# Patient Record
Sex: MALE | Race: WHITE | NOT HISPANIC OR LATINO | Employment: OTHER | URBAN - METROPOLITAN AREA
[De-identification: names, ages, dates, MRNs, and addresses within clinical notes are randomized per-mention and may not be internally consistent; named-entity substitution may affect disease eponyms.]

---

## 2017-01-03 ENCOUNTER — OFFICE VISIT (OUTPATIENT)
Dept: PAIN MEDICINE | Facility: CLINIC | Age: 52
End: 2017-01-03
Payer: COMMERCIAL

## 2017-01-03 VITALS
WEIGHT: 213.19 LBS | SYSTOLIC BLOOD PRESSURE: 148 MMHG | DIASTOLIC BLOOD PRESSURE: 102 MMHG | BODY MASS INDEX: 28.25 KG/M2 | HEIGHT: 73 IN | HEART RATE: 83 BPM

## 2017-01-03 DIAGNOSIS — M79.10 MYALGIA: ICD-10-CM

## 2017-01-03 DIAGNOSIS — M50.30 DDD (DEGENERATIVE DISC DISEASE), CERVICAL: ICD-10-CM

## 2017-01-03 DIAGNOSIS — M54.12 CERVICAL RADICULITIS: ICD-10-CM

## 2017-01-03 DIAGNOSIS — Z98.1 S/P CERVICAL SPINAL FUSION: ICD-10-CM

## 2017-01-03 DIAGNOSIS — F11.90 CHRONIC, CONTINUOUS USE OF OPIOIDS: Primary | ICD-10-CM

## 2017-01-03 DIAGNOSIS — M47.812 FACET ARTHROPATHY, CERVICAL: ICD-10-CM

## 2017-01-03 DIAGNOSIS — M62.50 DISUSE MUSCLE ATROPHY: ICD-10-CM

## 2017-01-03 PROCEDURE — 99999 PR PBB SHADOW E&M-EST. PATIENT-LVL III: CPT | Mod: PBBFAC,,, | Performed by: NURSE PRACTITIONER

## 2017-01-03 PROCEDURE — 99213 OFFICE O/P EST LOW 20 MIN: CPT | Mod: S$GLB,,, | Performed by: NURSE PRACTITIONER

## 2017-01-03 PROCEDURE — 80307 DRUG TEST PRSMV CHEM ANLYZR: CPT

## 2017-01-03 PROCEDURE — 1159F MED LIST DOCD IN RCRD: CPT | Mod: S$GLB,,, | Performed by: NURSE PRACTITIONER

## 2017-01-03 RX ORDER — METHOCARBAMOL 500 MG/1
500 TABLET, FILM COATED ORAL 3 TIMES DAILY
Qty: 90 TABLET | Refills: 2 | Status: SHIPPED | OUTPATIENT
Start: 2017-01-03 | End: 2017-04-03

## 2017-01-03 RX ORDER — TRAMADOL HYDROCHLORIDE 50 MG/1
50 TABLET ORAL EVERY 12 HOURS PRN
Qty: 60 TABLET | Refills: 1 | Status: SHIPPED | OUTPATIENT
Start: 2017-01-03 | End: 2017-03-03 | Stop reason: SDUPTHER

## 2017-01-03 NOTE — MR AVS SNAPSHOT
[facility]  [address]  [facility] [address]  Phone: [phone]  Fax: [phone]                  Sal Navarro   1/3/2017 10:00 AM   Office Visit    Description:  Male : 1965   Provider:  GORAN Haywood   Department:  [facility]           Reason for Visit     Neck Pain           Diagnoses this Visit        Comments    Chronic, continuous use of opioids    -  Primary     Facet arthropathy, cervical         DDD (degenerative disc disease), cervical         Cervical radiculitis         S/P cervical spinal fusion         Myalgia         Disuse muscle atrophy                To Do List           Future Appointments        Provider Department Dept Phone    2017 11:45 AM Torsten Fowler MD [facility] - Neurosurgery [facility] [phone]    3/3/2017 11:00 AM Kasandra Montoya MD [facility] [phone]      Goals (5 Years of Data)     None       These Medications        Disp Refills Start End    methocarbamol (ROBAXIN) 500 MG Tab 90 tablet 2 1/3/2017 4/3/2017    Take 1 tablet (500 mg total) by mouth 3 (three) times daily. - Oral    Ph[facility]: [facility] [facility] [place] [address] Ph #: [phone]       tramadol (ULTRAM) 50 mg tablet 60 tablet 1 1/3/2017 3/4/2017    Take 1 tablet (50 mg total) by mouth every 12 (twelve) hours as needed for Pain. - Oral    Ph[facility]: [facility] - [place], [address] Ph #: [phone]         [facility] On Call     [facility] On Call Nurse Care Line -  Assistance  Registered nurses in the [facility] On Call Center provide clinical advisement, health education, appointment booking, and other advisory services.  Call for this free service at [phone].             Medications           Message regarding Medications     Verify the changes and/or additions to your medication regime listed below are the same as discussed with your clinician today.  " If any of these changes or additions are incorrect, please notify your healthcare provider.        START taking these NEW medications        Refills    methocarbamol (ROBAXIN) 500 MG Tab 2    Sig: Take 1 tablet (500 mg total) by mouth 3 (three) times daily.    Class: Normal    Route: Oral    tramadol (ULTRAM) 50 mg tablet 1    Sig: Take 1 tablet (50 mg total) by mouth every 12 (twelve) hours as needed for Pain.    Class: Print    Route: Oral      STOP taking these medications     tizanidine (ZANAFLEX) 4 MG tablet Take 1 tablet (4 mg total) by mouth every 8 (eight) hours as needed.           Verify that the below list of medications is an accurate representation of the medications you are currently taking.  If none reported, the list may be blank. If incorrect, please contact your healthcare provider. Carry this list with you in case of emergency.           Current Medications     acetaminophen (TYLENOL) 500 MG tablet Take 500 mg by mouth every 6 (six) hours as needed for Pain.    amitriptyline (ELAVIL) 25 MG tablet Take 2 tablets (50 mg total) by mouth every evening.    cholecalciferol, vitamin D3, 5,000 unit Tab Take by mouth    meclizine (ANTIVERT) 25 mg tablet Take 25 mg by mouth.    melatonin 5 mg Tab Take by mouth.    meloxicam (MOBIC) 15 MG tablet Take 15 mg by mouth.    senna-docusate 8.6-50 mg (PERICOLACE) 8.6-50 mg per tablet Take 1 tablet by mouth once daily.    methocarbamol (ROBAXIN) 500 MG Tab Take 1 tablet (500 mg total) by mouth 3 (three) times daily.    tramadol (ULTRAM) 50 mg tablet Take 1 tablet (50 mg total) by mouth every 12 (twelve) hours as needed for Pain.           Clinical Reference Information           Vital Signs - Last Recorded  Most recent update: 1/3/2017  9:44 AM by Carri Francis MA    BP Pulse Ht Wt BMI    (!) 148/102 83 6' 1" (1.854 m) 96.7 kg (213 lb 3 oz) 28.13 kg/m2      Blood Pressure          Most Recent Value    BP  (!)  148/102      Allergies as of 1/3/2017     Pcn " [Penicillins]      Immunizations Administered on Date of Encounter - 1/3/2017     None      Orders Placed During Today's Visit      Normal Orders This Visit    HME - OTHER     Pain Clinic Drug Screen

## 2017-01-03 NOTE — PROGRESS NOTES
Subjective:     Patient ID: Sal Navarro is a 51 y.o. male.    Chief Complaint: Neck Pain    Consulted by: No ref. provider found     Disclaimer: This note was generated using voice recognition software.  There may be a typographical errors that were missed during proofreading.      HPI:    Sal Navarro is a 51 y.o. male who presents today s/p C4-7 ACDF with C5/6 PSIF in 2/2016 with residual chronic midline neck pain that radiates into his shoulder blades bilaterally, R>>L.  This is associated with bilateral hand numbness and tingling.  The hand symptoms did improve following the surgery.  The shooting pains in his arms resolved completely.   This pain is described in detail below.  His post-operative course was complicated by dysphagia that is being treated with speech therapy.    Interval History (9/23/2016):  He returns today for follow up.  He reports that the MELANIE was not helpful for the pain.  He hasn't noticed a difference with the amitriptyline.  He does notice a difference in his low back pain when he skips the meloxicam    Interval History (11/17/2016):  He returns today for follow up.  He reports that cervical RFAs were not very helpful for the pain. He is no longer taking Percocet or Tamazepam. He continues to take Mobic 15mg daily for his low back pain. He is taking elavil 25mg nightly without side effects. He is open to considering SCS. He reports poor sleep at night.    Interval History (1/3/2017):  The patient returns today for follow up of neck and shoulder pain.  His pain is located to his neck and surrounding areas.  He previously had limited benefit with RFAs, although he did have short term benefit with MBB.  He also had limited benefit with NGUYEN.  Dr. Montoya discussed cervical SCS trial with the patient, although he reports that he does not wish to pursue this option at this time.  He is scheduled to f/u with Dr. Fowler on 1/24/16.  He continues to participate in PT.  He did  previously have some benefit with a TENS unit at PT.  He is reporting some relief with Tramadol.  He also takes Zanaflex which helps but is very sedating.      Physical Therapy: Yes- outside facility in [place].    Non-pharmacologic Treatment: Ice and heat provide mild benefit.           · TENS? Yes at PT with benefit.    Pain Medications:         · Currently taking: Amitriptyline 50 mg QHS, Meloxicam 15 mg daily, Tylenol PRN, Tramadol 50 mg BID, and Zanaflex 4 mg PRN muscle pain (helpful but sedating)    · Has tried in the past:  Percocet (limited benefit and causes constipation treated with colace), Temazepam for sleep (he has had trouble sleeping since the surgery), Flexeril (no help in the past, both before and after the surgery), Gabapentin (after, unsure of duration of tx), Lyrica (sedation), Celebrex (GI upset)    · Has not tried: SNRIs, other muscle relaxants    Blood thinners: None    Interventional Therapies: None    Relevant Surgeries:   · C4-7 ACDF with C5/6 PSIF in 2/2016  · C7/T1 ILESI: 10-15% relief  · 10/4/16 Bilateral C4-7 MBB- short term benefit  · 10/11/16 Right C4-7 RFA- limited benefit  · 10/25/16 Left C4-7- limited benefit    Affecting sleep? Yes    Affecting daily activities? Yes  Depressive symptoms? Yes, due to general medical condition          · SI/HI? No    Work status: On short term disability from his job as an [employer] .    Pain Scales  Best: 3/10  Worst: 8/10  Usually: 5/10  Today: 6/10    Neck Pain    This is a new problem. Episode onset: february 2016. The problem occurs intermittently. The problem has been unchanged. The pain is present in the anterior neck. The quality of the pain is described as aching, shooting and stabbing. The pain is at a severity of 6/10. The pain is mild. The symptoms are aggravated by bending (turning). The pain is same all the time. Stiffness is present all day and in the morning. Associated symptoms include chest pain. He has tried oral  narcotics, NSAIDs and muscle relaxants for the symptoms. The treatment provided mild relief. Physical therapy was not tried and ineffective.      Review of Systems   Constitution: Negative.   HENT: Negative.    Eyes: Negative.    Cardiovascular: Positive for chest pain. Negative for dyspnea on exertion and irregular heartbeat.   Respiratory: Negative.    Endocrine: Negative.    Hematologic/Lymphatic: Negative.    Skin: Negative.    Musculoskeletal: Positive for neck pain and stiffness. Negative for back pain.   Gastrointestinal: Negative.    Genitourinary: Negative.    Neurological: Positive for dizziness and light-headedness. Negative for difficulty with concentration, loss of balance and seizures.   Psychiatric/Behavioral: Negative for altered mental status, depression and suicidal ideas. The patient has insomnia and is nervous/anxious.    Allergic/Immunologic: Negative.    All other systems reviewed and are negative.            Past Medical History   Diagnosis Date    Cataract        Past Surgical History   Procedure Laterality Date    Cataracts Bilateral     Spine surgery       cerival fusion     Rotator cuff repair Right     Acromioclavicular joint cyst excision Right        Review of patient's allergies indicates:   Allergen Reactions    Pcn [penicillins] Rash       Current Outpatient Prescriptions   Medication Sig Dispense Refill    acetaminophen (TYLENOL) 500 MG tablet Take 500 mg by mouth every 6 (six) hours as needed for Pain.      amitriptyline (ELAVIL) 25 MG tablet Take 2 tablets (50 mg total) by mouth every evening. 60 tablet 5    cholecalciferol, vitamin D3, 5,000 unit Tab Take by mouth      meclizine (ANTIVERT) 25 mg tablet Take 25 mg by mouth.      melatonin 5 mg Tab Take by mouth.      meloxicam (MOBIC) 15 MG tablet Take 15 mg by mouth.      senna-docusate 8.6-50 mg (PERICOLACE) 8.6-50 mg per tablet Take 1 tablet by mouth once daily.      tizanidine (ZANAFLEX) 4 MG tablet Take 1 tablet  "(4 mg total) by mouth every 8 (eight) hours as needed. 45 tablet 3     No current facility-administered medications for this visit.        Family History   Problem Relation Age of Onset    Heart disease Mother     Cancer Father        Social History     Social History    Marital status:      Spouse name: N/A    Number of children: N/A    Years of education: N/A     Occupational History    Not on file.     Social History Main Topics    Smoking status: Never Smoker    Smokeless tobacco: Not on file    Alcohol use No    Drug use: No    Sexual activity: Not on file     Other Topics Concern    Not on file     Social History Narrative       Objective:     Vitals:    01/03/17 0940   BP: (!) 148/102   Pulse: 83   Weight: 96.7 kg (213 lb 3 oz)   Height: 6' 1" (1.854 m)   PainSc:   7   PainLoc: Neck     GEN:  Well developed, well nourished.  No acute distress.  No pain behavior.  HEENT:  No trauma.  Mucous membranes moist.  Nares patent bilaterally.  PSYCH: Normal affect. Thought content appropriate.  CHEST:  Breathing symmetric.  No audible wheezing.  ABD: Soft, non-tender, non-distended.  SKIN:  Warm, pink, dry.  No rash on exposed areas.    EXT:  No cyanosis, clubbing, or edema.  No color change or changes in nail or hair growth.  Positive Tinel's over bilateral cubital tunnels. Positive Phalen to right wrist.  NEURO/MUSCULOSKELETAL:  Fully alert, oriented, and appropriate. Speech normal nella. No cranial nerve deficits.   Gait: Nonantalgic.  4/5 motor strength in bilateral deltoids; however, this may be due to pain.  Otherwise 5/5 motor strength throughout upper extremities.   Sensory: No sensory deficit in the upper extremities.   Reflexes: 1+ and symmetric throughout.  Negative Akins's bilaterally.  C-Spine:  Decreased ROM with pain on extension> flexion.  Limited extension and lateral rotation.  Positive facet loading bilaterally.  Negative Spurling's bilaterally.    No TTP over shoulders, elbows " or hands.    TTP over cervical facet joints and paraspinal muscles bilaterally.  Pain with palpation to right trapezius muscle and bilateral rhomboid muscles.    Imaging:      Diagnostic Results:  All imaging was independently reviewed by me.  Below is a summary from Dr Fowler's note.  I concur with the below findings.     MRI T-spine, dated 8/8/16:  1. No significant central or neuroforaminal stenosis     MRI C-spine, dated 8/8/16:  1. No significant stenosis      Flex/Ex X-ray C-spine, dated 7/19/16:  1. No prevertebral swelling  2. No gross instability   3. Good hardware position     MRI C-spine from an outside facility, dated 1/28/2016:  1. Moderate to severe DDD  2. Disc osteophyte complex, worst at C5/6   3. Moderate stenosis at C4/5 and C6/7      CT C-spine from an outside facility, dated 4/2015:  1. Diffuse cervical spondylosis   2. DDD, worst at C5/6, with some ossification of the PLL at C5/6      CT C-spine from an outside facility, dated 3/17/2016:  1. Fusion surgery at C5/6  2. Hardware in good position       AP and Lateral X-ray C-spine from an outside facility:  1. C4-7 ACDF  2. Hardware in good position  3. Spine in good alignment    Assessment:     Encounter Diagnoses   Name Primary?    Facet arthropathy, cervical     DDD (degenerative disc disease), cervical     Cervical radiculitis     S/P cervical spinal fusion     Myalgia     Disuse muscle atrophy     Chronic, continuous use of opioids Yes       Plan:     Sal was seen today for neck pain.    Diagnoses and all orders for this visit:    Chronic, continuous use of opioids  -     Pain Clinic Drug Screen    Facet arthropathy, cervical    DDD (degenerative disc disease), cervical    Cervical radiculitis    S/P cervical spinal fusion    Myalgia  -     HME - OTHER    Disuse muscle atrophy  -     HME - OTHER    Other orders  -     methocarbamol (ROBAXIN) 500 MG Tab; Take 1 tablet (500 mg total) by mouth 3 (three) times daily.  -     tramadol  (ULTRAM) 50 mg tablet; Take 1 tablet (50 mg total) by mouth every 12 (twelve) hours as needed for Pain.       His pain is consistent with the above.    We discussed the assessment and recommendations.  All available images were reviewed. We discussed the disease process, prognosis, treatment plan, and risks and benefits. The patient is aware of the risks and benefits of the medications being prescribed, common side effects, and proper usage. The following is the plan we agreed on:     1. We again discussed spinal cord stimulator which he declined at this time.  2. D/C Zanaflex as it is causing sedation.  Will trial Robaxin 500 mg TID PRN muscle pain. He can break in half if it is too sedating.  3. Continue Tramadol 50mg BID PRN pain, #60, 1 refill.  4. UDS was performed today.  Preliminary results are consistent with medications as prescribed and negative for illicit substances.  5. Continue Amitriptyline 50mg QHS   6. Continue Meloxicam 15 mg daily  7. Continue Tylenol as needed.  8. Keep appointment with Dr. Fowler on 1/24/16.  9. RTC in 2 months or sooner if needed.  Can consider TPIs at that time if muscular component persists.      The above plan and management options were discussed at length with patient. Patient is in agreement with the above and verbalized understanding.       GORAN Yoder  01/03/2017    Ortho/SPM Exam

## 2017-01-24 ENCOUNTER — HOSPITAL ENCOUNTER (OUTPATIENT)
Dept: RADIOLOGY | Facility: HOSPITAL | Age: 52
Discharge: HOME | End: 2017-01-24
Attending: NEUROLOGICAL SURGERY
Payer: COMMERCIAL

## 2017-01-24 ENCOUNTER — OFFICE VISIT (OUTPATIENT)
Dept: NEUROSURGERY | Facility: CLINIC | Age: 52
End: 2017-01-24
Payer: COMMERCIAL

## 2017-01-24 VITALS
BODY MASS INDEX: 28.49 KG/M2 | TEMPERATURE: 98 F | SYSTOLIC BLOOD PRESSURE: 126 MMHG | HEART RATE: 92 BPM | HEIGHT: 73 IN | DIASTOLIC BLOOD PRESSURE: 84 MMHG | WEIGHT: 215 LBS

## 2017-01-24 DIAGNOSIS — M54.2 CERVICALGIA: ICD-10-CM

## 2017-01-24 DIAGNOSIS — M54.2 CERVICALGIA: Primary | ICD-10-CM

## 2017-01-24 PROCEDURE — 1159F MED LIST DOCD IN RCRD: CPT | Mod: S$GLB,,, | Performed by: NEUROLOGICAL SURGERY

## 2017-01-24 PROCEDURE — 72052 X-RAY EXAM NECK SPINE 6/>VWS: CPT | Mod: 26,,, | Performed by: RADIOLOGY

## 2017-01-24 PROCEDURE — 99999 PR PBB SHADOW E&M-EST. PATIENT-LVL III: CPT | Mod: PBBFAC,,, | Performed by: NEUROLOGICAL SURGERY

## 2017-01-24 PROCEDURE — 99214 OFFICE O/P EST MOD 30 MIN: CPT | Mod: S$GLB,,, | Performed by: NEUROLOGICAL SURGERY

## 2017-01-24 PROCEDURE — 72052 X-RAY EXAM NECK SPINE 6/>VWS: CPT | Mod: TC

## 2017-01-24 RX ORDER — MELOXICAM 15 MG/1
TABLET ORAL
COMMUNITY
End: 2018-06-28 | Stop reason: SDUPTHER

## 2017-01-24 RX ORDER — VALSARTAN 80 MG/1
40 TABLET ORAL DAILY
Refills: 1 | COMMUNITY
Start: 2017-01-09

## 2017-01-24 NOTE — PROGRESS NOTES
"CHIEF COMPLAINT:  Follow up    I, Valerie De La Fuente, am scribing for, and in the presence of, Dr. Fowler.    HPI:  Sal Navarro is a 52 y.o.  male, who presents today for evaluation of neck pain. Pt reports continued pain in the trapezius region. The trapezius muscles are tender to palpation. Additionally, movement exacerbates the pain. Pt notes numbness and paresthesia of the right hand, which he experienced prior to surgery. Pt reports temporary relief with radiofrequency ablation and medial branch block. Pt also reports chest pain near the sternum, especially with movement of the head. Pt notes "popping" in the neck. Lying down alleviates the pain. Pt reports no relief of pain with a cervical collar.    Review of patient's allergies indicates:   Allergen Reactions    Pcn [penicillins] Rash       Past Medical History   Diagnosis Date    Cataract      Past Surgical History   Procedure Laterality Date    Cataracts Bilateral     Spine surgery       cerival fusion     Rotator cuff repair Right     Acromioclavicular joint cyst excision Right     Radiofrequency ablation  10/2016     cervical spine/[place]     Family History   Problem Relation Age of Onset    Heart disease Mother     Cancer Father      Social History   Substance Use Topics    Smoking status: Never Smoker    Smokeless tobacco: None    Alcohol use No        Review of Systems   Constitutional: Negative.    HENT: Negative.    Eyes: Negative.    Respiratory: Negative.    Cardiovascular: Negative.    Gastrointestinal: Negative.    Endocrine: Negative.    Genitourinary: Negative.    Musculoskeletal: Positive for neck pain. Negative for back pain, gait problem and myalgias.        Chest/sternum pain with movement   Skin: Negative.    Allergic/Immunologic: Negative.    Neurological: Positive for numbness (right hand). Negative for weakness, light-headedness and headaches.        Paresthesia right hand   Hematological: Negative.  "   Psychiatric/Behavioral: Negative.        OBJECTIVE:   Vital Signs:  Temp: 97.6 °F (36.4 °C) (01/24/17 1214)  Pulse: 92 (01/24/17 1214)  BP: 126/84 (01/24/17 1214)    Physical Exam:    Vital signs: All nursing notes and vital signs reviewed -- afebrile, vital signs stable.  Constitutional: Patient sitting comfortably in chair. Appears well developed and well nourished.  Skin: Exposed areas are intact without abnormal markings, rashes or other lesions.  HEENT: Normocephalic. Normal conjunctivae.  Cardiovascular: Normal rate and regular rhythm.  Respiratory: Chest wall rises and falls symmetrically, without signs of respiratory distress.  Abdomen: Soft and non-tender.  Extremities: Warm and without edema. Calves supple, non-tender.  Psych/Behavior: Normal affect.    Neurological:    Mental status: Alert and oriented. Conversational and appropriate.       Cranial Nerves: Grossly intact.    Motor:    Upper:  Deltoids Triceps Biceps WE WF     R 5/5 5/5 5/5 5/5 5/5 5/5    L 5/5 5/5 5/5 5/5 5/5 5/5      Lower:  HF KE KF DF PF EHL    R 5/5 5/5 5/5 5/5 5/5 5/5    L 5/5 5/5 5/5 5/5 5/5 5/5     Sensory: Intact sensation to light touch in all extremities. Romberg negative.    Reflexes:          DTR: 2+ symmetrically throughout.     Akins's: Negative.     Babinski's: Negative.     Clonus: Negative.    Cerebellar: Finger-to-nose and rapid alternating movements normal. Gait stable, fluid.    Spine:    Posture: Head well aligned over pelvis in front and side views.  No focal or global spinal deformity visible on inspection. Shoulders and hips even. No obvious leg length discrepancy. No scapula winging.    Bending: Pain limited ROM in all directions. No rib prominence with forward bend.     Cervical:      ROM: Full with flexion, extension, lateral rotation and ear-to-shoulder bend.      Midline TTP: Positive     Midline TTP: Negative.     Spurling's test: Negative.     Lhermitte's: Negative.    Thoracic:     Midline TTP:  Negative    Lumbar:     Midline TTP: Negative     Straight Leg Test: Negative     Crossed Straight Leg Test: Negative     Sciatic notch tenderness: Negative.    Other:     SI joint TTP: Negative.     Greater trochanter TTP: Negative.     Tenderness with external/internal hip rotation: Negative.    Diagnostic Results:  All imaging was independently reviewed by me.    No new imaging for my review.    CT C-spine, dated 10/11/2016:  1. Evidence of early stage fusion  2. Good hardware position and spinal alignment    ASSESSMENT/PLAN:     Sal Navarro has chronic cervicalgia related to facet arthropathy. He has had no gross cervical instability there in the past; however, we will repeat the Flex/Ex X-ray C-spine today. I recommend continued physical therapy and work with pain management. He will follow up with me as needed.    The patient understands and agrees with the plan of care. All questions were answered.     1. Flex/Ex X-ray C-spine  2. Referral to physical therapy  3. RTC PRN    I, Dr. Fowler, personally performed the services described in this documentation as scribed by Valerie De La Fuente in my presence, and it is both accurate and complete.      Torsten Fowler M.D.  Department of Neurosurgery  [facility]

## 2017-01-24 NOTE — LETTER
January 24, 2017      Marcelino Collazo MD  [address] [facility] [place] [address]           [facility] - Neurosurgery 7th Fl  [address]  [facility] [address]  Phone: [phone]          Patient: Sal Navarro   MR Number: [id number]   YOB: 1965   Date of Visit: 1/24/2017       Dear Dr. Marcelino Collazo:    Thank you for referring Sal Navarro to me for evaluation. Attached you will find relevant portions of my assessment and plan of care.    If you have questions, please do not hesitate to call me. I look forward to following Sal Navarro along with you.    Sincerely,    Torsten Fowler MD    Enclosure  CC:  No Recipients    If you would like to receive this communication electronically, please contact [email or web address] or [phone] to request more information on [employer] access.    For providers and/or their staff who would like to refer a patient to [facility], please contact us through our one-stop-shop provider referral line, [facility], at [phone].    If you feel you have received this communication in error or would no longer like to receive these types of communications, please e-mail [email or web address]

## 2017-02-09 ENCOUNTER — TELEPHONE (OUTPATIENT)
Dept: NEUROSURGERY | Facility: CLINIC | Age: 52
End: 2017-02-09

## 2017-02-09 DIAGNOSIS — M54.2 CERVICALGIA: Primary | ICD-10-CM

## 2017-02-16 ENCOUNTER — PATIENT MESSAGE (OUTPATIENT)
Dept: NEUROSURGERY | Facility: CLINIC | Age: 52
End: 2017-02-16

## 2017-02-22 ENCOUNTER — TELEPHONE (OUTPATIENT)
Dept: NEUROSURGERY | Facility: CLINIC | Age: 52
End: 2017-02-22

## 2017-02-22 NOTE — TELEPHONE ENCOUNTER
Spoke with pt's wife. Pt and pt's wife had requested a referral to a spine surgeon and stated that they were under the impression that Dr. Fowler was a neurologist. Advised pt's wife that Dr. Fowler is a neurosurgeon. Pt's wife verbalized understanding.

## 2017-03-03 ENCOUNTER — OFFICE VISIT (OUTPATIENT)
Dept: PAIN MEDICINE | Facility: CLINIC | Age: 52
End: 2017-03-03
Attending: ANESTHESIOLOGY
Payer: COMMERCIAL

## 2017-03-03 VITALS
HEIGHT: 73 IN | DIASTOLIC BLOOD PRESSURE: 93 MMHG | RESPIRATION RATE: 20 BRPM | SYSTOLIC BLOOD PRESSURE: 124 MMHG | TEMPERATURE: 98 F | HEART RATE: 76 BPM

## 2017-03-03 DIAGNOSIS — Z98.1 S/P CERVICAL SPINAL FUSION: ICD-10-CM

## 2017-03-03 DIAGNOSIS — M62.838 CERVICAL PARASPINAL MUSCLE SPASM: ICD-10-CM

## 2017-03-03 DIAGNOSIS — M47.812 FACET ARTHRITIS OF CERVICAL REGION: ICD-10-CM

## 2017-03-03 DIAGNOSIS — F11.90 CHRONIC, CONTINUOUS USE OF OPIOIDS: ICD-10-CM

## 2017-03-03 DIAGNOSIS — M50.30 DDD (DEGENERATIVE DISC DISEASE), CERVICAL: Primary | ICD-10-CM

## 2017-03-03 DIAGNOSIS — M79.10 MYALGIA: ICD-10-CM

## 2017-03-03 PROCEDURE — 20553 NJX 1/MLT TRIGGER POINTS 3/>: CPT | Mod: S$GLB,,, | Performed by: ANESTHESIOLOGY

## 2017-03-03 PROCEDURE — 99999 PR PBB SHADOW E&M-EST. PATIENT-LVL III: CPT | Mod: PBBFAC,,, | Performed by: ANESTHESIOLOGY

## 2017-03-03 PROCEDURE — 1160F RVW MEDS BY RX/DR IN RCRD: CPT | Mod: S$GLB,,, | Performed by: ANESTHESIOLOGY

## 2017-03-03 PROCEDURE — 99214 OFFICE O/P EST MOD 30 MIN: CPT | Mod: 25,S$GLB,, | Performed by: ANESTHESIOLOGY

## 2017-03-03 RX ORDER — OMEPRAZOLE 20 MG/1
20 CAPSULE, DELAYED RELEASE ORAL DAILY
Refills: 1 | COMMUNITY
Start: 2017-02-16 | End: 2018-04-04 | Stop reason: SDUPTHER

## 2017-03-03 RX ORDER — BETAMETHASONE SODIUM PHOSPHATE AND BETAMETHASONE ACETATE 3; 3 MG/ML; MG/ML
3 INJECTION, SUSPENSION INTRA-ARTICULAR; INTRALESIONAL; INTRAMUSCULAR; SOFT TISSUE
Status: COMPLETED | OUTPATIENT
Start: 2017-03-03 | End: 2017-03-03

## 2017-03-03 RX ORDER — TRAMADOL HYDROCHLORIDE 50 MG/1
50 TABLET ORAL EVERY 6 HOURS PRN
Qty: 120 TABLET | Refills: 1 | Status: SHIPPED | OUTPATIENT
Start: 2017-03-03 | End: 2017-05-01 | Stop reason: SDUPTHER

## 2017-03-03 RX ORDER — BUPIVACAINE HYDROCHLORIDE 5 MG/ML
5.5 INJECTION, SOLUTION EPIDURAL; INTRACAUDAL
Status: COMPLETED | OUTPATIENT
Start: 2017-03-03 | End: 2017-03-03

## 2017-03-03 RX ADMIN — BETAMETHASONE SODIUM PHOSPHATE AND BETAMETHASONE ACETATE 3 MG: 3; 3 INJECTION, SUSPENSION INTRA-ARTICULAR; INTRALESIONAL; INTRAMUSCULAR; SOFT TISSUE at 04:03

## 2017-03-03 RX ADMIN — BUPIVACAINE HYDROCHLORIDE 27.5 MG: 5 INJECTION, SOLUTION EPIDURAL; INTRACAUDAL at 04:03

## 2017-03-03 NOTE — MR AVS SNAPSHOT
[facility]  [address]  [facility] [address]  Phone: [phone]  Fax: [phone]                  Sal Navarro   3/3/2017 11:00 AM   Office Visit    Description:  Male : 1965   Provider:  Kasandra Montoya MD   Department:  [facility]           Reason for Visit     Neck Pain           Diagnoses this Visit        Comments    DDD (degenerative disc disease), cervical    -  Primary     Facet arthritis of cervical region         S/P cervical spinal fusion         Myalgia         Cervical paraspinal muscle spasm         Chronic, continuous use of opioids                To Do List           Goals (5 Years of Data)     None       These Medications        Disp Refills Start End    tramadol (ULTRAM) 50 mg tablet 120 tablet 1 3/3/2017 2017    Take 1 tablet (50 mg total) by mouth every 6 (six) hours as needed for Pain. - Oral    Ph[facility]acy: [facility] [address] - [facility] [address] #: [phone]         [facility] On Call     [facility] On Call Nurse [facility] -  Assistance  Registered nurses in the [facility] provide clinical advisement, health education, appointment booking, and other advisory services.  Call for this free service at [phone].             Medications           Message regarding Medications     Verify the changes and/or additions to your medication regime listed below are the same as discussed with your clinician today.  If any of these changes or additions are incorrect, please notify your healthcare provider.        CHANGE how you are taking these medications     Start Taking Instead of    tramadol (ULTRAM) 50 mg tablet tramadol (ULTRAM) 50 mg tablet    Dosage:  Take 1 tablet (50 mg total) by mouth every 6 (six) hours as needed for Pain. Dosage:  Take 1 tablet (50 mg total) by mouth every 12 (twelve) hours as needed for Pain.    Reason for Change:  Reorder            Verify that the below list  "of medications is an accurate representation of the medications you are currently taking.  If none reported, the list may be blank. If incorrect, please contact your healthcare provider. Carry this list with you in case of emergency.           Current Medications     acetaminophen (TYLENOL) 500 MG tablet Take 500 mg by mouth every 6 (six) hours as needed for Pain.    amitriptyline (ELAVIL) 25 MG tablet Take 2 tablets (50 mg total) by mouth every evening.    cholecalciferol, vitamin D3, 5,000 unit Tab Take by mouth    FOLIC ACID/MULTIVIT-MIN/LUTEIN (CENTRUM SILVER ORAL) Take by mouth once daily.    meclizine (ANTIVERT) 25 mg tablet Take 25 mg by mouth.    melatonin 5 mg Tab Take by mouth.    meloxicam (MOBIC) 15 MG tablet Take 15 mg by mouth daily    methocarbamol (ROBAXIN) 500 MG Tab Take 1 tablet (500 mg total) by mouth 3 (three) times daily.    senna-docusate 8.6-50 mg (PERICOLACE) 8.6-50 mg per tablet Take 1 tablet by mouth once daily.    tramadol (ULTRAM) 50 mg tablet Take 1 tablet (50 mg total) by mouth every 6 (six) hours as needed for Pain.    valsartan (DIOVAN) 80 MG tablet Take 80 mg by mouth once daily.    omeprazole (PRILOSEC) 20 MG capsule Take 20 mg by mouth once daily.           Clinical Reference Information           Your Vitals Were     BP Pulse Temp Resp Height       124/93 76 97.6 °F (36.4 °C) (Oral) 20 6' 1" (1.854 m)       Blood Pressure          Most Recent Value    BP  (!)  124/93      Allergies as of 3/3/2017     Pcn [Penicillins]      Immunizations Administered on Date of Encounter - 3/3/2017     None      Orders Placed During Today's Visit      Normal Orders This Visit    Ambulatory consult to Physical Therapy       Language Assistance Services     ATTENTION: Language assistance services are available, free of charge. Please call [phone].      ATENCIÓN: Si shanti carolina, tiene a jimenez disposición servicios gratuitos de asistencia lingüística. Llame al [phone].     CHÚ Ý: N?u b?n " nói Ti?ng Vi?t, có các d?ch v? h? tr? ngôn ng? mi?n phí dành cho b?n. G?i s? [phone].         [facility] complies with applicable Federal civil rights laws and does not discriminate on the basis of race, color, national origin, age, disability, or sex.

## 2017-03-03 NOTE — PROGRESS NOTES
Subjective:     Patient ID: Sal Navarro is a 52 y.o. male.    Chief Complaint: Neck Pain    Consulted by: No ref. provider found     Disclaimer: This note was generated using voice recognition software.  There may be a typographical errors that were missed during proofreading.      HPI:    Sal Navarro is a 52 y.o. male who presents today s/p C4-7 ACDF with C5/6 PSIF in 2/2016 with residual chronic midline neck pain that radiates into his shoulder blades bilaterally, R>>L.  This is associated with bilateral hand numbness and tingling.  The hand symptoms did improve following the surgery.  The shooting pains in his arms resolved completely.   This pain is described in detail below.  His post-operative course was complicated by dysphagia that is being treated with speech therapy.    Interval History (9/23/2016):  He returns today for follow up.  He reports that the MELANIE was not helpful for the pain.  He hasn't noticed a difference with the amitriptyline.  He does notice a difference in his low back pain when he skips the meloxicam    Interval History (11/17/2016):  He returns today for follow up.  He reports that cervical RFAs were not very helpful for the pain. He is no longer taking Percocet or Tamazepam. He continues to take Mobic 15mg daily for his low back pain. He is taking elavil 25mg nightly without side effects. He is open to considering SCS. He reports poor sleep at night.    Interval History (1/3/2017):  The patient returns today for follow up of neck and shoulder pain.  His pain is located to his neck and surrounding areas.  He previously had limited benefit with RFAs, although he did have short term benefit with MBB.  He also had limited benefit with NGUYEN.  Dr. Montoya discussed cervical SCS trial with the patient, although he reports that he does not wish to pursue this option at this time.  He is scheduled to f/u with Dr. Fowler on 1/24/16.  He continues to participate in PT.  He did  previously have some benefit with a TENS unit at PT.  He is reporting some relief with Tramadol.  He also takes Zanaflex which helps but is very sedating.      Interval History (3/3/2017):  The patient returns for follow up of neck and shoulder pain. He continues to take Tramadol BID with benefit. The medication decreases his pain slightly. He also reports benefit with Robaxin. He continues to take Mobic with benefit. He recently stopped physical therapy and reports increased pain into his shoulders, despite a home exercise plan.     Of note, he recently had an endoscopy that showed stage 3 gastritis, for which he was started on Prilosec    Physical Therapy: Yes- outside facility in [place].    Non-pharmacologic Treatment: Ice and heat provide mild benefit.           · TENS? Yes at PT with benefit.    Pain Medications:         · Currently taking: Amitriptyline 50 mg QHS, Meloxicam 15 mg daily, Tylenol PRN, Tramadol 50 mg BID, and Robaxin    · Has tried in the past:  Percocet (limited benefit and causes constipation treated with colace), Temazepam for sleep (he has had trouble sleeping since the surgery), Flexeril (no help in the past, both before and after the surgery), Gabapentin (after, unsure of duration of tx), Lyrica (sedation), Celebrex (GI upset), Zanaflex 4 mg PRN muscle pain (helpful but sedating)    · Has not tried: SNRIs, other muscle relaxants    Blood thinners: None    Interventional Therapies: None    Relevant Surgeries:   · C4-7 ACDF with C5/6 PSIF in 2/2016  · C7/T1 ILESI: 10-15% relief  · 10/4/16 Bilateral C4-7 MBB- short term benefit  · 10/11/16 Right C4-7 RFA- limited benefit  · 10/25/16 Left C4-7- limited benefit    Affecting sleep? Yes    Affecting daily activities? Yes  Depressive symptoms? Yes, due to general medical condition          · SI/HI? No    Work status: On short term disability from his job as an [employer] .    Pain Scales  Best: 4/10  Worst: 9/10  Usually: 7/10  Today:  6/10    Neck Pain    This is a new problem. Episode onset: february 2016. The problem occurs intermittently. The problem has been unchanged. The pain is present in the anterior neck. The quality of the pain is described as aching, shooting and stabbing. The pain is at a severity of 6/10. The pain is mild. The symptoms are aggravated by bending (turning). The pain is same all the time. Stiffness is present all day and in the morning. Associated symptoms include chest pain. He has tried oral narcotics, NSAIDs and muscle relaxants for the symptoms. The treatment provided mild relief. Physical therapy was not tried and ineffective.      Review of Systems   Constitution: Negative.   HENT: Negative.    Eyes: Negative.    Cardiovascular: Positive for chest pain. Negative for dyspnea on exertion and irregular heartbeat.   Respiratory: Negative.    Endocrine: Negative.    Hematologic/Lymphatic: Negative.    Skin: Negative.    Musculoskeletal: Positive for neck pain and stiffness. Negative for back pain.   Gastrointestinal: Negative.    Genitourinary: Negative.    Neurological: Positive for dizziness and light-headedness. Negative for difficulty with concentration, loss of balance and seizures.   Psychiatric/Behavioral: Negative for altered mental status, depression and suicidal ideas. The patient has insomnia and is nervous/anxious.    Allergic/Immunologic: Negative.    All other systems reviewed and are negative.            Past Medical History:   Diagnosis Date    Cataract        Past Surgical History:   Procedure Laterality Date    ACROMIOCLAVICULAR JOINT CYST EXCISION Right     cataracts Bilateral     RADIOFREQUENCY ABLATION  10/2016    cervical spine/Montoya    ROTATOR CUFF REPAIR Right     SPINE SURGERY      cerival fusion        Review of patient's allergies indicates:   Allergen Reactions    Pcn [penicillins] Rash       Current Outpatient Prescriptions   Medication Sig Dispense Refill    acetaminophen (TYLENOL)  "500 MG tablet Take 500 mg by mouth every 6 (six) hours as needed for Pain.      amitriptyline (ELAVIL) 25 MG tablet Take 2 tablets (50 mg total) by mouth every evening. 60 tablet 5    cholecalciferol, vitamin D3, 5,000 unit Tab Take by mouth      FOLIC ACID/MULTIVIT-MIN/LUTEIN (CENTRUM SILVER ORAL) Take by mouth once daily.      meclizine (ANTIVERT) 25 mg tablet Take 25 mg by mouth.      melatonin 5 mg Tab Take by mouth.      meloxicam (MOBIC) 15 MG tablet Take 15 mg by mouth daily      methocarbamol (ROBAXIN) 500 MG Tab Take 1 tablet (500 mg total) by mouth 3 (three) times daily. 90 tablet 2    senna-docusate 8.6-50 mg (PERICOLACE) 8.6-50 mg per tablet Take 1 tablet by mouth once daily.      tramadol (ULTRAM) 50 mg tablet Take 1 tablet (50 mg total) by mouth every 12 (twelve) hours as needed for Pain. 60 tablet 1    valsartan (DIOVAN) 80 MG tablet Take 80 mg by mouth once daily.  1    omeprazole (PRILOSEC) 20 MG capsule Take 20 mg by mouth once daily.  1     No current facility-administered medications for this visit.        Family History   Problem Relation Age of Onset    Heart disease Mother     Cancer Father        Social History     Social History    Marital status:      Spouse name: N/A    Number of children: N/A    Years of education: N/A     Occupational History    Not on file.     Social History Main Topics    Smoking status: Never Smoker    Smokeless tobacco: Not on file    Alcohol use No    Drug use: No    Sexual activity: Not on file     Other Topics Concern    Not on file     Social History Narrative       Objective:     Vitals:    03/03/17 1059   BP: (!) 124/93   Pulse: 76   Resp: 20   Temp: 97.6 °F (36.4 °C)   TempSrc: Oral   Height: 6' 1" (1.854 m)   PainSc:   6   PainLoc: Neck     GEN:  Well developed, well nourished.  No acute distress.  No pain behavior.  HEENT:  No trauma.  Mucous membranes moist.  Nares patent bilaterally.  PSYCH: Normal affect. Thought content " appropriate.  CHEST:  Breathing symmetric.  No audible wheezing.  ABD: Soft, non-tender, non-distended.  SKIN:  Warm, pink, dry.  No rash on exposed areas.    EXT:  No cyanosis, clubbing, or edema.  No color change or changes in nail or hair growth.  Positive Tinel's over bilateral cubital tunnels. Positive Phalen to right wrist.  NEURO/MUSCULOSKELETAL:  Fully alert, oriented, and appropriate. Speech normal nella. No cranial nerve deficits.   Gait: Nonantalgic.  4/5 motor strength in bilateral deltoids; however, this may be due to pain.  Otherwise 5/5 motor strength throughout upper extremities.   Sensory: No sensory deficit in the upper extremities.   Reflexes: 1+ and symmetric throughout.  Negative Akins's bilaterally.  C-Spine:  Decreased ROM with pain on extension> flexion.  Limited extension and lateral rotation.  Positive facet loading bilaterally.  Negative Spurling's bilaterally.    No TTP over shoulders, elbows or hands.    TTP over cervical facet joints and paraspinal muscles bilaterally.      Imaging:      Diagnostic Results:  All imaging was independently reviewed by me.  Below is a summary from Dr Fowler's note.  I concur with the below findings.     MRI T-spine, dated 8/8/16:  1. No significant central or neuroforaminal stenosis     MRI C-spine, dated 8/8/16:  1. No significant stenosis      Flex/Ex X-ray C-spine, dated 7/19/16:  1. No prevertebral swelling  2. No gross instability   3. Good hardware position     MRI C-spine from an outside facility, dated 1/28/2016:  1. Moderate to severe DDD  2. Disc osteophyte complex, worst at C5/6   3. Moderate stenosis at C4/5 and C6/7      CT C-spine from an outside facility, dated 4/2015:  1. Diffuse cervical spondylosis   2. DDD, worst at C5/6, with some ossification of the PLL at C5/6      CT C-spine from an outside facility, dated 3/17/2016:  1. Fusion surgery at C5/6  2. Hardware in good position       AP and Lateral X-ray C-spine from an outside  facility:  1. C4-7 ACDF  2. Hardware in good position  3. Spine in good alignment    Assessment:     Encounter Diagnoses   Name Primary?    DDD (degenerative disc disease), cervical Yes    Facet arthritis of cervical region     S/P cervical spinal fusion     Myalgia     Cervical paraspinal muscle spasm     Chronic, continuous use of opioids        Plan:     Sal was seen today for neck pain.    Diagnoses and all orders for this visit:    DDD (degenerative disc disease), cervical  -     tramadol (ULTRAM) 50 mg tablet; Take 1 tablet (50 mg total) by mouth every 6 (six) hours as needed for Pain.  -     Ambulatory consult to Physical Therapy    Facet arthritis of cervical region  -     tramadol (ULTRAM) 50 mg tablet; Take 1 tablet (50 mg total) by mouth every 6 (six) hours as needed for Pain.  -     Ambulatory consult to Physical Therapy    S/P cervical spinal fusion  -     tramadol (ULTRAM) 50 mg tablet; Take 1 tablet (50 mg total) by mouth every 6 (six) hours as needed for Pain.  -     Ambulatory consult to Physical Therapy    Myalgia  -     tramadol (ULTRAM) 50 mg tablet; Take 1 tablet (50 mg total) by mouth every 6 (six) hours as needed for Pain.  -     Ambulatory consult to Physical Therapy    Cervical paraspinal muscle spasm  -     tramadol (ULTRAM) 50 mg tablet; Take 1 tablet (50 mg total) by mouth every 6 (six) hours as needed for Pain.  -     Ambulatory consult to Physical Therapy    Chronic, continuous use of opioids        His pain is consistent with the above.    We discussed the assessment and recommendations.  All available images were reviewed. We discussed the disease process, prognosis, treatment plan, and risks and benefits. The patient is aware of the risks and benefits of the medications being prescribed, common side effects, and proper usage. The following is the plan we agreed on:     1. TPIs as below  2. Schedule for repeat right then left C4-7 RFA, cooled  3. We again discussed spinal  cord stimulator which he declined at this time.  4. Continue Robaxin  5. Increase Tramadol 50mg to QID PRN pain, #120, 1 refill.  6. Previous UDS results are consistent with medications as prescribed and negative for illicit substances.  7. Continue Amitriptyline 50mg QHS   8. Continue Meloxicam 15 mg daily, benefits currently outweigh risks.  Will continue to reassess  9. Continue Tylenol as needed.  10. Keep appointment with Dr. Fowler on 1/24/16.  11. RTC in 2 months or sooner if needed.  Can consider TPIs at that time if muscular component persists.    Trigger Point Injection:   The procedure was discussed with the patient including complications of damage, bleeding, infection, and failure of pain relief.     All medications, allergies, and relevant histories were reviewed. No recent antibiotics or infections.  A time-out was taken to verify the correct patient, procedure, laterality, and appropriate medications/allergies.    Trigger points were identified by palpation and marked. CHG prep of sites done. A 27-gauge needle was advanced to the point of maximal tenderness, and 2 mL of a mixture of 0.5% bupivacaine with betamethasone 3 mg was injected after negative aspiration. All sites done in the same manner. Patient tolerated the procedure well and without complications. Sites injected included: right rhomboid, right lower trap, left deltoid     The patient tolerated the procedure well and was discharged in excellent condition.      The above plan and management options were discussed at length with patient. Patient is in agreement with the above and verbalized understanding.     Ortho/SPM Exam

## 2017-03-04 ENCOUNTER — PATIENT MESSAGE (OUTPATIENT)
Dept: PAIN MEDICINE | Facility: CLINIC | Age: 52
End: 2017-03-04

## 2017-03-14 ENCOUNTER — SURGERY (OUTPATIENT)
Age: 52
End: 2017-03-14

## 2017-03-14 ENCOUNTER — HOSPITAL ENCOUNTER (OUTPATIENT)
Facility: OTHER | Age: 52
Discharge: HOME | End: 2017-03-14
Attending: ANESTHESIOLOGY | Admitting: ANESTHESIOLOGY
Payer: COMMERCIAL

## 2017-03-14 VITALS
TEMPERATURE: 98 F | HEIGHT: 73 IN | WEIGHT: 215 LBS | HEART RATE: 74 BPM | RESPIRATION RATE: 16 BRPM | SYSTOLIC BLOOD PRESSURE: 121 MMHG | BODY MASS INDEX: 28.49 KG/M2 | DIASTOLIC BLOOD PRESSURE: 80 MMHG | OXYGEN SATURATION: 97 %

## 2017-03-14 DIAGNOSIS — M47.812 FACET ARTHRITIS OF CERVICAL REGION: Primary | ICD-10-CM

## 2017-03-14 PROCEDURE — 63600175 PHARM REV CODE 636 W HCPCS: Performed by: ANESTHESIOLOGY

## 2017-03-14 PROCEDURE — 64634 DESTROY C/TH FACET JNT ADDL: CPT | Mod: RT,,, | Performed by: ANESTHESIOLOGY

## 2017-03-14 PROCEDURE — 25500020 PHARM REV CODE 255: Performed by: ANESTHESIOLOGY

## 2017-03-14 PROCEDURE — 64633 DESTROY CERV/THOR FACET JNT: CPT | Mod: RT,,, | Performed by: ANESTHESIOLOGY

## 2017-03-14 PROCEDURE — 64634 DESTROY C/TH FACET JNT ADDL: CPT | Performed by: ANESTHESIOLOGY

## 2017-03-14 PROCEDURE — 25000003 PHARM REV CODE 250: Performed by: ANESTHESIOLOGY

## 2017-03-14 PROCEDURE — 99152 MOD SED SAME PHYS/QHP 5/>YRS: CPT | Mod: ,,, | Performed by: ANESTHESIOLOGY

## 2017-03-14 PROCEDURE — 64633 DESTROY CERV/THOR FACET JNT: CPT | Performed by: ANESTHESIOLOGY

## 2017-03-14 RX ORDER — BUPIVACAINE HYDROCHLORIDE 5 MG/ML
3 INJECTION, SOLUTION EPIDURAL; INTRACAUDAL ONCE
Status: DISCONTINUED | OUTPATIENT
Start: 2017-03-14 | End: 2017-03-14 | Stop reason: HOSPADM

## 2017-03-14 RX ORDER — FENTANYL CITRATE 50 UG/ML
50 INJECTION, SOLUTION INTRAMUSCULAR; INTRAVENOUS ONCE
Status: DISCONTINUED | OUTPATIENT
Start: 2017-03-14 | End: 2017-03-14 | Stop reason: HOSPADM

## 2017-03-14 RX ORDER — LIDOCAINE HYDROCHLORIDE 20 MG/ML
5 INJECTION, SOLUTION INFILTRATION; PERINEURAL
Status: COMPLETED | OUTPATIENT
Start: 2017-03-14 | End: 2017-03-14

## 2017-03-14 RX ORDER — DEXAMETHASONE SODIUM PHOSPHATE 10 MG/ML
10 INJECTION INTRAMUSCULAR; INTRAVENOUS ONCE
Status: COMPLETED | OUTPATIENT
Start: 2017-03-14 | End: 2017-03-14

## 2017-03-14 RX ORDER — SODIUM CHLORIDE 9 MG/ML
INJECTION, SOLUTION INTRAVENOUS CONTINUOUS
Status: DISCONTINUED | OUTPATIENT
Start: 2017-03-14 | End: 2017-03-14 | Stop reason: HOSPADM

## 2017-03-14 RX ORDER — MIDAZOLAM HYDROCHLORIDE 1 MG/ML
INJECTION INTRAMUSCULAR; INTRAVENOUS
Status: DISCONTINUED | OUTPATIENT
Start: 2017-03-14 | End: 2017-03-14 | Stop reason: HOSPADM

## 2017-03-14 RX ORDER — BUPIVACAINE HYDROCHLORIDE 5 MG/ML
INJECTION, SOLUTION EPIDURAL; INTRACAUDAL
Status: DISCONTINUED | OUTPATIENT
Start: 2017-03-14 | End: 2017-03-14 | Stop reason: HOSPADM

## 2017-03-14 RX ORDER — FENTANYL CITRATE 50 UG/ML
INJECTION, SOLUTION INTRAMUSCULAR; INTRAVENOUS
Status: DISCONTINUED | OUTPATIENT
Start: 2017-03-14 | End: 2017-03-14 | Stop reason: HOSPADM

## 2017-03-14 RX ORDER — MIDAZOLAM HYDROCHLORIDE 1 MG/ML
2 INJECTION INTRAMUSCULAR; INTRAVENOUS CONTINUOUS PRN
Status: DISCONTINUED | OUTPATIENT
Start: 2017-03-14 | End: 2017-03-14 | Stop reason: HOSPADM

## 2017-03-14 RX ORDER — SODIUM CHLORIDE 9 MG/ML
3 INJECTION, SOLUTION INTRAMUSCULAR; INTRAVENOUS; SUBCUTANEOUS ONCE
Status: COMPLETED | OUTPATIENT
Start: 2017-03-14 | End: 2017-03-14

## 2017-03-14 RX ADMIN — FENTANYL CITRATE 25 MCG: 50 INJECTION, SOLUTION INTRAMUSCULAR; INTRAVENOUS at 02:03

## 2017-03-14 RX ADMIN — DEXAMETHASONE SODIUM PHOSPHATE 10 MG: 10 INJECTION, SOLUTION INTRAMUSCULAR; INTRAVENOUS at 02:03

## 2017-03-14 RX ADMIN — MIDAZOLAM HYDROCHLORIDE 1 MG: 1 INJECTION, SOLUTION INTRAMUSCULAR; INTRAVENOUS at 02:03

## 2017-03-14 RX ADMIN — SODIUM BICARBONATE 5 ML: 0.2 INJECTION, SOLUTION INTRAVENOUS at 02:03

## 2017-03-14 RX ADMIN — LIDOCAINE HYDROCHLORIDE 20 ML: 20 INJECTION, SOLUTION INFILTRATION; PERINEURAL at 02:03

## 2017-03-14 RX ADMIN — BUPIVACAINE HYDROCHLORIDE 10 ML: 5 INJECTION, SOLUTION EPIDURAL; INTRACAUDAL; PERINEURAL at 02:03

## 2017-03-14 RX ADMIN — SODIUM CHLORIDE: 0.9 INJECTION, SOLUTION INTRAVENOUS at 01:03

## 2017-03-14 RX ADMIN — SODIUM CHLORIDE 10 ML: 9 INJECTION, SOLUTION INTRAMUSCULAR; INTRAVENOUS; SUBCUTANEOUS at 02:03

## 2017-03-14 RX ADMIN — FENTANYL CITRATE 25 MCG: 50 INJECTION, SOLUTION INTRAMUSCULAR; INTRAVENOUS at 03:03

## 2017-03-14 RX ADMIN — IOHEXOL 50 ML: 300 INJECTION, SOLUTION INTRAVENOUS at 02:03

## 2017-03-14 RX ADMIN — MIDAZOLAM HYDROCHLORIDE 0.5 MG: 1 INJECTION, SOLUTION INTRAMUSCULAR; INTRAVENOUS at 02:03

## 2017-03-14 NOTE — IP AVS SNAPSHOT
[facility] Location ([facility])  [address] [place], [place] [id number]  Phone: [phone]           Patient Discharge Instructions     Our goal is to set you up for success. This packet includes information on your condition, medications, and your home care. It will help you to care for yourself so you don't get sicker and need to go back to the hospital.     Please ask your nurse if you have any questions.        There are many details to remember when preparing to leave the hospital. Here is what you will need to do:    1. Take your medicine. If you are prescribed medications, review your Medication List in the following pages. You may have new medications to  at the pharmacy and others that you'll need to stop taking. Review the instructions for how and when to take your medications. Talk with your doctor or nurses if you are unsure of what to do.     2. Go to your follow-up appointments. Specific follow-up information is listed in the following pages. Your may be contacted by a transition nurse or clinical provider about future appointments. Be sure we have all of the phone numbers to reach you, if needed. Please contact your provider's office if you are unable to make an appointment.     3. Watch for warning signs. Your doctor or nurse will give you detailed warning signs to watch for and when to call for assistance. These instructions may also include educational information about your condition. If you experience any of warning signs to your health, call your doctor.               [facility]  Unless otherwise directed by your provider, please contact [facility], our nurse care line that is available for 24/7 assistance.     [phone] (toll-free)    Registered nurses in the [facility] provide clinical advisement, health education, appointment booking, and other advisory services.                    ** Verify the list of medication(s) below is accurate and up to  date. Carry this with you in case of emergency. If your medications have changed, please notify your healthcare provider.             Medication List      CONTINUE taking these medications        Additional Info                      acetaminophen 500 MG tablet   Commonly known as:  TYLENOL   Refills:  0   Dose:  500 mg    Instructions:  Take 500 mg by mouth every 6 (six) hours as needed for Pain.     Begin Date    AM    Noon    PM    Bedtime       amitriptyline 25 MG tablet   Commonly known as:  ELAVIL   Quantity:  60 tablet   Refills:  5   Dose:  50 mg    Instructions:  Take 2 tablets (50 mg total) by mouth every evening.     Begin Date    AM    Noon    PM    Bedtime       CENTRUM SILVER ORAL   Refills:  0    Instructions:  Take by mouth once daily.     Begin Date    AM    Noon    PM    Bedtime       cholecalciferol (vitamin D3) 5,000 unit Tab   Refills:  0    Instructions:  Take by mouth     Begin Date    AM    Noon    PM    Bedtime       meclizine 25 mg tablet   Commonly known as:  ANTIVERT   Refills:  0   Dose:  25 mg    Instructions:  Take 25 mg by mouth.     Begin Date    AM    Noon    PM    Bedtime       melatonin 5 mg Tab   Refills:  0    Instructions:  Take by mouth.     Begin Date    AM    Noon    PM    Bedtime       meloxicam 15 MG tablet   Commonly known as:  MOBIC   Refills:  0    Instructions:  Take 15 mg by mouth daily     Begin Date    AM    Noon    PM    Bedtime       methocarbamol 500 MG Tab   Commonly known as:  ROBAXIN   Quantity:  90 tablet   Refills:  2   Dose:  500 mg    Instructions:  Take 1 tablet (500 mg total) by mouth 3 (three) times daily.     Begin Date    AM    Noon    PM    Bedtime       omeprazole 20 MG capsule   Commonly known as:  PRILOSEC   Refills:  1   Dose:  20 mg    Instructions:  Take 20 mg by mouth once daily.     Begin Date    AM    Noon    PM    Bedtime       senna-docusate 8.6-50 mg 8.6-50 mg per tablet   Commonly known as:  PERICOLACE   Refills:  0   Dose:  1 tablet     Instructions:  Take 1 tablet by mouth once daily.     Begin Date    AM    Noon    PM    Bedtime       tramadol 50 mg tablet   Commonly known as:  ULTRAM   Quantity:  120 tablet   Refills:  1   Dose:  50 mg    Instructions:  Take 1 tablet (50 mg total) by mouth every 6 (six) hours as needed for Pain.     Begin Date    AM    Noon    PM    Bedtime       valsartan 80 MG tablet   Commonly known as:  DIOVAN   Refills:  1   Dose:  80 mg    Instructions:  Take 80 mg by mouth once daily.     Begin Date    AM    Noon    PM    Bedtime                  Please bring to all follow up appointments:    1. A copy of your discharge instructions.  2. All medicines you are currently taking in their original bottles.  3. Identification and insurance card.    Please arrive 15 minutes ahead of scheduled appointment time.    Please call 24 hours in advance if you must reschedule your appointment and/or time.        Your Scheduled Appointments     May 01, 2017 11:00 AM CDT   Established Patient Visit with Kasandra Montoya MD   [facility] ([facility])    [address] [facility] [address]   [phone]            May 25, 2017 11:15 AM CDT   Established Patient Visit with Kasandra Montoya MD   [facility] ([facility])    [address]  [place] [address]   [phone]              Your Future Surgeries/Procedures     Mar 28, 2017   Surgery with Kasandra Montoya MD   [facility] ([facility])    [address]  [place] [address]   [phone]                  Discharge Instructions       Home Care Instructions Pain Management:    1. DIET:   You may resume your normal diet today.   2. BATHING:   You may shower with luke warm water.  3. DRESSING:   You may remove your bandage today.   4. ACTIVITY LEVEL:   You may resume your normal activities 24 hrs after your procedure.  5. MEDICATIONS:   You may resume your normal medications today.   6. SPECIAL  INSTRUCTIONS:   No heat to the injection site for 24 hrs including, bath or shower, heating pad, moist heat, or hot tubs.    Use ice pack to injection site for any pain or discomfort.  Apply ice packs for 20 minute intervals as needed.   If you have received any sedatives by mouth today you may not drive for 12 hours.    If you have received any sedation through your IV, you may not drive for 24 hrs.     PLEASE CALL YOUR DOCTOR IF:  1. Redness or swelling around the injection site.  2. Fever of 101 degrees  3. Drainage (pus) from the injection site.  4. For any continuous bleeding (some dried blood over the incision is normal.)    FOR EMERGENCIES:   If any unusual problems or difficulties occur during clinic hours, call [phone] or [phone].   Medial Branch Neurotomy  Back or neck pain may be due to problems with certain nerves near your spine. If so, a medial branch neurotomy can help relieve your pain.  In some cases, your doctor may give you a nerve block to predict your response to neurotomy. The treatment uses heat, cold, radiofrequency, or chemicals to destroy the nerves near a problem joint. This keeps some pain messages from traveling to your brain, and helps relieve your symptoms.    Medial branch nerves  Each vertebra in your spine has facets (flat surfaces). They touch where the vertebrae fit together. This forms a facet joint. Each facet joint has at least two medial branch nerves. They are part of the nerve pathway to and from each facet joint. A facet joint in your back or neck can become inflamed (swollen and irritated). Pain messages may then travel along the nerve pathway from the facet joint to your brain.    Blocking pain messages  Medial branch nerves in each facet joint send and carry messages about back or neck pain. Destroying a few of these nerves can keep certain pain messages from reaching your brain. This can help bring you relief. The relief typically lasts for months to years.  Risks and  complications  Risks and complications are rare, but can include:  · Infection  · Increased pain, numbness, or weakness  · Nerve damage  · Bleeding  · Failure to relieve pain    © 7355-8425 [employer]. [address] [place] [address], [place] [address]. All rights reserved. This information is not intended as a substitute for professional medical care. Always follow your healthcare professional's instructions.      Medial Branch Neurotomy: Your Experience  Back or neck pain may be due to problems with certain nerves near your spine. If so, a medial branch neurotomy can help relieve your pain. The treatment uses heat, cold, radiofrequency, or chemicals to destroy the nerves near a problem joint. This keeps some pain messages from traveling to your brain, and helps relieve your symptoms.  The treatment is done in a hospital or outpatient department. Your healthcare provider will ask you to sign a consent form. He or she will examine you and may give you an IV (intravenous) line for fluids and medicines.      Relax at home for the rest of the day after your treatment, even if you feel good.    Getting ready for your treatment  · Ask your healthcare provider whether you should stop taking any medicines before treatment.  · Tell your provider if you are pregnant or allergic to any medicines.  · You may be asked to stop eating or drinking for several hours before you check in for your treatment.  During the procedure  You will lie on an exam table, most likely on your stomach depending on where the problem joint is.  · Your healthcare provider will clean the skin over the treatment site and then numb it with medicine.  · Your provider uses X-ray imaging (fluoroscopy) to help see your spine and guide the treatment. Your provider may inject a contrast dye into the affected region to help get a better image. If you are allergic to iodine or had a reaction to a dye, tell your healthcare provider.  · Your healthcare  provider uses heat, cold, or chemicals to destroy part of the nerve near the inflamed facet joint. Nearby nerves may also be treated.  After the procedure  Most often, you can go home shortly after the procedure, generally in about an hour. Have an adult friend or relative drive you. The treated spot may be swollen and may feel more sore than usual. This is normal and may last for a day or so. It will be a few days before you feel relief from your symptoms. Your provider may prescribe pain medicines for you during that time. Ask him or her when its OK for you to go back to work.  Call your healthcare provider if you have a fever over 100.4°F (38°C), chills, or redness or drainage at the treatment site.    © 6616-3512 [employer]. [address] [place] [address], [place] [address]. All rights reserved. This information is not intended as a substitute for professional medical care. Always follow your healthcare professional's instructions.            Home Care Instructions Pain Management:    1. DIET:   You may resume your normal diet today.   2. BATHING:   You may shower with luke warm water. No soaking in tub.  3. DRESSING:   You may remove your bandage today.   4. ACTIVITY LEVEL:   You may resume your normal activities 24 hrs after your procedure.  5. MEDICATIONS:   You may resume your normal medications today.   6. SPECIAL INSTRUCTIONS:   No heat to the injection site for 24 hrs including, bath or shower, heating pad, moist heat, or hot tubs.    Use ice pack to injection site for any pain or discomfort.  Apply ice packs for 20 minute intervals as needed.   If you have received any sedatives by mouth today you may not drive for 12 hours.    If you have received any sedation through your IV, you may not drive for 24 hrs.     PLEASE CALL YOUR DOCTOR IF:  1. Redness or swelling around the injection site.  2. Fever of 101 degrees  3. Drainage (pus) from the injection site.  4. For any continuous bleeding (some  "dried blood over the incision is normal.)  5. For severe headache that is relieved when lying flat.    FOR EMERGENCIES:   If any unusual problems or difficulties occur during clinic hours, call [phone] or [phone].         Admission Information     Date & Time Provider Department CSN    3/14/2017 12:49 PM Kasandra Montoya MD [facility] [id number]      Care Providers     Provider Role Specialty Primary office phone    Kasandra Montoya MD Attending Provider Pain Medicine [phone]    Kasandra Montoya MD Surgeon  Pain Medicine [phone]      Your Vitals Were     BP Pulse Temp Resp Height Weight    122/81 104 97.6 °F (36.4 °C) (Oral) 18 6' 1" (1.854 m) 97.5 kg (215 lb)    SpO2 BMI             100% 28.37 kg/m2         Recent Lab Values     No lab values to display.      Allergies as of 3/14/2017        Reactions    Pcn [Penicillins] Hives, Rash      Advance Directives     An advance directive is a document which, in the event you are no longer able to make decisions for yourself, tells your healthcare team what kind of treatment you do or do not want to receive, or who you would like to make those decisions for you.  If you do not currently have an advance directive, [facility] encourages you to create one.  For more information call:  [phone]), [phone] ([phone]),  or log on to [email or web address].        Language Assistance Services     ATTENTION: Language assistance services are available, free of charge. Please call [phone].      ATENCIÓN: Si habla español, tiene a jimenez disposición servicios gratuitos de asistencia lingüística. Llame al [phone].     CHÚ Ý: N?u b?n nói Ti?ng Vi?t, có các d?ch v? h? tr? ngôn ng? mi?n phí dành cho b?n. G?i s? [phone].         [facility] complies with applicable Federal civil rights laws and does not discriminate on the basis of race, color, national origin, age, disability, or sex.        "

## 2017-03-14 NOTE — OP NOTE
Date: 03/14/2017    Procedure: Cervical Conventional Radiofreqiency Ablation: Right C4-7    Pre-op diagnosis: Cervical facet arthritis    Post-op diagnosis: Same     Physician: Dr. Kasandra Montoya     Assistant: None    Anesthestia: local/IV sedation:  Versed 1.5 mg and fentanyl 100 mcg IV.  Conscious sedation provided by MD and monitored by RN.  Total sedation time was less than 60 minutes.    All medications, allergies, and relevant histories were reviewed. No recent antibiotics or infections.  A time-out was taken to verify the correct patient, procedure, laterality, and appropriate medications/allergies.    Cervical Medial Branch Block with conventional radiofrequency, level Right C4-7    The procedure risks, benefits, and possible complications were discussed with the patient including nerve damage, infection, spinal headache, and paresis. NIBP, pulse rate, and O2 per nasal cannula at 3L/min. were monitored throughout the procedure.      Patient was placed in the prone position. Skin was prepped with CHG and draped. Oblique view of the spine was obtained with fluoroscopy. Entry sites were marked over the skin and Xylocaine 1% was used to anesthetize the skin and subcutaneous tissues. A 22-gauge 3.5 inch curved tip, insulated, RF needle was introduced at an angle, and the needle was placed onto the lateral aspect of the mid articular pilar at each level.   Placement was confirmed with a fluoroscopic view.      At each level, a 50 Hz stimulus elicited sensory paresthesia with the following voltage:   C4: 0.6 Impedance: 270  C5: 0.5 Impedance: 278  C6: 0.8 Impedance: 262  C7: 0.7 Impedance: 258    No motor stimulation was elicited at any level at 2V with a frequency of 2Hz. 0.2cc of Omnipaque injected under digital subtraction angiography was negative for signs of intravascular uptake.  After negative aspiration, 1cc of 2% lidocaine was injected at each level. Thermal RF was then conducted at each level at 80 degrees  for 1:30 minutes x2 cycles. 0.5 cc of 0.5% bupivicaine was injected at each level. Patient tolerated the procedure well and there were no complications.    Future Management:   If helpful, can repeat as needed.    Follow up with my clinic in 3 weeks or sooner if needed

## 2017-03-14 NOTE — INTERVAL H&P NOTE
The patient has been examined and the H&P has been reviewed:    I concur with the findings and no changes have occurred since H&P was written.     No change in the location or quality of the pain since the most recent clinic visit.  No new symptoms.  He wishes to proceed with the procedure today.    PE, unchanged from previous:  CV:  RRR  Resp: unlabored, no wheezing.    NPO since MN.      Anesthesia/Surgery risks, benefits and alternative options discussed and understood by patient/family.          Active Hospital Problems    Diagnosis  POA    Facet arthritis of cervical region [M46.92]  Yes      Resolved Hospital Problems    Diagnosis Date Resolved POA   No resolved problems to display.

## 2017-03-14 NOTE — DISCHARGE INSTRUCTIONS
Home Care Instructions Pain Management:    1. DIET:   You may resume your normal diet today.   2. BATHING:   You may shower with luke warm water.  3. DRESSING:   You may remove your bandage today.   4. ACTIVITY LEVEL:   You may resume your normal activities 24 hrs after your procedure.  5. MEDICATIONS:   You may resume your normal medications today.   6. SPECIAL INSTRUCTIONS:   No heat to the injection site for 24 hrs including, bath or shower, heating pad, moist heat, or hot tubs.    Use ice pack to injection site for any pain or discomfort.  Apply ice packs for 20 minute intervals as needed.   If you have received any sedatives by mouth today you may not drive for 12 hours.    If you have received any sedation through your IV, you may not drive for 24 hrs.     PLEASE CALL YOUR DOCTOR IF:  1. Redness or swelling around the injection site.  2. Fever of 101 degrees  3. Drainage (pus) from the injection site.  4. For any continuous bleeding (some dried blood over the incision is normal.)    FOR EMERGENCIES:   If any unusual problems or difficulties occur during clinic hours, call [phone] or [phone].   Medial Branch Neurotomy  Back or neck pain may be due to problems with certain nerves near your spine. If so, a medial branch neurotomy can help relieve your pain.  In some cases, your doctor may give you a nerve block to predict your response to neurotomy. The treatment uses heat, cold, radiofrequency, or chemicals to destroy the nerves near a problem joint. This keeps some pain messages from traveling to your brain, and helps relieve your symptoms.    Medial branch nerves  Each vertebra in your spine has facets (flat surfaces). They touch where the vertebrae fit together. This forms a facet joint. Each facet joint has at least two medial branch nerves. They are part of the nerve pathway to and from each facet joint. A facet joint in your back or neck can become inflamed (swollen and irritated). Pain messages may then  travel along the nerve pathway from the facet joint to your brain.    Blocking pain messages  Medial branch nerves in each facet joint send and carry messages about back or neck pain. Destroying a few of these nerves can keep certain pain messages from reaching your brain. This can help bring you relief. The relief typically lasts for months to years.  Risks and complications  Risks and complications are rare, but can include:  · Infection  · Increased pain, numbness, or weakness  · Nerve damage  · Bleeding  · Failure to relieve pain    © 2299-2648 [employer]. [address] [place] [address]. All rights reserved. This information is not intended as a substitute for professional medical care. Always follow your healthcare professional's instructions.      Medial Branch Neurotomy: Your Experience  Back or neck pain may be due to problems with certain nerves near your spine. If so, a medial branch neurotomy can help relieve your pain. The treatment uses heat, cold, radiofrequency, or chemicals to destroy the nerves near a problem joint. This keeps some pain messages from traveling to your brain, and helps relieve your symptoms.  The treatment is done in a hospital or outpatient department. Your healthcare provider will ask you to sign a consent form. He or she will examine you and may give you an IV (intravenous) line for fluids and medicines.      Relax at home for the rest of the day after your treatment, even if you feel good.    Getting ready for your treatment  · Ask your healthcare provider whether you should stop taking any medicines before treatment.  · Tell your provider if you are pregnant or allergic to any medicines.  · You may be asked to stop eating or drinking for several hours before you check in for your treatment.  During the procedure  You will lie on an exam table, most likely on your stomach depending on where the problem joint is.  · Your healthcare provider will clean the  skin over the treatment site and then numb it with medicine.  · Your provider uses X-ray imaging (fluoroscopy) to help see your spine and guide the treatment. Your provider may inject a contrast dye into the affected region to help get a better image. If you are allergic to iodine or had a reaction to a dye, tell your healthcare provider.  · Your healthcare provider uses heat, cold, or chemicals to destroy part of the nerve near the inflamed facet joint. Nearby nerves may also be treated.  After the procedure  Most often, you can go home shortly after the procedure, generally in about an hour. Have an adult friend or relative drive you. The treated spot may be swollen and may feel more sore than usual. This is normal and may last for a day or so. It will be a few days before you feel relief from your symptoms. Your provider may prescribe pain medicines for you during that time. Ask him or her when its OK for you to go back to work.  Call your healthcare provider if you have a fever over 100.4°F (38°C), chills, or redness or drainage at the treatment site.    © 9620-5567 [employer]. [address] [place] [address]. All rights reserved. This information is not intended as a substitute for professional medical care. Always follow your healthcare professional's instructions.            Home Care Instructions Pain Management:    1. DIET:   You may resume your normal diet today.   2. BATHING:   You may shower with luke warm water. No soaking in tub.  3. DRESSING:   You may remove your bandage today.   4. ACTIVITY LEVEL:   You may resume your normal activities 24 hrs after your procedure.  5. MEDICATIONS:   You may resume your normal medications today.   6. SPECIAL INSTRUCTIONS:   No heat to the injection site for 24 hrs including, bath or shower, heating pad, moist heat, or hot tubs.    Use ice pack to injection site for any pain or discomfort.  Apply ice packs for 20 minute intervals as  needed.   If you have received any sedatives by mouth today you may not drive for 12 hours.    If you have received any sedation through your IV, you may not drive for 24 hrs.     PLEASE CALL YOUR DOCTOR IF:  1. Redness or swelling around the injection site.  2. Fever of 101 degrees  3. Drainage (pus) from the injection site.  4. For any continuous bleeding (some dried blood over the incision is normal.)  5. For severe headache that is relieved when lying flat.    FOR EMERGENCIES:   If any unusual problems or difficulties occur during clinic hours, call [phone] or [phone].

## 2017-03-28 ENCOUNTER — HOSPITAL ENCOUNTER (OUTPATIENT)
Facility: OTHER | Age: 52
Discharge: HOME | End: 2017-03-28
Attending: ANESTHESIOLOGY | Admitting: ANESTHESIOLOGY
Payer: COMMERCIAL

## 2017-03-28 ENCOUNTER — SURGERY (OUTPATIENT)
Age: 52
End: 2017-03-28

## 2017-03-28 VITALS
OXYGEN SATURATION: 97 % | TEMPERATURE: 98 F | DIASTOLIC BLOOD PRESSURE: 86 MMHG | RESPIRATION RATE: 18 BRPM | WEIGHT: 215 LBS | HEART RATE: 74 BPM | HEIGHT: 73 IN | SYSTOLIC BLOOD PRESSURE: 126 MMHG | BODY MASS INDEX: 28.49 KG/M2

## 2017-03-28 DIAGNOSIS — M47.812 FACET ARTHRITIS OF CERVICAL REGION: Primary | ICD-10-CM

## 2017-03-28 PROCEDURE — 64633 DESTROY CERV/THOR FACET JNT: CPT | Mod: LT,,, | Performed by: ANESTHESIOLOGY

## 2017-03-28 PROCEDURE — 64634 DESTROY C/TH FACET JNT ADDL: CPT | Mod: LT,,, | Performed by: ANESTHESIOLOGY

## 2017-03-28 PROCEDURE — 25000003 PHARM REV CODE 250: Performed by: ANESTHESIOLOGY

## 2017-03-28 PROCEDURE — 99152 MOD SED SAME PHYS/QHP 5/>YRS: CPT | Mod: ,,, | Performed by: ANESTHESIOLOGY

## 2017-03-28 PROCEDURE — 25500020 PHARM REV CODE 255: Performed by: ANESTHESIOLOGY

## 2017-03-28 PROCEDURE — 64634 DESTROY C/TH FACET JNT ADDL: CPT | Performed by: ANESTHESIOLOGY

## 2017-03-28 PROCEDURE — 63600175 PHARM REV CODE 636 W HCPCS: Performed by: ANESTHESIOLOGY

## 2017-03-28 PROCEDURE — 64633 DESTROY CERV/THOR FACET JNT: CPT | Performed by: ANESTHESIOLOGY

## 2017-03-28 RX ORDER — SODIUM CHLORIDE 9 MG/ML
INJECTION, SOLUTION INTRAVENOUS CONTINUOUS
Status: DISCONTINUED | OUTPATIENT
Start: 2017-03-29 | End: 2017-03-28 | Stop reason: HOSPADM

## 2017-03-28 RX ORDER — BUPIVACAINE HYDROCHLORIDE 5 MG/ML
3 INJECTION, SOLUTION EPIDURAL; INTRACAUDAL ONCE
Status: COMPLETED | OUTPATIENT
Start: 2017-03-29 | End: 2017-03-28

## 2017-03-28 RX ORDER — LIDOCAINE HYDROCHLORIDE 20 MG/ML
5 INJECTION, SOLUTION INFILTRATION; PERINEURAL
Status: COMPLETED | OUTPATIENT
Start: 2017-03-29 | End: 2017-03-28

## 2017-03-28 RX ORDER — MIDAZOLAM HYDROCHLORIDE 1 MG/ML
INJECTION INTRAMUSCULAR; INTRAVENOUS
Status: DISCONTINUED | OUTPATIENT
Start: 2017-03-28 | End: 2017-03-28 | Stop reason: HOSPADM

## 2017-03-28 RX ORDER — DEXAMETHASONE SODIUM PHOSPHATE 10 MG/ML
10 INJECTION INTRAMUSCULAR; INTRAVENOUS ONCE
Status: COMPLETED | OUTPATIENT
Start: 2017-03-29 | End: 2017-03-28

## 2017-03-28 RX ORDER — SODIUM CHLORIDE 9 MG/ML
INJECTION, SOLUTION INTRAMUSCULAR; INTRAVENOUS; SUBCUTANEOUS
Status: DISCONTINUED | OUTPATIENT
Start: 2017-03-28 | End: 2017-03-28 | Stop reason: HOSPADM

## 2017-03-28 RX ORDER — SODIUM CHLORIDE 9 MG/ML
INJECTION, SOLUTION INTRAVENOUS CONTINUOUS
Status: DISCONTINUED | OUTPATIENT
Start: 2017-03-28 | End: 2017-03-28 | Stop reason: HOSPADM

## 2017-03-28 RX ORDER — MIDAZOLAM HYDROCHLORIDE 1 MG/ML
2 INJECTION INTRAMUSCULAR; INTRAVENOUS CONTINUOUS PRN
Status: DISCONTINUED | OUTPATIENT
Start: 2017-03-28 | End: 2017-03-28 | Stop reason: HOSPADM

## 2017-03-28 RX ORDER — FENTANYL CITRATE 50 UG/ML
50 INJECTION, SOLUTION INTRAMUSCULAR; INTRAVENOUS ONCE
Status: DISCONTINUED | OUTPATIENT
Start: 2017-03-28 | End: 2017-03-28 | Stop reason: HOSPADM

## 2017-03-28 RX ORDER — FENTANYL CITRATE 50 UG/ML
INJECTION, SOLUTION INTRAMUSCULAR; INTRAVENOUS
Status: DISCONTINUED | OUTPATIENT
Start: 2017-03-28 | End: 2017-03-28 | Stop reason: HOSPADM

## 2017-03-28 RX ADMIN — SODIUM CHLORIDE: 0.9 INJECTION, SOLUTION INTRAVENOUS at 12:03

## 2017-03-28 RX ADMIN — MIDAZOLAM HYDROCHLORIDE 1 MG: 1 INJECTION, SOLUTION INTRAMUSCULAR; INTRAVENOUS at 02:03

## 2017-03-28 RX ADMIN — FENTANYL CITRATE 50 MCG: 50 INJECTION, SOLUTION INTRAMUSCULAR; INTRAVENOUS at 02:03

## 2017-03-28 RX ADMIN — LIDOCAINE HYDROCHLORIDE 5 ML: 20 INJECTION, SOLUTION INFILTRATION; PERINEURAL at 02:03

## 2017-03-28 RX ADMIN — FENTANYL CITRATE 25 MCG: 50 INJECTION, SOLUTION INTRAMUSCULAR; INTRAVENOUS at 02:03

## 2017-03-28 RX ADMIN — IOHEXOL 5 ML: 300 INJECTION, SOLUTION INTRAVENOUS at 02:03

## 2017-03-28 RX ADMIN — BUPIVACAINE HYDROCHLORIDE 3 ML: 5 INJECTION, SOLUTION EPIDURAL; INTRACAUDAL; PERINEURAL at 02:03

## 2017-03-28 RX ADMIN — DEXAMETHASONE SODIUM PHOSPHATE 10 MG: 10 INJECTION, SOLUTION INTRAMUSCULAR; INTRAVENOUS at 02:03

## 2017-03-28 RX ADMIN — SODIUM CHLORIDE 3 ML: 9 INJECTION, SOLUTION INTRAMUSCULAR; INTRAVENOUS; SUBCUTANEOUS at 02:03

## 2017-03-28 RX ADMIN — SODIUM BICARBONATE 4 MEQ: 0.2 INJECTION, SOLUTION INTRAVENOUS at 02:03

## 2017-03-28 NOTE — OP NOTE
Date: 03/28/2017    Procedure: Cervical Conventional Radiofreqiency Ablation: Left C4-7    Pre-op diagnosis: Cervical facet arthritis    Post-op diagnosis: Same     Physician: Dr. Kasandra Montoya     Assistant: Dr. Crandall    Anesthestia: local/IV sedation:  Versed 2 mg and fentanyl 75 mcg IV.  Conscious sedation provided by MD and monitored by RN.  Total sedation time was less than 60 minutes.    All medications, allergies, and relevant histories were reviewed. No recent antibiotics or infections.  A time-out was taken to verify the correct patient, procedure, laterality, and appropriate medications/allergies.    Cervical Medial Branch Block with conventional radiofrequency, level C4-7    The procedure risks, benefits, and possible complications were discussed with the patient including nerve damage, infection, spinal headache, and paresis. NIBP, pulse rate, and O2 per nasal cannula at 3L/min. were monitored throughout the procedure.      Patient was placed in the prone position. Skin was prepped with CHG and draped. Oblique view of the spine was obtained with fluoroscopy. Entry sites were marked over the skin and Xylocaine 1% was used to anesthetize the skin and subcutaneous tissues. A 22-gauge 3.5 inch curved tip, insulated, RF needle was introduced at an angle, and the needle was placed onto the lateral aspect of the mid articular pilar at each level.   Placement was confirmed with a fluoroscopic view.      At each level, a 50 Hz stimulus elicited sensory paresthesia with the following voltage:   C4: 0.8 Impedance: 279  C5: 0.7 Impedance: 270  C6: 0.5 Impedance: 269  C7: 0.7 Impedance: 275    No motor stimulation was elicited at any level at 2V with a frequency of 2Hz. 0.2cc of Omnipaque injected under digital subtraction angiography was negative for signs of intravascular uptake.  After negative aspiration, 1cc of 2% lidocaine was injected at each level. Thermal RF was then conducted at each level at 80 degrees for  1:30 minutes x2 cycles. 0.5 cc of 0.5% bupivicaine was injected at each level. Patient tolerated the procedure well and there were no complications.    Future Management:   If helpful, can repeat as needed.    Follow up with my clinic in 3 weeks or sooner if needed    I certify that I provided the above services.  I was present for the entire procedure, which was performed by myself with the assistance of the resident physician.  There were no parts of the procedure that were performed not by myself or without my direct supervision.

## 2017-03-28 NOTE — DISCHARGE INSTRUCTIONS

## 2017-03-28 NOTE — H&P (VIEW-ONLY)
Subjective:     Patient ID: Sal Navarro is a 52 y.o. male.    Chief Complaint: Neck Pain    Consulted by: No ref. provider found     Disclaimer: This note was generated using voice recognition software.  There may be a typographical errors that were missed during proofreading.      HPI:    Sal Navarro is a 52 y.o. male who presents today s/p C4-7 ACDF with C5/6 PSIF in 2/2016 with residual chronic midline neck pain that radiates into his shoulder blades bilaterally, R>>L.  This is associated with bilateral hand numbness and tingling.  The hand symptoms did improve following the surgery.  The shooting pains in his arms resolved completely.   This pain is described in detail below.  His post-operative course was complicated by dysphagia that is being treated with speech therapy.    Interval History (9/23/2016):  He returns today for follow up.  He reports that the MELANIE was not helpful for the pain.  He hasn't noticed a difference with the amitriptyline.  He does notice a difference in his low back pain when he skips the meloxicam    Interval History (11/17/2016):  He returns today for follow up.  He reports that cervical RFAs were not very helpful for the pain. He is no longer taking Percocet or Tamazepam. He continues to take Mobic 15mg daily for his low back pain. He is taking elavil 25mg nightly without side effects. He is open to considering SCS. He reports poor sleep at night.    Interval History (1/3/2017):  The patient returns today for follow up of neck and shoulder pain.  His pain is located to his neck and surrounding areas.  He previously had limited benefit with RFAs, although he did have short term benefit with MBB.  He also had limited benefit with NGUYEN.  Dr. Montoya discussed cervical SCS trial with the patient, although he reports that he does not wish to pursue this option at this time.  He is scheduled to f/u with Dr. Fowelr on 1/24/16.  He continues to participate in PT.  He did  previously have some benefit with a TENS unit at PT.  He is reporting some relief with Tramadol.  He also takes Zanaflex which helps but is very sedating.      Interval History (3/3/2017):  The patient returns for follow up of neck and shoulder pain. He continues to take Tramadol BID with benefit. The medication decreases his pain slightly. He also reports benefit with Robaxin. He continues to take Mobic with benefit. He recently stopped physical therapy and reports increased pain into his shoulders, despite a home exercise plan.     Of note, he recently had an endoscopy that showed stage 3 gastritis, for which he was started on Prilosec    Physical Therapy: Yes- outside facility in [place].    Non-pharmacologic Treatment: Ice and heat provide mild benefit.           · TENS? Yes at PT with benefit.    Pain Medications:         · Currently taking: Amitriptyline 50 mg QHS, Meloxicam 15 mg daily, Tylenol PRN, Tramadol 50 mg BID, and Robaxin    · Has tried in the past:  Percocet (limited benefit and causes constipation treated with colace), Temazepam for sleep (he has had trouble sleeping since the surgery), Flexeril (no help in the past, both before and after the surgery), Gabapentin (after, unsure of duration of tx), Lyrica (sedation), Celebrex (GI upset), Zanaflex 4 mg PRN muscle pain (helpful but sedating)    · Has not tried: SNRIs, other muscle relaxants    Blood thinners: None    Interventional Therapies: None    Relevant Surgeries:   · C4-7 ACDF with C5/6 PSIF in 2/2016  · C7/T1 ILESI: 10-15% relief  · 10/4/16 Bilateral C4-7 MBB- short term benefit  · 10/11/16 Right C4-7 RFA- limited benefit  · 10/25/16 Left C4-7- limited benefit    Affecting sleep? Yes    Affecting daily activities? Yes  Depressive symptoms? Yes, due to general medical condition          · SI/HI? No    Work status: On short term disability from his job as an [employer] .    Pain Scales  Best: 4/10  Worst: 9/10  Usually: 7/10  Today:  6/10    Neck Pain    This is a new problem. Episode onset: february 2016. The problem occurs intermittently. The problem has been unchanged. The pain is present in the anterior neck. The quality of the pain is described as aching, shooting and stabbing. The pain is at a severity of 6/10. The pain is mild. The symptoms are aggravated by bending (turning). The pain is same all the time. Stiffness is present all day and in the morning. Associated symptoms include chest pain. He has tried oral narcotics, NSAIDs and muscle relaxants for the symptoms. The treatment provided mild relief. Physical therapy was not tried and ineffective.      Review of Systems   Constitution: Negative.   HENT: Negative.    Eyes: Negative.    Cardiovascular: Positive for chest pain. Negative for dyspnea on exertion and irregular heartbeat.   Respiratory: Negative.    Endocrine: Negative.    Hematologic/Lymphatic: Negative.    Skin: Negative.    Musculoskeletal: Positive for neck pain and stiffness. Negative for back pain.   Gastrointestinal: Negative.    Genitourinary: Negative.    Neurological: Positive for dizziness and light-headedness. Negative for difficulty with concentration, loss of balance and seizures.   Psychiatric/Behavioral: Negative for altered mental status, depression and suicidal ideas. The patient has insomnia and is nervous/anxious.    Allergic/Immunologic: Negative.    All other systems reviewed and are negative.            Past Medical History:   Diagnosis Date    Cataract        Past Surgical History:   Procedure Laterality Date    ACROMIOCLAVICULAR JOINT CYST EXCISION Right     cataracts Bilateral     RADIOFREQUENCY ABLATION  10/2016    cervical spine/Montoya    ROTATOR CUFF REPAIR Right     SPINE SURGERY      cerival fusion        Review of patient's allergies indicates:   Allergen Reactions    Pcn [penicillins] Rash       Current Outpatient Prescriptions   Medication Sig Dispense Refill    acetaminophen (TYLENOL)  "500 MG tablet Take 500 mg by mouth every 6 (six) hours as needed for Pain.      amitriptyline (ELAVIL) 25 MG tablet Take 2 tablets (50 mg total) by mouth every evening. 60 tablet 5    cholecalciferol, vitamin D3, 5,000 unit Tab Take by mouth      FOLIC ACID/MULTIVIT-MIN/LUTEIN (CENTRUM SILVER ORAL) Take by mouth once daily.      meclizine (ANTIVERT) 25 mg tablet Take 25 mg by mouth.      melatonin 5 mg Tab Take by mouth.      meloxicam (MOBIC) 15 MG tablet Take 15 mg by mouth daily      methocarbamol (ROBAXIN) 500 MG Tab Take 1 tablet (500 mg total) by mouth 3 (three) times daily. 90 tablet 2    senna-docusate 8.6-50 mg (PERICOLACE) 8.6-50 mg per tablet Take 1 tablet by mouth once daily.      tramadol (ULTRAM) 50 mg tablet Take 1 tablet (50 mg total) by mouth every 12 (twelve) hours as needed for Pain. 60 tablet 1    valsartan (DIOVAN) 80 MG tablet Take 80 mg by mouth once daily.  1    omeprazole (PRILOSEC) 20 MG capsule Take 20 mg by mouth once daily.  1     No current facility-administered medications for this visit.        Family History   Problem Relation Age of Onset    Heart disease Mother     Cancer Father        Social History     Social History    Marital status:      Spouse name: N/A    Number of children: N/A    Years of education: N/A     Occupational History    Not on file.     Social History Main Topics    Smoking status: Never Smoker    Smokeless tobacco: Not on file    Alcohol use No    Drug use: No    Sexual activity: Not on file     Other Topics Concern    Not on file     Social History Narrative       Objective:     Vitals:    03/03/17 1059   BP: (!) 124/93   Pulse: 76   Resp: 20   Temp: 97.6 °F (36.4 °C)   TempSrc: Oral   Height: 6' 1" (1.854 m)   PainSc:   6   PainLoc: Neck     GEN:  Well developed, well nourished.  No acute distress.  No pain behavior.  HEENT:  No trauma.  Mucous membranes moist.  Nares patent bilaterally.  PSYCH: Normal affect. Thought content " appropriate.  CHEST:  Breathing symmetric.  No audible wheezing.  ABD: Soft, non-tender, non-distended.  SKIN:  Warm, pink, dry.  No rash on exposed areas.    EXT:  No cyanosis, clubbing, or edema.  No color change or changes in nail or hair growth.  Positive Tinel's over bilateral cubital tunnels. Positive Phalen to right wrist.  NEURO/MUSCULOSKELETAL:  Fully alert, oriented, and appropriate. Speech normal nella. No cranial nerve deficits.   Gait: Nonantalgic.  4/5 motor strength in bilateral deltoids; however, this may be due to pain.  Otherwise 5/5 motor strength throughout upper extremities.   Sensory: No sensory deficit in the upper extremities.   Reflexes: 1+ and symmetric throughout.  Negative Akins's bilaterally.  C-Spine:  Decreased ROM with pain on extension> flexion.  Limited extension and lateral rotation.  Positive facet loading bilaterally.  Negative Spurling's bilaterally.    No TTP over shoulders, elbows or hands.    TTP over cervical facet joints and paraspinal muscles bilaterally.      Imaging:      Diagnostic Results:  All imaging was independently reviewed by me.  Below is a summary from Dr Fowler's note.  I concur with the below findings.     MRI T-spine, dated 8/8/16:  1. No significant central or neuroforaminal stenosis     MRI C-spine, dated 8/8/16:  1. No significant stenosis      Flex/Ex X-ray C-spine, dated 7/19/16:  1. No prevertebral swelling  2. No gross instability   3. Good hardware position     MRI C-spine from an outside facility, dated 1/28/2016:  1. Moderate to severe DDD  2. Disc osteophyte complex, worst at C5/6   3. Moderate stenosis at C4/5 and C6/7      CT C-spine from an outside facility, dated 4/2015:  1. Diffuse cervical spondylosis   2. DDD, worst at C5/6, with some ossification of the PLL at C5/6      CT C-spine from an outside facility, dated 3/17/2016:  1. Fusion surgery at C5/6  2. Hardware in good position       AP and Lateral X-ray C-spine from an outside  facility:  1. C4-7 ACDF  2. Hardware in good position  3. Spine in good alignment    Assessment:     Encounter Diagnoses   Name Primary?    DDD (degenerative disc disease), cervical Yes    Facet arthritis of cervical region     S/P cervical spinal fusion     Myalgia     Cervical paraspinal muscle spasm     Chronic, continuous use of opioids        Plan:     Sal was seen today for neck pain.    Diagnoses and all orders for this visit:    DDD (degenerative disc disease), cervical  -     tramadol (ULTRAM) 50 mg tablet; Take 1 tablet (50 mg total) by mouth every 6 (six) hours as needed for Pain.  -     Ambulatory consult to Physical Therapy    Facet arthritis of cervical region  -     tramadol (ULTRAM) 50 mg tablet; Take 1 tablet (50 mg total) by mouth every 6 (six) hours as needed for Pain.  -     Ambulatory consult to Physical Therapy    S/P cervical spinal fusion  -     tramadol (ULTRAM) 50 mg tablet; Take 1 tablet (50 mg total) by mouth every 6 (six) hours as needed for Pain.  -     Ambulatory consult to Physical Therapy    Myalgia  -     tramadol (ULTRAM) 50 mg tablet; Take 1 tablet (50 mg total) by mouth every 6 (six) hours as needed for Pain.  -     Ambulatory consult to Physical Therapy    Cervical paraspinal muscle spasm  -     tramadol (ULTRAM) 50 mg tablet; Take 1 tablet (50 mg total) by mouth every 6 (six) hours as needed for Pain.  -     Ambulatory consult to Physical Therapy    Chronic, continuous use of opioids        His pain is consistent with the above.    We discussed the assessment and recommendations.  All available images were reviewed. We discussed the disease process, prognosis, treatment plan, and risks and benefits. The patient is aware of the risks and benefits of the medications being prescribed, common side effects, and proper usage. The following is the plan we agreed on:     1. TPIs as below  2. Schedule for repeat right then left C4-7 RFA, cooled  3. We again discussed spinal  cord stimulator which he declined at this time.  4. Continue Robaxin  5. Increase Tramadol 50mg to QID PRN pain, #120, 1 refill.  6. Previous UDS results are consistent with medications as prescribed and negative for illicit substances.  7. Continue Amitriptyline 50mg QHS   8. Continue Meloxicam 15 mg daily, benefits currently outweigh risks.  Will continue to reassess  9. Continue Tylenol as needed.  10. Keep appointment with Dr. Fowler on 1/24/16.  11. RTC in 2 months or sooner if needed.  Can consider TPIs at that time if muscular component persists.    Trigger Point Injection:   The procedure was discussed with the patient including complications of damage, bleeding, infection, and failure of pain relief.     All medications, allergies, and relevant histories were reviewed. No recent antibiotics or infections.  A time-out was taken to verify the correct patient, procedure, laterality, and appropriate medications/allergies.    Trigger points were identified by palpation and marked. CHG prep of sites done. A 27-gauge needle was advanced to the point of maximal tenderness, and 2 mL of a mixture of 0.5% bupivacaine with betamethasone 3 mg was injected after negative aspiration. All sites done in the same manner. Patient tolerated the procedure well and without complications. Sites injected included: right rhomboid, right lower trap, left deltoid     The patient tolerated the procedure well and was discharged in excellent condition.      The above plan and management options were discussed at length with patient. Patient is in agreement with the above and verbalized understanding.     Ortho/SPM Exam

## 2017-03-28 NOTE — INTERVAL H&P NOTE
The patient has been examined and the H&P has been reviewed:    I concur with the findings and no changes have occurred since H&P was written.    No change in the location or quality of the pain since the most recent clinic visit.  No new symptoms.  He wishes to proceed with the procedure today.    PE, unchanged from previous:  CV:  RRR  Resp: unlabored, no wheezing.    NPO since MN.    Anesthesia/Surgery risks, benefits and alternative options discussed and understood by patient/family.          There are no hospital problems to display for this patient.

## 2017-03-28 NOTE — IP AVS SNAPSHOT
[facility] Location ([facility])  [address] [place], [place] [id number]  Phone: [phone]           Patient Discharge Instructions   Our goal is to set you up for success. This packet includes information on your condition, medications, and your home care.  It will help you care for yourself to prevent having to return to the hospital.     Please ask your nurse if you have any questions.      There are many details to remember when preparing to leave the hospital. Here is what you will need to do:    1. Take your medicine. If you are prescribed medications, review your Medication List on the following pages. You may have new medications to  at the pharmacy and others that you'll need to stop taking. Review the instructions for how and when to take your medications. Talk with your doctor or nurses if you are unsure of what to do.     2. Go to your follow-up appointments. Specific follow-up information is listed in the following pages. Your may be contacted by a nurse or clinical provider about future appointments. Be sure we have all of the phone numbers to reach you. Please contact your provider's office if you are unable to make an appointment.     3. Watch for warning signs. Your doctor or nurse will give you detailed warning signs to watch for and when to call for assistance. These instructions may also include educational information about your condition. If you experience any of warning signs to your health, call your doctor.               [facility]  Unless otherwise directed by your provider, please contact [facility], our nurse care line that is available for 24/7 assistance.     [phone] (toll-free)    Registered nurses in the [facility] provide clinical advisement, health education, appointment booking, and other advisory services.                    ** Verify the list of medication(s) below is accurate and up to date. Carry this with you in case of emergency.  If your medications have changed, please notify your healthcare provider.             Medication List      CONTINUE taking these medications        Additional Info                      acetaminophen 500 MG tablet   Commonly known as:  TYLENOL   Refills:  0   Dose:  500 mg    Instructions:  Take 500 mg by mouth every 6 (six) hours as needed for Pain.     Begin Date    AM    Noon    PM    Bedtime       amitriptyline 25 MG tablet   Commonly known as:  ELAVIL   Quantity:  60 tablet   Refills:  5   Dose:  50 mg    Instructions:  Take 2 tablets (50 mg total) by mouth every evening.     Begin Date    AM    Noon    PM    Bedtime       CENTRUM SILVER ORAL   Refills:  0    Instructions:  Take by mouth once daily.     Begin Date    AM    Noon    PM    Bedtime       cholecalciferol (vitamin D3) 5,000 unit Tab   Refills:  0    Instructions:  Take by mouth     Begin Date    AM    Noon    PM    Bedtime       meclizine 25 mg tablet   Commonly known as:  ANTIVERT   Refills:  0   Dose:  25 mg    Instructions:  Take 25 mg by mouth.     Begin Date    AM    Noon    PM    Bedtime       melatonin 5 mg Tab   Refills:  0    Instructions:  Take by mouth.     Begin Date    AM    Noon    PM    Bedtime       meloxicam 15 MG tablet   Commonly known as:  MOBIC   Refills:  0    Instructions:  Take 15 mg by mouth daily     Begin Date    AM    Noon    PM    Bedtime       methocarbamol 500 MG Tab   Commonly known as:  ROBAXIN   Quantity:  90 tablet   Refills:  2   Dose:  500 mg    Instructions:  Take 1 tablet (500 mg total) by mouth 3 (three) times daily.     Begin Date    AM    Noon    PM    Bedtime       omeprazole 20 MG capsule   Commonly known as:  PRILOSEC   Refills:  1   Dose:  20 mg    Instructions:  Take 20 mg by mouth once daily.     Begin Date    AM    Noon    PM    Bedtime       senna-docusate 8.6-50 mg 8.6-50 mg per tablet   Commonly known as:  PERICOLACE   Refills:  0   Dose:  1 tablet    Instructions:  Take 1 tablet by mouth once daily.      Begin Date    AM    Noon    PM    Bedtime       tramadol 50 mg tablet   Commonly known as:  ULTRAM   Quantity:  120 tablet   Refills:  1   Dose:  50 mg    Instructions:  Take 1 tablet (50 mg total) by mouth every 6 (six) hours as needed for Pain.     Begin Date    AM    Noon    PM    Bedtime       valsartan 80 MG tablet   Commonly known as:  DIOVAN   Refills:  1   Dose:  80 mg    Instructions:  Take 80 mg by mouth once daily.     Begin Date    AM    Noon    PM    Bedtime                  Please bring to all follow up appointments:    1. A copy of your discharge instructions.  2. All medicines you are currently taking in their original bottles.  3. Identification and insurance card.    Please arrive 15 minutes ahead of scheduled appointment time.    Please call 24 hours in advance if you must reschedule your appointment and/or time.        Your Scheduled Appointments     May 01, 2017 11:00 AM CDT   Established Patient Visit with Kasandra Montoya MD   [facility]    [address]  [facility] [address]   [phone]            May 25, 2017 11:15 AM CDT   Established Patient Visit with Kasandra Montoya MD   [facility] ([facility]    [address]  [facility] [id number] [phone]              Follow-up Information     Call Kasandra Montoya MD.    Specialty:  Pain Medicine    Why:  As needed, If symptoms worsen    Contact information:    [address]  [facility] [address]  [phone]            Discharge Instructions       Home Care Instructions Pain Management:    1. DIET:   You may resume your normal diet today.   2. BATHING:   You may shower with luke warm water.  3. DRESSING:   You may remove your bandage today.   4. ACTIVITY LEVEL:   You may resume your normal activities 24 hrs after your procedure.  5. MEDICATIONS:   You may resume your normal medications today.   6. SPECIAL INSTRUCTIONS:   No heat to the injection site for 24 hrs  "including, bath or shower, heating pad, moist heat, or hot tubs.    Use ice pack to injection site for any pain or discomfort.  Apply ice packs for 20 minute intervals as needed.   If you have received any sedatives by mouth today you may not drive for 12 hours.    If you have received any sedation through your IV, you may not drive for 24 hrs.     PLEASE CALL YOUR DOCTOR IF:  1. Redness or swelling around the injection site.  2. Fever of 101 degrees  3. Drainage (pus) from the injection site.  4. For any continuous bleeding (some dried blood over the incision is normal.)    FOR EMERGENCIES:   If any unusual problems or difficulties occur during clinic hours, call [phone] or [phone].         Admission Information     Date & Time Provider Department CSN    3/28/2017 12:22 PM Kasandra Montoya MD [facility] [id number]      Care Providers     Provider Role Specialty Primary office phone    Kasandra Montoya MD Attending Provider Pain Medicine [phone]    Kasandra Montoya MD Surgeon  Pain Medicine [phone]      Your Vitals Were     BP Pulse Temp Resp Height Weight    125/85 (BP Location: Right arm, Patient Position: Lying, BP Method: Automatic) 76 97.7 °F (36.5 °C) (Oral) 18 6' 1" (1.854 m) 97.5 kg (215 lb)    SpO2 BMI             97% 28.37 kg/m2         Recent Lab Values     No lab values to display.      Allergies as of 3/28/2017        Reactions    Pcn [Penicillins] Hives, Rash      Advance Directives     An advance directive is a document which, in the event you are no longer able to make decisions for yourself, tells your healthcare team what kind of treatment you do or do not want to receive, or who you would like to make those decisions for you.  If you do not currently have an advance directive, [facility] encourages you to create one.  For more information call:  [phone]), [phone] ([phone]),  or log on to [email or web address].        Language Assistance " Services     ATTENTION: Language assistance services are available, free of charge. Please call [phone].      ATENCIÓN: Si habla carolina, tiene a jimenez disposición servicios gratuitos de asistencia lingüística. Llame al [phone].     CHÚ Ý: N?u b?n nói Ti?ng Vi?t, có các d?ch v? h? tr? ngôn ng? mi?n phí dành cho b?n. G?i s? [phone].         [facility] complies with applicable Federal civil rights laws and does not discriminate on the basis of race, color, national origin, age, disability, or sex.

## 2017-03-28 NOTE — DISCHARGE SUMMARY
Discharge Note  Short Stay      SUMMARY     Admit Date: 3/28/2017    Attending Physician: Kasandra Montoya      Discharge Physician: Kasandra Montoya      Discharge Date: 3/28/2017 9:44 AM    Final Diagnosis: cervical facet arthritis    Disposition: Home or self care    Patient Instructions:   Current Discharge Medication List      CONTINUE these medications which have NOT CHANGED    Details   amitriptyline (ELAVIL) 25 MG tablet Take 2 tablets (50 mg total) by mouth every evening.  Qty: 60 tablet, Refills: 5    Associated Diagnoses: DDD (degenerative disc disease), cervical; S/P cervical spinal fusion; Arthropathy of cervical facet joint; Cervical radiculitis      cholecalciferol, vitamin D3, 5,000 unit Tab Take by mouth      FOLIC ACID/MULTIVIT-MIN/LUTEIN (CENTRUM SILVER ORAL) Take by mouth once daily.      meclizine (ANTIVERT) 25 mg tablet Take 25 mg by mouth.      melatonin 5 mg Tab Take by mouth.      meloxicam (MOBIC) 15 MG tablet Take 15 mg by mouth daily      methocarbamol (ROBAXIN) 500 MG Tab Take 1 tablet (500 mg total) by mouth 3 (three) times daily.  Qty: 90 tablet, Refills: 2      omeprazole (PRILOSEC) 20 MG capsule Take 20 mg by mouth once daily.  Refills: 1      tramadol (ULTRAM) 50 mg tablet Take 1 tablet (50 mg total) by mouth every 6 (six) hours as needed for Pain.  Qty: 120 tablet, Refills: 1    Associated Diagnoses: DDD (degenerative disc disease), cervical; Facet arthritis of cervical region; S/P cervical spinal fusion; Myalgia; Cervical paraspinal muscle spasm      valsartan (DIOVAN) 80 MG tablet Take 80 mg by mouth once daily.  Refills: 1      acetaminophen (TYLENOL) 500 MG tablet Take 500 mg by mouth every 6 (six) hours as needed for Pain.      senna-docusate 8.6-50 mg (PERICOLACE) 8.6-50 mg per tablet Take 1 tablet by mouth once daily.             Resume home diet and activity

## 2017-05-01 ENCOUNTER — OFFICE VISIT (OUTPATIENT)
Dept: PAIN MEDICINE | Facility: CLINIC | Age: 52
End: 2017-05-01
Attending: ANESTHESIOLOGY
Payer: COMMERCIAL

## 2017-05-01 VITALS
SYSTOLIC BLOOD PRESSURE: 125 MMHG | BODY MASS INDEX: 28.93 KG/M2 | TEMPERATURE: 98 F | WEIGHT: 218.31 LBS | HEIGHT: 73 IN | RESPIRATION RATE: 20 BRPM | HEART RATE: 76 BPM | DIASTOLIC BLOOD PRESSURE: 86 MMHG

## 2017-05-01 DIAGNOSIS — M50.30 DDD (DEGENERATIVE DISC DISEASE), CERVICAL: ICD-10-CM

## 2017-05-01 DIAGNOSIS — M62.838 CERVICAL PARASPINAL MUSCLE SPASM: ICD-10-CM

## 2017-05-01 DIAGNOSIS — M79.10 MYALGIA: Primary | ICD-10-CM

## 2017-05-01 DIAGNOSIS — G89.29 CHRONIC RIGHT SHOULDER PAIN: ICD-10-CM

## 2017-05-01 DIAGNOSIS — Z98.1 S/P CERVICAL SPINAL FUSION: ICD-10-CM

## 2017-05-01 DIAGNOSIS — M47.812 FACET ARTHRITIS OF CERVICAL REGION: ICD-10-CM

## 2017-05-01 DIAGNOSIS — M75.52 SUBACROMIAL BURSITIS OF LEFT SHOULDER JOINT: ICD-10-CM

## 2017-05-01 DIAGNOSIS — M25.511 CHRONIC RIGHT SHOULDER PAIN: ICD-10-CM

## 2017-05-01 PROCEDURE — 20610 DRAIN/INJ JOINT/BURSA W/O US: CPT | Mod: LT,S$GLB,, | Performed by: ANESTHESIOLOGY

## 2017-05-01 PROCEDURE — 1160F RVW MEDS BY RX/DR IN RCRD: CPT | Mod: S$GLB,,, | Performed by: ANESTHESIOLOGY

## 2017-05-01 PROCEDURE — 99999 PR PBB SHADOW E&M-EST. PATIENT-LVL III: CPT | Mod: PBBFAC,,, | Performed by: ANESTHESIOLOGY

## 2017-05-01 PROCEDURE — 99214 OFFICE O/P EST MOD 30 MIN: CPT | Mod: 25,S$GLB,, | Performed by: ANESTHESIOLOGY

## 2017-05-01 RX ORDER — BETAMETHASONE SODIUM PHOSPHATE AND BETAMETHASONE ACETATE 3; 3 MG/ML; MG/ML
6 INJECTION, SUSPENSION INTRA-ARTICULAR; INTRALESIONAL; INTRAMUSCULAR; SOFT TISSUE
Status: COMPLETED | OUTPATIENT
Start: 2017-05-01 | End: 2017-05-01

## 2017-05-01 RX ORDER — BUPIVACAINE HYDROCHLORIDE 5 MG/ML
4 INJECTION, SOLUTION EPIDURAL; INTRACAUDAL
Status: COMPLETED | OUTPATIENT
Start: 2017-05-01 | End: 2017-05-01

## 2017-05-01 RX ORDER — TRAMADOL HYDROCHLORIDE 50 MG/1
50 TABLET ORAL EVERY 6 HOURS PRN
Qty: 120 TABLET | Refills: 1 | Status: SHIPPED | OUTPATIENT
Start: 2017-05-01 | End: 2017-06-21 | Stop reason: SDUPTHER

## 2017-05-01 RX ORDER — METHOCARBAMOL 500 MG/1
500 TABLET, FILM COATED ORAL 3 TIMES DAILY PRN
Qty: 90 TABLET | Refills: 5 | Status: SHIPPED | OUTPATIENT
Start: 2017-05-01 | End: 2017-10-28

## 2017-05-01 RX ORDER — DICLOFENAC SODIUM 20 MG/G
40 SOLUTION TOPICAL 2 TIMES DAILY PRN
Qty: 112 G | Refills: 5 | Status: SHIPPED | OUTPATIENT
Start: 2017-05-01 | End: 2017-10-28

## 2017-05-01 RX ADMIN — BUPIVACAINE HYDROCHLORIDE 20 MG: 5 INJECTION, SOLUTION EPIDURAL; INTRACAUDAL at 01:05

## 2017-05-01 RX ADMIN — BETAMETHASONE SODIUM PHOSPHATE AND BETAMETHASONE ACETATE 6 MG: 3; 3 INJECTION, SUSPENSION INTRA-ARTICULAR; INTRALESIONAL; INTRAMUSCULAR; SOFT TISSUE at 01:05

## 2017-05-01 NOTE — PROGRESS NOTES
Subjective:     Patient ID: Sal Navarro is a 52 y.o. male.    Chief Complaint: No chief complaint on file.    Consulted by: No ref. provider found     Disclaimer: This note was generated using voice recognition software.  There may be a typographical errors that were missed during proofreading.      HPI:    Sal Navarro is a 52 y.o. male who presents today s/p C4-7 ACDF with C5/6 PSIF in 2/2016 with residual chronic midline neck pain that radiates into his shoulder blades bilaterally, R>>L.  This is associated with bilateral hand numbness and tingling.  The hand symptoms did improve following the surgery.  The shooting pains in his arms resolved completely.   This pain is described in detail below.  His post-operative course was complicated by dysphagia that is being treated with speech therapy.    Interval History (9/23/2016):  He returns today for follow up.  He reports that the MELANIE was not helpful for the pain.  He hasn't noticed a difference with the amitriptyline.  He does notice a difference in his low back pain when he skips the meloxicam    Interval History (11/17/2016):  He returns today for follow up.  He reports that cervical RFAs were not very helpful for the pain. He is no longer taking Percocet or Tamazepam. He continues to take Mobic 15mg daily for his low back pain. He is taking elavil 25mg nightly without side effects. He is open to considering SCS. He reports poor sleep at night.    Interval History (1/3/2017):  The patient returns today for follow up of neck and shoulder pain.  His pain is located to his neck and surrounding areas.  He previously had limited benefit with RFAs, although he did have short term benefit with MBB.  He also had limited benefit with NGUYEN.  Dr. Montoya discussed cervical SCS trial with the patient, although he reports that he does not wish to pursue this option at this time.  He is scheduled to f/u with Dr. Fowler on 1/24/16.  He continues to participate in  PT.  He did previously have some benefit with a TENS unit at PT.  He is reporting some relief with Tramadol.  He also takes Zanaflex which helps but is very sedating.      Interval History (3/3/2017):  The patient returns for follow up of neck and shoulder pain. He continues to take Tramadol BID with benefit. The medication decreases his pain slightly. He also reports benefit with Robaxin. He continues to take Mobic with benefit. He recently stopped physical therapy and reports increased pain into his shoulders, despite a home exercise plan.     Interval History (5/1/2017):  He returns today for follow up.  He reports that these RFAs were more helpful than before, L more so than right, but now the pain is again returning.  Today, he also complains of left shoulder pain that has been gradually worsening over the past few months. He associated this with modified activity due to his neck.  Pain is worse with extension of arm and lifting.  Tramadol, Robaxin, Meloxicam, and amitriptyline have been helpful for the pain.  He continues to have GI upset, but has just started prilosec.  When he tried stopping the meloxicam, his pain increased dramatically.  The tramadol takes the edge off.  Robaxin helps, but he is out.    Of note, he recently had an endoscopy that showed stage 3 gastritis, for which he was started on Prilosec    Physical Therapy: Yes- outside facility in [place].    Non-pharmacologic Treatment: Ice and heat provide mild benefit.           · TENS? Yes at PT with benefit.    Pain Medications:         · Currently taking: Amitriptyline 50 mg QHS, Meloxicam 15 mg daily, Tylenol PRN, Tramadol 50 mg BID, and Robaxin 500 mg TID PRN (out)    · Has tried in the past:  Percocet (limited benefit and causes constipation treated with colace), Temazepam for sleep (he has had trouble sleeping since the surgery), Flexeril (no help in the past, both before and after the surgery), Gabapentin (after, unsure of duration of tx),  Lyrica (sedation), Celebrex (GI upset), Zanaflex 4 mg PRN muscle pain (helpful but sedating)    · Has not tried: SNRIs, other muscle relaxants    Blood thinners: None    Interventional Therapies: None    Relevant Surgeries:   · C4-7 ACDF with C5/6 PSIF in 2/2016  · C7/T1 ILESI: 10-15% relief  · 10/4/16 Bilateral C4-7 MBB- short term benefit  · 10/11/16 Right C4-7 RFA- limited benefit  · 10/25/16 Left C4-7- limited benefit    Affecting sleep? Yes    Affecting daily activities? Yes  Depressive symptoms? Yes, due to general medical condition          · SI/HI? No    Work status: On short term disability from his job as an [employer] .    Pain Scales  Best: 4/10  Worst: 9/10  Usually: 7/10  Today: 7/10    Neck Pain    This is a new problem. Episode onset: february 2016. The problem occurs intermittently. The problem has been unchanged. The pain is present in the anterior neck. The quality of the pain is described as aching, shooting and stabbing. The pain is at a severity of 6/10. The pain is mild. The symptoms are aggravated by bending (turning). The pain is same all the time. Stiffness is present all day and in the morning. Associated symptoms include chest pain. He has tried oral narcotics, NSAIDs and muscle relaxants for the symptoms. The treatment provided mild relief. Physical therapy was not tried and ineffective.      Review of Systems   Constitution: Negative.   HENT: Negative.    Eyes: Negative.    Cardiovascular: Positive for chest pain. Negative for dyspnea on exertion and irregular heartbeat.   Respiratory: Negative.    Endocrine: Negative.    Hematologic/Lymphatic: Negative.    Skin: Negative.    Musculoskeletal: Positive for neck pain and stiffness. Negative for back pain.   Gastrointestinal: Negative.    Genitourinary: Negative.    Neurological: Positive for dizziness and light-headedness. Negative for difficulty with concentration, loss of balance and seizures.   Psychiatric/Behavioral:  Negative for altered mental status, depression and suicidal ideas. The patient has insomnia and is nervous/anxious.    Allergic/Immunologic: Negative.    All other systems reviewed and are negative.            Past Medical History:   Diagnosis Date    Arthritis     Cataract     Hypertension        Past Surgical History:   Procedure Laterality Date    ACROMIOCLAVICULAR JOINT CYST EXCISION Right     cataracts Bilateral     RADIOFREQUENCY ABLATION  10/2016    cervical spine/Montoya    ROTATOR CUFF REPAIR Right     SPINE SURGERY      cerival fusion        Review of patient's allergies indicates:   Allergen Reactions    Pcn [penicillins] Rash       Current Outpatient Prescriptions   Medication Sig Dispense Refill    acetaminophen (TYLENOL) 500 MG tablet Take 500 mg by mouth every 6 (six) hours as needed for Pain.      amitriptyline (ELAVIL) 25 MG tablet Take 2 tablets (50 mg total) by mouth every evening. 60 tablet 5    cholecalciferol, vitamin D3, 5,000 unit Tab Take by mouth      FOLIC ACID/MULTIVIT-MIN/LUTEIN (CENTRUM SILVER ORAL) Take by mouth once daily.      meclizine (ANTIVERT) 25 mg tablet Take 25 mg by mouth.      melatonin 5 mg Tab Take by mouth.      meloxicam (MOBIC) 15 MG tablet Take 15 mg by mouth daily      omeprazole (PRILOSEC) 20 MG capsule Take 20 mg by mouth once daily.  1    senna-docusate 8.6-50 mg (PERICOLACE) 8.6-50 mg per tablet Take 1 tablet by mouth once daily.      tramadol (ULTRAM) 50 mg tablet Take 1 tablet (50 mg total) by mouth every 6 (six) hours as needed for Pain. 120 tablet 1    valsartan (DIOVAN) 80 MG tablet Take 80 mg by mouth once daily.  1     No current facility-administered medications for this visit.        Family History   Problem Relation Age of Onset    Heart disease Mother     Cancer Father        Social History     Social History    Marital status:      Spouse name: N/A    Number of children: N/A    Years of education: N/A     Occupational  "History    Not on file.     Social History Main Topics    Smoking status: Never Smoker    Smokeless tobacco: Not on file    Alcohol use No    Drug use: No    Sexual activity: Not on file     Other Topics Concern    Not on file     Social History Narrative       Objective:     Vitals:    05/01/17 1059   BP: 125/86   Pulse: 76   Resp: 20   Temp: 97.6 °F (36.4 °C)   TempSrc: Oral   Weight: 99 kg (218 lb 4.8 oz)   Height: 6' 1" (1.854 m)   PainSc:   7   PainLoc: Neck     GEN:  Well developed, well nourished.  No acute distress.  No pain behavior.  HEENT:  No trauma.  Mucous membranes moist.  Nares patent bilaterally.  PSYCH: Normal affect. Thought content appropriate.  CHEST:  Breathing symmetric.  No audible wheezing.  ABD: Soft, non-tender, non-distended.  SKIN:  Warm, pink, dry.  No rash on exposed areas.    EXT:  No cyanosis, clubbing, or edema.  No color change or changes in nail or hair growth.  NEURO/MUSCULOSKELETAL:  Fully alert, oriented, and appropriate. Speech normal nella. No cranial nerve deficits.   Gait: Nonantalgic.  4/5 motor strength in bilateral deltoids; however, this may be due to pain.  Otherwise 5/5 motor strength throughout upper extremities.   Sensory: No sensory deficit in the upper extremities.   Reflexes: 1+ and symmetric throughout.  Negative Akins's bilaterally.  C-Spine:  Decreased ROM with pain on extension> flexion.  Limited extension and lateral rotation.  Positive facet loading bilaterally.  Negative Spurling's bilaterally.    No TTP over cervical facet joints and paraspinal muscles bilaterally.    TTP over left SAB and biceps tendon.  Positive impingement signs.  Pain with active ROM of left shoulder.    Imaging:      Diagnostic Results:  All imaging was independently reviewed by me.  Below is a summary from Dr Fowler's note.  I concur with the below findings.     MRI T-spine, dated 8/8/16:  1. No significant central or neuroforaminal stenosis     MRI C-spine, dated " 8/8/16:  1. No significant stenosis      Flex/Ex X-ray C-spine, dated 7/19/16:  1. No prevertebral swelling  2. No gross instability   3. Good hardware position     MRI C-spine from an outside facility, dated 1/28/2016:  1. Moderate to severe DDD  2. Disc osteophyte complex, worst at C5/6   3. Moderate stenosis at C4/5 and C6/7      CT C-spine from an outside facility, dated 4/2015:  1. Diffuse cervical spondylosis   2. DDD, worst at C5/6, with some ossification of the PLL at C5/6      CT C-spine from an outside facility, dated 3/17/2016:  1. Fusion surgery at C5/6  2. Hardware in good position       AP and Lateral X-ray C-spine from an outside facility:  1. C4-7 ACDF  2. Hardware in good position  3. Spine in good alignment    Assessment:     Encounter Diagnoses   Name Primary?    Myalgia Yes    Cervical paraspinal muscle spasm     Facet arthritis of cervical region     DDD (degenerative disc disease), cervical     S/P cervical spinal fusion     Chronic right shoulder pain     Subacromial bursitis of left shoulder joint        Plan:     Diagnoses and all orders for this visit:    Myalgia  -     diclofenac sodium 20 mg/gram /actuation(2 %) sopm; Apply 40 mg topically 2 (two) times daily as needed.  -     tramadol (ULTRAM) 50 mg tablet; Take 1 tablet (50 mg total) by mouth every 6 (six) hours as needed for Pain.  -     methocarbamol (ROBAXIN) 500 MG Tab; Take 1 tablet (500 mg total) by mouth 3 (three) times daily as needed (muscle spasms).    Cervical paraspinal muscle spasm  -     diclofenac sodium 20 mg/gram /actuation(2 %) sopm; Apply 40 mg topically 2 (two) times daily as needed.  -     tramadol (ULTRAM) 50 mg tablet; Take 1 tablet (50 mg total) by mouth every 6 (six) hours as needed for Pain.  -     methocarbamol (ROBAXIN) 500 MG Tab; Take 1 tablet (500 mg total) by mouth 3 (three) times daily as needed (muscle spasms).    Facet arthritis of cervical region  -     tramadol (ULTRAM) 50 mg tablet; Take 1  tablet (50 mg total) by mouth every 6 (six) hours as needed for Pain.    DDD (degenerative disc disease), cervical  -     tramadol (ULTRAM) 50 mg tablet; Take 1 tablet (50 mg total) by mouth every 6 (six) hours as needed for Pain.    S/P cervical spinal fusion  -     tramadol (ULTRAM) 50 mg tablet; Take 1 tablet (50 mg total) by mouth every 6 (six) hours as needed for Pain.    Chronic right shoulder pain  -     bupivacaine (PF) 0.5% (5 mg/ml) injection 20 mg; Inject 4 mLs (20 mg total) into the articular space one time.  -     betamethasone acetate-betamethasone sodium phosphate injection 6 mg; Inject 1 mL (6 mg total) into the muscle one time.    Subacromial bursitis of left shoulder joint  -     bupivacaine (PF) 0.5% (5 mg/ml) injection 20 mg; Inject 4 mLs (20 mg total) into the articular space one time.  -     betamethasone acetate-betamethasone sodium phosphate injection 6 mg; Inject 1 mL (6 mg total) into the muscle one time.      His pain is consistent with the above.    We discussed the assessment and recommendations.  All available images were reviewed. We discussed the disease process, prognosis, treatment plan, and risks and benefits. The patient is aware of the risks and benefits of the medications being prescribed, common side effects, and proper usage. The following is the plan we agreed on:     1. Left shoulder and SAB as below  1. If limited benefit, MRI w/o contrast  2. Pennsaid gel as above, concentrating on neck and left shoulder  3. We again discussed spinal cord stimulator which he declined at this time.  4. Continue Robaxin, refilled as above  5. Continue Tramadol 50mg to QID PRN pain, #120, 1 refill.  6. Previous UDS results are consistent with medications as prescribed and negative for illicit substances.  7. Continue Amitriptyline 50mg QHS   8. Continue Meloxicam 15 mg daily, benefits currently outweigh risks.  Will continue to reassess  9. Continue Tylenol as needed.  10. RTC in 2 months or  sooner if needed.  Can consider TPIs at that time if muscular component persists.    Date of Procedure: 05/01/2017    Procedure: Left shoulder intra-articular injection and SAB injection    Pre-op diagnosis: Left shoulder pain/SA bursitis    Post-op diagnosis: Same     Physician: Dr. Kasandra Montoya     Assistant: Dr. Magdaleno    Anesthestia: local    EBL: None    Specimens: None    All medications, allergies, and relevant histories were reviewed. No recent antibiotics or infections.  A time-out was taken to verify the correct patient, procedure, laterality, and appropriate medications/allergies.    Intra-articular shoulder and SAB injection: Left    Pt was advised, consented and had questions answered prior to proceeding.  Pt was escorted to the procedure room and placed in the seated position.  Sterile prep over left shoulder.  Palpation was used to located the shoulder joint space on the posterior aspect. A 25g Needle was used to administer 2cc of bicarbonated 1% lidocaine at needle insertion site.  A 22G needle was advanced from the posterolateral angle into the SAB. After negative aspiration, a mixture of 2 cc of 4 cc of 0.5% bupivacaine with 6 mg betamethasone was injected.  Next, the needle was directly inferiorly and laterally into the genohumeral joint space.  The remaining 3 cc of the above mixture was injected. Needle was removed and a bandage was applied.    The patient tolerated the procedure well and was discharged in excellent condition.  No complications noted.     The above plan and management options were discussed at length with patient. Patient is in agreement with the above and verbalized understanding.     Ortho/SPM Exam

## 2017-05-01 NOTE — MR AVS SNAPSHOT
[facility]  [address]  [facility] [address]  Phone: [phone]  Fax: [phone]                  Sal Navarro   2017 11:00 AM   Office Visit    Description:  Male : 1965   Provider:  Kasandra Montoya MD   Department:  [facility]           Diagnoses this Visit        Comments    Myalgia    -  Primary     Cervical paraspinal muscle spasm         Facet arthritis of cervical region         DDD (degenerative disc disease), cervical         S/P cervical spinal fusion         Chronic right shoulder pain         Subacromial bursitis of left shoulder joint                To Do List           Future Appointments        Provider Department Dept Phone    2017 11:00 AM Kasandra Montoya MD [facility] [phone]      Goals (5 Years of Data)     None       These Medications        Disp Refills Start End    diclofenac sodium 20 mg/gram /actuation(2 %) sopm 112 g 5 2017 10/28/2017    Apply 40 mg topically 2 (two) times daily as needed. - Topical (Top)    [place] [facility] [place] [address]  #: [phone]         [facility]     [facility] On Call Nurse Care Line -  Assistance  Unless otherwise directed by your provider, please contact [facility], our nurse care line that is available for  assistance.     Registered nurses in the [facility] provide: appointment scheduling, clinical advisement, health education, and other advisory services.  Call: [phone] (toll free)               Medications           Message regarding Medications     Verify the changes and/or additions to your medication regime listed below are the same as discussed with your clinician today.  If any of these changes or additions are incorrect, please notify your healthcare provider.        START taking these NEW medications        Refills    diclofenac sodium 20 mg/gram /actuation(2  "%) sopm 5    Sig: Apply 40 mg topically 2 (two) times daily as needed.    Class: Normal    Route: Topical (Top)           Verify that the below list of medications is an accurate representation of the medications you are currently taking.  If none reported, the list may be blank. If incorrect, please contact your healthcare provider. Carry this list with you in case of emergency.           Current Medications     acetaminophen (TYLENOL) 500 MG tablet Take 500 mg by mouth every 6 (six) hours as needed for Pain.    amitriptyline (ELAVIL) 25 MG tablet Take 2 tablets (50 mg total) by mouth every evening.    cholecalciferol, vitamin D3, 5,000 unit Tab Take by mouth    FOLIC ACID/MULTIVIT-MIN/LUTEIN (CENTRUM SILVER ORAL) Take by mouth once daily.    meclizine (ANTIVERT) 25 mg tablet Take 25 mg by mouth.    melatonin 5 mg Tab Take by mouth.    meloxicam (MOBIC) 15 MG tablet Take 15 mg by mouth daily    omeprazole (PRILOSEC) 20 MG capsule Take 20 mg by mouth once daily.    senna-docusate 8.6-50 mg (PERICOLACE) 8.6-50 mg per tablet Take 1 tablet by mouth once daily.    tramadol (ULTRAM) 50 mg tablet Take 1 tablet (50 mg total) by mouth every 6 (six) hours as needed for Pain.    valsartan (DIOVAN) 80 MG tablet Take 80 mg by mouth once daily.    diclofenac sodium 20 mg/gram /actuation(2 %) sopm Apply 40 mg topically 2 (two) times daily as needed.           Clinical Reference Information           Your Vitals Were     BP Pulse Temp Resp Height Weight    125/86 76 97.6 °F (36.4 °C) (Oral) 20 6' 1" (1.854 m) 99 kg (218 lb 4.8 oz)    BMI                28.8 kg/m2          Blood Pressure          Most Recent Value    BP  125/86      Allergies as of 5/1/2017     Pcn [Penicillins]      Immunizations Administered on Date of Encounter - 5/1/2017     None      Language Assistance Services     ATTENTION: Language assistance services are available, free of charge. Please call [phone].      ATENCIÓN: opal Galvan " disposición servicios gratuitos de asistencia lingüística. Juan Antonio al [phone].     XANDER Ý: N?u b?n nói Ti?ng Vi?t, có các d?ch v? h? tr? ngôn ng? mi?n phí dành cho b?n. G?i s? [phone].         [facility] complies with applicable Federal civil rights laws and does not discriminate on the basis of race, color, national origin, age, disability, or sex.

## 2017-05-11 ENCOUNTER — PATIENT MESSAGE (OUTPATIENT)
Dept: PAIN MEDICINE | Facility: CLINIC | Age: 52
End: 2017-05-11

## 2017-05-11 DIAGNOSIS — G89.29 CHRONIC LEFT SHOULDER PAIN: Primary | ICD-10-CM

## 2017-05-11 DIAGNOSIS — M25.512 CHRONIC LEFT SHOULDER PAIN: Primary | ICD-10-CM

## 2017-05-11 NOTE — TELEPHONE ENCOUNTER
Patient was last seen: 5/1/17 5/1/17 Plan: If limited benefit, MRI w/o contrast    Please review and advise

## 2017-05-15 ENCOUNTER — TELEPHONE (OUTPATIENT)
Dept: PAIN MEDICINE | Facility: CLINIC | Age: 52
End: 2017-05-15

## 2017-05-15 NOTE — TELEPHONE ENCOUNTER
Spoke with patient whom asked me to give his wife a call to schedule his MRI. Staff spoke to patients wife and came to an agreement on a time and date for the patients MRI.    Wife expressed thanks and verbalized understanding of time and date.

## 2017-05-18 ENCOUNTER — TELEPHONE (OUTPATIENT)
Dept: PAIN MEDICINE | Facility: CLINIC | Age: 52
End: 2017-05-18

## 2017-05-20 ENCOUNTER — HOSPITAL ENCOUNTER (OUTPATIENT)
Dept: RADIOLOGY | Facility: OTHER | Age: 52
Discharge: HOME | End: 2017-05-20
Attending: ANESTHESIOLOGY
Payer: COMMERCIAL

## 2017-05-20 DIAGNOSIS — G89.29 CHRONIC LEFT SHOULDER PAIN: ICD-10-CM

## 2017-05-20 DIAGNOSIS — M25.512 CHRONIC LEFT SHOULDER PAIN: ICD-10-CM

## 2017-05-20 PROCEDURE — 73221 MRI JOINT UPR EXTREM W/O DYE: CPT | Mod: 26,LT,, | Performed by: RADIOLOGY

## 2017-05-20 PROCEDURE — 73221 MRI JOINT UPR EXTREM W/O DYE: CPT | Mod: TC,LT

## 2017-05-24 ENCOUNTER — PATIENT MESSAGE (OUTPATIENT)
Dept: PAIN MEDICINE | Facility: CLINIC | Age: 52
End: 2017-05-24

## 2017-06-21 ENCOUNTER — OFFICE VISIT (OUTPATIENT)
Dept: SPINE | Facility: CLINIC | Age: 52
End: 2017-06-21
Attending: ANESTHESIOLOGY
Payer: COMMERCIAL

## 2017-06-21 VITALS
DIASTOLIC BLOOD PRESSURE: 80 MMHG | SYSTOLIC BLOOD PRESSURE: 123 MMHG | BODY MASS INDEX: 28.89 KG/M2 | HEIGHT: 73 IN | WEIGHT: 218 LBS | HEART RATE: 75 BPM

## 2017-06-21 DIAGNOSIS — Z98.1 S/P CERVICAL SPINAL FUSION: ICD-10-CM

## 2017-06-21 DIAGNOSIS — M79.10 MYALGIA: ICD-10-CM

## 2017-06-21 DIAGNOSIS — M50.30 DDD (DEGENERATIVE DISC DISEASE), CERVICAL: ICD-10-CM

## 2017-06-21 DIAGNOSIS — M47.812 FACET ARTHRITIS OF CERVICAL REGION: ICD-10-CM

## 2017-06-21 DIAGNOSIS — M25.512 ACUTE PAIN OF LEFT SHOULDER: Primary | ICD-10-CM

## 2017-06-21 DIAGNOSIS — M47.812 ARTHROPATHY OF CERVICAL FACET JOINT: ICD-10-CM

## 2017-06-21 DIAGNOSIS — M62.838 CERVICAL PARASPINAL MUSCLE SPASM: ICD-10-CM

## 2017-06-21 DIAGNOSIS — M54.12 CERVICAL RADICULITIS: ICD-10-CM

## 2017-06-21 PROCEDURE — 99214 OFFICE O/P EST MOD 30 MIN: CPT | Mod: 25,S$GLB,, | Performed by: ANESTHESIOLOGY

## 2017-06-21 PROCEDURE — 99999 PR PBB SHADOW E&M-EST. PATIENT-LVL III: CPT | Mod: PBBFAC,,, | Performed by: ANESTHESIOLOGY

## 2017-06-21 PROCEDURE — 20553 NJX 1/MLT TRIGGER POINTS 3/>: CPT | Mod: S$GLB,,, | Performed by: ANESTHESIOLOGY

## 2017-06-21 RX ORDER — TRAMADOL HYDROCHLORIDE 50 MG/1
50 TABLET ORAL EVERY 6 HOURS PRN
Qty: 120 TABLET | Refills: 1 | Status: SHIPPED | OUTPATIENT
Start: 2017-06-21 | End: 2017-08-20

## 2017-06-21 RX ORDER — BUPIVACAINE HYDROCHLORIDE 5 MG/ML
5 INJECTION, SOLUTION EPIDURAL; INTRACAUDAL
Status: COMPLETED | OUTPATIENT
Start: 2017-06-21 | End: 2017-06-21

## 2017-06-21 RX ORDER — BETAMETHASONE SODIUM PHOSPHATE AND BETAMETHASONE ACETATE 3; 3 MG/ML; MG/ML
6 INJECTION, SUSPENSION INTRA-ARTICULAR; INTRALESIONAL; INTRAMUSCULAR; SOFT TISSUE
Status: DISCONTINUED | OUTPATIENT
Start: 2017-06-21 | End: 2017-06-21

## 2017-06-21 RX ORDER — BUPIVACAINE HYDROCHLORIDE 5 MG/ML
5 INJECTION, SOLUTION EPIDURAL; INTRACAUDAL
Status: DISCONTINUED | OUTPATIENT
Start: 2017-06-21 | End: 2017-06-21

## 2017-06-21 RX ORDER — AMITRIPTYLINE HYDROCHLORIDE 25 MG/1
50 TABLET, FILM COATED ORAL NIGHTLY
Qty: 60 TABLET | Refills: 5 | Status: SHIPPED | OUTPATIENT
Start: 2017-06-21 | End: 2018-06-28

## 2017-06-21 RX ORDER — BETAMETHASONE SODIUM PHOSPHATE AND BETAMETHASONE ACETATE 3; 3 MG/ML; MG/ML
6 INJECTION, SUSPENSION INTRA-ARTICULAR; INTRALESIONAL; INTRAMUSCULAR; SOFT TISSUE
Status: COMPLETED | OUTPATIENT
Start: 2017-06-21 | End: 2017-06-21

## 2017-06-21 RX ADMIN — BETAMETHASONE SODIUM PHOSPHATE AND BETAMETHASONE ACETATE 6 MG: 3; 3 INJECTION, SUSPENSION INTRA-ARTICULAR; INTRALESIONAL; INTRAMUSCULAR; SOFT TISSUE at 11:06

## 2017-06-21 RX ADMIN — BUPIVACAINE HYDROCHLORIDE 25 MG: 5 INJECTION, SOLUTION EPIDURAL; INTRACAUDAL at 11:06

## 2017-06-21 NOTE — PROGRESS NOTES
Subjective:     Patient ID: Sal Navarro is a 52 y.o. male.    Chief Complaint: Follow-up    Consulted by: No ref. provider found     Disclaimer: This note was generated using voice recognition software.  There may be a typographical errors that were missed during proofreading.      HPI:    Sal Navarro is a 52 y.o. male who presents today s/p C4-7 ACDF with C5/6 PSIF in 2/2016 with residual chronic midline neck pain that radiates into his shoulder blades bilaterally, R>>L.  This is associated with bilateral hand numbness and tingling.  The hand symptoms did improve following the surgery.  The shooting pains in his arms resolved completely.   This pain is described in detail below.  His post-operative course was complicated by dysphagia that is being treated with speech therapy.    Interval History (9/23/2016):  He returns today for follow up.  He reports that the MELANIE was not helpful for the pain.  He hasn't noticed a difference with the amitriptyline.  He does notice a difference in his low back pain when he skips the meloxicam    Interval History (11/17/2016):  He returns today for follow up.  He reports that cervical RFAs were not very helpful for the pain. He is no longer taking Percocet or Tamazepam. He continues to take Mobic 15mg daily for his low back pain. He is taking elavil 25mg nightly without side effects. He is open to considering SCS. He reports poor sleep at night.    Interval History (1/3/2017):  The patient returns today for follow up of neck and shoulder pain.  His pain is located to his neck and surrounding areas.  He previously had limited benefit with RFAs, although he did have short term benefit with MBB.  He also had limited benefit with NGUYEN.  Dr. Montoya discussed cervical SCS trial with the patient, although he reports that he does not wish to pursue this option at this time.  He is scheduled to f/u with Dr. Fowler on 1/24/16.  He continues to participate in PT.  He did  previously have some benefit with a TENS unit at PT.  He is reporting some relief with Tramadol.  He also takes Zanaflex which helps but is very sedating.      Interval History (3/3/2017):  The patient returns for follow up of neck and shoulder pain. He continues to take Tramadol BID with benefit. The medication decreases his pain slightly. He also reports benefit with Robaxin. He continues to take Mobic with benefit. He recently stopped physical therapy and reports increased pain into his shoulders, despite a home exercise plan.     Interval History (5/1/2017):  He returns today for follow up.  He reports that these RFAs were more helpful than before, L more so than right, but now the pain is again returning.  Today, he also complains of left shoulder pain that has been gradually worsening over the past few months. He associated this with modified activity due to his neck.  Pain is worse with extension of arm and lifting.  Tramadol, Robaxin, Meloxicam, and amitriptyline have been helpful for the pain.  He continues to have GI upset, but has just started prilosec.  When he tried stopping the meloxicam, his pain increased dramatically.  The tramadol takes the edge off.  Robaxin helps, but he is out.    Of note, he recently had an endoscopy that showed stage 3 gastritis, for which he was started on Prilosec    Interval History (6/21/2017):  He returns today for follow up.  He reports that the shoulder injection provided one week of relief.  The Pennsaid has been helpful for the pain.  The RFA has helped with shooting pain into his back.  He is doing his HEP.  He has not been back to PT because of insurance issues.  His shoulder continues to present significant hindrances to his daily activities.      Physical Therapy: Yes- outside facility in [place].    Non-pharmacologic Treatment: Ice and heat provide mild benefit.           · TENS? Yes at PT with benefit.    Pain Medications:         · Currently taking: Amitriptyline 50  mg QHS, Meloxicam 15 mg daily, Tylenol PRN, Tramadol 50 mg BID, and Robaxin 500 mg TID PRN (out)    · Has tried in the past:  Percocet (limited benefit and causes constipation treated with colace), Temazepam for sleep (he has had trouble sleeping since the surgery), Flexeril (no help in the past, both before and after the surgery), Gabapentin (after, unsure of duration of tx), Lyrica (sedation), Celebrex (GI upset), Zanaflex 4 mg PRN muscle pain (helpful but sedating)    · Has not tried: SNRIs, other muscle relaxants    Blood thinners: None    Interventional Therapies: None    Relevant Surgeries:   · C4-7 ACDF with C5/6 PSIF in 2/2016  · C7/T1 ILESI: 10-15% relief  · 10/4/16 Bilateral C4-7 MBB- short term benefit  · 10/11/16 Right C4-7 RFA- limited benefit  · 10/25/16 Left C4-7- limited benefit    Affecting sleep? Yes    Affecting daily activities? Yes    Depressive symptoms? Yes, due to general medical condition.  He denies significant symptoms today          · SI/HI? No    Work status: On short term disability from his job as an [employer] .    Pain Scales  Best: 4/10  Worst: 9/10  Usually: 7/10  Today: 7/10    Neck Pain    This is a new problem. Episode onset: february 2016. The problem occurs intermittently. The problem has been unchanged. The pain is present in the anterior neck. The quality of the pain is described as aching, shooting and stabbing. The pain is at a severity of 6/10. The pain is mild. The symptoms are aggravated by bending (turning). The pain is same all the time. Stiffness is present all day and in the morning. Associated symptoms include chest pain. He has tried oral narcotics, NSAIDs and muscle relaxants for the symptoms. The treatment provided mild relief. Physical therapy was not tried and ineffective.      Review of Systems   Constitution: Negative.   HENT: Negative.    Eyes: Negative.    Cardiovascular: Positive for chest pain. Negative for dyspnea on exertion and irregular  heartbeat.   Respiratory: Negative.    Endocrine: Negative.    Hematologic/Lymphatic: Negative.    Skin: Negative.    Musculoskeletal: Positive for neck pain and stiffness. Negative for back pain.   Gastrointestinal: Negative.    Genitourinary: Negative.    Neurological: Positive for dizziness and light-headedness. Negative for difficulty with concentration, loss of balance and seizures.   Psychiatric/Behavioral: Negative for altered mental status, depression and suicidal ideas. The patient has insomnia and is nervous/anxious.    Allergic/Immunologic: Negative.    All other systems reviewed and are negative.            Past Medical History:   Diagnosis Date    Arthritis     Cataract     Hypertension        Past Surgical History:   Procedure Laterality Date    ACROMIOCLAVICULAR JOINT CYST EXCISION Right     cataracts Bilateral     RADIOFREQUENCY ABLATION  10/2016    cervical spine/Montoya    ROTATOR CUFF REPAIR Right     SPINE SURGERY      cerival fusion        Review of patient's allergies indicates:   Allergen Reactions    Pcn [penicillins] Rash       Current Outpatient Prescriptions   Medication Sig Dispense Refill    acetaminophen (TYLENOL) 500 MG tablet Take 500 mg by mouth every 6 (six) hours as needed for Pain.      amitriptyline (ELAVIL) 25 MG tablet Take 2 tablets (50 mg total) by mouth every evening. 60 tablet 5    cholecalciferol, vitamin D3, 5,000 unit Tab Take by mouth      diclofenac sodium 20 mg/gram /actuation(2 %) sopm Apply 40 mg topically 2 (two) times daily as needed. 112 g 5    FOLIC ACID/MULTIVIT-MIN/LUTEIN (CENTRUM SILVER ORAL) Take by mouth once daily.      meclizine (ANTIVERT) 25 mg tablet Take 25 mg by mouth.      melatonin 5 mg Tab Take by mouth.      meloxicam (MOBIC) 15 MG tablet Take 15 mg by mouth daily      methocarbamol (ROBAXIN) 500 MG Tab Take 1 tablet (500 mg total) by mouth 3 (three) times daily as needed (muscle spasms). 90 tablet 5    omeprazole (PRILOSEC) 20 MG  "capsule Take 20 mg by mouth once daily.  1    tramadol (ULTRAM) 50 mg tablet Take 1 tablet (50 mg total) by mouth every 6 (six) hours as needed for Pain. 120 tablet 1    senna-docusate 8.6-50 mg (PERICOLACE) 8.6-50 mg per tablet Take 1 tablet by mouth once daily.      valsartan (DIOVAN) 80 MG tablet Take 40 mg by mouth once daily.   1     No current facility-administered medications for this visit.        Family History   Problem Relation Age of Onset    Heart disease Mother     Cancer Father        Social History     Social History    Marital status:      Spouse name: N/A    Number of children: N/A    Years of education: N/A     Occupational History    Not on file.     Social History Main Topics    Smoking status: Never Smoker    Smokeless tobacco: Not on file    Alcohol use No    Drug use: No    Sexual activity: Not on file     Other Topics Concern    Not on file     Social History Narrative    No narrative on file       Objective:     Vitals:    06/21/17 1027   BP: 123/80   Pulse: 75   Weight: 98.9 kg (218 lb)   Height: 6' 1" (1.854 m)   PainSc:   6   PainLoc: Neck     GEN:  Well developed, well nourished.  No acute distress.  No pain behavior.  HEENT:  No trauma.  Mucous membranes moist.  Nares patent bilaterally.  PSYCH: Normal affect. Thought content appropriate.  CHEST:  Breathing symmetric.  No audible wheezing.  ABD: Soft, non-tender, non-distended.  SKIN:  Warm, pink, dry.  No rash on exposed areas.    EXT:  No cyanosis, clubbing, or edema.  No color change or changes in nail or hair growth.  NEURO/MUSCULOSKELETAL:  Fully alert, oriented, and appropriate. Speech normal nella. No cranial nerve deficits.   Gait: Nonantalgic.  4/5 motor strength in bilateral deltoids; however, this may be due to pain.  Otherwise 5/5 motor strength throughout upper extremities.   Sensory: No sensory deficit in the upper extremities.   Reflexes: 1+ and symmetric throughout.  Negative Akins's " bilaterally.  C-Spine:  Decreased ROM with pain on extension> flexion.  Limited extension and lateral rotation.  Positive facet loading bilaterally.  Negative Spurling's bilaterally.    No TTP over cervical facet joints and paraspinal muscles bilaterally.    TTP over left SAB and biceps tendon.  Positive impingement signs.  Pain with active ROM of left shoulder.    Imaging:      Diagnostic Results:  All imaging was independently reviewed by me.  Below is a summary from Dr Fowler's note.  I concur with the below findings.     MRI T-spine, dated 8/8/16:  1. No significant central or neuroforaminal stenosis     MRI C-spine, dated 8/8/16:  1. No significant stenosis      Flex/Ex X-ray C-spine, dated 7/19/16:  1. No prevertebral swelling  2. No gross instability   3. Good hardware position     MRI C-spine from an outside facility, dated 1/28/2016:  1. Moderate to severe DDD  2. Disc osteophyte complex, worst at C5/6   3. Moderate stenosis at C4/5 and C6/7      CT C-spine from an outside facility, dated 4/2015:  1. Diffuse cervical spondylosis   2. DDD, worst at C5/6, with some ossification of the PLL at C5/6      CT C-spine from an outside facility, dated 3/17/2016:  1. Fusion surgery at C5/6  2. Hardware in good position       AP and Lateral X-ray C-spine from an outside facility:  1. C4-7 ACDF  2. Hardware in good position  3. Spine in good alignment    Assessment:     Encounter Diagnoses   Name Primary?    Acute pain of left shoulder Yes    DDD (degenerative disc disease), cervical     S/P cervical spinal fusion     Arthropathy of cervical facet joint     Cervical radiculitis     Myalgia     Cervical paraspinal muscle spasm     Facet arthritis of cervical region        Plan:     Sal was seen today for follow-up.    Diagnoses and all orders for this visit:    Acute pain of left shoulder  -     Ambulatory consult to Sports Medicine    DDD (degenerative disc disease), cervical  -     amitriptyline (ELAVIL)  25 MG tablet; Take 2 tablets (50 mg total) by mouth every evening.  -     tramadol (ULTRAM) 50 mg tablet; Take 1 tablet (50 mg total) by mouth every 6 (six) hours as needed for Pain.    S/P cervical spinal fusion  -     amitriptyline (ELAVIL) 25 MG tablet; Take 2 tablets (50 mg total) by mouth every evening.  -     tramadol (ULTRAM) 50 mg tablet; Take 1 tablet (50 mg total) by mouth every 6 (six) hours as needed for Pain.    Arthropathy of cervical facet joint  -     amitriptyline (ELAVIL) 25 MG tablet; Take 2 tablets (50 mg total) by mouth every evening.    Cervical radiculitis  -     amitriptyline (ELAVIL) 25 MG tablet; Take 2 tablets (50 mg total) by mouth every evening.    Myalgia  -     bupivacaine (PF) 0.5% (5 mg/ml) injection 25 mg; Inject 5 mLs (25 mg total) into the muscle one time.  -     betamethasone acetate-betamethasone sodium phosphate injection 6 mg; Inject 1 mL (6 mg total) into the muscle one time.  -    tramadol (ULTRAM) 50 mg tablet; Take 1 tablet (50 mg total) by mouth every 6 (six) hours as needed for Pain.    Cervical paraspinal muscle spasm  -    tramadol (ULTRAM) 50 mg tablet; Take 1 tablet (50 mg total) by mouth every 6 (six) hours as needed for Pain.    Facet arthritis of cervical region  -     tramadol (ULTRAM) 50 mg tablet; Take 1 tablet (50 mg total) by mouth every 6 (six) hours as needed for Pain.        His pain is consistent with the above.    We discussed the assessment and recommendations.  All available images were reviewed. We discussed the disease process, prognosis, treatment plan, and risks and benefits. The patient is aware of the risks and benefits of the medications being prescribed, common side effects, and proper usage. The following is the plan we agreed on:     1. Consult to Sports Medicine to see if there are any additional treatment options for his shoulder.  2. Continue Pennsaid gel, concentrating on neck and left shoulder  3. We again discussed spinal cord  stimulator which he will think about.  4. We discussed FRP which he will also think about.  5. Continue Robaxin, refilled as above  6. Continue Tramadol 50mg to QID PRN pain, #120, 1 refill.  7. Previous UDS results are consistent with medications as prescribed and negative for illicit substances.  8. Continue Amitriptyline 25mg QHS   9. Continue Meloxicam 15 mg daily, benefits currently outweigh risks.  Will continue to reassess  10. Continue Tylenol as needed.  11. RTC in 2 months or sooner if needed.  We will talk bout the long term plan: FRP vs SCS then.    I have seen the patient with the resident physician.  I have performed my own history and physical exam and we have come up with the above plan.  The patient is in agreement with our plan.    Greater than 25 minutes spent in total in todays visit with the patient, with more than half that time direct face to face counseling and education with the patient today. We discussed the disease process, prognosis, treatment plan, and risks and benefits.    Trigger Point Injection:   The procedure was discussed with the patient including complications of damage, bleeding, infection, and failure of pain relief.     All medications, allergies, and relevant histories were reviewed. No recent antibiotics or infections.  A time-out was taken to verify the correct patient, procedure, laterality, and appropriate medications/allergies.    Trigger points were identified by palpation and marked. CHG prep of sites done. A 27-gauge needle was advanced to the point of maximal tenderness, and 1 mL of a mixture of 0.5% bupivacaine with betamethasone 3 mg was injected after negative aspiration. All sites done in the same manner. Patient tolerated the procedure well and without complications. Sites injected included:bilateral cervical paraspinals x 6     The patient tolerated the procedure well and was discharged in excellent condition.          The above plan and management options  were discussed at length with patient. Patient is in agreement with the above and verbalized understanding.     Ortho/SPM Exam

## 2017-06-21 NOTE — PATIENT INSTRUCTIONS
Recommend Colace or another over the counter stool softener to use as needed for regularity.    Recommend considering either our Functional Restoration Program or the spinal cord stimulator from [employer]    You can look up functional restoration programs online.  Ours is modeled after the program at [facility] in [place].  Their web site is:    [email or web address]    I have given you a DVD on the spinal cord stimulator system.

## 2017-06-30 ENCOUNTER — OFFICE VISIT (OUTPATIENT)
Dept: SPORTS MEDICINE | Facility: CLINIC | Age: 52
End: 2017-06-30
Payer: COMMERCIAL

## 2017-06-30 VITALS
SYSTOLIC BLOOD PRESSURE: 139 MMHG | WEIGHT: 218 LBS | DIASTOLIC BLOOD PRESSURE: 86 MMHG | HEIGHT: 73 IN | HEART RATE: 81 BPM | BODY MASS INDEX: 28.89 KG/M2

## 2017-06-30 DIAGNOSIS — M25.512 ACUTE PAIN OF LEFT SHOULDER: Primary | ICD-10-CM

## 2017-06-30 PROCEDURE — 20610 DRAIN/INJ JOINT/BURSA W/O US: CPT | Mod: LT,S$GLB,, | Performed by: ORTHOPAEDIC SURGERY

## 2017-06-30 PROCEDURE — 99214 OFFICE O/P EST MOD 30 MIN: CPT | Mod: 25,S$GLB,, | Performed by: ORTHOPAEDIC SURGERY

## 2017-06-30 PROCEDURE — 99999 PR PBB SHADOW E&M-EST. PATIENT-LVL III: CPT | Mod: PBBFAC,,, | Performed by: ORTHOPAEDIC SURGERY

## 2017-06-30 RX ORDER — LEVOTHYROXINE SODIUM 50 UG/1
50 TABLET ORAL DAILY
COMMUNITY

## 2017-06-30 RX ORDER — ATORVASTATIN CALCIUM 20 MG/1
20 TABLET, FILM COATED ORAL DAILY
Status: ON HOLD | COMMUNITY
End: 2017-09-26

## 2017-06-30 RX ORDER — TRIAMCINOLONE ACETONIDE 40 MG/ML
80 INJECTION, SUSPENSION INTRA-ARTICULAR; INTRAMUSCULAR
Status: DISCONTINUED | OUTPATIENT
Start: 2017-06-30 | End: 2017-06-30 | Stop reason: HOSPADM

## 2017-06-30 RX ADMIN — TRIAMCINOLONE ACETONIDE 80 MG: 40 INJECTION, SUSPENSION INTRA-ARTICULAR; INTRAMUSCULAR at 10:06

## 2017-06-30 NOTE — PROGRESS NOTES
CC: left shoulder pain referred by Dr. 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      He reports that the pain is worse with overhead activity. It also bothers him at night.    Is affecting ADLs.     Review of Systems   Constitution: Negative. Negative for chills, fever and night sweats.   HENT: Negative for congestion and headaches.    Eyes: Negative for blurred vision, left vision loss and right vision loss.   Cardiovascular: Negative for chest pain and syncope.   Respiratory: Negative for cough and shortness of breath.    Endocrine: Negative for polydipsia, polyphagia and polyuria.   Hematologic/Lymphatic: Negative for bleeding problem. Does not bruise/bleed easily.   Skin: Negative for dry skin, itching and rash.   Musculoskeletal: Negative for falls and muscle weakness.   Gastrointestinal: Negative for abdominal pain and bowel incontinence.   Genitourinary: Negative for bladder incontinence and nocturia.   Neurological: Negative for disturbances in coordination, loss of balance and seizures.   Psychiatric/Behavioral: Negative for depression. The patient does not have insomnia.    Allergic/Immunologic: Negative for hives and persistent infections.     PAST MEDICAL HISTORY:   Past Medical History:   Diagnosis Date    Arthritis     Cataract     Hypertension      PAST SURGICAL HISTORY:   Past Surgical History:   Procedure Laterality Date    ACROMIOCLAVICULAR JOINT CYST EXCISION Right     cataracts Bilateral     RADIOFREQUENCY ABLATION  10/2016    cervical spine/Jan    ROTATOR CUFF REPAIR Right     SPINE SURGERY      cerival fusion      FAMILY HISTORY:   Family History   Problem Relation Age of Onset    Heart disease Mother     Cancer Father      SOCIAL HISTORY:   Social History     Social History    Marital status:      Spouse name: N/A    Number of children: N/A    Years of  education: N/A     Occupational History    Not on file.     Social History Main Topics    Smoking status: Never Smoker    Smokeless tobacco: Not on file    Alcohol use No    Drug use: No    Sexual activity: Not on file     Other Topics Concern    Not on file     Social History Narrative    No narrative on file       MEDICATIONS:   Current Outpatient Prescriptions:     acetaminophen (TYLENOL) 500 MG tablet, Take 500 mg by mouth every 6 (six) hours as needed for Pain., Disp: , Rfl:     amitriptyline (ELAVIL) 25 MG tablet, Take 2 tablets (50 mg total) by mouth every evening., Disp: 60 tablet, Rfl: 5    atorvastatin (LIPITOR) 20 MG tablet, Take 20 mg by mouth once daily., Disp: , Rfl:     cholecalciferol, vitamin D3, 5,000 unit Tab, Take by mouth, Disp: , Rfl:     diclofenac sodium 20 mg/gram /actuation(2 %) sopm, Apply 40 mg topically 2 (two) times daily as needed., Disp: 112 g, Rfl: 5    FOLIC ACID/MULTIVIT-MIN/LUTEIN (CENTRUM SILVER ORAL), Take by mouth once daily., Disp: , Rfl:     levothyroxine (SYNTHROID) 50 MCG tablet, Take 50 mcg by mouth once daily., Disp: , Rfl:     meclizine (ANTIVERT) 25 mg tablet, Take 25 mg by mouth., Disp: , Rfl:     melatonin 5 mg Tab, Take by mouth., Disp: , Rfl:     meloxicam (MOBIC) 15 MG tablet, Take 15 mg by mouth daily, Disp: , Rfl:     methocarbamol (ROBAXIN) 500 MG Tab, Take 1 tablet (500 mg total) by mouth 3 (three) times daily as needed (muscle spasms)., Disp: 90 tablet, Rfl: 5    omeprazole (PRILOSEC) 20 MG capsule, Take 20 mg by mouth once daily., Disp: , Rfl: 1    senna-docusate 8.6-50 mg (PERICOLACE) 8.6-50 mg per tablet, Take 1 tablet by mouth once daily., Disp: , Rfl:     tramadol (ULTRAM) 50 mg tablet, Take 1 tablet (50 mg total) by mouth every 6 (six) hours as needed for Pain., Disp: 120 tablet, Rfl: 1    valsartan (DIOVAN) 80 MG tablet, Take 40 mg by mouth once daily. , Disp: , Rfl: 1  ALLERGIES:   Review of patient's allergies indicates:  "  Allergen Reactions    Pcn [penicillins] Hives and Rash       VITAL SIGNS: /86   Pulse 81   Ht 6' 1" (1.854 m)   Wt 98.9 kg (218 lb)   BMI 28.76 kg/m²      PHYSICAL EXAMINATION:  General:  The patient is alert and oriented x 3.  Mood is pleasant.  Observation of ears, eyes and nose reveal no gross abnormalities.  No labored breathing observed.  Gait is coordinated. Patient can toe walk and heel walk without difficulty.      left Shoulder / Upper Extremity Exam    OBSERVATION:     Swelling  none  Deformity  none   Discoloration  none   Scapular winging none   Scars   none  Atrophy  none    TENDERNESS / CREPITUS (T/C):          T/C      T/C   Clavicle   -/-  SUPRAspinatus    -/-     AC Jt.    -/-  INFRAspinatus  -/-    SC Jt.    -/-  Deltoid    -/-      G. Tuberosity  -/-  LH BICEP groove  +/-   Acromion:  -/-  Midline Neck   -/-     Scapular Spine -/-  Trapezium   -/-   SMA Scapula  -/-  GH jt. line - post  -/-     Scapulothoracic  -/-         ROM: (* = with pain)  Right shoulder   Left shoulder        AROM (PROM)   AROM (PROM)   FE    170° (175°)     170° (175°)     ER at 0°    60°  (65°)    60°  (65°)   ER at 90° ABD  90°  (90°)    90°  (90°)   IR at 90°  ABD   NA  (40°)     NA  (40°)      IR (spine level)   T10     T10    STRENGTH: (* = with pain) RIGHT SHOULDER  LEFT SHOULDER   SCAPTION   5/5    5/5    IR    5/5    5/5   ER    5/5    5/5   BICEPS   5/5    5/5   Deltoid    5/5    5/5     SIGNS:  Painful side       NEER   +    OALPHONSOS  neg    ARELLANO   +    SPEEDS  neg     DROP ARM   neg   BELLY PRESS neg   Superior escape none    LIFT-OFF  neg   X-Body ADD    neg    MOVING VALGUS neg        STABILITY TESTING    RIGHT SHOULDER   LEFT SHOULDER     Translation     Anterior  up face     up face    Posterior  up face    up face    Sulcus   < 10mm    < 10 mm     Signs   Apprehension   neg      neg       Relocation   no change     no change      Jerk test  neg     neg    EXTREMITY NEURO-VASCULAR EXAM "    Sensation grossly intact to light touch all dermatomal regions.    DTR 2+ Biceps, Triceps, BR and Negative Oniels sign   Grossly intact motor function at Elbow, Wrist and Hand   Distal pulses radial and ulnar 2+, brisk cap refill, symmetric.      NECK:  Painless FROM and spinous processes non-tender. Negative Spurlings sign.      OTHER FINDINGS:  + scapular dyskinesia    XRAYS:  Shoulder trauma series left,  were obtained and reviewed  No convincing fracture or dislocation is noted. The osseous structures appear well mineralized and well aligned    MRI:  Mild tendinopathy of the supraspinatus tendon, + SLAP        ASSESSMENT:   left:  1. Shoulder pain, impingement   2.  Scapular dyskinesia    PLAN:    PROCEDURE NOTE:   After cortisone consent and post-injection information was given, the shoulder was prepped with betadyne and alcohol. The left subacromial space of the shoulder was injected with 2 cc 40 mg kenalog and 4 cc lidocaine and 4 cc .5% marcaine.   Bandaid was applied. Patient was reminded to rest with RICE for 48 hours post injection and to call clinic immediately for any adverse side effects as explained in clinic today.    PT for scapular stabilization: Scapular dyskinesia   Scapular stabilization - Reyna protocol, 1-3x/week x 6-8 weeks with HEP      All questions were answered, pt will contact us for questions or concerns in the interim.

## 2017-06-30 NOTE — LETTER
June 30, 2017      Kasandra Montoya MD  [address]  [address]  [facility] [address]           [facility]  [address]  [facility] [address]  Phone: [phone]          Patient: Sal Navarro   MR Number: [id number]   YOB: 1965   Date of Visit: 6/30/2017       Dear Dr. Kasandra Montoya:    Thank you for referring Sal Navarro to me for evaluation. Attached you will find relevant portions of my assessment and plan of care.    If you have questions, please do not hesitate to call me. I look forward to following Sal Navarro along with you.    Sincerely,    Randi Flores MD    Enclosure  CC:  No Recipients    If you would like to receive this communication electronically, please contact [email or web address] or [phone] to request more information on [facility] Link access.    For providers and/or their staff who would like to refer a patient to [facility], please contact us through our one-stop-shop provider referral line, [facility], at [phone].    If you feel you have received this communication in error or would no longer like to receive these types of communications, please e-mail [email or web address]

## 2017-06-30 NOTE — PROCEDURES
Large Joint Aspiration/Injection  Date/Time: 6/30/2017 10:09 AM  Performed by: WENCESLAO AGUILAR  Authorized by: WENCESLAO AGUILAR     Consent Done?:  Yes (Verbal)  Indications:  Pain  Procedure site marked: Yes    Timeout: Prior to procedure the correct patient, procedure, and site was verified      Location:  Shoulder  Site:  L subacromial bursa  Prep: Patient was prepped and draped in usual sterile fashion    Needle size:  22 G  Approach:  Posterior  Medications:  80 mg triamcinolone acetonide 40 mg/mL  Patient tolerance:  Patient tolerated the procedure well with no immediate complications

## 2017-07-07 ENCOUNTER — TELEPHONE (OUTPATIENT)
Dept: ORTHOPEDICS | Facility: CLINIC | Age: 52
End: 2017-07-07

## 2017-07-07 ENCOUNTER — OFFICE VISIT (OUTPATIENT)
Dept: ORTHOPEDICS | Facility: CLINIC | Age: 52
End: 2017-07-07
Payer: COMMERCIAL

## 2017-07-07 ENCOUNTER — HOSPITAL ENCOUNTER (OUTPATIENT)
Dept: RADIOLOGY | Facility: HOSPITAL | Age: 52
Discharge: HOME | End: 2017-07-07
Attending: ORTHOPAEDIC SURGERY
Payer: COMMERCIAL

## 2017-07-07 VITALS — HEIGHT: 73 IN | WEIGHT: 219.13 LBS | BODY MASS INDEX: 29.04 KG/M2

## 2017-07-07 DIAGNOSIS — M54.50 LUMBAR SPINE PAIN: ICD-10-CM

## 2017-07-07 DIAGNOSIS — M54.50 CHRONIC BILATERAL LOW BACK PAIN WITHOUT SCIATICA: ICD-10-CM

## 2017-07-07 DIAGNOSIS — G89.29 CHRONIC BILATERAL LOW BACK PAIN WITHOUT SCIATICA: ICD-10-CM

## 2017-07-07 DIAGNOSIS — Z98.1 S/P CERVICAL SPINAL FUSION: Primary | ICD-10-CM

## 2017-07-07 DIAGNOSIS — M54.2 CERVICAL SPINE PAIN: ICD-10-CM

## 2017-07-07 DIAGNOSIS — M54.2 CERVICAL SPINE PAIN: Primary | ICD-10-CM

## 2017-07-07 PROCEDURE — 72050 X-RAY EXAM NECK SPINE 4/5VWS: CPT | Mod: TC

## 2017-07-07 PROCEDURE — 99999 PR PBB SHADOW E&M-EST. PATIENT-LVL III: CPT | Mod: PBBFAC,,, | Performed by: PHYSICIAN ASSISTANT

## 2017-07-07 PROCEDURE — 72120 X-RAY BEND ONLY L-S SPINE: CPT | Mod: 26,,, | Performed by: RADIOLOGY

## 2017-07-07 PROCEDURE — 99214 OFFICE O/P EST MOD 30 MIN: CPT | Mod: S$GLB,,, | Performed by: PHYSICIAN ASSISTANT

## 2017-07-07 PROCEDURE — 72100 X-RAY EXAM L-S SPINE 2/3 VWS: CPT | Mod: TC

## 2017-07-07 PROCEDURE — 72100 X-RAY EXAM L-S SPINE 2/3 VWS: CPT | Mod: 26,,, | Performed by: RADIOLOGY

## 2017-07-07 PROCEDURE — 72050 X-RAY EXAM NECK SPINE 4/5VWS: CPT | Mod: 26,,, | Performed by: RADIOLOGY

## 2017-07-07 RX ORDER — OXYCODONE AND ACETAMINOPHEN 5; 325 MG/1; MG/1
1 TABLET ORAL
COMMUNITY
End: 2017-08-21

## 2017-07-07 RX ORDER — TEMAZEPAM 30 MG/1
30 CAPSULE ORAL
Status: ON HOLD | COMMUNITY
End: 2017-08-31

## 2017-07-07 NOTE — PROGRESS NOTES
DATE: 7/7/2017  PATIENT: Sal Navarro    Supervising Physician: Rony Ricardo M.D.    CHIEF COMPLAINT: neck pain and back pain    HISTORY:  Sal Navarro is a 52 y.o. male with PMH of C4-7 cervical spinal fusion in February 2016 in [place] here for initial evaluation of low back pain (Back - 9, Leg - 0). The pain has been present for 15 years. The patient describes the pain as aching.  The pain is worse with bending and getting up from sitting and improved by nothing in particular. There is no associated numbness and tingling. There is subjective weakness. Prior treatments have included numerous medications and physical therapy, but no ESIs or surgery.    The patient also has complaints of neck pain (Neck - 7, Arm - 0).  The pain has been present for since his surgery in 2016. Prior to surgery he was having bilateral upper extremity radiculopathy and neck pain.  He was having some myelopathic symptoms as well including difficulty with balance.  Since surgery he says the pain in his arms improved for a while but the numbness and tingling has returned and is worse.  It is worse on the left than on the right.  He also reports continued difficulty with buttons/keys/coins and difficulty with balance.  He says his balance initially improved after surgery but has now worsened again.  He reports several falls.  He primarily has pain between the scapulae.  The patient describes the pain as aching and sharp. The pain is worse with cervical rotation and any activity and improved by laying flat or on his side. There is associated numbness and tingling. There is subjective weakness.  He also reports dysphagia since surgery.  Prior treatments have included tramadol, robaxin, mobic, physical therapy and several cervical RFTA and nerve blocks.     The patient denies myelopathic symptoms such as handwriting changes.  reports difficulty with buttons/coins/keys.  Reports difficulty with balance.  He sometimes uses a  cane for stability.  Denies perineal paresthesias, bowel/bladder dysfunction.    PAST MEDICAL/SURGICAL HISTORY:  Past Medical History:   Diagnosis Date    Arthritis     Cataract     Hypertension      Past Surgical History:   Procedure Laterality Date    ACROMIOCLAVICULAR JOINT CYST EXCISION Right     cataracts Bilateral     RADIOFREQUENCY ABLATION  10/2016    cervical spine/Montoya    ROTATOR CUFF REPAIR Right     SPINE SURGERY      cerival fusion        Medications:   Current Outpatient Prescriptions on File Prior to Visit   Medication Sig Dispense Refill    acetaminophen (TYLENOL) 500 MG tablet Take 500 mg by mouth every 6 (six) hours as needed for Pain.      amitriptyline (ELAVIL) 25 MG tablet Take 2 tablets (50 mg total) by mouth every evening. 60 tablet 5    atorvastatin (LIPITOR) 20 MG tablet Take 20 mg by mouth once daily.      cholecalciferol, vitamin D3, 5,000 unit Tab Take by mouth      diclofenac sodium 20 mg/gram /actuation(2 %) sopm Apply 40 mg topically 2 (two) times daily as needed. 112 g 5    FOLIC ACID/MULTIVIT-MIN/LUTEIN (CENTRUM SILVER ORAL) Take by mouth once daily.      levothyroxine (SYNTHROID) 50 MCG tablet Take 50 mcg by mouth once daily.      meclizine (ANTIVERT) 25 mg tablet Take 25 mg by mouth.      melatonin 5 mg Tab Take by mouth.      meloxicam (MOBIC) 15 MG tablet Take 15 mg by mouth daily      methocarbamol (ROBAXIN) 500 MG Tab Take 1 tablet (500 mg total) by mouth 3 (three) times daily as needed (muscle spasms). 90 tablet 5    omeprazole (PRILOSEC) 20 MG capsule Take 20 mg by mouth once daily.  1    senna-docusate 8.6-50 mg (PERICOLACE) 8.6-50 mg per tablet Take 1 tablet by mouth once daily.      tramadol (ULTRAM) 50 mg tablet Take 1 tablet (50 mg total) by mouth every 6 (six) hours as needed for Pain. 120 tablet 1    valsartan (DIOVAN) 80 MG tablet Take 40 mg by mouth once daily.   1     No current facility-administered medications on file prior to visit.   "      Social History:   Social History     Social History    Marital status:      Spouse name: N/A    Number of children: N/A    Years of education: N/A     Occupational History    Not on file.     Social History Main Topics    Smoking status: Never Smoker    Smokeless tobacco: Not on file    Alcohol use No    Drug use: No    Sexual activity: Not on file     Other Topics Concern    Not on file     Social History Narrative    No narrative on file       REVIEW OF SYSTEMS:  Constitution: Negative. Negative for chills, fever and night sweats.   Cardiovascular: Negative for chest pain and syncope.   Respiratory: Negative for cough and shortness of breath.   Gastrointestinal: See HPI. Negative for nausea/vomiting. Negative for abdominal pain.  Genitourinary: See HPI. Negative for discoloration or dysuria.  Skin: Negative for dry skin, itching and rash.   Hematologic/Lymphatic: Negative for bleeding problem. Does not bruise/bleed easily.   Musculoskeletal: Negative for falls and muscle weakness.   Neurological: See HPI. No seizures.   Endocrine: Negative for polydipsia, polyphagia and polyuria.   Allergic/Immunologic: Negative for hives and persistent infections.     EXAM:  Ht 6' 1" (1.854 m)   Wt 99.4 kg (219 lb 2.2 oz)   BMI 28.91 kg/m²     PHYSICAL EXAMINATION:    General: The patient is a very pleasant 52 y.o. male in no apparent distress, the patient is oriented to person, place and time.  Psych: Normal mood and affect  HEENT: Vision grossly intact, hearing intact to the spoken word.  Lungs: Respirations unlabored.  Gait: Normal station and gait, no difficulty with toe or heel walk.   Skin: Cervical skin and dorsal lumbar skin negative for rashes, lesions, hairy patches.   Range of motion: Cervical and lumbar range of motion is acceptable. There is mild tenderness to palpation of the paracervical muscles.  There is mild lumbar tenderness to palpation.  Spinal Balance: Global saggital and coronal " spinal balance acceptable, no significant for scoliosis and kyphosis.  Musculoskeletal: No pain with the range of motion of the bilateral shoulders and elbows. Normal bulk and contour of the bilateral hands.  No pain with the range of motion of the bilateral hips. Mild bilateral trochanteric tenderness to palpation.  Vascular: Bilateral upper and lower extremities warm and well perfused, radial pulses 2+ bilaterally, dorsalis pedis pulses 2+ bilaterally.  Neurological: Normal strength and tone in all major motor groups in the bilateral upper and lower extremities. Normal sensation to light touch in the C5-T1 and L2-S1 dermatomes bilaterally.  Deep tendon reflexes symmetric 2+ in the bilateral upper and lower extremities.  Negative Inverted Radial Reflex and Akins's bilaterally. Negative Babinski bilaterally. Negative straight leg raise bilaterally.     IMAGING:   Today I personally reviewed AP, Lat and Flex/Ex  upright C-spine films that demonstrate hardware in place.  There appears to be lucency around the C5 screws.    CT and MRI cervical spine from 8/9/2016 and 8/8/2016 were personally reviewed.  There is no evidence of hardware loosening or pseudoarthrosis on CT.  There is no significant spinal canal stenosis.     MRI thoracic spine demonstrates no significant spinal canal or neural foraminal narrowing.    AP, Lat and Flex/Ex upright lumbar spine films demonstrate normal disc spacing and alignment.  No acute abnormalities.       ASSESSMENT/PLAN:    Diagnoses and all orders for this visit:    S/P cervical spinal fusion  -     MRI Lumbar Spine Without Contrast; Future  -     MRI Cervical Spine Without Contrast; Future    Chronic bilateral low back pain without sciatica  -     MRI Lumbar Spine Without Contrast; Future  -     MRI Cervical Spine Without Contrast; Future        Discussed with Dr. Ricardo.  The patient is having new and worsening symptoms concerning for myelopathy.  Will obtain new MRIs cervical and  lumbar spine.  Follow up after the MRIs to discuss results and further treatment.       Return if symptoms worsen or fail to improve.

## 2017-07-11 ENCOUNTER — HOSPITAL ENCOUNTER (OUTPATIENT)
Dept: RADIOLOGY | Facility: HOSPITAL | Age: 52
Discharge: HOME | End: 2017-07-11
Attending: ORTHOPAEDIC SURGERY
Payer: COMMERCIAL

## 2017-07-11 DIAGNOSIS — M54.50 CHRONIC BILATERAL LOW BACK PAIN WITHOUT SCIATICA: ICD-10-CM

## 2017-07-11 DIAGNOSIS — G89.29 CHRONIC BILATERAL LOW BACK PAIN WITHOUT SCIATICA: ICD-10-CM

## 2017-07-11 DIAGNOSIS — Z98.1 S/P CERVICAL SPINAL FUSION: ICD-10-CM

## 2017-07-11 PROCEDURE — 72148 MRI LUMBAR SPINE W/O DYE: CPT | Mod: TC

## 2017-07-11 PROCEDURE — 72148 MRI LUMBAR SPINE W/O DYE: CPT | Mod: 26,,, | Performed by: RADIOLOGY

## 2017-07-11 PROCEDURE — 72141 MRI NECK SPINE W/O DYE: CPT | Mod: 26,,, | Performed by: RADIOLOGY

## 2017-07-11 PROCEDURE — 72141 MRI NECK SPINE W/O DYE: CPT | Mod: TC

## 2017-07-14 ENCOUNTER — OFFICE VISIT (OUTPATIENT)
Dept: ORTHOPEDICS | Facility: CLINIC | Age: 52
End: 2017-07-14
Payer: COMMERCIAL

## 2017-07-14 DIAGNOSIS — Z98.1 S/P CERVICAL SPINAL FUSION: Primary | ICD-10-CM

## 2017-07-14 DIAGNOSIS — M54.2 NECK PAIN: ICD-10-CM

## 2017-07-14 DIAGNOSIS — M54.50 CHRONIC BILATERAL LOW BACK PAIN WITHOUT SCIATICA: ICD-10-CM

## 2017-07-14 DIAGNOSIS — G89.29 CHRONIC BILATERAL LOW BACK PAIN WITHOUT SCIATICA: ICD-10-CM

## 2017-07-14 PROCEDURE — 99999 PR PBB SHADOW E&M-EST. PATIENT-LVL III: CPT | Mod: PBBFAC,,, | Performed by: PHYSICIAN ASSISTANT

## 2017-07-14 PROCEDURE — 99213 OFFICE O/P EST LOW 20 MIN: CPT | Mod: S$GLB,,, | Performed by: PHYSICIAN ASSISTANT

## 2017-07-14 NOTE — PROGRESS NOTES
DATE: 7/14/2017  PATIENT: Sal Navarro    Attending Physician: Rony Ricardo M.D.    HISTORY:  Sal Navarro is a 52 y.o. male who returns to me today for MRI results.  He was last seen by me 7/7/2017.  Today he is doing well but notes continued neck and back pain.  The pain in the neck bothers him most.  The neck bothers him throughout the day.  The back hurts him when he goes to sleep at night.     The patient denies myelopathic symptoms such as handwriting changes.  reports difficulty with buttons/coins/keys.  Reports difficulty with balance.  He sometimes uses a cane for stability.  These symptoms have been present since before his surgery.  Denies perineal paresthesias, bowel/bladder dysfunction.      PMH/PSH/FamHx/SocHx:  Unchanged from prior visit    ROS:  REVIEW OF SYSTEMS:  Constitution: Negative. Negative for chills, fever and night sweats.   HENT: Negative for congestion and headaches.    Eyes: Negative for blurred vision, left vision loss and right vision loss.   Cardiovascular: Negative for chest pain and syncope.   Respiratory: Negative for cough and shortness of breath.    Endocrine: Negative for polydipsia, polyphagia and polyuria.   Hematologic/Lymphatic: Negative for bleeding problem. Does not bruise/bleed easily.   Skin: Negative for dry skin, itching and rash.   Musculoskeletal: Negative for falls and muscle weakness.   Gastrointestinal: Negative for abdominal pain and bowel incontinence.   Allergic/Immunologic: Negative for hives and persistent infections.  Genitourinary: Negative for urinary retention/incontinence and nocturia.   Neurological: Negative for disturbances in coordination, no myelopathic symptoms such as handwriting changes or difficulty with buttons, coins, keys or small objects. No loss of balance and seizures.   Psychiatric/Behavioral: Negative for depression. The patient does not have insomnia.   Denies myelopathic symptoms, perineal paresthesias, bowel or  bladder incontinence    EXAM:  There were no vitals taken for this visit.    General: The patient is a very pleasant 52 y.o. male in no apparent distress, the patient is oriented to person, place and time.  Psych: Normal mood and affect  HEENT: Vision grossly intact, hearing intact to the spoken word.  Lungs: Respirations unlabored.  Gait: Normal station and gait, no difficulty with toe or heel walk.   Skin: Cervical skin and dorsal lumbar skin negative for rashes, lesions, hairy patches.   Range of motion: Cervical and lumbar range of motion is acceptable. There is mild tenderness to palpation of the paracervical muscles.  There is mild lumbar tenderness to palpation.  Spinal Balance: Global saggital and coronal spinal balance acceptable, no significant for scoliosis and kyphosis.  Musculoskeletal: No pain with the range of motion of the bilateral shoulders and elbows. Normal bulk and contour of the bilateral hands.  No pain with the range of motion of the bilateral hips. Mild bilateral trochanteric tenderness to palpation.  Vascular: Bilateral upper and lower extremities warm and well perfused, radial pulses 2+ bilaterally, dorsalis pedis pulses 2+ bilaterally.  Neurological: Normal strength and tone in all major motor groups in the bilateral upper and lower extremities. Normal sensation to light touch in the C5-T1 and L2-S1 dermatomes bilaterally.  Deep tendon reflexes symmetric 2+ in the bilateral upper and lower extremities.  Negative Inverted Radial Reflex and Akins's bilaterally. Negative Babinski bilaterally. Negative straight leg raise bilaterally.       IMAGING:  No new imaging today.    Today I personally re-reviewed AP, Lat and Flex/Ex  upright C-spine films that demonstrate hardware in place.  There appears to be lucency around the C5 screws.     CT and MRI cervical spine from 8/9/2016 and 8/8/2016 were personally reviewed.  There is no evidence of hardware loosening or pseudoarthrosis on CT.  There  is no significant spinal canal stenosis.      MRI thoracic spine demonstrates no significant spinal canal or neural foraminal narrowing.     AP, Lat and Flex/Ex upright lumbar spine films demonstrate normal disc spacing and alignment.  No acute abnormalities.     MRI cervical spine demonstrates mild degenerative changes with hardware in place and good decompression.    MRI lumbar spine demonstrates mild degenerative changes throughout the lumbar spine with mild spinal stenosis.  There is an annular fissure at L5/S1.       ASSESSMENT/PLAN:    Sal was seen today for pain and pain.    Diagnoses and all orders for this visit:    S/P cervical spinal fusion    Neck pain    Chronic bilateral low back pain without sciatica    There is no surgical intervention indicated.  We discussed treatment options.  He would like to try physical therapy with dry needling.  He lives in [place] and will call when he decides where to go.  Follow up after therapy if symptoms persist.         Return if symptoms worsen or fail to improve.

## 2017-07-25 ENCOUNTER — PATIENT MESSAGE (OUTPATIENT)
Dept: ORTHOPEDICS | Facility: CLINIC | Age: 52
End: 2017-07-25

## 2017-07-25 DIAGNOSIS — M54.2 NECK PAIN: ICD-10-CM

## 2017-07-25 DIAGNOSIS — Z98.1 S/P CERVICAL SPINAL FUSION: Primary | ICD-10-CM

## 2017-07-25 DIAGNOSIS — M54.50 CHRONIC BILATERAL LOW BACK PAIN WITHOUT SCIATICA: ICD-10-CM

## 2017-07-25 DIAGNOSIS — G89.29 CHRONIC BILATERAL LOW BACK PAIN WITHOUT SCIATICA: ICD-10-CM

## 2017-08-03 ENCOUNTER — TELEPHONE (OUTPATIENT)
Dept: SPORTS MEDICINE | Facility: CLINIC | Age: 52
End: 2017-08-03

## 2017-08-03 NOTE — TELEPHONE ENCOUNTER
I called and informed the patient's wife that the requested referral was faxed to the number provided

## 2017-08-03 NOTE — TELEPHONE ENCOUNTER
----- Message from Carlos Han sent at 8/3/2017  9:37 AM CDT -----  Contact: Ms. Ochoaer/wife  Ms. Navarro would like a copy of the PT referral to be faxed to [phone].

## 2017-08-10 ENCOUNTER — PATIENT MESSAGE (OUTPATIENT)
Dept: ORTHOPEDICS | Facility: CLINIC | Age: 52
End: 2017-08-10

## 2017-08-21 ENCOUNTER — OFFICE VISIT (OUTPATIENT)
Dept: PAIN MEDICINE | Facility: CLINIC | Age: 52
End: 2017-08-21
Attending: ANESTHESIOLOGY
Payer: COMMERCIAL

## 2017-08-21 VITALS
HEART RATE: 70 BPM | DIASTOLIC BLOOD PRESSURE: 84 MMHG | WEIGHT: 224.88 LBS | RESPIRATION RATE: 18 BRPM | HEIGHT: 73 IN | BODY MASS INDEX: 29.8 KG/M2 | TEMPERATURE: 98 F | SYSTOLIC BLOOD PRESSURE: 130 MMHG

## 2017-08-21 DIAGNOSIS — G89.29 CHRONIC LEFT SHOULDER PAIN: ICD-10-CM

## 2017-08-21 DIAGNOSIS — M51.36 DDD (DEGENERATIVE DISC DISEASE), LUMBAR: ICD-10-CM

## 2017-08-21 DIAGNOSIS — T40.2X5A THERAPEUTIC OPIOID INDUCED CONSTIPATION: ICD-10-CM

## 2017-08-21 DIAGNOSIS — G89.4 CHRONIC PAIN DISORDER: Primary | ICD-10-CM

## 2017-08-21 DIAGNOSIS — M50.30 DDD (DEGENERATIVE DISC DISEASE), CERVICAL: ICD-10-CM

## 2017-08-21 DIAGNOSIS — M62.50 DISUSE MUSCLE ATROPHY: ICD-10-CM

## 2017-08-21 DIAGNOSIS — K59.03 THERAPEUTIC OPIOID INDUCED CONSTIPATION: ICD-10-CM

## 2017-08-21 DIAGNOSIS — M47.812 FACET ARTHRITIS OF CERVICAL REGION: ICD-10-CM

## 2017-08-21 DIAGNOSIS — M62.838 CERVICAL PARASPINAL MUSCLE SPASM: ICD-10-CM

## 2017-08-21 DIAGNOSIS — Z98.1 S/P CERVICAL SPINAL FUSION: ICD-10-CM

## 2017-08-21 DIAGNOSIS — M25.512 CHRONIC LEFT SHOULDER PAIN: ICD-10-CM

## 2017-08-21 DIAGNOSIS — M79.10 MYALGIA: ICD-10-CM

## 2017-08-21 DIAGNOSIS — M47.816 FACET ARTHRITIS OF LUMBAR REGION: ICD-10-CM

## 2017-08-21 PROCEDURE — 99999 PR PBB SHADOW E&M-EST. PATIENT-LVL III: CPT | Mod: PBBFAC,,, | Performed by: ANESTHESIOLOGY

## 2017-08-21 PROCEDURE — 3008F BODY MASS INDEX DOCD: CPT | Mod: S$GLB,,, | Performed by: ANESTHESIOLOGY

## 2017-08-21 PROCEDURE — 99214 OFFICE O/P EST MOD 30 MIN: CPT | Mod: S$GLB,,, | Performed by: ANESTHESIOLOGY

## 2017-08-21 RX ORDER — HYDROCODONE BITARTRATE AND ACETAMINOPHEN 7.5; 325 MG/1; MG/1
1 TABLET ORAL EVERY 6 HOURS PRN
Qty: 120 TABLET | Refills: 0 | Status: SHIPPED | OUTPATIENT
Start: 2017-08-21 | End: 2017-09-20

## 2017-08-21 NOTE — PROGRESS NOTES
Subjective:     Patient ID: Sal Navarro is a 52 y.o. male.    Chief Complaint: Neck Pain (radiates to bilateral shoulder pain )    Consulted by: No ref. provider found     Disclaimer: This note was generated using voice recognition software.  There may be a typographical errors that were missed during proofreading.      HPI:    Sal Navarro is a 52 y.o. male who presents today s/p C4-7 ACDF with C5/6 PSIF in 2/2016 with residual chronic midline neck pain that radiates into his shoulder blades bilaterally, R>>L.  This is associated with bilateral hand numbness and tingling.  The hand symptoms did improve following the surgery.  The shooting pains in his arms resolved completely.   This pain is described in detail below.  His post-operative course was complicated by dysphagia that is being treated with speech therapy.    Interval History (9/23/2016):  He returns today for follow up.  He reports that the MELANIE was not helpful for the pain.  He hasn't noticed a difference with the amitriptyline.  He does notice a difference in his low back pain when he skips the meloxicam    Interval History (11/17/2016):  He returns today for follow up.  He reports that cervical RFAs were not very helpful for the pain. He is no longer taking Percocet or Tamazepam. He continues to take Mobic 15mg daily for his low back pain. He is taking elavil 25mg nightly without side effects. He is open to considering SCS. He reports poor sleep at night.    Interval History (1/3/2017):  The patient returns today for follow up of neck and shoulder pain.  His pain is located to his neck and surrounding areas.  He previously had limited benefit with RFAs, although he did have short term benefit with MBB.  He also had limited benefit with NGUYEN.  Dr. Montoya discussed cervical SCS trial with the patient, although he reports that he does not wish to pursue this option at this time.  He is scheduled to f/u with Dr. Fowler on 1/24/16.  He continues  to participate in PT.  He did previously have some benefit with a TENS unit at PT.  He is reporting some relief with Tramadol.  He also takes Zanaflex which helps but is very sedating.      Interval History (3/3/2017):  The patient returns for follow up of neck and shoulder pain. He continues to take Tramadol BID with benefit. The medication decreases his pain slightly. He also reports benefit with Robaxin. He continues to take Mobic with benefit. He recently stopped physical therapy and reports increased pain into his shoulders, despite a home exercise plan.     Interval History (5/1/2017):  He returns today for follow up.  He reports that these RFAs were more helpful than before, L more so than right, but now the pain is again returning.  Today, he also complains of left shoulder pain that has been gradually worsening over the past few months. He associated this with modified activity due to his neck.  Pain is worse with extension of arm and lifting.  Tramadol, Robaxin, Meloxicam, and amitriptyline have been helpful for the pain.  He continues to have GI upset, but has just started prilosec.  When he tried stopping the meloxicam, his pain increased dramatically.  The tramadol takes the edge off.  Robaxin helps, but he is out.    Of note, he recently had an endoscopy that showed stage 3 gastritis, for which he was started on Prilosec    Interval History (6/21/2017):  He returns today for follow up.  He reports that the shoulder injection provided one week of relief.  The Pennsaid has been helpful for the pain.  The RFA has helped with shooting pain into his back.  He is doing his HEP.  He has not been back to PT because of insurance issues.  His shoulder continues to present significant hindrances to his daily activities.    Interval History (8/21/2017):  He returns today for follow up.  He reports that the tramadol is not as helpful as it used to be.  In addition, he is experiencing constipation and urinary  retention that only resolves with skipping a dose of tramadol. He is going to PT for his neck/shoulder.    Physical Therapy: Yes- outside facility in [place].    Non-pharmacologic Treatment: Ice and heat provide mild benefit.           · TENS? Yes at PT with benefit.    Pain Medications:         · Currently taking: Amitriptyline 50 mg QHS, Meloxicam 15 mg daily, Tylenol PRN, Tramadol 50 mg QID, and Robaxin 500 mg TID PRN (out)    · Has tried in the past:  Percocet (limited benefit and causes constipation treated with colace), Temazepam for sleep (he has had trouble sleeping since the surgery), Flexeril (no help in the past, both before and after the surgery), Gabapentin (after, unsure of duration of tx), Lyrica (sedation), Celebrex (GI upset), Zanaflex 4 mg PRN muscle pain (helpful but sedating)     · Has not tried: SNRIs, other muscle relaxants    Blood thinners: None    Interventional Therapies: None    Relevant Surgeries:   · C4-7 ACDF with C5/6 PSIF in 2/2016  · C7/T1 ILESI: 10-15% relief  · 10/4/16 Bilateral C4-7 MBB- short term benefit  · 10/11/16 Right C4-7 RFA- limited benefit  · 10/25/16 Left C4-7- limited benefit    Affecting sleep? Yes    Affecting daily activities? Yes    Depressive symptoms? Yes, due to general medical condition.  He denies significant symptoms today          · SI/HI? No    Work status: On short term disability from his job as an [employer] .    Pain Scales  Best: 3/10  Worst: 9/10  Usually: 7/10  Today: 8/10    Neck Pain    This is a new problem. Episode onset: february 2016. The problem occurs intermittently. The problem has been unchanged. The pain is present in the anterior neck. The quality of the pain is described as aching, shooting and stabbing. The pain is at a severity of 6/10. The pain is mild. The symptoms are aggravated by bending (turning). The pain is same all the time. Stiffness is present all day and in the morning. Associated symptoms include chest pain. He has  tried oral narcotics, NSAIDs and muscle relaxants for the symptoms. The treatment provided mild relief. Physical therapy was not tried and ineffective.      Review of Systems   Constitution: Negative.   HENT: Negative.    Eyes: Negative.    Cardiovascular: Positive for chest pain. Negative for dyspnea on exertion and irregular heartbeat.   Respiratory: Negative.    Endocrine: Negative.    Hematologic/Lymphatic: Negative.    Skin: Negative.    Musculoskeletal: Positive for neck pain and stiffness. Negative for back pain.   Gastrointestinal: Negative.    Genitourinary: Negative.    Neurological: Positive for dizziness and light-headedness. Negative for difficulty with concentration, loss of balance and seizures.   Psychiatric/Behavioral: Negative for altered mental status, depression and suicidal ideas. The patient has insomnia and is nervous/anxious.    Allergic/Immunologic: Negative.    All other systems reviewed and are negative.            Past Medical History:   Diagnosis Date    Arthritis     Cataract     Hypertension        Past Surgical History:   Procedure Laterality Date    ACROMIOCLAVICULAR JOINT CYST EXCISION Right     cataracts Bilateral     RADIOFREQUENCY ABLATION  10/2016    cervical spine/Montoya    ROTATOR CUFF REPAIR Right     SPINE SURGERY      cerival fusion        Review of patient's allergies indicates:   Allergen Reactions    Pcn [penicillins] Rash       Current Outpatient Prescriptions   Medication Sig Dispense Refill    acetaminophen (TYLENOL) 500 MG tablet Take 500 mg by mouth every 6 (six) hours as needed for Pain.      amitriptyline (ELAVIL) 25 MG tablet Take 2 tablets (50 mg total) by mouth every evening. 60 tablet 5    atorvastatin (LIPITOR) 20 MG tablet Take 20 mg by mouth once daily.      cholecalciferol, vitamin D3, 5,000 unit Tab Take by mouth      diclofenac sodium 20 mg/gram /actuation(2 %) sopm Apply 40 mg topically 2 (two) times daily as needed. 112 g 5    FOLIC  "ACID/MULTIVIT-MIN/LUTEIN (CENTRUM SILVER ORAL) Take by mouth once daily.      levothyroxine (SYNTHROID) 50 MCG tablet Take 50 mcg by mouth once daily.      meclizine (ANTIVERT) 25 mg tablet Take 25 mg by mouth.      melatonin 5 mg Tab Take by mouth.      meloxicam (MOBIC) 15 MG tablet Take 15 mg by mouth daily      methocarbamol (ROBAXIN) 500 MG Tab Take 1 tablet (500 mg total) by mouth 3 (three) times daily as needed (muscle spasms). 90 tablet 5    omeprazole (PRILOSEC) 20 MG capsule Take 20 mg by mouth once daily.  1    oxycodone-acetaminophen (PERCOCET) 5-325 mg per tablet Take 1 tablet by mouth.      senna-docusate 8.6-50 mg (PERICOLACE) 8.6-50 mg per tablet Take 1 tablet by mouth once daily.      temazepam (RESTORIL) 30 mg capsule Take 30 mg by mouth.      valsartan (DIOVAN) 80 MG tablet Take 40 mg by mouth once daily.   1     No current facility-administered medications for this visit.        Family History   Problem Relation Age of Onset    Heart disease Mother     Cancer Father        Social History     Social History    Marital status:      Spouse name: N/A    Number of children: N/A    Years of education: N/A     Occupational History    Not on file.     Social History Main Topics    Smoking status: Never Smoker    Smokeless tobacco: Not on file    Alcohol use No    Drug use: No    Sexual activity: Not on file     Other Topics Concern    Not on file     Social History Narrative    No narrative on file       Objective:     Vitals:    08/21/17 1120   BP: 130/84   Pulse: 70   Resp: 18   Temp: 97.5 °F (36.4 °C)   TempSrc: Oral   Weight: 102 kg (224 lb 14.4 oz)   Height: 6' 1" (1.854 m)   PainSc:   8   PainLoc: Neck     GEN:  Well developed, well nourished.  No acute distress.  No pain behavior.  HEENT:  No trauma.  Mucous membranes moist.  Nares patent bilaterally.  PSYCH: Normal affect. Thought content appropriate.  CHEST:  Breathing symmetric.  No audible wheezing.  ABD: Soft, " non-tender, non-distended.  SKIN:  Warm, pink, dry.  No rash on exposed areas.    EXT:  No cyanosis, clubbing, or edema.  No color change or changes in nail or hair growth.  NEURO/MUSCULOSKELETAL:  Fully alert, oriented, and appropriate. Speech normal nella. No cranial nerve deficits.   Gait: Antalgic.  Present trendelenburg sign bilaterally, L>R  L-Spine:  Decreased ROM with pain on extension > flexion. Positive facet loading bilaterally.  Negative SLR bilaterally.    SI Joint/Hip: Negative KHRIS bilaterally.  Negative FADIR bilaterally.  No TTP over lumbar paraspinals, bilateral SI joints, piriformis muscles, or GTB.      Previous physical exam:  4/5 motor strength in bilateral deltoids; however, this may be due to pain.  Otherwise 5/5 motor strength throughout upper extremities.   Sensory: No sensory deficit in the upper extremities.   Reflexes: 1+ and symmetric throughout.  Negative Akins's bilaterally.  C-Spine:  Decreased ROM with pain on extension> flexion.  Limited extension and lateral rotation.  Positive facet loading bilaterally.  Negative Spurling's bilaterally.    No TTP over cervical facet joints and paraspinal muscles bilaterally.    TTP over left SAB and biceps tendon.  Positive impingement signs.  Pain with active ROM of left shoulder.    Imaging:      Diagnostic Results:  All imaging was independently reviewed by me.  Below is a summary from Dr Fowler's note.  I concur with the below findings.     MRI T-spine, dated 8/8/16:  1. No significant central or neuroforaminal stenosis     MRI C-spine, dated 8/8/16:  1. No significant stenosis      Flex/Ex X-ray C-spine, dated 7/19/16:  1. No prevertebral swelling  2. No gross instability   3. Good hardware position     MRI C-spine from an outside facility, dated 1/28/2016:  1. Moderate to severe DDD  2. Disc osteophyte complex, worst at C5/6   3. Moderate stenosis at C4/5 and C6/7      CT C-spine from an outside facility, dated 4/2015:  1. Diffuse  cervical spondylosis   2. DDD, worst at C5/6, with some ossification of the PLL at C5/6      CT C-spine from an outside facility, dated 3/17/2016:  1. Fusion surgery at C5/6  2. Hardware in good position       AP and Lateral X-ray C-spine from an outside facility:  1. C4-7 ACDF  2. Hardware in good position  3. Spine in good alignment    Assessment:     Encounter Diagnoses   Name Primary?    Chronic pain disorder Yes    DDD (degenerative disc disease), cervical     Facet arthritis of cervical region     Chronic left shoulder pain     Facet arthritis of lumbar region     DDD (degenerative disc disease), lumbar     S/P cervical spinal fusion     Cervical paraspinal muscle spasm     Myalgia     Disuse muscle atrophy     Therapeutic opioid induced constipation        Plan:     Sal was seen today for neck pain.    Diagnoses and all orders for this visit:    Chronic pain disorder  -     hydrocodone-acetaminophen 7.5-325mg (NORCO) 7.5-325 mg per tablet; Take 1 tablet by mouth every 6 (six) hours as needed for Pain.    DDD (degenerative disc disease), cervical  -     hydrocodone-acetaminophen 7.5-325mg (NORCO) 7.5-325 mg per tablet; Take 1 tablet by mouth every 6 (six) hours as needed for Pain.    Facet arthritis of cervical region  -     hydrocodone-acetaminophen 7.5-325mg (NORCO) 7.5-325 mg per tablet; Take 1 tablet by mouth every 6 (six) hours as needed for Pain.    Chronic left shoulder pain  -     hydrocodone-acetaminophen 7.5-325mg (NORCO) 7.5-325 mg per tablet; Take 1 tablet by mouth every 6 (six) hours as needed for Pain.    Facet arthritis of lumbar region  -     hydrocodone-acetaminophen 7.5-325mg (NORCO) 7.5-325 mg per tablet; Take 1 tablet by mouth every 6 (six) hours as needed for Pain.    DDD (degenerative disc disease), lumbar  -     hydrocodone-acetaminophen 7.5-325mg (NORCO) 7.5-325 mg per tablet; Take 1 tablet by mouth every 6 (six) hours as needed for Pain.    S/P cervical spinal fusion  -      hydrocodone-acetaminophen 7.5-325mg (NORCO) 7.5-325 mg per tablet; Take 1 tablet by mouth every 6 (six) hours as needed for Pain.    Cervical paraspinal muscle spasm  -     hydrocodone-acetaminophen 7.5-325mg (NORCO) 7.5-325 mg per tablet; Take 1 tablet by mouth every 6 (six) hours as needed for Pain.    Myalgia  -     hydrocodone-acetaminophen 7.5-325mg (NORCO) 7.5-325 mg per tablet; Take 1 tablet by mouth every 6 (six) hours as needed for Pain.    Disuse muscle atrophy    Therapeutic opioid induced constipation  -     naloxegol (MOVANTIK) tablet; Take 25 mg by mouth once daily.      His pain is consistent with the above.    We discussed the assessment and recommendations.  All available images were reviewed. We discussed the disease process, prognosis, treatment plan, and risks and benefits. The patient is aware of the risks and benefits of the medications being prescribed, common side effects, and proper usage. The following is the plan we agreed on:     1. Trial a switch to Norco from tramadol, 7/5/325 mg, #120. Previous UDS results are consistent with medications as prescribed and negative for illicit substances.  2. Schedule for L2-5 LMBB  1. If positive, coolief  2. If negative, NGUYEN  3. Continue Pennsaid gel, concentrating on neck and left shoulder  4. Continue PT  5. We again discussed spinal cord stimulator which he will think about.  I can do the perc trial after looking at his MRI  6. We discussed FRP which he will also think about.  We would need to get [facility] at the [facility]  7. Continue Robaxin   8. Continue Amitriptyline 25mg QHS   9. Continue Meloxicam 15 mg daily, benefits currently outweigh risks.  Will continue to reassess  10. Movantik 25 mg for OIC.  If needed, decrease to QOD  11. Continue Tylenol as needed.  12. RTC in 1 months or sooner if needed.      Greater than 25 minutes spent in total in todays visit with the patient, with more than half that time direct face to face  counseling and education with the patient today. We discussed the disease process, prognosis, treatment plan, and risks and benefits.    The above plan and management options were discussed at length with patient. Patient is in agreement with the above and verbalized understanding.     Ortho/SPM Exam

## 2017-08-31 ENCOUNTER — HOSPITAL ENCOUNTER (OUTPATIENT)
Facility: OTHER | Age: 52
Discharge: HOME | End: 2017-08-31
Attending: ANESTHESIOLOGY | Admitting: ANESTHESIOLOGY
Payer: COMMERCIAL

## 2017-08-31 ENCOUNTER — SURGERY (OUTPATIENT)
Age: 52
End: 2017-08-31

## 2017-08-31 VITALS
TEMPERATURE: 98 F | BODY MASS INDEX: 29.82 KG/M2 | RESPIRATION RATE: 16 BRPM | DIASTOLIC BLOOD PRESSURE: 78 MMHG | OXYGEN SATURATION: 98 % | SYSTOLIC BLOOD PRESSURE: 131 MMHG | HEIGHT: 73 IN | WEIGHT: 225 LBS | HEART RATE: 62 BPM

## 2017-08-31 DIAGNOSIS — M47.816 FACET ARTHRITIS, DEGENERATIVE, LUMBAR SPINE: Primary | ICD-10-CM

## 2017-08-31 PROCEDURE — 25000003 PHARM REV CODE 250: Performed by: ANESTHESIOLOGY

## 2017-08-31 PROCEDURE — S0020 INJECTION, BUPIVICAINE HYDRO: HCPCS | Performed by: ANESTHESIOLOGY

## 2017-08-31 PROCEDURE — 64495 INJ PARAVERT F JNT L/S 3 LEV: CPT | Mod: 50,,, | Performed by: ANESTHESIOLOGY

## 2017-08-31 PROCEDURE — 64493 INJ PARAVERT F JNT L/S 1 LEV: CPT | Mod: 50 | Performed by: ANESTHESIOLOGY

## 2017-08-31 PROCEDURE — 64494 INJ PARAVERT F JNT L/S 2 LEV: CPT | Mod: 50,,, | Performed by: ANESTHESIOLOGY

## 2017-08-31 PROCEDURE — 64494 INJ PARAVERT F JNT L/S 2 LEV: CPT | Mod: 50 | Performed by: ANESTHESIOLOGY

## 2017-08-31 PROCEDURE — 64495 INJ PARAVERT F JNT L/S 3 LEV: CPT | Mod: 50 | Performed by: ANESTHESIOLOGY

## 2017-08-31 PROCEDURE — 64493 INJ PARAVERT F JNT L/S 1 LEV: CPT | Mod: 50,,, | Performed by: ANESTHESIOLOGY

## 2017-08-31 RX ORDER — BUPIVACAINE HYDROCHLORIDE 5 MG/ML
3 INJECTION, SOLUTION EPIDURAL; INTRACAUDAL ONCE
Status: COMPLETED | OUTPATIENT
Start: 2017-08-31 | End: 2017-08-31

## 2017-08-31 RX ORDER — LIDOCAINE HYDROCHLORIDE 10 MG/ML
10 INJECTION INFILTRATION; PERINEURAL ONCE
Status: COMPLETED | OUTPATIENT
Start: 2017-08-31 | End: 2017-08-31

## 2017-08-31 RX ADMIN — LIDOCAINE HYDROCHLORIDE 10 ML: 10 INJECTION, SOLUTION INFILTRATION; PERINEURAL at 09:08

## 2017-08-31 RX ADMIN — BUPIVACAINE HYDROCHLORIDE 3 ML: 5 INJECTION, SOLUTION EPIDURAL; INTRACAUDAL; PERINEURAL at 09:08

## 2017-08-31 NOTE — OP NOTE
Date of Procedure: 08/31/2017    Procedure: Bilateral L2-5 Lumbar medial branch blocks    Pre-op diagnosis: Facet arthritis, degenerative, lumbar spine [M47.896]    Post-op diagnosis: Facet arthritis, degenerative, lumbar spine [M47.896]     Physician: Dr. Kasandra Montoya     Assistant: Princess Logan and Raul    Anesthestia: local    EBL: None    Specimens: None    All medications, allergies, and relevant histories were reviewed. No recent antibiotics or infections.  A time-out was taken to verify the correct patient, procedure, laterality, and appropriate medications/allergies.    Lumbar Medial Branch Block:   The procedure risks, benefits, and possible complications were discussed with the patient including nerve damage, spinal headache, bleeding, infection, and failure of pain relief.   Patient was placed in the prone position with the midriff elevated. Oblique view of the spine was obtained with fluoroscopy. Entry sites marked over the skin. The skin was prepped with chlorhexidine x3 and draped. Sterile precautions observed throughout the procedure. Xylocaine 1% was infiltrated locally over the entry site. A 22-gauge spinal needle was introduced at an angle, and the needle was placed onto the junction of the superior articular process and the most supermedial point on the transverse process. Placement was confirmed with a fluoroscopic view. After negative aspiration, 1cc of bupivacaine 0.5% was injected at each level. Needle was restyletted and removed. Procedure performed at the levels indicated: Right L2-5, Left:L2-5.     Patient tolerated the procedure well and there were no complications.   The patient was discharged home with a responsible adult.      Future Management:   If good results, can proceed to RFA.  If no relief, can follow up to discuss options.    I certify that I provided the above services.  I was present for the entire procedure, which was performed by the fellow and resident physician under my  supervision.  There were no parts of the procedure that were performed not by myself or without my direct supervision.

## 2017-08-31 NOTE — DISCHARGE INSTRUCTIONS

## 2017-09-01 ENCOUNTER — PATIENT MESSAGE (OUTPATIENT)
Dept: PAIN MEDICINE | Facility: CLINIC | Age: 52
End: 2017-09-01

## 2017-09-05 NOTE — TELEPHONE ENCOUNTER
Plan 8/21/17    1. Schedule for L2-5 LMBB  1. If positive, coolief  2. If negative, NGUYEN    Please review and advise.

## 2017-09-11 NOTE — TELEPHONE ENCOUNTER
Great! That is a positive diagnostic block. Please call him to schedule.  If his pain is not back, we do not need to do anything else.  If it is back, the next step is the RFA, L2-5, bilaterally. IV sedation, 30 min, with Halyard Coolief.  Thanks! - LW

## 2017-09-26 ENCOUNTER — HOSPITAL ENCOUNTER (OUTPATIENT)
Facility: OTHER | Age: 52
Discharge: HOME | End: 2017-09-26
Attending: ANESTHESIOLOGY | Admitting: ANESTHESIOLOGY
Payer: COMMERCIAL

## 2017-09-26 VITALS
RESPIRATION RATE: 18 BRPM | OXYGEN SATURATION: 97 % | TEMPERATURE: 98 F | SYSTOLIC BLOOD PRESSURE: 129 MMHG | HEIGHT: 73 IN | DIASTOLIC BLOOD PRESSURE: 93 MMHG | HEART RATE: 65 BPM | BODY MASS INDEX: 30.22 KG/M2 | WEIGHT: 228 LBS

## 2017-09-26 DIAGNOSIS — M47.816 FACET ARTHRITIS, DEGENERATIVE, LUMBAR SPINE: Primary | ICD-10-CM

## 2017-09-26 PROCEDURE — 99152 MOD SED SAME PHYS/QHP 5/>YRS: CPT | Mod: ,,, | Performed by: ANESTHESIOLOGY

## 2017-09-26 PROCEDURE — S0020 INJECTION, BUPIVICAINE HYDRO: HCPCS | Performed by: ANESTHESIOLOGY

## 2017-09-26 PROCEDURE — 64635 DESTROY LUMB/SAC FACET JNT: CPT | Mod: 50,,, | Performed by: ANESTHESIOLOGY

## 2017-09-26 PROCEDURE — 64620 INJECTION TREATMENT OF NERVE: CPT | Mod: 50 | Performed by: ANESTHESIOLOGY

## 2017-09-26 PROCEDURE — 64636 DESTROY L/S FACET JNT ADDL: CPT | Mod: 50,,, | Performed by: ANESTHESIOLOGY

## 2017-09-26 PROCEDURE — 63600175 PHARM REV CODE 636 W HCPCS: Performed by: ANESTHESIOLOGY

## 2017-09-26 PROCEDURE — A4649 SURGICAL SUPPLIES: HCPCS | Performed by: ANESTHESIOLOGY

## 2017-09-26 PROCEDURE — 64636 DESTROY L/S FACET JNT ADDL: CPT

## 2017-09-26 PROCEDURE — 25000003 PHARM REV CODE 250: Performed by: ANESTHESIOLOGY

## 2017-09-26 PROCEDURE — 64635 DESTROY LUMB/SAC FACET JNT: CPT

## 2017-09-26 RX ORDER — SODIUM CHLORIDE 9 MG/ML
INJECTION, SOLUTION INTRAVENOUS CONTINUOUS
Status: DISCONTINUED | OUTPATIENT
Start: 2017-09-26 | End: 2017-09-26 | Stop reason: HOSPADM

## 2017-09-26 RX ORDER — FENTANYL CITRATE 50 UG/ML
INJECTION, SOLUTION INTRAMUSCULAR; INTRAVENOUS
Status: DISCONTINUED | OUTPATIENT
Start: 2017-09-26 | End: 2017-09-26 | Stop reason: HOSPADM

## 2017-09-26 RX ORDER — BUPIVACAINE HYDROCHLORIDE 5 MG/ML
3 INJECTION, SOLUTION EPIDURAL; INTRACAUDAL ONCE
Status: COMPLETED | OUTPATIENT
Start: 2017-09-26 | End: 2017-09-26

## 2017-09-26 RX ORDER — DEXAMETHASONE SODIUM PHOSPHATE 10 MG/ML
10 INJECTION INTRAMUSCULAR; INTRAVENOUS ONCE
Status: COMPLETED | OUTPATIENT
Start: 2017-09-26 | End: 2017-09-26

## 2017-09-26 RX ORDER — MIDAZOLAM HYDROCHLORIDE 1 MG/ML
INJECTION INTRAMUSCULAR; INTRAVENOUS
Status: DISCONTINUED | OUTPATIENT
Start: 2017-09-26 | End: 2017-09-26 | Stop reason: HOSPADM

## 2017-09-26 RX ORDER — LIDOCAINE HYDROCHLORIDE 20 MG/ML
5 INJECTION, SOLUTION INFILTRATION; PERINEURAL
Status: COMPLETED | OUTPATIENT
Start: 2017-09-26 | End: 2017-09-26

## 2017-09-26 RX ADMIN — SODIUM CHLORIDE: 900 INJECTION, SOLUTION INTRAVENOUS at 09:09

## 2017-09-26 NOTE — DISCHARGE SUMMARY
Discharge Note  Short Stay      SUMMARY     Admit Date: 9/26/2017    Attending Physician: Kasandra Montoya      Discharge Physician: Kasandra Montoya      Discharge Date: 9/26/2017 11:14 AM    Final Diagnosis: Facet arthritis, degenerative, lumbar spine [M47.896]    Disposition: Home or self care    Patient Instructions:   Current Discharge Medication List      CONTINUE these medications which have NOT CHANGED    Details   acetaminophen (TYLENOL) 500 MG tablet Take 500 mg by mouth every 6 (six) hours as needed for Pain.      amitriptyline (ELAVIL) 25 MG tablet Take 2 tablets (50 mg total) by mouth every evening.  Qty: 60 tablet, Refills: 5    Associated Diagnoses: DDD (degenerative disc disease), cervical; S/P cervical spinal fusion; Arthropathy of cervical facet joint; Cervical radiculitis      diclofenac sodium 20 mg/gram /actuation(2 %) sopm Apply 40 mg topically 2 (two) times daily as needed.  Qty: 112 g, Refills: 5    Associated Diagnoses: Myalgia; Cervical paraspinal muscle spasm      FOLIC ACID/MULTIVIT-MIN/LUTEIN (CENTRUM SILVER ORAL) Take by mouth once daily.      levothyroxine (SYNTHROID) 50 MCG tablet Take 50 mcg by mouth once daily.      meclizine (ANTIVERT) 25 mg tablet Take 25 mg by mouth.      melatonin 5 mg Tab Take by mouth.      meloxicam (MOBIC) 15 MG tablet Take 15 mg by mouth daily      methocarbamol (ROBAXIN) 500 MG Tab Take 1 tablet (500 mg total) by mouth 3 (three) times daily as needed (muscle spasms).  Qty: 90 tablet, Refills: 5    Associated Diagnoses: Myalgia; Cervical paraspinal muscle spasm      naloxegol (MOVANTIK) tablet Take 25 mg by mouth once daily.  Qty: 30 tablet, Refills: 5    Associated Diagnoses: Therapeutic opioid induced constipation      omeprazole (PRILOSEC) 20 MG capsule Take 20 mg by mouth once daily.  Refills: 1      valsartan (DIOVAN) 80 MG tablet Take 40 mg by mouth once daily.   Refills: 1             Resume home diet and activity

## 2017-09-26 NOTE — OP NOTE
Date of Procedure: 09/26/2017    Procedure: Bilateral L2-5 Lumbar Medial Branch Nerve Coolief Thermal Radiofrequency Ablation    Pre-op diagnosis: Facet arthritis, degenerative, lumbar spine [M47.896]    Post-op diagnosis: Facet arthritis, degenerative, lumbar spine [M47.896]     Physician: Dr. Kasandra Montoya     Assistant: Dr. Walker    Anesthestia: local/IV sedation:  Versed 2 mg and fentanyl 100 mcg IV.  Conscious sedation provided by MD and monitored by RN.  Total sedation time was less than 45 minutes. (See nurse documentation and case log for sedation time)    EBL: None    Specimens: None    All medications, allergies, and relevant histories were reviewed. No recent antibiotics or infections.  A time-out was taken to verify the correct patient, procedure, laterality, and appropriate medications/allergies.    Procedure: Lumbar RFA    Lumbar Medial Branch Block with radiofrequency ablation, levels L2-5, bilateral     The procedure risks, benefits, and possible complications were discussed with the patient including nerve damage, infection, spinal headache, and paresis.   Patient was placed in the prone position with the midriff elevated. Skin was prepped with CHG and draped. Oblique view of the spine was obtained with fluoroscopy. Entry sites were marked over the skin and Xylocaine 1% was used to anesthetize the skin and subcutaneous tissues.     A 17-gauge Halyard Coolief RF needle was introduced at an angle to parallel the medial branches in the groove between superior articular process and transverse process, and L5 primary dorsal ramus at the junction of the S1 superior articular process and sacral ala.    No motor stimulation was elicited at any level at 2V with a frequency of 2Hz.  All impedances were within the acceptable range.    1cc of 2% lidocaine was injected at each level.  Coolief Thermal RF was then conducted at each level at 60 degrees at the probe, 80 degree lesion, for 2:30 minutes   1 cc of a  mixture of 0.5% bupivacaine with dexamethasone 10 mg was injected at each level.    Patient tolerated the procedure well and there were no complications.      Future Management:   If helpful, can repeat as needed.  Follow up with me in 4-6 weeks.      I certify that I provided the above services.  I was present for the entire procedure, which was performed by myself with the assistance of the resident physician.  There were no parts of the procedure that were performed not by myself or without my direct supervision.

## 2017-09-26 NOTE — H&P
"Low back pain     HPI  51 yo WM with PMH of  HTN and GERD who presents for b/l L2-5 RFA after successful MBB at the same levels in August. Pt is s/p bilateral cervical RFA in the past.  Pt denies any recent infection/fever/abx use and is not on any anticoagulation.     PMHx, PSHx, Allergies, Medications reviewed in epic    ROS negative except pain complaints in HPI    OBJECTIVE:    /87   Pulse 64   Temp 97.5 °F (36.4 °C) (Oral)   Resp 18   Ht 6' 1" (1.854 m)   Wt 103.4 kg (228 lb)   SpO2 97%   BMI 30.08 kg/m²     PHYSICAL EXAMINATION:    GENERAL: Well appearing, in no acute distress, alert and oriented x3.  PSYCH:  Mood and affect appropriate.  SKIN: Skin color, texture, turgor normal, no rashes or lesions.  CV: RRR with palpation of the radial artery.  PULM: No evidence of respiratory difficulty, symmetric chest rise. Clear to auscultation.  NEURO: Cranial nerves grossly intact.    Plan:    Proceed with procedure as planned    Tamara Walker  09/26/2017    I have seen the patient with the resident physician.  We have come up with the above plan.  The patient is in agreement with our plan.    "

## 2017-09-26 NOTE — DISCHARGE INSTRUCTIONS
Home Care Instructions Pain Management:    1. DIET:   You may resume your normal diet today.   2. BATHING:   You may shower with luke warm water. No soaking in tub.  3. DRESSING:   You may remove your bandage today.   4. ACTIVITY LEVEL:   You may resume your normal activities 24 hrs after your procedure.  5. MEDICATIONS:   You may resume your normal medications today.   6. SPECIAL INSTRUCTIONS:   No heat to the injection site for 24 hrs including, bath or shower, heating pad, moist heat, or hot tubs.    Use ice pack to injection site for any pain or discomfort.  Apply ice packs for 20 minute intervals as needed.   If you have received any sedatives by mouth today you may not drive for 12 hours.    If you have received any sedation through your IV, you may not drive for 24 hrs.     PLEASE CALL YOUR DOCTOR IF:  1. Redness or swelling around the injection site.  2. Fever of 101 degrees  3. Drainage (pus) from the injection site.  4. For any continuous bleeding (some dried blood over the incision is normal.)  5. For severe headache that is relieved when lying flat.    FOR EMERGENCIES:   If any unusual problems or difficulties occur during clinic hours, call [phone] or [phone].   Procedural Sedation  Procedural sedation is medicine to ease discomfort, pain, and anxiety during a procedure. The medicine is often given through an intravenous (IV) line in your arm or hand. In some cases, the medicine may be taken by mouth or inhaled. While you are under sedation, you will likely be awake. But you may not remember anything afterward.  Why procedural sedation is used  Sedation is used for many types of procedures. The goal is to reduce pain, anxiety, and stressful memories of a procedure. It can also help your health care provider treat you. For example, having a broken bone fixed may be easier if you feel relaxed.  Procedural sedation is used only for short, basic procedures. It is not used for complex surgeries. Some  procedures that use this type of sedation include:  · Dental surgery  · Breast biopsy, to take a sample of breast tissue  · Endoscopy, to look at gastrointestinal problems  · Bronchoscopy, to check for lung problems  · Bone or joint realignment, to fix a broken bone or dislocated joint  · Minor foot or skin surgery  · Electrical cardioversion, to restore a normal heart rhythm  · Lumbar puncture, to assess neurological disease  Risks of procedural sedation  Procedural sedation has some risks and possible side effects, such as:  · Headache  · Nausea and vomiting  · Unpleasant memory of the procedure  · Lowered rate of breathing  · Changes in heart rate and blood pressure (rare)  · Inhalation of stomach contents into your lungs (rare)  Side effects will likely go away shortly after the procedure. Your health care team will watch your heart rate and breathing during your sedation. This is to help prevent problems.  Your own risks may vary based on your age and your overall health. They also depend on the type of sedation you are given. Talk with your health care provider about the risks that apply most to you.  Getting ready for procedural sedation  Talk with your health care provider how to get ready for your procedure. Tell him or her about all the medicines you take. This includes over-the-counter medicines such as ibuprofen. It also includes vitamins, herbs, and other supplements. You may need to stop taking some medicines before the procedure, such as blood thinners and aspirin. If you smoke, you may need to stop. Talk with your health care provider if you need help to stop smoking.  Tell your health care provider if you:  · Have had any problems in the past with sedation or anesthesia  · Have had any recent changes in your health, such as an infection or fever  · Are pregnant or think you may be pregnant  Also, make sure to:  · Ask a family member or friend to take you home after the procedure. You cannot drive on  the day you receive sedation.  · Not eat or drink after midnight the night before your procedure, if advised.  · Follow all other instructions from your health care provider.  During your procedural sedation  You may have your procedure in a hospital or a medical clinic. Sedation is done by a trained health care provider. In general, you can expect the following:  · You will be given medicine through an IV line in your arm or hand. Or you may receive a shot. The medicine may also be given by mouth. Or you may inhale it through a mask.  · If you receive medicine through an IV, you may feel the effects very quickly. You will start to feel relaxed and drowsy.  · During the procedure, your heart rate, breathing, and blood pressure will be closely watched. Your breathing and blood pressure may decrease a little. But you will likely not need help with your breathing. You may receive a little extra oxygen through a mask.  · You will probably be awake the entire time. If you do fall asleep, you should be easy to wake up, if needed. You should feel little or no pain.  · When your procedure is over, the sedative medicine will be stopped.  After your procedural sedation  You will begin to feel more awake and aware. But you will likely be drowsy for a while afterward. You will be closely watched as you become more alert. You may have a faint memory of the procedure. Or you may not remember it at all.  You should be able to return home within an hour or two after your procedure. Plan to have someone stay with you for a few hours. Side effects such as headache and nausea may go away quickly. Tell your health care provider if they continue.  Dont drive or make any important decisions for at least 24 hours. Be sure to follow all after-care instructions.      When to call your health care provider  Have someone call your health care provider right away if you have any of these:  · Drowsiness that gets worse  · Weakness or dizziness  that gets worse  · Repeated vomiting  · You cant be awakened   Date Last Reviewed: 2/6/2015  © 7528-6853 [employer]. [address] [place] [address], [place] [address]. All rights reserved. This information is not intended as a substitute for professional medical care. Always follow your healthcare professional's instructions.

## 2017-10-18 ENCOUNTER — CLINICAL SUPPORT (OUTPATIENT)
Dept: REHABILITATION | Facility: OTHER | Age: 52
End: 2017-10-18
Attending: ANESTHESIOLOGY
Payer: COMMERCIAL

## 2017-10-18 ENCOUNTER — OFFICE VISIT (OUTPATIENT)
Dept: PAIN MEDICINE | Facility: CLINIC | Age: 52
End: 2017-10-18
Attending: ANESTHESIOLOGY
Payer: COMMERCIAL

## 2017-10-18 VITALS
HEIGHT: 73 IN | TEMPERATURE: 98 F | SYSTOLIC BLOOD PRESSURE: 157 MMHG | BODY MASS INDEX: 30.35 KG/M2 | HEART RATE: 82 BPM | DIASTOLIC BLOOD PRESSURE: 100 MMHG | WEIGHT: 229 LBS

## 2017-10-18 DIAGNOSIS — M50.30 DDD (DEGENERATIVE DISC DISEASE), CERVICAL: ICD-10-CM

## 2017-10-18 DIAGNOSIS — M62.838 CERVICAL PARASPINAL MUSCLE SPASM: ICD-10-CM

## 2017-10-18 DIAGNOSIS — M47.812 FACET ARTHRITIS OF CERVICAL REGION: ICD-10-CM

## 2017-10-18 DIAGNOSIS — G89.4 CHRONIC PAIN DISORDER: Primary | ICD-10-CM

## 2017-10-18 DIAGNOSIS — M62.50 DISUSE MUSCLE ATROPHY: ICD-10-CM

## 2017-10-18 DIAGNOSIS — F11.90 CHRONIC, CONTINUOUS USE OF OPIOIDS: ICD-10-CM

## 2017-10-18 DIAGNOSIS — M75.52 SUBACROMIAL BURSITIS OF LEFT SHOULDER JOINT: ICD-10-CM

## 2017-10-18 DIAGNOSIS — M51.36 DDD (DEGENERATIVE DISC DISEASE), LUMBAR: ICD-10-CM

## 2017-10-18 DIAGNOSIS — M25.512 CHRONIC LEFT SHOULDER PAIN: ICD-10-CM

## 2017-10-18 DIAGNOSIS — Z98.1 S/P CERVICAL SPINAL FUSION: ICD-10-CM

## 2017-10-18 DIAGNOSIS — M47.816 FACET ARTHRITIS, DEGENERATIVE, LUMBAR SPINE: ICD-10-CM

## 2017-10-18 DIAGNOSIS — M79.10 MYALGIA: ICD-10-CM

## 2017-10-18 DIAGNOSIS — M50.30 DDD (DEGENERATIVE DISC DISEASE), CERVICAL: Primary | ICD-10-CM

## 2017-10-18 DIAGNOSIS — G89.29 CHRONIC LEFT SHOULDER PAIN: ICD-10-CM

## 2017-10-18 PROCEDURE — G8988 SELF CARE GOAL STATUS: HCPCS | Mod: CJ

## 2017-10-18 PROCEDURE — 99214 OFFICE O/P EST MOD 30 MIN: CPT | Mod: 25,S$GLB,, | Performed by: ANESTHESIOLOGY

## 2017-10-18 PROCEDURE — 97166 OT EVAL MOD COMPLEX 45 MIN: CPT

## 2017-10-18 PROCEDURE — G8987 SELF CARE CURRENT STATUS: HCPCS | Mod: CK

## 2017-10-18 PROCEDURE — 99999 PR PBB SHADOW E&M-EST. PATIENT-LVL III: CPT | Mod: PBBFAC,,, | Performed by: ANESTHESIOLOGY

## 2017-10-18 PROCEDURE — 20553 NJX 1/MLT TRIGGER POINTS 3/>: CPT | Mod: S$GLB,,, | Performed by: ANESTHESIOLOGY

## 2017-10-18 RX ORDER — BUPIVACAINE HYDROCHLORIDE 5 MG/ML
9 INJECTION, SOLUTION EPIDURAL; INTRACAUDAL
Status: COMPLETED | OUTPATIENT
Start: 2017-10-18 | End: 2017-10-18

## 2017-10-18 RX ORDER — HYDROCODONE BITARTRATE AND ACETAMINOPHEN 7.5; 325 MG/1; MG/1
1 TABLET ORAL 4 TIMES DAILY PRN
Qty: 120 TABLET | Refills: 0 | Status: SHIPPED | OUTPATIENT
Start: 2017-10-18 | End: 2017-11-17

## 2017-10-18 RX ORDER — BETAMETHASONE SODIUM PHOSPHATE AND BETAMETHASONE ACETATE 3; 3 MG/ML; MG/ML
6 INJECTION, SUSPENSION INTRA-ARTICULAR; INTRALESIONAL; INTRAMUSCULAR; SOFT TISSUE
Status: COMPLETED | OUTPATIENT
Start: 2017-10-18 | End: 2017-10-18

## 2017-10-18 RX ADMIN — BUPIVACAINE HYDROCHLORIDE 45 MG: 5 INJECTION, SOLUTION EPIDURAL; INTRACAUDAL at 11:10

## 2017-10-18 RX ADMIN — BETAMETHASONE SODIUM PHOSPHATE AND BETAMETHASONE ACETATE 6 MG: 3; 3 INJECTION, SUSPENSION INTRA-ARTICULAR; INTRALESIONAL; INTRAMUSCULAR; SOFT TISSUE at 11:10

## 2017-10-18 NOTE — PROGRESS NOTES
"OT Evaluation &  Goals and Physical Capacity for Functional Restoration Program    Name: Sal Navarro  MRN:[id number]  Physician: Kasandra Montoya MD    Diagnosis: Neck pain, chronic pain disorder, radiates bilateral shoulder pain  Onset date: s/p C4-7 ACDF with C5/6 PSIF in 2/2016       Date of service: 10/18/2017  Start time: 1030  End time: 1145 (including intake)   Total time: 60 mins, billable    Subjective     Statement: Pt reported walking outside daily and taking standing rest breaks on fence.     Pain: Pt did not quantify pain during session.    Personal Functional Three Month Goals:   Vocational: "Possibly a job without extreme physical activity"   Recreational : "Travel, fishing, hunting, attend events and enjoy them without pain."    Daily Living Activities: "Take care kids, drive, take care of animals"        History     Medical History:   PMH:   Past Medical History:   Diagnosis Date    Arthritis     Cataract     Hypertension     Thyroid disease       Past Surgical History:   Procedure Laterality Date    ACROMIOCLAVICULAR JOINT CYST EXCISION Right     cataracts Bilateral     RADIOFREQUENCY ABLATION  10/2016    cervical spine/Jan    ROTATOR CUFF REPAIR Right     SPINE SURGERY      cerival fusion        Recent or major surgery: RFA, lower back in September; discussions of spinal stimulator    Worse: Bending   On the move, doesn't matter in what direction"     Better: Lying, head propped   Morning, "a little bit better, right when I wake up"     Can tolerate:   Sitting: possibly less than 30 mins to 45 mins   Pt able to tolerate sitting in straight back chair for interview lasting with moderate difficulty.   Standing: 10-15 mins  Walking: "I do a good bit of walking, walking for 10 mins before "propping."     Disturbed sleep: 4-5 hours/ night, "sleep a couple of hours at a time"   Sleeping postures: back or R side sleeping     Coping Strategies:  Stretching, "mostly working my arms;" some " heating pad, walking/moving around, TENs unit, proper upper body    Task modifications:  Standing rest breaks, breaking up tasks    Social and ADL History:  Activities of Daily Living Checklist:   Key to Score:   0: Unable to do the activity  1: Can do the activity with great difficulty  2: Can do the activity with little difficulty   3: No problem with activity  N/A: This is not an activity I do  H/T: Have not tried yet    Personal Care:  Homemaking:     Dressing Upper Body:  1 Meal preparation: 2   Dressing Lower Body:  2 Kitchen Cleanup: 2   Bathin Childcare:  3   Hair Care:  2 Grocery shoppin   Sleep:  1 Vacuuming/mopping:  --     Emptying trash/ garbage ba   General Mobility:   Bed making changin   Sittin Laundry  2   Bendin     Getting in/out of bed:  2 Home Maintenance:    Standin Mowing, rakin   Walkin Gardenin   Twistin Home Repairs:  1   Liftin Painting:  h/t         Getting around:       Getting in and out of car:  2     Buckling up you seat belt:  3     Opening heavy doors:  2     Climbing/descending stairs:  2     Driving 0         Social Hx: Previously worked as [employer] technician, Four children 7-15 yr/o living at home, 2 older children   Wife is caregiver and ; does not work outside the home. Live in [place], 2.5 hrs away.     Home access: house, ramp to enter  DME: wooden walking stick, occasionally uses   Driving status: Riding still hurts my neck, less ROM, and medication  Physical therapy: L shoulder (since ), 2016 (SLP, 95% better)  then PT for neck overlapped with neck PT; 2/wk      Objective     COGNITIVE Exam  Oriented: Person, Place, Time and Situation  Behaviors: Inattentive  Follows Commands/attention: Follows one-step commands, with increased time  Communication: dysarthria, increased time for responses/difficult formulating answers  Safety awareness/insight to disability: appears to have decreased   Coping skills/emotional  "control: decreased     PHYSICAL Exam  Resting Blood Pressure: 140/96  Heart rate: 84  SpO2: 94    ROM assessment:   AROM:   Shoulder L shoulder ~65, R WFL  Elbow WFL   Hand  Dominant hand: left handed, mostly      Cervical Spine     Flexion  WFL   Extension Limited    Rot. Right  Limited    Rot. Left Gotten better, better    Lateral flexion right  limited   Lateral flexion left  Limited      Strengthen Assessment:   BLE WFL, BUE WFL     Sensation: Melanie reports tingling in L hand, "with repetitive motions"   Light touch: intact, less sensitive L hand    Endurance Testing: Modified Ramp Treadmill Test  Minutes Completed   2 mins 40 secs   % Grades  5 %   Speed (mph)  1.3 mph   METS 3   HR 90 bpm   Madhav Rating Perceived Exertion Scale   3   Reason for stop point: Pt reported increased pain Rside of pain with position changing.   Pt able to continue with 10%, 1.7 mph, however for appr 40 secs of the time, reported level above is completed.     Functional Strength Test:  PILE Testing  Lbs/ HR    Floor to Waist   13 lbs/ 85 bpm   Waist to Shoulder   13 lbs 79 bpm   Reason for Stop point: Increased time required for completing repetitions. During physical testing, participation level consistent.   Noted able to complete additional 10 lbs for for trial and requested ending testing, above are passing levels.     No environmental, cultural, spiritual, developmental or education needs expressed or noted    Treatment     Mr. Navarro received education regarding proper posture and body mechanics.  Patient received a handout regarding anticipated muscular soreness following lift testing and strategies for management were reviewed with patient including stretching, using ice, scheduled rest, and z-lie. Patient received education on the Functional Restoration Program. Details of the program were discussed.        Assessment     Mr. Navarro presented with decreased affect, communication and limited eye contact. Pt easily " redirectable but continued to require increased time for responding to questions. Pt with concerns regarding financial obligation, distance, and ability to be away from home for for that period of time. Pt with difficulty expressing thoughts and moved slowly throughout session. He is unable to participate in work (within home and outside of home), recreational, and home-based activities because of decreased functional strength, decreased endurance, limited positional tolerance, fear of re-injury, and fear of increased pain. He will benefit from participating in a conditioning  program designed  to increase functional strength, flexibility, and endurance in order to meet functional goals.     Medical necessity is demonstrated by the following problem list.   History Examination Decision Making Complexity Score   Occupational Profile:     Medical and Therapy History:     Pt required expanded review of medical history and occupational profile.   Performance Performance Deficits   Decreased IADL performance    Physical  ROM, strength, endurance     Cognitive    Psychosocial:    Pain avoidance within home tasks, social isolation       Pt required high analytical complexity 2/2 occupational profile factors.    Assessment required detailed, review, limited treatment opinions, and co-morbidities include: arthritis and surgery history that affects occupational performance. Minimal modifications or assistance required for completion of assessment.     Pt presented with moderate complexity OT evaluation.          FRP Goals: While working towards the long-term (3 month) functional goals listed above, Mr. Navarro will accomplish the following short term goals during the 3-week intensive rehabilitation program. Mr. Navarro will:     1. Develop a viable return to work plan.   2. Demonstrate physical capacities consistent with a Medium work demand level.   3. Demonstrate increased functional strength by lifting 20 floor to waist  and  "20 waist to shoulder in order to meet demand of work/home routine.   4. Increase his positional tolerance to the level needed for work and home activities.   5. Implement self-care strategies during the program and at home to control pain.    6.   Pt will demonstrate use of mindfulness, stress management, and coping strategies for improved participation in daily routine.          Current Capacity Limit/ Physical  Demand Level (PDL)   Demand Levels of Functional Recovery Goals        Sedentary-light Physical Demand  (Based above tested results)    Vocational:  Possibly a job without physical activity, unsure    Going to [employer], [place]     Recreational:  Travel/vacation, and being able to drive, "both," sitting tolerance    Fishing trips, "we have several farm ponds" "feel like staying there with them" (3.5 METs)     Hunting, "used to do a little bit of deer hunting" "Haven't tried that, the recall's probably not going to feel too good." (3-7 METs)    Attend events and enjoy them with pain, dance events with younger girls       "Anything I have to do to move my head, makes it hurt"    Daily Living:  Driving    Take care of the kids (3.0 METs)    Doing tasks overhead, being able to tilt head up for home management     Use a riding lawnmower; stopped used  weed eater 2/2 problems with shoulder and neck (4 METs)     Take care of animals, have dogs, chickens, pigs, cattle, horses, currently older kids took over    Medium Physical Demand Level    The PDL listed above is based on todays physical performance screening test. More comprehensive physical  testing integrated with clinical findings would be required to derived an accurate work capacity.     Outcomes:  G CODE TOOL: Neck Disability Index    Category:  Current Score  = 28 /50  or 56% impaired, limited or restricted (CK)  Goal at Discharge Score =  40% impaired (CJ)    Score interpretation is as follows:   TEST SCORE  Modifier  Impairment " Limitation Restriction    0/50  CH  0 % impaired, limited or restricted   1-9/50  CI  @ least 1% but less than 20% impaired, limited or restricted   10-19/50  CJ  @ least 20%<40% impaired, limited or restricted   20-29/50  CK  @ least 40%<60% impaired, limited or restricted   30-39/50  CL  @ least 60% <80% impaired, limited or restricted   40-49/50  CM  @ least 80%<100% impaired limited or restricted   50/50  CN  100% impaired, limited or restricted           Plan     Outpatient occupational therapy, 2-3 x/wk, for 3 weeks or or 12 visits to include:     1. Functional conditioning 2 times daily to increase physical capacity and allow Mr. Navarro to meet demands of vocation and home.   2. Individual counseling to develop return to work/daily routine plan and better understand the return to work process and/or daily routine restructure.   3. Daily Stretching, aerobic conditioning and walking to increase functional tolerance and allow Mr. Navarro to meet demands of work and home.   4. Functional activities to increase ability to move fluidly and quickly and tolerate unguarded movements.   5. Re-education regarding proper postural, body mechanics, and coping strategies for healthy roles and routine for managing daily stressors.    6. Any other treatment deemed necessary for patient to achieve personally driven goals to promote positive health and wellness and daily roles and routines    MADONNA Yates, 10/18/2017

## 2017-10-18 NOTE — PROGRESS NOTES
Subjective:     Patient ID: Sal Navarro is a 52 y.o. male.    Chief Complaint: Neck Pain    Consulted by: No ref. provider found     Disclaimer: This note was generated using voice recognition software.  There may be a typographical errors that were missed during proofreading.      HPI:    Sal Navarro is a 52 y.o. male who presents today s/p C4-7 ACDF with C5/6 PSIF in 2/2016 with residual chronic midline neck pain that radiates into his shoulder blades bilaterally, R>>L.  This is associated with bilateral hand numbness and tingling.  The hand symptoms did improve following the surgery.  The shooting pains in his arms resolved completely.   This pain is described in detail below.  His post-operative course was complicated by dysphagia that is being treated with speech therapy.    Interval History (9/23/2016):  He returns today for follow up.  He reports that the MELANIE was not helpful for the pain.  He hasn't noticed a difference with the amitriptyline.  He does notice a difference in his low back pain when he skips the meloxicam    Interval History (11/17/2016):  He returns today for follow up.  He reports that cervical RFAs were not very helpful for the pain. He is no longer taking Percocet or Tamazepam. He continues to take Mobic 15mg daily for his low back pain. He is taking elavil 25mg nightly without side effects. He is open to considering SCS. He reports poor sleep at night.    Interval History (1/3/2017):  The patient returns today for follow up of neck and shoulder pain.  His pain is located to his neck and surrounding areas.  He previously had limited benefit with RFAs, although he did have short term benefit with MBB.  He also had limited benefit with NGUYEN.  Dr. Montoya discussed cervical SCS trial with the patient, although he reports that he does not wish to pursue this option at this time.  He is scheduled to f/u with Dr. Fowler on 1/24/16.  He continues to participate in PT.  He did  previously have some benefit with a TENS unit at PT.  He is reporting some relief with Tramadol.  He also takes Zanaflex which helps but is very sedating.      Interval History (3/3/2017):  The patient returns for follow up of neck and shoulder pain. He continues to take Tramadol BID with benefit. The medication decreases his pain slightly. He also reports benefit with Robaxin. He continues to take Mobic with benefit. He recently stopped physical therapy and reports increased pain into his shoulders, despite a home exercise plan.     Interval History (5/1/2017):  He returns today for follow up.  He reports that these RFAs were more helpful than before, L more so than right, but now the pain is again returning.  Today, he also complains of left shoulder pain that has been gradually worsening over the past few months. He associated this with modified activity due to his neck.  Pain is worse with extension of arm and lifting.  Tramadol, Robaxin, Meloxicam, and amitriptyline have been helpful for the pain.  He continues to have GI upset, but has just started prilosec.  When he tried stopping the meloxicam, his pain increased dramatically.  The tramadol takes the edge off.  Robaxin helps, but he is out.    Of note, he recently had an endoscopy that showed stage 3 gastritis, for which he was started on Prilosec    Interval History (6/21/2017):  He returns today for follow up.  He reports that the shoulder injection provided one week of relief.  The Pennsaid has been helpful for the pain.  The RFA has helped with shooting pain into his back.  He is doing his HEP.  He has not been back to PT because of insurance issues.  His shoulder continues to present significant hindrances to his daily activities.    Interval History (8/21/2017):  He returns today for follow up.  He reports that the tramadol is not as helpful as it used to be.  In addition, he is experiencing constipation and urinary retention that only resolves with  "skipping a dose of tramadol. He is going to PT for his neck/shoulder.    Interval History (10/18/2017):  He returns today for follow up.  He reports that Norco has been more helpful for the pain than the tramadol with fewer side effects.  He is averaging about 2-4 per day.  His back is doing much better, though his neck is still greatly limiting him in his daily tasks.  He reports a new pain that began with a "pop" followed by a sharp pain radiating down his back followed by a soreness.  This happened a few weeks ago on the left side of his low back above where we did the RFA.  This is resolving, but it is still sore.    Physical Therapy: Yes- outside facility in [place].    Non-pharmacologic Treatment: Ice and heat provide mild benefit.           · TENS? Yes at PT with benefit.    Pain Medications:         · Currently taking: Amitriptyline 50 mg QHS, Meloxicam 15 mg daily, Tylenol PRN, Norco 7.5/325 mg 2-4 daily PRN, and Robaxin 500 mg TID PRN (out)    · Has tried in the past:  Percocet (limited benefit and causes constipation treated with colace), Temazepam for sleep (he has had trouble sleeping since the surgery), Flexeril (no help in the past, both before and after the surgery), Gabapentin (after, unsure of duration of tx), Lyrica (sedation), Celebrex (GI upset), Zanaflex 4 mg PRN muscle pain (helpful but sedating)     · Has not tried: SNRIs, other muscle relaxants    Blood thinners: None    Interventional Therapies: None    Relevant Surgeries:   · C4-7 ACDF with C5/6 PSIF in 2/2016  · C7/T1 ILESI: 10-15% relief  · 10/4/16 Bilateral C4-7 MBB- short term benefit  · 10/11/16 Right C4-7 RFA- limited benefit  · 10/25/16 Left C4-7- limited benefit    Affecting sleep? Yes    Affecting daily activities? Yes    Depressive symptoms? Yes, due to general medical condition.  He denies significant symptoms today          · SI/HI? No    Work status: On short term disability from his job as an [employer] .    Pain " Scales  Best: 2/10  Worst: 8/10  Usually: 7/10  Today: 6/10    Neck Pain    This is a new problem. The current episode started more than 1 month ago (february 2016). The problem occurs intermittently. The problem has been unchanged. The pain is present in the anterior neck. The quality of the pain is described as aching, shooting and stabbing. The pain is at a severity of 6/10. The pain is mild. The symptoms are aggravated by bending (turning). The pain is same all the time. Stiffness is present all day and in the morning. Associated symptoms include chest pain. Pertinent negatives include no fever, headaches or weight loss. He has tried oral narcotics, NSAIDs and muscle relaxants for the symptoms. The treatment provided mild relief. Physical therapy was not tried and ineffective.      Review of Systems   Constitution: Negative. Negative for chills, fever, malaise/fatigue, weight gain and weight loss.   HENT: Negative.  Negative for ear pain and hoarse voice.    Eyes: Negative.  Negative for blurred vision, pain and visual disturbance.   Cardiovascular: Positive for chest pain. Negative for dyspnea on exertion and irregular heartbeat.   Respiratory: Negative.  Negative for cough, shortness of breath and wheezing.    Endocrine: Negative.  Negative for cold intolerance and heat intolerance.   Hematologic/Lymphatic: Negative.  Negative for adenopathy and bleeding problem. Does not bruise/bleed easily.   Skin: Negative.  Negative for color change, itching and rash.   Musculoskeletal: Positive for neck pain and stiffness. Negative for back pain.   Gastrointestinal: Negative.  Negative for change in bowel habit, diarrhea, hematemesis, hematochezia, melena and vomiting.   Genitourinary: Negative.  Negative for flank pain, frequency, hematuria and urgency.   Neurological: Positive for dizziness and light-headedness. Negative for difficulty with concentration, headaches, loss of balance and seizures.    Psychiatric/Behavioral: Negative for altered mental status, depression and suicidal ideas. The patient has insomnia and is nervous/anxious.    Allergic/Immunologic: Negative.  Negative for HIV exposure.   All other systems reviewed and are negative.            Past Medical History:   Diagnosis Date    Arthritis     Cataract     Hypertension     Thyroid disease        Past Surgical History:   Procedure Laterality Date    ACROMIOCLAVICULAR JOINT CYST EXCISION Right     cataracts Bilateral     RADIOFREQUENCY ABLATION  10/2016    cervical spine/Montoya    ROTATOR CUFF REPAIR Right     SPINE SURGERY      cerival fusion        Review of patient's allergies indicates:   Allergen Reactions    Pcn [penicillins] Rash       Current Outpatient Prescriptions   Medication Sig Dispense Refill    acetaminophen (TYLENOL) 500 MG tablet Take 500 mg by mouth every 6 (six) hours as needed for Pain.      amitriptyline (ELAVIL) 25 MG tablet Take 2 tablets (50 mg total) by mouth every evening. 60 tablet 5    diclofenac sodium 20 mg/gram /actuation(2 %) sopm Apply 40 mg topically 2 (two) times daily as needed. 112 g 5    FOLIC ACID/MULTIVIT-MIN/LUTEIN (CENTRUM SILVER ORAL) Take by mouth once daily.      levothyroxine (SYNTHROID) 50 MCG tablet Take 50 mcg by mouth once daily.      meclizine (ANTIVERT) 25 mg tablet Take 25 mg by mouth.      melatonin 5 mg Tab Take by mouth.      meloxicam (MOBIC) 15 MG tablet Take 15 mg by mouth daily      methocarbamol (ROBAXIN) 500 MG Tab Take 1 tablet (500 mg total) by mouth 3 (three) times daily as needed (muscle spasms). 90 tablet 5    omeprazole (PRILOSEC) 20 MG capsule Take 20 mg by mouth once daily.  1    valsartan (DIOVAN) 80 MG tablet Take 40 mg by mouth once daily.   1    naloxegol (MOVANTIK) tablet Take 25 mg by mouth once daily. 30 tablet 5     No current facility-administered medications for this visit.        Family History   Problem Relation Age of Onset    Heart disease  "Mother     Cancer Father        Social History     Social History    Marital status:      Spouse name: N/A    Number of children: N/A    Years of education: N/A     Occupational History    Not on file.     Social History Main Topics    Smoking status: Never Smoker    Smokeless tobacco: Never Used    Alcohol use No    Drug use: No    Sexual activity: Not on file     Other Topics Concern    Not on file     Social History Narrative    No narrative on file       Objective:     Vitals:    10/18/17 0917   BP: (!) 157/100   Pulse: 82   Temp: 98.2 °F (36.8 °C)   TempSrc: Oral   Weight: 103.9 kg (229 lb)   Height: 6' 1" (1.854 m)   PainSc:   6   PainLoc: Neck     GEN:  Well developed, well nourished.  No acute distress.  No pain behavior.  HEENT:  No trauma.  Mucous membranes moist.  Nares patent bilaterally.  PSYCH: Normal affect. Thought content appropriate.  CHEST:  Breathing symmetric.  No audible wheezing.  ABD: Soft, non-tender, non-distended.  SKIN:  Warm, pink, dry.  No rash on exposed areas.    EXT:  No cyanosis, clubbing, or edema.  No color change or changes in nail or hair growth.  NEURO/MUSCULOSKELETAL:  Fully alert, oriented, and appropriate. Speech normal nella. No cranial nerve deficits.   C-Spine:  Decreased ROM with pain on extension> flexion.  Limited extension and lateral rotation.  Positive facet loading bilaterally.  Negative Spurling's bilaterally.    TTP over Right cervical paraspinal muscles and left thoracic paraspinals.    Previous physical exam:  4/5 motor strength in bilateral deltoids; however, this may be due to pain.  Otherwise 5/5 motor strength throughout upper extremities.   Sensory: No sensory deficit in the upper extremities.   Reflexes: 1+ and symmetric throughout.  Negative Akins's bilaterally.  Gait: Antalgic.  Present trendelenburg sign bilaterally, L>R  L-Spine:  Decreased ROM with pain on extension > flexion. Positive facet loading bilaterally.  Negative SLR " bilaterally.    SI Joint/Hip: Negative KHRIS bilaterally.  Negative FADIR bilaterally.  No TTP over lumbar paraspinals, bilateral SI joints, piriformis muscles, or GTB.      Imaging:      Diagnostic Results:  All imaging was independently reviewed by me.  Below is a summary from Dr Fowler's note.  I concur with the below findings.     MRI T-spine, dated 8/8/16:  1. No significant central or neuroforaminal stenosis     MRI C-spine, dated 8/8/16:  1. No significant stenosis      Flex/Ex X-ray C-spine, dated 7/19/16:  1. No prevertebral swelling  2. No gross instability   3. Good hardware position     MRI C-spine from an outside facility, dated 1/28/2016:  1. Moderate to severe DDD  2. Disc osteophyte complex, worst at C5/6   3. Moderate stenosis at C4/5 and C6/7      CT C-spine from an outside facility, dated 4/2015:  1. Diffuse cervical spondylosis   2. DDD, worst at C5/6, with some ossification of the PLL at C5/6      CT C-spine from an outside facility, dated 3/17/2016:  1. Fusion surgery at C5/6  2. Hardware in good position       AP and Lateral X-ray C-spine from an outside facility:  1. C4-7 ACDF  2. Hardware in good position  3. Spine in good alignment    Assessment:     Encounter Diagnoses   Name Primary?    Chronic pain disorder Yes    DDD (degenerative disc disease), cervical     Facet arthritis of cervical region     Chronic left shoulder pain     Facet arthritis, degenerative, lumbar spine     DDD (degenerative disc disease), lumbar     S/P cervical spinal fusion     Cervical paraspinal muscle spasm     Myalgia     Disuse muscle atrophy     Subacromial bursitis of left shoulder joint     Chronic, continuous use of opioids        Plan:     Sal was seen today for neck pain.    Diagnoses and all orders for this visit:    Chronic pain disorder  -     [facility]  -     Ambulatory Referral to Physical Therapy  -     Ambulatory Referral to Occupational  Therapy  -     Ambulatory referral to Psychiatry    DDD (degenerative disc disease), cervical  -     Ambulatory referral to [facility]  -     Ambulatory Referral to Physical Therapy  -     Ambulatory Referral to Occupational Therapy  -     Ambulatory referral to Psychiatry    Facet arthritis of cervical region  -     [facility]ulatory referral to [facility]  -     Ambulatory Referral to Physical Therapy  -     Ambulatory Referral to Occupational Therapy  -     Ambulatory referral to Psychiatry    Chronic left shoulder pain  -     [facility]ulatory referral to [facility]  -     Ambulatory Referral to Physical Therapy  -     Ambulatory Referral to Occupational Therapy  -     Ambulatory referral to Psychiatry    Facet arthritis, degenerative, lumbar spine  -     [facility]ulatory referral to [facility]  -     Ambulatory Referral to Physical Therapy  -     Ambulatory Referral to Occupational Therapy  -     Ambulatory referral to Psychiatry    DDD (degenerative disc disease), lumbar  -     [facility] referral to [facility]  -     Ambulatory Referral to Physical Therapy  -     Ambulatory Referral to Occupational Therapy  -     Ambulatory referral to Psychiatry    S/P cervical spinal fusion  -     [facility] referral to [facility]  -     Ambulatory Referral to Physical Therapy  -     Ambulatory Referral to Occupational Therapy  -     Ambulatory referral to Psychiatry    Cervical paraspinal muscle spasm  -     [facility]ulatory referral to [facility]  -     Ambulatory Referral to Physical Therapy  -     Ambulatory Referral to Occupational Therapy  -     Ambulatory referral to Psychiatry    Myalgia  -     [facility]ulatory referral to [facility]  -     Ambulatory Referral to Physical Therapy  -     Ambulatory Referral to Occupational Therapy  -     Ambulatory referral to Psychiatry  -     bupivacaine (PF) 0.5%  (5 mg/ml) injection 45 mg; Inject 9 mLs (45 mg total) into the muscle one time.  -     betamethasone acetate-betamethasone sodium phosphate injection 6 mg; Inject 1 mL (6 mg total) into the muscle one time.    Disuse muscle atrophy  -     [facility] to [facility]  -     Ambulatory Referral to Physical Therapy  -     Ambulatory Referral to Occupational Therapy  -     Ambulatory referral to Psychiatry    Subacromial bursitis of left shoulder joint  -     [facility] referral to [facility]  -     Ambulatory Referral to Physical Therapy  -     Ambulatory Referral to Occupational Therapy  -     Ambulatory referral to Psychiatry    Chronic, continuous use of opioids  -     [facility] referral to [facility]  -     Ambulatory Referral to Physical Therapy  -     Ambulatory Referral to Occupational Therapy  -     Ambulatory referral to Psychiatry    Other orders  -     hydrocodone-acetaminophen 7.5-325mg (NORCO) 7.5-325 mg per tablet; Take 1 tablet by mouth 4 (four) times daily as needed for Pain.      His pain is consistent with the above.    We discussed the assessment and recommendations.  All available images were reviewed. We discussed the disease process, prognosis, treatment plan, and risks and benefits. The patient is aware of the risks and benefits of the medications being prescribed, common side effects, and proper usage. The following is the plan we agreed on:     1. Right Cervical and left thoracic TPIs today as below  2. Continue Norco from tramadol, 7/5/325 mg, #120. Previous UDS results are consistent with medications as prescribed and negative for illicit substances.  Will reassess at next visit.  We may be able to decrease the number of pills per month.  3. Can repeat L2-5 LMB RFA PRN (Coolief)  4. Continue Pennsaid gel, concentrating on neck and left shoulder  5. Continue PT  6. We again discussed spinal cord stimulator which he will think  about.  I can do the perc trial after looking at his MRI  7. We discussed FRP which he will also think about.  We would need to get him set up at the [facility]  1. GAP to be done today to further evaluate this option  8. Continue Robaxin   9. Continue Amitriptyline 25mg QHS   10. Continue Meloxicam 15 mg daily, benefits currently outweigh risks.  Will continue to reassess  11. Movantik 25 mg for OIC.  If needed, decrease to QOD  12. Continue Tylenol as needed.  13. RTC in 1 month or sooner if needed.      Trigger Point Injection:   The procedure was discussed with the patient including complications of damage, bleeding, infection, and failure of pain relief.     All medications, allergies, and relevant histories were reviewed. No recent antibiotics or infections.  A time-out was taken to verify the correct patient, procedure, laterality, and appropriate medications/allergies.    Trigger points were identified by palpation and marked. CHG prep of sites done. A 27-gauge needle was advanced to the point of maximal tenderness, and 1 mL of a mixture of 0.5% bupivacaine with betamethasone 3 mg was injected after negative aspiration. All sites done in the same manner. Patient tolerated the procedure well and without complications. Sites injected included: right cervical paraspinals x 3 (trap, levator scap, spl cervicis) and right thoracic paraspinal x 1     The patient tolerated the procedure well and was discharged in excellent condition.      Greater than 25 minutes spent in total in todays visit with the patient, with more than half that time direct face to face counseling and education with the patient today. We discussed the disease process, prognosis, treatment plan, and risks and benefits.    The above plan and management options were discussed at length with patient. Patient is in agreement with the above and verbalized understanding.     Ortho/SPM Exam

## 2017-10-19 ENCOUNTER — TELEPHONE (OUTPATIENT)
Dept: PAIN MEDICINE | Facility: CLINIC | Age: 52
End: 2017-10-19

## 2017-10-23 ENCOUNTER — TELEPHONE (OUTPATIENT)
Dept: PAIN MEDICINE | Facility: CLINIC | Age: 52
End: 2017-10-23

## 2017-10-23 NOTE — TELEPHONE ENCOUNTER
spk with pt said he need a few days to decide if he will benfit from the program and if he do decide he may need to start the begin of the year do to the holiday and all.

## 2017-10-26 NOTE — PLAN OF CARE
"OT Evaluation &  Goals and Physical Capacity for Functional Restoration Program    Name: Sal Navarro  MRN:[id number]  Physician: Kasandra Montoya MD    Diagnosis: Neck pain, chronic pain disorder, radiates bilateral shoulder pain  Onset date: s/p C4-7 ACDF with C5/6 PSIF in 2/2016       Date of service: 10/18/2017  Start time: 1030  End time: 1145 (including intake)   Total time: 60 mins, billable    Subjective     Statement: Pt reported walking outside daily and taking standing rest breaks on fence.     Pain: Pt did not quantify pain during session.    Personal Functional Three Month Goals:   Vocational: "Possibly a job without extreme physical activity"   Recreational : "Travel, fishing, hunting, attend events and enjoy them without pain."    Daily Living Activities: "Take care kids, drive, take care of animals"        History     Medical History:   PMH:   Past Medical History:   Diagnosis Date    Arthritis     Cataract     Hypertension     Thyroid disease       Past Surgical History:   Procedure Laterality Date    ACROMIOCLAVICULAR JOINT CYST EXCISION Right     cataracts Bilateral     RADIOFREQUENCY ABLATION  10/2016    cervical spine/Jan    ROTATOR CUFF REPAIR Right     SPINE SURGERY      cerival fusion        Recent or major surgery: RFA, lower back in September; discussions of spinal stimulator    Worse: Bending   On the move, doesn't matter in what direction"     Better: Lying, head propped   Morning, "a little bit better, right when I wake up"     Can tolerate:   Sitting: possibly less than 30 mins to 45 mins   Pt able to tolerate sitting in straight back chair for interview lasting with moderate difficulty.   Standing: 10-15 mins  Walking: "I do a good bit of walking, walking for 10 mins before "propping."     Disturbed sleep: 4-5 hours/ night, "sleep a couple of hours at a time"   Sleeping postures: back or R side sleeping     Coping Strategies:  Stretching, "mostly working my arms;" some " heating pad, walking/moving around, TENs unit, proper upper body    Task modifications:  Standing rest breaks, breaking up tasks    Social and ADL History:  Activities of Daily Living Checklist:   Key to Score:   0: Unable to do the activity  1: Can do the activity with great difficulty  2: Can do the activity with little difficulty   3: No problem with activity  N/A: This is not an activity I do  H/T: Have not tried yet    Personal Care:  Homemaking:     Dressing Upper Body:  1 Meal preparation: 2   Dressing Lower Body:  2 Kitchen Cleanup: 2   Bathin Childcare:  3   Hair Care:  2 Grocery shoppin   Sleep:  1 Vacuuming/mopping:  --     Emptying trash/ garbage ba   General Mobility:   Bed making changin   Sittin Laundry  2   Bendin     Getting in/out of bed:  2 Home Maintenance:    Standin Mowing, rakin   Walkin Gardenin   Twistin Home Repairs:  1   Liftin Painting:  h/t         Getting around:       Getting in and out of car:  2     Buckling up you seat belt:  3     Opening heavy doors:  2     Climbing/descending stairs:  2     Driving 0         Social Hx: Previously worked as [employer] technician, Four children 7-15 yr/o living at home, 2 older children   Wife is caregiver and ; does not work outside the home. Live in [place], 2.5 hrs away.     Home access: house, ramp to enter  DME: wooden walking stick, occasionally uses   Driving status: Riding still hurts my neck, less ROM, and medication  Physical therapy: L shoulder (since ), 2016 (SLP, 95% better)  then PT for neck overlapped with neck PT; 2/wk      Objective     COGNITIVE Exam  Oriented: Person, Place, Time and Situation  Behaviors: Inattentive  Follows Commands/attention: Follows one-step commands, with increased time  Communication: dysarthria, increased time for responses/difficult formulating answers  Safety awareness/insight to disability: appears to have decreased   Coping skills/emotional  "control: decreased     PHYSICAL Exam  Resting Blood Pressure: 140/96  Heart rate: 84  SpO2: 94    ROM assessment:   AROM:   Shoulder L shoulder ~65, R WFL  Elbow WFL   Hand  Dominant hand: left handed, mostly      Cervical Spine     Flexion  WFL   Extension Limited    Rot. Right  Limited    Rot. Left Gotten better, better    Lateral flexion right  limited   Lateral flexion left  Limited      Strengthen Assessment:   BLE WFL, BUE WFL     Sensation: Melanie reports tingling in L hand, "with repetitive motions"   Light touch: intact, less sensitive L hand    Endurance Testing: Modified Ramp Treadmill Test  Minutes Completed   2 mins 40 secs   % Grades  5 %   Speed (mph)  1.3 mph   METS 3   HR 90 bpm   Madhav Rating Perceived Exertion Scale   3   Reason for stop point: Pt reported increased pain Rside of pain with position changing.   Pt able to continue with 10%, 1.7 mph, however for appr 40 secs of the time, reported level above is completed.     Functional Strength Test:  PILE Testing  Lbs/ HR    Floor to Waist   13 lbs/ 85 bpm   Waist to Shoulder   13 lbs 79 bpm   Reason for Stop point: Increased time required for completing repetitions. During physical testing, participation level consistent.   Noted able to complete additional 10 lbs for for trial and requested ending testing, above are passing levels.     No environmental, cultural, spiritual, developmental or education needs expressed or noted    Treatment     Mr. Navarro received education regarding proper posture and body mechanics.  Patient received a handout regarding anticipated muscular soreness following lift testing and strategies for management were reviewed with patient including stretching, using ice, scheduled rest, and z-lie. Patient received education on the Functional Restoration Program. Details of the program were discussed.        Assessment     Mr. Navarro presented with decreased affect, communication and limited eye contact. Pt easily " redirectable but continued to require increased time for responding to questions. Pt with concerns regarding financial obligation, distance, and ability to be away from home for for that period of time. Pt with difficulty expressing thoughts and moved slowly throughout session. He is unable to participate in work (within home and outside of home), recreational, and home-based activities because of decreased functional strength, decreased endurance, limited positional tolerance, fear of re-injury, and fear of increased pain. He will benefit from participating in a conditioning  program designed  to increase functional strength, flexibility, and endurance in order to meet functional goals.     Medical necessity is demonstrated by the following problem list.   History Examination Decision Making Complexity Score   Occupational Profile:     Medical and Therapy History:     Pt required expanded review of medical history and occupational profile.   Performance Performance Deficits   Decreased IADL performance    Physical  ROM, strength, endurance     Cognitive    Psychosocial:    Pain avoidance within home tasks, social isolation       Pt required high analytical complexity 2/2 occupational profile factors.    Assessment required detailed, review, limited treatment opinions, and co-morbidities include: arthritis and surgery history that affects occupational performance. Minimal modifications or assistance required for completion of assessment.     Pt presented with moderate complexity OT evaluation.          FRP Goals: While working towards the long-term (3 month) functional goals listed above, Mr. Navarro will accomplish the following short term goals during the 3-week intensive rehabilitation program. Mr. Navarro will:     1. Develop a viable return to work plan.   2. Demonstrate physical capacities consistent with a Medium work demand level.   3. Demonstrate increased functional strength by lifting 20 floor to waist  and  "20 waist to shoulder in order to meet demand of work/home routine.   4. Increase his positional tolerance to the level needed for work and home activities.   5. Implement self-care strategies during the program and at home to control pain.    6.   Pt will demonstrate use of mindfulness, stress management, and coping strategies for improved participation in daily routine.          Current Capacity Limit/ Physical  Demand Level (PDL)   Demand Levels of Functional Recovery Goals        Sedentary-light Physical Demand  (Based above tested results)    Vocational:  Possibly a job without physical activity, unsure    Going [place] Taoist[place]     Recreational:  Travel/vacation, and being able to drive, "both," sitting tolerance    Fishing trips, "we have several farm ponds" "feel like staying there with them" (3.5 METs)     Hunting, "used to do a little bit of deer hunting" "Haven't tried that, the recall's probably not going to feel too good." (3-7 METs)    Attend events and enjoy them with pain, dance events with younger girls       "Anything I have to do to move my head, makes it hurt"    Daily Living:  Driving    Take care of the kids (3.0 METs)    Doing tasks overhead, being able to tilt head up for home management     Use a riding lawnmower; stopped used  weed eater 2/2 problems with shoulder and neck (4 METs)     Take care of animals, have dogs, chickens, pigs, cattle, horses, currently older kids took over    Medium Physical Demand Level    The PDL listed above is based on todays physical performance screening test. More comprehensive physical  testing integrated with clinical findings would be required to derived an accurate work capacity.     Outcomes:  G CODE TOOL: Neck Disability Index    Category:  Current Score  = 28 /50  or 56% impaired, limited or restricted (CK)  Goal at Discharge Score =  40% impaired (CJ)    Score interpretation is as follows:   TEST SCORE  Modifier  Impairment " Limitation Restriction    0/50  CH  0 % impaired, limited or restricted   1-9/50  CI  @ least 1% but less than 20% impaired, limited or restricted   10-19/50  CJ  @ least 20%<40% impaired, limited or restricted   20-29/50  CK  @ least 40%<60% impaired, limited or restricted   30-39/50  CL  @ least 60% <80% impaired, limited or restricted   40-49/50  CM  @ least 80%<100% impaired limited or restricted   50/50  CN  100% impaired, limited or restricted           Plan     Outpatient occupational therapy, 2-3 x/wk, for 3 weeks or or 12 visits to include:     1. Functional conditioning 2 times daily to increase physical capacity and allow Mr. Navarro to meet demands of vocation and home.   2. Individual counseling to develop return to work/daily routine plan and better understand the return to work process and/or daily routine restructure.   3. Daily Stretching, aerobic conditioning and walking to increase functional tolerance and allow Mr. Navarro to meet demands of work and home.   4. Functional activities to increase ability to move fluidly and quickly and tolerate unguarded movements.   5. Re-education regarding proper postural, body mechanics, and coping strategies for healthy roles and routine for managing daily stressors.    6. Any other treatment deemed necessary for patient to achieve personally driven goals to promote positive health and wellness and daily roles and routines    MADONNA Yates, 10/18/2017

## 2017-11-15 ENCOUNTER — OFFICE VISIT (OUTPATIENT)
Dept: SPORTS MEDICINE | Facility: CLINIC | Age: 52
End: 2017-11-15
Payer: COMMERCIAL

## 2017-11-15 VITALS
HEART RATE: 78 BPM | HEIGHT: 73 IN | BODY MASS INDEX: 30.36 KG/M2 | WEIGHT: 229.06 LBS | DIASTOLIC BLOOD PRESSURE: 92 MMHG | SYSTOLIC BLOOD PRESSURE: 145 MMHG

## 2017-11-15 DIAGNOSIS — M25.512 PAIN IN JOINT OF LEFT SHOULDER: Primary | ICD-10-CM

## 2017-11-15 PROCEDURE — 99214 OFFICE O/P EST MOD 30 MIN: CPT | Mod: S$GLB,,, | Performed by: ORTHOPAEDIC SURGERY

## 2017-11-15 PROCEDURE — 99999 PR PBB SHADOW E&M-EST. PATIENT-LVL III: CPT | Mod: PBBFAC,,, | Performed by: ORTHOPAEDIC SURGERY

## 2017-11-15 NOTE — PROGRESS NOTES
CC: left shoulder pain referred by Dr. 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      He reports that the pain is worse with overhead activity. It also bothers him at night.    Is affecting ADLs.     50% better, PT helped, awakens 6-7/7 nights when rolls over      Review of Systems   Constitution: Negative. Negative for chills, fever and night sweats.   HENT: Negative for congestion and headaches.    Eyes: Negative for blurred vision, left vision loss and right vision loss.   Cardiovascular: Negative for chest pain and syncope.   Respiratory: Negative for cough and shortness of breath.    Endocrine: Negative for polydipsia, polyphagia and polyuria.   Hematologic/Lymphatic: Negative for bleeding problem. Does not bruise/bleed easily.   Skin: Negative for dry skin, itching and rash.   Musculoskeletal: Negative for falls and muscle weakness.   Gastrointestinal: Negative for abdominal pain and bowel incontinence.   Genitourinary: Negative for bladder incontinence and nocturia.   Neurological: Negative for disturbances in coordination, loss of balance and seizures.   Psychiatric/Behavioral: Negative for depression. The patient does not have insomnia.    Allergic/Immunologic: Negative for hives and persistent infections.     PAST MEDICAL HISTORY:   Past Medical History:   Diagnosis Date    Arthritis     Cataract     Hypertension     Thyroid disease      PAST SURGICAL HISTORY:   Past Surgical History:   Procedure Laterality Date    ACROMIOCLAVICULAR JOINT CYST EXCISION Right     cataracts Bilateral     RADIOFREQUENCY ABLATION  10/2016    cervical spine/Jan    ROTATOR CUFF REPAIR Right     SPINE SURGERY      cerival fusion      FAMILY HISTORY:   Family History   Problem Relation Age of Onset    Heart disease Mother     Cancer Father      SOCIAL HISTORY:   Social History     Social History     "Marital status:      Spouse name: N/A    Number of children: N/A    Years of education: N/A     Occupational History    Not on file.     Social History Main Topics    Smoking status: Never Smoker    Smokeless tobacco: Never Used    Alcohol use No    Drug use: No    Sexual activity: Not on file     Other Topics Concern    Not on file     Social History Narrative    No narrative on file       MEDICATIONS:   Current Outpatient Prescriptions:     acetaminophen (TYLENOL) 500 MG tablet, Take 500 mg by mouth every 6 (six) hours as needed for Pain., Disp: , Rfl:     amitriptyline (ELAVIL) 25 MG tablet, Take 2 tablets (50 mg total) by mouth every evening., Disp: 60 tablet, Rfl: 5    FOLIC ACID/MULTIVIT-MIN/LUTEIN (CENTRUM SILVER ORAL), Take by mouth once daily., Disp: , Rfl:     hydrocodone-acetaminophen 7.5-325mg (NORCO) 7.5-325 mg per tablet, Take 1 tablet by mouth 4 (four) times daily as needed for Pain., Disp: 120 tablet, Rfl: 0    levothyroxine (SYNTHROID) 50 MCG tablet, Take 50 mcg by mouth once daily., Disp: , Rfl:     meclizine (ANTIVERT) 25 mg tablet, Take 25 mg by mouth., Disp: , Rfl:     melatonin 5 mg Tab, Take by mouth., Disp: , Rfl:     meloxicam (MOBIC) 15 MG tablet, Take 15 mg by mouth daily, Disp: , Rfl:     naloxegol (MOVANTIK) tablet, Take 25 mg by mouth once daily., Disp: 30 tablet, Rfl: 5    omeprazole (PRILOSEC) 20 MG capsule, Take 20 mg by mouth once daily., Disp: , Rfl: 1    valsartan (DIOVAN) 80 MG tablet, Take 40 mg by mouth once daily. , Disp: , Rfl: 1  ALLERGIES:   Review of patient's allergies indicates:   Allergen Reactions    Pcn [penicillins] Hives and Rash       VITAL SIGNS: BP (!) 145/92   Pulse 78   Ht 6' 1" (1.854 m)   Wt 103.9 kg (229 lb 0.9 oz)   BMI 30.22 kg/m²      PHYSICAL EXAMINATION:  General:  The patient is alert and oriented x 3.  Mood is pleasant.  Observation of ears, eyes and nose reveal no gross abnormalities.  No labored breathing " observed.  Gait is coordinated. Patient can toe walk and heel walk without difficulty.      left Shoulder / Upper Extremity Exam    OBSERVATION:     Swelling  none  Deformity  none   Discoloration  none   Scapular winging none   Scars   none  Atrophy  none    TENDERNESS / CREPITUS (T/C):          T/C      T/C   Clavicle   -/-  SUPRAspinatus    -/-     AC Jt.    +/-  INFRAspinatus  -/-    SC Jt.    -/-  Deltoid    -/-      G. Tuberosity  -/-  LH BICEP groove  +/-   Acromion:  -/-  Midline Neck   -/-     Scapular Spine -/-  Trapezium   -/-   SMA Scapula  -/-  GH jt. line - post  -/-     Scapulothoracic  -/-         ROM: (* = with pain)  Right shoulder   Left shoulder        AROM (PROM)   AROM (PROM)   FE    170° (175°)     170° (175°)     ER at 0°    60°  (65°)    60°  (65°)   ER at 90° ABD  90°  (90°)    90°  (90°)   IR at 90°  ABD   NA  (40°)     NA  (40°)      IR (spine level)   T10     T10    STRENGTH: (* = with pain) RIGHT SHOULDER  LEFT SHOULDER   SCAPTION at 0 and 30 5/5    5/5    IR    5/5    5/5   ER    5/5    5/5   BICEPS   5/5    5/5   Deltoid    5/5    5/5     SIGNS:  Painful side       NEER   +    OALPHONSOS  +    ARELLANO   +    SPEEDS  neg     DROP ARM   neg   BELLY PRESS neg   Superior escape none    LIFT-OFF  neg   X-Body ADD    neg    MOVING VALGUS neg        STABILITY TESTING    RIGHT SHOULDER   LEFT SHOULDER     Translation     Anterior  up face     up face    Posterior  up face    up face    Sulcus   < 10mm    < 10 mm     Signs   Apprehension   neg      neg       Relocation   no change     no change      Jerk test  neg     neg    EXTREMITY NEURO-VASCULAR EXAM    Sensation grossly intact to light touch all dermatomal regions.    DTR 2+ Biceps, Triceps, BR and Negative Oniels sign   Grossly intact motor function at Elbow, Wrist and Hand   Distal pulses radial and ulnar 2+, brisk cap refill, symmetric.      NECK:  Painless FROM and spinous processes non-tender. Negative Spurlings sign.       OTHER FINDINGS:  + scapular dyskinesia    XRAYS:  Shoulder trauma series left,  were obtained and reviewed  No convincing fracture or dislocation is noted. The osseous structures appear well mineralized and well aligned    MRI:  Mild tendinopathy of the supraspinatus tendon, + SLAP, biceps tendonitis        ASSESSMENT:   left:  1. Shoulder pain, cuff tendonitis, slap, biceps tendonitis, AC arthritis, impingment   2.  Scapular dyskinesia    PLAN:    All questions were answered, pt will contact us for questions or concerns in the interim.    All questions were answered, pt will contact us for questions or concerns in the interim.  Had thorough discussion of non-operative vs operative intervention, and risks and benefits of both.     We have discussed the surgery and recovery of arthroscopic shoulder surgery. he understands that there may be limited mobility up to several weeks after surgery depending on procedures that are performed at the time of surgery.    The spectrum of treatment options were discussed with the patient, including nonoperative and operative options.  After thorough discussion, the patient has elected to undergo surgical treatment to include:    left   a. Shoulder arthroscopic rotator cuff debridement vs. repair   b. Shoulder arthroscopic SAD   c. Shoulder arthroscopic extensive debridement   d. Shoulder arthroscopic biceps tenodesis    e. Shoulder arthroscopic-assisted Bio-D arthrocentesis   f. Shoulder arthroscopic possible microfracture   g. Shoulder arthroscopic possible lysis of adhesions   h. Shoulder arthroscopic DCE    The details of the surgical procedure were explained, including the location of probable incisions and a description of likely hardware and/or grafts to be used.  The patient understands the likely convalescence after surgery.  Also, we have thoroughly discussed the risks, benefits and alternatives to surgery, including, but not limited to, the risk of infection, joint  stiffness, blood clot (including DVT and/or pulmonary embolus), neurologic and vascular injury.  It was explained that, if tissue has been repaired or reconstructed, there is a chance of failure, which may require further management.  Consent form for surgery is signed and in chart.    These risks include but are not limited to: bleeding, infection, scarring, re-tear of repair, irreparability of the tear, damage to neurovascular structures, damage to cartilage, stiffness, blood clots, pulmonary embolism, swelling, compartment syndrome, need for further surgery, and the risks of anesthesia. She verbalized her understanding of these risks and wished to proceed with surgery.   Time was spent face-to-face with the patient during this encounter on counseling about treatment options including surgery and coordination of her care for preoperative visits, surgery and post-operative rehab.

## 2017-11-17 ENCOUNTER — OFFICE VISIT (OUTPATIENT)
Dept: ORTHOPEDICS | Facility: CLINIC | Age: 52
End: 2017-11-17
Payer: COMMERCIAL

## 2017-11-17 VITALS — WEIGHT: 229.75 LBS | HEIGHT: 73 IN | BODY MASS INDEX: 30.45 KG/M2

## 2017-11-17 DIAGNOSIS — M54.2 NECK PAIN: ICD-10-CM

## 2017-11-17 DIAGNOSIS — Z98.1 S/P CERVICAL SPINAL FUSION: Primary | ICD-10-CM

## 2017-11-17 PROCEDURE — 99213 OFFICE O/P EST LOW 20 MIN: CPT | Mod: S$GLB,,, | Performed by: PHYSICIAN ASSISTANT

## 2017-11-17 PROCEDURE — 99999 PR PBB SHADOW E&M-EST. PATIENT-LVL III: CPT | Mod: PBBFAC,,, | Performed by: PHYSICIAN ASSISTANT

## 2017-11-17 NOTE — PROGRESS NOTES
Mr. Navarro returns for follow up of neck and back pain.  He was last seen by me 7/14/2017.  He has a history of C4-7 ACDF in February 2016 in [place] with chronic neck pain and back pain.  He had a lumbar injection with Dr. Montoya in September with good relief.  He has been going to  for dry needling for his neck with little relief.  He had several cervical injections last year with some relief but only lasting a couple weeks.      He was seen by Dr. Flores 11/15 and is scheduled for a left shoulder scope in December.      Dr. Montoya tried to enroll him in the Functional Restoration Program but the patient declined.       Physical exam stable.  Neuro exam stable.      Imaging:  AP, Lat and Flex/Ex  upright C-spine films that demonstrate hardware in place.  There appears to be lucency around the C5 screws.     CT and MRI cervical spine from 8/9/2016 and 8/8/2016 were personally reviewed.  There is no evidence of hardware loosening or pseudoarthrosis on CT.  There is no significant spinal canal stenosis.      MRI thoracic spine demonstrates no significant spinal canal or neural foraminal narrowing.     AP, Lat and Flex/Ex upright lumbar spine films demonstrate normal disc spacing and alignment.  No acute abnormalities.      MRI cervical spine demonstrates mild degenerative changes with hardware in place and good decompression.     MRI lumbar spine demonstrates mild degenerative changes throughout the lumbar spine with mild spinal stenosis.  There is an annular fissure at L5/S1.     A/P:  Proceed with surgery with Dr. Flores.     There is no surgical intervention indicated from a spine standpoint.     I will discuss with Dr. Montoya.

## 2017-11-20 ENCOUNTER — TELEPHONE (OUTPATIENT)
Dept: PAIN MEDICINE | Facility: CLINIC | Age: 52
End: 2017-11-20

## 2017-11-20 NOTE — TELEPHONE ENCOUNTER
----- Message from Venice Pretty PA-C sent at 11/17/2017  9:37 AM CST -----  Hi Dr. Montoya,    I saw Mr. Navarro in clinic today.  He said the injection he had in September gave him pretty good relief for his back however he is inquiring about possibly having a repeat injection.  He also continues to have neck pain.  I was wondering if you had any recommendations as far as injections or something for his neck?      Thanks,  Venice

## 2017-11-20 NOTE — TELEPHONE ENCOUNTER
Please offer him a follow up with our clinic at the end of December.  He is scheduled to have shoulder surgery on 12/5

## 2017-11-21 DIAGNOSIS — M75.100 ROTATOR CUFF SYNDROME, UNSPECIFIED LATERALITY: Primary | ICD-10-CM

## 2017-11-21 DIAGNOSIS — M75.20 BICIPITAL TENOSYNOVITIS, UNSPECIFIED LATERALITY: ICD-10-CM

## 2017-11-21 DIAGNOSIS — S41.009D: ICD-10-CM

## 2017-11-21 DIAGNOSIS — S43.006D: ICD-10-CM

## 2017-11-28 ENCOUNTER — ANESTHESIA EVENT (OUTPATIENT)
Dept: SURGERY | Facility: OTHER | Age: 52
End: 2017-11-28
Payer: COMMERCIAL

## 2017-11-28 ENCOUNTER — HOSPITAL ENCOUNTER (OUTPATIENT)
Dept: PREADMISSION TESTING | Facility: OTHER | Age: 52
Discharge: HOME | End: 2017-11-28
Attending: ORTHOPAEDIC SURGERY
Payer: COMMERCIAL

## 2017-11-28 ENCOUNTER — OFFICE VISIT (OUTPATIENT)
Dept: SPORTS MEDICINE | Facility: CLINIC | Age: 52
End: 2017-11-28
Payer: COMMERCIAL

## 2017-11-28 VITALS
OXYGEN SATURATION: 98 % | HEIGHT: 73 IN | BODY MASS INDEX: 30.48 KG/M2 | RESPIRATION RATE: 16 BRPM | WEIGHT: 230 LBS | SYSTOLIC BLOOD PRESSURE: 134 MMHG | DIASTOLIC BLOOD PRESSURE: 87 MMHG | TEMPERATURE: 99 F | HEART RATE: 78 BPM

## 2017-11-28 VITALS
DIASTOLIC BLOOD PRESSURE: 90 MMHG | WEIGHT: 230 LBS | BODY MASS INDEX: 30.48 KG/M2 | HEIGHT: 73 IN | HEART RATE: 72 BPM | SYSTOLIC BLOOD PRESSURE: 133 MMHG

## 2017-11-28 DIAGNOSIS — M25.312 INSTABILITY OF LEFT SHOULDER JOINT: ICD-10-CM

## 2017-11-28 DIAGNOSIS — M75.22 BICEPS TENDINITIS OF LEFT UPPER EXTREMITY: ICD-10-CM

## 2017-11-28 DIAGNOSIS — M75.102 TEAR OF LEFT ROTATOR CUFF, UNSPECIFIED TEAR EXTENT: Primary | ICD-10-CM

## 2017-11-28 LAB
ANION GAP SERPL CALC-SCNC: 6 MMOL/L
BUN SERPL-MCNC: 11 MG/DL
CALCIUM SERPL-MCNC: 9.1 MG/DL
CHLORIDE SERPL-SCNC: 106 MMOL/L
CO2 SERPL-SCNC: 27 MMOL/L
CREAT SERPL-MCNC: 0.8 MG/DL
EST. GFR  (AFRICAN AMERICAN): >60 ML/MIN/1.73 M^2
EST. GFR  (NON AFRICAN AMERICAN): >60 ML/MIN/1.73 M^2
GLUCOSE SERPL-MCNC: 116 MG/DL
HCT VFR BLD AUTO: 42.7 %
HGB BLD-MCNC: 14.6 G/DL
POTASSIUM SERPL-SCNC: 3.6 MMOL/L
SODIUM SERPL-SCNC: 139 MMOL/L

## 2017-11-28 PROCEDURE — 85018 HEMOGLOBIN: CPT

## 2017-11-28 PROCEDURE — 36415 COLL VENOUS BLD VENIPUNCTURE: CPT

## 2017-11-28 PROCEDURE — 99999 PR PBB SHADOW E&M-EST. PATIENT-LVL IV: CPT | Mod: PBBFAC,,, | Performed by: PHYSICIAN ASSISTANT

## 2017-11-28 PROCEDURE — 85014 HEMATOCRIT: CPT

## 2017-11-28 PROCEDURE — 99499 UNLISTED E&M SERVICE: CPT | Mod: S$GLB,,, | Performed by: PHYSICIAN ASSISTANT

## 2017-11-28 PROCEDURE — 80048 BASIC METABOLIC PNL TOTAL CA: CPT

## 2017-11-28 RX ORDER — PREGABALIN 25 MG/1
75 CAPSULE ORAL
Status: CANCELLED | OUTPATIENT
Start: 2017-11-28 | End: 2017-11-29

## 2017-11-28 RX ORDER — HYDROCODONE BITARTRATE AND ACETAMINOPHEN 7.5; 325 MG/1; MG/1
1 TABLET ORAL EVERY 6 HOURS PRN
COMMUNITY
End: 2018-03-28 | Stop reason: SDUPTHER

## 2017-11-28 RX ORDER — METHOCARBAMOL 500 MG/1
500 TABLET, FILM COATED ORAL 2 TIMES DAILY
COMMUNITY
End: 2019-04-03 | Stop reason: SDUPTHER

## 2017-11-28 RX ORDER — AMOXICILLIN 500 MG
2 CAPSULE ORAL DAILY
Status: ON HOLD | COMMUNITY
End: 2018-11-23 | Stop reason: CLARIF

## 2017-11-28 RX ORDER — UBIDECARENONE 30 MG
30 CAPSULE ORAL DAILY
Status: ON HOLD | COMMUNITY
End: 2018-11-23 | Stop reason: CLARIF

## 2017-11-28 RX ORDER — PROMETHAZINE HYDROCHLORIDE 25 MG/1
25 TABLET ORAL EVERY 6 HOURS PRN
Qty: 40 TABLET | Refills: 0 | Status: SHIPPED | OUTPATIENT
Start: 2017-11-28 | End: 2019-04-03

## 2017-11-28 RX ORDER — SODIUM CHLORIDE, SODIUM LACTATE, POTASSIUM CHLORIDE, CALCIUM CHLORIDE 600; 310; 30; 20 MG/100ML; MG/100ML; MG/100ML; MG/100ML
INJECTION, SOLUTION INTRAVENOUS CONTINUOUS
Status: CANCELLED | OUTPATIENT
Start: 2017-11-28

## 2017-11-28 RX ORDER — OXYCODONE AND ACETAMINOPHEN 10; 325 MG/1; MG/1
1 TABLET ORAL EVERY 6 HOURS PRN
Qty: 60 TABLET | Refills: 0 | Status: SHIPPED | OUTPATIENT
Start: 2017-11-28 | End: 2018-03-28

## 2017-11-28 RX ORDER — AMPICILLIN TRIHYDRATE 250 MG
CAPSULE ORAL DAILY
Status: ON HOLD | COMMUNITY
End: 2018-11-23 | Stop reason: CLARIF

## 2017-11-28 RX ORDER — TRAMADOL HYDROCHLORIDE 50 MG/1
50 TABLET ORAL EVERY 6 HOURS PRN
Qty: 40 TABLET | Refills: 0 | Status: SHIPPED | OUTPATIENT
Start: 2017-11-28 | End: 2018-03-28

## 2017-11-28 RX ORDER — LIDOCAINE HYDROCHLORIDE 10 MG/ML
1 INJECTION, SOLUTION EPIDURAL; INFILTRATION; INTRACAUDAL; PERINEURAL ONCE
Status: CANCELLED | OUTPATIENT
Start: 2017-11-28 | End: 2017-11-28

## 2017-11-28 RX ORDER — OXYCODONE HCL 10 MG/1
10 TABLET, FILM COATED, EXTENDED RELEASE ORAL
Status: CANCELLED | OUTPATIENT
Start: 2017-11-28 | End: 2017-11-29

## 2017-11-28 RX ORDER — MIDAZOLAM HYDROCHLORIDE 5 MG/ML
5 INJECTION INTRAMUSCULAR; INTRAVENOUS ONCE AS NEEDED
Status: CANCELLED | OUTPATIENT
Start: 2017-11-28 | End: 2017-11-28

## 2017-11-28 NOTE — H&P
"Sal Navarro  is here for a completion of his perioperative paperwork. he  Is scheduled to undergo     left              a. Shoulder arthroscopic rotator cuff debridement vs. repair              b. Shoulder arthroscopic SAD              c. Shoulder arthroscopic extensive debridement              d. Shoulder arthroscopic biceps tenodesis               e. Shoulder arthroscopic-assisted Bio-D arthrocentesis              f. Shoulder arthroscopic possible microfracture              g. Shoulder arthroscopic possible lysis of adhesions              h. Shoulder arthroscopic DCE     on 12/5/17.  He is a healthy individual and does need clearance for this procedure.  Per patient's NP with [facility], "he is medically cleared to proceed with shoulder surgery."    Risks, indications and benefits of the surgical procedure were discussed with the patient. All questions with regard to surgery, rehab, expected return to functional activities, activities of daily living and recreational endeavors were answered to his satisfaction.    Once no other questions were asked, a brief history and physical exam was then performed.      PHYSICAL EXAM:  GEN: A&Ox3, WD WN NAD  HEENT: WNL  CHEST: CTAB, no W/R/R  HEART: RRR, no M/R/G  ABD: Soft, NT ND, BS x4 QUADS  MS; See Epic  NEURO: CN II-XII intact       The surgical consent was then reviewed with the patient, who agreed with all the contents of the consent form and it was signed. he was then given the [facility] surgery packet to bring with him to [facility] for the anesthesia portion of his perioperative paperwork.     PHYSICAL THERAPY:  He was also instructed regarding physical therapy and will begin when Dr. Sandra glaserems appropriate. He was given a copy of the original prescription to schedule. Another copy of this prescription was also faxed to [facility] in [place], [place].    POST OP CARE:instructions were reviewed including care of the wound and dressing after " surgery and when he can shower.     PAIN MANAGEMENT: Sal Navarro was also given his pain management regime, which includes the Zynex. Patient has already been contacted by the company and they are sending him one. He was also instructed regarding the Polar ice unit that will be in place after surgery and his postoperative pain medications.     PAIN MEDICATION:  Percocet 10/325mg 1 po q 4-6 hours prn pain  Ultram 50 mg one p.o. q.4-6 hours p.r.n. breakthrough pain,   Phenergan 25 mg one p.o. q.4-6 hours p.r.n. nausea and vomiting.    Patient was instructed to purchase and take Colace 100mg BID PRN to counter possible GI side effects of taking opiates.     DVT prophylaxis was discussed with the patient today including risk factors for developing DVTs and history of DVTs. The patient was asked if any specific recommendations were given from the doctor/s that did pre-operative surgical clearance.  He has no prior history of DVTs.    The patient was told that narcotic pain medications may make them drowsy and instructions were given to not sign legal documents, drive or operate heavy machinery, cars, or equipment while under the influence of narcotic medications.     As there were no other questions to be asked, he was given my business card along with Randi Flores MD business card if he has any questions or concerns prior to surgery or in the postop period.

## 2017-11-28 NOTE — DISCHARGE INSTRUCTIONS
PRE-ADMIT TESTING -  [phone]    [address]  [place] [facility]          Your surgery has been scheduled at [facility]. We are pleased to have the opportunity to serve you. For Further Information please call [phone].    On the day of surgery please report to the Information Desk on the 1st floor.    · CONTACT YOUR PHYSICIAN'S OFFICE THE DAY PRIOR TO YOUR SURGERY TO OBTAIN YOUR ARRIVAL TIME.     · The evening before surgery do not eat anything after 9 p.m. ( this includes hard candy, chewing gum and mints).  You may only have GATORADE, POWERADE AND WATER  from 9 p.m. until you leave your home.   DO NOT DRINK ANY LIQUIDS ON THE WAY TO THE HOSPITAL.      SPECIAL MEDICATION INSTRUCTIONS: TAKE medications checked off by the Anesthesiologist on your Medication List.    Angiogram Patients: Take medications as instructed by your physician, including aspirin.     Surgery Patients:    If you take ASPIRIN - Your PHYSICIAN/SURGEON will need to inform you IF/OR when you need to stop taking aspirin prior to your surgery.     Do Not take any medications containing IBUPROFEN.  Do Not Wear any make-up or dark nail polish   (especially eye make-up) to surgery. If you come to surgery with makeup on you will be required to remove the makeup or nail polish.    Do not shave your surgical area at least 5 days prior to your surgery. The surgical prep will be performed at the hospital according to Infection Control regulations.    Leave all valuables at home.   Do Not wear any jewelry or watches, including any metal in body piercings.  Contact Lens must be removed before surgery. Either do not wear the contact lens or bring a case and solution for storage.  Please bring a container for eyeglasses or dentures as required.  Bring any paperwork your physician has provided, such as consent forms,  history and physicals, doctor's orders, etc.   Bring comfortable clothes that are loose fitting to wear upon  discharge. Take into consideration the type of surgery being performed.  Maintain your diet as advised per your physician the day prior to surgery.      Adequate rest the night before surgery is advised.   Park in the Parking lot behind the hospital or in the [facility] across the street from the parking lot. Parking is complimentary.  If you will be discharged the same day as your procedure, please arrange for a responsible adult to drive you home or to accompany you if traveling by taxi.   YOU WILL NOT BE PERMITTED TO DRIVE OR TO LEAVE THE HOSPITAL ALONE AFTER SURGERY.   It is strongly recommended that you arrange for someone to remain with you for the first 24 hrs following your surgery.       Thank you for your cooperation.  The Staff of [facility].        Bathing Instructions                                                                 Please shower the evening before and morning of your procedure with    ANTIBACTERIAL SOAP. ( DIAL, etc )  Concentrate on the surgical area   for at least 3 minutes and rinse completely. Dry off as usual.   Do not use any deodorant, powder, body lotions, perfume, after shave or    cologne.

## 2017-11-28 NOTE — ANESTHESIA PREPROCEDURE EVALUATION
11/28/2017  Sal Navarro is a 52 y.o., male.    Anesthesia Evaluation    I have reviewed the Patient Summary Reports.    I have reviewed the Nursing Notes.   I have reviewed the Medications.     Review of Systems  Anesthesia Hx:  Denies Family Hx of Anesthesia complications.   Denies Personal Hx of Anesthesia complications.   Social:  Non-Smoker    Hematology/Oncology:  Hematology Normal   Oncology Normal     EENT/Dental:EENT/Dental Normal   Cardiovascular:   Hypertension    Pulmonary:  Pulmonary Normal    Renal/:  Renal/ Normal     Hepatic/GI:   PUD, prilosec prophylaxis   Musculoskeletal:   Arthritis  2016 C 4-7 ACF, constant pain since    Followed by Dr. JESSENIA Montoya, pain, multiple radiofrequency ablations neck and back, facet joint arthropathy, partial relief back, neck still very painful   Spine Disorders: cervical and lumbar Chronic Pain    Neurological:   Numbness L hand, intermittent, worse with activities, I.e. PT  Chronic Pain Syndrome (norco/robaxin daily)   Endocrine:   Hypothyroidism    Dermatological:  Skin Normal    Psych:  Psychiatric Normal           Physical Exam  General:  Well nourished    Airway/Jaw/Neck:  Airway Findings: Mouth Opening: Normal Mallampati: II  TM Distance: 4 - 6 cm  Jaw/Neck Findings:  Neck ROM: Extension Decreased, Severe, Extension Painful      Dental:  Dental Findings: In tact        Mental Status:  Mental Status Findings:  Cooperative, Alert and Oriented         Anesthesia Plan  Type of Anesthesia, risks & benefits discussed:  Anesthesia Type:  general  Patient's Preference:   Intra-op Monitoring Plan: standard ASA monitors  Intra-op Monitoring Plan Comments:   Post Op Pain Control Plan: peripheral nerve block  Post Op Pain Control Plan Comments:   Induction:    Beta Blocker:         Informed Consent: Patient understands risks and agrees with Anesthesia plan.   Questions answered. Anesthesia consent signed with patient.  ASA Score: 3     Day of Surgery Review of History & Physical:    H&P update referred to the surgeon.     Anesthesia Plan Notes: Labs today          Ready For Surgery From Anesthesia Perspective.

## 2017-11-29 ENCOUNTER — OFFICE VISIT (OUTPATIENT)
Dept: PAIN MEDICINE | Facility: CLINIC | Age: 52
End: 2017-11-29
Attending: ANESTHESIOLOGY
Payer: COMMERCIAL

## 2017-11-29 VITALS
HEART RATE: 88 BPM | WEIGHT: 233 LBS | BODY MASS INDEX: 30.88 KG/M2 | DIASTOLIC BLOOD PRESSURE: 83 MMHG | SYSTOLIC BLOOD PRESSURE: 119 MMHG | TEMPERATURE: 97 F | HEIGHT: 73 IN | RESPIRATION RATE: 20 BRPM

## 2017-11-29 DIAGNOSIS — M79.10 MYALGIA: Primary | ICD-10-CM

## 2017-11-29 DIAGNOSIS — M62.50 DISUSE MUSCLE ATROPHY: ICD-10-CM

## 2017-11-29 DIAGNOSIS — Z98.1 S/P CERVICAL SPINAL FUSION: ICD-10-CM

## 2017-11-29 DIAGNOSIS — G89.4 CHRONIC PAIN DISORDER: ICD-10-CM

## 2017-11-29 DIAGNOSIS — M47.812 FACET ARTHRITIS OF CERVICAL REGION: ICD-10-CM

## 2017-11-29 DIAGNOSIS — M51.36 DDD (DEGENERATIVE DISC DISEASE), LUMBAR: ICD-10-CM

## 2017-11-29 DIAGNOSIS — M47.816 FACET ARTHRITIS, DEGENERATIVE, LUMBAR SPINE: ICD-10-CM

## 2017-11-29 DIAGNOSIS — G89.29 CHRONIC LEFT SHOULDER PAIN: ICD-10-CM

## 2017-11-29 DIAGNOSIS — M25.512 CHRONIC LEFT SHOULDER PAIN: ICD-10-CM

## 2017-11-29 DIAGNOSIS — M50.30 DDD (DEGENERATIVE DISC DISEASE), CERVICAL: ICD-10-CM

## 2017-11-29 PROCEDURE — 99215 OFFICE O/P EST HI 40 MIN: CPT | Mod: S$GLB,,, | Performed by: ANESTHESIOLOGY

## 2017-11-29 PROCEDURE — 99999 PR PBB SHADOW E&M-EST. PATIENT-LVL III: CPT | Mod: PBBFAC,,, | Performed by: ANESTHESIOLOGY

## 2017-11-29 RX ORDER — BUPIVACAINE HYDROCHLORIDE 5 MG/ML
5 INJECTION, SOLUTION EPIDURAL; INTRACAUDAL
Status: COMPLETED | OUTPATIENT
Start: 2017-11-29 | End: 2017-11-29

## 2017-11-29 RX ADMIN — BUPIVACAINE HYDROCHLORIDE 25 MG: 5 INJECTION, SOLUTION EPIDURAL; INTRACAUDAL at 09:11

## 2017-11-29 NOTE — PROGRESS NOTES
Subjective:     Patient ID: Sal Navarro is a 52 y.o. male.    Chief Complaint: Neck Pain    Consulted by: No ref. provider found     Disclaimer: This note was generated using voice recognition software.  There may be a typographical errors that were missed during proofreading.      HPI:    Sla Navarro is a 52 y.o. male who presents today s/p C4-7 ACDF with C5/6 PSIF in 2/2016 with residual chronic midline neck pain that radiates into his shoulder blades bilaterally, R>>L.  This is associated with bilateral hand numbness and tingling.  The hand symptoms did improve following the surgery.  The shooting pains in his arms resolved completely.   This pain is described in detail below.  His post-operative course was complicated by dysphagia that is being treated with speech therapy.    Interval History (9/23/2016):  He returns today for follow up.  He reports that the MELANIE was not helpful for the pain.  He hasn't noticed a difference with the amitriptyline.  He does notice a difference in his low back pain when he skips the meloxicam    Interval History (11/17/2016):  He returns today for follow up.  He reports that cervical RFAs were not very helpful for the pain. He is no longer taking Percocet or Tamazepam. He continues to take Mobic 15mg daily for his low back pain. He is taking elavil 25mg nightly without side effects. He is open to considering SCS. He reports poor sleep at night.    Interval History (1/3/2017):  The patient returns today for follow up of neck and shoulder pain.  His pain is located to his neck and surrounding areas.  He previously had limited benefit with RFAs, although he did have short term benefit with MBB.  He also had limited benefit with NGUYEN.  Dr. Montoya discussed cervical SCS trial with the patient, although he reports that he does not wish to pursue this option at this time.  He is scheduled to f/u with Dr. Fowler on 1/24/16.  He continues to participate in PT.  He did  previously have some benefit with a TENS unit at PT.  He is reporting some relief with Tramadol.  He also takes Zanaflex which helps but is very sedating.      Interval History (3/3/2017):  The patient returns for follow up of neck and shoulder pain. He continues to take Tramadol BID with benefit. The medication decreases his pain slightly. He also reports benefit with Robaxin. He continues to take Mobic with benefit. He recently stopped physical therapy and reports increased pain into his shoulders, despite a home exercise plan.     Interval History (5/1/2017):  He returns today for follow up.  He reports that these RFAs were more helpful than before, L more so than right, but now the pain is again returning.  Today, he also complains of left shoulder pain that has been gradually worsening over the past few months. He associated this with modified activity due to his neck.  Pain is worse with extension of arm and lifting.  Tramadol, Robaxin, Meloxicam, and amitriptyline have been helpful for the pain.  He continues to have GI upset, but has just started prilosec.  When he tried stopping the meloxicam, his pain increased dramatically.  The tramadol takes the edge off.  Robaxin helps, but he is out.    Of note, he recently had an endoscopy that showed stage 3 gastritis, for which he was started on Prilosec    Interval History (6/21/2017):  He returns today for follow up.  He reports that the shoulder injection provided one week of relief.  The Pennsaid has been helpful for the pain.  The RFA has helped with shooting pain into his back.  He is doing his HEP.  He has not been back to PT because of insurance issues.  His shoulder continues to present significant hindrances to his daily activities.    Interval History (8/21/2017):  He returns today for follow up.  He reports that the tramadol is not as helpful as it used to be.  In addition, he is experiencing constipation and urinary retention that only resolves with  "skipping a dose of tramadol. He is going to PT for his neck/shoulder.    Interval History (10/18/2017):  He returns today for follow up.  He reports that Norco has been more helpful for the pain than the tramadol with fewer side effects.  He is averaging about 2-4 per day.  His back is doing much better, though his neck is still greatly limiting him in his daily tasks.  He reports a new pain that began with a "pop" followed by a sharp pain radiating down his back followed by a soreness.  This happened a few weeks ago on the left side of his low back above where we did the RFA.  This is resolving, but it is still sore.    Interval History (11/29/2017):  He returns today for follow up. He is scheduled for right shoulder surgery on 12/5/17 and is here today for lemuel-operative planning. Biggest pain today is in his left shoulder. After his long car ride here, he states his neck has started to bother him as well. Both pains today are roughly 5/10. Taking Norco 7.5/325 mg 1-2 tablets daily which he states takes the edge off of his pain. He has decreased his dosing from 3-4 per day due to side effects of constipation. States he is not taking the movantik on a regular basis-does not want to keep adding medications. Last took it over a week ago. Still taking mobic, robaxin with mild relief. Takes tylenol occasionally with mild relief. Also used hemp oil with mild relief. Still doing a HEP on a regular basis. States his lower back is gradually getting worse. Pain primarily on the left side. Had trigger point injections at last visit which provided 30-40% reduction in his pain for for 1-2 weeks     Physical Therapy: Yes- outside facility in [place].    Non-pharmacologic Treatment: Ice and heat provide mild benefit.           · TENS? Yes at PT with benefit.    Pain Medications:         · Currently taking: Amitriptyline 50 mg QHS, Meloxicam 15 mg daily, Tylenol PRN, Norco 7.5/325 mg 2-4 daily PRN, and Robaxin 500 mg TID PRN " (out)    · Has tried in the past:  Percocet (limited benefit and causes constipation treated with colace), Temazepam for sleep (he has had trouble sleeping since the surgery), Flexeril (no help in the past, both before and after the surgery), Gabapentin (after, unsure of duration of tx), Lyrica (sedation), Celebrex (GI upset), Zanaflex 4 mg PRN muscle pain (helpful but sedating)     · Has not tried: SNRIs, other muscle relaxants    Blood thinners: None    Interventional Therapies: None    Relevant Surgeries:   · C4-7 ACDF with C5/6 PSIF in 2/2016  · C7/T1 ILESI: 10-15% relief  · 10/4/16 Bilateral C4-7 MBB- short term benefit  · 10/11/16 Right C4-7 RFA- limited benefit  · 10/25/16 Left C4-7- limited benefit  · TPIs (cervical and left thoracic): Good benefit x 1-2 weeks in thoracic, ongoing in cervical    Affecting sleep? Yes    Affecting daily activities? Yes    Depressive symptoms? Yes, due to general medical condition.  He denies significant symptoms today          · SI/HI? No    Work status: On short term disability from his job as an [employer] .    Pain Scales  Best: 2/10  Worst: 9/10  Usually: 6/10  Today: 5/10    Neck Pain    This is a new problem. The current episode started more than 1 month ago (february 2016). The problem occurs intermittently. The problem has been unchanged. The pain is present in the anterior neck. The quality of the pain is described as aching, shooting and stabbing. The pain is at a severity of 5/10. The pain is mild. The symptoms are aggravated by bending (turning). The pain is same all the time. Stiffness is present all day and in the morning. Associated symptoms include chest pain. Pertinent negatives include no fever, headaches or weight loss. He has tried oral narcotics, NSAIDs and muscle relaxants for the symptoms. The treatment provided mild relief. Physical therapy was not tried and ineffective.      Review of Systems   Constitution: Negative. Negative for chills,  fever, malaise/fatigue, weight gain and weight loss.   HENT: Negative.  Negative for ear pain and hoarse voice.    Eyes: Negative.  Negative for blurred vision, pain and visual disturbance.   Cardiovascular: Positive for chest pain. Negative for dyspnea on exertion and irregular heartbeat.   Respiratory: Negative.  Negative for cough, shortness of breath and wheezing.    Endocrine: Negative.  Negative for cold intolerance and heat intolerance.   Hematologic/Lymphatic: Negative.  Negative for adenopathy and bleeding problem. Does not bruise/bleed easily.   Skin: Negative.  Negative for color change, itching and rash.   Musculoskeletal: Positive for neck pain and stiffness. Negative for back pain.   Gastrointestinal: Negative.  Negative for change in bowel habit, diarrhea, hematemesis, hematochezia, melena and vomiting.   Genitourinary: Negative.  Negative for flank pain, frequency, hematuria and urgency.   Neurological: Positive for dizziness and light-headedness. Negative for difficulty with concentration, headaches, loss of balance and seizures.   Psychiatric/Behavioral: Negative for altered mental status, depression and suicidal ideas. The patient has insomnia and is nervous/anxious.    Allergic/Immunologic: Negative.  Negative for HIV exposure.   All other systems reviewed and are negative.            Past Medical History:   Diagnosis Date    Arthritis     Cataract     Cervical back pain with evidence of disc disease     Hypertension     Thyroid disease        Past Surgical History:   Procedure Laterality Date    ACROMIOCLAVICULAR JOINT CYST EXCISION Right     BACK SURGERY      cataracts Bilateral     NGUYEN      EYE SURGERY      RADIOFREQUENCY ABLATION  10/2016    cervical spine/Montoya    ROTATOR CUFF REPAIR Right     SPINE SURGERY      cerival fusion        Review of patient's allergies indicates:   Allergen Reactions    Pcn [penicillins] Rash       Current Outpatient Prescriptions   Medication Sig  Dispense Refill    acetaminophen (TYLENOL) 500 MG tablet Take 500 mg by mouth every 6 (six) hours as needed for Pain.      amitriptyline (ELAVIL) 25 MG tablet Take 2 tablets (50 mg total) by mouth every evening. 60 tablet 5    co-enzyme Q-10 30 mg capsule Take 30 mg by mouth once daily.      fish oil-omega-3 fatty acids 300-1,000 mg capsule Take 2 g by mouth once daily.      FOLIC ACID/MULTIVIT-MIN/LUTEIN (CENTRUM SILVER ORAL) Take by mouth once daily.      hydrocodone-acetaminophen 7.5-325mg (NORCO) 7.5-325 mg per tablet Take 1 tablet by mouth every 6 (six) hours as needed for Pain.      levothyroxine (SYNTHROID) 50 MCG tablet Take 50 mcg by mouth once daily.      meclizine (ANTIVERT) 25 mg tablet Take 25 mg by mouth.      melatonin 5 mg Tab Take by mouth.      meloxicam (MOBIC) 15 MG tablet Take 15 mg by mouth daily      methocarbamol (ROBAXIN) 500 MG Tab Take 500 mg by mouth 3 (three) times daily.      naloxegol (MOVANTIK) tablet Take 25 mg by mouth once daily. 30 tablet 5    omeprazole (PRILOSEC) 20 MG capsule Take 20 mg by mouth once daily.  1    oxyCODONE-acetaminophen (PERCOCET)  mg per tablet Take 1 tablet by mouth every 6 (six) hours as needed for Pain. 60 tablet 0    promethazine (PHENERGAN) 25 MG tablet Take 1 tablet (25 mg total) by mouth every 6 (six) hours as needed for Nausea. 40 tablet 0    red yeast rice 600 mg Cap Take by mouth.      traMADol (ULTRAM) 50 mg tablet Take 1 tablet (50 mg total) by mouth every 6 (six) hours as needed for Pain. 40 tablet 0    valsartan (DIOVAN) 80 MG tablet Take 40 mg by mouth once daily.   1     No current facility-administered medications for this visit.        Family History   Problem Relation Age of Onset    Heart disease Mother     Cancer Father        Social History     Social History    Marital status:      Spouse name: N/A    Number of children: N/A    Years of education: N/A     Occupational History    Not on file.  "    Social History Main Topics    Smoking status: Never Smoker    Smokeless tobacco: Never Used    Alcohol use No    Drug use: No    Sexual activity: Not on file     Other Topics Concern    Not on file     Social History Narrative    No narrative on file       Objective:     Vitals:    11/29/17 0812   BP: 119/83   Pulse: 88   Resp: 20   Temp: 97.1 °F (36.2 °C)   TempSrc: Oral   Weight: 105.7 kg (233 lb)   Height: 6' 1" (1.854 m)   PainSc:   5   PainLoc: Neck     GEN:  Well developed, well nourished.  No acute distress.  No pain behavior.  HEENT:  No trauma.  Mucous membranes moist.  Nares patent bilaterally.  PSYCH: Normal affect. Thought content appropriate.  CHEST:  Breathing symmetric.  No audible wheezing.  ABD: Soft, non-tender, non-distended.  SKIN:  Warm, pink, dry.  No rash on exposed areas.    EXT:  No cyanosis, clubbing, or edema.  No color change or changes in nail or hair growth.  NEURO/MUSCULOSKELETAL:  Fully alert, oriented, and appropriate. Speech normal nella. No cranial nerve deficits.   C-Spine:  Decreased ROM with pain on extension> flexion.  Limited extension and lateral rotation.  Positive facet loading bilaterally.  Negative Spurling's bilaterally.    TTP over left thoracic paraspinals.    Previous physical exam:  4/5 motor strength in bilateral deltoids; however, this may be due to pain.  Otherwise 5/5 motor strength throughout upper extremities.   Sensory: No sensory deficit in the upper extremities.   Reflexes: 1+ and symmetric throughout.  Negative Akins's bilaterally.  Gait: Antalgic.  Present trendelenburg sign bilaterally, L>R  L-Spine:  Decreased ROM with pain on extension > flexion. Positive facet loading bilaterally.  Negative SLR bilaterally.    SI Joint/Hip: Negative KHRIS bilaterally.  Negative FADIR bilaterally.  No TTP over lumbar paraspinals, bilateral SI joints, piriformis muscles, or GTB.      Imaging:      Diagnostic Results:  All imaging was independently reviewed " by me.  Below is a summary from Dr Fowler's note.  I concur with the below findings.     MRI T-spine, dated 8/8/16:  1. No significant central or neuroforaminal stenosis     MRI C-spine, dated 8/8/16:  1. No significant stenosis      Flex/Ex X-ray C-spine, dated 7/19/16:  1. No prevertebral swelling  2. No gross instability   3. Good hardware position     MRI C-spine from an outside facility, dated 1/28/2016:  1. Moderate to severe DDD  2. Disc osteophyte complex, worst at C5/6   3. Moderate stenosis at C4/5 and C6/7      CT C-spine from an outside facility, dated 4/2015:  1. Diffuse cervical spondylosis   2. DDD, worst at C5/6, with some ossification of the PLL at C5/6      CT C-spine from an outside facility, dated 3/17/2016:  1. Fusion surgery at C5/6  2. Hardware in good position       AP and Lateral X-ray C-spine from an outside facility:  1. C4-7 ACDF  2. Hardware in good position  3. Spine in good alignment    Assessment:     Encounter Diagnoses   Name Primary?    Myalgia Yes    Chronic pain disorder     DDD (degenerative disc disease), cervical     Facet arthritis of cervical region     S/P cervical spinal fusion     Chronic left shoulder pain     Facet arthritis, degenerative, lumbar spine     DDD (degenerative disc disease), lumbar     Disuse muscle atrophy        Plan:     Sal was seen today for neck pain.    Diagnoses and all orders for this visit:    Myalgia  -     bupivacaine (PF) 0.5% (5 mg/ml) injection 25 mg; Inject 5 mLs (25 mg total) into the muscle one time.    Chronic pain disorder    DDD (degenerative disc disease), cervical    Facet arthritis of cervical region    S/P cervical spinal fusion    Chronic left shoulder pain    Facet arthritis, degenerative, lumbar spine    DDD (degenerative disc disease), lumbar    Disuse muscle atrophy      His pain is consistent with the above.    Recommendation for lemuel-op pain control:  1. Recommend perineural block; consider continuous  catheter  2. Recommend maintaining adjuvant medications as listed below  1. I do not recommend a stronger NSAID due to his recent gastritis  3. Currently, he has been prescribed Percocet per protocol.  If this is inadequate, consider:  1. Tylenol 1000 mg TID  2. Oxycodone 10 mg Q4H PRN       OR    1. Hydrocodone 10/325 mg Q4H PRN      We discussed the assessment and recommendations.  All available images were reviewed. We discussed the disease process, prognosis, treatment plan, and risks and benefits. The patient is aware of the risks and benefits of the medications being prescribed, common side effects, and proper usage. The following is the plan we agreed on:     1. Left thoracic TPIs today as below  2. Can repeat L2-5 LMB RFA PRN (Coolief)  3. Continue Pennsaid gel, concentrating on neck and left shoulder  4. Continue PT  5. We again discussed spinal cord stimulator which he will think about.  I can do the perc trial after looking at his MRI  6. We discussed FRP which he will also think about.  We would need to get him set up at the [facility]  1. GAP passed at V  7. Continue Robaxin   8. Continue Amitriptyline 25mg QHS   9. Continue Meloxicam 15 mg daily, benefits currently outweigh risks.  Will continue to reassess  10. Movantik 25 mg for OIC PRN  11. Continue Tylenol as needed.  12. RTC once released from Ortho.      Trigger Point Injection:   The procedure was discussed with the patient including complications of damage, bleeding, infection, and failure of pain relief.     All medications, allergies, and relevant histories were reviewed. No recent antibiotics or infections.  A time-out was taken to verify the correct patient, procedure, laterality, and appropriate medications/allergies.    Trigger points were identified by palpation and marked. CHG prep of sites done. A 27-gauge needle was advanced to the point of maximal tenderness, and 5 mL of 0.5% bupivacaine was injected after negative aspiration. All  sites done in the same manner. Patient tolerated the procedure well and without complications. Sites injected included: right cervical paraspinals x 3 (trap, levator scap, spl cervicis) and left thoracic paraspinal x 1     The patient tolerated the procedure well and was discharged in excellent condition.      Greater than 25 minutes spent in total in todays visit with the patient, with more than half that time direct face to face counseling and education with the patient today. We discussed the disease process, prognosis, treatment plan, and risks and benefits.    The above plan and management options were discussed at length with patient. Patient is in agreement with the above and verbalized understanding.     Ortho/SPM Exam

## 2017-12-05 ENCOUNTER — ANESTHESIA (OUTPATIENT)
Dept: SURGERY | Facility: OTHER | Age: 52
End: 2017-12-05
Payer: COMMERCIAL

## 2017-12-05 ENCOUNTER — HOSPITAL ENCOUNTER (OUTPATIENT)
Facility: OTHER | Age: 52
Discharge: HOME | End: 2017-12-05
Attending: ORTHOPAEDIC SURGERY | Admitting: ORTHOPAEDIC SURGERY
Payer: COMMERCIAL

## 2017-12-05 ENCOUNTER — TELEPHONE (OUTPATIENT)
Dept: SPORTS MEDICINE | Facility: CLINIC | Age: 52
End: 2017-12-05

## 2017-12-05 VITALS
SYSTOLIC BLOOD PRESSURE: 128 MMHG | OXYGEN SATURATION: 96 % | WEIGHT: 230 LBS | TEMPERATURE: 97 F | HEIGHT: 73 IN | DIASTOLIC BLOOD PRESSURE: 84 MMHG | HEART RATE: 66 BPM | BODY MASS INDEX: 30.48 KG/M2 | RESPIRATION RATE: 16 BRPM

## 2017-12-05 DIAGNOSIS — M75.102 TEAR OF LEFT ROTATOR CUFF, UNSPECIFIED TEAR EXTENT: ICD-10-CM

## 2017-12-05 DIAGNOSIS — M75.112 INCOMPLETE TEAR OF LEFT ROTATOR CUFF: Primary | ICD-10-CM

## 2017-12-05 DIAGNOSIS — M75.22 BICEPS TENDINITIS OF LEFT UPPER EXTREMITY: ICD-10-CM

## 2017-12-05 DIAGNOSIS — M25.312 INSTABILITY OF LEFT SHOULDER JOINT: ICD-10-CM

## 2017-12-05 PROCEDURE — 71000015 HC POSTOP RECOV 1ST HR: Performed by: ORTHOPAEDIC SURGERY

## 2017-12-05 PROCEDURE — 25000003 PHARM REV CODE 250: Performed by: NURSE ANESTHETIST, CERTIFIED REGISTERED

## 2017-12-05 PROCEDURE — 63600175 PHARM REV CODE 636 W HCPCS: Performed by: NURSE ANESTHETIST, CERTIFIED REGISTERED

## 2017-12-05 PROCEDURE — 23430 REPAIR BICEPS TENDON: CPT | Mod: 51,LT,, | Performed by: ORTHOPAEDIC SURGERY

## 2017-12-05 PROCEDURE — 36000710: Performed by: ORTHOPAEDIC SURGERY

## 2017-12-05 PROCEDURE — C1713 ANCHOR/SCREW BN/BN,TIS/BN: HCPCS | Performed by: ORTHOPAEDIC SURGERY

## 2017-12-05 PROCEDURE — 29827 SHO ARTHRS SRG RT8TR CUF RPR: CPT | Mod: 22,LT,, | Performed by: ORTHOPAEDIC SURGERY

## 2017-12-05 PROCEDURE — 36000711: Performed by: ORTHOPAEDIC SURGERY

## 2017-12-05 PROCEDURE — 63600175 PHARM REV CODE 636 W HCPCS: Performed by: ANESTHESIOLOGY

## 2017-12-05 PROCEDURE — 63600175 PHARM REV CODE 636 W HCPCS: Performed by: ORTHOPAEDIC SURGERY

## 2017-12-05 PROCEDURE — 71000039 HC RECOVERY, EACH ADD'L HOUR: Performed by: ORTHOPAEDIC SURGERY

## 2017-12-05 PROCEDURE — S0077 INJECTION, CLINDAMYCIN PHOSP: HCPCS | Performed by: PHYSICIAN ASSISTANT

## 2017-12-05 PROCEDURE — V2790 AMNIOTIC MEMBRANE: HCPCS | Performed by: ORTHOPAEDIC SURGERY

## 2017-12-05 PROCEDURE — 29823 SHO ARTHRS SRG XTNSV DBRDMT: CPT | Mod: 51,LT,, | Performed by: ORTHOPAEDIC SURGERY

## 2017-12-05 PROCEDURE — 27201423 OPTIME MED/SURG SUP & DEVICES STERILE SUPPLY: Performed by: ORTHOPAEDIC SURGERY

## 2017-12-05 PROCEDURE — 37000008 HC ANESTHESIA 1ST 15 MINUTES: Performed by: ORTHOPAEDIC SURGERY

## 2017-12-05 PROCEDURE — 29826 SHO ARTHRS SRG DECOMPRESSION: CPT | Mod: LT,,, | Performed by: ORTHOPAEDIC SURGERY

## 2017-12-05 PROCEDURE — 25000003 PHARM REV CODE 250: Performed by: PHYSICIAN ASSISTANT

## 2017-12-05 PROCEDURE — 25000003 PHARM REV CODE 250: Performed by: ANESTHESIOLOGY

## 2017-12-05 PROCEDURE — 71000016 HC POSTOP RECOV ADDL HR: Performed by: ORTHOPAEDIC SURGERY

## 2017-12-05 PROCEDURE — 37000009 HC ANESTHESIA EA ADD 15 MINS: Performed by: ORTHOPAEDIC SURGERY

## 2017-12-05 PROCEDURE — 29824 SHO ARTHRS SRG DSTL CLAVICLC: CPT | Mod: 51,LT,, | Performed by: ORTHOPAEDIC SURGERY

## 2017-12-05 PROCEDURE — 17999 UNLISTD PX SKN MUC MEMB SUBQ: CPT | Mod: ,,, | Performed by: ORTHOPAEDIC SURGERY

## 2017-12-05 PROCEDURE — 71000033 HC RECOVERY, INTIAL HOUR: Performed by: ORTHOPAEDIC SURGERY

## 2017-12-05 DEVICE — TISSUE MATRIX BIOD RESTORE LG: Type: IMPLANTABLE DEVICE | Site: SHOULDER | Status: FUNCTIONAL

## 2017-12-05 DEVICE — ANCHOR FORKED TIP 7MM PEEI: Type: IMPLANTABLE DEVICE | Site: SHOULDER | Status: FUNCTIONAL

## 2017-12-05 RX ORDER — DEXAMETHASONE SODIUM PHOSPHATE 4 MG/ML
INJECTION, SOLUTION INTRA-ARTICULAR; INTRALESIONAL; INTRAMUSCULAR; INTRAVENOUS; SOFT TISSUE
Status: DISCONTINUED | OUTPATIENT
Start: 2017-12-05 | End: 2017-12-05

## 2017-12-05 RX ORDER — OXYCODONE HCL 10 MG/1
10 TABLET, FILM COATED, EXTENDED RELEASE ORAL
Status: COMPLETED | OUTPATIENT
Start: 2017-12-05 | End: 2017-12-05

## 2017-12-05 RX ORDER — PROPOFOL 10 MG/ML
VIAL (ML) INTRAVENOUS
Status: DISCONTINUED | OUTPATIENT
Start: 2017-12-05 | End: 2017-12-05

## 2017-12-05 RX ORDER — ONDANSETRON 2 MG/ML
4 INJECTION INTRAMUSCULAR; INTRAVENOUS DAILY PRN
Status: DISCONTINUED | OUTPATIENT
Start: 2017-12-05 | End: 2017-12-05 | Stop reason: HOSPADM

## 2017-12-05 RX ORDER — MIDAZOLAM HYDROCHLORIDE 1 MG/ML
2 INJECTION INTRAMUSCULAR; INTRAVENOUS ONCE
Status: COMPLETED | OUTPATIENT
Start: 2017-12-05 | End: 2017-12-05

## 2017-12-05 RX ORDER — FENTANYL CITRATE 50 UG/ML
INJECTION, SOLUTION INTRAMUSCULAR; INTRAVENOUS
Status: DISCONTINUED | OUTPATIENT
Start: 2017-12-05 | End: 2017-12-05

## 2017-12-05 RX ORDER — HYDROMORPHONE HYDROCHLORIDE 2 MG/ML
0.4 INJECTION, SOLUTION INTRAMUSCULAR; INTRAVENOUS; SUBCUTANEOUS EVERY 5 MIN PRN
Status: DISCONTINUED | OUTPATIENT
Start: 2017-12-05 | End: 2017-12-05 | Stop reason: HOSPADM

## 2017-12-05 RX ORDER — SODIUM CHLORIDE, SODIUM LACTATE, POTASSIUM CHLORIDE, CALCIUM CHLORIDE 600; 310; 30; 20 MG/100ML; MG/100ML; MG/100ML; MG/100ML
INJECTION, SOLUTION INTRAVENOUS CONTINUOUS
Status: DISCONTINUED | OUTPATIENT
Start: 2017-12-05 | End: 2017-12-05 | Stop reason: HOSPADM

## 2017-12-05 RX ORDER — MORPHINE SULFATE 10 MG/ML
3 INJECTION INTRAMUSCULAR; INTRAVENOUS; SUBCUTANEOUS
Status: DISCONTINUED | OUTPATIENT
Start: 2017-12-05 | End: 2017-12-05 | Stop reason: HOSPADM

## 2017-12-05 RX ORDER — KETOROLAC TROMETHAMINE 30 MG/ML
INJECTION, SOLUTION INTRAMUSCULAR; INTRAVENOUS
Status: DISCONTINUED | OUTPATIENT
Start: 2017-12-05 | End: 2017-12-05

## 2017-12-05 RX ORDER — PROMETHAZINE HYDROCHLORIDE 25 MG/1
25 TABLET ORAL EVERY 6 HOURS PRN
Status: DISCONTINUED | OUTPATIENT
Start: 2017-12-05 | End: 2017-12-05 | Stop reason: HOSPADM

## 2017-12-05 RX ORDER — FENTANYL CITRATE 50 UG/ML
100 INJECTION, SOLUTION INTRAMUSCULAR; INTRAVENOUS EVERY 5 MIN PRN
Status: COMPLETED | OUTPATIENT
Start: 2017-12-05 | End: 2017-12-05

## 2017-12-05 RX ORDER — GLYCOPYRROLATE 0.2 MG/ML
INJECTION INTRAMUSCULAR; INTRAVENOUS
Status: DISCONTINUED | OUTPATIENT
Start: 2017-12-05 | End: 2017-12-05

## 2017-12-05 RX ORDER — NEOSTIGMINE METHYLSULFATE 1 MG/ML
INJECTION, SOLUTION INTRAVENOUS
Status: DISCONTINUED | OUTPATIENT
Start: 2017-12-05 | End: 2017-12-05

## 2017-12-05 RX ORDER — MIDAZOLAM HYDROCHLORIDE 5 MG/ML
5 INJECTION INTRAMUSCULAR; INTRAVENOUS ONCE AS NEEDED
Status: COMPLETED | OUTPATIENT
Start: 2017-12-05 | End: 2017-12-05

## 2017-12-05 RX ORDER — EPINEPHRINE 1 MG/ML
INJECTION, SOLUTION INTRACARDIAC; INTRAMUSCULAR; INTRAVENOUS; SUBCUTANEOUS
Status: DISCONTINUED | OUTPATIENT
Start: 2017-12-05 | End: 2017-12-05 | Stop reason: HOSPADM

## 2017-12-05 RX ORDER — ROCURONIUM BROMIDE 10 MG/ML
INJECTION, SOLUTION INTRAVENOUS
Status: DISCONTINUED | OUTPATIENT
Start: 2017-12-05 | End: 2017-12-05

## 2017-12-05 RX ORDER — FENTANYL CITRATE 50 UG/ML
25 INJECTION, SOLUTION INTRAMUSCULAR; INTRAVENOUS EVERY 5 MIN PRN
Status: DISCONTINUED | OUTPATIENT
Start: 2017-12-05 | End: 2017-12-05 | Stop reason: HOSPADM

## 2017-12-05 RX ORDER — MEPERIDINE HYDROCHLORIDE 50 MG/ML
12.5 INJECTION INTRAMUSCULAR; INTRAVENOUS; SUBCUTANEOUS ONCE AS NEEDED
Status: DISCONTINUED | OUTPATIENT
Start: 2017-12-05 | End: 2017-12-05 | Stop reason: HOSPADM

## 2017-12-05 RX ORDER — OXYCODONE HYDROCHLORIDE 5 MG/1
10 TABLET ORAL EVERY 4 HOURS PRN
Status: DISCONTINUED | OUTPATIENT
Start: 2017-12-05 | End: 2017-12-05 | Stop reason: HOSPADM

## 2017-12-05 RX ORDER — SODIUM CHLORIDE 0.9 % (FLUSH) 0.9 %
3 SYRINGE (ML) INJECTION
Status: DISCONTINUED | OUTPATIENT
Start: 2017-12-05 | End: 2017-12-05 | Stop reason: HOSPADM

## 2017-12-05 RX ORDER — SODIUM CHLORIDE 0.9 % (FLUSH) 0.9 %
5 SYRINGE (ML) INJECTION
Status: DISCONTINUED | OUTPATIENT
Start: 2017-12-05 | End: 2017-12-05 | Stop reason: HOSPADM

## 2017-12-05 RX ORDER — ONDANSETRON 2 MG/ML
4 INJECTION INTRAMUSCULAR; INTRAVENOUS EVERY 12 HOURS PRN
Status: DISCONTINUED | OUTPATIENT
Start: 2017-12-05 | End: 2017-12-05 | Stop reason: HOSPADM

## 2017-12-05 RX ORDER — PREGABALIN 75 MG/1
75 CAPSULE ORAL
Status: COMPLETED | OUTPATIENT
Start: 2017-12-05 | End: 2017-12-05

## 2017-12-05 RX ORDER — LIDOCAINE HYDROCHLORIDE 10 MG/ML
1 INJECTION, SOLUTION EPIDURAL; INFILTRATION; INTRACAUDAL; PERINEURAL ONCE
Status: DISCONTINUED | OUTPATIENT
Start: 2017-12-05 | End: 2017-12-05 | Stop reason: HOSPADM

## 2017-12-05 RX ORDER — OXYCODONE HYDROCHLORIDE 5 MG/1
5 TABLET ORAL
Status: DISCONTINUED | OUTPATIENT
Start: 2017-12-05 | End: 2017-12-05 | Stop reason: HOSPADM

## 2017-12-05 RX ORDER — ACETAMINOPHEN 10 MG/ML
INJECTION, SOLUTION INTRAVENOUS
Status: DISCONTINUED | OUTPATIENT
Start: 2017-12-05 | End: 2017-12-05

## 2017-12-05 RX ORDER — CLINDAMYCIN PHOSPHATE 900 MG/50ML
900 INJECTION, SOLUTION INTRAVENOUS
Status: COMPLETED | OUTPATIENT
Start: 2017-12-05 | End: 2017-12-05

## 2017-12-05 RX ORDER — ROPIVACAINE HYDROCHLORIDE 5 MG/ML
INJECTION, SOLUTION EPIDURAL; INFILTRATION; PERINEURAL
Status: DISCONTINUED | OUTPATIENT
Start: 2017-12-05 | End: 2017-12-05

## 2017-12-05 RX ORDER — ONDANSETRON HYDROCHLORIDE 2 MG/ML
INJECTION, SOLUTION INTRAMUSCULAR; INTRAVENOUS
Status: DISCONTINUED | OUTPATIENT
Start: 2017-12-05 | End: 2017-12-05

## 2017-12-05 RX ORDER — LIDOCAINE HCL/PF 100 MG/5ML
SYRINGE (ML) INTRAVENOUS
Status: DISCONTINUED | OUTPATIENT
Start: 2017-12-05 | End: 2017-12-05

## 2017-12-05 RX ADMIN — ONDANSETRON 4 MG: 2 INJECTION, SOLUTION INTRAMUSCULAR; INTRAVENOUS at 09:12

## 2017-12-05 RX ADMIN — KETOROLAC TROMETHAMINE 30 MG: 30 INJECTION, SOLUTION INTRAMUSCULAR; INTRAVENOUS at 10:12

## 2017-12-05 RX ADMIN — PREGABALIN 75 MG: 75 CAPSULE ORAL at 07:12

## 2017-12-05 RX ADMIN — FENTANYL CITRATE 50 MCG: 50 INJECTION, SOLUTION INTRAMUSCULAR; INTRAVENOUS at 09:12

## 2017-12-05 RX ADMIN — FENTANYL CITRATE 100 MCG: 50 INJECTION, SOLUTION INTRAMUSCULAR; INTRAVENOUS at 08:12

## 2017-12-05 RX ADMIN — MIDAZOLAM HYDROCHLORIDE 2 MG: 1 INJECTION INTRAMUSCULAR; INTRAVENOUS at 08:12

## 2017-12-05 RX ADMIN — LIDOCAINE HYDROCHLORIDE 100 MG: 20 INJECTION, SOLUTION INTRAVENOUS at 09:12

## 2017-12-05 RX ADMIN — MIDAZOLAM HYDROCHLORIDE 5 MG: 5 INJECTION, SOLUTION INTRAMUSCULAR; INTRAVENOUS at 07:12

## 2017-12-05 RX ADMIN — OXYCODONE HYDROCHLORIDE 10 MG: 10 TABLET, FILM COATED, EXTENDED RELEASE ORAL at 07:12

## 2017-12-05 RX ADMIN — GLYCOPYRROLATE 0.6 MG: 0.2 INJECTION, SOLUTION INTRAMUSCULAR; INTRAVENOUS at 11:12

## 2017-12-05 RX ADMIN — ROCURONIUM BROMIDE 50 MG: 10 INJECTION, SOLUTION INTRAVENOUS at 09:12

## 2017-12-05 RX ADMIN — SODIUM CHLORIDE, SODIUM LACTATE, POTASSIUM CHLORIDE, AND CALCIUM CHLORIDE: 600; 310; 30; 20 INJECTION, SOLUTION INTRAVENOUS at 10:12

## 2017-12-05 RX ADMIN — CARBOXYMETHYLCELLULOSE SODIUM 2 DROP: 2.5 SOLUTION/ DROPS OPHTHALMIC at 09:12

## 2017-12-05 RX ADMIN — ROPIVACAINE HYDROCHLORIDE 30 ML: 5 INJECTION, SOLUTION EPIDURAL; INFILTRATION; PERINEURAL at 08:12

## 2017-12-05 RX ADMIN — DEXAMETHASONE SODIUM PHOSPHATE 8 MG: 4 INJECTION, SOLUTION INTRAMUSCULAR; INTRAVENOUS at 09:12

## 2017-12-05 RX ADMIN — SODIUM CHLORIDE, SODIUM LACTATE, POTASSIUM CHLORIDE, AND CALCIUM CHLORIDE: 600; 310; 30; 20 INJECTION, SOLUTION INTRAVENOUS at 08:12

## 2017-12-05 RX ADMIN — CLINDAMYCIN PHOSPHATE 900 MG: 18 INJECTION, SOLUTION INTRAVENOUS at 09:12

## 2017-12-05 RX ADMIN — FENTANYL CITRATE 50 MCG: 50 INJECTION, SOLUTION INTRAMUSCULAR; INTRAVENOUS at 10:12

## 2017-12-05 RX ADMIN — PROPOFOL 200 MG: 10 INJECTION, EMULSION INTRAVENOUS at 09:12

## 2017-12-05 RX ADMIN — ACETAMINOPHEN 1000 MG: 10 INJECTION, SOLUTION INTRAVENOUS at 09:12

## 2017-12-05 RX ADMIN — ONDANSETRON 4 MG: 2 INJECTION INTRAMUSCULAR; INTRAVENOUS at 01:12

## 2017-12-05 RX ADMIN — NEOSTIGMINE METHYLSULFATE 4 MG: 1 INJECTION INTRAVENOUS at 11:12

## 2017-12-05 NOTE — TRANSFER OF CARE
"Anesthesia Transfer of Care Note    Patient: Sal Navarro    Procedure(s) Performed: Procedure(s) (LRB):  REPAIR-ROTATOR CUFF (Left)  DEBRIDEMENT-SHOULDER-ARTHROSCOPIC (Left)  REPAIR-TENDON-BICEP (Left)  ARTHROSCOPY-SHOULDER EXCISION DISTAL CLAVICLE (Left)  LYSIS-ADHESION (Left)  INJECTION-STEROID- arthrscopic assisted Bio D injection to the left shoulder (Left)  MICROFRACTURE (Left)    Patient location: PACU    Anesthesia Type: general    Transport from OR: Transported from OR on 2-3 L/min O2 by NC with adequate spontaneous ventilation    Post pain: adequate analgesia    Post assessment: no apparent anesthetic complications    Post vital signs: stable    Level of consciousness: awake    Nausea/Vomiting: no nausea/vomiting    Complications: none    Transfer of care protocol was followed      Last vitals:   Visit Vitals  /72 (BP Location: Right arm, Patient Position: Lying)   Pulse 77   Temp 36.4 °C (97.5 °F) (Oral)   Resp 18   Ht 6' 1" (1.854 m)   Wt 104.3 kg (230 lb)   SpO2 98%   BMI 30.34 kg/m²     "

## 2017-12-05 NOTE — H&P (VIEW-ONLY)
"Sal Navarro  is here for a completion of his perioperative paperwork. he  Is scheduled to undergo     left              a. Shoulder arthroscopic rotator cuff debridement vs. repair              b. Shoulder arthroscopic SAD              c. Shoulder arthroscopic extensive debridement              d. Shoulder arthroscopic biceps tenodesis               e. Shoulder arthroscopic-assisted Bio-D arthrocentesis              f. Shoulder arthroscopic possible microfracture              g. Shoulder arthroscopic possible lysis of adhesions              h. Shoulder arthroscopic DCE     on 12/5/17.  He is a healthy individual and does need clearance for this procedure.  Per patient's NP with [facility], "he is medically cleared to proceed with shoulder surgery."    Risks, indications and benefits of the surgical procedure were discussed with the patient. All questions with regard to surgery, rehab, expected return to functional activities, activities of daily living and recreational endeavors were answered to his satisfaction.    Once no other questions were asked, a brief history and physical exam was then performed.      PHYSICAL EXAM:  GEN: A&Ox3, WD WN NAD  HEENT: WNL  CHEST: CTAB, no W/R/R  HEART: RRR, no M/R/G  ABD: Soft, NT ND, BS x4 QUADS  [place]; See Epic  NEURO: CN II-XII intact       The surgical consent was then reviewed with the patient, who agreed with all the contents of the consent form and it was signed. he was then given the [facility] surgery packet to bring with him to [facility] for the anesthesia portion of his perioperative paperwork.     PHYSICAL THERAPY:  He was also instructed regarding physical therapy and will begin when Dr. Sandra glaserems appropriate. He was given a copy of the original prescription to schedule. Another copy of this prescription was also faxed to [facility] in [place], [place].    POST OP CARE:instructions were reviewed including care of the wound and dressing after " surgery and when he can shower.     PAIN MANAGEMENT: Sal Navarro was also given his pain management regime, which includes the Zynex. Patient has already been contacted by the company and they are sending him one. He was also instructed regarding the Polar ice unit that will be in place after surgery and his postoperative pain medications.     PAIN MEDICATION:  Percocet 10/325mg 1 po q 4-6 hours prn pain  Ultram 50 mg one p.o. q.4-6 hours p.r.n. breakthrough pain,   Phenergan 25 mg one p.o. q.4-6 hours p.r.n. nausea and vomiting.    Patient was instructed to purchase and take Colace 100mg BID PRN to counter possible GI side effects of taking opiates.     DVT prophylaxis was discussed with the patient today including risk factors for developing DVTs and history of DVTs. The patient was asked if any specific recommendations were given from the doctor/s that did pre-operative surgical clearance.  He has no prior history of DVTs.    The patient was told that narcotic pain medications may make them drowsy and instructions were given to not sign legal documents, drive or operate heavy machinery, cars, or equipment while under the influence of narcotic medications.     As there were no other questions to be asked, he was given my business card along with Randi Flores MD business card if he has any questions or concerns prior to surgery or in the postop period.

## 2017-12-05 NOTE — DISCHARGE INSTRUCTIONS

## 2017-12-05 NOTE — PLAN OF CARE
Sal Navarro has met all discharge criteria from Phase II. Vital Signs are stable, ambulating  without difficulty. Nausea has now subsided. Discharge instructions given, patient verbalized understanding. Discharged from facility via wheelchair in stable condition.

## 2017-12-05 NOTE — ANESTHESIA PROCEDURE NOTES
Left interscalene block    Patient location during procedure: holding area   Block not for primary anesthetic.  Reason for block: at surgeon's request and post-op pain management   Post-op Pain Location: Shoulder pain  Start time: 12/5/2017 8:06 AM  Timeout: 12/5/2017 8:05 AM   End time: 12/5/2017 8:14 AM  Surgery related to: Shoulder arthroscopy/repair  Staffing  Anesthesiologist: RUMA SHAH  Performed: anesthesiologist   Preanesthetic Checklist  Completed: patient identified, site marked, surgical consent, pre-op evaluation, timeout performed, IV checked, risks and benefits discussed and monitors and equipment checked  Peripheral Block  Patient position: supine  Prep: ChloraPrep and site prepped and draped  Patient monitoring: heart rate, cardiac monitor, continuous pulse ox and frequent blood pressure checks  Block type: interscalene  Laterality: left  Injection technique: single shot  Needle  Needle type: Stimuplex   Needle gauge: 21 G  Needle length: 3.5 in  Needle localization: anatomical landmarks, nerve stimulator and ultrasound guidance   -ultrasound image captured on disc.  Assessment  Injection assessment: negative aspiration, negative parasthesia and local visualized surrounding nerve  Paresthesia pain: none  Heart rate change: no  Slow fractionated injection: yes  Medications:  Bolus administered: 30 mL of 0.5 ropivacaine  Epinephrine added: none

## 2017-12-05 NOTE — TELEPHONE ENCOUNTER
Physical therapy to start 4 weeks from today for left shoulder rotator cuff repair surgery on 12/5/17. Pt to be with either [facility] or [facility] per Dr. Flores. Order placed today.

## 2017-12-05 NOTE — ANESTHESIA POSTPROCEDURE EVALUATION
"Anesthesia Post Evaluation    Patient: Sal Navarro    Procedure(s) Performed: Procedure(s) (LRB):  REPAIR-ROTATOR CUFF (Left)  DEBRIDEMENT-SHOULDER-ARTHROSCOPIC (Left)  REPAIR-TENDON-BICEP (Left)  ARTHROSCOPY-SHOULDER EXCISION DISTAL CLAVICLE (Left)  LYSIS-ADHESION (Left)  INJECTION-STEROID- arthrscopic assisted Bio D injection to the left shoulder (Left)  MICROFRACTURE (Left)    Final Anesthesia Type: general  Patient location during evaluation: PACU  Patient participation: Yes- Able to Participate  Level of consciousness: awake and alert  Post-procedure vital signs: reviewed and stable  Pain management: adequate  Airway patency: patent  PONV status at discharge: No PONV  Anesthetic complications: no      Cardiovascular status: blood pressure returned to baseline  Respiratory status: unassisted  Hydration status: euvolemic  Follow-up not needed.        Visit Vitals  /77   Pulse 66   Temp 36.9 °C (98.5 °F) (Oral)   Resp 16   Ht 6' 1" (1.854 m)   Wt 104.3 kg (230 lb)   SpO2 99%   BMI 30.34 kg/m²       Pain/Rosemary Score: Pain Assessment Performed: Yes (12/5/2017 12:15 PM)  Presence of Pain: denies (12/5/2017 12:15 PM)  Pain Rating Prior to Med Admin: 4 (12/5/2017  7:17 AM)  Rosemary Score: 8 (12/5/2017 12:15 PM)      "

## 2017-12-05 NOTE — OP NOTE
DATE OF PROCEDURE: 12/05/2017    SURGEON: Randi Flores M.D.    ASSISTANT: AIMEE Singh MD PGY1  ASSISTANT: SLICK Landaverde PA-C      PREOPERATIVE DIAGNOSES:   left  1. Shoulder rotator cuff tear, far medial  2. Shoulder biceps tendonitis  3. Shoulder synovitis  4. Shoulder SLAP  5. Shoulder impingement, bursitis  6. Shoulder AC arthritis    POSTOPERATIVE DIAGNOSES:   left  1. Shoulder rotator cuff tear   2. Shoulder biceps tendonitis  3. Shoulder synovitis  4. Shoulder SLAP  5. Shoulder impingement, bursitis  6. Shoulder AC arthritis  7. Shoulder adhesions    OPERATION:   left  1. Shoulder arthroscopic rotator cuff repair (10 x 15 mm, far medial margin convergence x 2) (CPT 01496) (complex, -22 modifier)  2. Shoulder subpectoral biceps tenodesis, arthroscopic-assisted (CPT 04687)  3. Shoulder arthroscopic subacromial decompression, bursectomy (CPT 86770)  4. Shoulder arthroscopic extensive debridement (anterior, posterior glenohumeral joint, subacromial space) (CPT 72184)  5. Shoulder arthroscopic-assisted arthrocentesis of viable structural human allograft tissue matrix (BioDRestore) (CPT 39233)   6. Shoulder arthroscopic lysis of adhesions (CPT 67853)  7. Shoulder arthroscopic distal clavicle excision (CPT 97772)    ANESTHESIA:  General with interscalene block    ESTIMATED BLOOD LOSS:  Minimal.    TOURNIQUET TIME:  None.    DRAINS:  None.    COMPLICATIONS:  None.  The patient was moved to the recovery room in stable condition with compartments soft and cap refill less than a second in all digits.    INDICATIONS/MEDICAL NECESSITY: The patient is a 52 y.o. year-old male who has history and physical examination findings consistent with the above.  Nonoperative versus operative options were discussed.  The risks and benefits were discussed with the patient.  The patient acknowledged understanding and wished to proceed with operative intervention.  Informed consent was obtained prior to the procedure.  Reasonable expectations  and potential complications were discussed and acknowledged, including but not limited to infection, bleeding, blood clots, (DVT and/or PE), nerve injury, tear, instability, continued pain and stiffness.  They agreed and understood and wished to proceed.    EXAMINATION UNDER ANESTHESIA of the left SHOULDER: Forward elevation 175 degrees, External rotation at 0 60 degrees, External rotation at 90 90 degrees, Internal rotation at 90 60 degrees.  Translation testing: anterior grade 1, posterior grade 1, sulcus sign grade 1 corrects to 0 on external rotation.    EXAMINATION UNDER ANESTHESIA of the right SHOULDER: Forward elevation 175 degrees, External rotation at 0 60 degrees, External rotation at 90 90 degrees, Internal rotation at 90 60 degrees.  Translation testing: anterior grade 1, posterior grade 1, sulcus sign grade 1 corrects to 0 on external rotation.    PROCEDURE IN DETAIL:  After the correct operative site was marked by the operating surgeon, an interscalene block was administered by the anesthesia team.  The patient was then taken to the operating room and placed supine on the operating room table, where the patient  underwent general anesthesia by the anesthesia team.  The patient was then rolled into the lateral decubitus position with the operative side up.  A well-padded axillary roll, beanbag and pillows were placed.  All pressure points were carefully padded and checked.  The upper extremities and both lower extremities were placed in comfortable positions and were also well-padded.  The operative upper extremity was then prepped and draped in the usual sterile fashion.     The Spider arm positioner was implemented with balanced suspension and appropriate landmarks were noted on the skin.  A posterior followed by pramod-superior portals were created and systematic examination of the joint revealed the following:      There was no evidence of any significant chondral lesions to the glenoid or humeral  head.     Tenosynovitis was noted to the biceps tendon on ramp sign.    There was some synovitis in the labrum that was debrided as needed.  #1 PDS was placed in biceps tendon in a horizontal mattress fashion.  Biceps tendon was then released at the level of the labrum.  The biceps tendon stump was then debrided down to a stable smooth edge to the level of the labrum. Attention was then turned to the rotator cuff.  The rotator cuff undersurface was then visualized and debrided as needed using a shaver.      Attention was then turned to the subacromial space where a significant hypertrophic bursa was encountered.  The bursa itself was thickened and hypertrophic.  A lateral portal was created to assist with the bursectomy.  Subacromial decompression was completed using three-portal technique in the standard fashion with a 4.5 mm lowell without difficulty.  The anterior osteophyte was flattened.  Confirmation of adequate resection was confirmed while viewing from the lateral portal.      Next, attention was then turned to the distal clavicle excision.  A thermal device was used to clear the soft tissue off the length of the AC joint approximately 1 cm medial to the end of the distal end of the clavicle.  The anterior portal which was established earlier was used to perform the AC resection at the level of the AC joint.  The adequacy of the resection was confirmed while viewing from the anterior portal.  Care was taken to resect enough postero-superiorly.  Approximately 9 mm was resected.     The surface of the rotator cuff was then gently debrided and mobilized.  We did this using a shaver while viewing through the posterior portal and the shaver used through the lateral portal.  We were then able to mobilize the cuff tear which was located at the far-medial aspect of the infraspinatus.  The cuff was able to be mobilized adequately.  The cuff tear dimensions were: 15 mm AP and 10 mm medial to lateral.  The undersurface  edge of the cuff was debrided as needed using the shaver.  A 7 mm cannula was placed in the lateral and pramod-superior portals.      Shoulder arthroscopic lysis of adhesions (CPT 64082):  With the arthroscope in the subacromial space, an extensive lysis of adhesions needed to be performed with lysis of adhesions in the lateral gutter, posterior gutter, and anterior gutter where there was extensive scarring from the chronic nature of the rotator cuff tear.  The rotator cuff was retracted and there was a medium tear noted.  After the lysis of adhesions, the mobility was restored to the rotator cuff tissue and shoulder.    2 margin convergence sutures were placed and tied in standard fashion to second torn area of cuff tissue after edges were debrided to tear, medial to lateral tear margin.    Attention was then turned to the subpectoral biceps tenodesis.  An arthroscopic-assisted longitudinal incision was made at the junction of pectoralis tendon extending distally.  Blunt dissection proceeded, neurovascular structures, including but limited to the musculocutaneous nerve and brachial plexus were protected, and the biceps tendon was localized.  The biceps tendon was localized next to the humerus.  Care was taken and gentle retraction was utilized.  The proximal 3 cm of biceps tendon was removed and the biceps tendon was whipped stitched at the appropriate tension level.     Guidepin was placed and a 7 mm reamer was placed to ream the anterior cortex anteriorly.  Arthrex Swivelock, 7x19.5 mm screw, was placed with the captured biceps tendon.  It had good purchase and was inserted flush with the humeral cortex.  Fixation was solid and tension was appropriate. Arthroscopic pictures were taken. The incision site was then irrigated copiously and gently and the skin was closed using 3-0 Vicryl subcutaneous suture in an interrupted fashion.  The skin was closed using a running 4-0 Monocryl suture, reapproximating the skin  edges nicely.                   Viable structural human allograft tissue matrix (BioDRestore) was injected into cuff repair and subacromial space under direct arthroscopic visualization after irrigation, as described below, was completed.            The shoulder and subacromial space were then irrigated and fluid was extravasated using suction. All portals were reapproximated using inverted 4-0 Monocryl suture in the subcutaneous tissue of the portals.  Mastisol and Steri-strips were placed with xeroform, 4x4s, abd pad, and Medi-pore tape.  TENS unit pads were placed which were medically necessary for pain relief.  An iceman was secured in the shoulder ley.  A sling with an abduction pillow was secured.  The patient was then moved to supine, extubated and taken to the recovery room where the patient arrived in stable condition with the compartments of the arm and forearm soft and cap refill less than a second in all digits.     POSTOPERATIVE PLAN: We will follow the arthroscopic rotator cuff repair guidelines for a medium size rotator cuff tear.  We discussed with the patient's family after surgery.  The patient will remain in a sling for 6 weeks.  PT to start at 4 weeks.    Quality of tissue: fair     Quality of the repair: fair-good      Size of tear: 10 x 15 mm

## 2017-12-05 NOTE — BRIEF OP NOTE
[facility]  Brief Operative Note     SUMMARY     Surgery Date: 12/5/2017     Surgeon(s) and Role:     * Randi Flores MD - Primary    Assisting Surgeon: None    Pre-op Diagnosis:  Bicipital tenosynovitis, unspecified laterality [M75.20]  Open dislocation of shoulder region, unspecified laterality, subsequent encounter [S43.006D, S41.009D]  Rotator cuff syndrome, unspecified laterality [M75.100]    Post-op Diagnosis:  Post-Op Diagnosis Codes:     * Bicipital tenosynovitis, unspecified laterality [M75.20]     * Open dislocation of shoulder region, unspecified laterality, subsequent encounter [S43.006D, S41.009D]     * Rotator cuff syndrome, unspecified laterality [M75.100]    Procedure(s) (LRB):  REPAIR-ROTATOR CUFF (Left)  DEBRIDEMENT-SHOULDER-ARTHROSCOPIC (Left)  REPAIR-TENDON-BICEP (Left)  ARTHROSCOPY-SHOULDER EXCISION DISTAL CLAVICLE (Left)  LYSIS-ADHESION (Left)  INJECTION-STEROID- arthrscopic assisted Bio D injection to the left shoulder (Left)  MICROFRACTURE (Left)    Anesthesia: General    Description of the findings of the procedure: see op note    Findings/Key Components: see op note    Estimated Blood Loss: * No values recorded between 12/5/2017  9:54 AM and 12/5/2017 11:47 AM *         Specimens:   Specimen (12h ago through future)    None          Discharge Note    SUMMARY     Admit Date: 12/5/2017    Discharge Date and Time:  12/05/2017 11:47 AM    Hospital Course (synopsis of major diagnoses, care, treatment, and services provided during the course of the hospital stay): Patient presented for above procedure.  Tolerated it well and was discharged home POD0 after voiding, tolerating diet, ambulating, pain controlled.  Discharge instructions, follow-up appointment, and med rec are below.       Final Diagnosis: Post-Op Diagnosis Codes:     * Bicipital tenosynovitis, unspecified laterality [M75.20]     * Open dislocation of shoulder region, unspecified laterality, subsequent encounter  [S43.006D, S41.009D]     * Rotator cuff syndrome, unspecified laterality [M75.100]    Disposition: Home or Self Care    Follow Up/Patient Instructions:     Medications:  Reconciled Home Medications:   Current Discharge Medication List      CONTINUE these medications which have NOT CHANGED    Details   amitriptyline (ELAVIL) 25 MG tablet Take 2 tablets (50 mg total) by mouth every evening.  Qty: 60 tablet, Refills: 5    Associated Diagnoses: DDD (degenerative disc disease), cervical; S/P cervical spinal fusion; Arthropathy of cervical facet joint; Cervical radiculitis      hydrocodone-acetaminophen 7.5-325mg (NORCO) 7.5-325 mg per tablet Take 1 tablet by mouth every 6 (six) hours as needed for Pain.      levothyroxine (SYNTHROID) 50 MCG tablet Take 50 mcg by mouth once daily.      meclizine (ANTIVERT) 25 mg tablet Take 25 mg by mouth.      melatonin 5 mg Tab Take by mouth.      methocarbamol (ROBAXIN) 500 MG Tab Take 500 mg by mouth 3 (three) times daily.      naloxegol (MOVANTIK) tablet Take 25 mg by mouth once daily.  Qty: 30 tablet, Refills: 5    Associated Diagnoses: Therapeutic opioid induced constipation      omeprazole (PRILOSEC) 20 MG capsule Take 20 mg by mouth once daily.  Refills: 1      red yeast rice 600 mg Cap Take by mouth.      valsartan (DIOVAN) 80 MG tablet Take 40 mg by mouth once daily.   Refills: 1      acetaminophen (TYLENOL) 500 MG tablet Take 500 mg by mouth every 6 (six) hours as needed for Pain.      co-enzyme Q-10 30 mg capsule Take 30 mg by mouth once daily.      fish oil-omega-3 fatty acids 300-1,000 mg capsule Take 2 g by mouth once daily.      FOLIC ACID/MULTIVIT-MIN/LUTEIN (CENTRUM SILVER ORAL) Take by mouth once daily.      meloxicam (MOBIC) 15 MG tablet Take 15 mg by mouth daily      oxyCODONE-acetaminophen (PERCOCET)  mg per tablet Take 1 tablet by mouth every 6 (six) hours as needed for Pain.  Qty: 60 tablet, Refills: 0      promethazine (PHENERGAN) 25 MG tablet Take 1 tablet  (25 mg total) by mouth every 6 (six) hours as needed for Nausea.  Qty: 40 tablet, Refills: 0      traMADol (ULTRAM) 50 mg tablet Take 1 tablet (50 mg total) by mouth every 6 (six) hours as needed for Pain.  Qty: 40 tablet, Refills: 0             Discharge Procedure Orders  Diet general     Shower on day dressing removed (No bath)     Ice to affected area     Lifting restrictions   Order Comments: As instructed on post-op discharge sheet     No driving, operating heavy equipment or signing legal documents while taking pain medication.     Call MD for:  temperature >100.4     Call MD for:  persistent nausea and vomiting     Call MD for:  severe uncontrolled pain     Call MD for:  difficulty breathing, headache or visual disturbances     Call MD for:  redness, tenderness, or signs of infection (pain, swelling, redness, odor or green/yellow discharge around incision site)     Call MD for:  hives     Call MD for:  persistent dizziness or light-headedness     Call MD for:  extreme fatigue     Remove dressing in 72 hours       Follow-up Information     Randi Flores MD In 2 weeks.    Specialties:  Sports Medicine, Orthopedic Surgery  Contact information:  [address] [id number] [phone]

## 2017-12-19 ENCOUNTER — TELEPHONE (OUTPATIENT)
Dept: SPORTS MEDICINE | Facility: CLINIC | Age: 52
End: 2017-12-19

## 2017-12-19 NOTE — TELEPHONE ENCOUNTER
Left message for patient to see what his plans were for physical therapy which Dr. Flores would like to start in 2 weeks from today. He has a PT order in place at [facility] but the patient lives in [place] and does not have anything scheduled yet. Called him twice and left 2 messages.

## 2017-12-20 ENCOUNTER — OFFICE VISIT (OUTPATIENT)
Dept: SPORTS MEDICINE | Facility: CLINIC | Age: 52
End: 2017-12-20
Payer: COMMERCIAL

## 2017-12-20 VITALS
BODY MASS INDEX: 30.48 KG/M2 | DIASTOLIC BLOOD PRESSURE: 100 MMHG | HEART RATE: 74 BPM | HEIGHT: 73 IN | WEIGHT: 230 LBS | SYSTOLIC BLOOD PRESSURE: 147 MMHG

## 2017-12-20 DIAGNOSIS — Z98.890 STATUS POST ARTHROSCOPY OF SHOULDER: Primary | ICD-10-CM

## 2017-12-20 PROCEDURE — 99999 PR PBB SHADOW E&M-EST. PATIENT-LVL III: CPT | Mod: PBBFAC,,, | Performed by: ORTHOPAEDIC SURGERY

## 2017-12-20 PROCEDURE — 99024 POSTOP FOLLOW-UP VISIT: CPT | Mod: S$GLB,,, | Performed by: ORTHOPAEDIC SURGERY

## 2017-12-20 NOTE — PROGRESS NOTES
CC right shoulder pain    HISTORY OF PRESENT ILLNESS:   Pt is here today for first post-operative followup of his shoulder arthroscopy.  he is doing well.  We have reviewed his findings and discussed plan of care and future treatment options.         He is still taking his pain medication 2-3 times a day   Pain today is 5/10  He notes that he had prior neck surgery and that he has increased neck pain from his sling 7/10    DATE OF PROCEDURE: 12/05/2017  OPERATION:   left  1. Shoulder arthroscopic rotator cuff repair (10 x 15 mm, far medial margin convergence x 2) (CPT 06243) (complex, -22 modifier)  2. Shoulder subpectoral biceps tenodesis, arthroscopic-assisted (CPT 13524)  3. Shoulder arthroscopic subacromial decompression, bursectomy (CPT 38648)  4. Shoulder arthroscopic extensive debridement (anterior, posterior glenohumeral joint, subacromial space) (CPT 46496)  5. Shoulder arthroscopic-assisted arthrocentesis of viable structural human allograft tissue matrix (BioDRestore) (CPT 49557)           6. Shoulder arthroscopic lysis of adhesions (CPT 22934)  7. Shoulder arthroscopic distal clavicle excision (CPT 64855)                                                                            PHYSICAL EXAMINATION:     Incision sites healed well  No evidence of any erythema, infection or induration  elbow Range of motion full   Minimal effusion  2+ DP pulse  No swelling                                                                               ASSESSMENT:                                                                                                                                               1. Status post above, doing well.                                                                                                                               PLAN:                                                                                                                                                     1.  Begin PT  in 2 weeks  2. Emphasized scapular function.  3. I have discussed return to activity in detail.  4. he will see us back in 6 weeks.                                      5. All questions were answered and he should contact us if he  has any questions or concerns in the interim.              POSTOPERATIVE PLAN: We will follow the arthroscopic rotator cuff repair guidelines for a medium size rotator cuff tear.  We discussed with the patient's family after surgery.  The patient will remain in a sling for 6 weeks.  PT to start at 4 weeks.

## 2018-01-31 ENCOUNTER — OFFICE VISIT (OUTPATIENT)
Dept: SPORTS MEDICINE | Facility: CLINIC | Age: 53
End: 2018-01-31
Payer: COMMERCIAL

## 2018-01-31 VITALS
DIASTOLIC BLOOD PRESSURE: 93 MMHG | WEIGHT: 230 LBS | HEIGHT: 73 IN | SYSTOLIC BLOOD PRESSURE: 150 MMHG | BODY MASS INDEX: 30.48 KG/M2 | HEART RATE: 95 BPM

## 2018-01-31 DIAGNOSIS — Z98.890 STATUS POST ARTHROSCOPY OF SHOULDER: Primary | ICD-10-CM

## 2018-01-31 DIAGNOSIS — M25.512 PAIN IN JOINT OF LEFT SHOULDER: ICD-10-CM

## 2018-01-31 PROCEDURE — 99024 POSTOP FOLLOW-UP VISIT: CPT | Mod: S$GLB,,, | Performed by: ORTHOPAEDIC SURGERY

## 2018-01-31 PROCEDURE — 99999 PR PBB SHADOW E&M-EST. PATIENT-LVL IV: CPT | Mod: PBBFAC,,, | Performed by: ORTHOPAEDIC SURGERY

## 2018-01-31 NOTE — PROGRESS NOTES
CC right shoulder pain    HISTORY OF PRESENT ILLNESS:   Pt is here today for post-operative followup of his shoulder arthroscopy.  he is doing well.  We have reviewed his findings and discussed plan of care and future treatment options.         [facility] in [facility]  Pain today is 4/10 currently     He notes that since discontinuing his sling his neck pain has improved some     DATE OF PROCEDURE: 12/05/2017  OPERATION:   left  1. Shoulder arthroscopic rotator cuff repair (10 x 15 mm, far medial margin convergence x 2) (CPT 58304) (complex, -22 modifier)  2. Shoulder subpectoral biceps tenodesis, arthroscopic-assisted (CPT 62471)  3. Shoulder arthroscopic subacromial decompression, bursectomy (CPT 28196)  4. Shoulder arthroscopic extensive debridement (anterior, posterior glenohumeral joint, subacromial space) (CPT 17106)  5. Shoulder arthroscopic-assisted arthrocentesis of viable structural human allograft tissue matrix (BioDRestore) (CPT 07386)           6. Shoulder arthroscopic lysis of adhesions (CPT 81947)  7. Shoulder arthroscopic distal clavicle excision (CPT 45825)                                                                            PHYSICAL EXAMINATION:     Incision sites healed well  No evidence of any erythema, infection or induration  elbow Range of motion full   Minimal effusion  2+ DP pulse  No swelling         + AC joint tenderness   5/5 at 0 and 30        Forward Flexion: 140  ER: 30  IR: L4                                                                      ASSESSMENT:                                                                                                                                               1. Status post above, doing well.                                                                                                                               PLAN:                                                                                                                                                      1. Continue PT   2. Emphasized scapular function.  3. I have discussed return to activity in detail.  4. he will see us back in 6 weeks.                                      5. All questions were answered and he should contact us if he  has any questions or concerns in the interim.              POSTOPERATIVE PLAN: We will follow the arthroscopic rotator cuff repair guidelines for a medium size rotator cuff tear.  We discussed with the patient's family after surgery.  The patient will remain in a sling for 6 weeks.  PT to start at 4 weeks.

## 2018-02-06 ENCOUNTER — PATIENT MESSAGE (OUTPATIENT)
Dept: SPORTS MEDICINE | Facility: CLINIC | Age: 53
End: 2018-02-06

## 2018-02-21 ENCOUNTER — OFFICE VISIT (OUTPATIENT)
Dept: PAIN MEDICINE | Facility: CLINIC | Age: 53
End: 2018-02-21
Attending: ANESTHESIOLOGY
Payer: COMMERCIAL

## 2018-02-21 VITALS
BODY MASS INDEX: 31.26 KG/M2 | HEART RATE: 82 BPM | WEIGHT: 235.88 LBS | HEIGHT: 73 IN | DIASTOLIC BLOOD PRESSURE: 103 MMHG | SYSTOLIC BLOOD PRESSURE: 154 MMHG | TEMPERATURE: 98 F

## 2018-02-21 DIAGNOSIS — F11.90 CHRONIC, CONTINUOUS USE OF OPIOIDS: ICD-10-CM

## 2018-02-21 DIAGNOSIS — M50.30 DDD (DEGENERATIVE DISC DISEASE), CERVICAL: ICD-10-CM

## 2018-02-21 DIAGNOSIS — Z98.1 S/P CERVICAL SPINAL FUSION: ICD-10-CM

## 2018-02-21 DIAGNOSIS — M51.36 DDD (DEGENERATIVE DISC DISEASE), LUMBAR: ICD-10-CM

## 2018-02-21 DIAGNOSIS — M62.50 DISUSE MUSCLE ATROPHY: ICD-10-CM

## 2018-02-21 DIAGNOSIS — M47.816 FACET ARTHRITIS, DEGENERATIVE, LUMBAR SPINE: ICD-10-CM

## 2018-02-21 DIAGNOSIS — G89.4 CHRONIC PAIN DISORDER: Primary | ICD-10-CM

## 2018-02-21 DIAGNOSIS — M62.838 CERVICAL PARASPINAL MUSCLE SPASM: ICD-10-CM

## 2018-02-21 DIAGNOSIS — M47.812 FACET ARTHRITIS OF CERVICAL REGION: ICD-10-CM

## 2018-02-21 PROCEDURE — 99214 OFFICE O/P EST MOD 30 MIN: CPT | Mod: S$GLB,,, | Performed by: ANESTHESIOLOGY

## 2018-02-21 PROCEDURE — 3008F BODY MASS INDEX DOCD: CPT | Mod: S$GLB,,, | Performed by: ANESTHESIOLOGY

## 2018-02-21 PROCEDURE — 99999 PR PBB SHADOW E&M-EST. PATIENT-LVL III: CPT | Mod: PBBFAC,,, | Performed by: ANESTHESIOLOGY

## 2018-02-21 NOTE — PROGRESS NOTES
Subjective:     Patient ID: Sal Navarro is a 53 y.o. male.    Chief Complaint: No chief complaint on file.    Consulted by: No ref. provider found     Disclaimer: This note was generated using voice recognition software.  There may be a typographical errors that were missed during proofreading.      HPI:    Sal Navarro is a 53 y.o. male who presents today s/p C4-7 ACDF with C5/6 PSIF in 2/2016 with residual chronic midline neck pain that radiates into his shoulder blades bilaterally, R>>L.  This is associated with bilateral hand numbness and tingling.  The hand symptoms did improve following the surgery.  The shooting pains in his arms resolved completely.   This pain is described in detail below.  His post-operative course was complicated by dysphagia that is being treated with speech therapy.    Interval History (9/23/2016):  He returns today for follow up.  He reports that the MELANIE was not helpful for the pain.  He hasn't noticed a difference with the amitriptyline.  He does notice a difference in his low back pain when he skips the meloxicam    Interval History (11/17/2016):  He returns today for follow up.  He reports that cervical RFAs were not very helpful for the pain. He is no longer taking Percocet or Tamazepam. He continues to take Mobic 15mg daily for his low back pain. He is taking elavil 25mg nightly without side effects. He is open to considering SCS. He reports poor sleep at night.    Interval History (1/3/2017):  The patient returns today for follow up of neck and shoulder pain.  His pain is located to his neck and surrounding areas.  He previously had limited benefit with RFAs, although he did have short term benefit with MBB.  He also had limited benefit with NGUYEN.  Dr. Montoya discussed cervical SCS trial with the patient, although he reports that he does not wish to pursue this option at this time.  He is scheduled to f/u with Dr. Fowler on 1/24/16.  He continues to participate in  PT.  He did previously have some benefit with a TENS unit at PT.  He is reporting some relief with Tramadol.  He also takes Zanaflex which helps but is very sedating.      Interval History (3/3/2017):  The patient returns for follow up of neck and shoulder pain. He continues to take Tramadol BID with benefit. The medication decreases his pain slightly. He also reports benefit with Robaxin. He continues to take Mobic with benefit. He recently stopped physical therapy and reports increased pain into his shoulders, despite a home exercise plan.     Interval History (5/1/2017):  He returns today for follow up.  He reports that these RFAs were more helpful than before, L more so than right, but now the pain is again returning.  Today, he also complains of left shoulder pain that has been gradually worsening over the past few months. He associated this with modified activity due to his neck.  Pain is worse with extension of arm and lifting.  Tramadol, Robaxin, Meloxicam, and amitriptyline have been helpful for the pain.  He continues to have GI upset, but has just started prilosec.  When he tried stopping the meloxicam, his pain increased dramatically.  The tramadol takes the edge off.  Robaxin helps, but he is out.    Of note, he recently had an endoscopy that showed stage 3 gastritis, for which he was started on Prilosec    Interval History (6/21/2017):  He returns today for follow up.  He reports that the shoulder injection provided one week of relief.  The Pennsaid has been helpful for the pain.  The RFA has helped with shooting pain into his back.  He is doing his HEP.  He has not been back to PT because of insurance issues.  His shoulder continues to present significant hindrances to his daily activities.    Interval History (8/21/2017):  He returns today for follow up.  He reports that the tramadol is not as helpful as it used to be.  In addition, he is experiencing constipation and urinary retention that only  "resolves with skipping a dose of tramadol. He is going to PT for his neck/shoulder.    Interval History (10/18/2017):  He returns today for follow up.  He reports that Norco has been more helpful for the pain than the tramadol with fewer side effects.  He is averaging about 2-4 per day.  His back is doing much better, though his neck is still greatly limiting him in his daily tasks.  He reports a new pain that began with a "pop" followed by a sharp pain radiating down his back followed by a soreness.  This happened a few weeks ago on the left side of his low back above where we did the RFA.  This is resolving, but it is still sore.    Interval History (11/29/2017):  He returns today for follow up. He is scheduled for right shoulder surgery on 12/5/17 and is here today for lemuel-operative planning. Biggest pain today is in his left shoulder. After his long car ride here, he states his neck has started to bother him as well. Both pains today are roughly 5/10. Taking Norco 7.5/325 mg 1-2 tablets daily which he states takes the edge off of his pain. He has decreased his dosing from 3-4 per day due to side effects of constipation. States he is not taking the movantik on a regular basis-does not want to keep adding medications. Last took it over a week ago. Still taking mobic, robaxin with mild relief. Takes tylenol occasionally with mild relief. Also used hemp oil with mild relief. Still doing a HEP on a regular basis. States his lower back is gradually getting worse. Pain primarily on the left side. Had trigger point injections at last visit which provided 30-40% reduction in his pain for for 1-2 weeks     Interval History (2/21/2018):  He returns today for follow up.  He reports that his shoulder is improving since his surgery.  His neck is doing about the same.  His low back pain has returned, and it is slightly higher than it was before.  He was recently started on a low dose of gabapentin    Physical Therapy: Yes- " outside facility in [place].    Non-pharmacologic Treatment: Ice and heat provide mild benefit.           · TENS? Yes at PT with benefit.    Pain Medications:         · Currently taking: Gabapentin 100 mg QHS, Amitriptyline 50 mg QHS, Meloxicam 15 mg daily, Tylenol PRN, Norco 7.5/325 mg 2-4 daily PRN, and Robaxin 500 mg TID PRN (out)    · Has tried in the past:  Percocet (limited benefit and causes constipation treated with colace), Temazepam for sleep (he has had trouble sleeping since the surgery), Flexeril (no help in the past, both before and after the surgery), Gabapentin (after, went to straight 300 mg TID, too sedating, unsure of duration of tx), Lyrica (sedation), Celebrex (GI upset), Zanaflex 4 mg PRN muscle pain (helpful but sedating)     · Has not tried: SNRIs, other muscle relaxants    Blood thinners: None    Interventional Therapies: None    Relevant Surgeries:   · C4-7 ACDF with C5/6 PSIF in 2/2016  · C7/T1 ILESI: 10-15% relief  · 10/4/16 Bilateral C4-7 MBB- short term benefit  · 10/11/16 Right C4-7 RFA- limited benefit  · 10/25/16 Left C4-7- limited benefit  · TPIs (cervical and left thoracic): Good benefit x 1-2 weeks in thoracic, ongoing in cervical    Affecting sleep? Yes    Affecting daily activities? Yes    Depressive symptoms? Yes, due to general medical condition.  He denies significant symptoms today          · SI/HI? No    Work status: On short term disability from his job as an [employer] .    Pain Scales  Best: 4/10  Worst: 9/10  Usually: 7/10  Today: 7/10    Neck Pain    This is a new problem. The current episode started more than 1 month ago (february 2016). The problem occurs intermittently. The problem has been unchanged. The pain is present in the anterior neck. The quality of the pain is described as aching, shooting and stabbing. The pain is at a severity of 5/10. The pain is mild. The symptoms are aggravated by bending (turning). The pain is same all the time. Stiffness is  present all day and in the morning. Associated symptoms include chest pain. Pertinent negatives include no fever, headaches or weight loss. He has tried oral narcotics, NSAIDs and muscle relaxants for the symptoms. The treatment provided mild relief. Physical therapy was not tried and ineffective.      Review of Systems   Constitution: Negative. Negative for chills, fever, malaise/fatigue, weight gain and weight loss.   HENT: Negative.  Negative for ear pain and hoarse voice.    Eyes: Negative.  Negative for blurred vision, pain and visual disturbance.   Cardiovascular: Positive for chest pain. Negative for dyspnea on exertion and irregular heartbeat.   Respiratory: Negative.  Negative for cough, shortness of breath and wheezing.    Endocrine: Negative.  Negative for cold intolerance and heat intolerance.   Hematologic/Lymphatic: Negative.  Negative for adenopathy and bleeding problem. Does not bruise/bleed easily.   Skin: Negative.  Negative for color change, itching and rash.   Musculoskeletal: Positive for neck pain and stiffness. Negative for back pain.   Gastrointestinal: Negative.  Negative for change in bowel habit, diarrhea, hematemesis, hematochezia, melena and vomiting.   Genitourinary: Negative.  Negative for flank pain, frequency, hematuria and urgency.   Neurological: Positive for dizziness and light-headedness. Negative for difficulty with concentration, headaches, loss of balance and seizures.   Psychiatric/Behavioral: Negative for altered mental status, depression and suicidal ideas. The patient has insomnia and is nervous/anxious.    Allergic/Immunologic: Negative.  Negative for HIV exposure.   All other systems reviewed and are negative.            Past Medical History:   Diagnosis Date    Arthritis     Cataract     Cervical back pain with evidence of disc disease     Hypertension     Thyroid disease        Past Surgical History:   Procedure Laterality Date    ACROMIOCLAVICULAR JOINT CYST  EXCISION Right     BACK SURGERY      cataracts Bilateral     NGUYEN      EYE SURGERY      RADIOFREQUENCY ABLATION  10/2016    cervical spine/Montoya    ROTATOR CUFF REPAIR Right     SPINE SURGERY      cerival fusion        Review of patient's allergies indicates:   Allergen Reactions    Pcn [penicillins] Rash       Current Outpatient Prescriptions   Medication Sig Dispense Refill    acetaminophen (TYLENOL) 500 MG tablet Take 500 mg by mouth every 6 (six) hours as needed for Pain.      amitriptyline (ELAVIL) 25 MG tablet Take 2 tablets (50 mg total) by mouth every evening. 60 tablet 5    co-enzyme Q-10 30 mg capsule Take 30 mg by mouth once daily.      fish oil-omega-3 fatty acids 300-1,000 mg capsule Take 2 g by mouth once daily.      FOLIC ACID/MULTIVIT-MIN/LUTEIN (CENTRUM SILVER ORAL) Take by mouth once daily.      hydrocodone-acetaminophen 7.5-325mg (NORCO) 7.5-325 mg per tablet Take 1 tablet by mouth every 6 (six) hours as needed for Pain.      levothyroxine (SYNTHROID) 50 MCG tablet Take 50 mcg by mouth once daily.      meclizine (ANTIVERT) 25 mg tablet Take 25 mg by mouth.      melatonin 5 mg Tab Take by mouth.      meloxicam (MOBIC) 15 MG tablet Take 15 mg by mouth daily      methocarbamol (ROBAXIN) 500 MG Tab Take 500 mg by mouth 3 (three) times daily.      omeprazole (PRILOSEC) 20 MG capsule Take 20 mg by mouth once daily.  1    oxyCODONE-acetaminophen (PERCOCET)  mg per tablet Take 1 tablet by mouth every 6 (six) hours as needed for Pain. 60 tablet 0    promethazine (PHENERGAN) 25 MG tablet Take 1 tablet (25 mg total) by mouth every 6 (six) hours as needed for Nausea. 40 tablet 0    red yeast rice 600 mg Cap Take by mouth.      traMADol (ULTRAM) 50 mg tablet Take 1 tablet (50 mg total) by mouth every 6 (six) hours as needed for Pain. 40 tablet 0    valsartan (DIOVAN) 80 MG tablet Take 40 mg by mouth once daily.   1     No current facility-administered medications for this visit.   "      Family History   Problem Relation Age of Onset    Heart disease Mother     Cancer Father        Social History     Social History    Marital status:      Spouse name: N/A    Number of children: N/A    Years of education: N/A     Occupational History    Not on file.     Social History Main Topics    Smoking status: Never Smoker    Smokeless tobacco: Never Used    Alcohol use No    Drug use: No    Sexual activity: Not on file     Other Topics Concern    Not on file     Social History Narrative    No narrative on file       Objective:     Vitals:    02/21/18 1055   BP: (!) 154/103   Pulse: 82   Temp: 98 °F (36.7 °C)   TempSrc: Oral   Weight: 107 kg (235 lb 14.3 oz)   Height: 6' 1" (1.854 m)   PainSc:   7   PainLoc: Back     GEN:  Well developed, well nourished.  No acute distress.  No pain behavior.  HEENT:  No trauma.  Mucous membranes moist.  Nares patent bilaterally.  PSYCH: Normal affect. Thought content appropriate.  CHEST:  Breathing symmetric.  No audible wheezing.  ABD: Soft, non-tender, non-distended.  SKIN:  Warm, pink, dry.  No rash on exposed areas.    EXT:  No cyanosis, clubbing, or edema.  No color change or changes in nail or hair growth.  NEURO/MUSCULOSKELETAL:  Fully alert, oriented, and appropriate. Speech normal nella. No cranial nerve deficits.   L-Spine:  Decreased ROM with pain on extension> flexion.  Positive facet loading bilaterally.  Negative SLR bilaterally.     Previous physical exam:  4/5 motor strength in bilateral deltoids; however, this may be due to pain.  Otherwise 5/5 motor strength throughout upper extremities.   Sensory: No sensory deficit in the upper extremities.   Reflexes: 1+ and symmetric throughout.  Negative Akins's bilaterally.  Gait: Antalgic.  Present trendelenburg sign bilaterally, L>R  L-Spine:  Decreased ROM with pain on extension > flexion. Positive facet loading bilaterally.     SI Joint/Hip: Negative KHRIS bilaterally.  Negative FADIR " bilaterally.  No TTP over lumbar paraspinals, bilateral SI joints, piriformis muscles, or GTB.    C-Spine: Limited extension and lateral rotation.  Negative Spurling's bilaterally.    TTP over left thoracic paraspinals.  Imaging:      Diagnostic Results:  All imaging was independently reviewed by me.  Below is a summary from Dr Fowler's note.  I concur with the below findings.     MRI T-spine, dated 8/8/16:  1. No significant central or neuroforaminal stenosis     MRI C-spine, dated 8/8/16:  1. No significant stenosis      Flex/Ex X-ray C-spine, dated 7/19/16:  1. No prevertebral swelling  2. No gross instability   3. Good hardware position     MRI C-spine from an outside facility, dated 1/28/2016:  1. Moderate to severe DDD  2. Disc osteophyte complex, worst at C5/6   3. Moderate stenosis at C4/5 and C6/7      CT C-spine from an outside facility, dated 4/2015:  1. Diffuse cervical spondylosis   2. DDD, worst at C5/6, with some ossification of the PLL at C5/6      CT C-spine from an outside facility, dated 3/17/2016:  1. Fusion surgery at C5/6  2. Hardware in good position       AP and Lateral X-ray C-spine from an outside facility:  1. C4-7 ACDF  2. Hardware in good position  3. Spine in good alignment    Assessment:     Encounter Diagnoses   Name Primary?    Chronic pain disorder Yes    DDD (degenerative disc disease), cervical     S/P cervical spinal fusion     Facet arthritis of cervical region     Facet arthritis, degenerative, lumbar spine     Disuse muscle atrophy     DDD (degenerative disc disease), lumbar     Cervical paraspinal muscle spasm     Chronic, continuous use of opioids        Plan:     Diagnoses and all orders for this visit:    Chronic pain disorder    DDD (degenerative disc disease), cervical    S/P cervical spinal fusion    Facet arthritis of cervical region    Facet arthritis, degenerative, lumbar spine    Disuse muscle atrophy    DDD (degenerative disc disease), lumbar    Cervical  paraspinal muscle spasm    Chronic, continuous use of opioids      His pain is consistent with the above.      We discussed the assessment and recommendations.  All available images were reviewed. We discussed the disease process, prognosis, treatment plan, and risks and benefits. The patient is aware of the risks and benefits of the medications being prescribed, common side effects, and proper usage. The following is the plan we agreed on:     1. Schedule for L2/3 ILESI  2. Can repeat L2-5 LMB RFA PRN (Coolief), next available is 3/26  3. Continue Pennsaid gel, concentrating on neck and left shoulder  4. Continue PT  5. We again discussed spinal cord stimulator which he will think about.  I can do the perc trial after looking at his MRI  6. We discussed FRP which he will also think about.  We would need to get him set up at the [facility]  1. GAP passed   7. Continue Robaxin   8. Continue Amitriptyline 25mg QHS   9. Continue Meloxicam 15 mg daily, benefits currently outweigh risks.  Will continue to reassess  10. Movantik 25 mg for OIC PRN  11. Continue gabapentin, increasing PRN  12. Continue Tylenol as needed.  13. RTC once released from Ortho.      Greater than 25 minutes spent in total in todays visit with the patient, with more than half that time direct face to face counseling and education with the patient today. We discussed the disease process, prognosis, treatment plan, and risks and benefits.    The above plan and management options were discussed at length with patient. Patient is in agreement with the above and verbalized understanding.     Ortho/SPM Exam

## 2018-03-06 ENCOUNTER — SURGERY (OUTPATIENT)
Age: 53
End: 2018-03-06

## 2018-03-06 ENCOUNTER — HOSPITAL ENCOUNTER (OUTPATIENT)
Facility: OTHER | Age: 53
Discharge: HOME | End: 2018-03-06
Attending: ANESTHESIOLOGY | Admitting: ANESTHESIOLOGY
Payer: COMMERCIAL

## 2018-03-06 VITALS
TEMPERATURE: 97 F | WEIGHT: 235 LBS | SYSTOLIC BLOOD PRESSURE: 112 MMHG | OXYGEN SATURATION: 97 % | HEIGHT: 73 IN | HEART RATE: 69 BPM | RESPIRATION RATE: 18 BRPM | BODY MASS INDEX: 31.14 KG/M2 | DIASTOLIC BLOOD PRESSURE: 71 MMHG

## 2018-03-06 DIAGNOSIS — M47.816 FACET ARTHRITIS, DEGENERATIVE, LUMBAR SPINE: ICD-10-CM

## 2018-03-06 DIAGNOSIS — M51.36 DDD (DEGENERATIVE DISC DISEASE), LUMBAR: Primary | ICD-10-CM

## 2018-03-06 PROBLEM — M51.369 DDD (DEGENERATIVE DISC DISEASE), LUMBAR: Status: ACTIVE | Noted: 2018-03-06

## 2018-03-06 PROCEDURE — 62323 NJX INTERLAMINAR LMBR/SAC: CPT | Performed by: ANESTHESIOLOGY

## 2018-03-06 PROCEDURE — 25500020 PHARM REV CODE 255: Performed by: ANESTHESIOLOGY

## 2018-03-06 PROCEDURE — 62323 NJX INTERLAMINAR LMBR/SAC: CPT | Mod: ,,, | Performed by: ANESTHESIOLOGY

## 2018-03-06 PROCEDURE — 25000003 PHARM REV CODE 250: Performed by: ANESTHESIOLOGY

## 2018-03-06 PROCEDURE — 63600175 PHARM REV CODE 636 W HCPCS: Performed by: ANESTHESIOLOGY

## 2018-03-06 RX ORDER — BUPIVACAINE HYDROCHLORIDE 2.5 MG/ML
INJECTION, SOLUTION EPIDURAL; INFILTRATION; INTRACAUDAL
Status: DISCONTINUED | OUTPATIENT
Start: 2018-03-06 | End: 2018-03-06 | Stop reason: HOSPADM

## 2018-03-06 RX ORDER — METHYLPREDNISOLONE ACETATE 80 MG/ML
INJECTION, SUSPENSION INTRA-ARTICULAR; INTRALESIONAL; INTRAMUSCULAR; SOFT TISSUE
Status: DISCONTINUED | OUTPATIENT
Start: 2018-03-06 | End: 2018-03-06 | Stop reason: HOSPADM

## 2018-03-06 RX ORDER — ALPRAZOLAM 0.5 MG/1
1 TABLET, ORALLY DISINTEGRATING ORAL ONCE
Status: COMPLETED | OUTPATIENT
Start: 2018-03-06 | End: 2018-03-06

## 2018-03-06 RX ORDER — LIDOCAINE HYDROCHLORIDE 10 MG/ML
INJECTION INFILTRATION; PERINEURAL
Status: DISCONTINUED | OUTPATIENT
Start: 2018-03-06 | End: 2018-03-06 | Stop reason: HOSPADM

## 2018-03-06 RX ADMIN — IOHEXOL 5 ML: 300 INJECTION, SOLUTION INTRAVENOUS at 10:03

## 2018-03-06 RX ADMIN — ALPRAZOLAM 1 MG: 0.5 TABLET, ORALLY DISINTEGRATING ORAL at 09:03

## 2018-03-06 RX ADMIN — METHYLPREDNISOLONE ACETATE 80 MG: 80 INJECTION, SUSPENSION INTRA-ARTICULAR; INTRALESIONAL; INTRAMUSCULAR; SOFT TISSUE at 10:03

## 2018-03-06 RX ADMIN — BUPIVACAINE HYDROCHLORIDE 10 ML: 2.5 INJECTION, SOLUTION EPIDURAL; INFILTRATION; INTRACAUDAL; PERINEURAL at 10:03

## 2018-03-06 RX ADMIN — LIDOCAINE HYDROCHLORIDE 10 ML: 10 INJECTION, SOLUTION INFILTRATION; PERINEURAL at 10:03

## 2018-03-06 NOTE — DISCHARGE INSTRUCTIONS
Thank you for allowing us to care for you today. You may receive a survey about the care we provided. Your feedback is valuable and helps us provide excellent care throughout the community. Home Care Instructions Pain Management:    1. DIET:   You may resume your normal diet today.   2. BATHING:   You may shower with luke warm water.  3. DRESSING:   You may remove your bandage today.   4. ACTIVITY LEVEL:   You may resume your normal activities 24 hrs after your procedure.  5. MEDICATIONS:   You may resume your normal medications today.   6. SPECIAL INSTRUCTIONS:   No heat to the injection site for 24 hrs including, bath or shower, heating pad, moist heat, or hot tubs.    Use ice pack to injection site for any pain or discomfort.  Apply ice packs for 20 minute intervals as needed.   If you have received any sedatives by mouth today you may not drive for 12 hours.    If you have received any sedation through your IV, you may not drive for 24 hrs.     PLEASE CALL YOUR DOCTOR IF:  1. Redness or swelling around the injection site.  2. Fever of 101 degrees  3. Drainage (pus) from the injection site.  4. For any continuous bleeding (some dried blood over the incision is normal.)    FOR EMERGENCIES:   If any unusual problems or difficulties occur during clinic hours, call [phone] or [phone].

## 2018-03-06 NOTE — OP NOTE
Date of Procedure: 03/06/2018    Procedure: L2/3 Interlaminar Epidural Steroid Injection    Referring Provider: None    Pre-op diagnosis: Lumbar radiculopathy [M54.16]    Post-op diagnosis: Lumbar radiculopathy [M54.16]    Physician: Dr. Kasandra Montoya     Assistant: None    Anesthestia: local/niravam    EBL: None    Specimens: None    All medications, allergies, and relevant histories were reviewed. No recent antibiotics or infections.  A time-out was taken to verify the correct patient, procedure, laterality, and appropriate medications/allergies.    Fluoroscopically-Guided, Contrast-Controlled Lumbar Interlaminar Epidural Steroid Injection:     Following denial of allergy and review of potential side effects and complications including but not necessarily limited to infection, allergic reaction, local tissue breakdown, nerve injury, paresis, paralysis, spinal cord injury, and seizure, the patient indicated they understood and agreed to proceed.     Patient was placed in prone position. Lumbar area was prepped and draped in sterile manner. Local Xylocaine 1% was given subcutaneously at the level L2/3. An 18-gauge Tuohy needle was introduced atraumatically in the interspinous space and advanced up to the epidural space using fluoroscopic guidance in the AP position. Loss of resistance to air was used to identify the epidural space using fluoroscopic guidance in the lateral position. Following negative aspiration for blood and CSF and confirming the absence of paresthesias, injection of approximately 1 cc of Omnipaque 300 demonstrated excellent epidural spread without vascular or intrathecal uptake. At this point, 2cc of 0.25% marcaine with 80 mg of Depo-Medrol was injected without complication. The needle was flushed with 1% lidocaine and withdrawn.     Patient tolerated the procedure well without any side effects. No noted complications.     The patient was followed post procedure and discharged under their own  power in excellent condition in the company of a responsible adult.     Future Management:   If helpful, can repeat as needed.    Follow up with my clinic in 3 weeks or sooner if needed

## 2018-03-14 ENCOUNTER — OFFICE VISIT (OUTPATIENT)
Dept: SPORTS MEDICINE | Facility: CLINIC | Age: 53
End: 2018-03-14
Payer: COMMERCIAL

## 2018-03-14 VITALS
WEIGHT: 235 LBS | SYSTOLIC BLOOD PRESSURE: 147 MMHG | HEIGHT: 73 IN | DIASTOLIC BLOOD PRESSURE: 94 MMHG | HEART RATE: 85 BPM | BODY MASS INDEX: 31.14 KG/M2

## 2018-03-14 DIAGNOSIS — M25.512 PAIN IN JOINT OF LEFT SHOULDER: Primary | ICD-10-CM

## 2018-03-14 PROCEDURE — 99999 PR PBB SHADOW E&M-EST. PATIENT-LVL III: CPT | Mod: PBBFAC,,, | Performed by: ORTHOPAEDIC SURGERY

## 2018-03-14 PROCEDURE — 99212 OFFICE O/P EST SF 10 MIN: CPT | Mod: S$GLB,,, | Performed by: ORTHOPAEDIC SURGERY

## 2018-03-14 NOTE — PROGRESS NOTES
CC right shoulder pain    HISTORY OF PRESENT ILLNESS:   Pt is here today for post-operative followup of his shoulder arthroscopy.  he is doing well.  We have reviewed his findings and discussed plan of care and future treatment options.         [facility] in [facility]  Pain today is 2/10 currently     He notes that since discontinuing his sling his neck pain has improved some     Had retinal detachment x 2 so out of PT for a 3 weeks     80% better vs preop    Review of Systems   Constitution: Negative. Negative for chills, fever and night sweats.   HENT: Negative for congestion and headaches.    Eyes: Negative for blurred vision, left vision loss and right vision loss.   Cardiovascular: Negative for chest pain and syncope.   Respiratory: Negative for cough and shortness of breath.    Endocrine: Negative for polydipsia, polyphagia and polyuria.   Hematologic/Lymphatic: Negative for bleeding problem. Does not bruise/bleed easily.   Skin: Negative for dry skin, itching and rash.   Musculoskeletal: Negative for falls and muscle weakness.   Gastrointestinal: Negative for abdominal pain and bowel incontinence.   Genitourinary: Negative for bladder incontinence and nocturia.   Neurological: Negative for disturbances in coordination, loss of balance and seizures.   Psychiatric/Behavioral: Negative for depression. The patient does not have insomnia.    Allergic/Immunologic: Negative for hives and persistent infections.     PAST MEDICAL HISTORY:   Past Medical History:   Diagnosis Date    Arthritis     Cataract     Cervical back pain with evidence of disc disease     Hypertension     Thyroid disease      PAST SURGICAL HISTORY:   Past Surgical History:   Procedure Laterality Date    ACROMIOCLAVICULAR JOINT CYST EXCISION Right     BACK SURGERY      cataracts Bilateral     NGUYEN      EYE SURGERY      RADIOFREQUENCY ABLATION  10/2016    cervical spine/[place]    ROTATOR CUFF REPAIR Right     SPINE SURGERY       cerival fusion      FAMILY HISTORY:   Family History   Problem Relation Age of Onset    Heart disease Mother     Cancer Father      SOCIAL HISTORY:   Social History     Social History    Marital status:      Spouse name: N/A    Number of children: N/A    Years of education: N/A     Occupational History    Not on file.     Social History Main Topics    Smoking status: Never Smoker    Smokeless tobacco: Never Used    Alcohol use No    Drug use: No    Sexual activity: Not on file     Other Topics Concern    Not on file     Social History Narrative    No narrative on file       MEDICATIONS:   Current Outpatient Prescriptions:     acetaminophen (TYLENOL) 500 MG tablet, Take 500 mg by mouth every 6 (six) hours as needed for Pain., Disp: , Rfl:     co-enzyme Q-10 30 mg capsule, Take 30 mg by mouth once daily., Disp: , Rfl:     fish oil-omega-3 fatty acids 300-1,000 mg capsule, Take 2 g by mouth once daily., Disp: , Rfl:     FOLIC ACID/MULTIVIT-MIN/LUTEIN (CENTRUM SILVER ORAL), Take by mouth once daily., Disp: , Rfl:     hydrocodone-acetaminophen 7.5-325mg (NORCO) 7.5-325 mg per tablet, Take 1 tablet by mouth every 6 (six) hours as needed for Pain., Disp: , Rfl:     levothyroxine (SYNTHROID) 50 MCG tablet, Take 50 mcg by mouth once daily., Disp: , Rfl:     meclizine (ANTIVERT) 25 mg tablet, Take 25 mg by mouth., Disp: , Rfl:     melatonin 5 mg Tab, Take by mouth., Disp: , Rfl:     meloxicam (MOBIC) 15 MG tablet, Take 15 mg by mouth daily, Disp: , Rfl:     methocarbamol (ROBAXIN) 500 MG Tab, Take 500 mg by mouth 3 (three) times daily., Disp: , Rfl:     omeprazole (PRILOSEC) 20 MG capsule, Take 20 mg by mouth once daily., Disp: , Rfl: 1    oxyCODONE-acetaminophen (PERCOCET)  mg per tablet, Take 1 tablet by mouth every 6 (six) hours as needed for Pain., Disp: 60 tablet, Rfl: 0    promethazine (PHENERGAN) 25 MG tablet, Take 1 tablet (25 mg total) by mouth every 6 (six) hours as needed  "for Nausea., Disp: 40 tablet, Rfl: 0    red yeast rice 600 mg Cap, Take by mouth., Disp: , Rfl:     traMADol (ULTRAM) 50 mg tablet, Take 1 tablet (50 mg total) by mouth every 6 (six) hours as needed for Pain., Disp: 40 tablet, Rfl: 0    valsartan (DIOVAN) 80 MG tablet, Take 40 mg by mouth once daily. , Disp: , Rfl: 1    amitriptyline (ELAVIL) 25 MG tablet, Take 2 tablets (50 mg total) by mouth every evening., Disp: 60 tablet, Rfl: 5  ALLERGIES:   Review of patient's allergies indicates:   Allergen Reactions    Pcn [penicillins] Hives and Rash    Lipitor [atorvastatin] Other (See Comments)     Muscle cramps, weakness       VITAL SIGNS: BP (!) 147/94   Pulse 85   Ht 6' 1" (1.854 m)   Wt 106.6 kg (235 lb)   BMI 31.00 kg/m²        DATE OF PROCEDURE: 12/05/2017  OPERATION:   left  1. Shoulder arthroscopic rotator cuff repair (10 x 15 mm, far medial margin convergence x 2) (CPT 58356) (complex, -22 modifier)  2. Shoulder subpectoral biceps tenodesis, arthroscopic-assisted (CPT 00310)  3. Shoulder arthroscopic subacromial decompression, bursectomy (CPT 43352)  4. Shoulder arthroscopic extensive debridement (anterior, posterior glenohumeral joint, subacromial space) (CPT 65320)  5. Shoulder arthroscopic-assisted arthrocentesis of viable structural human allograft tissue matrix ([employer]) (CPT 77383)           6. Shoulder arthroscopic lysis of adhesions (CPT 98769)  7. Shoulder arthroscopic distal clavicle excision (CPT 16176)                                                                            PHYSICAL EXAMINATION:     Incision sites healed well  No evidence of any erythema, infection or induration  elbow Range of motion full   Minimal effusion  2+ DP pulse  No swelling         + AC joint tenderness   5/5 at 0 and 30        Forward Flexion: 160  ER: 45  IR: L2                                                                      ASSESSMENT:                                                                    "                                                                            1. Status post above, doing well.                                                                                                                               PLAN:                                                                                                                                                     1. Continue PT   2. Emphasized scapular function.  3. I have discussed return to activity in detail.  4. he will see us back in 6 weeks.                                      5. All questions were answered and he should contact us if he  has any questions or concerns in the interim.              POSTOPERATIVE PLAN: We will follow the arthroscopic rotator cuff repair guidelines for a medium size rotator cuff tear.  We discussed with the patient's family after surgery.  The patient will remain in a sling for 6 weeks.  PT to start at 4 weeks.

## 2018-03-28 ENCOUNTER — OFFICE VISIT (OUTPATIENT)
Dept: PAIN MEDICINE | Facility: CLINIC | Age: 53
End: 2018-03-28
Attending: ANESTHESIOLOGY
Payer: COMMERCIAL

## 2018-03-28 VITALS
HEIGHT: 73 IN | HEART RATE: 90 BPM | SYSTOLIC BLOOD PRESSURE: 135 MMHG | BODY MASS INDEX: 31.56 KG/M2 | WEIGHT: 238.13 LBS | DIASTOLIC BLOOD PRESSURE: 100 MMHG | TEMPERATURE: 98 F

## 2018-03-28 DIAGNOSIS — M50.30 DDD (DEGENERATIVE DISC DISEASE), CERVICAL: Primary | ICD-10-CM

## 2018-03-28 DIAGNOSIS — M62.838 CERVICAL PARASPINAL MUSCLE SPASM: ICD-10-CM

## 2018-03-28 DIAGNOSIS — M79.10 MYALGIA: ICD-10-CM

## 2018-03-28 DIAGNOSIS — M47.816 FACET ARTHRITIS, DEGENERATIVE, LUMBAR SPINE: ICD-10-CM

## 2018-03-28 DIAGNOSIS — M47.812 FACET ARTHRITIS OF CERVICAL REGION: ICD-10-CM

## 2018-03-28 DIAGNOSIS — Z98.1 S/P CERVICAL SPINAL FUSION: ICD-10-CM

## 2018-03-28 DIAGNOSIS — Z87.19 HISTORY OF GASTRITIS: ICD-10-CM

## 2018-03-28 DIAGNOSIS — M51.36 DDD (DEGENERATIVE DISC DISEASE), LUMBAR: ICD-10-CM

## 2018-03-28 DIAGNOSIS — G89.29 CHRONIC LEFT SHOULDER PAIN: ICD-10-CM

## 2018-03-28 DIAGNOSIS — M25.512 CHRONIC LEFT SHOULDER PAIN: ICD-10-CM

## 2018-03-28 DIAGNOSIS — G89.4 CHRONIC PAIN DISORDER: ICD-10-CM

## 2018-03-28 PROCEDURE — 99999 PR PBB SHADOW E&M-EST. PATIENT-LVL III: CPT | Mod: PBBFAC,,, | Performed by: ANESTHESIOLOGY

## 2018-03-28 PROCEDURE — 99214 OFFICE O/P EST MOD 30 MIN: CPT | Mod: S$GLB,,, | Performed by: ANESTHESIOLOGY

## 2018-03-28 RX ORDER — HYDROCODONE BITARTRATE AND ACETAMINOPHEN 7.5; 325 MG/1; MG/1
1 TABLET ORAL 2 TIMES DAILY PRN
Qty: 60 TABLET | Refills: 0 | Status: SHIPPED | OUTPATIENT
Start: 2018-05-26 | End: 2018-06-25

## 2018-03-28 RX ORDER — HYDROCODONE BITARTRATE AND ACETAMINOPHEN 7.5; 325 MG/1; MG/1
1 TABLET ORAL 2 TIMES DAILY PRN
Qty: 60 TABLET | Refills: 0 | Status: SHIPPED | OUTPATIENT
Start: 2018-04-27 | End: 2018-03-28 | Stop reason: SDUPTHER

## 2018-03-28 RX ORDER — HYDROCODONE BITARTRATE AND ACETAMINOPHEN 7.5; 325 MG/1; MG/1
1 TABLET ORAL 2 TIMES DAILY PRN
Qty: 60 TABLET | Refills: 0 | Status: SHIPPED | OUTPATIENT
Start: 2018-03-28 | End: 2018-03-28 | Stop reason: SDUPTHER

## 2018-03-28 NOTE — PROGRESS NOTES
Subjective:     Patient ID: Sal Navarro is a 53 y.o. male.    Chief Complaint: No chief complaint on file.    Consulted by: No ref. provider found     Disclaimer: This note was generated using voice recognition software.  There may be a typographical errors that were missed during proofreading.      HPI:    Sal Navarro is a 53 y.o. male who presents today s/p C4-7 ACDF with C5/6 PSIF in 2/2016 with residual chronic midline neck pain that radiates into his shoulder blades bilaterally, R>>L.  This is associated with bilateral hand numbness and tingling.  The hand symptoms did improve following the surgery.  The shooting pains in his arms resolved completely.   This pain is described in detail below.  His post-operative course was complicated by dysphagia that is being treated with speech therapy.    Interval History (9/23/2016):  He returns today for follow up.  He reports that the MELANIE was not helpful for the pain.  He hasn't noticed a difference with the amitriptyline.  He does notice a difference in his low back pain when he skips the meloxicam    Interval History (11/17/2016):  He returns today for follow up.  He reports that cervical RFAs were not very helpful for the pain. He is no longer taking Percocet or Tamazepam. He continues to take Mobic 15mg daily for his low back pain. He is taking elavil 25mg nightly without side effects. He is open to considering SCS. He reports poor sleep at night.    Interval History (1/3/2017):  The patient returns today for follow up of neck and shoulder pain.  His pain is located to his neck and surrounding areas.  He previously had limited benefit with RFAs, although he did have short term benefit with MBB.  He also had limited benefit with NGUYEN.  Dr. Montoya discussed cervical SCS trial with the patient, although he reports that he does not wish to pursue this option at this time.  He is scheduled to f/u with Dr. Fowler on 1/24/16.  He continues to participate in  PT.  He did previously have some benefit with a TENS unit at PT.  He is reporting some relief with Tramadol.  He also takes Zanaflex which helps but is very sedating.      Interval History (3/3/2017):  The patient returns for follow up of neck and shoulder pain. He continues to take Tramadol BID with benefit. The medication decreases his pain slightly. He also reports benefit with Robaxin. He continues to take Mobic with benefit. He recently stopped physical therapy and reports increased pain into his shoulders, despite a home exercise plan.     Interval History (5/1/2017):  He returns today for follow up.  He reports that these RFAs were more helpful than before, L more so than right, but now the pain is again returning.  Today, he also complains of left shoulder pain that has been gradually worsening over the past few months. He associated this with modified activity due to his neck.  Pain is worse with extension of arm and lifting.  Tramadol, Robaxin, Meloxicam, and amitriptyline have been helpful for the pain.  He continues to have GI upset, but has just started prilosec.  When he tried stopping the meloxicam, his pain increased dramatically.  The tramadol takes the edge off.  Robaxin helps, but he is out.    Of note, he recently had an endoscopy that showed stage 3 gastritis, for which he was started on Prilosec    Interval History (6/21/2017):  He returns today for follow up.  He reports that the shoulder injection provided one week of relief.  The Pennsaid has been helpful for the pain.  The RFA has helped with shooting pain into his back.  He is doing his HEP.  He has not been back to PT because of insurance issues.  His shoulder continues to present significant hindrances to his daily activities.    Interval History (8/21/2017):  He returns today for follow up.  He reports that the tramadol is not as helpful as it used to be.  In addition, he is experiencing constipation and urinary retention that only  "resolves with skipping a dose of tramadol. He is going to PT for his neck/shoulder.    Interval History (10/18/2017):  He returns today for follow up.  He reports that Norco has been more helpful for the pain than the tramadol with fewer side effects.  He is averaging about 2-4 per day.  His back is doing much better, though his neck is still greatly limiting him in his daily tasks.  He reports a new pain that began with a "pop" followed by a sharp pain radiating down his back followed by a soreness.  This happened a few weeks ago on the left side of his low back above where we did the RFA.  This is resolving, but it is still sore.    Interval History (11/29/2017):  He returns today for follow up. He is scheduled for right shoulder surgery on 12/5/17 and is here today for lemuel-operative planning. Biggest pain today is in his left shoulder. After his long car ride here, he states his neck has started to bother him as well. Both pains today are roughly 5/10. Taking Norco 7.5/325 mg 1-2 tablets daily which he states takes the edge off of his pain. He has decreased his dosing from 3-4 per day due to side effects of constipation. States he is not taking the movantik on a regular basis-does not want to keep adding medications. Last took it over a week ago. Still taking mobic, robaxin with mild relief. Takes tylenol occasionally with mild relief. Also used hemp oil with mild relief. Still doing a HEP on a regular basis. States his lower back is gradually getting worse. Pain primarily on the left side. Had trigger point injections at last visit which provided 30-40% reduction in his pain for for 1-2 weeks     Interval History (2/21/2018):  He returns today for follow up.  He reports that his shoulder is improving since his surgery.  His neck is doing about the same.  His low back pain has returned, and it is slightly higher than it was before.  He was recently started on a low dose of gabapentin    Interval History " (3/29/2018):  He returns today for follow up.  He reports that his neck is feeling slightly better since he is no longer wearing his sling for her shoulder.  His physical therapy is still currently on hold due to his retinal condition.  His low back is feeling better.    Physical Therapy: Yes- outside facility in [place].    Non-pharmacologic Treatment: Ice and heat provide mild benefit.           · TENS? Yes at PT with benefit.    Pain Medications:         · Currently taking: Gabapentin 100 mg QHS, Amitriptyline 50 mg QHS, Meloxicam 15 mg daily, Tylenol PRN, Norco 7.5/325 mg 2 daily PRN, and Robaxin 500 mg TID PRN (out)    · Has tried in the past:  Percocet (limited benefit and causes constipation treated with colace), Temazepam for sleep (he has had trouble sleeping since the surgery), Flexeril (no help in the past, both before and after the surgery), Gabapentin (after, went to straight 300 mg TID, too sedating, unsure of duration of tx), Lyrica (sedation), Celebrex (GI upset), Zanaflex 4 mg PRN muscle pain (helpful but sedating)     · Has not tried: SNRIs, other muscle relaxants    Blood thinners: None    Interventional Therapies: None    Relevant Surgeries:   · C4-7 ACDF with C5/6 PSIF in 2/2016  · C7/T1 ILESI: 10-15% relief  · 10/4/16 Bilateral C4-7 MBB- short term benefit  · 10/11/16 Right C4-7 RFA- limited benefit  · 10/25/16 Left C4-7- limited benefit  · TPIs (cervical and left thoracic): Good benefit x 1-2 weeks in thoracic, ongoing in cervical    Affecting sleep? Yes    Affecting daily activities? Yes    Depressive symptoms? Yes, due to general medical condition.  He denies significant symptoms today          · SI/HI? No    Work status: On short term disability from his job as an [employer] .    Pain Scales  Best: 2/10  Worst: 8/10  Usually: 6/10  Today: 6/10    Neck Pain    This is a new problem. The current episode started more than 1 month ago (february 2016). The problem occurs intermittently.  The problem has been unchanged. The pain is present in the anterior neck. The quality of the pain is described as aching, shooting and stabbing. The pain is at a severity of 5/10. The pain is mild. The symptoms are aggravated by bending (turning). The pain is same all the time. Stiffness is present all day and in the morning. Associated symptoms include chest pain. Pertinent negatives include no fever, headaches or weight loss. He has tried oral narcotics, NSAIDs and muscle relaxants for the symptoms. The treatment provided mild relief. Physical therapy was not tried and ineffective.      Review of Systems   Constitution: Negative. Negative for chills, fever, malaise/fatigue, weight gain and weight loss.   HENT: Negative.  Negative for ear pain and hoarse voice.    Eyes: Negative.  Negative for blurred vision, pain and visual disturbance.   Cardiovascular: Positive for chest pain. Negative for dyspnea on exertion and irregular heartbeat.   Respiratory: Negative.  Negative for cough, shortness of breath and wheezing.    Endocrine: Negative.  Negative for cold intolerance and heat intolerance.   Hematologic/Lymphatic: Negative.  Negative for adenopathy and bleeding problem. Does not bruise/bleed easily.   Skin: Negative.  Negative for color change, itching and rash.   Musculoskeletal: Positive for neck pain and stiffness. Negative for back pain.   Gastrointestinal: Negative.  Negative for change in bowel habit, diarrhea, hematemesis, hematochezia, melena and vomiting.   Genitourinary: Negative.  Negative for flank pain, frequency, hematuria and urgency.   Neurological: Positive for dizziness and light-headedness. Negative for difficulty with concentration, headaches, loss of balance and seizures.   Psychiatric/Behavioral: Negative for altered mental status, depression and suicidal ideas. The patient has insomnia and is nervous/anxious.    Allergic/Immunologic: Negative.  Negative for HIV exposure.   All other systems  reviewed and are negative.            Past Medical History:   Diagnosis Date    Arthritis     Cataract     Cervical back pain with evidence of disc disease     Hypertension     Thyroid disease        Past Surgical History:   Procedure Laterality Date    ACROMIOCLAVICULAR JOINT CYST EXCISION Right     BACK SURGERY      cataracts Bilateral     NGUYEN      EYE SURGERY      RADIOFREQUENCY ABLATION  10/2016    cervical spine/Montoya    ROTATOR CUFF REPAIR Right     SPINE SURGERY      cerival fusion        Review of patient's allergies indicates:   Allergen Reactions    Pcn [penicillins] Rash       Current Outpatient Prescriptions   Medication Sig Dispense Refill    acetaminophen (TYLENOL) 500 MG tablet Take 500 mg by mouth every 6 (six) hours as needed for Pain.      amitriptyline (ELAVIL) 25 MG tablet Take 2 tablets (50 mg total) by mouth every evening. 60 tablet 5    co-enzyme Q-10 30 mg capsule Take 30 mg by mouth once daily.      fish oil-omega-3 fatty acids 300-1,000 mg capsule Take 2 g by mouth once daily.      FOLIC ACID/MULTIVIT-MIN/LUTEIN (CENTRUM SILVER ORAL) Take by mouth once daily.      hydrocodone-acetaminophen 7.5-325mg (NORCO) 7.5-325 mg per tablet Take 1 tablet by mouth every 6 (six) hours as needed for Pain.      levothyroxine (SYNTHROID) 50 MCG tablet Take 50 mcg by mouth once daily.      meclizine (ANTIVERT) 25 mg tablet Take 25 mg by mouth.      melatonin 5 mg Tab Take by mouth.      meloxicam (MOBIC) 15 MG tablet Take 15 mg by mouth daily      methocarbamol (ROBAXIN) 500 MG Tab Take 500 mg by mouth 3 (three) times daily.      omeprazole (PRILOSEC) 20 MG capsule Take 20 mg by mouth once daily.  1    oxyCODONE-acetaminophen (PERCOCET)  mg per tablet Take 1 tablet by mouth every 6 (six) hours as needed for Pain. 60 tablet 0    promethazine (PHENERGAN) 25 MG tablet Take 1 tablet (25 mg total) by mouth every 6 (six) hours as needed for Nausea. 40 tablet 0    red yeast rice  "600 mg Cap Take by mouth.      traMADol (ULTRAM) 50 mg tablet Take 1 tablet (50 mg total) by mouth every 6 (six) hours as needed for Pain. 40 tablet 0    valsartan (DIOVAN) 80 MG tablet Take 40 mg by mouth once daily.   1     No current facility-administered medications for this visit.        Family History   Problem Relation Age of Onset    Heart disease Mother     Cancer Father        Social History     Social History    Marital status:      Spouse name: N/A    Number of children: N/A    Years of education: N/A     Occupational History    Not on file.     Social History Main Topics    Smoking status: Never Smoker    Smokeless tobacco: Never Used    Alcohol use No    Drug use: No    Sexual activity: Not on file     Other Topics Concern    Not on file     Social History Narrative    No narrative on file       Objective:     Vitals:    03/28/18 0905   BP: (!) 135/100   Pulse: 90   Temp: 97.8 °F (36.6 °C)   Weight: 108 kg (238 lb 1.6 oz)   Height: 6' 1" (1.854 m)   PainSc:   6     GEN:  Well developed, well nourished.  No acute distress.  No pain behavior.  HEENT:  No trauma.  Mucous membranes moist.  Nares patent bilaterally.  PSYCH: Normal affect. Thought content appropriate.  CHEST:  Breathing symmetric.  No audible wheezing.  ABD: Soft, non-tender, non-distended.  SKIN:  Warm, pink, dry.  No rash on exposed areas.    EXT:  No cyanosis, clubbing, or edema.  No color change or changes in nail or hair growth.  NEURO/MUSCULOSKELETAL:  Fully alert, oriented, and appropriate. Speech normal nella. No cranial nerve deficits.     C-Spine: Limited extension and lateral rotation.  Negative Spurling's bilaterally.      Previous physical exam:  4/5 motor strength in bilateral deltoids; however, this may be due to pain.  Otherwise 5/5 motor strength throughout upper extremities.   Sensory: No sensory deficit in the upper extremities.   Reflexes: 1+ and symmetric throughout.  Negative Akins's " bilaterally.  Gait: Antalgic.  Present trendelenburg sign bilaterally, L>R  SI Joint/Hip: Negative KHRIS bilaterally.  Negative FADIR bilaterally.  No TTP over lumbar paraspinals, bilateral SI joints, piriformis muscles, or GTB.    L-Spine:  Decreased ROM with pain on extension> flexion.  Positive facet loading bilaterally.  Negative SLR bilaterally.   TTP over left thoracic paraspinals.  Imaging:      Diagnostic Results:  All imaging was independently reviewed by me.  Below is a summary from Dr Fowler's note.  I concur with the below findings.     MRI T-spine, dated 8/8/16:  1. No significant central or neuroforaminal stenosis     MRI C-spine, dated 8/8/16:  1. No significant stenosis      Flex/Ex X-ray C-spine, dated 7/19/16:  1. No prevertebral swelling  2. No gross instability   3. Good hardware position     MRI C-spine from an outside facility, dated 1/28/2016:  1. Moderate to severe DDD  2. Disc osteophyte complex, worst at C5/6   3. Moderate stenosis at C4/5 and C6/7      CT C-spine from an outside facility, dated 4/2015:  1. Diffuse cervical spondylosis   2. DDD, worst at C5/6, with some ossification of the PLL at C5/6      CT C-spine from an outside facility, dated 3/17/2016:  1. Fusion surgery at C5/6  2. Hardware in good position       AP and Lateral X-ray C-spine from an outside facility:  1. C4-7 ACDF  2. Hardware in good position  3. Spine in good alignment    Assessment:     Encounter Diagnoses   Name Primary?    DDD (degenerative disc disease), cervical Yes    S/P cervical spinal fusion     Facet arthritis of cervical region     Cervical paraspinal muscle spasm     Myalgia     Chronic left shoulder pain     DDD (degenerative disc disease), lumbar     Facet arthritis, degenerative, lumbar spine     Chronic pain disorder     History of gastritis        Plan:     Sal was seen today for back pain and neck pain.    Diagnoses and all orders for this visit:    DDD (degenerative disc disease),  cervical  -     Discontinue: hydrocodone-acetaminophen 7.5-325mg (NORCO) 7.5-325 mg per tablet; Take 1 tablet by mouth 2 (two) times daily as needed for Pain.  -     Discontinue: hydrocodone-acetaminophen 7.5-325mg (NORCO) 7.5-325 mg per tablet; Take 1 tablet by mouth 2 (two) times daily as needed for Pain.  -     hydrocodone-acetaminophen 7.5-325mg (NORCO) 7.5-325 mg per tablet; Take 1 tablet by mouth 2 (two) times daily as needed for Pain.    S/P cervical spinal fusion  -     Discontinue: hydrocodone-acetaminophen 7.5-325mg (NORCO) 7.5-325 mg per tablet; Take 1 tablet by mouth 2 (two) times daily as needed for Pain.  -     Discontinue: hydrocodone-acetaminophen 7.5-325mg (NORCO) 7.5-325 mg per tablet; Take 1 tablet by mouth 2 (two) times daily as needed for Pain.  -     hydrocodone-acetaminophen 7.5-325mg (NORCO) 7.5-325 mg per tablet; Take 1 tablet by mouth 2 (two) times daily as needed for Pain.    Facet arthritis of cervical region  -     Discontinue: hydrocodone-acetaminophen 7.5-325mg (NORCO) 7.5-325 mg per tablet; Take 1 tablet by mouth 2 (two) times daily as needed for Pain.  -     Discontinue: hydrocodone-acetaminophen 7.5-325mg (NORCO) 7.5-325 mg per tablet; Take 1 tablet by mouth 2 (two) times daily as needed for Pain.  -     hydrocodone-acetaminophen 7.5-325mg (NORCO) 7.5-325 mg per tablet; Take 1 tablet by mouth 2 (two) times daily as needed for Pain.    Cervical paraspinal muscle spasm  -     Discontinue: hydrocodone-acetaminophen 7.5-325mg (NORCO) 7.5-325 mg per tablet; Take 1 tablet by mouth 2 (two) times daily as needed for Pain.  -     Discontinue: hydrocodone-acetaminophen 7.5-325mg (NORCO) 7.5-325 mg per tablet; Take 1 tablet by mouth 2 (two) times daily as needed for Pain.  -     hydrocodone-acetaminophen 7.5-325mg (NORCO) 7.5-325 mg per tablet; Take 1 tablet by mouth 2 (two) times daily as needed for Pain.    Myalgia  -     Discontinue: hydrocodone-acetaminophen 7.5-325mg (NORCO) 7.5-325 mg  per tablet; Take 1 tablet by mouth 2 (two) times daily as needed for Pain.  -     Discontinue: hydrocodone-acetaminophen 7.5-325mg (NORCO) 7.5-325 mg per tablet; Take 1 tablet by mouth 2 (two) times daily as needed for Pain.  -     hydrocodone-acetaminophen 7.5-325mg (NORCO) 7.5-325 mg per tablet; Take 1 tablet by mouth 2 (two) times daily as needed for Pain.    Chronic left shoulder pain  -     Discontinue: hydrocodone-acetaminophen 7.5-325mg (NORCO) 7.5-325 mg per tablet; Take 1 tablet by mouth 2 (two) times daily as needed for Pain.  -     Discontinue: hydrocodone-acetaminophen 7.5-325mg (NORCO) 7.5-325 mg per tablet; Take 1 tablet by mouth 2 (two) times daily as needed for Pain.  -     hydrocodone-acetaminophen 7.5-325mg (NORCO) 7.5-325 mg per tablet; Take 1 tablet by mouth 2 (two) times daily as needed for Pain.    DDD (degenerative disc disease), lumbar  -     Discontinue: hydrocodone-acetaminophen 7.5-325mg (NORCO) 7.5-325 mg per tablet; Take 1 tablet by mouth 2 (two) times daily as needed for Pain.  -     Discontinue: hydrocodone-acetaminophen 7.5-325mg (NORCO) 7.5-325 mg per tablet; Take 1 tablet by mouth 2 (two) times daily as needed for Pain.  -     hydrocodone-acetaminophen 7.5-325mg (NORCO) 7.5-325 mg per tablet; Take 1 tablet by mouth 2 (two) times daily as needed for Pain.    Facet arthritis, degenerative, lumbar spine  -     Discontinue: hydrocodone-acetaminophen 7.5-325mg (NORCO) 7.5-325 mg per tablet; Take 1 tablet by mouth 2 (two) times daily as needed for Pain.  -     Discontinue: hydrocodone-acetaminophen 7.5-325mg (NORCO) 7.5-325 mg per tablet; Take 1 tablet by mouth 2 (two) times daily as needed for Pain.  -     hydrocodone-acetaminophen 7.5-325mg (NORCO) 7.5-325 mg per tablet; Take 1 tablet by mouth 2 (two) times daily as needed for Pain.    Chronic pain disorder  -     Discontinue: hydrocodone-acetaminophen 7.5-325mg (NORCO) 7.5-325 mg per tablet; Take 1 tablet by mouth 2 (two) times daily  as needed for Pain.  -     Discontinue: hydrocodone-acetaminophen 7.5-325mg (NORCO) 7.5-325 mg per tablet; Take 1 tablet by mouth 2 (two) times daily as needed for Pain.  -     hydrocodone-acetaminophen 7.5-325mg (NORCO) 7.5-325 mg per tablet; Take 1 tablet by mouth 2 (two) times daily as needed for Pain.    History of gastritis  -     Ambulatory consult to Gastroenterology      His pain is consistent with the above.      We discussed the assessment and recommendations.  All available images were reviewed. We discussed the disease process, prognosis, treatment plan, and risks and benefits. The patient is aware of the risks and benefits of the medications being prescribed, common side effects, and proper usage. The following is the plan we agreed on:     1. Offer to repeat the cervical NGUYEN.  He would like to wait.  2. Can repeat L2/3 epidural as needed.  3. Can repeat L2-5 LMB RFA PRN (Coolief) when needed.  He would like to wait on this.  4. Continue Norco 7.5/325 twice daily as needed, #60, 2 refills.   reviewed and is appropriate.    5. Continue Pennsaid gel, concentrating on neck and left shoulder  6. Continue PT  7. We again discussed spinal cord stimulator which he will think about.  I can do the perc trial after looking at his MRI  8. We discussed FRP which he will also think about.  We would need to get him set up at the [facility]  1. GAP passed   9. Continue Robaxin   10. Continue Amitriptyline 25mg QHS   11. Continue Meloxicam 15 mg daily, benefits currently outweigh risks.  Will continue to reassess  12. Movantik 25 mg for OIC PRN  13. Continue gabapentin, increasing PRN  14. Continue Tylenol as needed.  15. RTC in 3 months or sooner if needed.      The above plan and management options were discussed at length with patient. Patient is in agreement with the above and verbalized understanding.     Ortho/SPM Exam

## 2018-04-03 ENCOUNTER — PATIENT MESSAGE (OUTPATIENT)
Dept: PAIN MEDICINE | Facility: CLINIC | Age: 53
End: 2018-04-03

## 2018-04-03 DIAGNOSIS — M25.50 POLYARTHRALGIA: ICD-10-CM

## 2018-04-03 DIAGNOSIS — G89.4 CHRONIC PAIN DISORDER: Primary | ICD-10-CM

## 2018-04-04 ENCOUNTER — LAB VISIT (OUTPATIENT)
Dept: LAB | Facility: HOSPITAL | Age: 53
End: 2018-04-04
Payer: MEDICARE

## 2018-04-04 ENCOUNTER — OFFICE VISIT (OUTPATIENT)
Dept: GASTROENTEROLOGY | Facility: CLINIC | Age: 53
End: 2018-04-04
Payer: MEDICARE

## 2018-04-04 ENCOUNTER — TELEPHONE (OUTPATIENT)
Dept: ENDOSCOPY | Facility: HOSPITAL | Age: 53
End: 2018-04-04

## 2018-04-04 ENCOUNTER — TELEPHONE (OUTPATIENT)
Dept: GASTROENTEROLOGY | Facility: CLINIC | Age: 53
End: 2018-04-04

## 2018-04-04 VITALS
HEIGHT: 73 IN | HEART RATE: 76 BPM | SYSTOLIC BLOOD PRESSURE: 123 MMHG | BODY MASS INDEX: 31.79 KG/M2 | DIASTOLIC BLOOD PRESSURE: 83 MMHG | WEIGHT: 239.88 LBS

## 2018-04-04 DIAGNOSIS — K29.70 GASTRITIS, PRESENCE OF BLEEDING UNSPECIFIED, UNSPECIFIED CHRONICITY, UNSPECIFIED GASTRITIS TYPE: ICD-10-CM

## 2018-04-04 DIAGNOSIS — Z87.19 HISTORY OF GASTRITIS: ICD-10-CM

## 2018-04-04 DIAGNOSIS — K29.70 GASTRITIS, PRESENCE OF BLEEDING UNSPECIFIED, UNSPECIFIED CHRONICITY, UNSPECIFIED GASTRITIS TYPE: Primary | ICD-10-CM

## 2018-04-04 DIAGNOSIS — Z79.899 ENCOUNTER FOR MONITORING LONG-TERM PROTON PUMP INHIBITOR THERAPY: Primary | ICD-10-CM

## 2018-04-04 DIAGNOSIS — Z51.81 ENCOUNTER FOR MONITORING LONG-TERM PROTON PUMP INHIBITOR THERAPY: ICD-10-CM

## 2018-04-04 DIAGNOSIS — Z12.11 SPECIAL SCREENING FOR MALIGNANT NEOPLASMS, COLON: Primary | ICD-10-CM

## 2018-04-04 DIAGNOSIS — Z79.899 ENCOUNTER FOR MONITORING LONG-TERM PROTON PUMP INHIBITOR THERAPY: ICD-10-CM

## 2018-04-04 DIAGNOSIS — Z12.11 SCREEN FOR COLON CANCER: ICD-10-CM

## 2018-04-04 DIAGNOSIS — Z51.81 ENCOUNTER FOR MONITORING LONG-TERM PROTON PUMP INHIBITOR THERAPY: Primary | ICD-10-CM

## 2018-04-04 LAB
25(OH)D3+25(OH)D2 SERPL-MCNC: 32 NG/ML
MAGNESIUM SERPL-MCNC: 2.3 MG/DL
VIT B12 SERPL-MCNC: 701 PG/ML

## 2018-04-04 PROCEDURE — 99999 PR PBB SHADOW E&M-EST. PATIENT-LVL IV: CPT | Mod: PBBFAC,,, | Performed by: NURSE PRACTITIONER

## 2018-04-04 PROCEDURE — 82607 VITAMIN B-12: CPT

## 2018-04-04 PROCEDURE — 82306 VITAMIN D 25 HYDROXY: CPT

## 2018-04-04 PROCEDURE — 99204 OFFICE O/P NEW MOD 45 MIN: CPT | Mod: S$PBB,,, | Performed by: NURSE PRACTITIONER

## 2018-04-04 PROCEDURE — 83735 ASSAY OF MAGNESIUM: CPT

## 2018-04-04 PROCEDURE — 99214 OFFICE O/P EST MOD 30 MIN: CPT | Mod: PBBFAC | Performed by: NURSE PRACTITIONER

## 2018-04-04 PROCEDURE — 36415 COLL VENOUS BLD VENIPUNCTURE: CPT

## 2018-04-04 RX ORDER — OMEPRAZOLE 20 MG/1
20 CAPSULE, DELAYED RELEASE ORAL DAILY
Qty: 90 CAPSULE | Refills: 3 | Status: SHIPPED | OUTPATIENT
Start: 2018-04-04 | End: 2018-04-04 | Stop reason: SDUPTHER

## 2018-04-04 RX ORDER — NALOXEGOL OXALATE 25 MG/1
25 TABLET, FILM COATED ORAL
COMMUNITY
End: 2020-03-05

## 2018-04-04 RX ORDER — POLYETHYLENE GLYCOL 3350, SODIUM SULFATE ANHYDROUS, SODIUM BICARBONATE, SODIUM CHLORIDE, POTASSIUM CHLORIDE 236; 22.74; 6.74; 5.86; 2.97 G/4L; G/4L; G/4L; G/4L; G/4L
4 POWDER, FOR SOLUTION ORAL ONCE
Qty: 4000 ML | Refills: 0 | Status: SHIPPED | OUTPATIENT
Start: 2018-04-04 | End: 2018-04-04

## 2018-04-04 RX ORDER — GABAPENTIN 100 MG/1
CAPSULE ORAL 2 TIMES DAILY
COMMUNITY
Start: 2018-03-12 | End: 2019-09-18

## 2018-04-04 RX ORDER — OMEPRAZOLE 20 MG/1
20 CAPSULE, DELAYED RELEASE ORAL DAILY
Qty: 90 CAPSULE | Refills: 3 | Status: SHIPPED | OUTPATIENT
Start: 2018-04-04 | End: 2019-05-15 | Stop reason: SDUPTHER

## 2018-04-04 NOTE — PROGRESS NOTES
[facility] Note    Reason for Visit:  The primary encounter diagnosis was Encounter for monitoring long-term proton pump inhibitor therapy. Diagnoses of History of gastritis and Screen for colon cancer were also pertinent to this visit.    PCP:   Mare Soliz   [address] / [facility] [address]    Referring MD:  Kasandra Montoya Md  [address]  [address]  [place] [address]    HPI:  This is a 53 y.o. male here for evaluation of long term PPI use.     Takes meloxicam 15 mg once daily for arthritis in back x several years.  Taking omeprazole 20 mg once daily for gastro protection x 1 year.  Per pt, EGD last year with gastritis, esophagitis per pt report.     Abdominal pain - no  Reflux - no  Dysphagia/odynophagia - no   Bowel habits - normal. Takes Movantik d/t chronic narcotic pain med use.  GI bleeding - denies hematochezia, hematemesis, melena, BRBPR, black/tarry stools, and coffee ground emesis  NSAID usage - Meloxiam as above        ROS:  Constitutional: No fevers, no chills, No unintentional weight loss, no fatigue,   ENT: No allergies  CV: No chest pain, no palpitations, no perif. edema, no sob on exertion  Pulm: No cough, No shortness of breath, no wheezes, no sputum  Ophtho: No vision changes  GI: see HPI; also no nausea, no vomiting, no change in appetite  Derm: No rash  Heme: No lymphadenopathy, No bruising  MSK: +arthritis, no muscle pain, no muscle weakness  : No dysuria, No hematuria  Endo: No hot or cold intolerance  Neuro: No syncope, No seizure,       Medical History:  has a past medical history of Arthritis; Cataract; Cervical back pain with evidence of disc disease; Hiatal hernia; Hypertension; and Thyroid disease.    Surgical History:  has a past surgical history that includes cataracts (Bilateral); Spine surgery; Rotator cuff repair (Right); cromioclavicular joint cyst excision (Right); Radiofrequency ablation (10/2016); Eye surgery; Back  surgery; NGUYEN; and Cervical fusion.    Family History: family history includes Cancer in his father; Heart disease in his mother; Leukemia in his brother..     Social History:  reports that he has never smoked. He has never used smokeless tobacco. He reports that he does not drink alcohol or use drugs.    Review of patient's allergies indicates:   Allergen Reactions    Pcn [penicillins] Hives and Rash    Lipitor [atorvastatin] Other (See Comments)     Muscle cramps, weakness       Current Outpatient Prescriptions   Medication Sig    acetaminophen (TYLENOL) 500 MG tablet Take 500 mg by mouth every 6 (six) hours as needed for Pain.    co-enzyme Q-10 30 mg capsule Take 30 mg by mouth once daily.    fish oil-omega-3 fatty acids 300-1,000 mg capsule Take 2 g by mouth once daily.    FOLIC ACID/MULTIVIT-MIN/LUTEIN (CENTRUM SILVER ORAL) Take by mouth once daily.    gabapentin (NEURONTIN) 100 MG capsule 2 (two) times daily.     [START ON 5/26/2018] hydrocodone-acetaminophen 7.5-325mg (NORCO) 7.5-325 mg per tablet Take 1 tablet by mouth 2 (two) times daily as needed for Pain.    levothyroxine (SYNTHROID) 50 MCG tablet Take 50 mcg by mouth once daily.    meclizine (ANTIVERT) 25 mg tablet Take 25 mg by mouth once daily.     meloxicam (MOBIC) 15 MG tablet Take 15 mg by mouth daily    methocarbamol (ROBAXIN) 500 MG Tab Take 500 mg by mouth 3 (three) times daily.    naloxegol (MOVANTIK) tablet Take 25 mg by mouth as needed.    promethazine (PHENERGAN) 25 MG tablet Take 1 tablet (25 mg total) by mouth every 6 (six) hours as needed for Nausea.    red yeast rice 600 mg Cap Take by mouth once daily.     valsartan (DIOVAN) 80 MG tablet Take 40 mg by mouth once daily.     amitriptyline (ELAVIL) 25 MG tablet Take 2 tablets (50 mg total) by mouth every evening.    omeprazole (PRILOSEC) 20 MG capsule Take 1 capsule (20 mg total) by mouth once daily.    polyethylene glycol (GOLYTELY,NULYTELY) 236-22.74-6.74 -5.86 gram  "suspension Take 4,000 mLs (4 L total) by mouth once. Pt waiting     No current facility-administered medications for this visit.        Objective Findings:    Vital Signs:  /83   Pulse 76   Ht 6' 1" (1.854 m)   Wt 108.8 kg (239 lb 13.8 oz)   BMI 31.65 kg/m²   Body mass index is 31.65 kg/m².    Physical Exam:  General Appearance: Well appearing in no acute distress  Head:   Normocephalic, without obvious abnormality  Eyes:    No scleral icterus, EOMI  ENT: Neck supple, Lips, mucosa, and tongue normal; teeth and gums normal  Lungs: CTA bilaterally in anterior and posterior fields, no wheezes, no crackles.  Heart:  Regular rate and rhythm, S1, S2 normal, no murmurs heard  Abdomen: Soft, non tender, non distended with positive bowel sounds in all four quadrants. No hepatosplenomegaly, ascites, or mass  Extremities: 2+ radial pulses, no clubbing, cyanosis or edema  Skin: No rash to exposed areas  Neurologic: A&Ox4      Labs:  Lab Results   Component Value Date    HGB 14.6 11/28/2017    HCT 42.7 11/28/2017     11/28/2017    K 3.6 11/28/2017     11/28/2017    CREATININE 0.8 11/28/2017    BUN 11 11/28/2017    CO2 27 11/28/2017         Endoscopy:    Per pt, EGD last year-irritation in esophagus and stomach  Colonoscopy- none    Assessment:  1. Encounter for monitoring long-term proton pump inhibitor therapy    2. History of gastritis    3. Screen for colon cancer           Recommendations:  1.  Pt made aware of slightly increased risk of CAP, C diff infections and decreased mag, vit D and Vit B- 12 levels with long term PPI use. Yearly Mag, B 12 and Vit D levels recomended. Routine bone mineral densities per PCP recommended. Pt instructed to contact PCP for s/s of lung infection (fever, chills, CP, SOB, and/or productive cough) or GI infection (profuse, watery diarrhea with or without rectal bleeding, severe abd pain,and/or fever). Vitamin D, B12, mag levels today.    2. Risk vs benefits of daily PPI " for gastro protection while on melxicam discussed. Questions answered to pt satisfaction. Handout for PPI weaning provided.    3. Screening colonoscopy ordered.    F/u yearly for long term PPI use.       Order summary:  Orders Placed This Encounter    Vitamin D    Magnesium    Vitamin B12    Case request GI: COLONOSCOPY         Thank you so much for allowing me to participate in the care of Sal Overton, APRN, FNP-C

## 2018-04-04 NOTE — LETTER
April 4, 2018      Kasandra Montoya MD  [address]  [address]  [facility] [address]           [facility]  [address]  [place] [address]  Phone: [phone]  Fax: [phone]          Patient: Sal Navarro   MR Number: [id number]   YOB: 1965   Date of Visit: 4/4/2018       Dear Dr. Kasandra Montoya:    Thank you for referring Sal Navarro to me for evaluation. Attached you will find relevant portions of my assessment and plan of care.    If you have questions, please do not hesitate to call me. I look forward to following Sal Navarro along with you.    Sincerely,    Zenia Overton, WILLIAM    Enclosure  CC:  No Recipients    If you would like to receive this communication electronically, please contact [email or web address] or [phone] to request more information on [facility] Link access.    For providers and/or their staff who would like to refer a patient to [facility], please contact us through our one-stop-shop provider referral line, [facility], at [phone].    If you feel you have received this communication in error or would no longer like to receive these types of communications, please e-mail [email or web address]

## 2018-04-04 NOTE — TELEPHONE ENCOUNTER
----- Message from La Wild MA sent at 4/4/2018  8:54 AM CDT -----  Tiff  If you send Prilosec RX, pt would like it sent to [facility] for 90 day supply.

## 2018-04-04 NOTE — PATIENT INSTRUCTIONS
*There is a slightly increased risk of pneumonia infection, C diff infections and decreased magnesium, vitamin D and Vitamin B- 12 levels with long term PPI (ie nexium, prilosec, prevacid, dexilant, protonix) use. Yearly Magnesium, B 12 and Vitamin D levels are recommended through your primary care provider (PCP). Routine bone mineral densities by your  PCP recommended. Contact your PCP for sings and symptoms of lung infection (fever, chills, CP, SOB, and/or productive cough) or Gastro infection (profuse, watery diarrhea with or without rectal bleeding, severe abd pain,and/or fever).    If you abruptly stop taking a PPI (if you have been using it for 12 weeks or more), you will likely experience rebound acid reflux as your stomach acid pumps reactivate. Therefore, it is important to wean off of your PPI slowly and let your body readjust. Wean off in this manner:    1. Take your PPI every other day for 2 weeks. Alternately try half dose daily for a week, then every other day for a week.  2. Then take your PPI every 3rd day for 1 week.  3. Then take your PPI just once a week.  4. Try to stop altogether, and use as/if needed    If symptoms return, go back to the lowest dose necessary to control your symptoms (Good control means symptoms of reflux occurring less than twice per week).     If you will be on the medication daily you should follow up in GI clinic once a year.    *You may try to control your symptoms of reflux with a GERD healthy lifestyle and with alternative, more holistic options as detailed below:    GERD Healthy Lifestyle:    Remain upright for at least 3 hours after eating.    Elevate the head of the bead about 6 inches.  Some patients place cinder blocks under the head of the bed for elevation.    Avoid foods that you have noticed make your symptoms worse.    Set a weight loss goal of 10% of your body weight. (For example, if you weigh 150 lbs, your goal should be to loose 15 lbs).    You may take  over the counter Zantac/ranitadine as directed, as needed for breakthrough symptoms.     Change what you eat:  Eat smaller meals  Eat slowly and chew thoroughly until food is almost liquid  Cut down on junk carbohydrates such as sugar and white flour  Use herbs in your cooking  Eat more raw foods (more than 10 ingredients is not a raw food)  Avoid trans fats and partially hydrogenated oils  Eat more fish and switch to grass fed beef  Switch your cooking oil to macadamia nut or olive oil  Watch extremes of salt intake (too high or too low is bad)    Change these habits:  Stop smoking  Eat dinner earlier (3-4 hours before lying down to sleep)  Exercise (but wait 2 hours after eating)  Drink more water (between meals)    Worst Foods for Acid Reflux  Chocolate (milk chocolate worse than dark chocolate)  Soda (all carbonated beverages)  Alcohol (beer, liquor, wine)  Fried foods  Brown, sausage, ribs  Cream sauce  Fatty meats (beef)  Butter, margarine, lard, shortening  Coffee, tea  Mint   High fat nuts  Hot sauces and pepper  Citrus fruit/juices  Maybe bad foods (Everyone is unique)  Tomatoes  Garlic  Onion  Nuts (macadamia nuts)  Apples (especially green)  Cucumber  Green peppers  Spicy food  Some herbal teas      Alternative measures to control GERD    Take these supplements:  Multi vitamin  Probiotic  Fish oil    All natural immediate relief:  Chew 2-3 soft probiotic capsules - Dr. Meza's Probiotics 12 Plus  Chew chewable DGL licorice tablet  Chew papaya tablet with high protein meal - [employer]  Drink 2 ounces of aloe vera juice  Swedish bitters  Prelief- reduce the acid in food to keep it form burning sensitive tissue  Iberogast  S[place]ppery Elm  Drink Chamomile Tea  Teaspoon of baking soda in water  Spoonful of vinegar in water      All natural ulcer healers:  Zinc carnosine - 75.5 mg with food twice a day x 8 weeks   Earlene Schwab - $8 for 60 pills  DGL (deglycyrrhizinated licorice) - 2 tablets before  meals. Heals stomach lining   Natural Factors brand, Enzymatic therapy brand.  Aloe Vera juice  - 2 to 8 ounces a day   [place] or Ev of the Desert      NSAIDS are Non Steroidal Anti Inflammatory Drugs taken for pain.   You should stay away from these medications (unless they are advised due to cardiac health issues) as they may cause irritation and/or ulceration in the lining of your stomach.  Ask your primary care physician for an alternative medication.    Powders - BC Powder, Goody powder, Stanback    Ibuprofen which is also known as : Advil, Advil Childrens, Advil Colt Strength, Advil Liquigel, Advil Migraine, Advil Pediatric, Childrens Ibuprofen Berry, Genpril, IBU, Midol IB, Midol Maximum Strength Cramp Formula, Dolgesic, Motrin Childrens, Motrin IB, Motrin Infant Drops, Motrin Colt Strength, Motrin Migraine Pain, Nuprin, Migraine Liqui-gels, Ibu-Tab 200, Cap-Profen, Tab-Profen, Profen, Ibuprohm, Childrens Elixsure, IB Pro, Vicoprofen, Combunox, A-G Profen, Actiprofen, Addaprin, Advil Infants Concentrated Drops, Caldolor, Haltran, Q-Profen, Ibifon 600, Ibren, Menadol, Midol Cramps & Bodyaches, Rufen, Saleto-200, [place], Ultraprin, Uni-Pro, Wal-Profen., Famotidine and Ibuprofen can be found as : Duexis.    Aspirin which has the brand name : Arthritis Pain, Aspergum, Aspir-Low, Aspirin Lite Coat, Veto Aspirin, Bufferin, Easprin, Ecotrin, Empirin, Fasprin, Genacote, Halfprin, [place] Aspirin, [place] Aspirin, Stanback Analgesic, Tri-Buffered Aspirin, YSP Aspirin, Zorprin.    Ketoprofen which is  known as : Orudis KT, Oruvail, Actron.    Sulindac which is sold as : Clinoril.    Naproxen is one of the most known drug from NSAIDs list, and is sold as : Aleve, Naprosyn, EC-Naprosyn, Naprelan, Anaprox, All Day Pain Relief, Aflaxen, All Day Relief, Anaprox-DS, Comfort Pac  With Naproxen,  Aleve Caplet, Aleve Easy Open Arthritis, Aleve Gelcap,  Anaprox-DS, EC-Naprosyn, Leader Naproxen Sodium, Midol Extended  Relief, Naprelan 375?, Naprelan 500?, Naprelan 750?, Prevacid NapraPac 375,  Prevacid NapraPac, Naprapac, Vimovo.    Etodolac is sold under the brand name : Lodine XL, Lodine.  Fenoprofen which can be found on the market as : Nalfon, Nalfon 200.  Diclofenac which is sold as : Arthrotec, Cataflam, Voltaren, Cambia, Voltaren-XR, Zipsor.  Flurbiprofen sold as : Asaid.  Ketorolac is sold under the brand name : Sprix, Toradol, Toradol IM, Toradol IV/IM.  Piroxicam is found on the market as : Feldene.  Indomethacin which is known as : Indocin SR, Indocin, Indo-Copeland, Indomethegan, Indocin IV.  Mefenamic Acid sold as : Ponstel.  Meloxicam is sold under the brand name : Mobic.  Nabumetone which is sold as : Relafen.  Oxaprozin which has the brand name : Daypro.  Ketoprofen which has the brand name : Actron, Orudis KT, Orudis, Oruvail.  Meclofenamate sold as : Meclomen.  Tolmetin which can be found on the marked as : Tolectin, Tolectin 600, Tolectin DS.  Salsalate which is sold as : Disalcid.

## 2018-04-26 ENCOUNTER — PATIENT MESSAGE (OUTPATIENT)
Dept: PAIN MEDICINE | Facility: CLINIC | Age: 53
End: 2018-04-26

## 2018-04-27 ENCOUNTER — HOSPITAL ENCOUNTER (OUTPATIENT)
Facility: HOSPITAL | Age: 53
Discharge: HOME | End: 2018-04-27
Attending: INTERNAL MEDICINE | Admitting: INTERNAL MEDICINE
Payer: MEDICARE

## 2018-04-27 ENCOUNTER — ANESTHESIA EVENT (OUTPATIENT)
Dept: ENDOSCOPY | Facility: HOSPITAL | Age: 53
End: 2018-04-27
Payer: MEDICARE

## 2018-04-27 ENCOUNTER — ANESTHESIA (OUTPATIENT)
Dept: ENDOSCOPY | Facility: HOSPITAL | Age: 53
End: 2018-04-27
Payer: MEDICARE

## 2018-04-27 ENCOUNTER — SURGERY (OUTPATIENT)
Age: 53
End: 2018-04-27

## 2018-04-27 VITALS
HEIGHT: 73 IN | TEMPERATURE: 97 F | OXYGEN SATURATION: 95 % | BODY MASS INDEX: 31.81 KG/M2 | HEART RATE: 72 BPM | SYSTOLIC BLOOD PRESSURE: 117 MMHG | RESPIRATION RATE: 20 BRPM | WEIGHT: 240 LBS | DIASTOLIC BLOOD PRESSURE: 79 MMHG

## 2018-04-27 DIAGNOSIS — Z12.11 SCREENING FOR COLON CANCER: ICD-10-CM

## 2018-04-27 DIAGNOSIS — Z12.11 COLON CANCER SCREENING: Primary | ICD-10-CM

## 2018-04-27 PROCEDURE — D9220A PRA ANESTHESIA: Mod: CRNA,,, | Performed by: NURSE ANESTHETIST, CERTIFIED REGISTERED

## 2018-04-27 PROCEDURE — 63600175 PHARM REV CODE 636 W HCPCS: Performed by: NURSE ANESTHETIST, CERTIFIED REGISTERED

## 2018-04-27 PROCEDURE — 37000008 HC ANESTHESIA 1ST 15 MINUTES: Performed by: INTERNAL MEDICINE

## 2018-04-27 PROCEDURE — D9220A PRA ANESTHESIA: Mod: ANES,,, | Performed by: ANESTHESIOLOGY

## 2018-04-27 PROCEDURE — 37000009 HC ANESTHESIA EA ADD 15 MINS: Performed by: INTERNAL MEDICINE

## 2018-04-27 PROCEDURE — G0121 COLON CA SCRN NOT HI RSK IND: HCPCS | Mod: ,,, | Performed by: INTERNAL MEDICINE

## 2018-04-27 PROCEDURE — G0121 COLON CA SCRN NOT HI RSK IND: HCPCS | Performed by: INTERNAL MEDICINE

## 2018-04-27 PROCEDURE — 25000003 PHARM REV CODE 250: Performed by: INTERNAL MEDICINE

## 2018-04-27 RX ORDER — LIDOCAINE HCL/PF 100 MG/5ML
SYRINGE (ML) INTRAVENOUS
Status: DISCONTINUED | OUTPATIENT
Start: 2018-04-27 | End: 2018-04-27

## 2018-04-27 RX ORDER — SODIUM CHLORIDE 9 MG/ML
INJECTION, SOLUTION INTRAVENOUS CONTINUOUS
Status: DISCONTINUED | OUTPATIENT
Start: 2018-04-27 | End: 2018-04-27 | Stop reason: HOSPADM

## 2018-04-27 RX ORDER — PROPOFOL 10 MG/ML
VIAL (ML) INTRAVENOUS
Status: DISCONTINUED | OUTPATIENT
Start: 2018-04-27 | End: 2018-04-27

## 2018-04-27 RX ADMIN — LIDOCAINE HYDROCHLORIDE 100 MG: 20 INJECTION, SOLUTION INTRAVENOUS at 11:04

## 2018-04-27 RX ADMIN — PROPOFOL 50 MG: 10 INJECTION, EMULSION INTRAVENOUS at 11:04

## 2018-04-27 RX ADMIN — PROPOFOL 100 MG: 10 INJECTION, EMULSION INTRAVENOUS at 11:04

## 2018-04-27 RX ADMIN — SODIUM CHLORIDE: 9 INJECTION, SOLUTION INTRAVENOUS at 10:04

## 2018-04-27 NOTE — TELEPHONE ENCOUNTER
I don't see that we have set up a rheum appointment for him.  Can you please schedule this for him?

## 2018-04-27 NOTE — PROVATION PATIENT INSTRUCTIONS
Discharge Summary/Instructions after an Endoscopic Procedure  Patient Name: Sal Navarro  Patient MRN: [id number]  Patient YOB: 1965 Friday, April 27, 2018  Giovani Medeiros MD  RESTRICTIONS:  During your procedure today, you received medications for sedation.  These   medications may affect your judgment, balance and coordination.  Therefore,   for 24 hours, you have the following restrictions:   - DO NOT drive a car, operate machinery, make legal/financial decisions,   sign important papers or drink alcohol.    ACTIVITY:  The following day: return to full activity including work, except no heavy   lifting, straining or running for 3 days if polyps were removed.  DIET:  Eat and drink normally unless instructed otherwise.     TREATMENT FOR COMMON SIDE EFFECTS:  - Mild abdominal pain, nausea, belching, bloating or excessive gas:  rest,   eat lightly and use a heating pad.  - Sore Throat: treat with throat lozenges and/or gargle with warm salt   water.  - Because air was used during the procedure, expelling large amounts of air   from your rectum or belching is normal.  - If a bowel prep was taken, you may not have a bowel movement for 1-3 days.    This is normal.  SYMPTOMS TO WATCH FOR AND REPORT TO YOUR PHYSICIAN:  1. Abdominal pain or bloating, other than gas cramps.  2. Chest pain.  3. Back pain.  4. Signs of infection such as: chills or fever occurring within 24 hours   after the procedure.  5. Rectal bleeding, which would show as bright red, maroon, or black stools.   (A tablespoon of blood from the rectum is not serious, especially if   hemorrhoids are present.)  6. Vomiting.  7. Weakness or dizziness.  GO DIRECTLY TO THE NEAREST EMERGENCY ROOM IF YOU HAVE ANY OF THE FOLLOWING:      Difficulty breathing              Chills and/or fever over 101 F   Persistent vomiting and/or vomiting blood   Severe abdominal pain   Severe chest pain   Black, tarry stools   Bleeding- more than one tablespoon   Any  other symptom or condition that you feel may need urgent attention  Your doctor recommends these additional instructions:  If any biopsies were taken, your doctors clinic will contact you in 1 to 2   weeks with any results.  - Patient has a contact number available for emergencies.  The signs and   symptoms of potential delayed complications were discussed with the   patient.  Return to normal activities tomorrow.  Written discharge   instructions were provided to the patient.   - Discharge patient to home.   - Resume previous diet.   - Continue present medications.   - Repeat colonoscopy in 10 years for screening purposes.   For questions, problems or results please call your physician - Giovani Medeiros MD at Work:  [phone].  [facility], EMERGENCY ROOM PHONE NUMBER: [phone]  IF A COMPLICATION OR EMERGENCY SITUATION ARISES AND YOU ARE UNABLE TO REACH   YOUR PHYSICIAN - GO DIRECTLY TO THE EMERGENCY ROOM.  Giovani Medeiros MD  4/27/2018 11:49:19 AM  This report has been verified and signed electronically.

## 2018-04-27 NOTE — TRANSFER OF CARE
"Anesthesia Transfer of Care Note    Patient: Sal Navarro    Procedure(s) Performed: Procedure(s) (LRB):  COLONOSCOPY (N/A)    Patient location: GI    Anesthesia Type: general    Transport from OR: Transported from OR on room air with adequate spontaneous ventilation    Post pain: adequate analgesia    Post assessment: no apparent anesthetic complications    Post vital signs: stable    Level of consciousness: sedated    Nausea/Vomiting: no nausea/vomiting    Complications: none    Transfer of care protocol was followed      Last vitals:   Visit Vitals  /80 (BP Location: Left arm, Patient Position: Lying)   Pulse 71   Temp 37.1 °C (98.8 °F) (Temporal)   Resp 18   Ht 6' 1" (1.854 m)   Wt 108.9 kg (240 lb)   SpO2 95%   BMI 31.66 kg/m²     "

## 2018-04-27 NOTE — ANESTHESIA PREPROCEDURE EVALUATION
04/27/2018  Sal Navarro is a 53 y.o., male.    Anesthesia Evaluation    I have reviewed the Patient Summary Reports.     I have reviewed the Medications.     Review of Systems  Anesthesia Hx:  No problems with previous Anesthesia  History of prior surgery of interest to airway management or planning:  Denies Personal Hx of Anesthesia complications.   Social:  Non-Smoker    Cardiovascular:   Hypertension    Pulmonary:   Denies Asthma.  Denies Sleep Apnea.    Musculoskeletal:   Arthritis  Shoulder surgery    Neurological:   Cervical fusion.       Physical Exam  General:  Well nourished    Airway/Jaw/Neck:  Airway Findings: Mouth Opening: Normal General Airway Assessment: Adult  Mallampati: III  Improves to II with phonation.  TM Distance: Normal, at least 6 cm  Jaw/Neck Findings:  Neck ROM: Normal ROM      Dental:  Dental Findings: In tact   Chest/Lungs:  Chest/Lungs Clear    Heart/Vascular:  Heart Findings: Normal       Mental Status:  Mental Status Findings:  Cooperative, Alert and Oriented         Anesthesia Plan  Type of Anesthesia, risks & benefits discussed:  Anesthesia Type:  general  Patient's Preference:   Intra-op Monitoring Plan: standard ASA monitors  Intra-op Monitoring Plan Comments:   Post Op Pain Control Plan:   Post Op Pain Control Plan Comments:   Induction:   IV  Beta Blocker:  Patient is not currently on a Beta-Blocker (No further documentation required).       Informed Consent: Patient understands risks and agrees with Anesthesia plan.  Questions answered. Anesthesia consent signed with patient.  ASA Score: 3     Day of Surgery Review of History & Physical:            Ready For Surgery From Anesthesia Perspective.

## 2018-04-27 NOTE — ANESTHESIA POSTPROCEDURE EVALUATION
"Anesthesia Post Evaluation    Patient: Sal Navarro    Procedure(s) Performed: Procedure(s) (LRB):  COLONOSCOPY (N/A)    Final Anesthesia Type: general  Patient location during evaluation: GI PACU  Level of consciousness: awake and alert  Post-procedure vital signs: reviewed and stable  Pain management: adequate  Airway patency: patent  PONV status at discharge: No PONV  Anesthetic complications: no      Cardiovascular status: blood pressure returned to baseline  Respiratory status: unassisted and spontaneous ventilation  Hydration status: euvolemic  Follow-up not needed.        Visit Vitals  /79   Pulse 72   Temp 36.3 °C (97.3 °F)   Resp 20   Ht 6' 1" (1.854 m)   Wt 108.9 kg (240 lb)   SpO2 95%   BMI 31.66 kg/m²       Pain/Rosemary Score: Pain Assessment Performed: Yes (4/27/2018 12:21 PM)  Presence of Pain: denies (4/27/2018 12:21 PM)  Rosemary Score: 10 (4/27/2018 12:20 PM)      "

## 2018-04-27 NOTE — H&P (VIEW-ONLY)
[facility] Note    Reason for Visit:  The primary encounter diagnosis was Encounter for monitoring long-term proton pump inhibitor therapy. Diagnoses of History of gastritis and Screen for colon cancer were also pertinent to this visit.    PCP:   Mare Soliz   [address] / [facility] [address]    Referring MD:  Kasandra Montoya Md  [address]  [address]  [place] [address]    HPI:  This is a 53 y.o. male here for evaluation of long term PPI use.     Takes meloxicam 15 mg once daily for arthritis in back x several years.  Taking omeprazole 20 mg once daily for gastro protection x 1 year.  Per pt, EGD last year with gastritis, esophagitis per pt report.     Abdominal pain - no  Reflux - no  Dysphagia/odynophagia - no   Bowel habits - normal. Takes Movantik d/t chronic narcotic pain med use.  GI bleeding - denies hematochezia, hematemesis, melena, BRBPR, black/tarry stools, and coffee ground emesis  NSAID usage - Meloxiam as above        ROS:  Constitutional: No fevers, no chills, No unintentional weight loss, no fatigue,   ENT: No allergies  CV: No chest pain, no palpitations, no perif. edema, no sob on exertion  Pulm: No cough, No shortness of breath, no wheezes, no sputum  Ophtho: No vision changes  GI: see HPI; also no nausea, no vomiting, no change in appetite  Derm: No rash  Heme: No lymphadenopathy, No bruising  MSK: +arthritis, no muscle pain, no muscle weakness  : No dysuria, No hematuria  Endo: No hot or cold intolerance  Neuro: No syncope, No seizure,       Medical History:  has a past medical history of Arthritis; Cataract; Cervical back pain with evidence of disc disease; Hiatal hernia; Hypertension; and Thyroid disease.    Surgical History:  has a past surgical history that includes cataracts (Bilateral); Spine surgery; Rotator cuff repair (Right); cromioclavicular joint cyst excision (Right); Radiofrequency ablation (10/2016); Eye surgery; Back  surgery; NGUYEN; and Cervical fusion.    Family History: family history includes Cancer in his father; Heart disease in his mother; Leukemia in his brother..     Social History:  reports that he has never smoked. He has never used smokeless tobacco. He reports that he does not drink alcohol or use drugs.    Review of patient's allergies indicates:   Allergen Reactions    Pcn [penicillins] Hives and Rash    Lipitor [atorvastatin] Other (See Comments)     Muscle cramps, weakness       Current Outpatient Prescriptions   Medication Sig    acetaminophen (TYLENOL) 500 MG tablet Take 500 mg by mouth every 6 (six) hours as needed for Pain.    co-enzyme Q-10 30 mg capsule Take 30 mg by mouth once daily.    fish oil-omega-3 fatty acids 300-1,000 mg capsule Take 2 g by mouth once daily.    FOLIC ACID/MULTIVIT-MIN/LUTEIN (CENTRUM SILVER ORAL) Take by mouth once daily.    gabapentin (NEURONTIN) 100 MG capsule 2 (two) times daily.     [START ON 5/26/2018] hydrocodone-acetaminophen 7.5-325mg (NORCO) 7.5-325 mg per tablet Take 1 tablet by mouth 2 (two) times daily as needed for Pain.    levothyroxine (SYNTHROID) 50 MCG tablet Take 50 mcg by mouth once daily.    meclizine (ANTIVERT) 25 mg tablet Take 25 mg by mouth once daily.     meloxicam (MOBIC) 15 MG tablet Take 15 mg by mouth daily    methocarbamol (ROBAXIN) 500 MG Tab Take 500 mg by mouth 3 (three) times daily.    naloxegol (MOVANTIK) tablet Take 25 mg by mouth as needed.    promethazine (PHENERGAN) 25 MG tablet Take 1 tablet (25 mg total) by mouth every 6 (six) hours as needed for Nausea.    red yeast rice 600 mg Cap Take by mouth once daily.     valsartan (DIOVAN) 80 MG tablet Take 40 mg by mouth once daily.     amitriptyline (ELAVIL) 25 MG tablet Take 2 tablets (50 mg total) by mouth every evening.    omeprazole (PRILOSEC) 20 MG capsule Take 1 capsule (20 mg total) by mouth once daily.    polyethylene glycol (GOLYTELY,NULYTELY) 236-22.74-6.74 -5.86 gram  "suspension Take 4,000 mLs (4 L total) by mouth once. Pt waiting     No current facility-administered medications for this visit.        Objective Findings:    Vital Signs:  /83   Pulse 76   Ht 6' 1" (1.854 m)   Wt 108.8 kg (239 lb 13.8 oz)   BMI 31.65 kg/m²   Body mass index is 31.65 kg/m².    Physical Exam:  General Appearance: Well appearing in no acute distress  Head:   Normocephalic, without obvious abnormality  Eyes:    No scleral icterus, EOMI  ENT: Neck supple, Lips, mucosa, and tongue normal; teeth and gums normal  Lungs: CTA bilaterally in anterior and posterior fields, no wheezes, no crackles.  Heart:  Regular rate and rhythm, S1, S2 normal, no murmurs heard  Abdomen: Soft, non tender, non distended with positive bowel sounds in all four quadrants. No hepatosplenomegaly, ascites, or mass  Extremities: 2+ radial pulses, no clubbing, cyanosis or edema  Skin: No rash to exposed areas  Neurologic: A&Ox4      Labs:  Lab Results   Component Value Date    HGB 14.6 11/28/2017    HCT 42.7 11/28/2017     11/28/2017    K 3.6 11/28/2017     11/28/2017    CREATININE 0.8 11/28/2017    BUN 11 11/28/2017    CO2 27 11/28/2017         Endoscopy:    Per pt, EGD last year-irritation in esophagus and stomach  Colonoscopy- none    Assessment:  1. Encounter for monitoring long-term proton pump inhibitor therapy    2. History of gastritis    3. Screen for colon cancer           Recommendations:  1.  Pt made aware of slightly increased risk of CAP, C diff infections and decreased mag, vit D and Vit B- 12 levels with long term PPI use. Yearly Mag, B 12 and Vit D levels recomended. Routine bone mineral densities per PCP recommended. Pt instructed to contact PCP for s/s of lung infection (fever, chills, CP, SOB, and/or productive cough) or GI infection (profuse, watery diarrhea with or without rectal bleeding, severe abd pain,and/or fever). Vitamin D, B12, mag levels today.    2. Risk vs benefits of daily PPI " for gastro protection while on melxicam discussed. Questions answered to pt satisfaction. Handout for PPI weaning provided.    3. Screening colonoscopy ordered.    F/u yearly for long term PPI use.       Order summary:  Orders Placed This Encounter    Vitamin D    Magnesium    Vitamin B12    Case request GI: COLONOSCOPY         Thank you so much for allowing me to participate in the care of Sal Overton, APRN, FNP-C

## 2018-04-27 NOTE — DISCHARGE INSTRUCTIONS
Colonoscopy     A camera attached to a flexible tube with a viewing lens is used to take video pictures.     Colonoscopy is a test to view the inside of your lower digestive tract (colon and rectum). Sometimes it can show the last part of the small intestine (ileum). During the test, small pieces of tissue may be removed for testing. This is called a biopsy. Small growths, such as polyps, may also be removed.   Why is colonoscopy done?  The test is done to help look for colon cancer. And it can help find the source of abdominal pain, bleeding, and changes in bowel habits. It may be needed once a year, depending on factors such as your:  · Age  · Health history  · Family health history  · Symptoms  · Results from any prior colonoscopy  Risks and possible complications  These include:  · Bleeding               · A puncture or tear in the colon   · Risks of anesthesia  · A cancer lesion not being seen  Getting ready   To prepare for the test:  · Talk with your healthcare provider about the risks of the test (see below). Also ask your healthcare provider about alternatives to the test.  · Tell your healthcare provider about any medicines you take. Also tell him or her about any health conditions you may have.  · Make sure your rectum and colon are empty for the test. Follow the diet and bowel prep instructions exactly. If you dont, the test may need to be rescheduled.  · Plan for a friend or family member to drive you home after the test.     Colonoscopy provides an inside view of the entire colon.     You may discuss the results with your doctor right away or at a future visit.  During the test   The test is usually done in the hospital on an outpatient basis. This means you go home the same day. The procedure takes about 30 minutes. During that time:  · You are given relaxing (sedating) medicine through an IV line. You may be drowsy, or fully asleep.  · The healthcare provider will first give you a physical exam to  check for anal and rectal problems.  · Then the anus is lubricated and the scope inserted.  · If you are awake, you may have a feeling similar to needing to have a bowel movement. You may also feel pressure as air is pumped into the colon. Its OK to pass gas during the procedure.  · Biopsy, polyp removal, or other treatments may be done during the test.  After the test   You may have gas right after the test. It can help to try to pass it to help prevent later bloating. Your healthcare provider may discuss the results with you right away. Or you may need to schedule a follow-up visit to talk about the results. After the test, you can go back to your normal eating and other activities. You may be tired from the sedation and need to rest for a few hours.  Date Last Reviewed: 11/1/2016 © 2000-2017 [employer]. [address] [place] [address], [place] [address]. All rights reserved. This information is not intended as a substitute for professional medical care. Always follow your healthcare professional's instructions.

## 2018-04-30 ENCOUNTER — TELEPHONE (OUTPATIENT)
Dept: PAIN MEDICINE | Facility: CLINIC | Age: 53
End: 2018-04-30

## 2018-04-30 NOTE — TELEPHONE ENCOUNTER
Staff contacted the patient to get him schedule with the Rheumatology Department.    Patient accepted the appointment on 5/1/18 at 10 am with Dr. Yousif.    Staff inform the patient of the address and floor of his appointment.    Patient verbalized understanding and expressed thanks.

## 2018-05-01 ENCOUNTER — HOSPITAL ENCOUNTER (OUTPATIENT)
Dept: RADIOLOGY | Facility: HOSPITAL | Age: 53
Discharge: HOME | End: 2018-05-01
Attending: INTERNAL MEDICINE
Payer: MEDICARE

## 2018-05-01 ENCOUNTER — INITIAL CONSULT (OUTPATIENT)
Dept: RHEUMATOLOGY | Facility: CLINIC | Age: 53
End: 2018-05-01
Payer: MEDICARE

## 2018-05-01 VITALS
BODY MASS INDEX: 32.17 KG/M2 | WEIGHT: 243.81 LBS | HEART RATE: 71 BPM | DIASTOLIC BLOOD PRESSURE: 82 MMHG | SYSTOLIC BLOOD PRESSURE: 130 MMHG | RESPIRATION RATE: 20 BRPM

## 2018-05-01 DIAGNOSIS — M51.36 DDD (DEGENERATIVE DISC DISEASE), LUMBAR: ICD-10-CM

## 2018-05-01 DIAGNOSIS — M50.30 DDD (DEGENERATIVE DISC DISEASE), CERVICAL: ICD-10-CM

## 2018-05-01 DIAGNOSIS — M25.50 POLYARTHRALGIA: ICD-10-CM

## 2018-05-01 DIAGNOSIS — M25.50 POLYARTHRALGIA: Primary | ICD-10-CM

## 2018-05-01 PROCEDURE — 73130 X-RAY EXAM OF HAND: CPT | Mod: 26,RT,, | Performed by: RADIOLOGY

## 2018-05-01 PROCEDURE — 99214 OFFICE O/P EST MOD 30 MIN: CPT | Mod: PBBFAC,25 | Performed by: INTERNAL MEDICINE

## 2018-05-01 PROCEDURE — 73130 X-RAY EXAM OF HAND: CPT | Mod: 50,TC

## 2018-05-01 PROCEDURE — 99205 OFFICE O/P NEW HI 60 MIN: CPT | Mod: S$PBB,GC,, | Performed by: INTERNAL MEDICINE

## 2018-05-01 PROCEDURE — 99999 PR PBB SHADOW E&M-EST. PATIENT-LVL IV: CPT | Mod: PBBFAC,GC,, | Performed by: INTERNAL MEDICINE

## 2018-05-01 PROCEDURE — 73130 X-RAY EXAM OF HAND: CPT | Mod: 26,XS,LT, | Performed by: RADIOLOGY

## 2018-05-01 NOTE — PROGRESS NOTES
"Subjective:       Patient ID: Sal Navarro is a 53 y.o. male.    Chief Complaint: Disease Management    HPI   Pt is a 54 yo with DJD (cervical/L spine s/p cervical discectomy and fusion ),  hypothyroidism, cataracts, hiatal hernia, h/o b/l retinal tears and left retinal detachment presenting for evaluation of polyarthragias.   Prior to 2016 pt reports good health. He notes having lower back pain that started in the late teen years after being kicked by a cow and falling onto cement. He reports seeing a chiropractor but sts using meloxicam helped with the pain. He also sts using ice and "popping" his back helped.   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 In 2005 he developed bronchitis and the coughing caused his neck pain to start. He was treated with a steroid injection but sts the next day he was unable to lift his left arm. He was referred to neurology and was told he had a ruptured disc and that nothing should be done.  By the end of 10/2015, he suffered a fall after going down a wet ramp and landed on his right elbow. He suffered a rotator cuff tear and his neck pain which was better then returned.  An MRI showed a compressed disc.  Since the fall his neck and upper back pain has been persistent and worsened after his discetomy and fusion in 2/2016. Pt also suffers from migraines as well.   He follows with pain management and reports epidural steroid injections helped with the back but were not effective for the neck.  Currently he uses a combination of meloxicam 15 mg/d, norco 2 pills/d, gabapentin 200 mg/d, Elavil 25 mg qhs, Robaxin bid.  He also uses heat and ice along. Pt works with PT as well.   Pt reports the left 2nd MCP is painful and reports feeling as if his hands swell.   His retinal tears were attributed to his age/non traumatic.   He denies fevers, chills, n/v/d, Raynaud's, pleurisy, hematuria, GI sx.     Family history: no connective tissue " disease. No psoriasis.   Social, surgical updated in Epic     Review of Systems   Constitutional: Negative for fatigue, fever and unexpected weight change.   HENT: Negative for mouth sores, nosebleeds and trouble swallowing.    Eyes: Positive for redness. Negative for photophobia and visual disturbance.   Respiratory: Negative for cough and shortness of breath.    Cardiovascular: Negative for chest pain.   Gastrointestinal: Negative for constipation and diarrhea.   Genitourinary: Negative for genital sores.   Musculoskeletal: Positive for arthralgias, back pain, joint swelling and neck stiffness.   Skin: Negative for rash.   Neurological: Positive for headaches.   Hematological: Does not bruise/bleed easily.         Objective:   /82   Pulse 71   Resp 20   Wt 110.6 kg (243 lb 12.8 oz)   BMI 32.17 kg/m²      Physical Exam   Constitutional: He is oriented to person, place, and time and well-developed, well-nourished, and in no distress.   HENT:   Mouth/Throat: Oropharynx is clear and moist.   Eyes: Conjunctivae are normal.   Neck: Neck supple.   Limited in all directions except forward flexion     Cardiovascular: Normal rate, regular rhythm, normal heart sounds and intact distal pulses.    No murmur heard.  Pulmonary/Chest: Effort normal and breath sounds normal. No respiratory distress. He has no wheezes. He has no rales. He exhibits no tenderness.   Abdominal: Soft. Bowel sounds are normal. He exhibits no distension. There is no tenderness.       Left Side Rheumatological Exam     The patient is tender to palpation of the 2nd MCP.      Neurological: He is alert and oriented to person, place, and time. Gait normal.   Skin: Skin is warm and dry. No rash noted. No erythema.     Musculoskeletal: Normal range of motion. He exhibits tenderness (2nd left MCP). He exhibits no edema or deformity.   Limited flexion and extension of the L spine          Labs:      Ref. Range 5/1/2018 11:15   Sodium Latest Ref Range:  136 - 145 mmol/L 142   Potassium Latest Ref Range: 3.5 - 5.1 mmol/L 3.6   Chloride Latest Ref Range: 95 - 110 mmol/L 107   CO2 Latest Ref Range: 23 - 29 mmol/L 25   Anion Gap Latest Ref Range: 8 - 16 mmol/L 10   BUN, Bld Latest Ref Range: 6 - 20 mg/dL 12   Creatinine Latest Ref Range: 0.5 - 1.4 mg/dL 0.9   eGFR if non African American Latest Ref Range: >60 mL/min/1.73 m^2 >60.0   eGFR if African American Latest Ref Range: >60 mL/min/1.73 m^2 >60.0   Glucose Latest Ref Range: 70 - 110 mg/dL 109   Calcium Latest Ref Range: 8.7 - 10.5 mg/dL 9.5   Alkaline Phosphatase Latest Ref Range: 55 - 135 U/L 79   Total Protein Latest Ref Range: 6.0 - 8.4 g/dL 7.3   Albumin Latest Ref Range: 3.5 - 5.2 g/dL 3.9   Total Bilirubin Latest Ref Range: 0.1 - 1.0 mg/dL 0.4   AST Latest Ref Range: 10 - 40 U/L 27   ALT Latest Ref Range: 10 - 44 U/L 41   CRP Latest Ref Range: 0.0 - 8.2 mg/L 3.3        Ref. Range 5/1/2018 11:15   WBC Latest Ref Range: 3.90 - 12.70 K/uL 5.57   RBC Latest Ref Range: 4.60 - 6.20 M/uL 4.50 (L)   Hemoglobin Latest Ref Range: 14.0 - 18.0 g/dL 14.1   Hematocrit Latest Ref Range: 40.0 - 54.0 % 41.9   MCV Latest Ref Range: 82 - 98 fL 93   MCH Latest Ref Range: 27.0 - 31.0 pg 31.3 (H)   MCHC Latest Ref Range: 32.0 - 36.0 g/dL 33.7   RDW Latest Ref Range: 11.5 - 14.5 % 13.2   Platelets Latest Ref Range: 150 - 350 K/uL 249         Results for orders placed or performed in visit on 04/04/18   Vitamin D   Result Value Ref Range    Vit D, 25-Hydroxy 32 30 - 96 ng/mL         Assessment:       1. Polyarthralgia    2. DDD (degenerative disc disease), lumbar    3. DDD (degenerative disc disease), cervical        Pt is a 54 yo with DJD (cervical/L spine s/p cervical discectomy and fusion ),  hypothyroidism, cataracts, hiatal hernia, h/o b/l retinal tears and left retinal detachment presenting for evaluation of polyarthragias.   No findings of a connective tissue disease. No sx of an inflammatory arthritis. Will complete work up  as detailed below.   MCP pain likely trauma related from given history.     Plan:       - labs today: CCP, RF, sed rate, CPR, XR of the hands  - Continue PT and pain management   - RTC prn based on above results.     Signed,   Chau Yousif

## 2018-05-03 NOTE — PROGRESS NOTES
I have personally taken the history and examined the patient and concur with the resident's note as above.  He has a history of chronic neck and back pain related to previous injuries.  He has had some pain in the hands.  We are evaluating him to see if he has an underlying connective tissue disease.  At this time he just appears to have osteoarthritis.  Laboratory studies and x-rays are obtained.  We did not give him a regular return appointment.

## 2018-05-04 ENCOUNTER — TELEPHONE (OUTPATIENT)
Dept: ENDOSCOPY | Facility: HOSPITAL | Age: 53
End: 2018-05-04

## 2018-06-18 ENCOUNTER — OFFICE VISIT (OUTPATIENT)
Dept: SPORTS MEDICINE | Facility: CLINIC | Age: 53
End: 2018-06-18
Payer: MEDICARE

## 2018-06-18 VITALS
WEIGHT: 243.81 LBS | SYSTOLIC BLOOD PRESSURE: 137 MMHG | HEIGHT: 73 IN | HEART RATE: 61 BPM | BODY MASS INDEX: 32.31 KG/M2 | DIASTOLIC BLOOD PRESSURE: 96 MMHG

## 2018-06-18 DIAGNOSIS — M25.512 PAIN IN JOINT OF LEFT SHOULDER: Primary | ICD-10-CM

## 2018-06-18 PROCEDURE — 99999 PR PBB SHADOW E&M-EST. PATIENT-LVL III: CPT | Mod: PBBFAC,,, | Performed by: ORTHOPAEDIC SURGERY

## 2018-06-18 PROCEDURE — 99214 OFFICE O/P EST MOD 30 MIN: CPT | Mod: S$PBB,,, | Performed by: ORTHOPAEDIC SURGERY

## 2018-06-18 PROCEDURE — 99213 OFFICE O/P EST LOW 20 MIN: CPT | Mod: PBBFAC,PO | Performed by: ORTHOPAEDIC SURGERY

## 2018-06-18 RX ORDER — TRAZODONE HYDROCHLORIDE 50 MG/1
50 TABLET ORAL NIGHTLY
Refills: 2 | COMMUNITY
Start: 2018-05-18 | End: 2020-03-05

## 2018-06-18 RX ORDER — CETIRIZINE HYDROCHLORIDE 10 MG/1
10 TABLET ORAL NIGHTLY
Refills: 5 | COMMUNITY
Start: 2018-05-18

## 2018-06-18 NOTE — PROGRESS NOTES
CC right shoulder pain    HISTORY OF PRESENT ILLNESS:   Pt is here today for followup of his shoulder arthroscopy.  he is doing well.  We have reviewed his findings and discussed plan of care and future treatment options.         [facility]  Pain today is 1/10 currently     He notes that since discontinuing his sling his neck pain has improved some     Had retinal detachment x 2 so out of PT for a 3 weeks     95% better vs preop    Review of Systems   Constitution: Negative. Negative for chills, fever and night sweats.   HENT: Negative for congestion and headaches.    Eyes: Negative for blurred vision, left vision loss and right vision loss.   Cardiovascular: Negative for chest pain and syncope.   Respiratory: Negative for cough and shortness of breath.    Endocrine: Negative for polydipsia, polyphagia and polyuria.   Hematologic/Lymphatic: Negative for bleeding problem. Does not bruise/bleed easily.   Skin: Negative for dry skin, itching and rash.   Musculoskeletal: Negative for falls and muscle weakness.   Gastrointestinal: Negative for abdominal pain and bowel incontinence.   Genitourinary: Negative for bladder incontinence and nocturia.   Neurological: Negative for disturbances in coordination, loss of balance and seizures.   Psychiatric/Behavioral: Negative for depression. The patient does not have insomnia.    Allergic/Immunologic: Negative for hives and persistent infections.     PAST MEDICAL HISTORY:   Past Medical History:   Diagnosis Date    Arthritis     Cataract     Cervical back pain with evidence of disc disease     Hiatal hernia     Hypertension     Thyroid disease      PAST SURGICAL HISTORY:   Past Surgical History:   Procedure Laterality Date    ACROMIOCLAVICULAR JOINT CYST EXCISION Right     BACK SURGERY      cataracts Bilateral     CERVICAL FUSION      COLONOSCOPY N/A 4/27/2018    Procedure: COLONOSCOPY;  Surgeon: Giovani Medeiros MD;  Location: [facility]  (4TH FLR);  Service: Endoscopy;  Laterality: N/A;    NGUYEN      EYE SURGERY      RADIOFREQUENCY ABLATION  10/2016    cervical spine/Montoya    ROTATOR CUFF REPAIR Right     SPINE SURGERY      cerival fusion      FAMILY HISTORY:   Family History   Problem Relation Age of Onset    Heart disease Mother     Cancer Father     Leukemia Brother     Colon cancer Neg Hx     Celiac disease Neg Hx     Crohn's disease Neg Hx     Esophageal cancer Neg Hx     Inflammatory bowel disease Neg Hx     Irritable bowel syndrome Neg Hx     Liver cancer Neg Hx     Rectal cancer Neg Hx     Stomach cancer Neg Hx     Ulcerative colitis Neg Hx      SOCIAL HISTORY:   Social History     Social History    Marital status:      Spouse name: N/A    Number of children: N/A    Years of education: N/A     Occupational History    Not on file.     Social History Main Topics    Smoking status: Never Smoker    Smokeless tobacco: Never Used    Alcohol use No    Drug use: No    Sexual activity: Not on file     Other Topics Concern    Not on file     Social History Narrative    No narrative on file       MEDICATIONS:   Current Outpatient Prescriptions:     acetaminophen (TYLENOL) 500 MG tablet, Take 500 mg by mouth every 6 (six) hours as needed for Pain., Disp: , Rfl:     cetirizine (ZYRTEC) 10 MG tablet, Take 10 mg by mouth nightly., Disp: , Rfl: 5    co-enzyme Q-10 30 mg capsule, Take 30 mg by mouth once daily., Disp: , Rfl:     fish oil-omega-3 fatty acids 300-1,000 mg capsule, Take 2 g by mouth once daily., Disp: , Rfl:     FOLIC ACID/MULTIVIT-MIN/LUTEIN (CENTRUM SILVER ORAL), Take by mouth once daily., Disp: , Rfl:     gabapentin (NEURONTIN) 100 MG capsule, 2 (two) times daily. , Disp: , Rfl:     hydrocodone-acetaminophen 7.5-325mg (NORCO) 7.5-325 mg per tablet, Take 1 tablet by mouth 2 (two) times daily as needed for Pain., Disp: 60 tablet, Rfl: 0    levothyroxine (SYNTHROID) 50 MCG tablet, Take 50 mcg by mouth  "once daily., Disp: , Rfl:     meclizine (ANTIVERT) 25 mg tablet, Take 25 mg by mouth once daily. , Disp: , Rfl:     meloxicam (MOBIC) 15 MG tablet, Take 15 mg by mouth daily, Disp: , Rfl:     methocarbamol (ROBAXIN) 500 MG Tab, Take 500 mg by mouth 3 (three) times daily., Disp: , Rfl:     naloxegol (MOVANTIK) tablet, Take 25 mg by mouth as needed., Disp: , Rfl:     omeprazole (PRILOSEC) 20 MG capsule, Take 1 capsule (20 mg total) by mouth once daily., Disp: 90 capsule, Rfl: 3    promethazine (PHENERGAN) 25 MG tablet, Take 1 tablet (25 mg total) by mouth every 6 (six) hours as needed for Nausea., Disp: 40 tablet, Rfl: 0    red yeast rice 600 mg Cap, Take by mouth once daily. , Disp: , Rfl:     traZODone (DESYREL) 50 MG tablet, Take 50 mg by mouth nightly., Disp: , Rfl: 2    valsartan (DIOVAN) 80 MG tablet, Take 40 mg by mouth once daily. , Disp: , Rfl: 1    amitriptyline (ELAVIL) 25 MG tablet, Take 2 tablets (50 mg total) by mouth every evening., Disp: 60 tablet, Rfl: 5  ALLERGIES:   Review of patient's allergies indicates:   Allergen Reactions    Pcn [penicillins] Hives and Rash    Lipitor [atorvastatin] Other (See Comments)     Muscle cramps, weakness       VITAL SIGNS: BP (!) 137/96   Pulse 61   Ht 6' 1" (1.854 m)   Wt 110.6 kg (243 lb 12.8 oz)   BMI 32.17 kg/m²        DATE OF PROCEDURE: 12/05/2017  OPERATION:   left  1. Shoulder arthroscopic rotator cuff repair (10 x 15 mm, far medial margin convergence x 2) (CPT 86363) (complex, -22 modifier)  2. Shoulder subpectoral biceps tenodesis, arthroscopic-assisted (CPT 82048)  3. Shoulder arthroscopic subacromial decompression, bursectomy (CPT 13944)  4. Shoulder arthroscopic extensive debridement (anterior, posterior glenohumeral joint, subacromial space) (CPT 72073)  5. Shoulder arthroscopic-assisted arthrocentesis of viable structural human allograft tissue matrix ([employer]) (CPT 13684)           6. Shoulder arthroscopic lysis of adhesions (CPT " 31668)  7. Shoulder arthroscopic distal clavicle excision (CPT 83866)                                                                            PHYSICAL EXAMINATION:     Incision sites healed well  No evidence of any erythema, infection or induration  elbow Range of motion full   Minimal effusion  2+ DP pulse  No swelling         Mild + AC joint tenderness   5/5 at 0 and 30        Forward Flexion: 180  ER: 45  IR: T10  5/5 at 0 and 30                                                                      ASSESSMENT:                                                                                                                                               1. Status post above, doing well.                                                                                                                               PLAN:                                                                                                                                                     1. Continue PT   2. Emphasized scapular function.  3. I have discussed return to activity in detail.  4. he will see us back in 6 months.                                      5. All questions were answered and he should contact us if he  has any questions or concerns in the interim.              POSTOPERATIVE PLAN: We will follow the arthroscopic rotator cuff repair guidelines for a medium size rotator cuff tear.  We discussed with the patient's family after surgery.  The patient will remain in a sling for 6 weeks.  PT to start at 4 weeks.

## 2018-06-28 ENCOUNTER — OFFICE VISIT (OUTPATIENT)
Dept: PAIN MEDICINE | Facility: CLINIC | Age: 53
End: 2018-06-28
Attending: ANESTHESIOLOGY
Payer: MEDICARE

## 2018-06-28 VITALS
TEMPERATURE: 98 F | RESPIRATION RATE: 20 BRPM | OXYGEN SATURATION: 98 % | SYSTOLIC BLOOD PRESSURE: 120 MMHG | HEIGHT: 73 IN | WEIGHT: 246.94 LBS | DIASTOLIC BLOOD PRESSURE: 82 MMHG | HEART RATE: 57 BPM | BODY MASS INDEX: 32.73 KG/M2

## 2018-06-28 DIAGNOSIS — M50.30 DDD (DEGENERATIVE DISC DISEASE), CERVICAL: ICD-10-CM

## 2018-06-28 DIAGNOSIS — M79.10 MYALGIA: ICD-10-CM

## 2018-06-28 DIAGNOSIS — G89.4 CHRONIC PAIN DISORDER: Primary | ICD-10-CM

## 2018-06-28 DIAGNOSIS — Z79.891 ENCOUNTER FOR LONG-TERM OPIATE ANALGESIC USE: ICD-10-CM

## 2018-06-28 DIAGNOSIS — M47.816 FACET ARTHRITIS, DEGENERATIVE, LUMBAR SPINE: ICD-10-CM

## 2018-06-28 DIAGNOSIS — M62.838 CERVICAL PARASPINAL MUSCLE SPASM: ICD-10-CM

## 2018-06-28 DIAGNOSIS — Z98.1 S/P CERVICAL SPINAL FUSION: ICD-10-CM

## 2018-06-28 PROCEDURE — 20553 NJX 1/MLT TRIGGER POINTS 3/>: CPT | Mod: PBBFAC | Performed by: ANESTHESIOLOGY

## 2018-06-28 PROCEDURE — 99214 OFFICE O/P EST MOD 30 MIN: CPT | Mod: 25,S$PBB,, | Performed by: ANESTHESIOLOGY

## 2018-06-28 PROCEDURE — 20553 NJX 1/MLT TRIGGER POINTS 3/>: CPT | Mod: S$PBB,,, | Performed by: ANESTHESIOLOGY

## 2018-06-28 PROCEDURE — 99999 PR PBB SHADOW E&M-EST. PATIENT-LVL III: CPT | Mod: PBBFAC,,, | Performed by: ANESTHESIOLOGY

## 2018-06-28 PROCEDURE — 80307 DRUG TEST PRSMV CHEM ANLYZR: CPT

## 2018-06-28 PROCEDURE — 99213 OFFICE O/P EST LOW 20 MIN: CPT | Mod: PBBFAC,25 | Performed by: ANESTHESIOLOGY

## 2018-06-28 RX ORDER — MELOXICAM 15 MG/1
TABLET ORAL
COMMUNITY
End: 2018-06-28 | Stop reason: SDUPTHER

## 2018-06-28 RX ORDER — HYDROCODONE BITARTRATE AND ACETAMINOPHEN 5; 325 MG/1; MG/1
TABLET ORAL
COMMUNITY
End: 2018-06-28

## 2018-06-28 RX ORDER — BUPIVACAINE HYDROCHLORIDE 5 MG/ML
9.5 INJECTION, SOLUTION EPIDURAL; INTRACAUDAL
Status: COMPLETED | OUTPATIENT
Start: 2018-06-28 | End: 2018-06-28

## 2018-06-28 RX ORDER — HYDROCODONE BITARTRATE AND ACETAMINOPHEN 7.5; 325 MG/1; MG/1
1 TABLET ORAL 2 TIMES DAILY PRN
Qty: 60 TABLET | Refills: 0 | Status: SHIPPED | OUTPATIENT
Start: 2018-06-28 | End: 2018-07-28

## 2018-06-28 RX ORDER — FLUTICASONE PROPIONATE 50 MCG
SPRAY, SUSPENSION (ML) NASAL
Refills: 5 | COMMUNITY
Start: 2018-05-18 | End: 2020-03-05

## 2018-06-28 RX ORDER — CLONIDINE 100 UG/ML
50 INJECTION, SOLUTION EPIDURAL ONCE
Status: COMPLETED | OUTPATIENT
Start: 2018-06-28 | End: 2018-06-28

## 2018-06-28 RX ORDER — OMEPRAZOLE 20 MG/1
CAPSULE, DELAYED RELEASE ORAL
COMMUNITY
End: 2018-06-28 | Stop reason: SDUPTHER

## 2018-06-28 RX ADMIN — CLONIDINE 50 MCG: 100 INJECTION, SOLUTION EPIDURAL at 02:06

## 2018-06-28 RX ADMIN — BUPIVACAINE HYDROCHLORIDE 47.5 MG: 5 INJECTION, SOLUTION EPIDURAL; INTRACAUDAL; PERINEURAL at 02:06

## 2018-06-28 NOTE — PROGRESS NOTES
Subjective:     Patient ID: Sal Navarro is a 53 y.o. male.    Chief Complaint: Neck Pain (3 month follow up) and Back Pain    Consulted by: No ref. provider found     Disclaimer: This note was generated using voice recognition software.  There may be a typographical errors that were missed during proofreading.      HPI:    Sal Navarro is a 53 y.o. male who presents today s/p C4-7 ACDF with C5/6 PSIF in 2/2016 with residual chronic midline neck pain that radiates into his shoulder blades bilaterally, R>>L.  This is associated with bilateral hand numbness and tingling.  The hand symptoms did improve following the surgery.  The shooting pains in his arms resolved completely.   This pain is described in detail below.  His post-operative course was complicated by dysphagia that is being treated with speech therapy.    Interval History (9/23/2016):  He returns today for follow up.  He reports that the MELANIE was not helpful for the pain.  He hasn't noticed a difference with the amitriptyline.  He does notice a difference in his low back pain when he skips the meloxicam    Interval History (11/17/2016):  He returns today for follow up.  He reports that cervical RFAs were not very helpful for the pain. He is no longer taking Percocet or Tamazepam. He continues to take Mobic 15mg daily for his low back pain. He is taking elavil 25mg nightly without side effects. He is open to considering SCS. He reports poor sleep at night.    Interval History (1/3/2017):  The patient returns today for follow up of neck and shoulder pain.  His pain is located to his neck and surrounding areas.  He previously had limited benefit with RFAs, although he did have short term benefit with MBB.  He also had limited benefit with NGUYEN.  Dr. Montoya discussed cervical SCS trial with the patient, although he reports that he does not wish to pursue this option at this time.  He is scheduled to f/u with Dr. Fowler on 1/24/16.  He continues to  participate in PT.  He did previously have some benefit with a TENS unit at PT.  He is reporting some relief with Tramadol.  He also takes Zanaflex which helps but is very sedating.      Interval History (3/3/2017):  The patient returns for follow up of neck and shoulder pain. He continues to take Tramadol BID with benefit. The medication decreases his pain slightly. He also reports benefit with Robaxin. He continues to take Mobic with benefit. He recently stopped physical therapy and reports increased pain into his shoulders, despite a home exercise plan.     Interval History (5/1/2017):  He returns today for follow up.  He reports that these RFAs were more helpful than before, L more so than right, but now the pain is again returning.  Today, he also complains of left shoulder pain that has been gradually worsening over the past few months. He associated this with modified activity due to his neck.  Pain is worse with extension of arm and lifting.  Tramadol, Robaxin, Meloxicam, and amitriptyline have been helpful for the pain.  He continues to have GI upset, but has just started prilosec.  When he tried stopping the meloxicam, his pain increased dramatically.  The tramadol takes the edge off.  Robaxin helps, but he is out.    Of note, he recently had an endoscopy that showed stage 3 gastritis, for which he was started on Prilosec    Interval History (6/21/2017):  He returns today for follow up.  He reports that the shoulder injection provided one week of relief.  The Pennsaid has been helpful for the pain.  The RFA has helped with shooting pain into his back.  He is doing his HEP.  He has not been back to PT because of insurance issues.  His shoulder continues to present significant hindrances to his daily activities.    Interval History (8/21/2017):  He returns today for follow up.  He reports that the tramadol is not as helpful as it used to be.  In addition, he is experiencing constipation and urinary retention  "that only resolves with skipping a dose of tramadol. He is going to PT for his neck/shoulder.    Interval History (10/18/2017):  He returns today for follow up.  He reports that Norco has been more helpful for the pain than the tramadol with fewer side effects.  He is averaging about 2-4 per day.  His back is doing much better, though his neck is still greatly limiting him in his daily tasks.  He reports a new pain that began with a "pop" followed by a sharp pain radiating down his back followed by a soreness.  This happened a few weeks ago on the left side of his low back above where we did the RFA.  This is resolving, but it is still sore.    Interval History (11/29/2017):  He returns today for follow up. He is scheduled for right shoulder surgery on 12/5/17 and is here today for lemuel-operative planning. Biggest pain today is in his left shoulder. After his long car ride here, he states his neck has started to bother him as well. Both pains today are roughly 5/10. Taking Norco 7.5/325 mg 1-2 tablets daily which he states takes the edge off of his pain. He has decreased his dosing from 3-4 per day due to side effects of constipation. States he is not taking the movantik on a regular basis-does not want to keep adding medications. Last took it over a week ago. Still taking mobic, robaxin with mild relief. Takes tylenol occasionally with mild relief. Also used hemp oil with mild relief. Still doing a HEP on a regular basis. States his lower back is gradually getting worse. Pain primarily on the left side. Had trigger point injections at last visit which provided 30-40% reduction in his pain for for 1-2 weeks     Interval History (2/21/2018):  He returns today for follow up.  He reports that his shoulder is improving since his surgery.  His neck is doing about the same.  His low back pain has returned, and it is slightly higher than it was before.  He was recently started on a low dose of gabapentin    Interval " History (3/29/2018):  He returns today for follow up.  He reports that his neck is feeling slightly better since he is no longer wearing his sling for her shoulder.  His physical therapy is still currently on hold due to his retinal condition.  His low back is feeling better.    Interval History (6/28/2018):  He returns today for follow up.  He reports that his low back has been hurting worse.  His low back was doing well after the NGUYEN, but the pain started to return over the past few weeks.  This is associated with numbness in his left leg in the posterior thigh.  He is back to taking 2 Norco daily.  His neck and shoulders are doing ok, but his low back is hurting.  He continues to have issues with his eyes.  He has had to have another laser surgery on his right eye again 3 weeks ago.    Physical Therapy: Yes- outside facility in [place].  He is now doing the HEP.    Non-pharmacologic Treatment: Ice and heat provide mild benefit.           · TENS? Yes at PT with benefit.    Pain Medications:         · Currently taking: Gabapentin 100 mg BID, Meloxicam 15 mg daily, Tylenol PRN, Norco 7.5/325 mg 2 daily PRN, Robaxin 500 mg BID PRN    · Has tried in the past:  Amitriptyline 50 mg QHS, Percocet (limited benefit and causes constipation treated with colace), Temazepam for sleep (he has had trouble sleeping since the surgery), Flexeril (no help in the past, both before and after the surgery), Gabapentin (after, went to straight 300 mg TID, too sedating, unsure of duration of tx), Lyrica (sedation), Celebrex (GI upset), Zanaflex 4 mg PRN muscle pain (helpful but sedating)     · Has not tried: SNRIs, other muscle relaxants    Blood thinners: None    Interventional Therapies: None    Relevant Surgeries:   · C4-7 ACDF with C5/6 PSIF in 2/2016  · C7/T1 ILESI: 10-15% relief  · 10/4/16 Bilateral C4-7 MBB- short term benefit  · 10/11/16 Right C4-7 RFA- limited benefit  · 10/25/16 Left C4-7- limited benefit  · TPIs (cervical and left  thoracic): Good benefit x 1-2 weeks in thoracic, ongoing in cervical  · Bilateral L3-5 MBB: Positive  · Right then left L2-5 RFA: Good relief x 4-5 months  · L2/3 ILESI: >50% relief    Affecting sleep? Yes    Affecting daily activities? Yes    Depressive symptoms? Yes, due to general medical condition.  He denies significant symptoms today          · SI/HI? No    Work status: On short term disability from his job as an [employer] .    Pain Scales  Best: 3/10  Worst: 9/10  Usually: 7/10  Today: 6/10    Neck Pain    This is a new problem. The current episode started more than 1 month ago (february 2016). The problem occurs intermittently. The problem has been unchanged. The pain is present in the anterior neck. The quality of the pain is described as aching, shooting and stabbing. The pain is at a severity of 5/10. The pain is mild. The symptoms are aggravated by bending (turning). The pain is same all the time. Stiffness is present all day and in the morning. Associated symptoms include chest pain. Pertinent negatives include no fever, headaches or weight loss. He has tried oral narcotics, NSAIDs and muscle relaxants for the symptoms. The treatment provided mild relief. Physical therapy was not tried and ineffective.      Review of Systems   Constitution: Negative. Negative for chills, fever, malaise/fatigue, weight gain and weight loss.   HENT: Negative.  Negative for ear pain and hoarse voice.    Eyes: Negative.  Negative for blurred vision, pain and visual disturbance.   Cardiovascular: Positive for chest pain. Negative for dyspnea on exertion and irregular heartbeat.   Respiratory: Negative.  Negative for cough, shortness of breath and wheezing.    Endocrine: Negative.  Negative for cold intolerance and heat intolerance.   Hematologic/Lymphatic: Negative.  Negative for adenopathy and bleeding problem. Does not bruise/bleed easily.   Skin: Negative.  Negative for color change, itching and rash.    Musculoskeletal: Positive for neck pain and stiffness. Negative for back pain.   Gastrointestinal: Negative.  Negative for change in bowel habit, diarrhea, hematemesis, hematochezia, melena and vomiting.   Genitourinary: Negative.  Negative for flank pain, frequency, hematuria and urgency.   Neurological: Positive for dizziness and light-headedness. Negative for difficulty with concentration, headaches, loss of balance and seizures.   Psychiatric/Behavioral: Negative for altered mental status, depression and suicidal ideas. The patient has insomnia and is nervous/anxious.    Allergic/Immunologic: Negative.  Negative for HIV exposure.   All other systems reviewed and are negative.            Past Medical History:   Diagnosis Date    Arthritis     Cataract     Cervical back pain with evidence of disc disease     Hiatal hernia     Hypertension     Thyroid disease        Past Surgical History:   Procedure Laterality Date    ACROMIOCLAVICULAR JOINT CYST EXCISION Right     BACK SURGERY      cataracts Bilateral     CERVICAL FUSION      COLONOSCOPY N/A 4/27/2018    Procedure: COLONOSCOPY;  Surgeon: Giovani Medeiros MD;  Location: [address]);  Service: Endoscopy;  Laterality: N/A;    NGUYEN      EYE SURGERY      RADIOFREQUENCY ABLATION  10/2016    cervical spine/Montoya    ROTATOR CUFF REPAIR Right     SPINE SURGERY      cerival fusion        Review of patient's allergies indicates:   Allergen Reactions    Pcn [penicillins] Rash       Current Outpatient Prescriptions   Medication Sig Dispense Refill    acetaminophen (TYLENOL) 500 MG tablet Take 500 mg by mouth every 6 (six) hours as needed for Pain.      cetirizine (ZYRTEC) 10 MG tablet Take 10 mg by mouth nightly.  5    co-enzyme Q-10 30 mg capsule Take 30 mg by mouth once daily.      fish oil-omega-3 fatty acids 300-1,000 mg capsule Take 2 g by mouth once daily.      fluticasone (FLONASE) 50 mcg/actuation nasal spray SPRAY 1-2 SPRAYS into BOTH  nostrils DAILY  5    FOLIC ACID/MULTIVIT-MIN/LUTEIN (CENTRUM SILVER ORAL) Take by mouth once daily.      gabapentin (NEURONTIN) 100 MG capsule 2 (two) times daily.       levothyroxine (SYNTHROID) 50 MCG tablet Take 50 mcg by mouth once daily.      meclizine (ANTIVERT) 25 mg tablet Take 25 mg by mouth once daily.       methocarbamol (ROBAXIN) 500 MG Tab Take 500 mg by mouth 3 (three) times daily.      multivitamin/folic acid/zinc (MULTIVITAMIN-FA-ZINC ORAL) multivitamin      naloxegol (MOVANTIK) tablet Take 25 mg by mouth as needed.      omeprazole (PRILOSEC) 20 MG capsule Take 1 capsule (20 mg total) by mouth once daily. 90 capsule 3    promethazine (PHENERGAN) 25 MG tablet Take 1 tablet (25 mg total) by mouth every 6 (six) hours as needed for Nausea. 40 tablet 0    red yeast rice 600 mg Cap Take by mouth once daily.       traZODone (DESYREL) 50 MG tablet Take 50 mg by mouth nightly.  2    valsartan (DIOVAN) 80 MG tablet Take 40 mg by mouth once daily.   1    HYDROcodone-acetaminophen (NORCO) 7.5-325 mg per tablet Take 1 tablet by mouth 2 (two) times daily as needed for Pain. 60 tablet 0     No current facility-administered medications for this visit.        Family History   Problem Relation Age of Onset    Heart disease Mother     Cancer Father     Leukemia Brother     Colon cancer Neg Hx     Celiac disease Neg Hx     Crohn's disease Neg Hx     Esophageal cancer Neg Hx     Inflammatory bowel disease Neg Hx     Irritable bowel syndrome Neg Hx     Liver cancer Neg Hx     Rectal cancer Neg Hx     Stomach cancer Neg Hx     Ulcerative colitis Neg Hx        Social History     Social History    Marital status:      Spouse name: N/A    Number of children: N/A    Years of education: N/A     Occupational History    Not on file.     Social History Main Topics    Smoking status: Never Smoker    Smokeless tobacco: Never Used    Alcohol use No    Drug use: No    Sexual activity: Not on  "file     Other Topics Concern    Not on file     Social History Narrative    No narrative on file       Objective:     Vitals:    06/28/18 1347   BP: 120/82   Pulse: (!) 57   Resp: 20   Temp: 97.5 °F (36.4 °C)   TempSrc: Oral   SpO2: 98%   Weight: 112 kg (246 lb 14.6 oz)   Height: 6' 1" (1.854 m)   PainSc:   5   PainLoc: Generalized     GEN:  Well developed, well nourished.  No acute distress.  No pain behavior.  HEENT:  No trauma.  Mucous membranes moist.  Nares patent bilaterally.  PSYCH: Normal affect. Thought content appropriate.  CHEST:  Breathing symmetric.  No audible wheezing.  ABD: Soft, non-tender, non-distended.  SKIN:  Warm, pink, dry.  No rash on exposed areas.    EXT:  No cyanosis, clubbing, or edema.  No color change or changes in nail or hair growth.  NEURO/MUSCULOSKELETAL:  Fully alert, oriented, and appropriate. Speech normal nella. No cranial nerve deficits.     C-Spine: Limited extension and lateral rotation.  Negative Spurling's bilaterally.    TTP over right greater than left cervical paraspinals  L-Spine:  Decreased ROM with pain on extension> flexion.  Positive facet loading on right.  Negative SLR bilaterally.     Previous physical exam:  4/5 motor strength in bilateral deltoids; however, this may be due to pain.  Otherwise 5/5 motor strength throughout upper extremities.   Sensory: No sensory deficit in the upper extremities.   Reflexes: 1+ and symmetric throughout.  Negative Akins's bilaterally.  Gait: Antalgic.  Present trendelenburg sign bilaterally, L>R  SI Joint/Hip: Negative KHRIS bilaterally.  Negative FADIR bilaterally.  No TTP over lumbar paraspinals, bilateral SI joints, piriformis muscles, or GTB.      TTP over left thoracic paraspinals.  Imaging:      Diagnostic Results:  All imaging was independently reviewed by me.  Below is a summary from Dr Fowler's note.  I concur with the below findings.     MRI T-spine, dated 8/8/16:  1. No significant central or neuroforaminal " stenosis     MRI C-spine, dated 8/8/16:  1. No significant stenosis      Flex/Ex X-ray C-spine, dated 7/19/16:  1. No prevertebral swelling  2. No gross instability   3. Good hardware position     MRI C-spine from an outside facility, dated 1/28/2016:  1. Moderate to severe DDD  2. Disc osteophyte complex, worst at C5/6   3. Moderate stenosis at C4/5 and C6/7      CT C-spine from an outside facility, dated 4/2015:  1. Diffuse cervical spondylosis   2. DDD, worst at C5/6, with some ossification of the PLL at C5/6      CT C-spine from an outside facility, dated 3/17/2016:  1. Fusion surgery at C5/6  2. Hardware in good position       AP and Lateral X-ray C-spine from an outside facility:  1. C4-7 ACDF  2. Hardware in good position  3. Spine in good alignment    Assessment:     Encounter Diagnoses   Name Primary?    Chronic pain disorder Yes    DDD (degenerative disc disease), cervical     S/P cervical spinal fusion     Facet arthritis, degenerative, lumbar spine     Cervical paraspinal muscle spasm     Myalgia     Encounter for long-term opiate analgesic use        Plan:     Sal was seen today for neck pain and back pain.    Diagnoses and all orders for this visit:    Chronic pain disorder  -     HYDROcodone-acetaminophen (NORCO) 7.5-325 mg per tablet; Take 1 tablet by mouth 2 (two) times daily as needed for Pain.    DDD (degenerative disc disease), cervical  -     HYDROcodone-acetaminophen (NORCO) 7.5-325 mg per tablet; Take 1 tablet by mouth 2 (two) times daily as needed for Pain.    S/P cervical spinal fusion  -     HYDROcodone-acetaminophen (NORCO) 7.5-325 mg per tablet; Take 1 tablet by mouth 2 (two) times daily as needed for Pain.    Facet arthritis, degenerative, lumbar spine  -     HYDROcodone-acetaminophen (NORCO) 7.5-325 mg per tablet; Take 1 tablet by mouth 2 (two) times daily as needed for Pain.    Cervical paraspinal muscle spasm  -     HYDROcodone-acetaminophen (NORCO) 7.5-325 mg per tablet;  Take 1 tablet by mouth 2 (two) times daily as needed for Pain.    Myalgia  -     HYDROcodone-acetaminophen (NORCO) 7.5-325 mg per tablet; Take 1 tablet by mouth 2 (two) times daily as needed for Pain.  -     bupivacaine (PF) injection 47.5 mg; Inject 9.5 mLs (47.5 mg total) into the muscle one time.  -     cloNIDine injection 50 mcg; 0.5 mLs (50 mcg total) by Epidural route once.    Encounter for long-term opiate analgesic use  -     Pain Clinic Drug Screen      His pain is consistent with the above.      We discussed the assessment and recommendations.  All available images were reviewed. We discussed the disease process, prognosis, treatment plan, and risks and benefits. The patient is aware of the risks and benefits of the medications being prescribed, common side effects, and proper usage. The following is the plan we agreed on:     1. Bilateral trigger points today as below.  We will also schedule for Dysport to be done at next clinic visit  2. Scheduled for repeat L2/3 epidural tomorrow  3. Can repeat L2-5 LMB RFA PRN (Coolief) when needed.  He would like to wait on this.  4. Continue Norco 7.5/325 twice daily as needed, #60.   reviewed and is appropriate.  UDS done today  5. Continue Pennsaid gel, concentrating on neck and left shoulder  6. Continue PT HEP  7. We again discussed spinal cord stimulator which he will think about.  I can do the perc trial after looking at his MRI  8. We discussed FRP which he will also think about.  We would need to get him set up at the [facility]  1. GAP passed   9. Continue Robaxin   10. Continue Amitriptyline 25mg QHS   11. Continue Meloxicam 15 mg daily, benefits currently outweigh risks.  Will continue to reassess  12. Movantik 25 mg for OIC PRN  13. Continue gabapentin, increasing PRN  14. Continue Tylenol as needed.  15. RTC in 4 weeks or sooner if needed.      Trigger Point Injection:   The procedure was discussed with the patient including complications of damage,  bleeding, infection, and failure of pain relief.     All medications, allergies, and relevant histories were reviewed. No recent antibiotics or infections.  A time-out was taken to verify the correct patient, procedure, laterality, and appropriate medications/allergies.    Trigger points were identified by palpation and marked. CHG prep of sites done. A 27-gauge needle was advanced to the point of maximal tenderness, and 2 mL of a mixture of 0.5% bupivacaine with clonidine 50 mcg mg was injected after negative aspiration. All sites done in the same manner. Patient tolerated the procedure well and without complications. Sites injected included:  Bilateral cervical paraspinals, 3 right, 2 left     The patient tolerated the procedure well and was discharged in excellent condition.      The above plan and management options were discussed at length with patient. Patient is in agreement with the above and verbalized understanding.     Ortho/SPM Exam

## 2018-06-28 NOTE — PATIENT INSTRUCTIONS
Recommend looking into seeing Dr. Tyrone Jeffers in [facility], who is a retinal specialist.  His number is [phone]

## 2018-06-29 ENCOUNTER — SURGERY (OUTPATIENT)
Age: 53
End: 2018-06-29

## 2018-06-29 ENCOUNTER — HOSPITAL ENCOUNTER (OUTPATIENT)
Facility: OTHER | Age: 53
Discharge: HOME | End: 2018-06-29
Attending: ANESTHESIOLOGY | Admitting: ANESTHESIOLOGY
Payer: MEDICARE

## 2018-06-29 VITALS
WEIGHT: 245 LBS | HEART RATE: 68 BPM | BODY MASS INDEX: 32.47 KG/M2 | OXYGEN SATURATION: 98 % | SYSTOLIC BLOOD PRESSURE: 125 MMHG | DIASTOLIC BLOOD PRESSURE: 80 MMHG | HEIGHT: 73 IN | TEMPERATURE: 98 F | RESPIRATION RATE: 18 BRPM

## 2018-06-29 DIAGNOSIS — M51.36 DDD (DEGENERATIVE DISC DISEASE), LUMBAR: Primary | ICD-10-CM

## 2018-06-29 PROCEDURE — 62323 NJX INTERLAMINAR LMBR/SAC: CPT | Performed by: ANESTHESIOLOGY

## 2018-06-29 PROCEDURE — 62322 NJX INTERLAMINAR LMBR/SAC: CPT | Performed by: ANESTHESIOLOGY

## 2018-06-29 PROCEDURE — 25500020 PHARM REV CODE 255: Performed by: ANESTHESIOLOGY

## 2018-06-29 PROCEDURE — 63600175 PHARM REV CODE 636 W HCPCS: Performed by: ANESTHESIOLOGY

## 2018-06-29 PROCEDURE — 25000003 PHARM REV CODE 250: Performed by: ANESTHESIOLOGY

## 2018-06-29 PROCEDURE — 62323 NJX INTERLAMINAR LMBR/SAC: CPT | Mod: ,,, | Performed by: ANESTHESIOLOGY

## 2018-06-29 RX ORDER — LIDOCAINE HYDROCHLORIDE 10 MG/ML
INJECTION INFILTRATION; PERINEURAL
Status: DISCONTINUED | OUTPATIENT
Start: 2018-06-29 | End: 2018-06-29 | Stop reason: HOSPADM

## 2018-06-29 RX ORDER — BUPIVACAINE HYDROCHLORIDE 2.5 MG/ML
INJECTION, SOLUTION EPIDURAL; INFILTRATION; INTRACAUDAL
Status: DISCONTINUED | OUTPATIENT
Start: 2018-06-29 | End: 2018-06-29 | Stop reason: HOSPADM

## 2018-06-29 RX ORDER — METHYLPREDNISOLONE ACETATE 80 MG/ML
INJECTION, SUSPENSION INTRA-ARTICULAR; INTRALESIONAL; INTRAMUSCULAR; SOFT TISSUE
Status: DISCONTINUED | OUTPATIENT
Start: 2018-06-29 | End: 2018-06-29 | Stop reason: HOSPADM

## 2018-06-29 RX ORDER — ALPRAZOLAM 0.5 MG/1
1 TABLET, ORALLY DISINTEGRATING ORAL
Status: DISCONTINUED | OUTPATIENT
Start: 2018-06-29 | End: 2018-06-29 | Stop reason: HOSPADM

## 2018-06-29 RX ADMIN — LIDOCAINE HYDROCHLORIDE 10 ML: 10 INJECTION, SOLUTION INFILTRATION; PERINEURAL at 03:06

## 2018-06-29 RX ADMIN — ALPRAZOLAM 1 MG: 0.5 TABLET, ORALLY DISINTEGRATING ORAL at 03:06

## 2018-06-29 RX ADMIN — BUPIVACAINE HYDROCHLORIDE 10 ML: 2.5 INJECTION, SOLUTION EPIDURAL; INFILTRATION; INTRACAUDAL; PERINEURAL at 03:06

## 2018-06-29 RX ADMIN — IOHEXOL 3 ML: 300 INJECTION, SOLUTION INTRAVENOUS at 03:06

## 2018-06-29 RX ADMIN — METHYLPREDNISOLONE ACETATE 80 MG: 80 INJECTION, SUSPENSION INTRA-ARTICULAR; INTRALESIONAL; INTRAMUSCULAR; SOFT TISSUE at 03:06

## 2018-06-29 RX ADMIN — SODIUM BICARBONATE 5 ML: 0.2 INJECTION, SOLUTION INTRAVENOUS at 03:06

## 2018-06-29 NOTE — OP NOTE
Date of Procedure: 06/29/2018    Procedure: LL2/3 Interlaminar Epidural Steroid Injection    Referring Provider: None    Pre-op diagnosis: Lumbar radiculopathy [M54.16]  DDD (degenerative disc disease), lumbar [M51.36]    Post-op diagnosis: Lumbar radiculopathy [M54.16]  DDD (degenerative disc disease), lumbar [M51.36]    Physician: Dr. Kasandra Montoya     Assistant: None    Anesthestia: local/niravam    EBL: None    Specimens: None    All medications, allergies, and relevant histories were reviewed. No recent antibiotics or infections.  A time-out was taken to verify the correct patient, procedure, laterality, and appropriate medications/allergies.    Fluoroscopically-Guided, Contrast-Controlled Lumbar Interlaminar Epidural Steroid Injection:     Following denial of allergy and review of potential side effects and complications including but not necessarily limited to infection, allergic reaction, local tissue breakdown, nerve injury, paresis, paralysis, spinal cord injury, and seizure, the patient indicated they understood and agreed to proceed.     Patient was placed in prone position. Lumbar area was prepped and draped in sterile manner. Local Xylocaine 1% was given subcutaneously at the level L2/3. An 18-gauge Tuohy needle was introduced atraumatically in the interspinous space and advanced up to the epidural space using fluoroscopic guidance in the AP position. Loss of resistance to air was used to identify the epidural space using fluoroscopic guidance in the lateral position. Following negative aspiration for blood and CSF and confirming the absence of paresthesias, injection of approximately 1 cc of Omnipaque 300 demonstrated excellent epidural spread without vascular or intrathecal uptake. At this point, 2cc of 0.25% marcaine with 80 mg of Depo-Medrol was injected without complication. The needle was flushed with 1% lidocaine and withdrawn.     Patient tolerated the procedure well without any side effects. No  noted complications.     The patient was followed post procedure and discharged under their own power in excellent condition in the company of a responsible adult.     Future Management:   If helpful, can repeat as needed.    Follow up with my clinic in 3 weeks or sooner if needed

## 2018-06-29 NOTE — DISCHARGE INSTRUCTIONS
Thank you for allowing us to care for you today. You may receive a survey about the care we provided. Your feedback is valuable and helps us provide excellent care throughout the community. Home Care Instructions Pain Management:    1. DIET:   You may resume your normal diet today.   2. BATHING:   You may shower with luke warm water.  3. DRESSING:   You may remove your bandage today.   4. ACTIVITY LEVEL:   You may resume your normal activities 24 hrs after your procedure.  5. MEDICATIONS:   You may resume your normal medications today.   6. SPECIAL INSTRUCTIONS:   No heat to the injection site for 24 hrs including, bath or shower, heating pad, moist heat, or hot tubs.    Use ice pack to injection site for any pain or discomfort.  Apply ice packs for 20 minute intervals as needed.   If you have received any sedatives by mouth today you may not drive for 12 hours.    If you have received any sedation through your IV, you may not drive for 24 hrs.     PLEASE CALL YOUR DOCTOR IF:  1. Redness or swelling around the injection site.  2. Fever of 101 degrees  3. Drainage (pus) from the injection site.  4. For any continuous bleeding (some dried blood over the incision is normal.)    FOR EMERGENCIES:   If any unusual problems or difficulties occur during clinic hours, call [phone] or [phone].

## 2018-06-29 NOTE — PLAN OF CARE
Pt tolerated procedure well. Pt pain 2/10. Pt assisted to feet for the first time, steady on feet. Discharge instructions given. Pt verbalized understanding.

## 2018-07-03 LAB
6MAM UR QL: NOT DETECTED
7AMINOCLONAZEPAM UR QL: NOT DETECTED
A-OH ALPRAZ UR QL: NOT DETECTED
ALPRAZ UR QL: NOT DETECTED
AMPHET UR QL SCN: NOT DETECTED
ANNOTATION COMMENT IMP: NORMAL
ANNOTATION COMMENT IMP: NORMAL
BARBITURATES UR QL: NOT DETECTED
BUPRENORPHINE UR QL: NOT DETECTED
BZE UR QL: NOT DETECTED
CARBOXYTHC UR QL: NOT DETECTED
CARISOPRODOL UR QL: NOT DETECTED
CLONAZEPAM UR QL: NOT DETECTED
CODEINE UR QL: NOT DETECTED
CREAT UR-MCNC: 205.4 MG/DL (ref 20–400)
DIAZEPAM UR QL: NOT DETECTED
ETHYL GLUCURONIDE UR QL: NOT DETECTED
FENTANYL UR QL: NOT DETECTED
HYDROCODONE UR QL: PRESENT
HYDROMORPHONE UR QL: NOT DETECTED
LORAZEPAM UR QL: NOT DETECTED
MDA UR QL: NOT DETECTED
MDEA UR QL: NOT DETECTED
MDMA UR QL: NOT DETECTED
ME-PHENIDATE UR QL: NOT DETECTED
MEPERIDINE UR QL: NOT DETECTED
METHADONE UR QL: NOT DETECTED
METHAMPHET UR QL: NOT DETECTED
MIDAZOLAM UR QL SCN: NOT DETECTED
MORPHINE UR QL: NOT DETECTED
NORBUPRENORPHINE UR QL CFM: NOT DETECTED
NORDIAZEPAM UR QL: NOT DETECTED
NORFENTANYL UR QL: NOT DETECTED
NORHYDROCODONE UR QL CFM: PRESENT
NOROXYCODONE UR QL CFM: NOT DETECTED
NOROXYMORPHONE: NOT DETECTED
OXAZEPAM UR QL: NOT DETECTED
OXYCODONE UR QL: NOT DETECTED
OXYMORPHONE UR QL: NOT DETECTED
PATHOLOGY STUDY: NORMAL
PCP UR QL: NOT DETECTED
PHENTERMINE UR QL: NOT DETECTED
PROPOXYPH UR QL: NOT DETECTED
SERVICE CMNT-IMP: NORMAL
TAPENTADOL UR QL SCN: NOT DETECTED
TAPENTADOL-O-SULF: NOT DETECTED
TEMAZEPAM UR QL: NOT DETECTED
TRAMADOL UR QL: NOT DETECTED
ZOLPIDEM UR QL: NOT DETECTED

## 2018-07-17 ENCOUNTER — PATIENT MESSAGE (OUTPATIENT)
Dept: PAIN MEDICINE | Facility: CLINIC | Age: 53
End: 2018-07-17

## 2018-09-13 ENCOUNTER — TELEPHONE (OUTPATIENT)
Dept: PAIN MEDICINE | Facility: CLINIC | Age: 53
End: 2018-09-13

## 2018-09-13 ENCOUNTER — OFFICE VISIT (OUTPATIENT)
Dept: PAIN MEDICINE | Facility: CLINIC | Age: 53
End: 2018-09-13
Attending: ANESTHESIOLOGY
Payer: MEDICARE

## 2018-09-13 VITALS
RESPIRATION RATE: 18 BRPM | SYSTOLIC BLOOD PRESSURE: 114 MMHG | HEART RATE: 81 BPM | WEIGHT: 242.5 LBS | BODY MASS INDEX: 32.14 KG/M2 | TEMPERATURE: 98 F | DIASTOLIC BLOOD PRESSURE: 80 MMHG | HEIGHT: 73 IN

## 2018-09-13 DIAGNOSIS — G89.4 CHRONIC PAIN DISORDER: Primary | ICD-10-CM

## 2018-09-13 DIAGNOSIS — M50.30 DDD (DEGENERATIVE DISC DISEASE), CERVICAL: ICD-10-CM

## 2018-09-13 DIAGNOSIS — M79.10 MYALGIA: ICD-10-CM

## 2018-09-13 DIAGNOSIS — Z98.1 S/P CERVICAL SPINAL FUSION: ICD-10-CM

## 2018-09-13 DIAGNOSIS — M54.12 CERVICAL RADICULOPATHY: Primary | ICD-10-CM

## 2018-09-13 DIAGNOSIS — M51.36 DDD (DEGENERATIVE DISC DISEASE), LUMBAR: ICD-10-CM

## 2018-09-13 DIAGNOSIS — M47.22 OSTEOARTHRITIS OF SPINE WITH RADICULOPATHY, CERVICAL REGION: ICD-10-CM

## 2018-09-13 PROCEDURE — 99999 PR PBB SHADOW E&M-EST. PATIENT-LVL III: CPT | Mod: PBBFAC,,, | Performed by: ANESTHESIOLOGY

## 2018-09-13 PROCEDURE — 99214 OFFICE O/P EST MOD 30 MIN: CPT | Mod: S$PBB,,, | Performed by: ANESTHESIOLOGY

## 2018-09-13 PROCEDURE — 99213 OFFICE O/P EST LOW 20 MIN: CPT | Mod: PBBFAC | Performed by: ANESTHESIOLOGY

## 2018-09-13 RX ORDER — HYDROCODONE BITARTRATE AND ACETAMINOPHEN 7.5; 325 MG/1; MG/1
1 TABLET ORAL 2 TIMES DAILY PRN
Qty: 60 TABLET | Refills: 0 | Status: SHIPPED | OUTPATIENT
Start: 2018-09-13 | End: 2018-10-13

## 2018-09-13 NOTE — PROGRESS NOTES
Subjective:     Patient ID: Sal Navarro is a 53 y.o. male.    Chief Complaint: No chief complaint on file.    Consulted by: No ref. provider found     Disclaimer: This note was generated using voice recognition software.  There may be a typographical errors that were missed during proofreading.      HPI:    Sal Navarro is a 53 y.o. male who presents today s/p C4-7 ACDF with C5/6 PSIF in 2/2016 with residual chronic midline neck pain that radiates into his shoulder blades bilaterally, R>>L.  This is associated with bilateral hand numbness and tingling.  The hand symptoms did improve following the surgery.  The shooting pains in his arms resolved completely.   This pain is described in detail below.  His post-operative course was complicated by dysphagia that is being treated with speech therapy.    Interval History (9/23/2016):  He returns today for follow up.  He reports that the MELANIE was not helpful for the pain.  He hasn't noticed a difference with the amitriptyline.  He does notice a difference in his low back pain when he skips the meloxicam    Interval History (11/17/2016):  He returns today for follow up.  He reports that cervical RFAs were not very helpful for the pain. He is no longer taking Percocet or Tamazepam. He continues to take Mobic 15mg daily for his low back pain. He is taking elavil 25mg nightly without side effects. He is open to considering SCS. He reports poor sleep at night.    Interval History (1/3/2017):  The patient returns today for follow up of neck and shoulder pain.  His pain is located to his neck and surrounding areas.  He previously had limited benefit with RFAs, although he did have short term benefit with MBB.  He also had limited benefit with NGUYEN.  Dr. Montoya discussed cervical SCS trial with the patient, although he reports that he does not wish to pursue this option at this time.  He is scheduled to f/u with Dr. Fowler on 1/24/16.  He continues to participate in  PT.  He did previously have some benefit with a TENS unit at PT.  He is reporting some relief with Tramadol.  He also takes Zanaflex which helps but is very sedating.      Interval History (3/3/2017):  The patient returns for follow up of neck and shoulder pain. He continues to take Tramadol BID with benefit. The medication decreases his pain slightly. He also reports benefit with Robaxin. He continues to take Mobic with benefit. He recently stopped physical therapy and reports increased pain into his shoulders, despite a home exercise plan.     Interval History (5/1/2017):  He returns today for follow up.  He reports that these RFAs were more helpful than before, L more so than right, but now the pain is again returning.  Today, he also complains of left shoulder pain that has been gradually worsening over the past few months. He associated this with modified activity due to his neck.  Pain is worse with extension of arm and lifting.  Tramadol, Robaxin, Meloxicam, and amitriptyline have been helpful for the pain.  He continues to have GI upset, but has just started prilosec.  When he tried stopping the meloxicam, his pain increased dramatically.  The tramadol takes the edge off.  Robaxin helps, but he is out.    Of note, he recently had an endoscopy that showed stage 3 gastritis, for which he was started on Prilosec    Interval History (6/21/2017):  He returns today for follow up.  He reports that the shoulder injection provided one week of relief.  The Pennsaid has been helpful for the pain.  The RFA has helped with shooting pain into his back.  He is doing his HEP.  He has not been back to PT because of insurance issues.  His shoulder continues to present significant hindrances to his daily activities.    Interval History (8/21/2017):  He returns today for follow up.  He reports that the tramadol is not as helpful as it used to be.  In addition, he is experiencing constipation and urinary retention that only  "resolves with skipping a dose of tramadol. He is going to PT for his neck/shoulder.    Interval History (10/18/2017):  He returns today for follow up.  He reports that Norco has been more helpful for the pain than the tramadol with fewer side effects.  He is averaging about 2-4 per day.  His back is doing much better, though his neck is still greatly limiting him in his daily tasks.  He reports a new pain that began with a "pop" followed by a sharp pain radiating down his back followed by a soreness.  This happened a few weeks ago on the left side of his low back above where we did the RFA.  This is resolving, but it is still sore.    Interval History (11/29/2017):  He returns today for follow up. He is scheduled for right shoulder surgery on 12/5/17 and is here today for lemuel-operative planning. Biggest pain today is in his left shoulder. After his long car ride here, he states his neck has started to bother him as well. Both pains today are roughly 5/10. Taking Norco 7.5/325 mg 1-2 tablets daily which he states takes the edge off of his pain. He has decreased his dosing from 3-4 per day due to side effects of constipation. States he is not taking the movantik on a regular basis-does not want to keep adding medications. Last took it over a week ago. Still taking mobic, robaxin with mild relief. Takes tylenol occasionally with mild relief. Also used hemp oil with mild relief. Still doing a HEP on a regular basis. States his lower back is gradually getting worse. Pain primarily on the left side. Had trigger point injections at last visit which provided 30-40% reduction in his pain for for 1-2 weeks     Interval History (2/21/2018):  He returns today for follow up.  He reports that his shoulder is improving since his surgery.  His neck is doing about the same.  His low back pain has returned, and it is slightly higher than it was before.  He was recently started on a low dose of gabapentin    Interval History " (3/29/2018):  He returns today for follow up.  He reports that his neck is feeling slightly better since he is no longer wearing his sling for her shoulder.  His physical therapy is still currently on hold due to his retinal condition.  His low back is feeling better.    Interval History (6/28/2018):  He returns today for follow up.  He reports that his low back has been hurting worse.  His low back was doing well after the NGUYEN, but the pain started to return over the past few weeks.  This is associated with numbness in his left leg in the posterior thigh.  He is back to taking 2 Norco daily.  His neck and shoulders are doing ok, but his low back is hurting.  He continues to have issues with his eyes.  He has had to have another laser surgery on his right eye again 3 weeks ago.    Interval History (9/13/2018):  He returns today for follow up.  He reports that he is still having trouble with his right eye.  Overall, his medication regimen helps somewhat.  The Norco 7.5 mg does not help much the way he is taking it, but any more causes constipation. He reports increasing neck pain.  This is associated with some radiation of tingling into his right arm (in an ulnar nerve distribution).     Physical Therapy: Yes- outside facility in [place].  He is now doing the HEP.    Non-pharmacologic Treatment: Ice and heat provide mild benefit.           · TENS? Yes at PT with benefit.    Pain Medications:         · Currently taking: Gabapentin 100 mg BID, Meloxicam 15 mg daily, Tylenol PRN, Norco 7.5/325 mg 2 daily PRN (last prescription was 6/28/2018 for #60), Robaxin 500 mg BID PRN, movantik 25 mg (causes stomach cramping)    · Has tried in the past:  Amitriptyline 50 mg QHS, Percocet (limited benefit and causes constipation treated with colace), Temazepam for sleep (he has had trouble sleeping since the surgery), Flexeril (no help in the past, both before and after the surgery), Gabapentin (after, went to straight 300 mg TID, too  sedating, unsure of duration of tx), Lyrica (sedation), Celebrex (GI upset), Zanaflex 4 mg PRN muscle pain (helpful but sedating)     · Has not tried: SNRIs, other muscle relaxants    Blood thinners: None    Interventional Therapies:   · C7/T1 ILESI: 10-15% relief  · 10/4/16 Bilateral C4-7 MBB- short term benefit  · 10/11/16 Right C4-7 RFA- limited benefit  · 10/25/16 Left C4-7- limited benefit  · TPIs (cervical and left thoracic): Good benefit x 1-2 weeks in thoracic, ongoing in cervical  · Bilateral L3-5 MBB: Positive  · Right then left L2-5 RFA: Good relief x 4-5 months  · L2/3 ILESI: >50% relief    Relevant Surgeries:   · C4-7 ACDF with C5/6 PSIF in 2/2016    Affecting sleep? Yes    Affecting daily activities? Yes    Depressive symptoms? Yes, due to general medical condition.  He denies significant symptoms today          · SI/HI? No    Work status: On short term disability from his job as an [employer] .    Pain Scales  Best: 2/10  Worst: 9/10  Usually: 7/10  Today: 7/10    Neck Pain    This is a new problem. The current episode started more than 1 month ago (february 2016). The problem occurs intermittently. The problem has been unchanged. The pain is present in the anterior neck. The quality of the pain is described as aching, shooting and stabbing. The pain is at a severity of 5/10. The pain is mild. The symptoms are aggravated by bending (turning). The pain is same all the time. Stiffness is present all day and in the morning. Associated symptoms include chest pain. Pertinent negatives include no fever, headaches or weight loss. He has tried oral narcotics, NSAIDs and muscle relaxants for the symptoms. The treatment provided mild relief. Physical therapy was not tried and ineffective.      Review of Systems   Constitution: Negative. Negative for chills, fever, malaise/fatigue, weight gain and weight loss.   HENT: Negative.  Negative for ear pain and hoarse voice.    Eyes: Negative.  Negative for  blurred vision, pain and visual disturbance.   Cardiovascular: Positive for chest pain. Negative for dyspnea on exertion and irregular heartbeat.   Respiratory: Negative.  Negative for cough, shortness of breath and wheezing.    Endocrine: Negative.  Negative for cold intolerance and heat intolerance.   Hematologic/Lymphatic: Negative.  Negative for adenopathy and bleeding problem. Does not bruise/bleed easily.   Skin: Negative.  Negative for color change, itching and rash.   Musculoskeletal: Positive for neck pain and stiffness. Negative for back pain.   Gastrointestinal: Negative.  Negative for change in bowel habit, diarrhea, hematemesis, hematochezia, melena and vomiting.   Genitourinary: Negative.  Negative for flank pain, frequency, hematuria and urgency.   Neurological: Positive for dizziness and light-headedness. Negative for difficulty with concentration, headaches, loss of balance and seizures.   Psychiatric/Behavioral: Negative for altered mental status, depression and suicidal ideas. The patient has insomnia and is nervous/anxious.    Allergic/Immunologic: Negative.  Negative for HIV exposure.   All other systems reviewed and are negative.            Past Medical History:   Diagnosis Date    Arthritis     Cataract     Cervical back pain with evidence of disc disease     Hiatal hernia     Hypertension     Thyroid disease        Past Surgical History:   Procedure Laterality Date    ACROMIOCLAVICULAR JOINT CYST EXCISION Right     ARTHROSCOPY-SHOULDER EXCISION DISTAL CLAVICLE Left 12/5/2017    [facility]    ARTHROSCOPY-SHOULDER WITH SUBACROMIAL DECOMPRESSION Left 12/5/2017    [facility]    BACK SURGERY      BLOCK-NERVE-MEDIAL BRANCH-CERVICAL Bilateral 10/4/2016    [facility]    BLOCK-NERVE-MEDIAL BRANCH-LUMBAR Bilateral 8/31/2017    [facility]    cataracts  Bilateral     CERVICAL FUSION      COLONOSCOPY N/A 4/27/2018    Procedure: COLONOSCOPY;  Surgeon: Giovani Medeiros MD;  Location: [facility] ENDO (4TH FLR);  Service: Endoscopy;  Laterality: N/A;    COLONOSCOPY N/A 4/27/2018    [facility] ([facility]    DEBRIDEMENT-SHOULDER-ARTHROSCOPIC Left 12/5/2017    [facility]    NGUYEN      EYE SURGERY      INJECTION,STEROID,EPIDURAL N/A 6/29/2018    [facility] MGT    INJECTION-STEROID- arthrscopic assisted Bio D injection to the left shoulder Left 12/5/2017    [facility]    INJECTION-STEROID-EPIDURAL-CERVICAL N/A 8/31/2016    [facility]    INJECTION-STEROID-EPIDURAL-LUMBAR N/A 3/6/2018    [facility]    LYSIS-ADHESION Left 12/5/2017    [facility]    RADIOFREQUENCY ABLATION  10/2016    cervical spine/Jan    RADIOFREQUENCY THERMOCOAGULATION Left 3/28/2017    [facility]    RADIOFREQUENCY THERMOCOAGULATION Left 10/25/2016    [facility]    RADIOFREQUENCY THERMOCOAGULATION Right 10/11/2016    [facility]    RADIOFREQUENCY THERMOCOAGULATION (RFTC)-NERVE-MEDIAN BRANCH-CERVICAL Right 3/14/2017    [facility]    RADIOFREQUENCY THERMOCOAGULATION (RFTC)-NERVE-MEDIAN BRANCH-LUMBAR Bilateral 9/26/2017    [facility] MGT    REPAIR-ROTATOR CUFF Left 12/5/2017    [facility] OR    REPAIR-TENDON-BICEP Left 12/5/2017    [facility]    ROTATOR CUFF REPAIR Right     SPINE SURGERY      cerival fusion        Review of patient's allergies indicates:   Allergen Reactions    Pcn [penicillins] Rash       Current Outpatient  Medications   Medication Sig Dispense Refill    acetaminophen (TYLENOL) 500 MG tablet Take 500 mg by mouth every 6 (six) hours as needed for Pain.      cetirizine (ZYRTEC) 10 MG tablet Take 10 mg by mouth nightly.  5    co-enzyme Q-10 30 mg capsule Take 30 mg by mouth once daily.      fish oil-omega-3 fatty acids 300-1,000 mg capsule Take 2 g by mouth once daily.      fluticasone (FLONASE) 50 mcg/actuation nasal spray SPRAY 1-2 SPRAYS into BOTH nostrils DAILY  5    FOLIC ACID/MULTIVIT-MIN/LUTEIN (CENTRUM SILVER ORAL) Take by mouth once daily.      gabapentin (NEURONTIN) 100 MG capsule 2 (two) times daily.       levothyroxine (SYNTHROID) 50 MCG tablet Take 50 mcg by mouth once daily.      meclizine (ANTIVERT) 25 mg tablet Take 25 mg by mouth once daily.       methocarbamol (ROBAXIN) 500 MG Tab Take 500 mg by mouth 3 (three) times daily.      multivitamin/folic acid/zinc (MULTIVITAMIN-FA-ZINC ORAL) multivitamin      naloxegol (MOVANTIK) tablet Take 25 mg by mouth as needed.      omeprazole (PRILOSEC) 20 MG capsule Take 1 capsule (20 mg total) by mouth once daily. 90 capsule 3    promethazine (PHENERGAN) 25 MG tablet Take 1 tablet (25 mg total) by mouth every 6 (six) hours as needed for Nausea. 40 tablet 0    red yeast rice 600 mg Cap Take by mouth once daily.       traZODone (DESYREL) 50 MG tablet Take 50 mg by mouth nightly.  2    valsartan (DIOVAN) 80 MG tablet Take 40 mg by mouth once daily.   1     No current facility-administered medications for this visit.        Family History   Problem Relation Age of Onset    Heart disease Mother     Cancer Father     Leukemia Brother     Colon cancer Neg Hx     Celiac disease Neg Hx     Crohn's disease Neg Hx     Esophageal cancer Neg Hx     Inflammatory bowel disease Neg Hx     Irritable bowel syndrome Neg Hx     Liver cancer Neg Hx     Rectal cancer Neg Hx     Stomach cancer Neg Hx     Ulcerative colitis Neg Hx        Social History  "    Socioeconomic History    Marital status:      Spouse name: Not on file    Number of children: Not on file    Years of education: Not on file    Highest education level: Not on file   Social Needs    Financial resource strain: Not on file    Food insecurity - worry: Not on file    Food insecurity - inability: Not on file    Transportation needs - medical: Not on file    Transportation needs - non-medical: Not on file   Occupational History    Not on file   Tobacco Use    Smoking status: Never Smoker    Smokeless tobacco: Never Used   Substance and Sexual Activity    Alcohol use: No    Drug use: No    Sexual activity: Not on file   Other Topics Concern    Not on file   Social History Narrative    Not on file       Objective:     Vitals:    09/13/18 1118   BP: 114/80   Pulse: 81   Resp: 18   Temp: 97.7 °F (36.5 °C)   TempSrc: Oral   Weight: 110 kg (242 lb 8.1 oz)   Height: 6' 1" (1.854 m)     GEN:  Well developed, well nourished.  No acute distress.  No pain behavior.  HEENT:  No trauma.  Mucous membranes moist.  Nares patent bilaterally.  PSYCH: Normal affect. Thought content appropriate.  CHEST:  Breathing symmetric.  No audible wheezing.  ABD: Soft, non-tender, non-distended.  SKIN:  Warm, pink, dry.  No rash on exposed areas.    EXT:  No cyanosis, clubbing, or edema.  No color change or changes in nail or hair growth.  NEURO/MUSCULOSKELETAL:  Fully alert, oriented, and appropriate. Speech normal nella. No cranial nerve deficits.   C-Spine: Limited extension and lateral rotation.  Negative Spurling's bilaterally.    Mild TTP over right greater than left cervical paraspinals.  Much improved hypertonicity in bilateral traps.      Previous physical exam:  4/5 motor strength in bilateral deltoids; however, this may be due to pain.  Otherwise 5/5 motor strength throughout upper extremities.   Sensory: No sensory deficit in the upper extremities.   Reflexes: 1+ and symmetric throughout.  " Negative Akins's bilaterally.  Gait: Antalgic.  Present trendelenburg sign bilaterally, L>R  SI Joint/Hip: Negative KHRIS bilaterally.  Negative FADIR bilaterally.  No TTP over lumbar paraspinals, bilateral SI joints, piriformis muscles, or GTB.    L-Spine:  Decreased ROM with pain on extension> flexion.  Positive facet loading on right.  Negative SLR bilaterally.   TTP over left thoracic paraspinals.  Imaging:      Diagnostic Results:  All imaging was independently reviewed by me.  Below is a summary from Dr Fowler's note.  I concur with the below findings.     MRI T-spine, dated 8/8/16:  1. No significant central or neuroforaminal stenosis     MRI C-spine, dated 8/8/16:  1. No significant stenosis      Flex/Ex X-ray C-spine, dated 7/19/16:  1. No prevertebral swelling  2. No gross instability   3. Good hardware position     MRI C-spine from an outside facility, dated 1/28/2016:  1. Moderate to severe DDD  2. Disc osteophyte complex, worst at C5/6   3. Moderate stenosis at C4/5 and C6/7      CT C-spine from an outside facility, dated 4/2015:  1. Diffuse cervical spondylosis   2. DDD, worst at C5/6, with some ossification of the PLL at C5/6      CT C-spine from an outside facility, dated 3/17/2016:  1. Fusion surgery at C5/6  2. Hardware in good position       AP and Lateral X-ray C-spine from an outside facility:  1. C4-7 ACDF  2. Hardware in good position  3. Spine in good alignment    Assessment:     Encounter Diagnoses   Name Primary?    Chronic pain disorder Yes    DDD (degenerative disc disease), cervical     S/P cervical spinal fusion     Osteoarthritis of spine with radiculopathy, cervical region     DDD (degenerative disc disease), lumbar     Myalgia        Plan:     Diagnoses and all orders for this visit:    Chronic pain disorder  -     HYDROcodone-acetaminophen (NORCO) 7.5-325 mg per tablet; Take 1 tablet by mouth 2 (two) times daily as needed for Pain.    DDD (degenerative disc disease),  cervical  -     HYDROcodone-acetaminophen (NORCO) 7.5-325 mg per tablet; Take 1 tablet by mouth 2 (two) times daily as needed for Pain.    S/P cervical spinal fusion  -     HYDROcodone-acetaminophen (NORCO) 7.5-325 mg per tablet; Take 1 tablet by mouth 2 (two) times daily as needed for Pain.    Osteoarthritis of spine with radiculopathy, cervical region  -     HYDROcodone-acetaminophen (NORCO) 7.5-325 mg per tablet; Take 1 tablet by mouth 2 (two) times daily as needed for Pain.    DDD (degenerative disc disease), lumbar  -     HYDROcodone-acetaminophen (NORCO) 7.5-325 mg per tablet; Take 1 tablet by mouth 2 (two) times daily as needed for Pain.    Myalgia  -     HYDROcodone-acetaminophen (NORCO) 7.5-325 mg per tablet; Take 1 tablet by mouth 2 (two) times daily as needed for Pain.      His pain is consistent with the above.    We extensively discussed his options, including: C7/T1 ILESI, Dysport (will hold off on this as his muscle tightness is better), SCS, FRP, no intervention.    He wants to do the ILESI again, which I think is reasonable.    We discussed the assessment and recommendations.  All available images were reviewed. We discussed the disease process, prognosis, treatment plan, and risks and benefits. The patient is aware of the risks and benefits of the medications being prescribed, common side effects, and proper usage. The following is the plan we agreed on:     1. Scheduled for repeat C7/T1 epidural tomorrow  2. Can repeat L2-5 LMB RFA PRN (Coolief) when needed.  He would like to wait on this.  3. Can repeat L2/3 ILESI  4. Continue Norco 7.5/325 twice daily as needed, #60.   reviewed and is appropriate.  UDS consistent.  Refilled today.  5. Continue Pennsaid gel, concentrating on neck and left shoulder  6. Continue PT HEP  7. We again discussed spinal cord stimulator which he will think about.  I can do the perc trial after looking at his MRI  8. We discussed FRP which he will also think about.  We  would need [facility]  1. GAP passed   9. Continue Robaxin   10. Continue Amitriptyline 25mg QHS   11. Continue Meloxicam 15 mg daily, benefits currently outweigh risks.  Will continue to reassess  12. Movantik 25 mg for OIC PRN  13. Continue gabapentin, increasing PRN  14. Continue Tylenol as needed.  15. RTC in 4 weeks or sooner if needed.      The above plan and management options were discussed at length with patient. Patient is in agreement with the above and verbalized understanding.     Ortho/SPM Exam

## 2018-09-13 NOTE — TELEPHONE ENCOUNTER
----- Message from Jennifer Mcfadden sent at 9/13/2018  3:21 PM CDT -----  Left message with females asking pt. Call me to schedule his procedure with Dr. Montoya.

## 2018-09-13 NOTE — TELEPHONE ENCOUNTER
----- Message from Justyn Rey sent at 9/13/2018  4:43 PM CDT -----  Contact: VAN ESCOTO [[id number]]            Name of Who is Calling: VAN ESCOTO [[id number]]      What is the request in detail: Returning a call      Can the clinic reply by [facility]: no      What Number to Call Back if not in [place]: [phone]

## 2018-09-14 ENCOUNTER — HOSPITAL ENCOUNTER (OUTPATIENT)
Facility: OTHER | Age: 53
Discharge: HOME | End: 2018-09-14
Attending: ANESTHESIOLOGY | Admitting: ANESTHESIOLOGY
Payer: MEDICARE

## 2018-09-14 VITALS
SYSTOLIC BLOOD PRESSURE: 130 MMHG | DIASTOLIC BLOOD PRESSURE: 88 MMHG | RESPIRATION RATE: 18 BRPM | HEIGHT: 73 IN | BODY MASS INDEX: 32.07 KG/M2 | HEART RATE: 65 BPM | WEIGHT: 242 LBS | OXYGEN SATURATION: 95 % | TEMPERATURE: 97 F

## 2018-09-14 DIAGNOSIS — M50.30 DDD (DEGENERATIVE DISC DISEASE), CERVICAL: Primary | ICD-10-CM

## 2018-09-14 PROCEDURE — 62321 NJX INTERLAMINAR CRV/THRC: CPT | Performed by: ANESTHESIOLOGY

## 2018-09-14 PROCEDURE — 62321 NJX INTERLAMINAR CRV/THRC: CPT | Mod: ,,, | Performed by: ANESTHESIOLOGY

## 2018-09-14 PROCEDURE — 63600175 PHARM REV CODE 636 W HCPCS: Performed by: ANESTHESIOLOGY

## 2018-09-14 PROCEDURE — 25500020 PHARM REV CODE 255: Performed by: ANESTHESIOLOGY

## 2018-09-14 PROCEDURE — 25000003 PHARM REV CODE 250: Performed by: ANESTHESIOLOGY

## 2018-09-14 RX ORDER — BUPIVACAINE HYDROCHLORIDE 2.5 MG/ML
INJECTION, SOLUTION EPIDURAL; INFILTRATION; INTRACAUDAL
Status: DISCONTINUED | OUTPATIENT
Start: 2018-09-14 | End: 2018-09-14 | Stop reason: HOSPADM

## 2018-09-14 RX ORDER — LIDOCAINE HYDROCHLORIDE 10 MG/ML
INJECTION INFILTRATION; PERINEURAL
Status: DISCONTINUED | OUTPATIENT
Start: 2018-09-14 | End: 2018-09-14 | Stop reason: HOSPADM

## 2018-09-14 RX ORDER — METHYLPREDNISOLONE ACETATE 80 MG/ML
INJECTION, SUSPENSION INTRA-ARTICULAR; INTRALESIONAL; INTRAMUSCULAR; SOFT TISSUE
Status: DISCONTINUED | OUTPATIENT
Start: 2018-09-14 | End: 2018-09-14 | Stop reason: HOSPADM

## 2018-09-14 RX ORDER — ALPRAZOLAM 0.5 MG/1
1 TABLET, ORALLY DISINTEGRATING ORAL ONCE
Status: COMPLETED | OUTPATIENT
Start: 2018-09-14 | End: 2018-09-14

## 2018-09-14 RX ADMIN — ALPRAZOLAM 1 MG: 0.5 TABLET, ORALLY DISINTEGRATING ORAL at 11:09

## 2018-09-14 NOTE — PLAN OF CARE
Pt AAOx3, pt able to tolerate PO intake at this time. Pt able to stand and ambulate independently. Pt denies pain at this time, 1 bandaid CDI. Pt awaiting transport. Will continue to monitor.

## 2018-09-14 NOTE — OP NOTE
Date: 09/14/2018    Procedure: C7/F1Akmypsej Steroid Injection    Referring Provider: None     Pre-op diagnosis: DDD (degenerative disc disease), cervical [M50.30]  Cervical radiculopathy [M54.12]    Post-op diagnosis: DDD (degenerative disc disease), cervical [M50.30]  Cervical radiculopathy [M54.12]    Physician: Dr. Kasandra Montoya     Assistant: Dr. Casanova    Anesthestia: local    All medications, allergies, and relevant histories were reviewed. No recent antibiotics or infections.   A time-out was taken to verify the correct patient, procedure, laterality, and appropriate medications/allergies.    Fluoroscopically-Guided, Contrast-Controlled Cervical Interlaminar Epidural Steroid Injection:     Following denial of allergy and review of potential side effects and complications including but not necessarily limited to infection, allergic reaction, local tissue breakdown, nerve injury, paresis, paralysis, spinal cord injury, and seizure, the patient indicated they understood and agreed to proceed.     The patient was positioned prone and prepped and draped in the usual sterile fashion. The C7-T1 interspace was identified fluoroscopically. The skin was anesthetized via 25-gauge 1.5 needle with approximately 4cc of bicarbonated 1% lidocaine. A 20-gauge Tuohy needle was atraumatically introduced and advanced under fluoroscopic guidance. Using LELIA to saline technique with 0.9 % saline the epidural space was entered without difficulties. Following negative aspiration for blood and CSF and confirming the absence of paresthesias, injection of approximately 1.5 cc of Omnipaque 300 demonstrated excellent epidural spread without vascular or intrathecal uptake. At this point, 2 cc of 0.9% normal saline with 80 mg of Depo-Medrol was injected without complication. The needle was flushed with 1% lidocaine withdrawn.     The patient was followedpost procedure and discharged under their own power in excellent condition.    Future  Management:   If helpful, can repeat as needed.    Follow up with my clinic in 3 weeks or sooner if needed    I certify that I provided the above services.  I was present for the entire procedure, which was performed by myself with the assistance of the resident physician.  There were no parts of the procedure that were performed not by myself or without my direct supervision.

## 2018-09-14 NOTE — DISCHARGE INSTRUCTIONS
Thank you for allowing us to care for you today. You may receive a survey about the care we provided. Your feedback is valuable and helps us provide excellent care throughout the community.     Home Care Instructions for Pain Management:    1. DIET:   You may resume your normal diet today.   2. BATHING:   You may shower with luke warm water. No tub baths or anything that will soak injection sites under water for the next 24 hours.  3. DRESSING:   You may remove your bandage today.   4. ACTIVITY LEVEL:   You may resume your normal activities 24 hrs after your procedure. Nothing strenuous today.  5. MEDICATIONS:   You may resume your normal medications today. To restart blood thinners, ask your doctor.  6. DRIVING    If you have received any sedatives by mouth today, you may not drive for 12 hours.    If you have received any sedation through your IV, you may not drive for 24 hrs.   7. SPECIAL INSTRUCTIONS:   No heat to the injection site for 24 hrs including, hot bath or shower, heating pad, moist heat, or hot tubs.    Use ice pack to injection site for any pain or discomfort.  Apply ice packs for 20 minute intervals as needed.    IF you have diabetes, be sure to monitor your blood sugar more closely. IF your injection contained steroids your blood sugar levels may become higher than normal.    If you are still having pain upon discharge:  Your pain may improve over the next 48 hours. The anesthetic (numbing medication) works immediately to 48 hours. IF your injection contained a steroid (anti-inflammatory medication), it takes approximately 3 days to start feeling relief and 7-10 days to see your greatest results from the medication. It is possible you may need subsequent injections. This would be discussed at your follow up appointment with pain management or your referring doctor.      PLEASE CALL YOUR DOCTOR IF:  1. Redness or swelling around the injection site.  2. Fever of 101 degrees or more  3. Drainage  (pus) from the injection site.  4. For any continuous bleeding (some dried blood over the incision is normal.)    FOR EMERGENCIES:   If any unusual problems or difficulties occur during clinic hours, call [phone] or [phone].

## 2018-10-17 ENCOUNTER — OFFICE VISIT (OUTPATIENT)
Dept: PAIN MEDICINE | Facility: CLINIC | Age: 53
End: 2018-10-17
Attending: ANESTHESIOLOGY
Payer: MEDICARE

## 2018-10-17 VITALS
BODY MASS INDEX: 31.93 KG/M2 | HEIGHT: 73 IN | SYSTOLIC BLOOD PRESSURE: 118 MMHG | DIASTOLIC BLOOD PRESSURE: 84 MMHG | TEMPERATURE: 98 F | HEART RATE: 67 BPM

## 2018-10-17 DIAGNOSIS — M79.10 MYALGIA: ICD-10-CM

## 2018-10-17 DIAGNOSIS — M51.36 DDD (DEGENERATIVE DISC DISEASE), LUMBAR: ICD-10-CM

## 2018-10-17 DIAGNOSIS — M50.30 DDD (DEGENERATIVE DISC DISEASE), CERVICAL: ICD-10-CM

## 2018-10-17 DIAGNOSIS — M47.816 LUMBAR SPONDYLOSIS: ICD-10-CM

## 2018-10-17 DIAGNOSIS — M47.812 OSTEOARTHRITIS OF CERVICAL SPINE, UNSPECIFIED SPINAL OSTEOARTHRITIS COMPLICATION STATUS: Primary | ICD-10-CM

## 2018-10-17 DIAGNOSIS — Z98.1 S/P CERVICAL SPINAL FUSION: ICD-10-CM

## 2018-10-17 DIAGNOSIS — M47.22 OSTEOARTHRITIS OF SPINE WITH RADICULOPATHY, CERVICAL REGION: ICD-10-CM

## 2018-10-17 DIAGNOSIS — M47.816 LUMBAR SPONDYLOSIS: Primary | ICD-10-CM

## 2018-10-17 DIAGNOSIS — G89.4 CHRONIC PAIN DISORDER: Primary | ICD-10-CM

## 2018-10-17 PROCEDURE — 99999 PR PBB SHADOW E&M-EST. PATIENT-LVL III: CPT | Mod: PBBFAC,,, | Performed by: ANESTHESIOLOGY

## 2018-10-17 PROCEDURE — 99214 OFFICE O/P EST MOD 30 MIN: CPT | Mod: S$PBB,,, | Performed by: ANESTHESIOLOGY

## 2018-10-17 PROCEDURE — 99213 OFFICE O/P EST LOW 20 MIN: CPT | Mod: PBBFAC | Performed by: ANESTHESIOLOGY

## 2018-10-17 RX ORDER — ASPIRIN 81 MG/1
81 TABLET ORAL DAILY
COMMUNITY

## 2018-10-17 RX ORDER — HYDROCODONE BITARTRATE AND ACETAMINOPHEN 7.5; 325 MG/1; MG/1
1 TABLET ORAL 2 TIMES DAILY PRN
Qty: 60 TABLET | Refills: 0 | Status: SHIPPED | OUTPATIENT
Start: 2018-10-17 | End: 2018-11-16

## 2018-10-17 RX ORDER — SULFAMETHOXAZOLE AND TRIMETHOPRIM 400; 80 MG/1; MG/1
1 TABLET ORAL 2 TIMES DAILY
Status: ON HOLD | COMMUNITY
End: 2018-11-23 | Stop reason: CLARIF

## 2018-10-17 RX ORDER — PRAVASTATIN SODIUM 20 MG/1
TABLET ORAL
COMMUNITY
Start: 2018-08-15 | End: 2022-10-27

## 2018-10-17 NOTE — PROGRESS NOTES
Subjective:     Patient ID: Sal Navarro is a 53 y.o. male.    Chief Complaint: Follow-up    Consulted by: No ref. provider found     Disclaimer: This note was generated using voice recognition software.  There may be a typographical errors that were missed during proofreading.      HPI:    Sal Navarro is a 53 y.o. male who presents today s/p C4-7 ACDF with C5/6 PSIF in 2/2016 with residual chronic midline neck pain that radiates into his shoulder blades bilaterally, R>>L.  This is associated with bilateral hand numbness and tingling.  The hand symptoms did improve following the surgery.  The shooting pains in his arms resolved completely.   This pain is described in detail below.  His post-operative course was complicated by dysphagia that is being treated with speech therapy.    Interval History (9/23/2016):  He returns today for follow up.  He reports that the MELANIE was not helpful for the pain.  He hasn't noticed a difference with the amitriptyline.  He does notice a difference in his low back pain when he skips the meloxicam    Interval History (11/17/2016):  He returns today for follow up.  He reports that cervical RFAs were not very helpful for the pain. He is no longer taking Percocet or Tamazepam. He continues to take Mobic 15mg daily for his low back pain. He is taking elavil 25mg nightly without side effects. He is open to considering SCS. He reports poor sleep at night.    Interval History (1/3/2017):  The patient returns today for follow up of neck and shoulder pain.  His pain is located to his neck and surrounding areas.  He previously had limited benefit with RFAs, although he did have short term benefit with MBB.  He also had limited benefit with NGUYEN.  Dr. Montoya discussed cervical SCS trial with the patient, although he reports that he does not wish to pursue this option at this time.  He is scheduled to f/u with Dr. Fowler on 1/24/16.  He continues to participate in PT.  He did  previously have some benefit with a TENS unit at PT.  He is reporting some relief with Tramadol.  He also takes Zanaflex which helps but is very sedating.      Interval History (3/3/2017):  The patient returns for follow up of neck and shoulder pain. He continues to take Tramadol BID with benefit. The medication decreases his pain slightly. He also reports benefit with Robaxin. He continues to take Mobic with benefit. He recently stopped physical therapy and reports increased pain into his shoulders, despite a home exercise plan.     Interval History (5/1/2017):  He returns today for follow up.  He reports that these RFAs were more helpful than before, L more so than right, but now the pain is again returning.  Today, he also complains of left shoulder pain that has been gradually worsening over the past few months. He associated this with modified activity due to his neck.  Pain is worse with extension of arm and lifting.  Tramadol, Robaxin, Meloxicam, and amitriptyline have been helpful for the pain.  He continues to have GI upset, but has just started prilosec.  When he tried stopping the meloxicam, his pain increased dramatically.  The tramadol takes the edge off.  Robaxin helps, but he is out.    Of note, he recently had an endoscopy that showed stage 3 gastritis, for which he was started on Prilosec    Interval History (6/21/2017):  He returns today for follow up.  He reports that the shoulder injection provided one week of relief.  The Pennsaid has been helpful for the pain.  The RFA has helped with shooting pain into his back.  He is doing his HEP.  He has not been back to PT because of insurance issues.  His shoulder continues to present significant hindrances to his daily activities.    Interval History (8/21/2017):  He returns today for follow up.  He reports that the tramadol is not as helpful as it used to be.  In addition, he is experiencing constipation and urinary retention that only resolves with  "skipping a dose of tramadol. He is going to PT for his neck/shoulder.    Interval History (10/18/2017):  He returns today for follow up.  He reports that Norco has been more helpful for the pain than the tramadol with fewer side effects.  He is averaging about 2-4 per day.  His back is doing much better, though his neck is still greatly limiting him in his daily tasks.  He reports a new pain that began with a "pop" followed by a sharp pain radiating down his back followed by a soreness.  This happened a few weeks ago on the left side of his low back above where we did the RFA.  This is resolving, but it is still sore.    Interval History (11/29/2017):  He returns today for follow up. He is scheduled for right shoulder surgery on 12/5/17 and is here today for lemuel-operative planning. Biggest pain today is in his left shoulder. After his long car ride here, he states his neck has started to bother him as well. Both pains today are roughly 5/10. Taking Norco 7.5/325 mg 1-2 tablets daily which he states takes the edge off of his pain. He has decreased his dosing from 3-4 per day due to side effects of constipation. States he is not taking the movantik on a regular basis-does not want to keep adding medications. Last took it over a week ago. Still taking mobic, robaxin with mild relief. Takes tylenol occasionally with mild relief. Also used hemp oil with mild relief. Still doing a HEP on a regular basis. States his lower back is gradually getting worse. Pain primarily on the left side. Had trigger point injections at last visit which provided 30-40% reduction in his pain for for 1-2 weeks     Interval History (2/21/2018):  He returns today for follow up.  He reports that his shoulder is improving since his surgery.  His neck is doing about the same.  His low back pain has returned, and it is slightly higher than it was before.  He was recently started on a low dose of gabapentin    Interval History (3/29/2018):  He " returns today for follow up.  He reports that his neck is feeling slightly better since he is no longer wearing his sling for her shoulder.  His physical therapy is still currently on hold due to his retinal condition.  His low back is feeling better.    Interval History (6/28/2018):  He returns today for follow up.  He reports that his low back has been hurting worse.  His low back was doing well after the NGUYEN, but the pain started to return over the past few weeks.  This is associated with numbness in his left leg in the posterior thigh.  He is back to taking 2 Norco daily.  His neck and shoulders are doing ok, but his low back is hurting.  He continues to have issues with his eyes.  He has had to have another laser surgery on his right eye again 3 weeks ago.    Interval History (9/13/2018):  He returns today for follow up.  He reports that he is still having trouble with his right eye.  Overall, his medication regimen helps somewhat.  The Norco 7.5 mg does not help much the way he is taking it, but any more causes constipation. He reports increasing neck pain.  This is associated with some radiation of tingling into his right arm (in an ulnar nerve distribution).     Interval History (10/17/2018):  He returns today for follow up.  He reports that the C7/T1 ILESI has been helpful for the pain in his neck.  He continues to report left-sided lower neck pain that radiates into shoulder blade.  He also continues to report low back pain.  Overall, he feels that his functioning is going down.  Today, he presents walking with a cane.    Physical Therapy: Yes- outside facility in [place].  He is now doing the HEP, stretches 3-4 days per week, exercises 2-3 days per week    Non-pharmacologic Treatment: Ice and heat provide mild benefit.           · TENS? Yes at PT with benefit.    Pain Medications:         · Currently taking: Gabapentin 100 mg BID, Meloxicam 15 mg daily, Tylenol PRN, Norco 7.5/325 mg 2 daily PRN, Robaxin 500 mg  BID PRN, movantik 25 mg (causes stomach cramping)    · Has tried in the past:  Amitriptyline 50 mg QHS, Percocet (limited benefit and causes constipation treated with colace), Temazepam for sleep (he has had trouble sleeping since the surgery), Flexeril (no help in the past, both before and after the surgery), Gabapentin (after, went to straight 300 mg TID, too sedating, unsure of duration of tx), Lyrica (sedation), Celebrex (GI upset), Zanaflex 4 mg PRN muscle pain (helpful but sedating)     · Has not tried: SNRIs, other muscle relaxants    Blood thinners: None    Interventional Therapies:   · C7/T1 ILESI: 10-15% relief  · 10/4/16 Bilateral C4-7 MBB- short term benefit  · 10/11/16 Right C4-7 RFA- limited benefit  · 10/25/16 Left C4-7- limited benefit  · TPIs (cervical and left thoracic): Good benefit x 1-2 weeks in thoracic, ongoing in cervical  · Bilateral L3-5 MBB: Positive  · Right then left L2-5 RFA: Good relief x 4-5 months  · 03/2018:  L2/3 ILESI: >50% relief  · 06/29/2018:  L2/3 ILESI:  Greater than 50% relief  · 09/14/2018:  C7/T1 ILESI: 30% relief    Relevant Surgeries:   · C4-7 ACDF with C5/6 PSIF in 2/2016    Affecting sleep? Yes    Affecting daily activities? Yes    Depressive symptoms? Yes, due to general medical condition.  He denies significant symptoms today          · SI/HI? No    Work status: On disability from his job as an [employer] .    Pain Scales  Best: 2/10  Worst: 9/10  Usually: 6/10  Today: 5/10    Neck Pain    This is a new problem. The current episode started more than 1 month ago (february 2016). The problem occurs intermittently. The problem has been unchanged. The pain is present in the anterior neck. The quality of the pain is described as aching, shooting and stabbing. The pain is at a severity of 5/10. The pain is mild. The symptoms are aggravated by bending (turning). The pain is same all the time. Stiffness is present all day and in the morning. Associated symptoms  include chest pain. Pertinent negatives include no fever, headaches or weight loss. He has tried oral narcotics, NSAIDs and muscle relaxants for the symptoms. The treatment provided mild relief. Physical therapy was not tried and ineffective.      Review of Systems   Constitution: Negative. Negative for chills, fever, malaise/fatigue, weight gain and weight loss.   HENT: Negative.  Negative for ear pain and hoarse voice.    Eyes: Negative.  Negative for blurred vision, pain and visual disturbance.   Cardiovascular: Positive for chest pain. Negative for dyspnea on exertion and irregular heartbeat.   Respiratory: Negative.  Negative for cough, shortness of breath and wheezing.    Endocrine: Negative.  Negative for cold intolerance and heat intolerance.   Hematologic/Lymphatic: Negative.  Negative for adenopathy and bleeding problem. Does not bruise/bleed easily.   Skin: Negative.  Negative for color change, itching and rash.   Musculoskeletal: Positive for neck pain and stiffness. Negative for back pain.   Gastrointestinal: Negative.  Negative for change in bowel habit, diarrhea, hematemesis, hematochezia, melena and vomiting.   Genitourinary: Negative.  Negative for flank pain, frequency, hematuria and urgency.   Neurological: Positive for dizziness and light-headedness. Negative for difficulty with concentration, headaches, loss of balance and seizures.   Psychiatric/Behavioral: Negative for altered mental status, depression and suicidal ideas. The patient has insomnia and is nervous/anxious.    Allergic/Immunologic: Negative.  Negative for HIV exposure.   All other systems reviewed and are negative.            Past Medical History:   Diagnosis Date    Arthritis     Cataract     Cervical back pain with evidence of disc disease     Hiatal hernia     Hypertension     Thyroid disease        Past Surgical History:   Procedure Laterality Date    ACROMIOCLAVICULAR JOINT CYST EXCISION Right     ARTHROSCOPY-SHOULDER  EXCISION DISTAL CLAVICLE Left 12/5/2017    Performed by Randi Flores [facility] at [facility] [place], operated by [facility] OR    ARTHROSCOPY-SHOULDER WITH SUBACROMIAL DECOMPRESSION Left 12/5/2017    Performed by Randi Flores MD at [facility] [place], operated by [facility] OR    BACK SURGERY      BLOCK-NERVE-MEDIAL BRANCH-CERVICAL Bilateral 10/4/2016    Performed [facility] at [facility] MGT    BLOCK-NERVE-MEDIAL BRANCH-LUMBAR Bilateral 8/31/2017    [facility] at [facility]    cataracts Bilateral     CERVICAL FUSION      COLONOSCOPY N/A 4/27/2018    Procedure: COLONOSCOPY;  Surgeon: Giovani Medeiros MD;  Location: [facility] ENDO (4TH FLR);  Service: Endoscopy;  Laterality: N/A;    COLONOSCOPY N/A 4/27/2018    [facility] ([facility])    DEBRIDEMENT-SHOULDER-ARTHROSCOPIC Left 12/5/2017    Performed by Randi [facility] at [facility] [place], operated [facility] OR    NGUYEN      EYE SURGERY      INJECTION,STEROID,EPIDURAL N/A 6/29/2018    Performed by Kasandra Montoya MD at [facility] [place], [facility] PAIN MGT    Injection,steroid,epidural, CERVICAL C7/T1 NGUYEN N/A 9/14/2018    Performed [facility] at [facility]    INJECTION-STEROID- arthrscopic assisted Bio D injection to the left shoulder Left 12/5/2017    Performed by Randi Flores MD at [facility] [place], operated by [facility] OR    INJECTION-STEROID-EPIDURAL-CERVICAL N/A 8/31/2016    Performed by Kasandra Montoya MD at [facility] [place] PAIN MGT    INJECTION-STEROID-EPIDURAL-LUMBAR N/A 3/6/2018    Performed [facility] at [facility]    LYSIS-ADHESION Left 12/5/2017    Performed by Randi Flores MD at [facility] [place], [facility] OR    RADIOFREQUENCY ABLATION  10/2016    cervical spine/Jan    RADIOFREQUENCY THERMOCOAGULATION Left 3/28/2017    Performed by Kasandra Mont[facility] MD at [facility] [place] PAIN MGT    RADIOFREQUENCY THERMOCOAGULATION Left 10/25/2016    [facility] at [facility]    RADIOFREQUENCY THERMOCOAGULATION Right 10/11/2016    [facility]    RADIOFREQUENCY THERMOCOAGULATION (RFTC)-NERVE-MEDIAN BRANCH-CERVICAL  Right 3/14/2017    Performed [facility] at [facility] PAIN MGT    RADIOFREQUENCY THERMOCOAGULATION (RFTC)-NERVE-MEDIAN BRANCH-LUMBAR Bilateral 9/26/2017    Performed by [facility] at [facility] PAIN MGT    REPAIR-ROTATOR CUFF Left 12/5/2017    Performed by [facility] at [facility] OR    REPAIR-TENDON-BICEP Left 12/5/2017    [facility]formed [facility] at [facility] OR    ROTATOR CUFF REPAIR Right     SPINE SURGERY      cerival fusion        Review of patient's allergies indicates:   Allergen Reactions    Pcn [penicillins] Rash       Current Outpatient Medications   Medication Sig Dispense Refill    acetaminophen (TYLENOL) 500 MG tablet Take 500 mg by mouth every 6 (six) hours as needed for Pain.      cetirizine (ZYRTEC) 10 MG tablet Take 10 mg by mouth nightly.  5    co-enzyme Q-10 30 mg capsule Take 30 mg by mouth once daily.      fish oil-omega-3 fatty acids 300-1,000 mg capsule Take 2 g by mouth once daily.      fluticasone (FLONASE) 50 mcg/actuation nasal spray SPRAY 1-2 SPRAYS into BOTH nostrils DAILY  5    FOLIC ACID/MULTIVIT-MIN/LUTEIN (CENTRUM SILVER ORAL) Take by mouth once daily.      gabapentin (NEURONTIN) 100 MG capsule 2 (two) times daily.       levothyroxine (SYNTHROID) 50 MCG tablet Take 50 mcg by mouth once daily.      meclizine (ANTIVERT) 25 mg tablet Take 25 mg by mouth once daily.       methocarbamol (ROBAXIN) 500 MG Tab Take 500 mg by mouth 2 (two) times daily.       multivitamin/folic acid/zinc (MULTIVITAMIN-FA-ZINC ORAL) multivitamin      naloxegol (MOVANTIK) tablet Take 25 mg by mouth as needed.      omeprazole (PRILOSEC) 20 MG capsule Take 1 capsule (20 mg total) by mouth once daily. 90 capsule 3    promethazine (PHENERGAN) 25 MG tablet Take 1 tablet (25 mg total) by mouth every 6 (six) hours as needed for Nausea. 40 tablet 0    red yeast rice 600 mg Cap Take by mouth once daily.       traZODone (DESYREL) 50 MG tablet Take 50 mg by mouth nightly.  2    valsartan  "(DIOVAN) 80 MG tablet Take 40 mg by mouth once daily.   1     No current facility-administered medications for this visit.        Family History   Problem Relation Age of Onset    Heart disease Mother     Cancer Father     Leukemia Brother     Colon cancer Neg Hx     Celiac disease Neg Hx     Crohn's disease Neg Hx     Esophageal cancer Neg Hx     Inflammatory bowel disease Neg Hx     Irritable bowel syndrome Neg Hx     Liver cancer Neg Hx     Rectal cancer Neg Hx     Stomach cancer Neg Hx     Ulcerative colitis Neg Hx        Social History     Socioeconomic History    Marital status:      Spouse name: Not on file    Number of children: Not on file    Years of education: Not on file    Highest education level: Not on file   Social Needs    Financial resource strain: Not on file    Food insecurity - worry: Not on file    Food insecurity - inability: Not on file    Transportation needs - medical: Not on file    Transportation needs - non-medical: Not on file   Occupational History    Not on file   Tobacco Use    Smoking status: Never Smoker    Smokeless tobacco: Never Used   Substance and Sexual Activity    Alcohol use: No    Drug use: No    Sexual activity: Not on file   Other Topics Concern    Not on file   Social History Narrative    Not on file       Objective:     Vitals:    10/17/18 1016   BP: 118/84   Pulse: 67   Temp: 98 °F (36.7 °C)   Height: 6' 1" (1.854 m)     GEN:  Well developed, well nourished.  No acute distress.  No pain behavior.  HEENT:  No trauma.  Mucous membranes moist.  Nares patent bilaterally.  PSYCH: Normal affect. Thought content appropriate.  CHEST:  Breathing symmetric.  No audible wheezing.  ABD: Soft, non-tender, non-distended.  SKIN:  Warm, pink, dry.  No rash on exposed areas.    EXT:  No cyanosis, clubbing, or edema.  No color change or changes in nail or hair growth.  NEURO/MUSCULOSKELETAL:  Fully alert, oriented, and appropriate. Speech normal " nella. No cranial nerve deficits.   C-Spine: Limited extension and lateral rotation.  Negative Spurling's bilaterally.    Mild TTP over left cervical paraspinals.  Much improved hypertonicity in bilateral traps.  Positive facet loading in bilateral lumbar spine      Previous physical exam:  4/5 motor strength in bilateral deltoids; however, this may be due to pain.  Otherwise 5/5 motor strength throughout upper extremities.   Sensory: No sensory deficit in the upper extremities.   Reflexes: 1+ and symmetric throughout.  Negative Akins's bilaterally.  Gait: Antalgic.  Present trendelenburg sign bilaterally, L>R  SI Joint/Hip: Negative KHRIS bilaterally.  Negative FADIR bilaterally.  No TTP over lumbar paraspinals, bilateral SI joints, piriformis muscles, or GTB.    L-Spine:  Decreased ROM with pain on extension> flexion.  Positive facet loading on right.  Negative SLR bilaterally.   TTP over left thoracic paraspinals.    Imaging:      Narrative     MRI LUMBAR SPINE WITHOUT CONTRAST    COMPARISON: None    TECHNIQUE:  Sagittal T1, T2, and STIR;  axial T1 and T2 sequences through the lumbar spine were acquired without contrast.    FINDINGS: Vertebral body height and alignment are anatomic.  There is straightening of normal lumbar lordosis.  Marrow signal is within normal limits.  A few scattered small vertebral body hemangiomas are present.  Disc heights are well-maintained.  The conus terminates at L1.    L1-L2:  No disc herniation. No spinal canal or neuroforaminal narrowing.    L2-L3:  There is mild diffuse disc bulging and bilateral facet arthropathy without significant spinal canal or neuroforaminal narrowing.    L3-L4:  There is mild diffuse bulging of the disc and bilateral facet arthropathy, contributing to mild spinal canal narrowing and moderate right and mild left neuroforaminal narrowing.    L4-L5:  There is mild diffuse disc bulging accompanied by a small annular fissure of the subcarinal disc.   Bilateral facet arthropathy is also present.  There is resultant mild spinal canal narrowing and moderate bilateral neuroforaminal narrowing.    L5-S1:  There is mild broad-based posterior disc bulging with associated annular fissure this disc.  This contributes to mild spinal canal narrowing.  No significant neuroforaminal narrowing.      Impression       Multilevel degenerative lumbar spondylosis resulting in mild spinal canal narrowing and mild to moderate neuroforaminal narrowing.      Electronically signed by: Rafael Bermudez MD  Date: 07/11/17  Time: 17:17        Diagnostic Results:  All imaging was independently reviewed by me.  Below is a summary from Dr Fowler's note.  I concur with the below findings.     MRI T-spine, dated 8/8/16:  1. No significant central or neuroforaminal stenosis     MRI C-spine, dated 8/8/16:  1. No significant stenosis      Flex/Ex X-ray C-spine, dated 7/19/16:  1. No prevertebral swelling  2. No gross instability   3. Good hardware position     MRI C-spine from an outside facility, dated 1/28/2016:  1. Moderate to severe DDD  2. Disc osteophyte complex, worst at C5/6   3. Moderate stenosis at C4/5 and C6/7      CT C-spine from an outside facility, dated 4/2015:  1. Diffuse cervical spondylosis   2. DDD, worst at C5/6, with some ossification of the PLL at C5/6      CT C-spine from an outside facility, dated 3/17/2016:  1. Fusion surgery at C5/6  2. Hardware in good position       AP and Lateral X-ray C-spine from an outside facility:  1. C4-7 ACDF  2. Hardware in good position  3. Spine in good alignment    Assessment:     Encounter Diagnoses   Name Primary?    Chronic pain disorder Yes    Lumbar spondylosis     Osteoarthritis of spine with radiculopathy, cervical region     S/P cervical spinal fusion     DDD (degenerative disc disease), cervical     DDD (degenerative disc disease), lumbar     Myalgia        Plan:     Sal was seen today for follow-up.    Diagnoses and  all orders for this visit:    Chronic pain disorder  -     HYDROcodone-acetaminophen (NORCO) 7.5-325 mg per tablet; Take 1 tablet by mouth 2 (two) times daily as needed for Pain.    Lumbar spondylosis  -     HYDROcodone-acetaminophen (NORCO) 7.5-325 mg per tablet; Take 1 tablet by mouth 2 (two) times daily as needed for Pain.    Osteoarthritis of spine with radiculopathy, cervical region  -     HYDROcodone-acetaminophen (NORCO) 7.5-325 mg per tablet; Take 1 tablet by mouth 2 (two) times daily as needed for Pain.    S/P cervical spinal fusion  -     HYDROcodone-acetaminophen (NORCO) 7.5-325 mg per tablet; Take 1 tablet by mouth 2 (two) times daily as needed for Pain.    DDD (degenerative disc disease), cervical  -     HYDROcodone-acetaminophen (NORCO) 7.5-325 mg per tablet; Take 1 tablet by mouth 2 (two) times daily as needed for Pain.    DDD (degenerative disc disease), lumbar  -     HYDROcodone-acetaminophen (NORCO) 7.5-325 mg per tablet; Take 1 tablet by mouth 2 (two) times daily as needed for Pain.    Myalgia  -     HYDROcodone-acetaminophen (NORCO) 7.5-325 mg per tablet; Take 1 tablet by mouth 2 (two) times daily as needed for Pain.      His pain is consistent with the above.    We extensively discussed his options, including:  Repeating L2-5 medial branch RFA, Dysport (will hold off on this as his muscle tightness is better), SCS, FRP, no intervention.    10/16/2018:  I had a long discussion with him regarding his overall decline in function.  An concerned that, if this trend continues, he will continue to lose functionality.  Today, he presents with a cane.  I did discuss this cane is to talk for him.  We again discussed doing the functional restoration program.  I did discuss that the spinal cord stimulator is not the miracle treatment.  He was still need to work to improve functionality if we were to do this.  He would like to think about his options and get back to me.  For the current time being, we are  going to repeat the radiofrequencies on his lower lumbar spine    He wants to do the ILESI again, which I think is reasonable.    We discussed the assessment and recommendations.  All available images were reviewed. We discussed the disease process, prognosis, treatment plan, and risks and benefits. The patient is aware of the risks and benefits of the medications being prescribed, common side effects, and proper usage. The following is the plan we agreed on:     1. Scheduled repeat L2-5 LMB RFA PRN (Coolief) when needed.  2. Can repeat L2/3 ILESI p.r.n.  3. Can repeat for repeat C7/T1 epidural PRN  4. Continue Norco 7.5/325 twice daily as needed, #60.   reviewed and is appropriate.  UDS consistent.  Refilled today.  5. Continue Pennsaid gel, concentrating on neck and left shoulder  6. Continue PT HEP  7. We again discussed spinal cord stimulator which he will think about.  I can do the perc trial after looking at his MRI  8. We discussed FRP which he will also think about.  We would need to get him set up at the [facility]  1. GAP passed   9. Continue Robaxin   10. Continue Amitriptyline 25mg QHS   11. Continue Meloxicam 15 mg daily, benefits currently outweigh risks.  Will continue to reassess  12. Movantik 25 mg for OIC PRN  13. Continue gabapentin, increasing PRN  14. Continue Tylenol as needed.  15. RTC in 4 weeks or sooner if needed.      The above plan and management options were discussed at length with patient. Patient is in agreement with the above and verbalized understanding.     Greater than 25 minutes spent in total in todays visit with the patient, with more than half that time direct face to face counseling and education with the patient today. We discussed the disease process, prognosis, treatment plan, and risks and benefits.    Kasandra Montoya MD    Ortho/SPM Exam

## 2018-11-23 ENCOUNTER — HOSPITAL ENCOUNTER (OUTPATIENT)
Facility: OTHER | Age: 53
Discharge: HOME | End: 2018-11-23
Attending: ANESTHESIOLOGY | Admitting: ANESTHESIOLOGY
Payer: MEDICARE

## 2018-11-23 VITALS
WEIGHT: 243 LBS | HEART RATE: 52 BPM | RESPIRATION RATE: 18 BRPM | HEIGHT: 73 IN | BODY MASS INDEX: 32.2 KG/M2 | TEMPERATURE: 97 F | SYSTOLIC BLOOD PRESSURE: 135 MMHG | OXYGEN SATURATION: 95 % | DIASTOLIC BLOOD PRESSURE: 85 MMHG

## 2018-11-23 DIAGNOSIS — M47.812 CERVICAL SPONDYLOSIS: ICD-10-CM

## 2018-11-23 DIAGNOSIS — M47.812 SPONDYLOSIS OF CERVICAL REGION WITHOUT MYELOPATHY OR RADICULOPATHY: Primary | ICD-10-CM

## 2018-11-23 PROCEDURE — S0020 INJECTION, BUPIVICAINE HYDRO: HCPCS | Performed by: ANESTHESIOLOGY

## 2018-11-23 PROCEDURE — 99152 MOD SED SAME PHYS/QHP 5/>YRS: CPT | Mod: ,,, | Performed by: ANESTHESIOLOGY

## 2018-11-23 PROCEDURE — 64634 DESTROY C/TH FACET JNT ADDL: CPT | Performed by: ANESTHESIOLOGY

## 2018-11-23 PROCEDURE — 64634 DESTROY C/TH FACET JNT ADDL: CPT | Mod: RT,,, | Performed by: ANESTHESIOLOGY

## 2018-11-23 PROCEDURE — 64633 DESTROY CERV/THOR FACET JNT: CPT | Performed by: ANESTHESIOLOGY

## 2018-11-23 PROCEDURE — 25000003 PHARM REV CODE 250: Performed by: ANESTHESIOLOGY

## 2018-11-23 PROCEDURE — 63600175 PHARM REV CODE 636 W HCPCS: Performed by: ANESTHESIOLOGY

## 2018-11-23 PROCEDURE — 64633 DESTROY CERV/THOR FACET JNT: CPT | Mod: RT,,, | Performed by: ANESTHESIOLOGY

## 2018-11-23 PROCEDURE — 25500020 PHARM REV CODE 255: Performed by: ANESTHESIOLOGY

## 2018-11-23 RX ORDER — SODIUM CHLORIDE 9 MG/ML
INJECTION, SOLUTION INTRAVENOUS CONTINUOUS
Status: DISCONTINUED | OUTPATIENT
Start: 2018-11-23 | End: 2018-11-23 | Stop reason: HOSPADM

## 2018-11-23 RX ORDER — MIDAZOLAM HYDROCHLORIDE 1 MG/ML
INJECTION INTRAMUSCULAR; INTRAVENOUS
Status: DISCONTINUED | OUTPATIENT
Start: 2018-11-23 | End: 2018-11-23 | Stop reason: HOSPADM

## 2018-11-23 RX ORDER — DEXAMETHASONE SODIUM PHOSPHATE 4 MG/ML
INJECTION, SOLUTION INTRA-ARTICULAR; INTRALESIONAL; INTRAMUSCULAR; INTRAVENOUS; SOFT TISSUE
Status: DISCONTINUED | OUTPATIENT
Start: 2018-11-23 | End: 2018-11-23 | Stop reason: HOSPADM

## 2018-11-23 RX ORDER — BUPIVACAINE HYDROCHLORIDE 5 MG/ML
INJECTION, SOLUTION EPIDURAL; INTRACAUDAL
Status: DISCONTINUED | OUTPATIENT
Start: 2018-11-23 | End: 2018-11-23 | Stop reason: HOSPADM

## 2018-11-23 RX ORDER — FENTANYL CITRATE 50 UG/ML
INJECTION, SOLUTION INTRAMUSCULAR; INTRAVENOUS
Status: DISCONTINUED | OUTPATIENT
Start: 2018-11-23 | End: 2018-11-23 | Stop reason: HOSPADM

## 2018-11-23 RX ORDER — LIDOCAINE HYDROCHLORIDE 20 MG/ML
INJECTION, SOLUTION INFILTRATION; PERINEURAL
Status: DISCONTINUED | OUTPATIENT
Start: 2018-11-23 | End: 2018-11-23 | Stop reason: HOSPADM

## 2018-11-23 RX ADMIN — SODIUM CHLORIDE: 0.9 INJECTION, SOLUTION INTRAVENOUS at 09:11

## 2018-11-23 NOTE — H&P
H&P Update    Pt here for scheduled procedure. Continues to have consistent pain. No new sx including no new weakness, sensation changes, fevers, recent illness, CP, palpitations, SOB, N/V/D or abd pain    Past Medical History:   Diagnosis Date    Arthritis     Cataract     Cervical back pain with evidence of disc disease     Hiatal hernia     Hypertension     Thyroid disease        Past Surgical History:   Procedure Laterality Date    ACROMIOCLAVICULAR JOINT CYST EXCISION Right     ARTHROSCOPY-SHOULDER EXCISION DISTAL CLAVICLE Left 12/5/2017    [facility]    ARTHROSCOPY-SHOULDER WITH SUBACROMIAL DECOMPRESSION Left 12/5/2017    [facility] OR    BACK SURGERY      BLOCK-NERVE-MEDIAL BRANCH-CERVICAL Bilateral 10/4/2016    [facility]    BLOCK-NERVE-MEDIAL BRANCH-LUMBAR Bilateral 8/31/2017    [facility]    cataracts Bilateral     CERVICAL FUSION      COLONOSCOPY N/A 4/27/2018    Procedure: COLONOSCOPY;  Surgeon: Giovani Medeiros MD;  Location: [facility] (4TH FLR);  Service: Endoscopy;  Laterality: N/A;    COLONOSCOPY N/A 4/27/2018    Performed [facility] at [facility] (4[facility] FLR)    DEBRIDEMENT-SHOULDER-ARTHROSCOPIC Left 12/5/2017    [facility] OR    NGUYEN      EYE SURGERY      INJECTION,STEROID,EPIDURAL N/A 6/29/2018    [facility] at [facility] PAIN MGT    Injection,steroid,epidural, CERVICAL C7/T1 NGUYEN N/A 9/14/2018    [facility]    INJECTION-STEROID- arthrscopic assisted Bio D injection to the left shoulder Left 12/5/2017    [facility] OR    INJECTION-STEROID-EPIDURAL-CERVICAL N/A 8/31/2016    [facility]    INJECTION-STEROID-EPIDURAL-LUMBAR N/A 3/6/2018    [facility]     LYSIS-ADHESION Left 12/5/2017    [facility]    RADIOFREQUENCY ABLATION  10/2016    cervical spine/Jan    RADIOFREQUENCY ABLATION RIGHT CERVICAL C4-C7 RFA, FULL STEROID DOSE, VENOM 20G Right 11/23/2018    Per[facility] at [facility] PAIN T    RADIOFREQUENCY THERMOCOAGULATION Left 3/28/2017    [facility] at [facility]    RADIOFREQUENCY THERMOCOAGULATION Left 10/25/2016    [facility]    RADIOFREQUENCY THERMOCOAGULATION Right 10/11/2016    [facility]    RADIOFREQUENCY THERMOCOAGULATION (RFTC)-NERVE-MEDIAN BRANCH-CERVICAL Right 3/14/2017    [facility]formed by [facility] at [facility]    RADIOFREQUENCY THERMOCOAGULATION (RFTC)-NERVE-MEDIAN BRANCH-LUMBAR Bilateral 9/26/2017    [facility] at [facility]    REPAIR-ROTATOR CUFF Left 12/5/2017    [facility] at [facility] OR    REPAIR-TENDON-BICEP Left 12/5/2017    [facility] by [facility] at [facility] OR    ROTATOR CUFF REPAIR Right     SPINE SURGERY      cerival fusion        No current facility-administered medications for this encounter.     Current Outpatient Medications:     acetaminophen (TYLENOL) 500 MG tablet, Take 500 mg by mouth every 6 (six) hours as needed for Pain., Disp: , Rfl:     aspirin (ECOTRIN) 81 MG EC tablet, Take 81 mg by mouth once daily., Disp: , Rfl:     cetirizine (ZYRTEC) 10 MG tablet, Take 10 mg by mouth nightly., Disp: , Rfl: 5    fluticasone (FLONASE) 50 mcg/actuation nasal spray, SPRAY 1-2 SPRAYS into BOTH nostrils DAILY, Disp: , Rfl: 5    FOLIC ACID/MULTIVIT-MIN/LUTEIN (CENTRUM SILVER ORAL), Take by mouth once daily., Disp: , Rfl:     gabapentin (NEURONTIN) 100 MG capsule, 2 (two) times daily. , Disp: , Rfl:     levothyroxine (SYNTHROID) 50 MCG tablet, Take 50 mcg by mouth once daily., Disp: , Rfl:     meclizine (ANTIVERT) 25 mg tablet, Take  "25 mg by mouth once daily. , Disp: , Rfl:     methocarbamol (ROBAXIN) 500 MG Tab, Take 500 mg by mouth 2 (two) times daily. , Disp: , Rfl:     multivitamin/folic acid/zinc (MULTIVITAMIN-FA-ZINC ORAL), multivitamin, Disp: , Rfl:     naloxegol (MOVANTIK) tablet, Take 25 mg by mouth as needed., Disp: , Rfl:     omeprazole (PRILOSEC) 20 MG capsule, Take 1 capsule (20 mg total) by mouth once daily., Disp: 90 capsule, Rfl: 3    pravastatin (PRAVACHOL) 20 MG tablet, , Disp: , Rfl:     promethazine (PHENERGAN) 25 MG tablet, Take 1 tablet (25 mg total) by mouth every 6 (six) hours as needed for Nausea., Disp: 40 tablet, Rfl: 0    traZODone (DESYREL) 50 MG tablet, Take 50 mg by mouth nightly., Disp: , Rfl: 2    valsartan (DIOVAN) 80 MG tablet, Take 40 mg by mouth once daily. , Disp: , Rfl: 1    Social History     Socioeconomic History    Marital status:      Spouse name: Not on file    Number of children: Not on file    Years of education: Not on file    Highest education level: Not on file   Social Needs    Financial resource strain: Not on file    Food insecurity - worry: Not on file    Food insecurity - inability: Not on file    Transportation needs - medical: Not on file    Transportation needs - non-medical: Not on file   Occupational History    Not on file   Tobacco Use    Smoking status: Never Smoker    Smokeless tobacco: Never Used   Substance and Sexual Activity    Alcohol use: No    Drug use: No    Sexual activity: Not on file   Other Topics Concern    Not on file   Social History Narrative    Not on file       Review of patient's allergies indicates:   Allergen Reactions    Pcn [penicillins] Hives and Rash    Lipitor [atorvastatin] Other (See Comments)     Muscle cramps, weakness       12 point ROS negative.     OBJECTIVE:  /85 (BP Location: Left arm, Patient Position: Sitting)   Pulse (!) 52   Temp 97.4 °F (36.3 °C) (Oral)   Resp 18   Ht 6' 1" (1.854 m)   Wt 110.2 kg " (243 lb)   SpO2 95%   BMI 32.06 kg/m²   PHYSICAL EXAMINATION:  GENERAL: Well appearing, in no acute distress, alert and oriented x3.  PSYCH:  Mood and affect appropriate.  SKIN: Skin color, texture, turgor normal, no rashes or lesions.  CV: RRR with palpation of the radial artery.  PULM: No evidence of respiratory difficulty, symmetric chest rise. Clear to auscultation.  NEURO: Cranial nerves grossly intact.    Plan:  Proceed with Right Cervical C4-C7 RFA    Missy Maya  11/23/2018    I have seen the patient with the resident physician.  We have come up with the above plan.  The patient is in agreement with our plan.

## 2018-11-23 NOTE — DISCHARGE INSTRUCTIONS
Thank you for allowing us to care for you today. You may receive a survey about the care we provided. Your feedback is valuable and helps us provide excellent care throughout the community.     Home Care Instructions for Pain Management:    1. DIET:   You may resume your normal diet today.   2. BATHING:   You may shower with luke warm water. No tub baths or anything that will soak injection sites under water for the next 24 hours.  3. DRESSING:   You may remove your bandage today.   4. ACTIVITY LEVEL:   You may resume your normal activities 24 hrs after your procedure. Nothing strenuous today.  5. MEDICATIONS:   You may resume your normal medications today. To restart blood thinners, ask your doctor.  6. DRIVING    If you have received any sedatives by mouth today, you may not drive for 12 hours.    If you have received any sedation through your IV, you may not drive for 24 hrs.   7. SPECIAL INSTRUCTIONS:   No heat to the injection site for 24 hrs including, hot bath or shower, heating pad, moist heat, or hot tubs.    Use ice pack to injection site for any pain or discomfort.  Apply ice packs for 20 minute intervals as needed.    IF you have diabetes, be sure to monitor your blood sugar more closely. IF your injection contained steroids your blood sugar levels may become higher than normal.    If you are still having pain upon discharge:  Your pain may improve over the next 48 hours. The anesthetic (numbing medication) works immediately to 48 hours. IF your injection contained a steroid (anti-inflammatory medication), it takes approximately 3 days to start feeling relief and 7-10 days to see your greatest results from the medication. It is possible you may need subsequent injections. This would be discussed at your follow up appointment with pain management or your referring doctor.      PLEASE CALL YOUR DOCTOR IF:  1. Redness or swelling around the injection site.  2. Fever of 101 degrees or more  3. Drainage  (pus) from the injection site.  4. For any continuous bleeding (some dried blood over the incision is normal.)    FOR EMERGENCIES:   If any unusual problems or difficulties occur during clinic hours, call [phone] or 244. Adult Procedural Sedation Instructions    Recovery After Procedural Sedation (Adult)  You have been given medicine by vein to make you sleep during your surgery. This may have included both a pain medicine and sleeping medicine. Most of the effects have worn off. But you may still have some drowsiness for the next 6 to 8 hours.  Home care  Follow these guidelines when you get home:  · For the next 8 hours, you should be watched by a responsible adult. This person should make sure your condition is not getting worse.  · Don't drink any alcohol for the next 24 hours.  · Don't drive, operate dangerous machinery, or make important business or personal decisions during the next 24 hours.  Note: Your healthcare provider may tell you not to take any medicine by mouth for pain or sleep in the next 4 hours. These medicines may react with the medicines you were given in the hospital. This could cause a much stronger response than usual.  Follow-up care  Follow up with your healthcare provider if you are not alert and back to your usual level of activity within 12 hours.  When to seek medical advice  Call your healthcare provider right away if any of these occur:  · Drowsiness gets worse  · Weakness or dizziness gets worse  · Repeated vomiting  · You can't be awakened   Date Last Reviewed: 10/18/2016  © 1719-2344 [employer]. [address] [place] [address], [place] [address]. All rights reserved. This information is not intended as a substitute for professional medical care. Always follow your healthcare professional's instructions.

## 2018-11-23 NOTE — PLAN OF CARE
Patient remains awake, alert; sats 97-98% on room air; EKG sinus michael, rate 49-53;BP stable.  IV converted to saline lock; HOB elevated 45 degrees.  Will continue to monitor.

## 2018-11-23 NOTE — OP NOTE
"Date: 11/23/2018    Procedure: Cervical Thermal Radiofreqiency Ablation: Right C4-7    Pre-op diagnosis: Osteoarthritis of cervical spine, unspecified spinal osteoarthritis complication status [M47.812]    Post-op diagnosis: Osteoarthritis of cervical spine, unspecified spinal osteoarthritis complication status [M47.812]     Physician: Dr. Kasandra Montoya     Assistant: Dr. Maya    Anesthestia: local/IV sedation:  Versed 3 mg and fentanyl 100 mcg IV.  Conscious sedation provided by MD and monitored by RN.  Total sedation time was less than 45 minutes. (See nurse documentation and case log for sedation time)    All medications, allergies, and relevant histories were reviewed. No recent antibiotics or infections.  A time-out was taken to verify the correct patient, procedure, laterality, and appropriate medications/allergies.    Cervical Medial Branch Block with conventional radiofrequency, level Right C4-7    The procedure risks, benefits, and possible complications were discussed with the patient including nerve damage, infection, spinal headache, and paresis. NIBP, pulse rate, and O2 per nasal cannula at 3L/min. were monitored throughout the procedure.      Patient was placed in the prone position. Skin was prepped with CHG and draped. Oblique view of the spine was obtained with fluoroscopy. Entry sites were marked over the skin and Xylocaine 1% was used to anesthetize the skin and subcutaneous tissues. A 3.5 " [place] Venom needle was introduced at an angle, and the needle was placed onto the lateral aspect of the mid articular pilar at each level.   Placement was confirmed with a fluoroscopic view.      At each level, a 50 Hz stimulus elicited sensory paresthesia with the following voltage:   C4: 0.5 Impedance: 287  C5: 0.7 Impedance: 214  C6: 0.6 Impedance: 283  C7: 0.8 Impedance: 311    No motor stimulation (except multifidus) was elicited at any level at 2V with a frequency of 2Hz. 0.2cc of Omnipaque injected " under digital subtraction angiography was negative for signs of intravascular uptake.  After negative aspiration, 1cc of 2% lidocaine was injected at each level. After confirming that the Venom probe was out of the end of the needle at each level, Thermal RF was then conducted at each level at 80 degrees for 1:30 minutes x2 cycles. 0.5 cc of 0.5% bupivicaine with dexamethasone 5 mg was injected at each level. Patient tolerated the procedure well and there were no complications.    Future Management:   If helpful, can repeat as needed.    Follow up with my clinic in 3 weeks or sooner if needed    I certify that I provided the above services.  I was present for the entire procedure, which was performed by myself with the assistance of the resident physician.  There were no parts of the procedure that were performed not by myself or without my direct supervision.

## 2018-11-23 NOTE — DISCHARGE SUMMARY
Discharge Note  Short Stay      SUMMARY     Admit Date: 11/23/2018    Attending Physician: Kasandra Montoya    Procedure: Cervical Thermal Radiofreqiency Ablation: Right C4-7    Discharge Physician: Kasandra Montoya    Discharge Date: 11/23/2018 10:09 AM    Final Diagnosis: Osteoarthritis of cervical spine, unspecified spinal osteoarthritis complication status [M47.812]    Disposition: Home or self care    Patient Instructions:   Current Discharge Medication List      CONTINUE these medications which have NOT CHANGED    Details   acetaminophen (TYLENOL) 500 MG tablet Take 500 mg by mouth every 6 (six) hours as needed for Pain.      aspirin (ECOTRIN) 81 MG EC tablet Take 81 mg by mouth once daily.      cetirizine (ZYRTEC) 10 MG tablet Take 10 mg by mouth nightly.  Refills: 5      fluticasone (FLONASE) 50 mcg/actuation nasal spray SPRAY 1-2 SPRAYS into BOTH nostrils DAILY  Refills: 5      FOLIC ACID/MULTIVIT-MIN/LUTEIN (CENTRUM SILVER ORAL) Take by mouth once daily.      gabapentin (NEURONTIN) 100 MG capsule 2 (two) times daily.       levothyroxine (SYNTHROID) 50 MCG tablet Take 50 mcg by mouth once daily.      meclizine (ANTIVERT) 25 mg tablet Take 25 mg by mouth once daily.       methocarbamol (ROBAXIN) 500 MG Tab Take 500 mg by mouth 2 (two) times daily.       multivitamin/folic acid/zinc (MULTIVITAMIN-FA-ZINC ORAL) multivitamin      naloxegol (MOVANTIK) tablet Take 25 mg by mouth as needed.      omeprazole (PRILOSEC) 20 MG capsule Take 1 capsule (20 mg total) by mouth once daily.  Qty: 90 capsule, Refills: 3    Associated Diagnoses: Gastritis, presence of bleeding unspecified, unspecified chronicity, unspecified gastritis type      pravastatin (PRAVACHOL) 20 MG tablet       promethazine (PHENERGAN) 25 MG tablet Take 1 tablet (25 mg total) by mouth every 6 (six) hours as needed for Nausea.  Qty: 40 tablet, Refills: 0      traZODone (DESYREL) 50 MG tablet Take 50 mg by mouth nightly.  Refills: 2      valsartan (DIOVAN)  80 MG tablet Take 40 mg by mouth once daily.   Refills: 1             Resume home diet and activity

## 2018-12-21 ENCOUNTER — HOSPITAL ENCOUNTER (OUTPATIENT)
Facility: OTHER | Age: 53
Discharge: HOME | End: 2018-12-21
Attending: ANESTHESIOLOGY | Admitting: ANESTHESIOLOGY
Payer: MEDICARE

## 2018-12-21 VITALS
SYSTOLIC BLOOD PRESSURE: 129 MMHG | RESPIRATION RATE: 18 BRPM | HEART RATE: 53 BPM | OXYGEN SATURATION: 93 % | DIASTOLIC BLOOD PRESSURE: 86 MMHG

## 2018-12-21 DIAGNOSIS — M47.816 LUMBAR SPONDYLOSIS: Primary | ICD-10-CM

## 2018-12-21 PROCEDURE — 63600175 PHARM REV CODE 636 W HCPCS: Performed by: ANESTHESIOLOGY

## 2018-12-21 PROCEDURE — 64635 DESTROY LUMB/SAC FACET JNT: CPT | Mod: RT,,, | Performed by: ANESTHESIOLOGY

## 2018-12-21 PROCEDURE — 25000003 PHARM REV CODE 250: Performed by: ANESTHESIOLOGY

## 2018-12-21 PROCEDURE — S0020 INJECTION, BUPIVICAINE HYDRO: HCPCS | Performed by: ANESTHESIOLOGY

## 2018-12-21 PROCEDURE — 64635 DESTROY LUMB/SAC FACET JNT: CPT | Performed by: ANESTHESIOLOGY

## 2018-12-21 PROCEDURE — 64636 DESTROY L/S FACET JNT ADDL: CPT | Performed by: ANESTHESIOLOGY

## 2018-12-21 PROCEDURE — 99152 MOD SED SAME PHYS/QHP 5/>YRS: CPT | Mod: ,,, | Performed by: ANESTHESIOLOGY

## 2018-12-21 PROCEDURE — 64636 DESTROY L/S FACET JNT ADDL: CPT | Mod: RT,,, | Performed by: ANESTHESIOLOGY

## 2018-12-21 RX ORDER — MIDAZOLAM HYDROCHLORIDE 1 MG/ML
INJECTION INTRAMUSCULAR; INTRAVENOUS
Status: DISCONTINUED | OUTPATIENT
Start: 2018-12-21 | End: 2018-12-21 | Stop reason: HOSPADM

## 2018-12-21 RX ORDER — FENTANYL CITRATE 50 UG/ML
INJECTION, SOLUTION INTRAMUSCULAR; INTRAVENOUS
Status: DISCONTINUED | OUTPATIENT
Start: 2018-12-21 | End: 2018-12-21 | Stop reason: HOSPADM

## 2018-12-21 RX ORDER — SODIUM CHLORIDE 9 MG/ML
INJECTION, SOLUTION INTRAVENOUS
Status: COMPLETED | OUTPATIENT
Start: 2018-12-21 | End: 2018-12-21

## 2018-12-21 RX ORDER — DEXAMETHASONE SODIUM PHOSPHATE 4 MG/ML
INJECTION, SOLUTION INTRA-ARTICULAR; INTRALESIONAL; INTRAMUSCULAR; INTRAVENOUS; SOFT TISSUE
Status: DISCONTINUED | OUTPATIENT
Start: 2018-12-21 | End: 2018-12-21 | Stop reason: HOSPADM

## 2018-12-21 RX ORDER — BUPIVACAINE HYDROCHLORIDE 5 MG/ML
INJECTION, SOLUTION EPIDURAL; INTRACAUDAL
Status: DISCONTINUED | OUTPATIENT
Start: 2018-12-21 | End: 2018-12-21 | Stop reason: HOSPADM

## 2018-12-21 RX ORDER — LIDOCAINE HYDROCHLORIDE 20 MG/ML
INJECTION, SOLUTION INFILTRATION; PERINEURAL
Status: DISCONTINUED | OUTPATIENT
Start: 2018-12-21 | End: 2018-12-21 | Stop reason: HOSPADM

## 2018-12-21 NOTE — OP NOTE
"Date of Procedure: 12/21/2018    Procedure: Right L2-5 Lumbar Medial Branch Nerve Thermal Radiofrequency Ablation    Pre-op diagnosis: Lumbar Spondylosis [M47.816]    Post-op diagnosis: Lumbar Spondylosis [M47.816]     Physician: Dr. Kasandra Montoya     Assistant: Dr. Walker    Anesthestia: local/IV sedation:  Versed 3 mg and fentanyl 50 mcg IV.  Conscious sedation provided by MD and monitored by RN.  Total sedation time was less than 45 minutes. (See nurse documentation and case log for sedation time)    EBL: None    Specimens: None    All medications, allergies, and relevant histories were reviewed. No recent antibiotics or infections.  A time-out was taken to verify the correct patient, procedure, laterality, and appropriate medications/allergies.    Procedure: Lumbar RFA    Lumbar Medial Branch Block with radiofrequency ablation, levels L2-5     The procedure risks, benefits, and possible complications were discussed with the patient including nerve damage, infection, spinal headache, and paresis.   Patient was placed in the prone position with the midriff elevated. Skin was prepped with CHG and draped. Oblique view of the spine was obtained with fluoroscopy. Entry sites were marked over the skin and Xylocaine 1% was used to anesthetize the skin and subcutaneous tissues.     A 3.5 " [place] Venom needle was introduced at an angle to parallel the medial branches in the groove between superior articular process and transverse process, and L5 primary dorsal ramus at the junction of the S1 superior articular process and sacral ala.    Multifidus stim elicited at each level.  No distal motor stimulation was elicited at any level at 2V with a frequency of 2Hz.  All impedances were within the acceptable range.    1cc of 2% lidocaine was injected at each level.  Thermal RF was then conducted at each level at 80 degrees, for 2:30 minutes   1 cc of a mixture of 0.5% bupivacaine with dexamethasone 5 mg was injected at each " level.    Patient tolerated the procedure well and there were no complications.    Future Management:   If helpful, can repeat as needed.  Follow up with me in 4-6 weeks.      I certify that I provided the above services.  I was present for the entire procedure, which was performed by myself with the assistance of the resident physician.  There were no parts of the procedure that were performed not by myself or without my direct supervision.

## 2018-12-21 NOTE — DISCHARGE SUMMARY
Discharge Note  Short Stay      SUMMARY     Admit Date: 12/21/2018    Attending Physician: Kasandra Montoya    Procedure: Right L2-5 Lumbar Medial Branch Nerve Thermal Radiofrequency Ablation    Discharge Physician: Kasandra Montoya    Discharge Date: 12/21/2018 10:12 AM    Final Diagnosis: Lumbar spondylosis [M47.816]    Disposition: Home or self care    Patient Instructions:   Current Discharge Medication List      CONTINUE these medications which have NOT CHANGED    Details   acetaminophen (TYLENOL) 500 MG tablet Take 500 mg by mouth every 6 (six) hours as needed for Pain.      aspirin (ECOTRIN) 81 MG EC tablet Take 81 mg by mouth once daily.      cetirizine (ZYRTEC) 10 MG tablet Take 10 mg by mouth nightly.  Refills: 5      fluticasone (FLONASE) 50 mcg/actuation nasal spray SPRAY 1-2 SPRAYS into BOTH nostrils DAILY  Refills: 5      FOLIC ACID/MULTIVIT-MIN/LUTEIN (CENTRUM SILVER ORAL) Take by mouth once daily.      gabapentin (NEURONTIN) 100 MG capsule 2 (two) times daily.       levothyroxine (SYNTHROID) 50 MCG tablet Take 50 mcg by mouth once daily.      meclizine (ANTIVERT) 25 mg tablet Take 25 mg by mouth once daily.       methocarbamol (ROBAXIN) 500 MG Tab Take 500 mg by mouth 2 (two) times daily.       multivitamin/folic acid/zinc (MULTIVITAMIN-FA-ZINC ORAL) multivitamin      naloxegol (MOVANTIK) tablet Take 25 mg by mouth as needed.      omeprazole (PRILOSEC) 20 MG capsule Take 1 capsule (20 mg total) by mouth once daily.  Qty: 90 capsule, Refills: 3    Associated Diagnoses: Gastritis, presence of bleeding unspecified, unspecified chronicity, unspecified gastritis type      pravastatin (PRAVACHOL) 20 MG tablet       promethazine (PHENERGAN) 25 MG tablet Take 1 tablet (25 mg total) by mouth every 6 (six) hours as needed for Nausea.  Qty: 40 tablet, Refills: 0      traZODone (DESYREL) 50 MG tablet Take 50 mg by mouth nightly.  Refills: 2      valsartan (DIOVAN) 80 MG tablet Take 40 mg by mouth once daily.    Refills: 1             Resume home diet and activity

## 2018-12-21 NOTE — DISCHARGE INSTRUCTIONS
Adult Procedural Sedation Instructions    Recovery After Procedural Sedation (Adult)  You have been given medicine by vein to make you sleep during your surgery. This may have included both a pain medicine and sleeping medicine. Most of the effects have worn off. But you may still have some drowsiness for the next 6 to 8 hours.  Home care  Follow these guidelines when you get home:  · For the next 8 hours, you should be watched by a responsible adult. This person should make sure your condition is not getting worse.  · Don't drink any alcohol for the next 24 hours.  · Don't drive, operate dangerous machinery, or make important business or personal decisions during the next 24 hours.  Note: Your healthcare provider may tell you not to take any medicine by mouth for pain or sleep in the next 4 hours. These medicines may react with the medicines you were given in the hospital. This could cause a much stronger response than usual.  Follow-up care  Follow up with your healthcare provider if you are not alert and back to your usual level of activity within 12 hours.  When to seek medical advice  Call your healthcare provider right away if any of these occur:  · Drowsiness gets worse  · Weakness or dizziness gets worse  · Repeated vomiting  · You can't be awakened   Date Last Reviewed: 10/18/2016  © 8157-2613 [employer]. [address] [place] [address], [place] [address]. All rights reserved. This information is not intended as a substitute for professional medical care. Always follow your healthcare professional's instructions.       Thank you for allowing us to care for you today. You may receive a survey about the care we provided. Your feedback is valuable and helps us provide excellent care throughout the community.     Home Care Instructions for Pain Management:    1. DIET:   You may resume your normal diet today.   2. BATHING:   You may shower with luke warm water. No tub baths or anything that will soak  injection sites under water for the next 24 hours.  3. DRESSING:   You may remove your bandage today.   4. ACTIVITY LEVEL:   You may resume your normal activities 24 hrs after your procedure. Nothing strenuous today.  5. MEDICATIONS:   You may resume your normal medications today. To restart blood thinners, ask your doctor.  6. DRIVING    If you have received any sedatives by mouth today, you may not drive for 12 hours.    If you have received any sedation through your IV, you may not drive for 24 hrs.   7. SPECIAL INSTRUCTIONS:   No heat to the injection site for 24 hrs including, hot bath or shower, heating pad, moist heat, or hot tubs.    Use ice pack to injection site for any pain or discomfort.  Apply ice packs for 20 minute intervals as needed.    IF you have diabetes, be sure to monitor your blood sugar more closely. IF your injection contained steroids your blood sugar levels may become higher than normal.    If you are still having pain upon discharge:  Your pain may improve over the next 48 hours. The anesthetic (numbing medication) works immediately to 48 hours. IF your injection contained a steroid (anti-inflammatory medication), it takes approximately 3 days to start feeling relief and 7-10 days to see your greatest results from the medication. It is possible you may need subsequent injections. This would be discussed at your follow up appointment with pain management or your referring doctor.      PLEASE CALL YOUR DOCTOR IF:  1. Redness or swelling around the injection site.  2. Fever of 101 degrees or more  3. Drainage (pus) from the injection site.  4. For any continuous bleeding (some dried blood over the incision is normal.)    FOR EMERGENCIES:   If any unusual problems or difficulties occur during clinic hours, call [phone] or [phone].

## 2019-01-03 ENCOUNTER — OFFICE VISIT (OUTPATIENT)
Dept: PAIN MEDICINE | Facility: CLINIC | Age: 54
End: 2019-01-03
Attending: ANESTHESIOLOGY
Payer: MEDICARE

## 2019-01-03 VITALS
TEMPERATURE: 99 F | HEIGHT: 73 IN | DIASTOLIC BLOOD PRESSURE: 84 MMHG | WEIGHT: 244.69 LBS | BODY MASS INDEX: 32.43 KG/M2 | RESPIRATION RATE: 18 BRPM | HEART RATE: 71 BPM | SYSTOLIC BLOOD PRESSURE: 127 MMHG

## 2019-01-03 DIAGNOSIS — M54.12 CERVICAL RADICULOPATHY: ICD-10-CM

## 2019-01-03 DIAGNOSIS — G89.4 CHRONIC PAIN DISORDER: Primary | ICD-10-CM

## 2019-01-03 DIAGNOSIS — M62.50 DISUSE MUSCLE ATROPHY: ICD-10-CM

## 2019-01-03 DIAGNOSIS — M47.816 LUMBAR SPONDYLOSIS: ICD-10-CM

## 2019-01-03 DIAGNOSIS — Z98.1 S/P CERVICAL SPINAL FUSION: ICD-10-CM

## 2019-01-03 DIAGNOSIS — M79.10 MYALGIA: ICD-10-CM

## 2019-01-03 DIAGNOSIS — M47.812 SPONDYLOSIS OF CERVICAL REGION WITHOUT MYELOPATHY OR RADICULOPATHY: ICD-10-CM

## 2019-01-03 PROCEDURE — 20553 PR INJECT TRIGGER POINTS, > 3: ICD-10-PCS | Mod: S$PBB,ICN,, | Performed by: ANESTHESIOLOGY

## 2019-01-03 PROCEDURE — 99999 PR PBB SHADOW E&M-EST. PATIENT-LVL III: CPT | Mod: PBBFAC,,, | Performed by: ANESTHESIOLOGY

## 2019-01-03 PROCEDURE — 20553 NJX 1/MLT TRIGGER POINTS 3/>: CPT | Mod: PBBFAC | Performed by: ANESTHESIOLOGY

## 2019-01-03 PROCEDURE — 99213 OFFICE O/P EST LOW 20 MIN: CPT | Mod: PBBFAC | Performed by: ANESTHESIOLOGY

## 2019-01-03 PROCEDURE — 99214 OFFICE O/P EST MOD 30 MIN: CPT | Mod: 25,S$PBB,ICN, | Performed by: ANESTHESIOLOGY

## 2019-01-03 PROCEDURE — 20553 NJX 1/MLT TRIGGER POINTS 3/>: CPT | Mod: S$PBB,ICN,, | Performed by: ANESTHESIOLOGY

## 2019-01-03 PROCEDURE — 99999 PR PBB SHADOW E&M-EST. PATIENT-LVL III: ICD-10-PCS | Mod: PBBFAC,,, | Performed by: ANESTHESIOLOGY

## 2019-01-03 PROCEDURE — 99214 PR OFFICE/OUTPT VISIT, EST, LEVL IV, 30-39 MIN: ICD-10-PCS | Mod: 25,S$PBB,ICN, | Performed by: ANESTHESIOLOGY

## 2019-01-03 RX ORDER — BUPIVACAINE HYDROCHLORIDE 5 MG/ML
9 INJECTION, SOLUTION EPIDURAL; INTRACAUDAL
Status: COMPLETED | OUTPATIENT
Start: 2019-01-03 | End: 2019-01-03

## 2019-01-03 RX ORDER — BETAMETHASONE SODIUM PHOSPHATE AND BETAMETHASONE ACETATE 3; 3 MG/ML; MG/ML
6 INJECTION, SUSPENSION INTRA-ARTICULAR; INTRALESIONAL; INTRAMUSCULAR; SOFT TISSUE
Status: COMPLETED | OUTPATIENT
Start: 2019-01-03 | End: 2019-01-03

## 2019-01-03 RX ORDER — BETAMETHASONE SODIUM PHOSPHATE AND BETAMETHASONE ACETATE 3; 3 MG/ML; MG/ML
3 INJECTION, SUSPENSION INTRA-ARTICULAR; INTRALESIONAL; INTRAMUSCULAR; SOFT TISSUE
Status: COMPLETED | OUTPATIENT
Start: 2019-01-03 | End: 2019-01-03

## 2019-01-03 RX ORDER — HYDROCODONE BITARTRATE AND ACETAMINOPHEN 7.5; 325 MG/1; MG/1
1 TABLET ORAL 2 TIMES DAILY PRN
Qty: 60 TABLET | Refills: 0 | Status: SHIPPED | OUTPATIENT
Start: 2019-01-03 | End: 2019-02-02

## 2019-01-03 RX ADMIN — BUPIVACAINE HYDROCHLORIDE 45 MG: 5 INJECTION, SOLUTION EPIDURAL; INTRACAUDAL; PERINEURAL at 12:01

## 2019-01-03 RX ADMIN — BETAMETHASONE SODIUM PHOSPHATE AND BETAMETHASONE ACETATE 6 MG: 3; 3 INJECTION, SUSPENSION INTRA-ARTICULAR; INTRALESIONAL; INTRAMUSCULAR at 12:01

## 2019-01-03 RX ADMIN — BETAMETHASONE SODIUM PHOSPHATE AND BETAMETHASONE ACETATE 3 MG: 3; 3 INJECTION, SUSPENSION INTRA-ARTICULAR; INTRALESIONAL; INTRAMUSCULAR at 12:01

## 2019-01-03 NOTE — PROGRESS NOTES
Subjective:     Patient ID: Sal Navarro is a 53 y.o. male.    Chief Complaint: Follow-up    Consulted by: No ref. provider found     Disclaimer: This note was generated using voice recognition software.  There may be a typographical errors that were missed during proofreading.      HPI:    Sal Navarro is a 53 y.o. male who presents today s/p C4-7 ACDF with C5/6 PSIF in 2/2016 with residual chronic midline neck pain that radiates into his shoulder blades bilaterally, R>>L.  This is associated with bilateral hand numbness and tingling.  The hand symptoms did improve following the surgery.  The shooting pains in his arms resolved completely.   This pain is described in detail below.  His post-operative course was complicated by dysphagia that is being treated with speech therapy.    Interval History (9/23/2016):  He returns today for follow up.  He reports that the MELANIE was not helpful for the pain.  He hasn't noticed a difference with the amitriptyline.  He does notice a difference in his low back pain when he skips the meloxicam    Interval History (11/17/2016):  He returns today for follow up.  He reports that cervical RFAs were not very helpful for the pain. He is no longer taking Percocet or Tamazepam. He continues to take Mobic 15mg daily for his low back pain. He is taking elavil 25mg nightly without side effects. He is open to considering SCS. He reports poor sleep at night.    Interval History (1/3/2017):  The patient returns today for follow up of neck and shoulder pain.  His pain is located to his neck and surrounding areas.  He previously had limited benefit with RFAs, although he did have short term benefit with MBB.  He also had limited benefit with NGUYEN.  Dr. Montoya discussed cervical SCS trial with the patient, although he reports that he does not wish to pursue this option at this time.  He is scheduled to f/u with Dr. Fowler on 1/24/16.  He continues to participate in PT.  He did  previously have some benefit with a TENS unit at PT.  He is reporting some relief with Tramadol.  He also takes Zanaflex which helps but is very sedating.      Interval History (3/3/2017):  The patient returns for follow up of neck and shoulder pain. He continues to take Tramadol BID with benefit. The medication decreases his pain slightly. He also reports benefit with Robaxin. He continues to take Mobic with benefit. He recently stopped physical therapy and reports increased pain into his shoulders, despite a home exercise plan.     Interval History (5/1/2017):  He returns today for follow up.  He reports that these RFAs were more helpful than before, L more so than right, but now the pain is again returning.  Today, he also complains of left shoulder pain that has been gradually worsening over the past few months. He associated this with modified activity due to his neck.  Pain is worse with extension of arm and lifting.  Tramadol, Robaxin, Meloxicam, and amitriptyline have been helpful for the pain.  He continues to have GI upset, but has just started prilosec.  When he tried stopping the meloxicam, his pain increased dramatically.  The tramadol takes the edge off.  Robaxin helps, but he is out.    Of note, he recently had an endoscopy that showed stage 3 gastritis, for which he was started on Prilosec    Interval History (6/21/2017):  He returns today for follow up.  He reports that the shoulder injection provided one week of relief.  The Pennsaid has been helpful for the pain.  The RFA has helped with shooting pain into his back.  He is doing his HEP.  He has not been back to PT because of insurance issues.  His shoulder continues to present significant hindrances to his daily activities.    Interval History (8/21/2017):  He returns today for follow up.  He reports that the tramadol is not as helpful as it used to be.  In addition, he is experiencing constipation and urinary retention that only resolves with  "skipping a dose of tramadol. He is going to PT for his neck/shoulder.    Interval History (10/18/2017):  He returns today for follow up.  He reports that Norco has been more helpful for the pain than the tramadol with fewer side effects.  He is averaging about 2-4 per day.  His back is doing much better, though his neck is still greatly limiting him in his daily tasks.  He reports a new pain that began with a "pop" followed by a sharp pain radiating down his back followed by a soreness.  This happened a few weeks ago on the left side of his low back above where we did the RFA.  This is resolving, but it is still sore.    Interval History (11/29/2017):  He returns today for follow up. He is scheduled for right shoulder surgery on 12/5/17 and is here today for lemuel-operative planning. Biggest pain today is in his left shoulder. After his long car ride here, he states his neck has started to bother him as well. Both pains today are roughly 5/10. Taking Norco 7.5/325 mg 1-2 tablets daily which he states takes the edge off of his pain. He has decreased his dosing from 3-4 per day due to side effects of constipation. States he is not taking the movantik on a regular basis-does not want to keep adding medications. Last took it over a week ago. Still taking mobic, robaxin with mild relief. Takes tylenol occasionally with mild relief. Also used hemp oil with mild relief. Still doing a HEP on a regular basis. States his lower back is gradually getting worse. Pain primarily on the left side. Had trigger point injections at last visit which provided 30-40% reduction in his pain for for 1-2 weeks     Interval History (2/21/2018):  He returns today for follow up.  He reports that his shoulder is improving since his surgery.  His neck is doing about the same.  His low back pain has returned, and it is slightly higher than it was before.  He was recently started on a low dose of gabapentin    Interval History (3/29/2018):  He " returns today for follow up.  He reports that his neck is feeling slightly better since he is no longer wearing his sling for her shoulder.  His physical therapy is still currently on hold due to his retinal condition.  His low back is feeling better.    Interval History (6/28/2018):  He returns today for follow up.  He reports that his low back has been hurting worse.  His low back was doing well after the NGUYEN, but the pain started to return over the past few weeks.  This is associated with numbness in his left leg in the posterior thigh.  He is back to taking 2 Norco daily.  His neck and shoulders are doing ok, but his low back is hurting.  He continues to have issues with his eyes.  He has had to have another laser surgery on his right eye again 3 weeks ago.    Interval History (9/13/2018):  He returns today for follow up.  He reports that he is still having trouble with his right eye.  Overall, his medication regimen helps somewhat.  The Norco 7.5 mg does not help much the way he is taking it, but any more causes constipation. He reports increasing neck pain.  This is associated with some radiation of tingling into his right arm (in an ulnar nerve distribution).     Interval History (10/17/2018):  He returns today for follow up.  He reports that the C7/T1 ILESI has been helpful for the pain in his neck.  He continues to report left-sided lower neck pain that radiates into shoulder blade.  He also continues to report low back pain.  Overall, he feels that his functioning is going down.  Today, he presents walking with a cane.    Interval History (1/3/2019):  He returns today for follow up.  He reports that the lumbar RFA provided great relief until he lifted a lot of firewood 3 days ago.  The pain has greatly let up since yesterday, but he still feels intense right sided low back pain.  PRN Norco in addition to his daily regimen (listed below) has been helpful for the pain.  He also reports that his neck  continues to hurt, though it is also better since the RFA.  Both of these are only hurting on the right side.    Physical Therapy: Yes- outside facility in [place].  He is now doing the HEP, stretches 3-4 days per week, exercises 2-3 days per week    Non-pharmacologic Treatment: Ice and heat provide mild benefit.           · TENS? Yes at PT with benefit.    Pain Medications:         · Currently taking: Gabapentin 100 mg BID, Meloxicam 15 mg daily, Tylenol PRN, Norco 7.5/325 mg 2 daily PRN, Robaxin 500 mg BID PRN, movantik 25 mg (causes stomach cramping)    · Has tried in the past:  Amitriptyline 50 mg QHS, Percocet (limited benefit and causes constipation treated with colace), Temazepam for sleep (he has had trouble sleeping since the surgery), Flexeril (no help in the past, both before and after the surgery), Gabapentin (after, went to straight 300 mg TID, too sedating, unsure of duration of tx), Lyrica (sedation), Celebrex (GI upset), Zanaflex 4 mg PRN muscle pain (helpful but sedating)     · Has not tried: SNRIs, other muscle relaxants    Blood thinners: None    Interventional Therapies:   · C7/T1 ILESI: 10-15% relief  · 10/4/16 Bilateral C4-7 MBB- short term benefit  · 10/11/16 Right C4-7 RFA- limited benefit  · 10/25/16 Left C4-7- limited benefit  · TPIs (cervical and left thoracic): Good benefit x 1-2 weeks in thoracic, ongoing in cervical  · Bilateral L3-5 MBB: Positive  · Right then left L2-5 RFA: Good relief x 4-5 months  · 03/2018:  L2/3 ILESI: >50% relief  · 06/29/2018:  L2/3 ILESI:  Greater than 50% relief  · 09/14/2018:  C7/T1 ILESI: 30% relief  · 11/23/2018: Right C4-7 RFA: Modest benefit, but still having pain  · 12/21/2018: Right L2-5 RFA: Excellent relief until lifting firewood    Relevant Surgeries:   · C4-7 ACDF with C5/6 PSIF in 2/2016    Affecting sleep? Yes    Affecting daily activities? Yes    Depressive symptoms? Yes, due to general medical condition.  He denies significant symptoms today           · SI/HI? No    Work status: On disability from his job as an [employer] .    Pain Scales  Best: 2/10  Worst: 9/10  Usually: 6/10  Today: 7/10    Neck Pain    This is a new problem. The current episode started more than 1 month ago (february 2016). The problem occurs intermittently. The problem has been unchanged. The pain is present in the anterior neck. The quality of the pain is described as aching, shooting and stabbing. The pain is at a severity of 5/10. The pain is mild. The symptoms are aggravated by bending (turning). The pain is same all the time. Stiffness is present all day and in the morning. Associated symptoms include chest pain. Pertinent negatives include no fever, headaches or weight loss. He has tried oral narcotics, NSAIDs and muscle relaxants for the symptoms. The treatment provided mild relief. Physical therapy was not tried and ineffective.      Review of Systems   Constitution: Negative. Negative for chills, fever, malaise/fatigue, weight gain and weight loss.   HENT: Negative.  Negative for ear pain and hoarse voice.    Eyes: Negative.  Negative for blurred vision, pain and visual disturbance.   Cardiovascular: Positive for chest pain. Negative for dyspnea on exertion and irregular heartbeat.   Respiratory: Negative.  Negative for cough, shortness of breath and wheezing.    Endocrine: Negative.  Negative for cold intolerance and heat intolerance.   Hematologic/Lymphatic: Negative.  Negative for adenopathy and bleeding problem. Does not bruise/bleed easily.   Skin: Negative.  Negative for color change, itching and rash.   Musculoskeletal: Positive for neck pain and stiffness. Negative for back pain.   Gastrointestinal: Negative.  Negative for change in bowel habit, diarrhea, hematemesis, hematochezia, melena and vomiting.   Genitourinary: Negative.  Negative for flank pain, frequency, hematuria and urgency.   Neurological: Positive for dizziness and light-headedness. Negative for  difficulty with concentration, headaches, loss of balance and seizures.   Psychiatric/Behavioral: Negative for altered mental status, depression and suicidal ideas. The patient has insomnia and is nervous/anxious.    Allergic/Immunologic: Negative.  Negative for HIV exposure.   All other systems reviewed and are negative.            Past Medical History:   Diagnosis Date    Arthritis     Cataract     Cervical back pain with evidence of disc disease     Hiatal hernia     Hypertension     Thyroid disease        Past Surgical History:   Procedure Laterality Date    ACROMIOCLAVICULAR JOINT CYST EXCISION Right     ARTHROSCOPY-SHOULDER EXCISION DISTAL CLAVICLE Left 12/5/2017    [facility]    ARTHROSCOPY-SHOULDER WITH SUBACROMIAL DECOMPRESSION Left 12/5/2017    [facility]    BACK SURGERY      BLOCK-NERVE-MEDIAL BRANCH-CERVICAL Bilateral 10/4/2016    [facility] at [facility] PAIN MGT    BLOCK-NERVE-MEDIAL BRANCH-LUMBAR Bilateral 8/31/2017    [facility] PAIN MGT    cataracts Bilateral     CERVICAL FUSION      COLONOSCOPY N/A 4/27/2018    [facility]O (4TH F[facility])    DEBRIDEMENT-SHOULDER-ARTHROSCOPIC Left 12/5/2017    [facility]    NGUYEN      EYE SURGERY      INJECTION,STEROID,EPIDURAL N/A 6/29/2018    [facility] at [facility] PAIN MGT    Injection,steroid,epidural, CERVICAL C7/T1 NGUYEN N/A 9/14/2018    Per[facility] PAIN MGT    INJECTION-STEROID- arthrscopic assisted Bio D injection to the left shoulder Left 12/5/2017    [facility] OR    INJECTION-STEROID-EPIDURAL-CERVICAL N/A 8/31/2016    [facility] PAIN MGT    INJECTION-STEROID-EPIDURAL-LUMBAR N/A 3/6/2018    [facility] PAIN MGT    LYSIS-ADHESION Left 12/5/2017     [facility]    RADIOFREQUENCY ABLATION  10/2016    cervical spine/Jan    RADIOFREQUENCY ABLATION RIGHT CERVICAL C4-C7 RFA, FULL STEROID DOSE, VENOM 20G Right 11/23/2018    [facility]    RADIOFREQUENCY ABLATION RIGHT LUMBAR L2-5 RFA VENOM Right 12/21/2018    [facility]    RADIOFREQUENCY THERMOCOAGULATION Left 3/28/2017    [facility]formed [facility]    RADIOFREQUENCY THERMOCOAGULATION Left 10/25/2016    [facility]    RADIOFREQUENCY THERMOCOAGULATION Right 10/11/2016    [facility]formed [facility]    RADIOFREQUENCY THERMOCOAGULATION (RFTC)-NERVE-MEDIAN BRANCH-CERVICAL Right 3/14/2017    Performed by [facility]    RADIOFREQUENCY THERMOCOAGULATION (RFTC)-NERVE-MEDIAN BRANCH-LUMBAR Bilateral 9/26/2017    [facility]    REPAIR-ROTATOR CUFF Left 12/5/2017    [facility] OR    REPAIR-TENDON-BICEP Left 12/5/2017    [facility]formed [facility] OR    ROTATOR CUFF REPAIR Right     SPINE SURGERY      cerival fusion        Review of patient's allergies indicates:   Allergen Reactions    Pcn [penicillins] Rash       Current Outpatient Medications   Medication Sig Dispense Refill    acetaminophen (TYLENOL) 500 MG tablet Take 500 mg by mouth every 6 (six) hours as needed for Pain.      aspirin (ECOTRIN) 81 MG EC tablet Take 81 mg by mouth once daily.      cetirizine (ZYRTEC) 10 MG tablet Take 10 mg by mouth nightly.  5    fluticasone (FLONASE) 50 mcg/actuation nasal spray SPRAY 1-2 SPRAYS into BOTH nostrils DAILY  5    FOLIC ACID/MULTIVIT-MIN/LUTEIN (CENTRUM SILVER ORAL) Take by mouth once daily.      gabapentin (NEURONTIN) 100 MG capsule 2 (two) times daily.       levothyroxine (SYNTHROID) 50 MCG tablet Take 50 mcg by mouth once  daily.      meclizine (ANTIVERT) 25 mg tablet Take 25 mg by mouth once daily.       methocarbamol (ROBAXIN) 500 MG Tab Take 500 mg by mouth 2 (two) times daily.       multivitamin/folic acid/zinc (MULTIVITAMIN-FA-ZINC ORAL) multivitamin      naloxegol (MOVANTIK) tablet Take 25 mg by mouth as needed.      omeprazole (PRILOSEC) 20 MG capsule Take 1 capsule (20 mg total) by mouth once daily. 90 capsule 3    pravastatin (PRAVACHOL) 20 MG tablet       promethazine (PHENERGAN) 25 MG tablet Take 1 tablet (25 mg total) by mouth every 6 (six) hours as needed for Nausea. 40 tablet 0    traZODone (DESYREL) 50 MG tablet Take 50 mg by mouth nightly.  2    valsartan (DIOVAN) 80 MG tablet Take 40 mg by mouth once daily.   1     No current facility-administered medications for this visit.        Family History   Problem Relation Age of Onset    Heart disease Mother     Cancer Father     Leukemia Brother     Colon cancer Neg Hx     Celiac disease Neg Hx     Crohn's disease Neg Hx     Esophageal cancer Neg Hx     Inflammatory bowel disease Neg Hx     Irritable bowel syndrome Neg Hx     Liver cancer Neg Hx     Rectal cancer Neg Hx     Stomach cancer Neg Hx     Ulcerative colitis Neg Hx        Social History     Socioeconomic History    Marital status:      Spouse name: Not on file    Number of children: Not on file    Years of education: Not on file    Highest education level: Not on file   Social Needs    Financial resource strain: Not on file    Food insecurity - worry: Not on file    Food insecurity - inability: Not on file    Transportation needs - medical: Not on file    Transportation needs - non-medical: Not on file   Occupational History    Not on file   Tobacco Use    Smoking status: Never Smoker    Smokeless tobacco: Never Used   Substance and Sexual Activity    Alcohol use: No    Drug use: No    Sexual activity: Not on file   Other Topics Concern    Not on file   Social History  "Narrative    Not on file       Objective:     Vitals:    01/03/19 1103   BP: 127/84   Pulse: 71   Resp: 18   Temp: 98.7 °F (37.1 °C)   TempSrc: Oral   Weight: 111 kg (244 lb 11.4 oz)   Height: 6' 1" (1.854 m)   PainSc:   7     GEN:  Well developed, well nourished.  No acute distress.  No pain behavior.  HEENT:  No trauma.  Mucous membranes moist.  Nares patent bilaterally.  PSYCH: Normal affect. Thought content appropriate.  CHEST:  Breathing symmetric.  No audible wheezing.  ABD: Soft, non-tender, non-distended.  SKIN:  Warm, pink, dry.  No rash on exposed areas.    EXT:  No cyanosis, clubbing, or edema.  No color change or changes in nail or hair growth.  NEURO/MUSCULOSKELETAL:  Fully alert, oriented, and appropriate. Speech normal nella. No cranial nerve deficits.   Positive facet loading on the right lumbar spine with TTP over paraspinals.  Positive facet loading on the right cervical spine with TTP over paraspinals.    Previous physical exam:  C-Spine: Limited extension and lateral rotation.  Negative Spurling's bilaterally.    Mild TTP over left cervical paraspinals.  Much improved hypertonicity in bilateral traps.    4/5 motor strength in bilateral deltoids; however, this may be due to pain.  Otherwise 5/5 motor strength throughout upper extremities.   Sensory: No sensory deficit in the upper extremities.   Reflexes: 1+ and symmetric throughout.  Negative Akins's bilaterally.  Gait: Antalgic.  Present trendelenburg sign bilaterally, L>R  SI Joint/Hip: Negative KHRIS bilaterally.  Negative FADIR bilaterally.  No TTP over lumbar paraspinals, bilateral SI joints, piriformis muscles, or GTB.    L-Spine:  Decreased ROM with pain on extension> flexion.  Positive facet loading on right.  Negative SLR bilaterally.   TTP over left thoracic paraspinals.    Imaging:      Narrative     MRI LUMBAR SPINE WITHOUT CONTRAST    COMPARISON: None    TECHNIQUE:  Sagittal T1, T2, and STIR;  axial T1 and T2 sequences through " the lumbar spine were acquired without contrast.    FINDINGS: Vertebral body height and alignment are anatomic.  There is straightening of normal lumbar lordosis.  Marrow signal is within normal limits.  A few scattered small vertebral body hemangiomas are present.  Disc heights are well-maintained.  The conus terminates at L1.    L1-L2:  No disc herniation. No spinal canal or neuroforaminal narrowing.    L2-L3:  There is mild diffuse disc bulging and bilateral facet arthropathy without significant spinal canal or neuroforaminal narrowing.    L3-L4:  There is mild diffuse bulging of the disc and bilateral facet arthropathy, contributing to mild spinal canal narrowing and moderate right and mild left neuroforaminal narrowing.    L4-L5:  There is mild diffuse disc bulging accompanied by a small annular fissure of the subcarinal disc.  Bilateral facet arthropathy is also present.  There is resultant mild spinal canal narrowing and moderate bilateral neuroforaminal narrowing.    L5-S1:  There is mild broad-based posterior disc bulging with associated annular fissure this disc.  This contributes to mild spinal canal narrowing.  No significant neuroforaminal narrowing.      Impression       Multilevel degenerative lumbar spondylosis resulting in mild spinal canal narrowing and mild to moderate neuroforaminal narrowing.      Electronically signed by: Rafael Bermudez MD  Date: 07/11/17  Time: 17:17        Diagnostic Results:  All imaging was independently reviewed by me.  Below is a summary from Dr Fowler's note.  I concur with the below findings.     MRI T-spine, dated 8/8/16:  1. No significant central or neuroforaminal stenosis     MRI C-spine, dated 8/8/16:  1. No significant stenosis      Flex/Ex X-ray C-spine, dated 7/19/16:  1. No prevertebral swelling  2. No gross instability   3. Good hardware position     MRI C-spine from an outside facility, dated 1/28/2016:  1. Moderate to severe DDD  2. Disc osteophyte  complex, worst at C5/6   3. Moderate stenosis at C4/5 and C6/7      CT C-spine from an outside facility, dated 4/2015:  1. Diffuse cervical spondylosis   2. DDD, worst at C5/6, with some ossification of the PLL at C5/6      CT C-spine from an outside facility, dated 3/17/2016:  1. Fusion surgery at C5/6  2. Hardware in good position       AP and Lateral X-ray C-spine from an outside facility:  1. C4-7 ACDF  2. Hardware in good position  3. Spine in good alignment    Assessment:     Encounter Diagnoses   Name Primary?    Chronic pain disorder Yes    Lumbar spondylosis     Spondylosis of cervical region without myelopathy or radiculopathy     S/P cervical spinal fusion     Myalgia     Cervical radiculopathy     Disuse muscle atrophy        Plan:     Sal was seen today for follow-up.    Diagnoses and all orders for this visit:    Chronic pain disorder  -     HYDROcodone-acetaminophen (NORCO) 7.5-325 mg per tablet; Take 1 tablet by mouth 2 (two) times daily as needed for Pain.    Lumbar spondylosis  -     HYDROcodone-acetaminophen (NORCO) 7.5-325 mg per tablet; Take 1 tablet by mouth 2 (two) times daily as needed for Pain.  -     HME - OTHER    Spondylosis of cervical region without myelopathy or radiculopathy  -     HYDROcodone-acetaminophen (NORCO) 7.5-325 mg per tablet; Take 1 tablet by mouth 2 (two) times daily as needed for Pain.    S/P cervical spinal fusion  -     HYDROcodone-acetaminophen (NORCO) 7.5-325 mg per tablet; Take 1 tablet by mouth 2 (two) times daily as needed for Pain.    Myalgia  -     HYDROcodone-acetaminophen (NORCO) 7.5-325 mg per tablet; Take 1 tablet by mouth 2 (two) times daily as needed for Pain.  -     bupivacaine (PF) injection 45 mg  -     betamethasone acetate-betamethasone sodium phosphate injection 6 mg  -     betamethasone acetate-betamethasone sodium phosphate injection 3 mg    Cervical radiculopathy  -     HYDROcodone-acetaminophen (NORCO) 7.5-325 mg per tablet; Take 1  tablet by mouth 2 (two) times daily as needed for Pain.    Disuse muscle atrophy  -     HME - OTHER      His pain is consistent with the above.    We extensively discussed his options, including:  Repeating L2-5 medial branch RFA, Dysport (will hold off on this as his muscle tightness is better), SCS, FRP, no intervention.    10/16/2018:  I had a long discussion with him regarding his overall decline in function.  An concerned that, if this trend continues, he will continue to lose functionality.  Today, he presents with a cane. We again discussed doing the functional restoration program.  I did discuss that the spinal cord stimulator is not a miracle treatment.  He was still need to work to improve functionality if we were to do this.  He would like to think about his options and get back to me.  For the current time being, we are going to repeat the radiofrequencies on his lower lumbar spine.    1/3/2019: He is doing well s/p RFA, but he lifted too heavy of a load a few days ago.  We discussed using activity pacing and gradually increasing activity to allow his muscles to slowly strengthen.  He would also benefit from an LSO to help with stabilization during heavy lifting and activities that require excessive low back use.     We discussed the assessment and recommendations.  All available images were reviewed. We discussed the disease process, prognosis, treatment plan, and risks and benefits. The patient is aware of the risks and benefits of the medications being prescribed, common side effects, and proper usage. The following is the plan we agreed on:     1. TPIs for both cervical and lumbar paraspinals today as below.  2. We discussed activity pacing.    3. I will order him an LSO brace for use when doing strenuous activity.  LSO Back brace ordered for lateral support and spinal stabilization.  Instructed not to wear the brace for more than 3 hours daily.  This is intended to help reduce back pain and  increase function.  4. Can repeat L2-5 LMB RFA PRN (Coolief) when needed.  5. Can repeat L2/3 ILESI p.r.n.  6. Can repeat for repeat C7/T1 epidural PRN  7. Continue Norco 7.5/325 twice daily as needed, #60.   reviewed and is appropriate.  UDS consistent.  Refilled today.  8. Continue Pennsaid gel, concentrating on neck and left shoulder  9. Continue PT HEP  10. We again discussed spinal cord stimulator which he will think about.  I can do the perc trial after looking at his MRI  11. We discussed FRP which he will also think about.  We would need to get him set up at the [facility]  1. GAP passed   12. Continue Robaxin   13. Continue Amitriptyline 25mg QHS   14. Continue Meloxicam 15 mg daily, benefits currently outweigh risks.  Will continue to reassess PRN  15. Movantik 25 mg for OIC PRN  16. Continue gabapentin, increasing PRN  17. Continue Tylenol as needed.  18. RTC in 3 months or sooner if needed.      The above plan and management options were discussed at length with patient. Patient is in agreement with the above and verbalized understanding.     Trigger Point Injection:   The procedure was discussed with the patient including complications of damage, bleeding, infection, and failure of pain relief.     All medications, allergies, and relevant histories were reviewed. No recent antibiotics or infections.  A time-out was taken to verify the correct patient, procedure, laterality, and appropriate medications/allergies.    Trigger points were identified by palpation and marked. CHG prep of sites done. A 27-gauge needle was advanced to the point of maximal tenderness, and 5 mL of a mixture of 0.5% bupivacaine with betamethasone 3-6 mg was injected after negative aspiration in a fanlike distribution. All sites done in the same manner. Patient tolerated the procedure well and without complications. Sites injected included: right lumbar paraspinals x 1, right cervical paraspinals x 1.     The patient tolerated  the procedure well and was discharged in excellent condition.      Greater than 25 minutes spent in total in todays visit with the patient, with more than half that time direct face to face counseling and education with the patient today. We discussed the disease process, prognosis, treatment plan, and risks and benefits.    Kasandra Montoya MD   01/03/2019    Ortho/SPM Exam

## 2019-01-04 ENCOUNTER — TELEPHONE (OUTPATIENT)
Dept: PAIN MEDICINE | Facility: CLINIC | Age: 54
End: 2019-01-04

## 2019-01-04 NOTE — TELEPHONE ENCOUNTER
----- Message from Shena Leal sent at 1/4/2019 11:51 AM CST -----  Name of Who is Calling:[facility]    What is the request in detail: Needs medical notes from 1/4 for the pts back brace faxed to [phone]      Can the clinic reply by [facility]: No      What Number to Call Back if not in [place]: [phone]

## 2019-01-04 NOTE — TELEPHONE ENCOUNTER
Staff contacted bayron alcantar ([facility]) to further discuss their request for patients note.     Bayron Alcantar stated he needed the patient last note for 1/3/19 with you. He states he had given the patient the brace and has received your external script for the brace.    Staff informed Mr. Bayron Alcantar that the note is not completed and asked that he contact up back to check on completion status next Wednesday.     Bayron Alcantar verbalized understanding and expressed thanks.     Please review and notify staff when note is completed.

## 2019-01-10 ENCOUNTER — PATIENT MESSAGE (OUTPATIENT)
Dept: PAIN MEDICINE | Facility: CLINIC | Age: 54
End: 2019-01-10

## 2019-04-03 ENCOUNTER — OFFICE VISIT (OUTPATIENT)
Dept: PAIN MEDICINE | Facility: CLINIC | Age: 54
End: 2019-04-03
Payer: MEDICARE

## 2019-04-03 VITALS
HEIGHT: 73 IN | WEIGHT: 244.69 LBS | HEART RATE: 59 BPM | DIASTOLIC BLOOD PRESSURE: 92 MMHG | SYSTOLIC BLOOD PRESSURE: 136 MMHG | RESPIRATION RATE: 18 BRPM | TEMPERATURE: 98 F | BODY MASS INDEX: 32.43 KG/M2

## 2019-04-03 DIAGNOSIS — M47.816 LUMBAR SPONDYLOSIS: ICD-10-CM

## 2019-04-03 DIAGNOSIS — M54.12 CERVICAL RADICULOPATHY: ICD-10-CM

## 2019-04-03 DIAGNOSIS — Z51.81 ENCOUNTER FOR MONITORING OPIOID MAINTENANCE THERAPY: ICD-10-CM

## 2019-04-03 DIAGNOSIS — M50.30 DDD (DEGENERATIVE DISC DISEASE), CERVICAL: ICD-10-CM

## 2019-04-03 DIAGNOSIS — M47.812 SPONDYLOSIS OF CERVICAL REGION WITHOUT MYELOPATHY OR RADICULOPATHY: ICD-10-CM

## 2019-04-03 DIAGNOSIS — Z79.891 ENCOUNTER FOR MONITORING OPIOID MAINTENANCE THERAPY: ICD-10-CM

## 2019-04-03 DIAGNOSIS — M51.36 DDD (DEGENERATIVE DISC DISEASE), LUMBAR: ICD-10-CM

## 2019-04-03 DIAGNOSIS — M79.10 MYALGIA: ICD-10-CM

## 2019-04-03 DIAGNOSIS — M47.816 FACET ARTHRITIS OF LUMBAR REGION: ICD-10-CM

## 2019-04-03 DIAGNOSIS — Z98.1 S/P CERVICAL SPINAL FUSION: ICD-10-CM

## 2019-04-03 DIAGNOSIS — G89.4 CHRONIC PAIN DISORDER: Primary | ICD-10-CM

## 2019-04-03 PROCEDURE — 80307 DRUG TEST PRSMV CHEM ANLYZR: CPT

## 2019-04-03 PROCEDURE — 99214 OFFICE O/P EST MOD 30 MIN: CPT | Mod: PBBFAC | Performed by: NURSE PRACTITIONER

## 2019-04-03 PROCEDURE — 99999 PR PBB SHADOW E&M-EST. PATIENT-LVL IV: ICD-10-PCS | Mod: PBBFAC,,, | Performed by: NURSE PRACTITIONER

## 2019-04-03 PROCEDURE — 99999 PR PBB SHADOW E&M-EST. PATIENT-LVL IV: CPT | Mod: PBBFAC,,, | Performed by: NURSE PRACTITIONER

## 2019-04-03 PROCEDURE — 99214 PR OFFICE/OUTPT VISIT, EST, LEVL IV, 30-39 MIN: ICD-10-PCS | Mod: S$PBB,,, | Performed by: NURSE PRACTITIONER

## 2019-04-03 PROCEDURE — 99214 OFFICE O/P EST MOD 30 MIN: CPT | Mod: S$PBB,,, | Performed by: NURSE PRACTITIONER

## 2019-04-03 RX ORDER — METHOCARBAMOL 500 MG/1
500 TABLET, FILM COATED ORAL 2 TIMES DAILY
Qty: 60 TABLET | Refills: 4 | Status: SHIPPED | OUTPATIENT
Start: 2019-04-03 | End: 2021-06-17 | Stop reason: SDUPTHER

## 2019-04-03 RX ORDER — HYDROCODONE BITARTRATE AND ACETAMINOPHEN 7.5; 325 MG/1; MG/1
1 TABLET ORAL EVERY 12 HOURS PRN
Qty: 60 TABLET | Refills: 0 | Status: SHIPPED | OUTPATIENT
Start: 2019-04-03 | End: 2019-05-29 | Stop reason: SDUPTHER

## 2019-04-03 NOTE — H&P (VIEW-ONLY)
Subjective:     Patient ID: Sal Navarro is a 54 y.o. male.    Chief Complaint: Neck Pain and Low-back Pain    Consulted by: No ref. provider found     Disclaimer: This note was generated using voice recognition software.  There may be a typographical errors that were missed during proofreading.      HPI:    Sal Navarro is a 54 y.o. male who presents today s/p C4-7 ACDF with C5/6 PSIF in 2/2016 with residual chronic midline neck pain that radiates into his shoulder blades bilaterally, R>>L.  This is associated with bilateral hand numbness and tingling.  The hand symptoms did improve following the surgery.  The shooting pains in his arms resolved completely.   This pain is described in detail below.  His post-operative course was complicated by dysphagia that is being treated with speech therapy.    Interval History (9/23/2016):  He returns today for follow up.  He reports that the MELANIE was not helpful for the pain.  He hasn't noticed a difference with the amitriptyline.  He does notice a difference in his low back pain when he skips the meloxicam    Interval History (11/17/2016):  He returns today for follow up.  He reports that cervical RFAs were not very helpful for the pain. He is no longer taking Percocet or Tamazepam. He continues to take Mobic 15mg daily for his low back pain. He is taking elavil 25mg nightly without side effects. He is open to considering SCS. He reports poor sleep at night.    Interval History (1/3/2017):  The patient returns today for follow up of neck and shoulder pain.  His pain is located to his neck and surrounding areas.  He previously had limited benefit with RFAs, although he did have short term benefit with MBB.  He also had limited benefit with NGUYEN.  Dr. Montoya discussed cervical SCS trial with the patient, although he reports that he does not wish to pursue this option at this time.  He is scheduled to f/u with Dr. Fowler on 1/24/16.  He continues to participate in  PT.  He did previously have some benefit with a TENS unit at PT.  He is reporting some relief with Tramadol.  He also takes Zanaflex which helps but is very sedating.      Interval History (3/3/2017):  The patient returns for follow up of neck and shoulder pain. He continues to take Tramadol BID with benefit. The medication decreases his pain slightly. He also reports benefit with Robaxin. He continues to take Mobic with benefit. He recently stopped physical therapy and reports increased pain into his shoulders, despite a home exercise plan.     Interval History (5/1/2017):  He returns today for follow up.  He reports that these RFAs were more helpful than before, L more so than right, but now the pain is again returning.  Today, he also complains of left shoulder pain that has been gradually worsening over the past few months. He associated this with modified activity due to his neck.  Pain is worse with extension of arm and lifting.  Tramadol, Robaxin, Meloxicam, and amitriptyline have been helpful for the pain.  He continues to have GI upset, but has just started prilosec.  When he tried stopping the meloxicam, his pain increased dramatically.  The tramadol takes the edge off.  Robaxin helps, but he is out.    Of note, he recently had an endoscopy that showed stage 3 gastritis, for which he was started on Prilosec    Interval History (6/21/2017):  He returns today for follow up.  He reports that the shoulder injection provided one week of relief.  The Pennsaid has been helpful for the pain.  The RFA has helped with shooting pain into his back.  He is doing his HEP.  He has not been back to PT because of insurance issues.  His shoulder continues to present significant hindrances to his daily activities.    Interval History (8/21/2017):  He returns today for follow up.  He reports that the tramadol is not as helpful as it used to be.  In addition, he is experiencing constipation and urinary retention that only  "resolves with skipping a dose of tramadol. He is going to PT for his neck/shoulder.    Interval History (10/18/2017):  He returns today for follow up.  He reports that Norco has been more helpful for the pain than the tramadol with fewer side effects.  He is averaging about 2-4 per day.  His back is doing much better, though his neck is still greatly limiting him in his daily tasks.  He reports a new pain that began with a "pop" followed by a sharp pain radiating down his back followed by a soreness.  This happened a few weeks ago on the left side of his low back above where we did the RFA.  This is resolving, but it is still sore.    Interval History (11/29/2017):  He returns today for follow up. He is scheduled for right shoulder surgery on 12/5/17 and is here today for lemuel-operative planning. Biggest pain today is in his left shoulder. After his long car ride here, he states his neck has started to bother him as well. Both pains today are roughly 5/10. Taking Norco 7.5/325 mg 1-2 tablets daily which he states takes the edge off of his pain. He has decreased his dosing from 3-4 per day due to side effects of constipation. States he is not taking the movantik on a regular basis-does not want to keep adding medications. Last took it over a week ago. Still taking mobic, robaxin with mild relief. Takes tylenol occasionally with mild relief. Also used hemp oil with mild relief. Still doing a HEP on a regular basis. States his lower back is gradually getting worse. Pain primarily on the left side. Had trigger point injections at last visit which provided 30-40% reduction in his pain for for 1-2 weeks     Interval History (2/21/2018):  He returns today for follow up.  He reports that his shoulder is improving since his surgery.  His neck is doing about the same.  His low back pain has returned, and it is slightly higher than it was before.  He was recently started on a low dose of gabapentin    Interval History " (3/29/2018):  He returns today for follow up.  He reports that his neck is feeling slightly better since he is no longer wearing his sling for her shoulder.  His physical therapy is still currently on hold due to his retinal condition.  His low back is feeling better.    Interval History (6/28/2018):  He returns today for follow up.  He reports that his low back has been hurting worse.  His low back was doing well after the NGUYEN, but the pain started to return over the past few weeks.  This is associated with numbness in his left leg in the posterior thigh.  He is back to taking 2 Norco daily.  His neck and shoulders are doing ok, but his low back is hurting.  He continues to have issues with his eyes.  He has had to have another laser surgery on his right eye again 3 weeks ago.    Interval History (9/13/2018):  He returns today for follow up.  He reports that he is still having trouble with his right eye.  Overall, his medication regimen helps somewhat.  The Norco 7.5 mg does not help much the way he is taking it, but any more causes constipation. He reports increasing neck pain.  This is associated with some radiation of tingling into his right arm (in an ulnar nerve distribution).     Interval History (10/17/2018):  He returns today for follow up.  He reports that the C7/T1 ILESI has been helpful for the pain in his neck.  He continues to report left-sided lower neck pain that radiates into shoulder blade.  He also continues to report low back pain.  Overall, he feels that his functioning is going down.  Today, he presents walking with a cane.    Interval History (1/3/2019):  He returns today for follow up.  He reports that the lumbar RFA provided great relief until he lifted a lot of firewood 3 days ago.  The pain has greatly let up since yesterday, but he still feels intense right sided low back pain.  PRN Norco in addition to his daily regimen (listed below) has been helpful for the pain.  He also reports that  his neck continues to hurt, though it is also better since the RFA.  Both of these are only hurting on the right side.    Interval History (4/3/2019):  The patient returns to clinic today for follow up. He reports increased left sided low back pain over the last month. He describes this pain as constant and aching in nature. He denies any radiating leg pain. He reports intermittent neck pain that is deep and aching in nature. He denies any radiating arm pain today. He continues to report benefit with current medication regimen. He continues to take Gabapentin, Robaxin, and Mobic with benefit. He also takes Norco sparingly with benefit. He denies any adverse effects. He denies any other health changes.     Physical Therapy: Yes- outside facility in [place].  He is now doing the HEP, stretches 3-4 days per week, exercises 2-3 days per week    Non-pharmacologic Treatment: Ice and heat provide mild benefit.           · TENS? Yes at PT with benefit.    Pain Medications:         · Currently taking: Gabapentin 100 mg BID, Meloxicam 15 mg daily, Tylenol PRN, Norco 7.5/325 mg 2 daily PRN, Robaxin 500 mg BID PRN, movantik 25 mg (causes stomach cramping)    · Has tried in the past:  Amitriptyline 50 mg QHS, Percocet (limited benefit and causes constipation treated with colace), Temazepam for sleep (he has had trouble sleeping since the surgery), Flexeril (no help in the past, both before and after the surgery), Gabapentin (after, went to straight 300 mg TID, too sedating, unsure of duration of tx), Lyrica (sedation), Celebrex (GI upset), Zanaflex 4 mg PRN muscle pain (helpful but sedating)     · Has not tried: SNRIs, other muscle relaxants    Blood thinners: None    Interventional Therapies:   · C7/T1 ILESI: 10-15% relief  · 10/4/16 Bilateral C4-7 MBB- short term benefit  · 10/11/16 Right C4-7 RFA- limited benefit  · 10/25/16 Left C4-7- limited benefit  · TPIs (cervical and left thoracic): Good benefit x 1-2 weeks in thoracic,  ongoing in cervical  · Bilateral L3-5 MBB: Positive  · Right then left L2-5 RFA: Good relief x 4-5 months  · 03/2018:  L2/3 ILESI: >50% relief  · 06/29/2018:  L2/3 ILESI:  Greater than 50% relief  · 09/14/2018:  C7/T1 ILESI: 30% relief  · 11/23/2018: Right C4-7 RFA: Modest benefit, but still having pain  · 12/21/2018: Right L2-5 RFA: Excellent relief until lifting firewood    Relevant Surgeries:   · C4-7 ACDF with C5/6 PSIF in 2/2016    Affecting sleep? Yes    Affecting daily activities? Yes    Depressive symptoms? Yes, due to general medical condition.  He denies significant symptoms today          · SI/HI? No    Work status: On disability from his job as an [employer] .    Pain Scales  Best: 2/10  Worst: 9/10  Usually: 6/10  Today: 5/10    Neck Pain    This is a new problem. The current episode started more than 1 month ago (february 2016). The problem occurs intermittently. The problem has been unchanged. The pain is present in the anterior neck. The quality of the pain is described as aching, shooting and stabbing. The pain is at a severity of 5/10. The pain is mild. The symptoms are aggravated by bending (turning). The pain is same all the time. Stiffness is present all day and in the morning. Associated symptoms include chest pain. Pertinent negatives include no fever, headaches or weight loss. He has tried oral narcotics, NSAIDs and muscle relaxants for the symptoms. The treatment provided mild relief. Physical therapy was not tried and ineffective.      Review of Systems   Constitution: Negative. Negative for chills, fever, malaise/fatigue, weight gain and weight loss.   HENT: Negative.  Negative for ear pain and hoarse voice.    Eyes: Negative.  Negative for blurred vision, pain and visual disturbance.   Cardiovascular: Positive for chest pain. Negative for dyspnea on exertion and irregular heartbeat.   Respiratory: Negative.  Negative for cough, shortness of breath and wheezing.    Endocrine:  Negative.  Negative for cold intolerance and heat intolerance.   Hematologic/Lymphatic: Negative.  Negative for adenopathy and bleeding problem. Does not bruise/bleed easily.   Skin: Negative.  Negative for color change, itching and rash.   Musculoskeletal: Positive for neck pain and stiffness. Negative for back pain.   Gastrointestinal: Negative.  Negative for change in bowel habit, diarrhea, hematemesis, hematochezia, melena and vomiting.   Genitourinary: Negative.  Negative for flank pain, frequency, hematuria and urgency.   Neurological: Positive for dizziness and light-headedness. Negative for difficulty with concentration, headaches, loss of balance and seizures.   Psychiatric/Behavioral: Negative for altered mental status, depression and suicidal ideas. The patient has insomnia and is nervous/anxious.    Allergic/Immunologic: Negative.  Negative for HIV exposure.   All other systems reviewed and are negative.            Past Medical History:   Diagnosis Date    Arthritis     Cataract     Cervical back pain with evidence of disc disease     Hiatal hernia     Hypertension     Thyroid disease        Past Surgical History:   Procedure Laterality Date    ACROMIOCLAVICULAR JOINT CYST EXCISION Right     ARTHROSCOPY-SHOULDER EXCISION DISTAL CLAVICLE Left 12/5/2017    [facility]    ARTHROSCOPY-SHOULDER WITH SUBACROMIAL DECOMPRESSION Left 12/5/2017    [facility]    BACK SURGERY      BLOCK-NERVE-MEDIAL BRANCH-CERVICAL Bilateral 10/4/2016    [facility] at [facility] PAIN T    BLOCK-NERVE-MEDIAL BRANCH-LUMBAR Bilateral 8/31/2017    [facility] at [facility] PAIN MGT    cataracts Bilateral     CERVICAL FUSION      COLONOSCOPY N/A 4/27/2018    [facility] ([facility])    DEBRIDEMENT-SHOULDER-ARTHROSCOPIC Left 12/5/2017    [facility]    [facility]      EYE SURGERY       INJECTION,STEROID,EPIDURAL N/A 6/29/2018    [facility]    Injection,steroid,epidural, CERVICAL C7/T1 NGUYEN N/A 9/14/2018    [facility]    INJECTION-STEROID- arthrscopic assisted Bio D injection to the left shoulder Left 12/5/2017    [facility]    INJECTION-STEROID-EPIDURAL-CERVICAL N/A 8/31/2016    [facility]    INJECTION-STEROID-EPIDURAL-LUMBAR N/A 3/6/2018    [facility]    LYSIS-ADHESION Left 12/5/2017    [facility]    RADIOFREQUENCY ABLATION  10/2016    cervical spine/Jan    RADIOFREQUENCY ABLATION RIGHT CERVICAL C4-C7 RFA, FULL STEROID DOSE, VENOM 20G Right 11/23/2018    [facility]    RADIOFREQUENCY ABLATION RIGHT LUMBAR L2-5 RFA VENOM Right 12/21/2018    [facility]    RADIOFREQUENCY THERMOCOAGULATION Left 3/28/2017    [facility]    RADIOFREQUENCY THERMOCOAGULATION Left 10/25/2016    [facility]    RADIOFREQUENCY THERMOCOAGULATION Right 10/11/2016    [facility]    RADIOFREQUENCY THERMOCOAGULATION (RFTC)-NERVE-MEDIAN BRANCH-CERVICAL Right 3/14/2017    [facility]    RADIOFREQUENCY THERMOCOAGULATION (RFTC)-NERVE-MEDIAN BRANCH-LUMBAR Bilateral 9/26/2017    [facility]    REPAIR-ROTATOR CUFF Left 12/5/2017    [facility]    REPAIR-TENDON-BICEP Left 12/5/2017    [facility]    ROTATOR CUFF REPAIR Right     SPINE SURGERY      cerival fusion        Review of patient's allergies indicates:   Allergen Reactions    Pcn [penicillins] Rash       Current Outpatient Medications    Medication Sig Dispense Refill    acetaminophen (TYLENOL) 500 MG tablet Take 500 mg by mouth every 6 (six) hours as needed for Pain.      aspirin (ECOTRIN) 81 MG EC tablet Take 81 mg by mouth once daily.      cetirizine (ZYRTEC) 10 MG tablet Take 10 mg by mouth nightly.  5    fluticasone (FLONASE) 50 mcg/actuation nasal spray SPRAY 1-2 SPRAYS into BOTH nostrils DAILY  5    FOLIC ACID/MULTIVIT-MIN/LUTEIN (CENTRUM SILVER ORAL) Take by mouth once daily.      gabapentin (NEURONTIN) 100 MG capsule 2 (two) times daily.       levothyroxine (SYNTHROID) 50 MCG tablet Take 50 mcg by mouth once daily.      meclizine (ANTIVERT) 25 mg tablet Take 25 mg by mouth once daily.       methocarbamol (ROBAXIN) 500 MG Tab Take 500 mg by mouth 2 (two) times daily.       multivitamin/folic acid/zinc (MULTIVITAMIN-FA-ZINC ORAL) multivitamin      naloxegol (MOVANTIK) tablet Take 25 mg by mouth as needed.      omeprazole (PRILOSEC) 20 MG capsule Take 1 capsule (20 mg total) by mouth once daily. 90 capsule 3    pravastatin (PRAVACHOL) 20 MG tablet       promethazine (PHENERGAN) 25 MG tablet Take 1 tablet (25 mg total) by mouth every 6 (six) hours as needed for Nausea. 40 tablet 0    traZODone (DESYREL) 50 MG tablet Take 50 mg by mouth nightly.  2    valsartan (DIOVAN) 80 MG tablet Take 40 mg by mouth once daily.   1     No current facility-administered medications for this visit.        Family History   Problem Relation Age of Onset    Heart disease Mother     Cancer Father     Leukemia Brother     Colon cancer Neg Hx     Celiac disease Neg Hx     Crohn's disease Neg Hx     Esophageal cancer Neg Hx     Inflammatory bowel disease Neg Hx     Irritable bowel syndrome Neg Hx     Liver cancer Neg Hx     Rectal cancer Neg Hx     Stomach cancer Neg Hx     Ulcerative colitis Neg Hx        Social History     Socioeconomic History    Marital status:      Spouse name: Not on file    Number of children: Not on file  "   Years of education: Not on file    Highest education level: Not on file   Occupational History    Not on file   Social Needs    Financial resource strain: Not on file    Food insecurity:     Worry: Not on file     Inability: Not on file    Transportation needs:     Medical: Not on file     Non-medical: Not on file   Tobacco Use    Smoking status: Never Smoker    Smokeless tobacco: Never Used   Substance and Sexual Activity    Alcohol use: No    Drug use: No    Sexual activity: Not on file   Lifestyle    Physical activity:     Days per week: Not on file     Minutes per session: Not on file    Stress: Not on file   Relationships    Social connections:     Talks on phone: Not on file     Gets together: Not on file     Attends Restorationism service: Not on file     Active member of club or organization: Not on file     Attends meetings of clubs or organizations: Not on file     Relationship status: Not on file   Other Topics Concern    Not on file   Social History Narrative    Not on file       Objective:     Vitals:    04/03/19 0954   BP: (!) 136/92   Pulse: (!) 59   Resp: 18   Temp: 97.6 °F (36.4 °C)   TempSrc: Oral   Weight: 111 kg (244 lb 11.2 oz)   Height: 6' 1" (1.854 m)   PainSc:   5   PainLoc: Neck     GEN:  Well developed, well nourished.  No acute distress.  No pain behavior.  HEENT:  No trauma.  Mucous membranes moist.  Nares patent bilaterally.  PSYCH: Normal affect. Thought content appropriate.  CHEST:  Breathing symmetric.  No audible wheezing.  ABD: Soft, non-tender, non-distended.  SKIN:  Warm, pink, dry.  No rash on exposed areas.    EXT:  No cyanosis, clubbing, or edema.  No color change or changes in nail or hair growth.  NEURO/MUSCULOSKELETAL:  Fully alert, oriented, and appropriate. Speech normal nella. No cranial nerve deficits.   Sensory: No sensory deficit in the lower extremities.   Reflexes: 1+ and symmetric throughout.  Negative Akins's bilaterally.  Gait: Antalgic- " ambulates without assistance.  Present trendelenburg sign bilaterally, L>R  SI Joint/Hip: Negative KHRIS bilaterally.  Negative FADIR bilaterally.  TTP over left lumbar paraspinals. TTP over lumbar spine.   No TTP over bilateral SI joints, piriformis muscles, or GTB.    L-Spine:  Decreased ROM with pain on extension  Positive facet loading on the left.  Negative SLR bilaterally.   SI/Hip: FABERs and FADIRs is negative bilaterally.         Imaging:      Narrative     MRI LUMBAR SPINE WITHOUT CONTRAST    COMPARISON: None    TECHNIQUE:  Sagittal T1, T2, and STIR;  axial T1 and T2 sequences through the lumbar spine were acquired without contrast.    FINDINGS: Vertebral body height and alignment are anatomic.  There is straightening of normal lumbar lordosis.  Marrow signal is within normal limits.  A few scattered small vertebral body hemangiomas are present.  Disc heights are well-maintained.  The conus terminates at L1.    L1-L2:  No disc herniation. No spinal canal or neuroforaminal narrowing.    L2-L3:  There is mild diffuse disc bulging and bilateral facet arthropathy without significant spinal canal or neuroforaminal narrowing.    L3-L4:  There is mild diffuse bulging of the disc and bilateral facet arthropathy, contributing to mild spinal canal narrowing and moderate right and mild left neuroforaminal narrowing.    L4-L5:  There is mild diffuse disc bulging accompanied by a small annular fissure of the subcarinal disc.  Bilateral facet arthropathy is also present.  There is resultant mild spinal canal narrowing and moderate bilateral neuroforaminal narrowing.    L5-S1:  There is mild broad-based posterior disc bulging with associated annular fissure this disc.  This contributes to mild spinal canal narrowing.  No significant neuroforaminal narrowing.      Impression       Multilevel degenerative lumbar spondylosis resulting in mild spinal canal narrowing and mild to moderate neuroforaminal  narrowing.      Electronically signed by: Rafael Bermudez MD  Date: 07/11/17  Time: 17:17        Diagnostic Results:  All imaging was independently reviewed by me.  Below is a summary from Dr Fowler's note.  I concur with the below findings.     MRI T-spine, dated 8/8/16:  1. No significant central or neuroforaminal stenosis     MRI C-spine, dated 8/8/16:  1. No significant stenosis      Flex/Ex X-ray C-spine, dated 7/19/16:  1. No prevertebral swelling  2. No gross instability   3. Good hardware position     MRI C-spine from an outside facility, dated 1/28/2016:  1. Moderate to severe DDD  2. Disc osteophyte complex, worst at C5/6   3. Moderate stenosis at C4/5 and C6/7      CT C-spine from an outside facility, dated 4/2015:  1. Diffuse cervical spondylosis   2. DDD, worst at C5/6, with some ossification of the PLL at C5/6      CT C-spine from an outside facility, dated 3/17/2016:  1. Fusion surgery at C5/6  2. Hardware in good position       AP and Lateral X-ray C-spine from an outside facility:  1. C4-7 ACDF  2. Hardware in good position  3. Spine in good alignment    Assessment:     Encounter Diagnoses   Name Primary?    Chronic pain disorder Yes    Lumbar spondylosis     DDD (degenerative disc disease), lumbar     Facet arthritis of lumbar region     Myalgia     Spondylosis of cervical region without myelopathy or radiculopathy     S/P cervical spinal fusion     Cervical radiculopathy     DDD (degenerative disc disease), cervical     Encounter for monitoring opioid maintenance therapy        Plan:     Sal was seen today for neck pain and low-back pain.    Diagnoses and all orders for this visit:    Chronic pain disorder  -     HYDROcodone-acetaminophen (NORCO) 7.5-325 mg per tablet; Take 1 tablet by mouth every 12 (twelve) hours as needed for Pain.    Lumbar spondylosis  -     HYDROcodone-acetaminophen (NORCO) 7.5-325 mg per tablet; Take 1 tablet by mouth every 12 (twelve) hours as needed for  Pain.    DDD (degenerative disc disease), lumbar  -     HYDROcodone-acetaminophen (NORCO) 7.5-325 mg per tablet; Take 1 tablet by mouth every 12 (twelve) hours as needed for Pain.    Facet arthritis of lumbar region  -     HYDROcodone-acetaminophen (NORCO) 7.5-325 mg per tablet; Take 1 tablet by mouth every 12 (twelve) hours as needed for Pain.    Myalgia  -     methocarbamol (ROBAXIN) 500 MG Tab; Take 1 tablet (500 mg total) by mouth 2 (two) times daily.  -     HYDROcodone-acetaminophen (NORCO) 7.5-325 mg per tablet; Take 1 tablet by mouth every 12 (twelve) hours as needed for Pain.    Spondylosis of cervical region without myelopathy or radiculopathy  -     HYDROcodone-acetaminophen (NORCO) 7.5-325 mg per tablet; Take 1 tablet by mouth every 12 (twelve) hours as needed for Pain.    S/P cervical spinal fusion  -     HYDROcodone-acetaminophen (NORCO) 7.5-325 mg per tablet; Take 1 tablet by mouth every 12 (twelve) hours as needed for Pain.    Cervical radiculopathy    DDD (degenerative disc disease), cervical    Encounter for monitoring opioid maintenance therapy  -     [facility] Drug Screen      His pain is consistent with the above.      We discussed the assessment and recommendations.  All available images were reviewed. We discussed the disease process, prognosis, treatment plan, and risks and benefits. The patient is aware of the risks and benefits of the medications being prescribed, common side effects, and proper usage. The following is the plan we agreed on:     1. Schedule for left L2-5 RFA. The procedure, risks, benefits and options were discussed with patient. There are no contraindications to the procedure. The patient expressed understanding and agreed to proceed.    2. Can repeat right L2-5 LMB RFA PRN (Coolief) when needed.  3. Can repeat L2/3 ILESI p.r.n.  4. Can repeat for repeat C7/T1 epidural PRN  5. Continue Norco 7.5/325 twice daily as needed, #60.    6. The patient is here today for a refill  of current pain medications and they believe these provide effective pain control and improvements in quality of life.  The patient notes no serious side effects, and feels the benefits outweigh the risks.  The patient was reminded of the pain contract that they signed previously as well as the risks and benefits of the medication including possible death.  The updated [place] [employer] prescription monitoring program was reviewed, and the patient has been found to be compliant with current treatment plan. Medication management provided by Dr. Noyola in absence of Dr. Montoya.  7. Previous UDS from 6/2018 reviewed and consistent. UDS done today.   8. Continue Pennsaid gel, concentrating on neck and left shoulder  9. Continue PT HEP  10. We again discussed spinal cord stimulator which he will think about.  I can do the perc trial after looking at his MRI  11. We discussed FRP which he will also think about.  We would need to get him set up at the [facility]  1. GAP passed   12. Continue Robaxin. Refill provided today.    13. Continue Amitriptyline 25mg QHS   14. Continue Meloxicam 15 mg daily, benefits currently outweigh risks.  Will continue to reassess PRN  15. Movantik 25 mg for OIC PRN  16. Continue gabapentin, increasing PRN  17. Continue Tylenol as needed.  18. RTC 2 weeks after above procedure.      The above plan and management options were discussed at length with patient. Patient is in agreement with the above and verbalized understanding.     Angelique Barahona NP   04/03/2019      Ortho/SPM Exam

## 2019-04-03 NOTE — PROGRESS NOTES
Subjective:     Patient ID: Sal Navarro is a 54 y.o. male.    Chief Complaint: Neck Pain and Low-back Pain    Consulted by: No ref. provider found     Disclaimer: This note was generated using voice recognition software.  There may be a typographical errors that were missed during proofreading.      HPI:    Sal Navarro is a 54 y.o. male who presents today s/p C4-7 ACDF with C5/6 PSIF in 2/2016 with residual chronic midline neck pain that radiates into his shoulder blades bilaterally, R>>L.  This is associated with bilateral hand numbness and tingling.  The hand symptoms did improve following the surgery.  The shooting pains in his arms resolved completely.   This pain is described in detail below.  His post-operative course was complicated by dysphagia that is being treated with speech therapy.    Interval History (9/23/2016):  He returns today for follow up.  He reports that the MELANIE was not helpful for the pain.  He hasn't noticed a difference with the amitriptyline.  He does notice a difference in his low back pain when he skips the meloxicam    Interval History (11/17/2016):  He returns today for follow up.  He reports that cervical RFAs were not very helpful for the pain. He is no longer taking Percocet or Tamazepam. He continues to take Mobic 15mg daily for his low back pain. He is taking elavil 25mg nightly without side effects. He is open to considering SCS. He reports poor sleep at night.    Interval History (1/3/2017):  The patient returns today for follow up of neck and shoulder pain.  His pain is located to his neck and surrounding areas.  He previously had limited benefit with RFAs, although he did have short term benefit with MBB.  He also had limited benefit with NGUYEN.  Dr. Montoya discussed cervical SCS trial with the patient, although he reports that he does not wish to pursue this option at this time.  He is scheduled to f/u with Dr. Fowler on 1/24/16.  He continues to participate in  PT.  He did previously have some benefit with a TENS unit at PT.  He is reporting some relief with Tramadol.  He also takes Zanaflex which helps but is very sedating.      Interval History (3/3/2017):  The patient returns for follow up of neck and shoulder pain. He continues to take Tramadol BID with benefit. The medication decreases his pain slightly. He also reports benefit with Robaxin. He continues to take Mobic with benefit. He recently stopped physical therapy and reports increased pain into his shoulders, despite a home exercise plan.     Interval History (5/1/2017):  He returns today for follow up.  He reports that these RFAs were more helpful than before, L more so than right, but now the pain is again returning.  Today, he also complains of left shoulder pain that has been gradually worsening over the past few months. He associated this with modified activity due to his neck.  Pain is worse with extension of arm and lifting.  Tramadol, Robaxin, Meloxicam, and amitriptyline have been helpful for the pain.  He continues to have GI upset, but has just started prilosec.  When he tried stopping the meloxicam, his pain increased dramatically.  The tramadol takes the edge off.  Robaxin helps, but he is out.    Of note, he recently had an endoscopy that showed stage 3 gastritis, for which he was started on Prilosec    Interval History (6/21/2017):  He returns today for follow up.  He reports that the shoulder injection provided one week of relief.  The Pennsaid has been helpful for the pain.  The RFA has helped with shooting pain into his back.  He is doing his HEP.  He has not been back to PT because of insurance issues.  His shoulder continues to present significant hindrances to his daily activities.    Interval History (8/21/2017):  He returns today for follow up.  He reports that the tramadol is not as helpful as it used to be.  In addition, he is experiencing constipation and urinary retention that only  "resolves with skipping a dose of tramadol. He is going to PT for his neck/shoulder.    Interval History (10/18/2017):  He returns today for follow up.  He reports that Norco has been more helpful for the pain than the tramadol with fewer side effects.  He is averaging about 2-4 per day.  His back is doing much better, though his neck is still greatly limiting him in his daily tasks.  He reports a new pain that began with a "pop" followed by a sharp pain radiating down his back followed by a soreness.  This happened a few weeks ago on the left side of his low back above where we did the RFA.  This is resolving, but it is still sore.    Interval History (11/29/2017):  He returns today for follow up. He is scheduled for right shoulder surgery on 12/5/17 and is here today for lemuel-operative planning. Biggest pain today is in his left shoulder. After his long car ride here, he states his neck has started to bother him as well. Both pains today are roughly 5/10. Taking Norco 7.5/325 mg 1-2 tablets daily which he states takes the edge off of his pain. He has decreased his dosing from 3-4 per day due to side effects of constipation. States he is not taking the movantik on a regular basis-does not want to keep adding medications. Last took it over a week ago. Still taking mobic, robaxin with mild relief. Takes tylenol occasionally with mild relief. Also used hemp oil with mild relief. Still doing a HEP on a regular basis. States his lower back is gradually getting worse. Pain primarily on the left side. Had trigger point injections at last visit which provided 30-40% reduction in his pain for for 1-2 weeks     Interval History (2/21/2018):  He returns today for follow up.  He reports that his shoulder is improving since his surgery.  His neck is doing about the same.  His low back pain has returned, and it is slightly higher than it was before.  He was recently started on a low dose of gabapentin    Interval History " (3/29/2018):  He returns today for follow up.  He reports that his neck is feeling slightly better since he is no longer wearing his sling for her shoulder.  His physical therapy is still currently on hold due to his retinal condition.  His low back is feeling better.    Interval History (6/28/2018):  He returns today for follow up.  He reports that his low back has been hurting worse.  His low back was doing well after the NGUYEN, but the pain started to return over the past few weeks.  This is associated with numbness in his left leg in the posterior thigh.  He is back to taking 2 Norco daily.  His neck and shoulders are doing ok, but his low back is hurting.  He continues to have issues with his eyes.  He has had to have another laser surgery on his right eye again 3 weeks ago.    Interval History (9/13/2018):  He returns today for follow up.  He reports that he is still having trouble with his right eye.  Overall, his medication regimen helps somewhat.  The Norco 7.5 mg does not help much the way he is taking it, but any more causes constipation. He reports increasing neck pain.  This is associated with some radiation of tingling into his right arm (in an ulnar nerve distribution).     Interval History (10/17/2018):  He returns today for follow up.  He reports that the C7/T1 ILESI has been helpful for the pain in his neck.  He continues to report left-sided lower neck pain that radiates into shoulder blade.  He also continues to report low back pain.  Overall, he feels that his functioning is going down.  Today, he presents walking with a cane.    Interval History (1/3/2019):  He returns today for follow up.  He reports that the lumbar RFA provided great relief until he lifted a lot of firewood 3 days ago.  The pain has greatly let up since yesterday, but he still feels intense right sided low back pain.  PRN Norco in addition to his daily regimen (listed below) has been helpful for the pain.  He also reports that  his neck continues to hurt, though it is also better since the RFA.  Both of these are only hurting on the right side.    Interval History (4/3/2019):  The patient returns to clinic today for follow up. He reports increased left sided low back pain over the last month. He describes this pain as constant and aching in nature. He denies any radiating leg pain. He reports intermittent neck pain that is deep and aching in nature. He denies any radiating arm pain today. He continues to report benefit with current medication regimen. He continues to take Gabapentin, Robaxin, and Mobic with benefit. He also takes Norco sparingly with benefit. He denies any adverse effects. He denies any other health changes.     Physical Therapy: Yes- outside facility in [place].  He is now doing the HEP, stretches 3-4 days per week, exercises 2-3 days per week    Non-pharmacologic Treatment: Ice and heat provide mild benefit.           · TENS? Yes at PT with benefit.    Pain Medications:         · Currently taking: Gabapentin 100 mg BID, Meloxicam 15 mg daily, Tylenol PRN, Norco 7.5/325 mg 2 daily PRN, Robaxin 500 mg BID PRN, movantik 25 mg (causes stomach cramping)    · Has tried in the past:  Amitriptyline 50 mg QHS, Percocet (limited benefit and causes constipation treated with colace), Temazepam for sleep (he has had trouble sleeping since the surgery), Flexeril (no help in the past, both before and after the surgery), Gabapentin (after, went to straight 300 mg TID, too sedating, unsure of duration of tx), Lyrica (sedation), Celebrex (GI upset), Zanaflex 4 mg PRN muscle pain (helpful but sedating)     · Has not tried: SNRIs, other muscle relaxants    Blood thinners: None    Interventional Therapies:   · C7/T1 ILESI: 10-15% relief  · 10/4/16 Bilateral C4-7 MBB- short term benefit  · 10/11/16 Right C4-7 RFA- limited benefit  · 10/25/16 Left C4-7- limited benefit  · TPIs (cervical and left thoracic): Good benefit x 1-2 weeks in thoracic,  ongoing in cervical  · Bilateral L3-5 MBB: Positive  · Right then left L2-5 RFA: Good relief x 4-5 months  · 03/2018:  L2/3 ILESI: >50% relief  · 06/29/2018:  L2/3 ILESI:  Greater than 50% relief  · 09/14/2018:  C7/T1 ILESI: 30% relief  · 11/23/2018: Right C4-7 RFA: Modest benefit, but still having pain  · 12/21/2018: Right L2-5 RFA: Excellent relief until lifting firewood    Relevant Surgeries:   · C4-7 ACDF with C5/6 PSIF in 2/2016    Affecting sleep? Yes    Affecting daily activities? Yes    Depressive symptoms? Yes, due to general medical condition.  He denies significant symptoms today          · SI/HI? No    Work status: On disability from his job as an [employer] .    Pain Scales  Best: 2/10  Worst: 9/10  Usually: 6/10  Today: 5/10    Neck Pain    This is a new problem. The current episode started more than 1 month ago (february 2016). The problem occurs intermittently. The problem has been unchanged. The pain is present in the anterior neck. The quality of the pain is described as aching, shooting and stabbing. The pain is at a severity of 5/10. The pain is mild. The symptoms are aggravated by bending (turning). The pain is same all the time. Stiffness is present all day and in the morning. Associated symptoms include chest pain. Pertinent negatives include no fever, headaches or weight loss. He has tried oral narcotics, NSAIDs and muscle relaxants for the symptoms. The treatment provided mild relief. Physical therapy was not tried and ineffective.      Review of Systems   Constitution: Negative. Negative for chills, fever, malaise/fatigue, weight gain and weight loss.   HENT: Negative.  Negative for ear pain and hoarse voice.    Eyes: Negative.  Negative for blurred vision, pain and visual disturbance.   Cardiovascular: Positive for chest pain. Negative for dyspnea on exertion and irregular heartbeat.   Respiratory: Negative.  Negative for cough, shortness of breath and wheezing.    Endocrine:  Negative.  Negative for cold intolerance and heat intolerance.   Hematologic/Lymphatic: Negative.  Negative for adenopathy and bleeding problem. Does not bruise/bleed easily.   Skin: Negative.  Negative for color change, itching and rash.   Musculoskeletal: Positive for neck pain and stiffness. Negative for back pain.   Gastrointestinal: Negative.  Negative for change in bowel habit, diarrhea, hematemesis, hematochezia, melena and vomiting.   Genitourinary: Negative.  Negative for flank pain, frequency, hematuria and urgency.   Neurological: Positive for dizziness and light-headedness. Negative for difficulty with concentration, headaches, loss of balance and seizures.   Psychiatric/Behavioral: Negative for altered mental status, depression and suicidal ideas. The patient has insomnia and is nervous/anxious.    Allergic/Immunologic: Negative.  Negative for HIV exposure.   All other systems reviewed and are negative.            Past Medical History:   Diagnosis Date    Arthritis     Cataract     Cervical back pain with evidence of disc disease     Hiatal hernia     Hypertension     Thyroid disease        Past Surgical History:   Procedure Laterality Date    ACROMIOCLAVICULAR JOINT CYST EXCISION Right     ARTHROSCOPY-SHOULDER EXCISION DISTAL CLAVICLE Left 12/5/2017    [facility]    ARTHROSCOPY-SHOULDER WITH SUBACROMIAL DECOMPRESSION Left 12/5/2017    [facility]    BACK SURGERY      BLOCK-NERVE-MEDIAL BRANCH-CERVICAL Bilateral 10/4/2016    [facility] PAIN T    BLOCK-NERVE-MEDIAL BRANCH-LUMBAR Bilateral 8/31/2017    [facility] PAIN MGT    cataracts Bilateral     CERVICAL FUSION      COLONOSCOPY N/A 4/27/2018    [facility] (4[facility] F[facility]    DEBRIDEMENT-SHOULDER-ARTHROSCOPIC Left 12/5/2017    [facility]    [facility]      EYE SURGERY       INJECTION,STEROID,EPIDURAL N/A 6/29/2018    [facility]    Injection,steroid,epidural, CERVICAL C7/T1 NGUYEN N/A 9/14/2018    [facility]    INJECTION-STEROID- arthrscopic assisted Bio D injection to the left shoulder Left 12/5/2017    [facility]    INJECTION-STEROID-EPIDURAL-CERVICAL N/A 8/31/2016    [facility]    INJECTION-STEROID-EPIDURAL-LUMBAR N/A 3/6/2018    [facility]    LYSIS-ADHESION Left 12/5/2017    [facility]    RADIOFREQUENCY ABLATION  10/2016    cervical spine/Jan    RADIOFREQUENCY ABLATION RIGHT CERVICAL C4-C7 RFA, FULL STEROID DOSE, VENOM 20G Right 11/23/2018    [facility]    RADIOFREQUENCY ABLATION RIGHT LUMBAR L2-5 RFA VENOM Right 12/21/2018    [facility]    RADIOFREQUENCY THERMOCOAGULATION Left 3/28/2017    [facility]    RADIOFREQUENCY THERMOCOAGULATION Left 10/25/2016    [facility]    RADIOFREQUENCY THERMOCOAGULATION Right 10/11/2016    [facility]    RADIOFREQUENCY THERMOCOAGULATION (RFTC)-NERVE-MEDIAN BRANCH-CERVICAL Right 3/14/2017    [facility]    RADIOFREQUENCY THERMOCOAGULATION (RFTC)-NERVE-MEDIAN BRANCH-LUMBAR Bilateral 9/26/2017    [facility]    REPAIR-ROTATOR CUFF Left 12/5/2017    [facility] OR    REPAIR-TENDON-BICEP Left 12/5/2017    [facility] OR    ROTATOR CUFF REPAIR Right     SPINE SURGERY      cerival fusion        Review of patient's allergies indicates:   Allergen Reactions    Pcn [penicillins] Rash       Current Outpatient Medications    Medication Sig Dispense Refill    acetaminophen (TYLENOL) 500 MG tablet Take 500 mg by mouth every 6 (six) hours as needed for Pain.      aspirin (ECOTRIN) 81 MG EC tablet Take 81 mg by mouth once daily.      cetirizine (ZYRTEC) 10 MG tablet Take 10 mg by mouth nightly.  5    fluticasone (FLONASE) 50 mcg/actuation nasal spray SPRAY 1-2 SPRAYS into BOTH nostrils DAILY  5    FOLIC ACID/MULTIVIT-MIN/LUTEIN (CENTRUM SILVER ORAL) Take by mouth once daily.      gabapentin (NEURONTIN) 100 MG capsule 2 (two) times daily.       levothyroxine (SYNTHROID) 50 MCG tablet Take 50 mcg by mouth once daily.      meclizine (ANTIVERT) 25 mg tablet Take 25 mg by mouth once daily.       methocarbamol (ROBAXIN) 500 MG Tab Take 500 mg by mouth 2 (two) times daily.       multivitamin/folic acid/zinc (MULTIVITAMIN-FA-ZINC ORAL) multivitamin      naloxegol (MOVANTIK) tablet Take 25 mg by mouth as needed.      omeprazole (PRILOSEC) 20 MG capsule Take 1 capsule (20 mg total) by mouth once daily. 90 capsule 3    pravastatin (PRAVACHOL) 20 MG tablet       promethazine (PHENERGAN) 25 MG tablet Take 1 tablet (25 mg total) by mouth every 6 (six) hours as needed for Nausea. 40 tablet 0    traZODone (DESYREL) 50 MG tablet Take 50 mg by mouth nightly.  2    valsartan (DIOVAN) 80 MG tablet Take 40 mg by mouth once daily.   1     No current facility-administered medications for this visit.        Family History   Problem Relation Age of Onset    Heart disease Mother     Cancer Father     Leukemia Brother     Colon cancer Neg Hx     Celiac disease Neg Hx     Crohn's disease Neg Hx     Esophageal cancer Neg Hx     Inflammatory bowel disease Neg Hx     Irritable bowel syndrome Neg Hx     Liver cancer Neg Hx     Rectal cancer Neg Hx     Stomach cancer Neg Hx     Ulcerative colitis Neg Hx        Social History     Socioeconomic History    Marital status:      Spouse name: Not on file    Number of children: Not on file  "   Years of education: Not on file    Highest education level: Not on file   Occupational History    Not on file   Social Needs    Financial resource strain: Not on file    Food insecurity:     Worry: Not on file     Inability: Not on file    Transportation needs:     Medical: Not on file     Non-medical: Not on file   Tobacco Use    Smoking status: Never Smoker    Smokeless tobacco: Never Used   Substance and Sexual Activity    Alcohol use: No    Drug use: No    Sexual activity: Not on file   Lifestyle    Physical activity:     Days per week: Not on file     Minutes per session: Not on file    Stress: Not on file   Relationships    Social connections:     Talks on phone: Not on file     Gets together: Not on file     Attends Yazdanism service: Not on file     Active member of club or organization: Not on file     Attends meetings of clubs or organizations: Not on file     Relationship status: Not on file   Other Topics Concern    Not on file   Social History Narrative    Not on file       Objective:     Vitals:    04/03/19 0954   BP: (!) 136/92   Pulse: (!) 59   Resp: 18   Temp: 97.6 °F (36.4 °C)   TempSrc: Oral   Weight: 111 kg (244 lb 11.2 oz)   Height: 6' 1" (1.854 m)   PainSc:   5   PainLoc: Neck     GEN:  Well developed, well nourished.  No acute distress.  No pain behavior.  HEENT:  No trauma.  Mucous membranes moist.  Nares patent bilaterally.  PSYCH: Normal affect. Thought content appropriate.  CHEST:  Breathing symmetric.  No audible wheezing.  ABD: Soft, non-tender, non-distended.  SKIN:  Warm, pink, dry.  No rash on exposed areas.    EXT:  No cyanosis, clubbing, or edema.  No color change or changes in nail or hair growth.  NEURO/MUSCULOSKELETAL:  Fully alert, oriented, and appropriate. Speech normal nella. No cranial nerve deficits.   Sensory: No sensory deficit in the lower extremities.   Reflexes: 1+ and symmetric throughout.  Negative Akins's bilaterally.  Gait: Antalgic- " ambulates without assistance.  Present trendelenburg sign bilaterally, L>R  SI Joint/Hip: Negative KHRIS bilaterally.  Negative FADIR bilaterally.  TTP over left lumbar paraspinals. TTP over lumbar spine.   No TTP over bilateral SI joints, piriformis muscles, or GTB.    L-Spine:  Decreased ROM with pain on extension  Positive facet loading on the left.  Negative SLR bilaterally.   SI/Hip: FABERs and FADIRs is negative bilaterally.         Imaging:      Narrative     MRI LUMBAR SPINE WITHOUT CONTRAST    COMPARISON: None    TECHNIQUE:  Sagittal T1, T2, and STIR;  axial T1 and T2 sequences through the lumbar spine were acquired without contrast.    FINDINGS: Vertebral body height and alignment are anatomic.  There is straightening of normal lumbar lordosis.  Marrow signal is within normal limits.  A few scattered small vertebral body hemangiomas are present.  Disc heights are well-maintained.  The conus terminates at L1.    L1-L2:  No disc herniation. No spinal canal or neuroforaminal narrowing.    L2-L3:  There is mild diffuse disc bulging and bilateral facet arthropathy without significant spinal canal or neuroforaminal narrowing.    L3-L4:  There is mild diffuse bulging of the disc and bilateral facet arthropathy, contributing to mild spinal canal narrowing and moderate right and mild left neuroforaminal narrowing.    L4-L5:  There is mild diffuse disc bulging accompanied by a small annular fissure of the subcarinal disc.  Bilateral facet arthropathy is also present.  There is resultant mild spinal canal narrowing and moderate bilateral neuroforaminal narrowing.    L5-S1:  There is mild broad-based posterior disc bulging with associated annular fissure this disc.  This contributes to mild spinal canal narrowing.  No significant neuroforaminal narrowing.      Impression       Multilevel degenerative lumbar spondylosis resulting in mild spinal canal narrowing and mild to moderate neuroforaminal  narrowing.      Electronically signed by: Rafael Bermudez MD  Date: 07/11/17  Time: 17:17        Diagnostic Results:  All imaging was independently reviewed by me.  Below is a summary from Dr Fowler's note.  I concur with the below findings.     MRI T-spine, dated 8/8/16:  1. No significant central or neuroforaminal stenosis     MRI C-spine, dated 8/8/16:  1. No significant stenosis      Flex/Ex X-ray C-spine, dated 7/19/16:  1. No prevertebral swelling  2. No gross instability   3. Good hardware position     MRI C-spine from an outside facility, dated 1/28/2016:  1. Moderate to severe DDD  2. Disc osteophyte complex, worst at C5/6   3. Moderate stenosis at C4/5 and C6/7      CT C-spine from an outside facility, dated 4/2015:  1. Diffuse cervical spondylosis   2. DDD, worst at C5/6, with some ossification of the PLL at C5/6      CT C-spine from an outside facility, dated 3/17/2016:  1. Fusion surgery at C5/6  2. Hardware in good position       AP and Lateral X-ray C-spine from an outside facility:  1. C4-7 ACDF  2. Hardware in good position  3. Spine in good alignment    Assessment:     Encounter Diagnoses   Name Primary?    Chronic pain disorder Yes    Lumbar spondylosis     DDD (degenerative disc disease), lumbar     Facet arthritis of lumbar region     Myalgia     Spondylosis of cervical region without myelopathy or radiculopathy     S/P cervical spinal fusion     Cervical radiculopathy     DDD (degenerative disc disease), cervical     Encounter for monitoring opioid maintenance therapy        Plan:     Sal was seen today for neck pain and low-back pain.    Diagnoses and all orders for this visit:    Chronic pain disorder  -     HYDROcodone-acetaminophen (NORCO) 7.5-325 mg per tablet; Take 1 tablet by mouth every 12 (twelve) hours as needed for Pain.    Lumbar spondylosis  -     HYDROcodone-acetaminophen (NORCO) 7.5-325 mg per tablet; Take 1 tablet by mouth every 12 (twelve) hours as needed for  Pain.    DDD (degenerative disc disease), lumbar  -     HYDROcodone-acetaminophen (NORCO) 7.5-325 mg per tablet; Take 1 tablet by mouth every 12 (twelve) hours as needed for Pain.    Facet arthritis of lumbar region  -     HYDROcodone-acetaminophen (NORCO) 7.5-325 mg per tablet; Take 1 tablet by mouth every 12 (twelve) hours as needed for Pain.    Myalgia  -     methocarbamol (ROBAXIN) 500 MG Tab; Take 1 tablet (500 mg total) by mouth 2 (two) times daily.  -     HYDROcodone-acetaminophen (NORCO) 7.5-325 mg per tablet; Take 1 tablet by mouth every 12 (twelve) hours as needed for Pain.    Spondylosis of cervical region without myelopathy or radiculopathy  -     HYDROcodone-acetaminophen (NORCO) 7.5-325 mg per tablet; Take 1 tablet by mouth every 12 (twelve) hours as needed for Pain.    S/P cervical spinal fusion  -     HYDROcodone-acetaminophen (NORCO) 7.5-325 mg per tablet; Take 1 tablet by mouth every 12 (twelve) hours as needed for Pain.    Cervical radiculopathy    DDD (degenerative disc disease), cervical    Encounter for monitoring opioid maintenance therapy  -     Pain Clinic Drug Screen      His pain is consistent with the above.      We discussed the assessment and recommendations.  All available images were reviewed. We discussed the disease process, prognosis, treatment plan, and risks and benefits. The patient is aware of the risks and benefits of the medications being prescribed, common side effects, and proper usage. The following is the plan we agreed on:     1. Schedule for left L2-5 RFA. The procedure, risks, benefits and options were discussed with patient. There are no contraindications to the procedure. The patient expressed understanding and agreed to proceed.    2. Can repeat right L2-5 LMB RFA PRN (Coolief) when needed.  3. Can repeat L2/3 ILESI p.r.n.  4. Can repeat for repeat C7/T1 epidural PRN  5. Continue Norco 7.5/325 twice daily as needed, #60.    6. The patient is here today for a refill  of current pain medications and they believe these provide effective pain control and improvements in quality of life.  The patient notes no serious side effects, and feels the benefits outweigh the risks.  The patient was reminded of the pain contract that they signed previously as well as the risks and benefits of the medication including possible death.  The updated [place] Board of Pharmacy prescription monitoring program was reviewed, and the patient has been found to be compliant with current treatment plan. Medication management provided by Dr. Noyola in absence of Dr. Montoya.  7. Previous UDS from 6/2018 reviewed and consistent. UDS done today.   8. Continue Pennsaid gel, concentrating on neck and left shoulder  9. Continue PT HEP  10. We again discussed spinal cord stimulator which he will think about.  I can do the perc trial after looking at his MRI  11. We discussed FRP which he will also think about.  We would need to get him set up at the [facility]  1. GAP passed   12. Continue Robaxin. Refill provided today.    13. Continue Amitriptyline 25mg QHS   14. Continue Meloxicam 15 mg daily, benefits currently outweigh risks.  Will continue to reassess PRN  15. Movantik 25 mg for OIC PRN  16. Continue gabapentin, increasing PRN  17. Continue Tylenol as needed.  18. RTC 2 weeks after above procedure.      The above plan and management options were discussed at length with patient. Patient is in agreement with the above and verbalized understanding.     Angelique Barahona NP   04/03/2019      Ortho/SPM Exam

## 2019-04-07 LAB
6MAM UR QL: NOT DETECTED
7AMINOCLONAZEPAM UR QL: NOT DETECTED
A-OH ALPRAZ UR QL: NOT DETECTED
ALPRAZ UR QL: NOT DETECTED
AMPHET UR QL SCN: NOT DETECTED
ANNOTATION COMMENT IMP: NORMAL
ANNOTATION COMMENT IMP: NORMAL
BARBITURATES UR QL: NOT DETECTED
BUPRENORPHINE UR QL: NOT DETECTED
BZE UR QL: NOT DETECTED
CARBOXYTHC UR QL: NOT DETECTED
CARISOPRODOL UR QL: NOT DETECTED
CLONAZEPAM UR QL: NOT DETECTED
CODEINE UR QL: NOT DETECTED
CREAT UR-MCNC: 264.6 MG/DL (ref 20–400)
DIAZEPAM UR QL: NOT DETECTED
ETHYL GLUCURONIDE UR QL: NOT DETECTED
FENTANYL UR QL: NOT DETECTED
HYDROCODONE UR QL: PRESENT
HYDROMORPHONE UR QL: NOT DETECTED
LORAZEPAM UR QL: NOT DETECTED
MDA UR QL: NOT DETECTED
MDEA UR QL: NOT DETECTED
MDMA UR QL: NOT DETECTED
ME-PHENIDATE UR QL: NOT DETECTED
MEPERIDINE UR QL: NOT DETECTED
METHADONE UR QL: NOT DETECTED
METHAMPHET UR QL: NOT DETECTED
MIDAZOLAM UR QL SCN: NOT DETECTED
MORPHINE UR QL: NOT DETECTED
NORBUPRENORPHINE UR QL CFM: NOT DETECTED
NORDIAZEPAM UR QL: NOT DETECTED
NORFENTANYL UR QL: NOT DETECTED
NORHYDROCODONE UR QL CFM: NOT DETECTED
NOROXYCODONE UR QL CFM: NOT DETECTED
NOROXYMORPHONE: NOT DETECTED
OXAZEPAM UR QL: NOT DETECTED
OXYCODONE UR QL: NOT DETECTED
OXYMORPHONE UR QL: NOT DETECTED
PATHOLOGY STUDY: NORMAL
PCP UR QL: NOT DETECTED
PHENTERMINE UR QL: NOT DETECTED
PROPOXYPH UR QL: NOT DETECTED
SERVICE CMNT-IMP: NORMAL
TAPENTADOL UR QL SCN: NOT DETECTED
TAPENTADOL-O-SULF: NOT DETECTED
TEMAZEPAM UR QL: NOT DETECTED
TRAMADOL UR QL: NOT DETECTED
ZOLPIDEM UR QL: NOT DETECTED

## 2019-04-16 ENCOUNTER — PATIENT MESSAGE (OUTPATIENT)
Dept: PAIN MEDICINE | Facility: OTHER | Age: 54
End: 2019-04-16

## 2019-05-03 ENCOUNTER — HOSPITAL ENCOUNTER (OUTPATIENT)
Facility: OTHER | Age: 54
Discharge: HOME | End: 2019-05-03
Attending: ANESTHESIOLOGY | Admitting: ANESTHESIOLOGY
Payer: MEDICARE

## 2019-05-03 VITALS
TEMPERATURE: 98 F | OXYGEN SATURATION: 96 % | WEIGHT: 245 LBS | RESPIRATION RATE: 18 BRPM | DIASTOLIC BLOOD PRESSURE: 79 MMHG | HEIGHT: 73 IN | SYSTOLIC BLOOD PRESSURE: 120 MMHG | HEART RATE: 52 BPM | BODY MASS INDEX: 32.47 KG/M2

## 2019-05-03 DIAGNOSIS — M47.816 LUMBAR SPONDYLOSIS: Primary | ICD-10-CM

## 2019-05-03 PROCEDURE — 64635 DESTROY LUMB/SAC FACET JNT: CPT | Mod: LT,,, | Performed by: ANESTHESIOLOGY

## 2019-05-03 PROCEDURE — 99152 PR MOD CONSCIOUS SEDATION, SAME PHYS, 5+ YRS, FIRST 15 MIN: ICD-10-PCS | Mod: ,,, | Performed by: ANESTHESIOLOGY

## 2019-05-03 PROCEDURE — 64635 PR DESTROY LUMB/SAC FACET JNT: ICD-10-PCS | Mod: LT,,, | Performed by: ANESTHESIOLOGY

## 2019-05-03 PROCEDURE — 64636 DESTROY L/S FACET JNT ADDL: CPT | Performed by: ANESTHESIOLOGY

## 2019-05-03 PROCEDURE — 64636 PR DESTROY L/S FACET JNT ADDL: ICD-10-PCS | Mod: LT,,, | Performed by: ANESTHESIOLOGY

## 2019-05-03 PROCEDURE — S0020 INJECTION, BUPIVICAINE HYDRO: HCPCS | Performed by: ANESTHESIOLOGY

## 2019-05-03 PROCEDURE — 64636 DESTROY L/S FACET JNT ADDL: CPT | Mod: LT,,, | Performed by: ANESTHESIOLOGY

## 2019-05-03 PROCEDURE — 64635 DESTROY LUMB/SAC FACET JNT: CPT | Performed by: ANESTHESIOLOGY

## 2019-05-03 PROCEDURE — 63600175 PHARM REV CODE 636 W HCPCS: Performed by: ANESTHESIOLOGY

## 2019-05-03 PROCEDURE — 25000003 PHARM REV CODE 250: Performed by: ANESTHESIOLOGY

## 2019-05-03 PROCEDURE — 99152 MOD SED SAME PHYS/QHP 5/>YRS: CPT | Mod: ,,, | Performed by: ANESTHESIOLOGY

## 2019-05-03 RX ORDER — BUPIVACAINE HYDROCHLORIDE 5 MG/ML
INJECTION, SOLUTION EPIDURAL; INTRACAUDAL
Status: DISCONTINUED | OUTPATIENT
Start: 2019-05-03 | End: 2019-05-03 | Stop reason: HOSPADM

## 2019-05-03 RX ORDER — MIDAZOLAM HYDROCHLORIDE 1 MG/ML
INJECTION INTRAMUSCULAR; INTRAVENOUS
Status: DISCONTINUED | OUTPATIENT
Start: 2019-05-03 | End: 2019-05-03 | Stop reason: HOSPADM

## 2019-05-03 RX ORDER — SODIUM CHLORIDE 9 MG/ML
INJECTION, SOLUTION INTRAVENOUS CONTINUOUS
Status: DISCONTINUED | OUTPATIENT
Start: 2019-05-03 | End: 2019-05-03 | Stop reason: HOSPADM

## 2019-05-03 RX ORDER — FENTANYL CITRATE 50 UG/ML
INJECTION, SOLUTION INTRAMUSCULAR; INTRAVENOUS
Status: DISCONTINUED | OUTPATIENT
Start: 2019-05-03 | End: 2019-05-03 | Stop reason: HOSPADM

## 2019-05-03 RX ORDER — DEXAMETHASONE SODIUM PHOSPHATE 4 MG/ML
INJECTION, SOLUTION INTRA-ARTICULAR; INTRALESIONAL; INTRAMUSCULAR; INTRAVENOUS; SOFT TISSUE
Status: DISCONTINUED | OUTPATIENT
Start: 2019-05-03 | End: 2019-05-03 | Stop reason: HOSPADM

## 2019-05-03 RX ORDER — LIDOCAINE HYDROCHLORIDE 20 MG/ML
INJECTION, SOLUTION INFILTRATION; PERINEURAL
Status: DISCONTINUED | OUTPATIENT
Start: 2019-05-03 | End: 2019-05-03 | Stop reason: HOSPADM

## 2019-05-03 RX ADMIN — SODIUM CHLORIDE: 0.9 INJECTION, SOLUTION INTRAVENOUS at 10:05

## 2019-05-03 NOTE — PLAN OF CARE
Pt AAOx3, pt able to tolerate PO intake at this time. Pt able to stand and ambulate independently. Pt reports 2/10 pain at this time, 4 bandaids CDI. Pt verbalizes understanding of discharge instructions. Pt awaiting transport. Will continue to monitor.

## 2019-05-03 NOTE — DISCHARGE INSTRUCTIONS
Thank you for allowing us to care for you today. You may receive a survey about the care we provided. Your feedback is valuable and helps us provide excellent care throughout the community.     Home Care Instructions for Pain Management:    1. DIET:   You may resume your normal diet today.   2. BATHING:   You may shower with luke warm water. No tub baths or anything that will soak injection sites under water for the next 24 hours.  3. DRESSING:   You may remove your bandage today.   4. ACTIVITY LEVEL:   You may resume your normal activities 24 hrs after your procedure. Nothing strenuous today.  5. MEDICATIONS:   You may resume your normal medications today. To restart blood thinners, ask your doctor.  6. DRIVING    If you have received any sedatives by mouth today, you may not drive for 12 hours.    If you have received any sedation through your IV, you may not drive for 24 hrs.   7. SPECIAL INSTRUCTIONS:   No heat to the injection site for 24 hrs including, hot bath or shower, heating pad, moist heat, or hot tubs.    Use ice pack to injection site for any pain or discomfort.  Apply ice packs for 20 minute intervals as needed.    IF you have diabetes, be sure to monitor your blood sugar more closely. IF your injection contained steroids your blood sugar levels may become higher than normal.    If you are still having pain upon discharge:  Your pain may improve over the next 48 hours. The anesthetic (numbing medication) works immediately to 48 hours. IF your injection contained a steroid (anti-inflammatory medication), it takes approximately 3 days to start feeling relief and 7-10 days to see your greatest results from the medication. It is possible you may need subsequent injections. This would be discussed at your follow up appointment with pain management or your referring doctor.      PLEASE CALL YOUR DOCTOR IF:  1. Redness or swelling around the injection site.  2. Fever of 101 degrees or more  3. Drainage  (pus) from the injection site.  4. For any continuous bleeding (some dried blood over the incision is normal.)    FOR EMERGENCIES:   If any unusual problems or difficulties occur during clinic hours, call [phone] or [phone]. Medial Branch Neurotomy  Back or neck pain may be due to problems with certain nerves near your spine. If so, a medial branch neurotomy can help relieve your pain.  In some cases, your doctor may give you a nerve block to predict your response to neurotomy. The treatment uses heat, cold, radiofrequency, or chemicals to destroy the nerves near a problem joint. This keeps some pain messages from traveling to your brain, and helps relieve your symptoms.    Please note that it may take up to 4-6 weeks for the nerves to die after the radiofrequency ablation.    Medial branch nerves  Each vertebra in your spine has facets (flat surfaces). They touch where the vertebrae fit together. This forms a facet joint. Each facet joint has at least two medial branch nerves. They are part of the nerve pathway to and from each facet joint. A facet joint in your back or neck can become inflamed (swollen and irritated). Pain messages may then travel along the nerve pathway from the facet joint to your brain.    Blocking pain messages  Medial branch nerves in each facet joint send and carry messages about back or neck pain. Destroying a few of these nerves can keep certain pain messages from reaching your brain. This can help bring you relief. The relief typically lasts for months to years.  Risks and complications  Risks and complications are rare, but can include:  · Infection  · Increased pain, numbness, or weakness  · Nerve damage  · Bleeding  · Failure to relieve pain    © 8650-5151 [employer]. [address] [place] [address], [place] [address]. All rights reserved. This information is not intended as a substitute for professional medical care. Always follow your healthcare professional's  instructions.      Medial Branch Neurotomy: Your Experience  Back or neck pain may be due to problems with certain nerves near your spine. If so, a medial branch neurotomy can help relieve your pain. The treatment uses heat, cold, radiofrequency, or chemicals to destroy the nerves near a problem joint. This keeps some pain messages from traveling to your brain, and helps relieve your symptoms.  The treatment is done in a hospital or outpatient department. Your healthcare provider will ask you to sign a consent form. He or she will examine you and may give you an IV (intravenous) line for fluids and medicines.      Relax at home for the rest of the day after your treatment, even if you feel good.    Getting ready for your treatment  · Ask your healthcare provider whether you should stop taking any medicines before treatment.  · Tell your provider if you are pregnant or allergic to any medicines.  · You may be asked to stop eating or drinking for several hours before you check in for your treatment.  During the procedure  You will lie on an exam table, most likely on your stomach depending on where the problem joint is.  · Your healthcare provider will clean the skin over the treatment site and then numb it with medicine.  · Your provider uses X-ray imaging (fluoroscopy) to help see your spine and guide the treatment. Your provider may inject a contrast dye into the affected region to help get a better image. If you are allergic to iodine or had a reaction to a dye, tell your healthcare provider.  · Your healthcare provider uses heat, cold, or chemicals to destroy part of the nerve near the inflamed facet joint. Nearby nerves may also be treated.  After the procedure  Most often, you can go home shortly after the procedure, generally in about an hour. Have an adult friend or relative drive you. The treated spot may be swollen and may feel more sore than usual. This is normal and may last for a day or so. It will be a  few days before you feel relief from your symptoms. Your provider may prescribe pain medicines for you during that time. Ask him or her when its OK for you to go back to work.  Call your healthcare provider if you have a fever over 100.4°F (38°C), chills, or redness or drainage at the treatment site.    © 1243-1057 [employer]. [address] [place] [id number]. All rights reserved. This information is not intended as a substitute for professional medical care. Always follow your healthcare professional's instructions.

## 2019-05-03 NOTE — DISCHARGE SUMMARY
Discharge Note  Short Stay      SUMMARY     Admit Date: 5/3/2019    Attending Physician: Kasandra Montoya    Procedure: Left L2-5 Lumbar Medial Branch Nerve Thermal Radiofrequency Ablation    Discharge Physician: Kasandra Montoya    Discharge Date: 5/3/2019 11:16 AM    Final Diagnosis: Lumbar spondylosis [M47.816]    Disposition: Home or self care    Patient Instructions:   Current Discharge Medication List      CONTINUE these medications which have NOT CHANGED    Details   gabapentin (NEURONTIN) 100 MG capsule 2 (two) times daily.       levothyroxine (SYNTHROID) 50 MCG tablet Take 50 mcg by mouth once daily.      meclizine (ANTIVERT) 25 mg tablet Take 25 mg by mouth once daily.       omeprazole (PRILOSEC) 20 MG capsule Take 1 capsule (20 mg total) by mouth once daily.  Qty: 90 capsule, Refills: 3    Associated Diagnoses: Gastritis, presence of bleeding unspecified, unspecified chronicity, unspecified gastritis type      valsartan (DIOVAN) 80 MG tablet Take 40 mg by mouth once daily.   Refills: 1      acetaminophen (TYLENOL) 500 MG tablet Take 500 mg by mouth every 6 (six) hours as needed for Pain.      aspirin (ECOTRIN) 81 MG EC tablet Take 81 mg by mouth once daily.      cetirizine (ZYRTEC) 10 MG tablet Take 10 mg by mouth nightly.  Refills: 5      fluticasone (FLONASE) 50 mcg/actuation nasal spray SPRAY 1-2 SPRAYS into BOTH nostrils DAILY  Refills: 5      FOLIC ACID/MULTIVIT-MIN/LUTEIN (CENTRUM SILVER ORAL) Take by mouth once daily.      HYDROcodone-acetaminophen (NORCO) 7.5-325 mg per tablet Take 1 tablet by mouth every 12 (twelve) hours as needed for Pain.  Qty: 60 tablet, Refills: 0    Associated Diagnoses: Chronic pain disorder; Lumbar spondylosis; DDD (degenerative disc disease), lumbar; Facet arthritis of lumbar region; Myalgia; Spondylosis of cervical region without myelopathy or radiculopathy; S/P cervical spinal fusion      methocarbamol (ROBAXIN) 500 MG Tab Take 1 tablet (500 mg total) by mouth 2 (two)  times daily.  Qty: 60 tablet, Refills: 4    Associated Diagnoses: Myalgia      naloxegol (MOVANTIK) tablet Take 25 mg by mouth as needed.      pravastatin (PRAVACHOL) 20 MG tablet       traZODone (DESYREL) 50 MG tablet Take 50 mg by mouth nightly.  Refills: 2             Resume home diet and activity

## 2019-05-03 NOTE — OP NOTE
"Date of Procedure: 05/03/2019    Procedure: Left L2-5 Lumbar Medial Branch Nerve Thermal Radiofrequency Ablation    Pre-op diagnosis: Lumbar Spondylosis [M47.816]    Post-op diagnosis: Lumbar Spondylosis [M47.816]     Physician: Dr. Kasandra Montoya     Assistant: Dr. Jarquin    Anesthestia: local/IV sedation:  Versed 1 mg and fentanyl 50 mcg IV.  Conscious sedation provided by MD and monitored by RN.  Total sedation time was less than 30 minutes. (See nurse documentation and case log for sedation time)    EBL: None    Specimens: None    All medications, allergies, and relevant histories were reviewed. No recent antibiotics or infections.  A time-out was taken to verify the correct patient, procedure, laterality, and appropriate medications/allergies.    Procedure: Lumbar RFA    Lumbar Medial Branch Block with radiofrequency ablation, levels L2-5     The procedure risks, benefits, and possible complications were discussed with the patient including nerve damage, infection, spinal headache, and paresis.   Patient was placed in the prone position with the midriff elevated. Skin was prepped with CHG and draped. Oblique view of the spine was obtained with fluoroscopy. Entry sites were marked over the skin and Xylocaine 1% was used to anesthetize the skin and subcutaneous tissues.     A 3.5 " [place] Venom needle was introduced at an angle to parallel the medial branches in the groove between superior articular process and transverse process, and L5 primary dorsal ramus at the junction of the S1 superior articular process and sacral ala.    Multifidus stim elicited at each level.  No distal motor stimulation was elicited at any level at 2V with a frequency of 2Hz.  All impedances were within the acceptable range.    1cc of 2% lidocaine was injected at each level.  Thermal RF was then conducted at each level at 80 degrees, for 2:30 minutes   1 cc of a mixture of 0.5% bupivacaine with dexamethasone 5 mg was injected at each " level.    Patient tolerated the procedure well and there were no complications.      Future Management:   If helpful, can repeat as needed.  Follow up with me in 4-6 weeks.      I certify that I provided the above services.  I was present for the entire procedure, which was performed by the fellow physician under my supervision.  There were no parts of the procedure that were performed not by myself or without my direct supervision.

## 2019-05-03 NOTE — INTERVAL H&P NOTE
The patient has been examined and the H&P has been reviewed:    I concur with the findings and no changes have occurred since H&P was written.    No change in the location or quality of the pain since the most recent clinic visit.  No new symptoms.  He wishes to proceed with the procedure today.    PE, unchanged from previous:  CV:  RRR  Resp: unlabored, no wheezing.    NPO since MN.    Anesthesia/Surgery risks, benefits and alternative options discussed and understood by patient/family.      There are no hospital problems to display for this patient.    I have seen the patient with the resident physician.  We have come up with the above plan.  The patient is in agreement with our plan.

## 2019-05-08 ENCOUNTER — PATIENT MESSAGE (OUTPATIENT)
Dept: GASTROENTEROLOGY | Facility: CLINIC | Age: 54
End: 2019-05-08

## 2019-05-08 DIAGNOSIS — K29.70 GASTRITIS, PRESENCE OF BLEEDING UNSPECIFIED, UNSPECIFIED CHRONICITY, UNSPECIFIED GASTRITIS TYPE: ICD-10-CM

## 2019-05-08 RX ORDER — OMEPRAZOLE 20 MG/1
CAPSULE, DELAYED RELEASE ORAL
Qty: 90 CAPSULE | Refills: 3 | OUTPATIENT
Start: 2019-05-08

## 2019-05-09 ENCOUNTER — PATIENT MESSAGE (OUTPATIENT)
Dept: GASTROENTEROLOGY | Facility: CLINIC | Age: 54
End: 2019-05-09

## 2019-05-09 RX ORDER — OMEPRAZOLE 20 MG/1
20 CAPSULE, DELAYED RELEASE ORAL DAILY
Qty: 90 CAPSULE | Refills: 3 | OUTPATIENT
Start: 2019-05-09

## 2019-05-15 ENCOUNTER — OFFICE VISIT (OUTPATIENT)
Dept: GASTROENTEROLOGY | Facility: CLINIC | Age: 54
End: 2019-05-15
Payer: MEDICARE

## 2019-05-15 ENCOUNTER — LAB VISIT (OUTPATIENT)
Dept: LAB | Facility: HOSPITAL | Age: 54
End: 2019-05-15
Payer: MEDICARE

## 2019-05-15 VITALS
HEIGHT: 73 IN | DIASTOLIC BLOOD PRESSURE: 86 MMHG | SYSTOLIC BLOOD PRESSURE: 127 MMHG | BODY MASS INDEX: 31.56 KG/M2 | WEIGHT: 238.13 LBS | HEART RATE: 62 BPM

## 2019-05-15 DIAGNOSIS — K21.9 GASTROESOPHAGEAL REFLUX DISEASE, ESOPHAGITIS PRESENCE NOT SPECIFIED: Primary | ICD-10-CM

## 2019-05-15 DIAGNOSIS — Z51.81 ENCOUNTER FOR MONITORING LONG-TERM PROTON PUMP INHIBITOR THERAPY: ICD-10-CM

## 2019-05-15 DIAGNOSIS — Z79.899 ENCOUNTER FOR MONITORING LONG-TERM PROTON PUMP INHIBITOR THERAPY: ICD-10-CM

## 2019-05-15 LAB
25(OH)D3+25(OH)D2 SERPL-MCNC: 28 NG/ML (ref 30–96)
MAGNESIUM SERPL-MCNC: 2.3 MG/DL (ref 1.6–2.6)
VIT B12 SERPL-MCNC: 415 PG/ML (ref 210–950)

## 2019-05-15 PROCEDURE — 99999 PR PBB SHADOW E&M-EST. PATIENT-LVL IV: ICD-10-PCS | Mod: PBBFAC,,, | Performed by: NURSE PRACTITIONER

## 2019-05-15 PROCEDURE — 82607 VITAMIN B-12: CPT

## 2019-05-15 PROCEDURE — 99213 OFFICE O/P EST LOW 20 MIN: CPT | Mod: S$PBB,,, | Performed by: NURSE PRACTITIONER

## 2019-05-15 PROCEDURE — 99213 PR OFFICE/OUTPT VISIT, EST, LEVL III, 20-29 MIN: ICD-10-PCS | Mod: S$PBB,,, | Performed by: NURSE PRACTITIONER

## 2019-05-15 PROCEDURE — 83735 ASSAY OF MAGNESIUM: CPT

## 2019-05-15 PROCEDURE — 82306 VITAMIN D 25 HYDROXY: CPT

## 2019-05-15 PROCEDURE — 36415 COLL VENOUS BLD VENIPUNCTURE: CPT

## 2019-05-15 PROCEDURE — 99214 OFFICE O/P EST MOD 30 MIN: CPT | Mod: PBBFAC | Performed by: NURSE PRACTITIONER

## 2019-05-15 PROCEDURE — 99999 PR PBB SHADOW E&M-EST. PATIENT-LVL IV: CPT | Mod: PBBFAC,,, | Performed by: NURSE PRACTITIONER

## 2019-05-15 RX ORDER — OMEPRAZOLE 20 MG/1
20 CAPSULE, DELAYED RELEASE ORAL DAILY
Qty: 90 CAPSULE | Refills: 3 | Status: SHIPPED | OUTPATIENT
Start: 2019-05-15

## 2019-05-15 NOTE — PATIENT INSTRUCTIONS
*There is a slightly increased risk of pneumonia infection, C diff infections and decreased magnesium, vitamin D and Vitamin B- 12 levels with long term PPI (ie nexium, prilosec, prevacid, dexilant, protonix) use. Yearly Magnesium, B 12 and Vitamin D levels are recommended through your primary care provider (PCP). Routine bone mineral densities by your  PCP recommended. Contact your PCP for sings and symptoms of lung infection (fever, chills, CP, SOB, and/or productive cough) or Gastro infection (profuse, watery diarrhea with or without rectal bleeding, severe abd pain,and/or fever).    If you abruptly stop taking a PPI (if you have been using it for 12 weeks or more), you will likely experience rebound acid reflux as your stomach acid pumps reactivate. Therefore, it is important to wean off of your PPI slowly and let your body readjust. Wean off in this manner:    1. Take your PPI every other day for 2 weeks. Alternately try half dose daily for a week, then every other day for a week.  2. Then take your PPI every 3rd day for 1 week.  3. Then take your PPI just once a week.  4. Try to stop altogether, and use as/if needed    If symptoms return, go back to the lowest dose necessary to control your symptoms (Good control means symptoms of reflux occurring less than twice per week).     If you will be on the medication daily you should follow up in GI clinic once a year.    *You may try to control your symptoms of reflux with a GERD healthy lifestyle and with alternative, more holistic options as detailed below:    GERD Healthy Lifestyle:    Remain upright for at least 3 hours after eating.    Elevate the head of the bead about 6 inches.  Some patients place cinder blocks under the head of the bed for elevation.    Avoid foods that you have noticed make your symptoms worse.    Set a weight loss goal of 10% of your body weight. (For example, if you weigh 150 lbs, your goal should be to loose 15 lbs).    You may take  over the counter Zantac/ranitadine as directed, as needed for breakthrough symptoms.     Change what you eat:  Eat smaller meals  Eat slowly and chew thoroughly until food is almost liquid  Cut down on junk carbohydrates such as sugar and white flour  Use herbs in your cooking  Eat more raw foods (more than 10 ingredients is not a raw food)  Avoid trans fats and partially hydrogenated oils  Eat more fish and switch to grass fed beef  Switch your cooking oil to macadamia nut or olive oil  Watch extremes of salt intake (too high or too low is bad)    Change these habits:  Stop smoking  Eat dinner earlier (3-4 hours before lying down to sleep)  Exercise (but wait 2 hours after eating)  Drink more water (between meals)    Worst Foods for Acid Reflux  Chocolate (milk chocolate worse than dark chocolate)  Soda (all carbonated beverages)  Alcohol (beer, liquor, wine)  Fried foods  Brown, sausage, ribs  Cream sauce  Fatty meats (beef)  Butter, margarine, lard, shortening  Coffee, tea  Mint   High fat nuts  Hot sauces and pepper  Citrus fruit/juices  Maybe bad foods (Everyone is unique)  Tomatoes  Garlic  Onion  Nuts (macadamia nuts)  Apples (especially green)  Cucumber  Green peppers  Spicy food  Some herbal teas      Alternative measures to control GERD    Take these supplements:  Multi vitamin  Probiotic  Fish oil    All natural immediate relief:  Chew 2-3 soft probiotic capsules - Dr. Meza's Probiotics 12 Plus  Chew chewable DGL licorice tablet  Chew papaya tablet with high protein meal - [employer]  Drink 2 ounces of aloe vera juice  Swedish bitters  Prelief- reduce the acid in food to keep it form burning sensitive tissue  I[employer]ogast  [place]  Drink Chamomile Tea  Teaspoon of baking soda in water  Spoonful of vinegar in water      All natural ulcer healers:  Zinc carnosine - 75.5 mg with food twice a day x 8 weeks   Earlene Schwab - $8 for 60 pills  DGL (deglycyrrhizinated licorice) - 2 tablets before  meals. Heals stomach lining   Natural Factors brand, Enzymatic therapy brand.  Aloe Vera juice  - 2 to 8 ounces a day   [place] or [place] of [place]

## 2019-05-15 NOTE — PROGRESS NOTES
[facility] Note    Reason for Visit:  The primary encounter diagnosis was Gastroesophageal reflux disease, esophagitis presence not specified. A diagnosis of Encounter for monitoring long-term proton pump inhibitor therapy was also pertinent to this visit.    PCP:   Mare Soliz       Referring MD:  No referring provider defined for this encounter.    HPI:  This is a 54 y.o. male here for evaluation of long term PPI use.     Takes meloxicam 15 mg once daily for arthritis in back x several years.  Taking omeprazole 20 mg once daily for gastro protection x 1 year.  Per pt, EGD last year with gastritis, esophagitis per pt report.     Abdominal pain - no  Reflux - occ indigestion, not very often, traces back to eating known trigger.  Dysphagia/odynophagia - no   Bowel habits - normal- daily or QOD bristol type 3-4. taking movantik PRN d/t chronic narcotic pain med use.  GI bleeding - denies hematochezia, hematemesis, melena, BRBPR, black/tarry stools, and coffee ground emesis  NSAID usage - Meloxiam as above        ROS:  Constitutional: No fevers, no chills, No unintentional weight loss, no fatigue,   ENT: No allergies  CV: No chest pain, no palpitations, no perif. edema, no sob on exertion  Pulm: No cough, No shortness of breath, no wheezes, no sputum  Ophtho: No vision changes  GI: see HPI; also no nausea, no vomiting, no change in appetite  Derm: No rash  Heme: No lymphadenopathy, No bruising  MSK: +arthritis, no muscle pain, no muscle weakness  : No dysuria, No hematuria  Endo: No hot or cold intolerance  Neuro: No syncope, No seizure,       Medical History:  has a past medical history of Arthritis, Cataract, Cervical back pain with evidence of disc disease, Hiatal hernia, Hypertension, and Thyroid disease.    Surgical History:  has a past surgical history that includes cataracts (Bilateral); Spine surgery; Rotator cuff repair (Right); cromioclavicular joint cyst excision (Right);  Radiofrequency ablation (10/2016); Eye surgery; Back surgery; NGUYEN; Cervical fusion; Colonoscopy (N/A, 4/27/2018); Radiofrequency ablation (Left, 5/3/2019); and Retinal detachment surgery.    Family History: family history includes Cancer in his father; Heart disease in his mother; Leukemia in his brother..     Social History:  reports that he has never smoked. He has never used smokeless tobacco. He reports that he does not drink alcohol or use drugs.    Review of patient's allergies indicates:   Allergen Reactions    Pcn [penicillins] Hives and Rash    Lipitor [atorvastatin] Other (See Comments)     Muscle cramps, weakness       Current Outpatient Medications   Medication Sig    aspirin (ECOTRIN) 81 MG EC tablet Take 81 mg by mouth once daily.    cetirizine (ZYRTEC) 10 MG tablet Take 10 mg by mouth nightly.    fluticasone (FLONASE) 50 mcg/actuation nasal spray SPRAY 1-2 SPRAYS into BOTH nostrils DAILY    gabapentin (NEURONTIN) 100 MG capsule 2 (two) times daily.     levothyroxine (SYNTHROID) 50 MCG tablet Take 50 mcg by mouth once daily.    meclizine (ANTIVERT) 25 mg tablet Take 25 mg by mouth once daily.     methocarbamol (ROBAXIN) 500 MG Tab Take 1 tablet (500 mg total) by mouth 2 (two) times daily.    pravastatin (PRAVACHOL) 20 MG tablet     traZODone (DESYREL) 50 MG tablet Take 50 mg by mouth nightly.    valsartan (DIOVAN) 80 MG tablet Take 40 mg by mouth once daily.     acetaminophen (TYLENOL) 500 MG tablet Take 500 mg by mouth every 6 (six) hours as needed for Pain.    FOLIC ACID/MULTIVIT-MIN/LUTEIN (CENTRUM SILVER ORAL) Take by mouth once daily.    HYDROcodone-acetaminophen (NORCO) 7.5-325 mg per tablet Take 1 tablet by mouth every 12 (twelve) hours as needed for Pain.    naloxegol (MOVANTIK) tablet Take 25 mg by mouth as needed.    omeprazole (PRILOSEC) 20 MG capsule Take 1 capsule (20 mg total) by mouth once daily.     No current facility-administered medications for this visit.   "      Objective Findings:    Vital Signs:  /86   Pulse 62   Ht 6' 1" (1.854 m)   Wt 108 kg (238 lb 1.6 oz)   BMI 31.41 kg/m²   Body mass index is 31.41 kg/m².    Physical Exam:  General Appearance: Well appearing in no acute distress  Head:   Normocephalic, without obvious abnormality  Eyes:    No scleral icterus, EOMI  ENT: Neck supple, Lips, mucosa, and tongue normal; teeth and gums normal  Lungs: CTA bilaterally in anterior and posterior fields, no wheezes, no crackles.  Heart:  Regular rate and rhythm, S1, S2 normal, no murmurs heard  Abdomen: Soft, non tender, non distended with positive bowel sounds in all four quadrants. No hepatosplenomegaly, ascites, or mass  Extremities: 2+ radial pulses, no clubbing, cyanosis or edema  Skin: No rash to exposed areas  Neurologic: A&Ox4      Labs:  Lab Results   Component Value Date    WBC 5.57 05/01/2018    HGB 14.1 05/01/2018    HCT 41.9 05/01/2018     05/01/2018    ALT 41 05/01/2018    AST 27 05/01/2018     05/01/2018    K 3.6 05/01/2018     05/01/2018    CREATININE 0.9 05/01/2018    BUN 12 05/01/2018    CO2 25 05/01/2018         Endoscopy:    Per pt, EGD 2017-irritation in esophagus and stomach  Colonoscopy 4/27/2018: [facility]. Normal. Rpt 10 yrs    Assessment:  1. Gastroesophageal reflux disease, esophagitis presence not specified    2. Encounter for monitoring long-term proton pump inhibitor therapy           Recommendations:  1.GERD- on omeprazole 20 mg daily with good control. Takes daily PPI d/t daily mobic use for gastro protection. Discussed weaning off as per handout provided, if d/ mobic.   2. Encounter for monitoring long term PPI therapy-Pt reminded of  increased risk of CAP, C diff infections and decreased mag, vit D and Vit B- 12 levels with long term PPI use. Yearly Mag, B 12 and Vit D levels recomended. Routine bone mineral densities per PCP recommended. Pt instructed to contact PCP for s/s of lung infection (fever, chills, CP, " SOB, and/or productive cough) or GI infection (profuse, watery diarrhea with or without rectal bleeding, severe abd pain,and/or fever). Check vitamin D, Mag and B 12 levels today.      F/u yearly via [employer] (w local labs)  for long term PPI use.       Order summary:  Orders Placed This Encounter    Vitamin D    Magnesium    Vitamin B12    omeprazole (PRILOSEC) 20 MG capsule         Thank you so much for allowing me to participate in the care of Sal Overton, APRN, FNP-C

## 2019-05-29 ENCOUNTER — OFFICE VISIT (OUTPATIENT)
Dept: PAIN MEDICINE | Facility: CLINIC | Age: 54
End: 2019-05-29
Attending: ANESTHESIOLOGY
Payer: MEDICARE

## 2019-05-29 VITALS
HEART RATE: 64 BPM | SYSTOLIC BLOOD PRESSURE: 125 MMHG | DIASTOLIC BLOOD PRESSURE: 87 MMHG | HEIGHT: 73 IN | TEMPERATURE: 98 F | WEIGHT: 238 LBS | RESPIRATION RATE: 18 BRPM | BODY MASS INDEX: 31.54 KG/M2

## 2019-05-29 DIAGNOSIS — M47.816 LUMBAR SPONDYLOSIS: ICD-10-CM

## 2019-05-29 DIAGNOSIS — M47.812 SPONDYLOSIS OF CERVICAL REGION WITHOUT MYELOPATHY OR RADICULOPATHY: ICD-10-CM

## 2019-05-29 DIAGNOSIS — Z98.1 S/P CERVICAL SPINAL FUSION: ICD-10-CM

## 2019-05-29 DIAGNOSIS — M51.36 DDD (DEGENERATIVE DISC DISEASE), LUMBAR: ICD-10-CM

## 2019-05-29 DIAGNOSIS — G89.4 CHRONIC PAIN DISORDER: ICD-10-CM

## 2019-05-29 DIAGNOSIS — M47.816 FACET ARTHRITIS OF LUMBAR REGION: ICD-10-CM

## 2019-05-29 DIAGNOSIS — M79.10 MYALGIA: ICD-10-CM

## 2019-05-29 PROCEDURE — 99214 OFFICE O/P EST MOD 30 MIN: CPT | Mod: 25,S$PBB,, | Performed by: ANESTHESIOLOGY

## 2019-05-29 PROCEDURE — 99214 PR OFFICE/OUTPT VISIT, EST, LEVL IV, 30-39 MIN: ICD-10-PCS | Mod: 25,S$PBB,, | Performed by: ANESTHESIOLOGY

## 2019-05-29 PROCEDURE — 96372 THER/PROPH/DIAG INJ SC/IM: CPT | Mod: PBBFAC

## 2019-05-29 PROCEDURE — 20553 PR INJECT TRIGGER POINTS, > 3: ICD-10-PCS | Mod: S$PBB,,, | Performed by: ANESTHESIOLOGY

## 2019-05-29 PROCEDURE — 99999 PR PBB SHADOW E&M-EST. PATIENT-LVL IV: ICD-10-PCS | Mod: PBBFAC,,, | Performed by: ANESTHESIOLOGY

## 2019-05-29 PROCEDURE — 99214 OFFICE O/P EST MOD 30 MIN: CPT | Mod: PBBFAC,25 | Performed by: ANESTHESIOLOGY

## 2019-05-29 PROCEDURE — 20553 NJX 1/MLT TRIGGER POINTS 3/>: CPT | Mod: S$PBB,,, | Performed by: ANESTHESIOLOGY

## 2019-05-29 PROCEDURE — 20553 NJX 1/MLT TRIGGER POINTS 3/>: CPT | Mod: PBBFAC | Performed by: ANESTHESIOLOGY

## 2019-05-29 PROCEDURE — 99999 PR PBB SHADOW E&M-EST. PATIENT-LVL IV: CPT | Mod: PBBFAC,,, | Performed by: ANESTHESIOLOGY

## 2019-05-29 RX ORDER — BETAMETHASONE SODIUM PHOSPHATE AND BETAMETHASONE ACETATE 3; 3 MG/ML; MG/ML
6 INJECTION, SUSPENSION INTRA-ARTICULAR; INTRALESIONAL; INTRAMUSCULAR; SOFT TISSUE
Status: COMPLETED | OUTPATIENT
Start: 2019-05-29 | End: 2019-05-29

## 2019-05-29 RX ORDER — BUPIVACAINE HYDROCHLORIDE 5 MG/ML
9 INJECTION, SOLUTION EPIDURAL; INTRACAUDAL
Status: COMPLETED | OUTPATIENT
Start: 2019-05-29 | End: 2019-05-29

## 2019-05-29 RX ORDER — HYDROCODONE BITARTRATE AND ACETAMINOPHEN 7.5; 325 MG/1; MG/1
1 TABLET ORAL EVERY 12 HOURS PRN
Qty: 60 TABLET | Refills: 0 | Status: SHIPPED | OUTPATIENT
Start: 2019-05-29 | End: 2019-09-18 | Stop reason: SDUPTHER

## 2019-05-29 RX ADMIN — BETAMETHASONE ACETATE AND BETAMETHASONE SODIUM PHOSPHATE 6 MG: 3; 3 INJECTION, SUSPENSION INTRA-ARTICULAR; INTRALESIONAL; INTRAMUSCULAR; SOFT TISSUE at 09:05

## 2019-05-29 RX ADMIN — BUPIVACAINE HYDROCHLORIDE 45 MG: 5 INJECTION, SOLUTION EPIDURAL; INTRACAUDAL; PERINEURAL at 09:05

## 2019-05-29 NOTE — PROGRESS NOTES
Subjective:     Patient ID: Sal Navarro is a 54 y.o. male.    Chief Complaint: Pain    Consulted by: Dr. Fowler     Disclaimer: This note was generated using voice recognition software.  There may be a typographical errors that were missed during proofreading.      HPI:    Sal Navarro is a 54 y.o. male who presents today s/p C4-7 ACDF with C5/6 PSIF in 2/2016 with residual chronic midline neck pain that radiates into his shoulder blades bilaterally, R>>L.  This is associated with bilateral hand numbness and tingling.  The hand symptoms did improve following the surgery.  The shooting pains in his arms resolved completely.   This pain is described in detail below.  His post-operative course was complicated by dysphagia that is being treated with speech therapy.     Interval History (9/23/2016):  He returns today for follow up.  He reports that the MELANIE was not helpful for the pain.  He hasn't noticed a difference with the amitriptyline.  He does notice a difference in his low back pain when he skips the meloxicam     Interval History (11/17/2016):  He returns today for follow up.  He reports that cervical RFAs were not very helpful for the pain. He is no longer taking Percocet or Tamazepam. He continues to take Mobic 15mg daily for his low back pain. He is taking elavil 25mg nightly without side effects. He is open to considering SCS. He reports poor sleep at night.     Interval History (1/3/2017):  The patient returns today for follow up of neck and shoulder pain.  His pain is located to his neck and surrounding areas.  He previously had limited benefit with RFAs, although he did have short term benefit with MBB.  He also had limited benefit with NGUYEN.  Dr. Montoya discussed cervical SCS trial with the patient, although he reports that he does not wish to pursue this option at this time.  He is scheduled to f/u with Dr. Fowler on 1/24/16.  He continues to participate in PT.  He did previously have some  benefit with a TENS unit at PT.  He is reporting some relief with Tramadol.  He also takes Zanaflex which helps but is very sedating.       Interval History (3/3/2017):  The patient returns for follow up of neck and shoulder pain. He continues to take Tramadol BID with benefit. The medication decreases his pain slightly. He also reports benefit with Robaxin. He continues to take Mobic with benefit. He recently stopped physical therapy and reports increased pain into his shoulders, despite a home exercise plan.      Interval History (5/1/2017):  He returns today for follow up.  He reports that these RFAs were more helpful than before, L more so than right, but now the pain is again returning.  Today, he also complains of left shoulder pain that has been gradually worsening over the past few months. He associated this with modified activity due to his neck.  Pain is worse with extension of arm and lifting.  Tramadol, Robaxin, Meloxicam, and amitriptyline have been helpful for the pain.  He continues to have GI upset, but has just started prilosec.  When he tried stopping the meloxicam, his pain increased dramatically.  The tramadol takes the edge off.  Robaxin helps, but he is out.     Of note, he recently had an endoscopy that showed stage 3 gastritis, for which he was started on Prilosec     Interval History (6/21/2017):  He returns today for follow up.  He reports that the shoulder injection provided one week of relief.  The Pennsaid has been helpful for the pain.  The RFA has helped with shooting pain into his back.  He is doing his HEP.  He has not been back to PT because of insurance issues.  His shoulder continues to present significant hindrances to his daily activities.     Interval History (8/21/2017):  He returns today for follow up.  He reports that the tramadol is not as helpful as it used to be.  In addition, he is experiencing constipation and urinary retention that only resolves with skipping a dose of  "tramadol. He is going to PT for his neck/shoulder.     Interval History (10/18/2017):  He returns today for follow up.  He reports that Norco has been more helpful for the pain than the tramadol with fewer side effects.  He is averaging about 2-4 per day.  His back is doing much better, though his neck is still greatly limiting him in his daily tasks.  He reports a new pain that began with a "pop" followed by a sharp pain radiating down his back followed by a soreness.  This happened a few weeks ago on the left side of his low back above where we did the RFA.  This is resolving, but it is still sore.     Interval History (11/29/2017):  He returns today for follow up. He is scheduled for right shoulder surgery on 12/5/17 and is here today for lemuel-operative planning. Biggest pain today is in his left shoulder. After his long car ride here, he states his neck has started to bother him as well. Both pains today are roughly 5/10. Taking Norco 7.5/325 mg 1-2 tablets daily which he states takes the edge off of his pain. He has decreased his dosing from 3-4 per day due to side effects of constipation. States he is not taking the movantik on a regular basis-does not want to keep adding medications. Last took it over a week ago. Still taking mobic, robaxin with mild relief. Takes tylenol occasionally with mild relief. Also used hemp oil with mild relief. Still doing a HEP on a regular basis. States his lower back is gradually getting worse. Pain primarily on the left side. Had trigger point injections at last visit which provided 30-40% reduction in his pain for for 1-2 weeks      Interval History (2/21/2018):  He returns today for follow up.  He reports that his shoulder is improving since his surgery.  His neck is doing about the same.  His low back pain has returned, and it is slightly higher than it was before.  He was recently started on a low dose of gabapentin     Interval History (3/29/2018):  He returns today for " follow up.  He reports that his neck is feeling slightly better since he is no longer wearing his sling for her shoulder.  His physical therapy is still currently on hold due to his retinal condition.  His low back is feeling better.     Interval History (6/28/2018):  He returns today for follow up.  He reports that his low back has been hurting worse.  His low back was doing well after the NGUYEN, but the pain started to return over the past few weeks.  This is associated with numbness in his left leg in the posterior thigh.  He is back to taking 2 Norco daily.  His neck and shoulders are doing ok, but his low back is hurting.  He continues to have issues with his eyes.  He has had to have another laser surgery on his right eye again 3 weeks ago.     Interval History (9/13/2018):  He returns today for follow up.  He reports that he is still having trouble with his right eye.  Overall, his medication regimen helps somewhat.  The Norco 7.5 mg does not help much the way he is taking it, but any more causes constipation. He reports increasing neck pain.  This is associated with some radiation of tingling into his right arm (in an ulnar nerve distribution).      Interval History (10/17/2018):  He returns today for follow up.  He reports that the C7/T1 ILESI has been helpful for the pain in his neck.  He continues to report left-sided lower neck pain that radiates into shoulder blade.  He also continues to report low back pain.  Overall, he feels that his functioning is going down.  Today, he presents walking with a cane.     Interval History (1/3/2019):  He returns today for follow up.  He reports that the lumbar RFA provided great relief until he lifted a lot of firewood 3 days ago.  The pain has greatly let up since yesterday, but he still feels intense right sided low back pain.  PRN Norco in addition to his daily regimen (listed below) has been helpful for the pain.  He also reports that his neck continues to hurt,  though it is also better since the RFA.  Both of these are only hurting on the right side.     Interval History (4/3/2019):  The patient returns to clinic today for follow up. He reports increased left sided low back pain over the last month. He describes this pain as constant and aching in nature. He denies any radiating leg pain. He reports intermittent neck pain that is deep and aching in nature. He denies any radiating arm pain today. He continues to report benefit with current medication regimen. He continues to take Gabapentin, Robaxin, and Mobic with benefit. He also takes Norco sparingly with benefit. He denies any adverse effects. He denies any other health changes.      Interval History (5/29/2019):  He returns today for follow up.  He reports that his low back is doing better after the radiofrequency.  He would like to repeat the trigger points today.  He asks about repeating the radiofrequency for the left side for his neck.  His eyes doing better.  He still has occasional flutter, but overall is much improved.  He reports that he is only taking Norco daily most days, still needing to sometimes.  This helps him be more functional.  Overall, he continues to feel that his functionality is decreased.  He would like to be able to do more.    Physical Therapy: Yes- outside facility in [place].  He is now doing the HEP, stretches 3-4 days per week, exercises 2-3 days per week     Non-pharmacologic Treatment: Ice and heat provide mild benefit.           · TENS? Yes at PT with benefit.     Pain Medications:         · Currently taking: Gabapentin 100 mg BID, Meloxicam 15 mg daily, Tylenol PRN, Norco 7.5/325 mg 2 daily PRN, Robaxin 500 mg BID PRN, movantik 25 mg (causes stomach cramping)     · Has tried in the past:  Amitriptyline 50 mg QHS, Percocet (limited benefit and causes constipation treated with colace), Temazepam for sleep (he has had trouble sleeping since the surgery), Flexeril (no help in the past, both  before and after the surgery), Gabapentin (after, went to straight 300 mg TID, too sedating, unsure of duration of tx), Lyrica (sedation), Celebrex (GI upset), Zanaflex 4 mg PRN muscle pain (helpful but sedating)      · Has not tried: SNRIs, other muscle relaxants     Blood thinners: None     Interventional Therapies:   · C7/T1 ILESI: 10-15% relief  · 10/4/16 Bilateral C4-7 MBB- short term benefit  · 10/11/16 Right C4-7 RFA- limited benefit  · 10/25/16 Left C4-7- limited benefit  · TPIs (cervical and left thoracic): Good benefit x 1-2 weeks in thoracic, ongoing in cervical  · Bilateral L3-5 MBB: Positive  · Right then left L2-5 RFA: Good relief x 4-5 months  · 03/2018:  L2/3 ILESI: >50% relief  · 06/29/2018:  L2/3 ILESI:  Greater than 50% relief  · 09/14/2018:  C7/T1 ILESI: 30% relief  · 11/23/2018: Right C4-7 RFA: Modest benefit, but still having pain  · 12/21/2018: Right L2-5 RFA: Excellent relief until lifting firewood  · 05/03/2019:  Left L2-5 RFA:  Excellent relief ongoing     Relevant Surgeries:   · C4-7 ACDF with C5/6 PSIF in 2/2016     Affecting sleep? Yes     Affecting daily activities? Yes     Depressive symptoms? Yes, due to general medical condition.  He denies significant symptoms today          · SI/HI? No     Work status: On disability from his job as an [employer] .    Prescription Monitoring Program database:  Reviewed and consistent with medication use as prescribed.    Last 3 PDI Scores 4/3/2019 1/3/2019 10/17/2018   Pain Disability Index (PDI) 52 [phone]       Opioid Risk Score     None        Constitution: Negative. Negative for chills, fever, malaise/fatigue, weight gain and weight loss.   HENT: Negative.  Negative for ear pain and hoarse voice.    Eyes: Negative.  Negative for blurred vision, pain and visual disturbance.   Cardiovascular: Negative for dyspnea on exertion and irregular heartbeat.   Respiratory: Negative.  Negative for cough, shortness of breath and wheezing.     Endocrine: Negative.  Negative for cold intolerance and heat intolerance.   Hematologic/Lymphatic: Negative.  Negative for adenopathy and bleeding problem. Does not bruise/bleed easily.   Skin: Negative.  Negative for color change, itching and rash.   Musculoskeletal: Positive for neck pain and stiffness. Negative for back pain.   Gastrointestinal: Negative.  Negative for change in bowel habit, diarrhea, hematemesis, hematochezia, melena and vomiting.   Genitourinary: Negative.  Negative for flank pain, frequency, hematuria and urgency.   Neurological: Positive for dizziness and light-headedness. Negative for difficulty with concentration, headaches, loss of balance and seizures.   Psychiatric/Behavioral: Negative for altered mental status, depression and suicidal ideas. The patient has insomnia and is nervous/anxious.    Allergic/Immunologic: Negative.  Negative for HIV exposure.   All other reviewed and negative other than HPI.          Past Medical History:   Diagnosis Date    Arthritis     Cataract     Cervical back pain with evidence of disc disease     Hiatal hernia     Hypertension     Thyroid disease        Past Surgical History:   Procedure Laterality Date    ACROMIOCLAVICULAR JOINT CYST EXCISION Right     ARTHROSCOPY-SHOULDER EXCISION DISTAL CLAVICLE Left 12/5/2017    [facility] at [facility] OR    ARTHROSCOPY-SHOULDER WITH SUBACROMIAL DECOMPRESSION Left 12/5/2017    [facility] at [facility] OR    BACK SURGERY      BLOCK-NERVE-MEDIAL BRANCH-CERVICAL Bilateral 10/4/2016    [facility]formed [facility] at [facility] PAIN T    BLOCK-NERVE-MEDIAL BRANCH-LUMBAR Bilateral 8/31/2017    [facility] at [facility] PAIN MGT    cataracts Bilateral     CERVICAL FUSION      COLONOSCOPY N/A 4/27/2018    [facility] ENDO (4TH FLR)    DEBRIDEMENT-SHOULDER-ARTHROSCOPIC Left 12/5/2017    [facility] at [facility] OR    NGUYEN       EYE SURGERY      INJECTION,STEROID,EPIDURAL N/A 6/29/2018    [facility]    Injection,steroid,epidural, CERVICAL C7/T1 NGUYEN N/A 9/14/2018    [facility]    INJECTION-STEROID- arthrscopic assisted Bio D injection to the left shoulder Left 12/5/2017    Performed [facility] at [facility] [place] OR    INJECTION-STEROID-EPIDURAL-CERVICAL N/A 8/31/2016    [facility]    INJECTION-STEROID-EPIDURAL-LUMBAR N/A 3/6/2018    [facility]    LYSIS-ADHESION Left 12/5/2017    Performed by [facility] Flores [facility] at [facility] [place] OR    RADIOFREQUENCY ABLATION  10/2016    cervical spine/Montoya    RADIOFREQUENCY ABLATION RIGHT CERVICAL C4-C7 RFA, FULL STEROID DOSE, VENOM 20G Right 11/23/2018    [facility]    RADIOFREQUENCY ABLATION RIGHT LUMBAR L2-5 RFA VENOM Right 12/21/2018    [facility]    RADIOFREQUENCY ABLATION, L2-L5 MEDIALBRANCH Left 5/3/2019    [facility]    RADIOFREQUENCY THERMOCOAGULATION Left 3/28/2017    [facility]    RADIOFREQUENCY THERMOCOAGULATION Left 10/25/2016    [facility]    RADIOFREQUENCY THERMOCOAGULATION Right 10/11/2016    [facility]    RADIOFREQUENCY THERMOCOAGULATION (RFTC)-NERVE-MEDIAN BRANCH-CERVICAL Right 3/14/2017    [facility]    RADIOFREQUENCY THERMOCOAGULATION (RFTC)-NERVE-MEDIAN BRANCH-LUMBAR Bilateral 9/26/2017    [facility]    REPAIR-ROTATOR CUFF Left 12/5/2017    Performed by [facility] at [facility] [place] OR    REPAIR-TENDON-BICEP Left 12/5/2017    Performed by Randi Flores MD at [facility] [place] OR    RETINAL DETACHMENT SURGERY      ROTATOR CUFF REPAIR Right     SPINE  SURGERY      cerival fusion        Review of patient's allergies indicates:   Allergen Reactions    Pcn [penicillins] Hives and Rash    Lipitor [atorvastatin] Other (See Comments)     Muscle cramps, weakness       Current Outpatient Medications   Medication Sig Dispense Refill    acetaminophen (TYLENOL) 500 MG tablet Take 500 mg by mouth every 6 (six) hours as needed for Pain.      aspirin (ECOTRIN) 81 MG EC tablet Take 81 mg by mouth once daily.      cetirizine (ZYRTEC) 10 MG tablet Take 10 mg by mouth nightly.  5    fluticasone (FLONASE) 50 mcg/actuation nasal spray SPRAY 1-2 SPRAYS into BOTH nostrils DAILY  5    FOLIC ACID/MULTIVIT-MIN/LUTEIN (CENTRUM SILVER ORAL) Take by mouth once daily.      gabapentin (NEURONTIN) 100 MG capsule 2 (two) times daily.       HYDROcodone-acetaminophen (NORCO) 7.5-325 mg per tablet Take 1 tablet by mouth every 12 (twelve) hours as needed for Pain. 60 tablet 0    levothyroxine (SYNTHROID) 50 MCG tablet Take 50 mcg by mouth once daily.      meclizine (ANTIVERT) 25 mg tablet Take 25 mg by mouth once daily.       methocarbamol (ROBAXIN) 500 MG Tab Take 1 tablet (500 mg total) by mouth 2 (two) times daily. 60 tablet 4    naloxegol (MOVANTIK) tablet Take 25 mg by mouth as needed.      omeprazole (PRILOSEC) 20 MG capsule Take 1 capsule (20 mg total) by mouth once daily. 90 capsule 3    pravastatin (PRAVACHOL) 20 MG tablet       traZODone (DESYREL) 50 MG tablet Take 50 mg by mouth nightly.  2    valsartan (DIOVAN) 80 MG tablet Take 40 mg by mouth once daily.   1     No current facility-administered medications for this visit.        Family History   Problem Relation Age of Onset    Heart disease Mother     Cancer Father     Leukemia Brother     Colon cancer Neg Hx     Celiac disease Neg Hx     Crohn's disease Neg Hx     Esophageal cancer Neg Hx     Inflammatory bowel disease Neg Hx     Irritable bowel syndrome Neg Hx     Liver cancer Neg Hx     Rectal cancer Neg  "Hx     Stomach cancer Neg Hx     Ulcerative colitis Neg Hx        Social History     Socioeconomic History    Marital status:      Spouse name: Not on file    Number of children: Not on file    Years of education: Not on file    Highest education level: Not on file   Occupational History    Not on file   Social Needs    Financial resource strain: Not on file    Food insecurity:     Worry: Not on file     Inability: Not on file    Transportation needs:     Medical: Not on file     Non-medical: Not on file   Tobacco Use    Smoking status: Never Smoker    Smokeless tobacco: Never Used   Substance and Sexual Activity    Alcohol use: No    Drug use: No    Sexual activity: Not on file   Lifestyle    Physical activity:     Days per week: Not on file     Minutes per session: Not on file    Stress: Not on file   Relationships    Social connections:     Talks on phone: Not on file     Gets together: Not on file     Attends Yazidi service: Not on file     Active member of club or organization: Not on file     Attends meetings of clubs or organizations: Not on file     Relationship status: Not on file   Other Topics Concern    Not on file   Social History Narrative    Not on file       Objective:     Vitals:    05/29/19 0905   BP: 125/87   Pulse: 64   Resp: 18   Temp: 98 °F (36.7 °C)   TempSrc: Oral   Weight: 108 kg (238 lb)   Height: 6' 1" (1.854 m)   PainSc:   6       GEN:  Well developed, well nourished.  No acute distress.  No pain behavior.  HEENT:  No trauma.  Mucous membranes moist.  Nares patent bilaterally.  PSYCH: Normal affect. Thought content appropriate.  CHEST:  Breathing symmetric.  No audible wheezing.  ABD: Soft, non-tender, non-distended.  SKIN:  Warm, pink, dry.  No rash on exposed areas.    EXT:  No cyanosis, clubbing, or edema.  No color change or changes in nail or hair growth.  NEURO/MUSCULOSKELETAL:  Fully alert, oriented, and appropriate. Speech normal nella. No cranial " nerve deficits.   Sensory: No sensory deficit in the lower extremities.   Reflexes: 1+ and symmetric throughout.  Negative Akins's bilaterally.  Gait: Antalgic- ambulates without assistance.    C-spine:  Limited mobility due to previous fusion.  Positive axial loading on the left greater than right.  Positive trigger points palpated    Previous physical exam:  Present trendelenburg sign bilaterally, L>R  SI Joint/Hip: Negative KHRIS bilaterally.  Negative FADIR bilaterally.  TTP over left lumbar paraspinals. TTP over lumbar spine.   No TTP over bilateral SI joints, piriformis muscles, or GTB.    L-Spine:  Decreased ROM with pain on extension  Positive facet loading on the left.  Negative SLR bilaterally.   SI/Hip: FABERs and FADIRs is negative bilaterally.       Imaging:        The imaging studies listed below were independently reviewed by me, and I agree with the findings as documented below.     Narrative       MRI LUMBAR SPINE WITHOUT CONTRAST    COMPARISON: None    TECHNIQUE:  Sagittal T1, T2, and STIR;  axial T1 and T2 sequences through the lumbar spine were acquired without contrast.    FINDINGS: Vertebral body height and alignment are anatomic.  There is straightening of normal lumbar lordosis.  Marrow signal is within normal limits.  A few scattered small vertebral body hemangiomas are present.  Disc heights are well-maintained.  The conus terminates at L1.    L1-L2:  No disc herniation. No spinal canal or neuroforaminal narrowing.    L2-L3:  There is mild diffuse disc bulging and bilateral facet arthropathy without significant spinal canal or neuroforaminal narrowing.    L3-L4:  There is mild diffuse bulging of the disc and bilateral facet arthropathy, contributing to mild spinal canal narrowing and moderate right and mild left neuroforaminal narrowing.    L4-L5:  There is mild diffuse disc bulging accompanied by a small annular fissure of the subcarinal disc.  Bilateral facet arthropathy is also  present.  There is resultant mild spinal canal narrowing and moderate bilateral neuroforaminal narrowing.    L5-S1:  There is mild broad-based posterior disc bulging with associated annular fissure this disc.  This contributes to mild spinal canal narrowing.  No significant neuroforaminal narrowing.       Impression         Multilevel degenerative lumbar spondylosis resulting in mild spinal canal narrowing and mild to moderate neuroforaminal narrowing.      Electronically signed by: Rafael Bermudez MD  Date: 07/11/17  Time: 17:17          Diagnostic Results:  All imaging was independently reviewed by me.  Below is a summary from Dr Fowler's note.  I concur with the below findings.     MRI T-spine, dated 8/8/16:  1. No significant central or neuroforaminal stenosis     MRI C-spine, dated 8/8/16:  1. No significant stenosis      Flex/Ex X-ray C-spine, dated 7/19/16:  1. No prevertebral swelling  2. No gross instability   3. Good hardware position     MRI C-spine from an outside facility, dated 1/28/2016:  1. Moderate to severe DDD  2. Disc osteophyte complex, worst at C5/6   3. Moderate stenosis at C4/5 and C6/7      CT C-spine from an outside facility, dated 4/2015:  1. Diffuse cervical spondylosis   2. DDD, worst at C5/6, with some ossification of the PLL at C5/6      CT C-spine from an outside facility, dated 3/17/2016:  1. Fusion surgery at C5/6  2. Hardware in good position       AP and Lateral X-ray C-spine from an outside facility:  1. C4-7 ACDF  2. Hardware in good position  3. Spine in good alignment      Assessment:     Encounter Diagnoses   Name Primary?    Chronic pain disorder     Lumbar spondylosis     DDD (degenerative disc disease), lumbar     Facet arthritis of lumbar region     Myalgia     Spondylosis of cervical region without myelopathy or radiculopathy     S/P cervical spinal fusion        Plan:     Sal was seen today for follow-up.    Diagnoses and all orders for this  visit:    Chronic pain disorder  -     HYDROcodone-acetaminophen (NORCO) 7.5-325 mg per tablet; Take 1 tablet by mouth every 12 (twelve) hours as needed for Pain.    Lumbar spondylosis  -     HYDROcodone-acetaminophen (NORCO) 7.5-325 mg per tablet; Take 1 tablet by mouth every 12 (twelve) hours as needed for Pain.    DDD (degenerative disc disease), lumbar  -     HYDROcodone-acetaminophen (NORCO) 7.5-325 mg per tablet; Take 1 tablet by mouth every 12 (twelve) hours as needed for Pain.    Facet arthritis of lumbar region  -     HYDROcodone-acetaminophen (NORCO) 7.5-325 mg per tablet; Take 1 tablet by mouth every 12 (twelve) hours as needed for Pain.    Myalgia  -     HYDROcodone-acetaminophen (NORCO) 7.5-325 mg per tablet; Take 1 tablet by mouth every 12 (twelve) hours as needed for Pain.  -     bupivacaine (PF) 0.5% (5 mg/mL) injection 45 mg  -     betamethasone acetate-betamethasone sodium phosphate injection 6 mg    Spondylosis of cervical region without myelopathy or radiculopathy  -     HYDROcodone-acetaminophen (NORCO) 7.5-325 mg per tablet; Take 1 tablet by mouth every 12 (twelve) hours as needed for Pain.    S/P cervical spinal fusion  -     HYDROcodone-acetaminophen (NORCO) 7.5-325 mg per tablet; Take 1 tablet by mouth every 12 (twelve) hours as needed for Pain.         His pain is consistent with the above.    We discussed the assessment and recommendations.  All available images were reviewed. We discussed the disease process, prognosis, treatment plan, and risks and benefits. The patient is aware of the risks and benefits of the medications being prescribed, common side effects, and proper usage. The following is the plan we agreed on:     1. TPIs today as below  2. Schedule for left C4-7 RFA. The procedure, risks, benefits and options were discussed with patient. There are no contraindications to the procedure. The patient expressed understanding and agreed to proceed.    3. Can repeat right and left  L2-5 LMB RFA PRN (Coolief) when needed.  4. Can repeat L2/3 ILESI p.r.n.  5. Can repeat for repeat C7/T1 epidural PRN  6. Continue Norco 7.5/325 twice daily as needed, #60.    7. The patient is here today for a refill of current pain medications and they believe these provide effective pain control and improvements in quality of life.  The patient notes no serious side effects, and feels the benefits outweigh the risks.  The patient was reminded of the pain contract that they signed previously as well as the risks and benefits of the medication including possible death.  The updated [place] Board [employer] prescription monitoring program was reviewed, and the patient has been found to be compliant with current treatment plan.   8. Previous UDS from 4/2019 reviewed and consistent.   9. Continue Pennsaid gel, concentrating on neck and left shoulder  10. Continue PT HEP  11. We again discussed spinal cord stimulator which he will think about.  I can do the perc trial after looking at his MRI  12. We discussed FRP which he will also think about.  We would need to get him set up at the [facility]  1. GAP passed   13. Continue Robaxin.   14. Continue Amitriptyline 25mg QHS   15. Continue Meloxicam 15 mg daily, benefits currently outweigh risks.  Will continue to reassess PRN  16. Movantik 25 mg for OIC PRN  17. Continue gabapentin, increasing PRN  18. Continue Tylenol as needed.  19. RTC 2 weeks after above procedure.      Trigger Point Injection:   The procedure was discussed with the patient including complications of damage, bleeding, infection, and failure of pain relief.     All medications, allergies, and relevant histories were reviewed. No recent antibiotics or infections.  A time-out was taken to verify the correct patient, procedure, laterality, and appropriate medications/allergies.    Trigger points were identified by palpation and marked. CHG prep of sites done. A 27-gauge needle was advanced to the  point of maximal tenderness, and 2 mL of a mixture of 0.5% bupivacaine with betamethasone 3 mg was injected after negative aspiration in a fanlike distribution. All sites done in the same manner. Patient tolerated the procedure well and without complications. Sites injected included:  Left cervical paraspinal x4, right x1     The patient tolerated the procedure well and was discharged in excellent condition.      Kasandra Montoya MD  05/29/2019     The above plan and management options were discussed at length with patient. Patient is in agreement with the above and verbalized understanding. It will be communicated with the referring physician via electronic record, fax, or mail.

## 2019-05-29 NOTE — PATIENT INSTRUCTIONS
Recommend looking into our Functional Restoration Program here at [facility].  This is the intensive outpatient program that has physical therapy, occupational therapy, and education on how pain affects sleep, mood, nutrition, and how these things affect pain.  This program is 3-4 days per week for 3-4 weeks.  Our web site is:    [email or web address]

## 2019-06-13 ENCOUNTER — HOSPITAL ENCOUNTER (OUTPATIENT)
Facility: OTHER | Age: 54
Discharge: HOME | End: 2019-06-13
Attending: ANESTHESIOLOGY | Admitting: ANESTHESIOLOGY
Payer: MEDICARE

## 2019-06-13 VITALS
WEIGHT: 240 LBS | TEMPERATURE: 98 F | HEART RATE: 61 BPM | BODY MASS INDEX: 31.81 KG/M2 | RESPIRATION RATE: 18 BRPM | SYSTOLIC BLOOD PRESSURE: 118 MMHG | DIASTOLIC BLOOD PRESSURE: 73 MMHG | HEIGHT: 73 IN | OXYGEN SATURATION: 98 %

## 2019-06-13 DIAGNOSIS — G89.29 CHRONIC PAIN: ICD-10-CM

## 2019-06-13 DIAGNOSIS — M47.812 OSTEOARTHRITIS OF CERVICAL SPINE, UNSPECIFIED SPINAL OSTEOARTHRITIS COMPLICATION STATUS: Primary | ICD-10-CM

## 2019-06-13 PROCEDURE — 99152 MOD SED SAME PHYS/QHP 5/>YRS: CPT | Mod: ,,, | Performed by: ANESTHESIOLOGY

## 2019-06-13 PROCEDURE — 25000003 PHARM REV CODE 250: Performed by: STUDENT IN AN ORGANIZED HEALTH CARE EDUCATION/TRAINING PROGRAM

## 2019-06-13 PROCEDURE — 25000003 PHARM REV CODE 250: Performed by: ANESTHESIOLOGY

## 2019-06-13 PROCEDURE — 64633 DESTROY CERV/THOR FACET JNT: CPT | Performed by: ANESTHESIOLOGY

## 2019-06-13 PROCEDURE — 64633 PR DESTROY CERV/THOR FACET JNT: ICD-10-PCS | Mod: LT,,, | Performed by: ANESTHESIOLOGY

## 2019-06-13 PROCEDURE — 64634 PR DESTROY C/TH FACET JNT ADDL: ICD-10-PCS | Mod: LT,,, | Performed by: ANESTHESIOLOGY

## 2019-06-13 PROCEDURE — 63600175 PHARM REV CODE 636 W HCPCS: Performed by: ANESTHESIOLOGY

## 2019-06-13 PROCEDURE — S0020 INJECTION, BUPIVICAINE HYDRO: HCPCS | Performed by: ANESTHESIOLOGY

## 2019-06-13 PROCEDURE — 64633 DESTROY CERV/THOR FACET JNT: CPT | Mod: LT,,, | Performed by: ANESTHESIOLOGY

## 2019-06-13 PROCEDURE — 64634 DESTROY C/TH FACET JNT ADDL: CPT | Performed by: ANESTHESIOLOGY

## 2019-06-13 PROCEDURE — 99152 PR MOD CONSCIOUS SEDATION, SAME PHYS, 5+ YRS, FIRST 15 MIN: ICD-10-PCS | Mod: ,,, | Performed by: ANESTHESIOLOGY

## 2019-06-13 PROCEDURE — 64634 DESTROY C/TH FACET JNT ADDL: CPT | Mod: LT,,, | Performed by: ANESTHESIOLOGY

## 2019-06-13 RX ORDER — MIDAZOLAM HYDROCHLORIDE 1 MG/ML
INJECTION INTRAMUSCULAR; INTRAVENOUS
Status: DISCONTINUED | OUTPATIENT
Start: 2019-06-13 | End: 2019-06-13 | Stop reason: HOSPADM

## 2019-06-13 RX ORDER — BUPIVACAINE HYDROCHLORIDE 5 MG/ML
INJECTION, SOLUTION EPIDURAL; INTRACAUDAL
Status: DISCONTINUED | OUTPATIENT
Start: 2019-06-13 | End: 2019-06-13 | Stop reason: HOSPADM

## 2019-06-13 RX ORDER — LIDOCAINE HYDROCHLORIDE 20 MG/ML
INJECTION, SOLUTION INFILTRATION; PERINEURAL
Status: DISCONTINUED | OUTPATIENT
Start: 2019-06-13 | End: 2019-06-13 | Stop reason: HOSPADM

## 2019-06-13 RX ORDER — DEXAMETHASONE SODIUM PHOSPHATE 4 MG/ML
INJECTION, SOLUTION INTRA-ARTICULAR; INTRALESIONAL; INTRAMUSCULAR; INTRAVENOUS; SOFT TISSUE
Status: DISCONTINUED | OUTPATIENT
Start: 2019-06-13 | End: 2019-06-13 | Stop reason: HOSPADM

## 2019-06-13 RX ORDER — FENTANYL CITRATE 50 UG/ML
INJECTION, SOLUTION INTRAMUSCULAR; INTRAVENOUS
Status: DISCONTINUED | OUTPATIENT
Start: 2019-06-13 | End: 2019-06-13 | Stop reason: HOSPADM

## 2019-06-13 RX ORDER — SODIUM CHLORIDE 9 MG/ML
500 INJECTION, SOLUTION INTRAVENOUS CONTINUOUS
Status: DISCONTINUED | OUTPATIENT
Start: 2019-06-13 | End: 2019-06-13 | Stop reason: HOSPADM

## 2019-06-13 RX ADMIN — SODIUM CHLORIDE 500 ML: 0.9 INJECTION, SOLUTION INTRAVENOUS at 02:06

## 2019-06-13 NOTE — DISCHARGE SUMMARY
Discharge Note  Short Stay      SUMMARY     Admit Date: 6/13/2019    Attending Physician: Kasandra Montoya    Procedure: Cervical Conventional Radiofreqiency Ablation: C4-7, left    Discharge Physician: Kasandra Montoya    Discharge Date: 6/13/2019 3:31 PM    Final Diagnosis: Spondylosis of cervical region without myelopathy or radiculopathy [M47.812]    Disposition: Home or self care    Patient Instructions:   Current Discharge Medication List      CONTINUE these medications which have NOT CHANGED    Details   acetaminophen (TYLENOL) 500 MG tablet Take 500 mg by mouth every 6 (six) hours as needed for Pain.      aspirin (ECOTRIN) 81 MG EC tablet Take 81 mg by mouth once daily.      cetirizine (ZYRTEC) 10 MG tablet Take 10 mg by mouth nightly.  Refills: 5      fluticasone (FLONASE) 50 mcg/actuation nasal spray SPRAY 1-2 SPRAYS into BOTH nostrils DAILY  Refills: 5      FOLIC ACID/MULTIVIT-MIN/LUTEIN (CENTRUM SILVER ORAL) Take by mouth once daily.      gabapentin (NEURONTIN) 100 MG capsule 2 (two) times daily.       HYDROcodone-acetaminophen (NORCO) 7.5-325 mg per tablet Take 1 tablet by mouth every 12 (twelve) hours as needed for Pain.  Qty: 60 tablet, Refills: 0    Associated Diagnoses: Chronic pain disorder; Lumbar spondylosis; DDD (degenerative disc disease), lumbar; Facet arthritis of lumbar region; Myalgia; Spondylosis of cervical region without myelopathy or radiculopathy; S/P cervical spinal fusion      levothyroxine (SYNTHROID) 50 MCG tablet Take 50 mcg by mouth once daily.      meclizine (ANTIVERT) 25 mg tablet Take 25 mg by mouth once daily.       methocarbamol (ROBAXIN) 500 MG Tab Take 1 tablet (500 mg total) by mouth 2 (two) times daily.  Qty: 60 tablet, Refills: 4    Associated Diagnoses: Myalgia      naloxegol (MOVANTIK) tablet Take 25 mg by mouth as needed.      omeprazole (PRILOSEC) 20 MG capsule Take 1 capsule (20 mg total) by mouth once daily.  Qty: 90 capsule, Refills: 3    Associated Diagnoses:  Gastroesophageal reflux disease, esophagitis presence not specified      pravastatin (PRAVACHOL) 20 MG tablet       traZODone (DESYREL) 50 MG tablet Take 50 mg by mouth nightly.  Refills: 2      valsartan (DIOVAN) 80 MG tablet Take 40 mg by mouth once daily.   Refills: 1             Resume home diet and activity

## 2019-06-13 NOTE — DISCHARGE INSTRUCTIONS
Adult Procedural Sedation Instructions    Recovery After Procedural Sedation (Adult)  You have been given medicine by vein to make you sleep during your surgery. This may have included both a pain medicine and sleeping medicine. Most of the effects have worn off. But you may still have some drowsiness for the next 6 to 8 hours.  Home care  Follow these guidelines when you get home:  · For the next 8 hours, you should be watched by a responsible adult. This person should make sure your condition is not getting worse.  · Don't drink any alcohol for the next 24 hours.  · Don't drive, operate dangerous machinery, or make important business or personal decisions during the next 24 hours.  Note: Your healthcare provider may tell you not to take any medicine by mouth for pain or sleep in the next 4 hours. These medicines may react with the medicines you were given in the hospital. This could cause a much stronger response than usual.  Follow-up care  Follow up with your healthcare provider if you are not alert and back to your usual level of activity within 12 hours.  When to seek medical advice  Call your healthcare provider right away if any of these occur:  · Drowsiness gets worse  · Weakness or dizziness gets worse  · Repeated vomiting  · You can't be awakened   Date Last Reviewed: 10/18/2016  © 8401-0671 [employer]. [address] [place] [address], [place] [address]. All rights reserved. This information is not intended as a substitute for professional medical care. Always follow your healthcare professional's instructions.       Thank you for allowing us to care for you today. You may receive a survey about the care we provided. Your feedback is valuable and helps us provide excellent care throughout the community.     Home Care Instructions for Pain Management:    1. DIET:   You may resume your normal diet today.   2. BATHING:   You may shower with luke warm water. No tub baths or anything that will soak  injection sites under water for the next 24 hours.  3. DRESSING:   You may remove your bandage today.   4. ACTIVITY LEVEL:   You may resume your normal activities 24 hrs after your procedure. Nothing strenuous today.  5. MEDICATIONS:   You may resume your normal medications today. To restart blood thinners, ask your doctor.  6. DRIVING    If you have received any sedatives by mouth today, you may not drive for 12 hours.    If you have received any sedation through your IV, you may not drive for 24 hrs.   7. SPECIAL INSTRUCTIONS:   No heat to the injection site for 24 hrs including, hot bath or shower, heating pad, moist heat, or hot tubs.    Use ice pack to injection site for any pain or discomfort.  Apply ice packs for 20 minute intervals as needed.    IF you have diabetes, be sure to monitor your blood sugar more closely. IF your injection contained steroids your blood sugar levels may become higher than normal.    If you are still having pain upon discharge:  Your pain may improve over the next 48 hours. The anesthetic (numbing medication) works immediately to 48 hours. IF your injection contained a steroid (anti-inflammatory medication), it takes approximately 3 days to start feeling relief and 7-10 days to see your greatest results from the medication. It is possible you may need subsequent injections. This would be discussed at your follow up appointment with pain management or your referring doctor.      PLEASE CALL YOUR DOCTOR IF:  1. Redness or swelling around the injection site.  2. Fever of 101 degrees or more  3. Drainage (pus) from the injection site.  4. For any continuous bleeding (some dried blood over the incision is normal.)    FOR EMERGENCIES:   If any unusual problems or difficulties occur during clinic hours, call [phone] or [phone].

## 2019-06-13 NOTE — OP NOTE
Date: 06/13/2019    Procedure: Cervical Conventional Radiofreqiency Ablation: C4-7, left    Pre-op diagnosis: Spondylosis of cervical region without myelopathy or radiculopathy [M47.812]    Post-op diagnosis: Spondylosis of cervical region without myelopathy or radiculopathy [M47.812]     Physician: Dr. Kasandra Montoya     Assistant: Dr. Jarquin    Anesthestia: local/IV sedation:  Versed 2 mg and fentanyl 100 mcg IV.  Conscious sedation provided by MD and monitored by RN.  Total sedation time was less than 60 minutes. (See nurse documentation and case log for sedation time)    All medications, allergies, and relevant histories were reviewed. No recent antibiotics or infections.  A time-out was taken to verify the correct patient, procedure, laterality, and appropriate medications/allergies.    Cervical Medial Branch Block with conventional radiofrequency, level C4-7, left    The procedure risks, benefits, and possible complications were discussed with the patient including nerve damage, infection, spinal headache, and paresis. NIBP, pulse rate, and O2 per nasal cannula at 3L/min. were monitored throughout the procedure.      Patient was placed in the prone position. Skin was prepped with CHG and draped. Oblique view of the spine was obtained with fluoroscopy. Entry sites were marked over the skin and Xylocaine 1% was used to anesthetize the skin and subcutaneous tissues. A 22-gauge 3.5 inch curved tip, insulated, RF needle was introduced at an angle, and the needle was placed onto the lateral aspect of the mid articular pilar at each level.   Placement was confirmed with a fluoroscopic view.      At each level, a 50 Hz stimulus elicited sensory paresthesia with the following voltage:   C4: 0.5 Impedance: 218  C5: 0.6 Impedance: 245  C6: 0.7 Impedance: 211  C7: 0.9 Impedance: 317    No motor stimulation was elicited at any level at 2V with a frequency of 2Hz. 0.2cc of Omnipaque injected under digital subtraction  angiography was negative for signs of intravascular uptake.  After negative aspiration, 1cc of 2% lidocaine was injected at each level. Thermal RF was then conducted at each level at 80 degrees for 1:30 minutes. 1 cc of 0.5% bupivicaine with dexamethasone 5 mg was injected at each level. Patient tolerated the procedure well and there were no complications.    Future Management:   If helpful, can repeat as needed.    Follow up with my clinic in 3 weeks or sooner if needed    I certify that I provided the above services.  I was present for the entire procedure, which was performed by the fellow physician under my supervision.  There were no parts of the procedure that were performed not by myself or without my direct supervision.

## 2019-06-13 NOTE — H&P
"HPI  Patient presenting for  Procedure(s) (LRB):  RADIOFREQUENCY ABLATION C4-7 (Left)  INJECTION, TRIGGER POINT (Left)    No health changes since previous encounter    PMHx, PSHx, Allergies, Medications reviewed in epic    ROS negative except pain complaints in HPI    OBJECTIVE:    /86 (BP Location: Right arm, Patient Position: Lying)   Pulse (!) 54   Temp 97.8 °F (36.6 °C) (Oral)   Resp 14   Ht 6' 1" (1.854 m)   Wt 108.9 kg (240 lb)   SpO2 96%   BMI 31.66 kg/m²     PHYSICAL EXAMINATION:    GENERAL: Well appearing, in no acute distress, alert and oriented x3.  PSYCH:  Mood and affect appropriate.  SKIN: Skin color, texture, turgor normal, no rashes or lesions.  CV: RRR with palpation of the radial artery.  PULM: No evidence of respiratory difficulty, symmetric chest rise. Clear to auscultation.  NEURO: Cranial nerves grossly intact.    Plan:    Proceed with procedure as planned    Luz Jarquin  06/13/2019     I have seen the patient with the fellow physician.  We have come up with the above plan.  The patient is in agreement with our plan.    "

## 2019-07-18 NOTE — INTERVAL H&P NOTE
The patient has been examined and the H&P has been reviewed:    I concur with the findings and no changes have occurred since H&P was written.    Anesthesia/Surgery risks, benefits and alternative options discussed and understood by patient/family.          Active Hospital Problems    Diagnosis  POA    Tear of left rotator cuff [M75.102]  Yes      Resolved Hospital Problems    Diagnosis Date Resolved POA   No resolved problems to display.     
165.1

## 2019-09-18 ENCOUNTER — OFFICE VISIT (OUTPATIENT)
Dept: PAIN MEDICINE | Facility: CLINIC | Age: 54
End: 2019-09-18
Attending: ANESTHESIOLOGY
Payer: MEDICARE

## 2019-09-18 VITALS
RESPIRATION RATE: 18 BRPM | DIASTOLIC BLOOD PRESSURE: 88 MMHG | HEIGHT: 73 IN | BODY MASS INDEX: 31.85 KG/M2 | HEART RATE: 57 BPM | TEMPERATURE: 98 F | SYSTOLIC BLOOD PRESSURE: 139 MMHG | WEIGHT: 240.31 LBS

## 2019-09-18 DIAGNOSIS — G89.4 CHRONIC PAIN DISORDER: Primary | ICD-10-CM

## 2019-09-18 DIAGNOSIS — M79.18 MYOFASCIAL PAIN: ICD-10-CM

## 2019-09-18 DIAGNOSIS — M47.812 SPONDYLOSIS OF CERVICAL REGION WITHOUT MYELOPATHY OR RADICULOPATHY: ICD-10-CM

## 2019-09-18 DIAGNOSIS — M47.816 FACET ARTHRITIS OF LUMBAR REGION: ICD-10-CM

## 2019-09-18 DIAGNOSIS — M47.816 LUMBAR SPONDYLOSIS: ICD-10-CM

## 2019-09-18 DIAGNOSIS — M51.36 DDD (DEGENERATIVE DISC DISEASE), LUMBAR: ICD-10-CM

## 2019-09-18 DIAGNOSIS — Z98.1 S/P CERVICAL SPINAL FUSION: ICD-10-CM

## 2019-09-18 DIAGNOSIS — M79.10 MYALGIA: ICD-10-CM

## 2019-09-18 DIAGNOSIS — M54.12 CERVICAL RADICULOPATHY: ICD-10-CM

## 2019-09-18 DIAGNOSIS — M47.812 OSTEOARTHRITIS OF CERVICAL SPINE, UNSPECIFIED SPINAL OSTEOARTHRITIS COMPLICATION STATUS: ICD-10-CM

## 2019-09-18 PROCEDURE — 99999 PR PBB SHADOW E&M-EST. PATIENT-LVL IV: ICD-10-PCS | Mod: PBBFAC,,, | Performed by: ANESTHESIOLOGY

## 2019-09-18 PROCEDURE — 99214 OFFICE O/P EST MOD 30 MIN: CPT | Mod: PBBFAC | Performed by: ANESTHESIOLOGY

## 2019-09-18 PROCEDURE — 99215 PR OFFICE/OUTPT VISIT, EST, LEVL V, 40-54 MIN: ICD-10-PCS | Mod: S$PBB,,, | Performed by: ANESTHESIOLOGY

## 2019-09-18 PROCEDURE — 99215 OFFICE O/P EST HI 40 MIN: CPT | Mod: S$PBB,,, | Performed by: ANESTHESIOLOGY

## 2019-09-18 PROCEDURE — 99999 PR PBB SHADOW E&M-EST. PATIENT-LVL IV: CPT | Mod: PBBFAC,,, | Performed by: ANESTHESIOLOGY

## 2019-09-18 RX ORDER — GABAPENTIN 300 MG/1
300 CAPSULE ORAL 3 TIMES DAILY
Qty: 90 CAPSULE | Refills: 11 | Status: SHIPPED | OUTPATIENT
Start: 2019-09-18 | End: 2019-12-18 | Stop reason: SDUPTHER

## 2019-09-18 RX ORDER — HYDROCODONE BITARTRATE AND ACETAMINOPHEN 7.5; 325 MG/1; MG/1
1 TABLET ORAL EVERY 12 HOURS PRN
Qty: 60 TABLET | Refills: 0 | Status: SHIPPED | OUTPATIENT
Start: 2019-09-18 | End: 2019-12-18 | Stop reason: SDUPTHER

## 2019-09-18 NOTE — PATIENT INSTRUCTIONS
"We are going to increase the gabapentin to 300 mg three times daily.  To help with side effects, I recommend doing this slowly.  This medication may make you sleepy, so do not drive until you know how it affect you.  Start this medication at bedtime.  If it does not make you too sleepy, you can increase this as listed below to 3 times daily.  Some people choose to only take this at night.  Other side effects are generally mild and include diarrhea, nausea, and "forgetfullness."    Gabapentin dose increase schedule:  - Start with 1 extra capsule twice daily for 7 days then  - Then increase to three capsules daily as follows:   - You can take this as either:    - 1 capsule three times daily OR    - 1 capsule in the morning, 2 capsules at bedtime      "

## 2019-09-18 NOTE — PROGRESS NOTES
Subjective:     Patient ID: Sal Navarro is a 54 y.o. male.    Chief Complaint: Pain    Consulted by: Dr. Fowler     Disclaimer: This note was generated using voice recognition software.  There may be a typographical errors that were missed during proofreading.      HPI:    Sal Navarro is a 54 y.o. male who presents today s/p C4-7 ACDF with C5/6 PSIF in 2/2016 with residual chronic midline neck pain that radiates into his shoulder blades bilaterally, R>>L.  This is associated with bilateral hand numbness and tingling.  The hand symptoms did improve following the surgery.  The shooting pains in his arms resolved completely.   This pain is described in detail below.  His post-operative course was complicated by dysphagia that is being treated with speech therapy.     Interval History (9/23/2016):  He returns today for follow up.  He reports that the MELANIE was not helpful for the pain.  He hasn't noticed a difference with the amitriptyline.  He does notice a difference in his low back pain when he skips the meloxicam     Interval History (11/17/2016):  He returns today for follow up.  He reports that cervical RFAs were not very helpful for the pain. He is no longer taking Percocet or Tamazepam. He continues to take Mobic 15mg daily for his low back pain. He is taking elavil 25mg nightly without side effects. He is open to considering SCS. He reports poor sleep at night.     Interval History (1/3/2017):  The patient returns today for follow up of neck and shoulder pain.  His pain is located to his neck and surrounding areas.  He previously had limited benefit with RFAs, although he did have short term benefit with MBB.  He also had limited benefit with NGUYEN.  Dr. Montoya discussed cervical SCS trial with the patient, although he reports that he does not wish to pursue this option at this time.  He is scheduled to f/u with Dr. Fowler on 1/24/16.  He continues to participate in PT.  He did previously have some  benefit with a TENS unit at PT.  He is reporting some relief with Tramadol.  He also takes Zanaflex which helps but is very sedating.       Interval History (3/3/2017):  The patient returns for follow up of neck and shoulder pain. He continues to take Tramadol BID with benefit. The medication decreases his pain slightly. He also reports benefit with Robaxin. He continues to take Mobic with benefit. He recently stopped physical therapy and reports increased pain into his shoulders, despite a home exercise plan.      Interval History (5/1/2017):  He returns today for follow up.  He reports that these RFAs were more helpful than before, L more so than right, but now the pain is again returning.  Today, he also complains of left shoulder pain that has been gradually worsening over the past few months. He associated this with modified activity due to his neck.  Pain is worse with extension of arm and lifting.  Tramadol, Robaxin, Meloxicam, and amitriptyline have been helpful for the pain.  He continues to have GI upset, but has just started prilosec.  When he tried stopping the meloxicam, his pain increased dramatically.  The tramadol takes the edge off.  Robaxin helps, but he is out.     Of note, he recently had an endoscopy that showed stage 3 gastritis, for which he was started on Prilosec     Interval History (6/21/2017):  He returns today for follow up.  He reports that the shoulder injection provided one week of relief.  The Pennsaid has been helpful for the pain.  The RFA has helped with shooting pain into his back.  He is doing his HEP.  He has not been back to PT because of insurance issues.  His shoulder continues to present significant hindrances to his daily activities.     Interval History (8/21/2017):  He returns today for follow up.  He reports that the tramadol is not as helpful as it used to be.  In addition, he is experiencing constipation and urinary retention that only resolves with skipping a dose of  "tramadol. He is going to PT for his neck/shoulder.     Interval History (10/18/2017):  He returns today for follow up.  He reports that Norco has been more helpful for the pain than the tramadol with fewer side effects.  He is averaging about 2-4 per day.  His back is doing much better, though his neck is still greatly limiting him in his daily tasks.  He reports a new pain that began with a "pop" followed by a sharp pain radiating down his back followed by a soreness.  This happened a few weeks ago on the left side of his low back above where we did the RFA.  This is resolving, but it is still sore.     Interval History (11/29/2017):  He returns today for follow up. He is scheduled for right shoulder surgery on 12/5/17 and is here today for lemuel-operative planning. Biggest pain today is in his left shoulder. After his long car ride here, he states his neck has started to bother him as well. Both pains today are roughly 5/10. Taking Norco 7.5/325 mg 1-2 tablets daily which he states takes the edge off of his pain. He has decreased his dosing from 3-4 per day due to side effects of constipation. States he is not taking the movantik on a regular basis-does not want to keep adding medications. Last took it over a week ago. Still taking mobic, robaxin with mild relief. Takes tylenol occasionally with mild relief. Also used hemp oil with mild relief. Still doing a HEP on a regular basis. States his lower back is gradually getting worse. Pain primarily on the left side. Had trigger point injections at last visit which provided 30-40% reduction in his pain for for 1-2 weeks      Interval History (2/21/2018):  He returns today for follow up.  He reports that his shoulder is improving since his surgery.  His neck is doing about the same.  His low back pain has returned, and it is slightly higher than it was before.  He was recently started on a low dose of gabapentin     Interval History (3/29/2018):  He returns today for " follow up.  He reports that his neck is feeling slightly better since he is no longer wearing his sling for her shoulder.  His physical therapy is still currently on hold due to his retinal condition.  His low back is feeling better.     Interval History (6/28/2018):  He returns today for follow up.  He reports that his low back has been hurting worse.  His low back was doing well after the NGUYEN, but the pain started to return over the past few weeks.  This is associated with numbness in his left leg in the posterior thigh.  He is back to taking 2 Norco daily.  His neck and shoulders are doing ok, but his low back is hurting.  He continues to have issues with his eyes.  He has had to have another laser surgery on his right eye again 3 weeks ago.     Interval History (9/13/2018):  He returns today for follow up.  He reports that he is still having trouble with his right eye.  Overall, his medication regimen helps somewhat.  The Norco 7.5 mg does not help much the way he is taking it, but any more causes constipation. He reports increasing neck pain.  This is associated with some radiation of tingling into his right arm (in an ulnar nerve distribution).      Interval History (10/17/2018):  He returns today for follow up.  He reports that the C7/T1 ILESI has been helpful for the pain in his neck.  He continues to report left-sided lower neck pain that radiates into shoulder blade.  He also continues to report low back pain.  Overall, he feels that his functioning is going down.  Today, he presents walking with a cane.     Interval History (1/3/2019):  He returns today for follow up.  He reports that the lumbar RFA provided great relief until he lifted a lot of firewood 3 days ago.  The pain has greatly let up since yesterday, but he still feels intense right sided low back pain.  PRN Norco in addition to his daily regimen (listed below) has been helpful for the pain.  He also reports that his neck continues to hurt,  though it is also better since the RFA.  Both of these are only hurting on the right side.     Interval History (4/3/2019):  The patient returns to clinic today for follow up. He reports increased left sided low back pain over the last month. He describes this pain as constant and aching in nature. He denies any radiating leg pain. He reports intermittent neck pain that is deep and aching in nature. He denies any radiating arm pain today. He continues to report benefit with current medication regimen. He continues to take Gabapentin, Robaxin, and Mobic with benefit. He also takes Norco sparingly with benefit. He denies any adverse effects. He denies any other health changes.      Interval History (5/29/2019):  He returns today for follow up.  He reports that his low back is doing better after the radiofrequency.  He would like to repeat the trigger points today.  He asks about repeating the radiofrequency for the left side for his neck.  His eyes doing better.  He still has occasional flutter, but overall is much improved.  He reports that he is only taking Norco daily most days, still needing to sometimes.  This helps him be more functional.  Overall, he continues to feel that his functionality is decreased.  He would like to be able to do more.    Interval History (9/18/2019):  He returns today for follow up.  He reports that the radiofrequency ablation did not provide excellent benefit in his neck.  Overall, his pain is unchanged.  He is taking hydrocodone infrequently.  While this does help when he takes a, he does not like taking it due to the side effects.  As previously, he feels that his functionality is decreased.  He would like to be able to do more.  The worst of his pain today is in his low back.  He is still not ready to commit to either a spinal cord stimulator or the functional restoration program.    Physical Therapy: Yes- outside facility in [place].  He is now doing the HEP, stretches 3-4 days per week,  exercises 2-3 days per week     Non-pharmacologic Treatment: Ice and heat provide mild benefit.           · TENS? Yes at PT with benefit.     Pain Medications:         · Currently taking: Gabapentin 100 mg BID, Meloxicam 15 mg daily, Tylenol PRN, Norco 7.5/325 mg 2 daily PRN (takes infrequently), Robaxin 500 mg BID PRN, movantik 25 mg (causes stomach cramping)     · Has tried in the past:  Amitriptyline 50 mg QHS, Percocet (limited benefit and causes constipation treated with colace), Temazepam for sleep (he has had trouble sleeping since the surgery), Flexeril (no help in the past, both before and after the surgery), Gabapentin (after, went to straight 300 mg TID, too sedating, unsure of duration of tx), Lyrica (sedation), Celebrex (GI upset), Zanaflex 4 mg PRN muscle pain (helpful but sedating)      · Has not tried: SNRIs, other muscle relaxants     Blood thinners: None     Interventional Therapies:   · C7/T1 ILESI: 10-15% relief  · 10/4/16 Bilateral C4-7 MBB- short term benefit  · 10/11/16 Right C4-7 RFA- limited benefit  · 10/25/16 Left C4-7- limited benefit  · TPIs (cervical and left thoracic): Good benefit x 1-2 weeks in thoracic, ongoing in cervical  · Bilateral L3-5 MBB: Positive  · Right then left L2-5 RFA: Good relief x 4-5 months  · 03/2018:  L2/3 ILESI: >50% relief  · 06/29/2018:  L2/3 ILESI:  Greater than 50% relief  · 09/14/2018:  C7/T1 ILESI: 30% relief  · 11/23/2018: Right C4-7 RFA: Modest benefit, but still having pain  · 12/21/2018: Right L2-5 RFA: Excellent relief until lifting firewood  · 05/03/2019:  Left L2-5 RFA:  Excellent relief ongoing  · 06/13/2019:  Left C4-7 RFA:  Limited benefit     Relevant Surgeries:   · C4-7 ACDF with C5/6 PSIF in 2/2016     Affecting sleep? Yes     Affecting daily activities? Yes     Depressive symptoms? Yes, due to general medical condition.  He denies significant symptoms today          · SI/HI? 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    Prescription Monitoring Program database:  Reviewed and consistent with medication use as prescribed.    Last 3 PDI Scores 9/18/2019 5/29/2019 4/3/2019   Pain Disability Index (PDI) [phone]       Opioid Risk Score     None        Constitution: Negative. Negative for chills, fever, malaise/fatigue, weight gain and weight loss.   HENT: Negative.  Negative for ear pain and hoarse voice.    Eyes: Negative.  Negative for blurred vision, pain and visual disturbance.   Cardiovascular: Negative for dyspnea on exertion and irregular heartbeat.   Respiratory: Negative.  Negative for cough, shortness of breath and wheezing.    Endocrine: Negative.  Negative for cold intolerance and heat intolerance.   Hematologic/Lymphatic: Negative.  Negative for adenopathy and bleeding problem. Does not bruise/bleed easily.   Skin: Negative.  Negative for color change, itching and rash.   Musculoskeletal: Positive for neck pain and stiffness. Negative for back pain.   Gastrointestinal: Negative.  Negative for change in bowel habit, diarrhea, hematemesis, hematochezia, melena and vomiting.   Genitourinary: Negative.  Negative for flank pain, frequency, hematuria and urgency.   Neurological: Positive for dizziness and light-headedness. Negative for difficulty with concentration, headaches, loss of balance and seizures.   Psychiatric/Behavioral: Negative for altered mental status, depression and suicidal ideas. The patient has insomnia and is nervous/anxious.    Allergic/Immunologic: Negative.  Negative for HIV exposure.   All other reviewed and negative other than HPI.          Past Medical History:   Diagnosis Date    Arthritis     Cataract     Cervical back pain with evidence of disc disease     Hiatal hernia     Hypertension     Thyroid disease        Past Surgical History:   Procedure Laterality Date    ACROMIOCLAVICULAR JOINT CYST EXCISION Right     ARTHROSCOPY-SHOULDER EXCISION DISTAL CLAVICLE Left 12/5/2017     [facility]    ARTHROSCOPY-SHOULDER WITH SUBACROMIAL DECOMPRESSION Left 12/5/2017    [facility] OR    BACK SURGERY      BLOCK-NERVE-MEDIAL BRANCH-CERVICAL Bilateral 10/4/2016    [facility] PAIN MGT    BLOCK-NERVE-MEDIAL BRANCH-LUMBAR Bilateral 8/31/2017    [facility]    cataracts Bilateral     CERVICAL FUSION      COLONOSCOPY N/A 4/27/2018    [facility] ([facility]TH [facility]    DEBRIDEMENT-SHOULDER-ARTHROSCOPIC Left 12/5/2017    [facility] OR    NGUYEN      EYE SURGERY      INJECTION, TRIGGER POINT Left 6/13/2019    [facility] PAIN MGT    INJECTION,STEROID,EPIDURAL N/A 6/29/2018    [facility] PAIN MGT    Injection,steroid,epidural, CERVICAL C7/T1 NGUYEN N/A 9/14/2018    [facility] PAIN MGT    INJECTION-STEROID- arthrscopic assisted Bio D injection to the left shoulder Left 12/5/2017    [facility] OR    INJECTION-STEROID-EPIDURAL-CERVICAL N/A 8/31/2016    [facility]    INJECTION-STEROID-EPIDURAL-LUMBAR N/A 3/6/2018    [facility]    LYSIS-ADHESION Left 12/5/2017    [facility] OR    RADIOFREQUENCY ABLATION  10/2016    cervical spine/Jan    RADIOFREQUENCY ABLATION C4-7 Left 6/13/2019    [facility] PAIN MGT    RADIOFREQUENCY ABLATION RIGHT CERVICAL C4-C7 RFA, FULL STEROID DOSE, VENOM 20G Right 11/23/2018    [facility]    RADIOFREQUENCY ABLATION RIGHT LUMBAR L2-5 RFA VENOM Right 12/21/2018    [facility]    RADIOFREQUENCY ABLATION, L2-L5 MEDIALBRANCH Left 5/3/2019    [facility]     RADIOFREQUENCY THERMOCOAGULATION Left 3/28/2017    [facility] PAIN T    RADIOFREQUENCY THERMOCOAGULATION Left 10/25/2016    [facility]    RADIOFREQUENCY THERMOCOAGULATION Right 10/11/2016    [facility]    RADIOFREQUENCY THERMOCOAGULATION (RFTC)-NERVE-MEDIAN BRANCH-CERVICAL Right 3/14/2017    [facility]    RADIOFREQUENCY THERMOCOAGULATION (RFTC)-NERVE-MEDIAN BRANCH-LUMBAR Bilateral 9/26/2017    [facility]    REPAIR-ROTATOR CUFF Left 12/5/2017    [facility] OR    REPAIR-TENDON-BICEP Left 12/5/2017    [facility] OR    RETINAL DETACHMENT SURGERY      ROTATOR CUFF REPAIR Right     SPINE SURGERY      cerival fusion        Review of patient's allergies indicates:   Allergen Reactions    Pcn [penicillins] Hives and Rash    Lipitor [atorvastatin] Other (See Comments)     Muscle cramps, weakness       Current Outpatient Medications   Medication Sig Dispense Refill    acetaminophen (TYLENOL) 500 MG tablet Take 500 mg by mouth every 6 (six) hours as needed for Pain.      aspirin (ECOTRIN) 81 MG EC tablet Take 81 mg by mouth once daily.      cetirizine (ZYRTEC) 10 MG tablet Take 10 mg by mouth nightly.  5    fluticasone (FLONASE) 50 mcg/actuation nasal spray SPRAY 1-2 SPRAYS into BOTH nostrils DAILY  5    FOLIC ACID/MULTIVIT-MIN/LUTEIN (CENTRUM SILVER ORAL) Take by mouth once daily.      gabapentin (NEURONTIN) 100 MG capsule 2 (two) times daily.       HYDROcodone-acetaminophen (NORCO) 7.5-325 mg per tablet Take 1 tablet by mouth every 12 (twelve) hours as needed for Pain. 60 tablet 0    levothyroxine (SYNTHROID) 50 MCG tablet Take 50 mcg by mouth once daily.      meclizine (ANTIVERT) 25 mg tablet Take 25 mg by mouth once daily.       methocarbamol (ROBAXIN) 500 MG Tab  Take 1 tablet (500 mg total) by mouth 2 (two) times daily. 60 tablet 4    naloxegol (MOVANTIK) tablet Take 25 mg by mouth as needed.      omeprazole (PRILOSEC) 20 MG capsule Take 1 capsule (20 mg total) by mouth once daily. 90 capsule 3    pravastatin (PRAVACHOL) 20 MG tablet       traZODone (DESYREL) 50 MG tablet Take 50 mg by mouth nightly.  2    valsartan (DIOVAN) 80 MG tablet Take 40 mg by mouth once daily.   1     No current facility-administered medications for this visit.        Family History   Problem Relation Age of Onset    Heart disease Mother     Cancer Father     Leukemia Brother     Colon cancer Neg Hx     Celiac disease Neg Hx     Crohn's disease Neg Hx     Esophageal cancer Neg Hx     Inflammatory bowel disease Neg Hx     Irritable bowel syndrome Neg Hx     Liver cancer Neg Hx     Rectal cancer Neg Hx     Stomach cancer Neg Hx     Ulcerative colitis Neg Hx        Social History     Socioeconomic History    Marital status:      Spouse name: Not on file    Number of children: Not on file    Years of education: Not on file    Highest education level: Not on file   Occupational History    Not on file   Social Needs    Financial resource strain: Not on file    Food insecurity:     Worry: Not on file     Inability: Not on file    Transportation needs:     Medical: Not on file     Non-medical: Not on file   Tobacco Use    Smoking status: Never Smoker    Smokeless tobacco: Never Used   Substance and Sexual Activity    Alcohol use: No    Drug use: No    Sexual activity: Not on file   Lifestyle    Physical activity:     Days per week: Not on file     Minutes per session: Not on file    Stress: Not on file   Relationships    Social connections:     Talks on phone: Not on file     Gets together: Not on file     Attends Zoroastrianism service: Not on file     Active member of club or organization: Not on file     Attends meetings of clubs or organizations: Not on file      "Relationship status: Not on file   Other Topics Concern    Not on file   Social History Narrative    Not on file       Objective:     Vitals:    09/18/19 1122   BP: 139/88   Pulse: (!) 57   Resp: 18   Temp: 98.1 °F (36.7 °C)   TempSrc: Oral   Weight: 109 kg (240 lb 4.8 oz)   Height: 6' 1" (1.854 m)   PainSc:   6       GEN:  Well developed, well nourished.  No acute distress.  No pain behavior.  HEENT:  No trauma.  Mucous membranes moist.  Nares patent bilaterally.  PSYCH: Normal affect. Thought content appropriate.  CHEST:  Breathing symmetric.  No audible wheezing.  ABD: Soft, non-tender, non-distended.  SKIN:  Warm, pink, dry.  No rash on exposed areas.    EXT:  No cyanosis, clubbing, or edema.  No color change or changes in nail or hair growth.  NEURO/MUSCULOSKELETAL:  Fully alert, oriented, and appropriate. Speech normal nella. No cranial nerve deficits.   Sensory: No sensory deficit in the lower extremities.   Reflexes: 1+ and symmetric throughout.  Negative Akins's bilaterally.  Gait: Antalgic- ambulates without assistance.    L-Spine:  Decreased ROM with pain on extension  Positive facet loading on the left.    SI/Hip: FABERs and FADIRs is negative bilaterally.     Previous physical exam:  Present trendelenburg sign bilaterally, L>R  SI Joint/Hip: Negative KHRIS bilaterally.  Negative FADIR bilaterally.  TTP over left lumbar paraspinals. TTP over lumbar spine.   No TTP over bilateral SI joints, piriformis muscles, or GTB.    Negative SLR bilaterally.   C-spine:  Limited mobility due to previous fusion.  Positive axial loading on the left greater than right.  Positive trigger points palpated    Imaging:        The imaging studies listed below were independently reviewed by me, and I agree with the findings as documented below.     Narrative       MRI LUMBAR SPINE WITHOUT CONTRAST    COMPARISON: None    TECHNIQUE:  Sagittal T1, T2, and STIR;  axial T1 and T2 sequences through the lumbar spine were " acquired without contrast.    FINDINGS: Vertebral body height and alignment are anatomic.  There is straightening of normal lumbar lordosis.  Marrow signal is within normal limits.  A few scattered small vertebral body hemangiomas are present.  Disc heights are well-maintained.  The conus terminates at L1.    L1-L2:  No disc herniation. No spinal canal or neuroforaminal narrowing.    L2-L3:  There is mild diffuse disc bulging and bilateral facet arthropathy without significant spinal canal or neuroforaminal narrowing.    L3-L4:  There is mild diffuse bulging of the disc and bilateral facet arthropathy, contributing to mild spinal canal narrowing and moderate right and mild left neuroforaminal narrowing.    L4-L5:  There is mild diffuse disc bulging accompanied by a small annular fissure of the subcarinal disc.  Bilateral facet arthropathy is also present.  There is resultant mild spinal canal narrowing and moderate bilateral neuroforaminal narrowing.    L5-S1:  There is mild broad-based posterior disc bulging with associated annular fissure this disc.  This contributes to mild spinal canal narrowing.  No significant neuroforaminal narrowing.       Impression         Multilevel degenerative lumbar spondylosis resulting in mild spinal canal narrowing and mild to moderate neuroforaminal narrowing.      Electronically signed by: Rafael Bermudez MD  Date: 07/11/17  Time: 17:17          Diagnostic Results:  All imaging was independently reviewed by me.  Below is a summary from Dr Fowler's note.  I concur with the below findings.     MRI T-spine, dated 8/8/16:  1. No significant central or neuroforaminal stenosis     MRI C-spine, dated 8/8/16:  1. No significant stenosis      Flex/Ex X-ray C-spine, dated 7/19/16:  1. No prevertebral swelling  2. No gross instability   3. Good hardware position     MRI C-spine from an outside facility, dated 1/28/2016:  1. Moderate to severe DDD  2. Disc osteophyte complex, worst at C5/6    3. Moderate stenosis at C4/5 and C6/7      CT C-spine from an outside facility, dated 4/2015:  1. Diffuse cervical spondylosis   2. DDD, worst at C5/6, with some ossification of the PLL at C5/6      CT C-spine from an outside facility, dated 3/17/2016:  1. Fusion surgery at C5/6  2. Hardware in good position       AP and Lateral X-ray C-spine from an outside facility:  1. C4-7 ACDF  2. Hardware in good position  3. Spine in good alignment      Assessment:     Encounter Diagnoses   Name Primary?    Chronic pain disorder Yes    Lumbar spondylosis     DDD (degenerative disc disease), lumbar     Osteoarthritis of cervical spine, unspecified spinal osteoarthritis complication status     S/P cervical spinal fusion     Cervical radiculopathy     Myofascial pain     Facet arthritis of lumbar region     Myalgia     Spondylosis of cervical region without myelopathy or radiculopathy        Plan:     Sal was seen today for follow-up.    Diagnoses and all orders for this visit:    Chronic pain disorder  -     gabapentin (NEURONTIN) 300 MG capsule; Take 1 capsule (300 mg total) by mouth 3 (three) times daily. Increase as directed  -     HYDROcodone-acetaminophen (NORCO) 7.5-325 mg per tablet; Take 1 tablet by mouth every 12 (twelve) hours as needed for Pain.    Lumbar spondylosis  -     gabapentin (NEURONTIN) 300 MG capsule; Take 1 capsule (300 mg total) by mouth 3 (three) times daily. Increase as directed  -     HYDROcodone-acetaminophen (NORCO) 7.5-325 mg per tablet; Take 1 tablet by mouth every 12 (twelve) hours as needed for Pain.    DDD (degenerative disc disease), lumbar  -     gabapentin (NEURONTIN) 300 MG capsule; Take 1 capsule (300 mg total) by mouth 3 (three) times daily. Increase as directed  -     HYDROcodone-acetaminophen (NORCO) 7.5-325 mg per tablet; Take 1 tablet by mouth every 12 (twelve) hours as needed for Pain.    Osteoarthritis of cervical spine, unspecified spinal osteoarthritis complication  status  -     gabapentin (NEURONTIN) 300 MG capsule; Take 1 capsule (300 mg total) by mouth 3 (three) times daily. Increase as directed    S/P cervical spinal fusion  -     gabapentin (NEURONTIN) 300 MG capsule; Take 1 capsule (300 mg total) by mouth 3 (three) times daily. Increase as directed  -     HYDROcodone-acetaminophen (NORCO) 7.5-325 mg per tablet; Take 1 tablet by mouth every 12 (twelve) hours as needed for Pain.    Cervical radiculopathy  -     gabapentin (NEURONTIN) 300 MG capsule; Take 1 capsule (300 mg total) by mouth 3 (three) times daily. Increase as directed    Myofascial pain  -     gabapentin (NEURONTIN) 300 MG capsule; Take 1 capsule (300 mg total) by mouth 3 (three) times daily. Increase as directed    Facet arthritis of lumbar region  -     HYDROcodone-acetaminophen (NORCO) 7.5-325 mg per tablet; Take 1 tablet by mouth every 12 (twelve) hours as needed for Pain.    Myalgia  -     HYDROcodone-acetaminophen (NORCO) 7.5-325 mg per tablet; Take 1 tablet by mouth every 12 (twelve) hours as needed for Pain.    Spondylosis of cervical region without myelopathy or radiculopathy  -     HYDROcodone-acetaminophen (NORCO) 7.5-325 mg per tablet; Take 1 tablet by mouth every 12 (twelve) hours as needed for Pain.         His pain is consistent with the above.    We discussed the assessment and recommendations.  All available images were reviewed. We discussed the disease process, prognosis, treatment plan, and risks and benefits. The patient is aware of the risks and benefits of the medications being prescribed, common side effects, and proper usage. The following is the plan we agreed on:     1. Schedule for L4/5 interlaminar NGUYEN. The procedure, risks, benefits and options were discussed with patient. There are no contraindications to the procedure. The patient expressed understanding and agreed to proceed.    1. I reviewed his lumbar spine findings with him.  We discussed that there is nothing surgical that  I can see in his lumbar spine  2. Can repeat right and left L2-5 LMB RFA PRN (Coolief) when needed.  3. Can repeat for repeat C7/T1 epidural PRN  4. Continue Norco 7.5/325 twice daily as needed, #60.    5. The patient is here today for a refill of current pain medications and they believe these provide effective pain control and improvements in quality of life.  The patient notes no serious side effects, and feels the benefits outweigh the risks.  The patient was reminded of the pain contract that they signed previously as well as the risks and benefits of the medication including possible death.  The updated [place] [employer] prescription monitoring program was reviewed, and the patient has been found to be compliant with current treatment plan.   6. Previous UDS from 4/2019 reviewed and consistent.   7. Continue Pennsaid gel, concentrating on neck and left shoulder  8. Continue PT HEP  9. We again discussed spinal cord stimulator which he will think about.  I can do the perc trial after looking at his MRI  10. We again discussed FRP which he will also think about.  We would need to get him set up at the [facility]  1. GAP passed previously, but, at this point, I would likely be repeated   11. Continue Robaxin.   12. Continue Amitriptyline 25mg QHS   13. Continue Meloxicam 15 mg daily, benefits currently outweigh risks.  Will continue to reassess PRN  14. Movantik 25 mg for OIC PRN  15. Continue gabapentin, increasing PRN  16. Continue Tylenol as needed.  17. RTC 2 weeks after above procedure.        Kasandra Montoya MD  09/18/2019     The above plan and management options were discussed at length with patient. Patient is in agreement with the above and verbalized understanding. It will be communicated with the referring physician via electronic record, fax, or mail.

## 2019-10-11 ENCOUNTER — HOSPITAL ENCOUNTER (OUTPATIENT)
Facility: OTHER | Age: 54
Discharge: HOME | End: 2019-10-11
Attending: ANESTHESIOLOGY | Admitting: ANESTHESIOLOGY
Payer: MEDICARE

## 2019-10-11 VITALS
HEIGHT: 73 IN | OXYGEN SATURATION: 97 % | TEMPERATURE: 98 F | RESPIRATION RATE: 16 BRPM | HEART RATE: 50 BPM | BODY MASS INDEX: 31.81 KG/M2 | SYSTOLIC BLOOD PRESSURE: 144 MMHG | WEIGHT: 240 LBS | DIASTOLIC BLOOD PRESSURE: 88 MMHG

## 2019-10-11 DIAGNOSIS — M51.36 DDD (DEGENERATIVE DISC DISEASE), LUMBAR: Primary | ICD-10-CM

## 2019-10-11 PROCEDURE — 63600175 PHARM REV CODE 636 W HCPCS: Performed by: ANESTHESIOLOGY

## 2019-10-11 PROCEDURE — 25500020 PHARM REV CODE 255: Performed by: ANESTHESIOLOGY

## 2019-10-11 PROCEDURE — 62323 NJX INTERLAMINAR LMBR/SAC: CPT | Mod: ,,, | Performed by: ANESTHESIOLOGY

## 2019-10-11 PROCEDURE — 25000003 PHARM REV CODE 250: Performed by: ANESTHESIOLOGY

## 2019-10-11 PROCEDURE — 62323 PR INJ LUMBAR/SACRAL, W/IMAGING GUIDANCE: ICD-10-PCS | Mod: ,,, | Performed by: ANESTHESIOLOGY

## 2019-10-11 PROCEDURE — 62323 NJX INTERLAMINAR LMBR/SAC: CPT | Performed by: ANESTHESIOLOGY

## 2019-10-11 RX ORDER — SODIUM CHLORIDE 9 MG/ML
500 INJECTION, SOLUTION INTRAVENOUS CONTINUOUS
Status: DISCONTINUED | OUTPATIENT
Start: 2019-10-11 | End: 2019-10-11 | Stop reason: HOSPADM

## 2019-10-11 RX ORDER — BUPIVACAINE HYDROCHLORIDE 2.5 MG/ML
INJECTION, SOLUTION EPIDURAL; INFILTRATION; INTRACAUDAL
Status: DISCONTINUED | OUTPATIENT
Start: 2019-10-11 | End: 2019-10-11 | Stop reason: HOSPADM

## 2019-10-11 RX ORDER — LIDOCAINE HYDROCHLORIDE 10 MG/ML
INJECTION INFILTRATION; PERINEURAL
Status: DISCONTINUED | OUTPATIENT
Start: 2019-10-11 | End: 2019-10-11 | Stop reason: HOSPADM

## 2019-10-11 RX ORDER — METHYLPREDNISOLONE ACETATE 80 MG/ML
INJECTION, SUSPENSION INTRA-ARTICULAR; INTRALESIONAL; INTRAMUSCULAR; SOFT TISSUE
Status: DISCONTINUED | OUTPATIENT
Start: 2019-10-11 | End: 2019-10-11 | Stop reason: HOSPADM

## 2019-10-11 NOTE — OP NOTE
Date of Procedure: 10/11/2019    Procedure: L4/5 Interlaminar Epidural Steroid Injection    Referring Provider: None    Pre-op diagnosis: Lumbar radiculopathy [M54.16]  DDD (degenerative disc disease), lumbar [M51.36]    Post-op diagnosis: Lumbar radiculopathy [M54.16]  DDD (degenerative disc disease), lumbar [M51.36]    Physician: Dr. Kasandra Montoya     Assistant: None    Anesthestia: Local    EBL: None    Specimens: None    All medications, allergies, and relevant histories were reviewed. No recent antibiotics or infections.  A time-out was taken to verify the correct patient, procedure, laterality, and appropriate medications/allergies.    Fluoroscopically-Guided, Contrast-Controlled Lumbar Interlaminar Epidural Steroid Injection:     Following denial of allergy and review of potential side effects and complications including but not necessarily limited to infection, allergic reaction, local tissue breakdown, nerve injury, paresis, paralysis, spinal cord injury, and seizure, the patient indicated they understood and agreed to proceed.     Patient was placed in prone position. Lumbar area was prepped and draped in sterile manner. Local Xylocaine 1% was given subcutaneously at the level L4/5. An 18-gauge Tuohy needle was introduced atraumatically in the interspinous space and advanced up to the epidural space using fluoroscopic guidance in the AP position. Loss of resistance to air was used to identify the epidural space using fluoroscopic guidance in the lateral position. Following negative aspiration for blood and CSF and confirming the absence of paresthesias, injection of approximately 1 cc of Omnipaque 300 demonstrated excellent epidural spread without vascular or intrathecal uptake. At this point, 2cc of 0.25% marcaine with 80 mg of Depo-Medrol was injected without complication. The needle was flushed with 1% lidocaine and withdrawn.     Patient tolerated the procedure well without any side effects. No noted  complications.     The patient was followed post procedure and discharged under their own power in excellent condition in the company of a responsible adult.     Future Management:   If helpful, can repeat as needed.    Follow up with my clinic in 3 weeks or sooner if needed

## 2019-10-11 NOTE — DISCHARGE INSTRUCTIONS

## 2019-12-18 ENCOUNTER — OFFICE VISIT (OUTPATIENT)
Dept: PAIN MEDICINE | Facility: CLINIC | Age: 54
End: 2019-12-18
Attending: ANESTHESIOLOGY
Payer: MEDICARE

## 2019-12-18 VITALS
RESPIRATION RATE: 18 BRPM | BODY MASS INDEX: 33.36 KG/M2 | TEMPERATURE: 98 F | DIASTOLIC BLOOD PRESSURE: 88 MMHG | WEIGHT: 251.75 LBS | OXYGEN SATURATION: 100 % | HEART RATE: 64 BPM | SYSTOLIC BLOOD PRESSURE: 129 MMHG | HEIGHT: 73 IN

## 2019-12-18 DIAGNOSIS — M47.812 OSTEOARTHRITIS OF CERVICAL SPINE, UNSPECIFIED SPINAL OSTEOARTHRITIS COMPLICATION STATUS: ICD-10-CM

## 2019-12-18 DIAGNOSIS — M47.812 SPONDYLOSIS OF CERVICAL REGION WITHOUT MYELOPATHY OR RADICULOPATHY: ICD-10-CM

## 2019-12-18 DIAGNOSIS — M79.10 MYALGIA: ICD-10-CM

## 2019-12-18 DIAGNOSIS — M47.816 FACET ARTHRITIS OF LUMBAR REGION: ICD-10-CM

## 2019-12-18 DIAGNOSIS — M54.12 CERVICAL RADICULOPATHY: ICD-10-CM

## 2019-12-18 DIAGNOSIS — M79.18 MYOFASCIAL PAIN: ICD-10-CM

## 2019-12-18 DIAGNOSIS — M51.36 DDD (DEGENERATIVE DISC DISEASE), LUMBAR: ICD-10-CM

## 2019-12-18 DIAGNOSIS — Z98.1 S/P CERVICAL SPINAL FUSION: ICD-10-CM

## 2019-12-18 DIAGNOSIS — M50.30 DDD (DEGENERATIVE DISC DISEASE), CERVICAL: ICD-10-CM

## 2019-12-18 DIAGNOSIS — M47.816 LUMBAR SPONDYLOSIS: ICD-10-CM

## 2019-12-18 DIAGNOSIS — G89.4 CHRONIC PAIN DISORDER: Primary | ICD-10-CM

## 2019-12-18 PROCEDURE — 99214 OFFICE O/P EST MOD 30 MIN: CPT | Mod: S$PBB,,, | Performed by: ANESTHESIOLOGY

## 2019-12-18 PROCEDURE — 99214 OFFICE O/P EST MOD 30 MIN: CPT | Mod: PBBFAC | Performed by: ANESTHESIOLOGY

## 2019-12-18 PROCEDURE — 99214 PR OFFICE/OUTPT VISIT, EST, LEVL IV, 30-39 MIN: ICD-10-PCS | Mod: S$PBB,,, | Performed by: ANESTHESIOLOGY

## 2019-12-18 PROCEDURE — 99999 PR PBB SHADOW E&M-EST. PATIENT-LVL IV: ICD-10-PCS | Mod: PBBFAC,,, | Performed by: ANESTHESIOLOGY

## 2019-12-18 PROCEDURE — 99999 PR PBB SHADOW E&M-EST. PATIENT-LVL IV: CPT | Mod: PBBFAC,,, | Performed by: ANESTHESIOLOGY

## 2019-12-18 RX ORDER — HYDROCODONE BITARTRATE AND ACETAMINOPHEN 7.5; 325 MG/1; MG/1
1 TABLET ORAL EVERY 12 HOURS PRN
Qty: 60 TABLET | Refills: 0 | Status: SHIPPED | OUTPATIENT
Start: 2019-12-18 | End: 2020-03-05 | Stop reason: SDUPTHER

## 2019-12-18 RX ORDER — GABAPENTIN 300 MG/1
600 CAPSULE ORAL 3 TIMES DAILY
Qty: 180 CAPSULE | Refills: 5 | Status: SHIPPED | OUTPATIENT
Start: 2019-12-18 | End: 2021-06-17 | Stop reason: SDUPTHER

## 2019-12-18 NOTE — PATIENT INSTRUCTIONS
"We are going to increase the gabapentin to 600 mg three times daily.  To help with side effects, I recommend doing this slowly.  This medication may make you sleepy, so do not drive until you know how it affect you.  Other side effects are generally mild and include diarrhea, nausea, and "forgetfullness."    Gabapentin dose increase schedule:  - Start with 1 extra capsule at night for 7 days (4 total capsules daily)  - Then increase to 2 capsules in the morning, and 3 at bedtime for 7 days (5 total capsules daily)  - Then increase to 2 capsules in the morning and 4 at bedtime (6 total capsules daily)    "

## 2019-12-18 NOTE — PROGRESS NOTES
Subjective:     Patient ID: Sal Navarro is a 54 y.o. male.    Chief Complaint: Pain    Consulted by: Dr. Fowler     Disclaimer: This note was generated using voice recognition software.  There may be a typographical errors that were missed during proofreading.      HPI:    Sal Navarro is a 54 y.o. male who presents today s/p C4-7 ACDF with C5/6 PSIF in 2/2016 with residual chronic midline neck pain that radiates into his shoulder blades bilaterally, R>>L.  This is associated with bilateral hand numbness and tingling.  The hand symptoms did improve following the surgery.  The shooting pains in his arms resolved completely.   This pain is described in detail below.  His post-operative course was complicated by dysphagia that is being treated with speech therapy.     Interval History (9/23/2016):  He returns today for follow up.  He reports that the MELANIE was not helpful for the pain.  He hasn't noticed a difference with the amitriptyline.  He does notice a difference in his low back pain when he skips the meloxicam     Interval History (11/17/2016):  He returns today for follow up.  He reports that cervical RFAs were not very helpful for the pain. He is no longer taking Percocet or Tamazepam. He continues to take Mobic 15mg daily for his low back pain. He is taking elavil 25mg nightly without side effects. He is open to considering SCS. He reports poor sleep at night.     Interval History (1/3/2017):  The patient returns today for follow up of neck and shoulder pain.  His pain is located to his neck and surrounding areas.  He previously had limited benefit with RFAs, although he did have short term benefit with MBB.  He also had limited benefit with NGUYEN.  Dr. Montoya discussed cervical SCS trial with the patient, although he reports that he does not wish to pursue this option at this time.  He is scheduled to f/u with Dr. Fowler on 1/24/16.  He continues to participate in PT.  He did previously have some  benefit with a TENS unit at PT.  He is reporting some relief with Tramadol.  He also takes Zanaflex which helps but is very sedating.       Interval History (3/3/2017):  The patient returns for follow up of neck and shoulder pain. He continues to take Tramadol BID with benefit. The medication decreases his pain slightly. He also reports benefit with Robaxin. He continues to take Mobic with benefit. He recently stopped physical therapy and reports increased pain into his shoulders, despite a home exercise plan.      Interval History (5/1/2017):  He returns today for follow up.  He reports that these RFAs were more helpful than before, L more so than right, but now the pain is again returning.  Today, he also complains of left shoulder pain that has been gradually worsening over the past few months. He associated this with modified activity due to his neck.  Pain is worse with extension of arm and lifting.  Tramadol, Robaxin, Meloxicam, and amitriptyline have been helpful for the pain.  He continues to have GI upset, but has just started prilosec.  When he tried stopping the meloxicam, his pain increased dramatically.  The tramadol takes the edge off.  Robaxin helps, but he is out.     Of note, he recently had an endoscopy that showed stage 3 gastritis, for which he was started on Prilosec     Interval History (6/21/2017):  He returns today for follow up.  He reports that the shoulder injection provided one week of relief.  The Pennsaid has been helpful for the pain.  The RFA has helped with shooting pain into his back.  He is doing his HEP.  He has not been back to PT because of insurance issues.  His shoulder continues to present significant hindrances to his daily activities.     Interval History (8/21/2017):  He returns today for follow up.  He reports that the tramadol is not as helpful as it used to be.  In addition, he is experiencing constipation and urinary retention that only resolves with skipping a dose of  "tramadol. He is going to PT for his neck/shoulder.     Interval History (10/18/2017):  He returns today for follow up.  He reports that Norco has been more helpful for the pain than the tramadol with fewer side effects.  He is averaging about 2-4 per day.  His back is doing much better, though his neck is still greatly limiting him in his daily tasks.  He reports a new pain that began with a "pop" followed by a sharp pain radiating down his back followed by a soreness.  This happened a few weeks ago on the left side of his low back above where we did the RFA.  This is resolving, but it is still sore.     Interval History (11/29/2017):  He returns today for follow up. He is scheduled for right shoulder surgery on 12/5/17 and is here today for lemuel-operative planning. Biggest pain today is in his left shoulder. After his long car ride here, he states his neck has started to bother him as well. Both pains today are roughly 5/10. Taking Norco 7.5/325 mg 1-2 tablets daily which he states takes the edge off of his pain. He has decreased his dosing from 3-4 per day due to side effects of constipation. States he is not taking the movantik on a regular basis-does not want to keep adding medications. Last took it over a week ago. Still taking mobic, robaxin with mild relief. Takes tylenol occasionally with mild relief. Also used hemp oil with mild relief. Still doing a HEP on a regular basis. States his lower back is gradually getting worse. Pain primarily on the left side. Had trigger point injections at last visit which provided 30-40% reduction in his pain for for 1-2 weeks      Interval History (2/21/2018):  He returns today for follow up.  He reports that his shoulder is improving since his surgery.  His neck is doing about the same.  His low back pain has returned, and it is slightly higher than it was before.  He was recently started on a low dose of gabapentin     Interval History (3/29/2018):  He returns today for " follow up.  He reports that his neck is feeling slightly better since he is no longer wearing his sling for her shoulder.  His physical therapy is still currently on hold due to his retinal condition.  His low back is feeling better.     Interval History (6/28/2018):  He returns today for follow up.  He reports that his low back has been hurting worse.  His low back was doing well after the NGUYEN, but the pain started to return over the past few weeks.  This is associated with numbness in his left leg in the posterior thigh.  He is back to taking 2 Norco daily.  His neck and shoulders are doing ok, but his low back is hurting.  He continues to have issues with his eyes.  He has had to have another laser surgery on his right eye again 3 weeks ago.     Interval History (9/13/2018):  He returns today for follow up.  He reports that he is still having trouble with his right eye.  Overall, his medication regimen helps somewhat.  The Norco 7.5 mg does not help much the way he is taking it, but any more causes constipation. He reports increasing neck pain.  This is associated with some radiation of tingling into his right arm (in an ulnar nerve distribution).      Interval History (10/17/2018):  He returns today for follow up.  He reports that the C7/T1 ILESI has been helpful for the pain in his neck.  He continues to report left-sided lower neck pain that radiates into shoulder blade.  He also continues to report low back pain.  Overall, he feels that his functioning is going down.  Today, he presents walking with a cane.     Interval History (1/3/2019):  He returns today for follow up.  He reports that the lumbar RFA provided great relief until he lifted a lot of firewood 3 days ago.  The pain has greatly let up since yesterday, but he still feels intense right sided low back pain.  PRN Norco in addition to his daily regimen (listed below) has been helpful for the pain.  He also reports that his neck continues to hurt,  though it is also better since the RFA.  Both of these are only hurting on the right side.     Interval History (4/3/2019):  The patient returns to clinic today for follow up. He reports increased left sided low back pain over the last month. He describes this pain as constant and aching in nature. He denies any radiating leg pain. He reports intermittent neck pain that is deep and aching in nature. He denies any radiating arm pain today. He continues to report benefit with current medication regimen. He continues to take Gabapentin, Robaxin, and Mobic with benefit. He also takes Norco sparingly with benefit. He denies any adverse effects. He denies any other health changes.      Interval History (5/29/2019):  He returns today for follow up.  He reports that his low back is doing better after the radiofrequency.  He would like to repeat the trigger points today.  He asks about repeating the radiofrequency for the left side for his neck.  His eyes doing better.  He still has occasional flutter, but overall is much improved.  He reports that he is only taking Norco daily most days, still needing to sometimes.  This helps him be more functional.  Overall, he continues to feel that his functionality is decreased.  He would like to be able to do more.    Interval History (9/18/2019):  He returns today for follow up.  He reports that the radiofrequency ablation did not provide excellent benefit in his neck.  Overall, his pain is unchanged.  He is taking hydrocodone infrequently.  While this does help when he takes a, he does not like taking it due to the side effects.  As previously, he feels that his functionality is decreased.  He would like to be able to do more.  The worst of his pain today is in his low back.  He is still not ready to commit to either a spinal cord stimulator or the functional restoration program.    Interval History (12/18/2019):  He returns today for follow up.  He reports that his low back pain  has been doing better.  His neck pain is his primary complaint today.  He reports pinpoint tenderness over his lower cervical/upper thoracic spine.  This is not changed.  The increase in gabapentin is providing some benefit with no side effects.    Of note, he did have a recent fall in which she her his left hip and shoulder.  He reports this was when he had one of his drunk spells.  Upon further questioning, he reports having altered equilibrium and lightheadedness on occasion when turning his head to sharply to 1 side or the other    Physical Therapy: Yes- outside facility in [place].  He is now doing the HEP, stretches 3-4 days per week, exercises 2-3 days per week     Non-pharmacologic Treatment: Ice and heat provide mild benefit.           · TENS? Yes at PT with benefit.     Pain Medications:         · Currently taking: Gabapentin 300 mg TID, Meloxicam 15 mg daily, Tylenol PRN, Norco 7.5/325 mg 2 daily PRN (takes infrequently), Robaxin 500 mg BID PRN, movantik 25 mg (causes stomach cramping)     · Has tried in the past:  Amitriptyline 50 mg QHS, Percocet (limited benefit and causes constipation treated with colace), Temazepam for sleep (he has had trouble sleeping since the surgery), Flexeril (no help in the past, both before and after the surgery), Gabapentin (after, went to straight 300 mg TID, too sedating, unsure of duration of tx), Lyrica (sedation), Celebrex (GI upset), Zanaflex 4 mg PRN muscle pain (helpful but sedating)      · Has not tried: SNRIs, other muscle relaxants     Blood thinners: None     Interventional Therapies:   · C7/T1 ILESI: 10-15% relief  · 10/4/16 Bilateral C4-7 MBB- short term benefit  · 10/11/16 Right C4-7 RFA- limited benefit  · 10/25/16 Left C4-7- limited benefit  · TPIs (cervical and left thoracic): Good benefit x 1-2 weeks in thoracic, ongoing in cervical  · Bilateral L3-5 MBB: Positive  · Right then left L2-5 RFA: Good relief x 4-5 months  · 03/2018:  L2/3 ILESI: >50%  relief  · 06/29/2018:  L2/3 ILESI:  Greater than 50% relief  · 09/14/2018:  C7/T1 ILESI: 30% relief  · 11/23/2018: Right C4-7 RFA: Modest benefit, but still having pain  · 12/21/2018: Right L2-5 RFA: Excellent relief until lifting firewood  · 05/03/2019:  Left L2-5 RFA:  Excellent relief ongoing  · 06/13/2019:  Left C4-7 RFA:  Limited benefit     Relevant Surgeries:   · C4-7 ACDF with C5/6 PSIF in 2/2016     Affecting sleep? Yes     Affecting daily activities? Yes     Depressive symptoms? Yes, due to general medical condition.  He denies significant symptoms today          · SI/HI? No     Work status: On disability from his job as an [employer] .    Prescription Monitoring Program database:  Reviewed and consistent with medication use as prescribed.    Last 3 PDI Scores 12/18/2019 9/18/2019 5/29/2019   Pain Disability Index (PDI) 53 [phone]       Opioid Risk Score     None        Constitution: Negative. Negative for chills, fever, malaise/fatigue, weight gain and weight loss.   HENT: Negative.  Negative for ear pain and hoarse voice.    Eyes: Negative.  Negative for blurred vision, pain and visual disturbance.   Cardiovascular: Negative for dyspnea on exertion and irregular heartbeat.   Respiratory: Negative.  Negative for cough, shortness of breath and wheezing.    Endocrine: Negative.  Negative for cold intolerance and heat intolerance.   Hematologic/Lymphatic: Negative.  Negative for adenopathy and bleeding problem. Does not bruise/bleed easily.   Skin: Negative.  Negative for color change, itching and rash.   Musculoskeletal: Positive for neck pain and stiffness. Negative for back pain.   Gastrointestinal: Negative.  Negative for change in bowel habit, diarrhea, hematemesis, hematochezia, melena and vomiting.   Genitourinary: Negative.  Negative for flank pain, frequency, hematuria and urgency.   Neurological: Positive for dizziness and light-headedness. Negative for difficulty with concentration,  headaches, loss of balance and seizures.   Psychiatric/Behavioral: Negative for altered mental status, depression and suicidal ideas. The patient has insomnia and is nervous/anxious.    Allergic/Immunologic: Negative.  Negative for HIV exposure.   All other reviewed and negative other than HPI.          Past Medical History:   Diagnosis Date    Arthritis     Cataract     Cervical back pain with evidence of disc disease     Hiatal hernia     Hypertension     Thyroid disease        Past Surgical History:   Procedure Laterality Date    ACROMIOCLAVICULAR JOINT CYST EXCISION Right     BACK SURGERY      cataracts Bilateral     CERVICAL FUSION      COLONOSCOPY N/A 4/27/2018    Procedure: COLONOSCOPY;  Surgeon: Giovani Medeiros MD;  Location: [facility] ENDO ([address] [place] [address]);  Service: Endoscopy;  Laterality: N/A;    EPIDURAL STEROID INJECTION N/A 10/11/2019    Procedure: INJECTION, STEROID, EPIDURAL;  Surgeon: Kasandra Montoya MD;  Location: [facility] PAIN MGT;  Service: Pain Management;  Laterality: N/A;  Lumbar NGUYEN L4/5    NGUYEN      EYE SURGERY      RADIOFREQUENCY ABLATION  10/2016    cervical spine/Montoya    RADIOFREQUENCY ABLATION Left 5/3/2019    Procedure: RADIOFREQUENCY ABLATION, L2-L5 MEDIALBRANCH;  Surgeon: Kasandra Montoya MD;  Location: [facility] PAIN MGT;  Service: Pain Management;  Laterality: Left;    RADIOFREQUENCY ABLATION Left 6/13/2019    Procedure: RADIOFREQUENCY ABLATION C4-7;  Surgeon: Kasandra Montoya MD;  Location: [facility] PAIN MGT;  Service: Pain Management;  Laterality: Left;    RETINAL DETACHMENT SURGERY      ROTATOR CUFF REPAIR Right     SPINE SURGERY      cerival fusion     TRIGGER POINT INJECTION Left 6/13/2019    Procedure: INJECTION, TRIGGER POINT;  Surgeon: Kasandra Montoya MD;  Location: [facility] PAIN MGT;  Service: Pain Management;  Laterality: Left;       Review of patient's allergies indicates:   Allergen Reactions    Pcn [penicillins] Hives and Rash    Lipitor [atorvastatin] Other (See  Comments)     Muscle cramps, weakness       Current Outpatient Medications   Medication Sig Dispense Refill    acetaminophen (TYLENOL) 500 MG tablet Take 500 mg by mouth every 6 (six) hours as needed for Pain.      aspirin (ECOTRIN) 81 MG EC tablet Take 81 mg by mouth once daily.      cetirizine (ZYRTEC) 10 MG tablet Take 10 mg by mouth nightly.  5    fluticasone (FLONASE) 50 mcg/actuation nasal spray SPRAY 1-2 SPRAYS into BOTH nostrils DAILY  5    FOLIC ACID/MULTIVIT-MIN/LUTEIN (CENTRUM SILVER ORAL) Take by mouth once daily.      gabapentin (NEURONTIN) 300 MG capsule Take 1 capsule (300 mg total) by mouth 3 (three) times daily. Increase as directed 90 capsule 11    HYDROcodone-acetaminophen (NORCO) 7.5-325 mg per tablet Take 1 tablet by mouth every 12 (twelve) hours as needed for Pain. 60 tablet 0    levothyroxine (SYNTHROID) 50 MCG tablet Take 50 mcg by mouth once daily.      meclizine (ANTIVERT) 25 mg tablet Take 25 mg by mouth once daily.       methocarbamol (ROBAXIN) 500 MG Tab Take 1 tablet (500 mg total) by mouth 2 (two) times daily. 60 tablet 4    naloxegol (MOVANTIK) tablet Take 25 mg by mouth as needed.      omeprazole (PRILOSEC) 20 MG capsule Take 1 capsule (20 mg total) by mouth once daily. 90 capsule 3    pravastatin (PRAVACHOL) 20 MG tablet       valsartan (DIOVAN) 80 MG tablet Take 40 mg by mouth once daily.   1    traZODone (DESYREL) 50 MG tablet Take 50 mg by mouth nightly.  2     No current facility-administered medications for this visit.        Family History   Problem Relation Age of Onset    Heart disease Mother     Cancer Father     Leukemia Brother     Colon cancer Neg Hx     Celiac disease Neg Hx     Crohn's disease Neg Hx     Esophageal cancer Neg Hx     Inflammatory bowel disease Neg Hx     Irritable bowel syndrome Neg Hx     Liver cancer Neg Hx     Rectal cancer Neg Hx     Stomach cancer Neg Hx     Ulcerative colitis Neg Hx        Social History  "    Socioeconomic History    Marital status:      Spouse name: Not on file    Number of children: Not on file    Years of education: Not on file    Highest education level: Not on file   Occupational History    Not on file   Social Needs    Financial resource strain: Not on file    Food insecurity:     Worry: Not on file     Inability: Not on file    Transportation needs:     Medical: Not on file     Non-medical: Not on file   Tobacco Use    Smoking status: Never Smoker    Smokeless tobacco: Never Used   Substance and Sexual Activity    Alcohol use: No    Drug use: No    Sexual activity: Not on file   Lifestyle    Physical activity:     Days per week: Not on file     Minutes per session: Not on file    Stress: Not on file   Relationships    Social connections:     Talks on phone: Not on file     Gets together: Not on file     Attends Anglican service: Not on file     Active member of club or organization: Not on file     Attends meetings of clubs or organizations: Not on file     Relationship status: Not on file   Other Topics Concern    Not on file   Social History Narrative    Not on file       Objective:     Vitals:    12/18/19 1019   BP: 129/88   Pulse: 64   Resp: 18   Temp: 97.7 °F (36.5 °C)   SpO2: 100%   Weight: 114.2 kg (251 lb 12.3 oz)   Height: 6' 1" (1.854 m)   PainSc:   6   PainLoc: Back       GEN:  Well developed, well nourished.  No acute distress.  No pain behavior.  HEENT:  No trauma.  Mucous membranes moist.  Nares patent bilaterally.  PSYCH: Normal affect. Thought content appropriate.  CHEST:  Breathing symmetric.  No audible wheezing.  ABD: Soft, non-tender, non-distended.  SKIN:  Warm, pink, dry.  No rash on exposed areas.    EXT:  No cyanosis, clubbing, or edema.  No color change or changes in nail or hair growth.  NEURO/MUSCULOSKELETAL:  Fully alert, oriented, and appropriate. Speech normal nella. No cranial nerve deficits.   Sensory: No sensory deficit in the lower " extremities.   Reflexes: 1+ and symmetric throughout.  Negative Akins's bilaterally.  Gait: Antalgic- ambulates without assistance.    C-spine:  Limited mobility due to previous fusion.  Point tenderness over likely T2.  No tenderness with spinous process mobility.  No tenderness in the paraspinal muscles.  Mild trigger points palpated in bilateral paraspinal muscles    Previous physical exam:  Present trendelenburg sign bilaterally, L>R  SI Joint/Hip: Negative KHRIS bilaterally.  Negative FADIR bilaterally.  TTP over left lumbar paraspinals. TTP over lumbar spine.   No TTP over bilateral SI joints, piriformis muscles, or GTB.    Negative SLR bilaterally.   C-spine:  Limited mobility due to previous fusion.  Positive axial loading on the left greater than right.  Positive trigger points palpated  L-Spine:  Decreased ROM with pain on extension  Positive facet loading on the left.    SI/Hip: FABERs and FADIRs is negative bilaterally.     Imaging:        The imaging studies listed below were independently reviewed by me, and I agree with the findings as documented below.     Narrative       MRI LUMBAR SPINE WITHOUT CONTRAST    COMPARISON: None    TECHNIQUE:  Sagittal T1, T2, and STIR;  axial T1 and T2 sequences through the lumbar spine were acquired without contrast.    FINDINGS: Vertebral body height and alignment are anatomic.  There is straightening of normal lumbar lordosis.  Marrow signal is within normal limits.  A few scattered small vertebral body hemangiomas are present.  Disc heights are well-maintained.  The conus terminates at L1.    L1-L2:  No disc herniation. No spinal canal or neuroforaminal narrowing.    L2-L3:  There is mild diffuse disc bulging and bilateral facet arthropathy without significant spinal canal or neuroforaminal narrowing.    L3-L4:  There is mild diffuse bulging of the disc and bilateral facet arthropathy, contributing to mild spinal canal narrowing and moderate right and mild left  neuroforaminal narrowing.    L4-L5:  There is mild diffuse disc bulging accompanied by a small annular fissure of the subcarinal disc.  Bilateral facet arthropathy is also present.  There is resultant mild spinal canal narrowing and moderate bilateral neuroforaminal narrowing.    L5-S1:  There is mild broad-based posterior disc bulging with associated annular fissure this disc.  This contributes to mild spinal canal narrowing.  No significant neuroforaminal narrowing.       Impression         Multilevel degenerative lumbar spondylosis resulting in mild spinal canal narrowing and mild to moderate neuroforaminal narrowing.      Electronically signed by: Rafael Bermudez MD  Date: 07/11/17  Time: 17:17          Diagnostic Results:  All imaging was independently reviewed by me.  Below is a summary from Dr Fowler's note.  I concur with the below findings.     MRI T-spine, dated 8/8/16:  1. No significant central or neuroforaminal stenosis     MRI C-spine, dated 8/8/16:  1. No significant stenosis      Flex/Ex X-ray C-spine, dated 7/19/16:  1. No prevertebral swelling  2. No gross instability   3. Good hardware position     MRI C-spine from an outside facility, dated 1/28/2016:  1. Moderate to severe DDD  2. Disc osteophyte complex, worst at C5/6   3. Moderate stenosis at C4/5 and C6/7      CT C-spine from an outside facility, dated 4/2015:  1. Diffuse cervical spondylosis   2. DDD, worst at C5/6, with some ossification of the PLL at C5/6      CT C-spine from an outside facility, dated 3/17/2016:  1. Fusion surgery at C5/6  2. Hardware in good position       AP and Lateral X-ray C-spine from an outside facility:  1. C4-7 ACDF  2. Hardware in good position  3. Spine in good alignment      Assessment:     Encounter Diagnoses   Name Primary?    Chronic pain disorder Yes    Osteoarthritis of cervical spine, unspecified spinal osteoarthritis complication status     Cervical radiculopathy     S/P cervical spinal fusion      Myofascial pain     DDD (degenerative disc disease), cervical     Lumbar spondylosis     DDD (degenerative disc disease), lumbar     Facet arthritis of lumbar region     Myalgia     Spondylosis of cervical region without myelopathy or radiculopathy        Plan:     Sal was seen today for follow-up.    Diagnoses and all orders for this visit:    Chronic pain disorder  -     HYDROcodone-acetaminophen (NORCO) 7.5-325 mg per tablet; Take 1 tablet by mouth every 12 (twelve) hours as needed for Pain.  -     gabapentin (NEURONTIN) 300 MG capsule; Take 2 capsules (600 mg total) by mouth 3 (three) times daily. Increase as directed    Osteoarthritis of cervical spine, unspecified spinal osteoarthritis complication status  -     gabapentin (NEURONTIN) 300 MG capsule; Take 2 capsules (600 mg total) by mouth 3 (three) times daily. Increase as directed    Cervical radiculopathy  -     gabapentin (NEURONTIN) 300 MG capsule; Take 2 capsules (600 mg total) by mouth 3 (three) times daily. Increase as directed    S/P cervical spinal fusion  -     HYDROcodone-acetaminophen (NORCO) 7.5-325 mg per tablet; Take 1 tablet by mouth every 12 (twelve) hours as needed for Pain.  -     gabapentin (NEURONTIN) 300 MG capsule; Take 2 capsules (600 mg total) by mouth 3 (three) times daily. Increase as directed    Myofascial pain  -     gabapentin (NEURONTIN) 300 MG capsule; Take 2 capsules (600 mg total) by mouth 3 (three) times daily. Increase as directed    DDD (degenerative disc disease), cervical    Lumbar spondylosis  -     HYDROcodone-acetaminophen (NORCO) 7.5-325 mg per tablet; Take 1 tablet by mouth every 12 (twelve) hours as needed for Pain.  -     gabapentin (NEURONTIN) 300 MG capsule; Take 2 capsules (600 mg total) by mouth 3 (three) times daily. Increase as directed    DDD (degenerative disc disease), lumbar  -     HYDROcodone-acetaminophen (NORCO) 7.5-325 mg per tablet; Take 1 tablet by mouth every 12 (twelve) hours as  needed for Pain.  -     gabapentin (NEURONTIN) 300 MG capsule; Take 2 capsules (600 mg total) by mouth 3 (three) times daily. Increase as directed    Facet arthritis of lumbar region  -     HYDROcodone-acetaminophen (NORCO) 7.5-325 mg per tablet; Take 1 tablet by mouth every 12 (twelve) hours as needed for Pain.    Myalgia  -     HYDROcodone-acetaminophen (NORCO) 7.5-325 mg per tablet; Take 1 tablet by mouth every 12 (twelve) hours as needed for Pain.    Spondylosis of cervical region without myelopathy or radiculopathy  -     HYDROcodone-acetaminophen (NORCO) 7.5-325 mg per tablet; Take 1 tablet by mouth every 12 (twelve) hours as needed for Pain.         His pain is consistent with the above.    We discussed the assessment and recommendations.  All available images were reviewed. We discussed the disease process, prognosis, treatment plan, and risks and benefits. The patient is aware of the risks and benefits of the medications being prescribed, common side effects, and proper usage. The following is the plan we agreed on:     1. Given his episodes when turning his head, I would like to obtain imaging of his neck to evaluate the vertebral artery.  I will speak to the radiologist regarding this.  I am specifically looking for dynamic imaging when he is turning his head  2. Can repeat L4/5 interlaminar NGUYEN as needed.    1. Previously, I reviewed his lumbar spine findings with him.  We discussed that there is nothing surgical that I can see in his lumbar spine  3. Can repeat right and left L2-5 LMB RFA PRN ([employer]) when needed.  4. Can repeat for repeat C7/T1 epidural PRN  5. Increase gabapentin to 600 mg 3 times daily  6. Continue Norco 7.5/325 twice daily as needed, #60, refilled today.    7. The patient is here today for a refill of current pain medications and they believe these provide effective pain control and improvements in quality of life.  The patient notes no serious side effects, and feels the benefits  outweigh the risks.  The patient was reminded of the pain contract that they signed previously as well as the risks and benefits of the medication including possible death.  The updated [place] [employer] prescription monitoring program was reviewed, and the patient has been found to be compliant with current treatment plan.   8. Previous UDS from 4/2019 reviewed and consistent.   9. Continue Pennsaid gel, concentrating on neck and left shoulder  10. Continue PT HEP  11. We again discussed spinal cord stimulator which he will think about.  I can do the perc trial after looking at his MRI  12. We again discussed FRP which he will also think about.  We would need to get him set up at the [facility]  1. GAP passed previously, but, at this point, I would likely be repeated   13. Continue Robaxin.   14. Continue Amitriptyline 25mg QHS   15. Continue Meloxicam 15 mg daily, benefits currently outweigh risks.  Will continue to reassess PRN  16. Movantik 25 mg for OIC PRN  17. Continue Tylenol as needed.  18. RTC 2 weeks after above procedure.        Kasandra Montoya MD  12/18/2019     The above plan and management options were discussed at length with patient. Patient is in agreement with the above and verbalized understanding. It will be communicated with the referring physician via electronic record, fax, or mail.

## 2020-03-05 ENCOUNTER — OFFICE VISIT (OUTPATIENT)
Dept: SPINE | Facility: CLINIC | Age: 55
End: 2020-03-05
Payer: MEDICARE

## 2020-03-05 VITALS
HEART RATE: 76 BPM | SYSTOLIC BLOOD PRESSURE: 144 MMHG | BODY MASS INDEX: 32.64 KG/M2 | HEIGHT: 73 IN | DIASTOLIC BLOOD PRESSURE: 82 MMHG | WEIGHT: 246.25 LBS

## 2020-03-05 DIAGNOSIS — Z79.891 ENCOUNTER FOR LONG-TERM OPIATE ANALGESIC USE: ICD-10-CM

## 2020-03-05 DIAGNOSIS — Z98.1 S/P CERVICAL SPINAL FUSION: ICD-10-CM

## 2020-03-05 DIAGNOSIS — M96.1 CERVICAL POST-LAMINECTOMY SYNDROME: ICD-10-CM

## 2020-03-05 DIAGNOSIS — M51.36 DDD (DEGENERATIVE DISC DISEASE), LUMBAR: ICD-10-CM

## 2020-03-05 DIAGNOSIS — G89.4 CHRONIC PAIN DISORDER: ICD-10-CM

## 2020-03-05 DIAGNOSIS — M47.816 LUMBAR SPONDYLOSIS: ICD-10-CM

## 2020-03-05 DIAGNOSIS — M47.812 SPONDYLOSIS OF CERVICAL REGION WITHOUT MYELOPATHY OR RADICULOPATHY: ICD-10-CM

## 2020-03-05 DIAGNOSIS — G89.4 CHRONIC PAIN SYNDROME: Primary | ICD-10-CM

## 2020-03-05 DIAGNOSIS — M47.816 FACET ARTHRITIS OF LUMBAR REGION: ICD-10-CM

## 2020-03-05 DIAGNOSIS — M54.17 LUMBOSACRAL RADICULOPATHY: ICD-10-CM

## 2020-03-05 DIAGNOSIS — M79.10 MYALGIA: ICD-10-CM

## 2020-03-05 PROCEDURE — 99214 PR OFFICE/OUTPT VISIT, EST, LEVL IV, 30-39 MIN: ICD-10-PCS | Mod: S$PBB,,, | Performed by: NURSE PRACTITIONER

## 2020-03-05 PROCEDURE — 99213 OFFICE O/P EST LOW 20 MIN: CPT | Mod: PBBFAC | Performed by: NURSE PRACTITIONER

## 2020-03-05 PROCEDURE — 99999 PR PBB SHADOW E&M-EST. PATIENT-LVL III: ICD-10-PCS | Mod: PBBFAC,,, | Performed by: NURSE PRACTITIONER

## 2020-03-05 PROCEDURE — 80307 DRUG TEST PRSMV CHEM ANLYZR: CPT

## 2020-03-05 PROCEDURE — 99999 PR PBB SHADOW E&M-EST. PATIENT-LVL III: CPT | Mod: PBBFAC,,, | Performed by: NURSE PRACTITIONER

## 2020-03-05 PROCEDURE — 99214 OFFICE O/P EST MOD 30 MIN: CPT | Mod: S$PBB,,, | Performed by: NURSE PRACTITIONER

## 2020-03-05 RX ORDER — CEFADROXIL 500 MG/5ML
1 POWDER, FOR SUSPENSION ORAL 2 TIMES DAILY
COMMUNITY
End: 2021-02-12

## 2020-03-05 RX ORDER — HYDROCODONE BITARTRATE AND ACETAMINOPHEN 7.5; 325 MG/1; MG/1
1 TABLET ORAL EVERY 12 HOURS PRN
Qty: 60 TABLET | Refills: 0 | Status: SHIPPED | OUTPATIENT
Start: 2020-03-05 | End: 2021-02-12 | Stop reason: SDUPTHER

## 2020-03-05 NOTE — PROGRESS NOTES
Subjective:     Patient ID: Sal Navarro is a 55 y.o. male.    Chief Complaint: Pain    Consulted by: Dr. Fowler     Disclaimer: This note was generated using voice recognition software.  There may be a typographical errors that were missed during proofreading.      HPI:    Sal Navarro is a 55 y.o. male who presents today s/p C4-7 ACDF with C5/6 PSIF in 2/2016 with residual chronic midline neck pain that radiates into his shoulder blades bilaterally, R>>L.  This is associated with bilateral hand numbness and tingling.  The hand symptoms did improve following the surgery.  The shooting pains in his arms resolved completely.   This pain is described in detail below.  His post-operative course was complicated by dysphagia that is being treated with speech therapy.     Interval History (9/23/2016):  He returns today for follow up.  He reports that the MELANIE was not helpful for the pain.  He hasn't noticed a difference with the amitriptyline.  He does notice a difference in his low back pain when he skips the meloxicam     Interval History (11/17/2016):  He returns today for follow up.  He reports that cervical RFAs were not very helpful for the pain. He is no longer taking Percocet or Tamazepam. He continues to take Mobic 15mg daily for his low back pain. He is taking elavil 25mg nightly without side effects. He is open to considering SCS. He reports poor sleep at night.     Interval History (1/3/2017):  The patient returns today for follow up of neck and shoulder pain.  His pain is located to his neck and surrounding areas.  He previously had limited benefit with RFAs, although he did have short term benefit with MBB.  He also had limited benefit with NGUYEN.  Dr. Montoya discussed cervical SCS trial with the patient, although he reports that he does not wish to pursue this option at this time.  He is scheduled to f/u with Dr. Fowler on 1/24/16.  He continues to participate in PT.  He did previously have some  benefit with a TENS unit at PT.  He is reporting some relief with Tramadol.  He also takes Zanaflex which helps but is very sedating.       Interval History (3/3/2017):  The patient returns for follow up of neck and shoulder pain. He continues to take Tramadol BID with benefit. The medication decreases his pain slightly. He also reports benefit with Robaxin. He continues to take Mobic with benefit. He recently stopped physical therapy and reports increased pain into his shoulders, despite a home exercise plan.      Interval History (5/1/2017):  He returns today for follow up.  He reports that these RFAs were more helpful than before, L more so than right, but now the pain is again returning.  Today, he also complains of left shoulder pain that has been gradually worsening over the past few months. He associated this with modified activity due to his neck.  Pain is worse with extension of arm and lifting.  Tramadol, Robaxin, Meloxicam, and amitriptyline have been helpful for the pain.  He continues to have GI upset, but has just started prilosec.  When he tried stopping the meloxicam, his pain increased dramatically.  The tramadol takes the edge off.  Robaxin helps, but he is out.     Of note, he recently had an endoscopy that showed stage 3 gastritis, for which he was started on Prilosec     Interval History (6/21/2017):  He returns today for follow up.  He reports that the shoulder injection provided one week of relief.  The Pennsaid has been helpful for the pain.  The RFA has helped with shooting pain into his back.  He is doing his HEP.  He has not been back to PT because of insurance issues.  His shoulder continues to present significant hindrances to his daily activities.     Interval History (8/21/2017):  He returns today for follow up.  He reports that the tramadol is not as helpful as it used to be.  In addition, he is experiencing constipation and urinary retention that only resolves with skipping a dose of  "tramadol. He is going to PT for his neck/shoulder.     Interval History (10/18/2017):  He returns today for follow up.  He reports that Norco has been more helpful for the pain than the tramadol with fewer side effects.  He is averaging about 2-4 per day.  His back is doing much better, though his neck is still greatly limiting him in his daily tasks.  He reports a new pain that began with a "pop" followed by a sharp pain radiating down his back followed by a soreness.  This happened a few weeks ago on the left side of his low back above where we did the RFA.  This is resolving, but it is still sore.     Interval History (11/29/2017):  He returns today for follow up. He is scheduled for right shoulder surgery on 12/5/17 and is here today for lemuel-operative planning. Biggest pain today is in his left shoulder. After his long car ride here, he states his neck has started to bother him as well. Both pains today are roughly 5/10. Taking Norco 7.5/325 mg 1-2 tablets daily which he states takes the edge off of his pain. He has decreased his dosing from 3-4 per day due to side effects of constipation. States he is not taking the movantik on a regular basis-does not want to keep adding medications. Last took it over a week ago. Still taking mobic, robaxin with mild relief. Takes tylenol occasionally with mild relief. Also used hemp oil with mild relief. Still doing a HEP on a regular basis. States his lower back is gradually getting worse. Pain primarily on the left side. Had trigger point injections at last visit which provided 30-40% reduction in his pain for for 1-2 weeks      Interval History (2/21/2018):  He returns today for follow up.  He reports that his shoulder is improving since his surgery.  His neck is doing about the same.  His low back pain has returned, and it is slightly higher than it was before.  He was recently started on a low dose of gabapentin     Interval History (3/29/2018):  He returns today for " follow up.  He reports that his neck is feeling slightly better since he is no longer wearing his sling for her shoulder.  His physical therapy is still currently on hold due to his retinal condition.  His low back is feeling better.     Interval History (6/28/2018):  He returns today for follow up.  He reports that his low back has been hurting worse.  His low back was doing well after the NGUYEN, but the pain started to return over the past few weeks.  This is associated with numbness in his left leg in the posterior thigh.  He is back to taking 2 Norco daily.  His neck and shoulders are doing ok, but his low back is hurting.  He continues to have issues with his eyes.  He has had to have another laser surgery on his right eye again 3 weeks ago.     Interval History (9/13/2018):  He returns today for follow up.  He reports that he is still having trouble with his right eye.  Overall, his medication regimen helps somewhat.  The Norco 7.5 mg does not help much the way he is taking it, but any more causes constipation. He reports increasing neck pain.  This is associated with some radiation of tingling into his right arm (in an ulnar nerve distribution).      Interval History (10/17/2018):  He returns today for follow up.  He reports that the C7/T1 ILESI has been helpful for the pain in his neck.  He continues to report left-sided lower neck pain that radiates into shoulder blade.  He also continues to report low back pain.  Overall, he feels that his functioning is going down.  Today, he presents walking with a cane.     Interval History (1/3/2019):  He returns today for follow up.  He reports that the lumbar RFA provided great relief until he lifted a lot of firewood 3 days ago.  The pain has greatly let up since yesterday, but he still feels intense right sided low back pain.  PRN Norco in addition to his daily regimen (listed below) has been helpful for the pain.  He also reports that his neck continues to hurt,  though it is also better since the RFA.  Both of these are only hurting on the right side.     Interval History (4/3/2019):  The patient returns to clinic today for follow up. He reports increased left sided low back pain over the last month. He describes this pain as constant and aching in nature. He denies any radiating leg pain. He reports intermittent neck pain that is deep and aching in nature. He denies any radiating arm pain today. He continues to report benefit with current medication regimen. He continues to take Gabapentin, Robaxin, and Mobic with benefit. He also takes Norco sparingly with benefit. He denies any adverse effects. He denies any other health changes.      Interval History (5/29/2019):  He returns today for follow up.  He reports that his low back is doing better after the radiofrequency.  He would like to repeat the trigger points today.  He asks about repeating the radiofrequency for the left side for his neck.  His eyes doing better.  He still has occasional flutter, but overall is much improved.  He reports that he is only taking Norco daily most days, still needing to sometimes.  This helps him be more functional.  Overall, he continues to feel that his functionality is decreased.  He would like to be able to do more.    Interval History (9/18/2019):  He returns today for follow up.  He reports that the radiofrequency ablation did not provide excellent benefit in his neck.  Overall, his pain is unchanged.  He is taking hydrocodone infrequently.  While this does help when he takes a, he does not like taking it due to the side effects.  As previously, he feels that his functionality is decreased.  He would like to be able to do more.  The worst of his pain today is in his low back.  He is still not ready to commit to either a spinal cord stimulator or the functional restoration program.    Interval History (12/18/2019):  He returns today for follow up.  He reports that his low back pain  has been doing better.  His neck pain is his primary complaint today.  He reports pinpoint tenderness over his lower cervical/upper thoracic spine.  This is not changed.  The increase in gabapentin is providing some benefit with no side effects.     Interval History (3/5/2020):  The patient is here for follow up of chronic neck and back pain.  He is considering SCS trial.  He would like me to order imaging for him today.  He also reports a fall a couple of weeks ago in which he landed onto his posterior neck and middle back.  He has not been evaluated since this time.  He has benefit with current medication regimen.      Physical Therapy: Yes- outside facility in [place].  He is now doing the HEP, stretches 3-4 days per week, exercises 2-3 days per week     Non-pharmacologic Treatment: Ice and heat provide mild benefit.           · TENS? Yes at PT with benefit.     Pain Medications:         · Currently taking: Gabapentin 300 mg TID, Meloxicam 15 mg daily, Tylenol PRN, Norco 7.5/325 mg 2 daily PRN (takes infrequently), Robaxin 500 mg BID PRN, movantik 25 mg (causes stomach cramping)     · Has tried in the past:  Amitriptyline 50 mg QHS, Percocet (limited benefit and causes constipation treated with colace), Temazepam for sleep (he has had trouble sleeping since the surgery), Flexeril (no help in the past, both before and after the surgery), Gabapentin (after, went to straight 300 mg TID, too sedating, unsure of duration of tx), Lyrica (sedation), Celebrex (GI upset), Zanaflex 4 mg PRN muscle pain (helpful but sedating)      · Has not tried: SNRIs, other muscle relaxants     Blood thinners: None     Interventional Therapies:   · C7/T1 ILESI: 10-15% relief  · 10/4/16 Bilateral C4-7 MBB- short term benefit  · 10/11/16 Right C4-7 RFA- limited benefit  · 10/25/16 Left C4-7- limited benefit  · TPIs (cervical and left thoracic): Good benefit x 1-2 weeks in thoracic, ongoing in cervical  · Bilateral L3-5 MBB: Positive  · Right  then left L2-5 RFA: Good relief x 4-5 months  · 03/2018:  L2/3 ILESI: >50% relief  · 06/29/2018:  L2/3 ILESI:  Greater than 50% relief  · 09/14/2018:  C7/T1 ILESI: 30% relief  · 11/23/2018: Right C4-7 RFA: Modest benefit, but still having pain  · 12/21/2018: Right L2-5 RFA: Excellent relief until lifting firewood  · 05/03/2019:  Left L2-5 RFA:  Excellent relief ongoing  · 06/13/2019:  Left C4-7 RFA:  Limited benefit     Relevant Surgeries:   · C4-7 ACDF with C5/6 PSIF in 2/2016     Affecting sleep? Yes     Affecting daily activities? Yes     Depressive symptoms? Yes, due to general medical condition.  He denies significant symptoms today          · SI/HI? No     Work status: On disability from his job as an [employer] .    Prescription Monitoring Program database:  Reviewed and consistent with medication use as prescribed.    Last 3 PDI Scores 3/5/2020 12/18/2019 9/18/2019   Pain Disability Index (PDI) 42 [phone]       Opioid Risk Score     None        Constitution: Negative. Negative for chills, fever, malaise/fatigue, weight gain and weight loss.   HENT: Negative.  Negative for ear pain and hoarse voice.    Eyes: Negative.  Negative for blurred vision, pain and visual disturbance.   Cardiovascular: Negative for dyspnea on exertion and irregular heartbeat.   Respiratory: Negative.  Negative for cough, shortness of breath and wheezing.    Endocrine: Negative.  Negative for cold intolerance and heat intolerance.   Hematologic/Lymphatic: Negative.  Negative for adenopathy and bleeding problem. Does not bruise/bleed easily.   Skin: Negative.  Negative for color change, itching and rash.   Musculoskeletal: Positive for neck pain and stiffness. Negative for back pain.   Gastrointestinal: Negative.  Negative for change in bowel habit, diarrhea, hematemesis, hematochezia, melena and vomiting.   Genitourinary: Negative.  Negative for flank pain, frequency, hematuria and urgency.   Neurological: Positive for  dizziness and light-headedness. Negative for difficulty with concentration, headaches, loss of balance and seizures.   Psychiatric/Behavioral: Negative for altered mental status, depression and suicidal ideas. The patient has insomnia and is nervous/anxious.    Allergic/Immunologic: Negative.  Negative for HIV exposure.   All other reviewed and negative other than HPI.          Past Medical History:   Diagnosis Date    Arthritis     Cataract     Cervical back pain with evidence of disc disease     Hiatal hernia     Hypertension     Thyroid disease        Past Surgical History:   Procedure Laterality Date    ACROMIOCLAVICULAR JOINT CYST EXCISION Right     BACK SURGERY      cataracts Bilateral     CERVICAL FUSION      COLONOSCOPY N/A 4/27/2018    Procedure: COLONOSCOPY;  Surgeon: Giovani Medeiros MD;  [facility]  Service: Endoscopy;  Laterality: N/A;    EPIDURAL STEROID INJECTION N/A 10/11/2019    Procedure: INJECTION, STEROID, EPIDURAL;  Surgeon: Kasandra Montoya MD;  Location: [facility] PAIN MGT;  Service: Pain Management;  Laterality: N/A;  Lumbar NGUYEN L4/5    NGUYEN      EYE SURGERY      RADIOFREQUENCY ABLATION  10/2016    cervical spine/Jan    RADIOFREQUENCY ABLATION Left 5/3/2019    Procedure: RADIOFREQUENCY ABLATION, L2-L5 MEDIALBRANCH;  Surgeon: Kasandra Montoya MD;  Location: [facility] PAIN MGT;  Service: Pain Management;  Laterality: Left;    RADIOFREQUENCY ABLATION Left 6/13/2019    Procedure: RADIOFREQUENCY ABLATION C4-7;  Surgeon: Kasandra Montoya MD;  Location: [facility] PAIN MGT;  Service: Pain Management;  Laterality: Left;    RETINAL DETACHMENT SURGERY      ROTATOR CUFF REPAIR Right     SPINE SURGERY      cerival fusion     TRIGGER POINT INJECTION Left 6/13/2019    Procedure: INJECTION, TRIGGER POINT;  Surgeon: Kasandra Montoya MD;  Location: [facility] PAIN MGT;  Service: Pain Management;  Laterality: Left;       Review of patient's allergies indicates:   Allergen Reactions    Pcn  [penicillins] Hives and Rash    Lipitor [atorvastatin] Other (See Comments)     Muscle cramps, weakness       Current Outpatient Medications   Medication Sig Dispense Refill    acetaminophen (TYLENOL) 500 MG tablet Take 500 mg by mouth every 6 (six) hours as needed for Pain.      aspirin (ECOTRIN) 81 MG EC tablet Take 81 mg by mouth once daily.      cefadroxil (DURICEF) 500 mg/5 mL suspension Take 1 g by mouth 2 (two) times daily.      cetirizine (ZYRTEC) 10 MG tablet Take 10 mg by mouth nightly.  5    FOLIC ACID/MULTIVIT-MIN/LUTEIN (CENTRUM SILVER ORAL) Take by mouth once daily.      gabapentin (NEURONTIN) 300 MG capsule Take 2 capsules (600 mg total) by mouth 3 (three) times daily. Increase as directed 180 capsule 5    HYDROcodone-acetaminophen (NORCO) 7.5-325 mg per tablet Take 1 tablet by mouth every 12 (twelve) hours as needed for Pain. 60 tablet 0    levothyroxine (SYNTHROID) 50 MCG tablet Take 50 mcg by mouth once daily.      meclizine (ANTIVERT) 25 mg tablet Take 25 mg by mouth once daily.       methocarbamol (ROBAXIN) 500 MG Tab Take 1 tablet (500 mg total) by mouth 2 (two) times daily. 60 tablet 4    omeprazole (PRILOSEC) 20 MG capsule Take 1 capsule (20 mg total) by mouth once daily. 90 capsule 3    pravastatin (PRAVACHOL) 20 MG tablet       valsartan (DIOVAN) 80 MG tablet Take 40 mg by mouth once daily.   1    fluticasone (FLONASE) 50 mcg/actuation nasal spray SPRAY 1-2 SPRAYS into BOTH nostrils DAILY  5    naloxegol (MOVANTIK) tablet Take 25 mg by mouth as needed.      traZODone (DESYREL) 50 MG tablet Take 50 mg by mouth nightly.  2     No current facility-administered medications for this visit.        Family History   Problem Relation Age of Onset    Heart disease Mother     Cancer Father     Leukemia Brother     Colon cancer Neg Hx     Celiac disease Neg Hx     Crohn's disease Neg Hx     Esophageal cancer Neg Hx     Inflammatory bowel disease Neg Hx     Irritable bowel  "syndrome Neg Hx     Liver cancer Neg Hx     Rectal cancer Neg Hx     Stomach cancer Neg Hx     Ulcerative colitis Neg Hx        Social History     Socioeconomic History    Marital status:      Spouse name: Not on file    Number of children: Not on file    Years of education: Not on file    Highest education level: Not on file   Occupational History    Not on file   Social Needs    Financial resource strain: Not on file    Food insecurity:     Worry: Not on file     Inability: Not on file    Transportation needs:     Medical: Not on file     Non-medical: Not on file   Tobacco Use    Smoking status: Never Smoker    Smokeless tobacco: Never Used   Substance and Sexual Activity    Alcohol use: No    Drug use: No    Sexual activity: Not on file   Lifestyle    Physical activity:     Days per week: Not on file     Minutes per session: Not on file    Stress: Not on file   Relationships    Social connections:     Talks on phone: Not on file     Gets together: Not on file     Attends Synagogue service: Not on file     Active member of club or organization: Not on file     Attends meetings of clubs or organizations: Not on file     Relationship status: Not on file   Other Topics Concern    Not on file   Social History Narrative    Not on file       Objective:     Vitals:    03/05/20 1508   BP: (!) 144/82   Pulse: 76   Weight: 111.7 kg (246 lb 4.1 oz)   Height: 6' 0.99" (1.854 m)   PainSc:   6       GEN:  Well developed, well nourished.  No acute distress.  No pain behavior.  HEENT:  No trauma.  Mucous membranes moist.  Nares patent bilaterally.  PSYCH: Normal affect. Thought content appropriate.  CHEST:  Breathing symmetric.  No audible wheezing.  ABD: Soft, non-tender, non-distended.  SKIN:  Warm, pink, dry.  No rash on exposed areas.    EXT:  No cyanosis, clubbing, or edema.  No color change or changes in nail or hair growth.  NEURO/MUSCULOSKELETAL:  Fully alert, oriented, and appropriate. Speech " normal nella. No cranial nerve deficits.   Sensory: No sensory deficit in the lower extremities.   Reflexes: 1+ and symmetric throughout.  Negative Akins's bilaterally.  Gait: Antalgic- ambulates without assistance.    C-spine:  Limited mobility due to previous fusion.  Point tenderness over likely T2.  No tenderness with spinous process mobility.  No tenderness in the paraspinal muscles.  Mild trigger points palpated in bilateral paraspinal muscles    Present trendelenburg sign bilaterally.  SI Joint/Hip: Negative KHRIS bilaterally.  Negative FADIR bilaterally.  TTP over bilateral lumbar paraspinals. TTP over lumbar spine.   No TTP over bilateral SI joints, piriformis muscles, or GTB.    Negative SLR bilaterally.   C-spine:  Limited mobility due to previous fusion.  Positive axial loading on the left greater than right.  Positive trigger points palpated  L-Spine:  Decreased ROM with pain on extension.  Positive facet loading bilaterally.  SI/Hip: FABERs and FADIRs is negative bilaterally.     Imaging:        The imaging studies listed below were independently reviewed by me, and I agree with the findings as documented below.     Narrative       MRI LUMBAR SPINE WITHOUT CONTRAST    COMPARISON: None    TECHNIQUE:  Sagittal T1, T2, and STIR;  axial T1 and T2 sequences through the lumbar spine were acquired without contrast.    FINDINGS: Vertebral body height and alignment are anatomic.  There is straightening of normal lumbar lordosis.  Marrow signal is within normal limits.  A few scattered small vertebral body hemangiomas are present.  Disc heights are well-maintained.  The conus terminates at L1.    L1-L2:  No disc herniation. No spinal canal or neuroforaminal narrowing.    L2-L3:  There is mild diffuse disc bulging and bilateral facet arthropathy without significant spinal canal or neuroforaminal narrowing.    L3-L4:  There is mild diffuse bulging of the disc and bilateral facet arthropathy, contributing to  mild spinal canal narrowing and moderate right and mild left neuroforaminal narrowing.    L4-L5:  There is mild diffuse disc bulging accompanied by a small annular fissure of the subcarinal disc.  Bilateral facet arthropathy is also present.  There is resultant mild spinal canal narrowing and moderate bilateral neuroforaminal narrowing.    L5-S1:  There is mild broad-based posterior disc bulging with associated annular fissure this disc.  This contributes to mild spinal canal narrowing.  No significant neuroforaminal narrowing.       Impression         Multilevel degenerative lumbar spondylosis resulting in mild spinal canal narrowing and mild to moderate neuroforaminal narrowing.      Electronically signed by: Rafael Bermudez MD  Date: 07/11/17  Time: 17:17          Diagnostic Results:  All imaging was independently reviewed by me.  Below is a summary from Dr Fowler's note.  I concur with the below findings.     MRI T-spine, dated 8/8/16:  1. No significant central or neuroforaminal stenosis     MRI C-spine, dated 8/8/16:  1. No significant stenosis      Flex/Ex X-ray C-spine, dated 7/19/16:  1. No prevertebral swelling  2. No gross instability   3. Good hardware position     MRI C-spine from an outside facility, dated 1/28/2016:  1. Moderate to severe DDD  2. Disc osteophyte complex, worst at C5/6   3. Moderate stenosis at C4/5 and C6/7      CT C-spine from an outside facility, dated 4/2015:  1. Diffuse cervical spondylosis   2. DDD, worst at C5/6, with some ossification of the PLL at C5/6      CT C-spine from an outside facility, dated 3/17/2016:  1. Fusion surgery at C5/6  2. Hardware in good position       AP and Lateral X-ray C-spine from an outside facility:  1. C4-7 ACDF  2. Hardware in good position  3. Spine in good alignment      Assessment:     Encounter Diagnoses   Name Primary?    Chronic pain syndrome Yes    Cervical post-laminectomy syndrome     Lumbosacral radiculopathy     DDD (degenerative  disc disease), lumbar     Encounter for long-term opiate analgesic use        Plan:     Diagnoses and all orders for this visit:    Chronic pain syndrome  -     MRI Lumbar Spine Without Contrast; Future    Cervical post-laminectomy syndrome  -     X-Ray Cervical Spine 5 View W Flex Extxt; Future    Lumbosacral radiculopathy    DDD (degenerative disc disease), lumbar  -     X-Ray Thoracic Spine AP Lateral; Future    Encounter for long-term opiate analgesic use  -     Pain Clinic Drug Screen       His pain is consistent with the above.    We discussed the assessment and recommendations.  All available images were reviewed. We discussed the disease process, prognosis, treatment plan, and risks and benefits. The patient is aware of the risks and benefits of the medications being prescribed, common side effects, and proper usage. The following is the plan we agreed on:     1. I will order XRAYs of cervical and thoracic spine.  I will also order lumbar MRI.    2. Can repeat right and left L2-5 LMB RFA PRN (Coolief) when needed.  3. Can repeat for repeat C7/T1 epidural PRN.  4. Continue gabapentin to 600 mg 3 times daily  5. Continue Norco 7.5/325 twice daily as needed, #60, refilled today.    6. The patient is here today for a refill of current pain medications and they believe these provide effective pain control and improvements in quality of life.  The patient notes no serious side effects, and feels the benefits outweigh the risks.  The patient was reminded of the pain contract that they signed previously as well as the risks and benefits of the medication including possible death.  The updated [place] Board of [employer] prescription monitoring program was reviewed, and the patient has been found to be compliant with current treatment plan.   7. Previous UDS from 4/2019 reviewed and consistent. Repeat today.  8. Continue Pennsaid gel, concentrating on neck and left shoulder  9. Continue PT HEP  10. We again  discussed spinal cord stimulator which he will think about.  He would like to speak to Dr. Montoya after imaging.  11. Continue Robaxin.   12. Continue Amitriptyline 25mg QHS   13. Continue Meloxicam 15 mg daily, benefits currently outweigh risks.  Will continue to reassess PRN  14. Movantik 25 mg for OIC PRN  15. Continue Tylenol as needed.  16. RTC after imaging.      The above plan and management options were discussed at length with patient. Patient is in agreement with the above and verbalized understanding.     Aparna Daigle, GORAN  03/05/2020

## 2020-03-09 ENCOUNTER — PATIENT MESSAGE (OUTPATIENT)
Dept: SPINE | Facility: CLINIC | Age: 55
End: 2020-03-09

## 2020-03-09 ENCOUNTER — PATIENT MESSAGE (OUTPATIENT)
Dept: PAIN MEDICINE | Facility: CLINIC | Age: 55
End: 2020-03-09

## 2020-03-09 LAB
6MAM UR QL: NOT DETECTED
7AMINOCLONAZEPAM UR QL: NOT DETECTED
A-OH ALPRAZ UR QL: NOT DETECTED
ALPRAZ UR QL: NOT DETECTED
AMPHET UR QL SCN: NOT DETECTED
ANNOTATION COMMENT IMP: NORMAL
ANNOTATION COMMENT IMP: NORMAL
BARBITURATES UR QL: NOT DETECTED
BUPRENORPHINE UR QL: NOT DETECTED
BZE UR QL: NOT DETECTED
CARBOXYTHC UR QL: NOT DETECTED
CARISOPRODOL UR QL: NOT DETECTED
CLONAZEPAM UR QL: NOT DETECTED
CODEINE UR QL: NOT DETECTED
CREAT UR-MCNC: 270.4 MG/DL (ref 20–400)
DIAZEPAM UR QL: NOT DETECTED
ETHYL GLUCURONIDE UR QL: NOT DETECTED
FENTANYL UR QL: NOT DETECTED
HYDROCODONE UR QL: PRESENT
HYDROMORPHONE UR QL: NOT DETECTED
LORAZEPAM UR QL: NOT DETECTED
MDA UR QL: NOT DETECTED
MDEA UR QL: NOT DETECTED
MDMA UR QL: NOT DETECTED
ME-PHENIDATE UR QL: NOT DETECTED
MEPERIDINE UR QL: NOT DETECTED
METHADONE UR QL: NOT DETECTED
METHAMPHET UR QL: NOT DETECTED
MIDAZOLAM UR QL SCN: NOT DETECTED
MORPHINE UR QL: NOT DETECTED
NORBUPRENORPHINE UR QL CFM: NOT DETECTED
NORDIAZEPAM UR QL: NOT DETECTED
NORFENTANYL UR QL: NOT DETECTED
NORHYDROCODONE UR QL CFM: PRESENT
NOROXYCODONE UR QL CFM: NOT DETECTED
NOROXYMORPHONE: NOT DETECTED
OXAZEPAM UR QL: NOT DETECTED
OXYCODONE UR QL: NOT DETECTED
OXYMORPHONE UR QL: NOT DETECTED
PATHOLOGY STUDY: NORMAL
PCP UR QL: NOT DETECTED
PHENTERMINE UR QL: NOT DETECTED
PROPOXYPH UR QL: NOT DETECTED
SERVICE CMNT-IMP: NORMAL
TAPENTADOL UR QL SCN: NOT DETECTED
TAPENTADOL-O-SULF: NOT DETECTED
TEMAZEPAM UR QL: NOT DETECTED
TRAMADOL UR QL: NOT DETECTED
ZOLPIDEM UR QL: NOT DETECTED

## 2020-07-16 ENCOUNTER — TELEPHONE (OUTPATIENT)
Dept: GASTROENTEROLOGY | Facility: CLINIC | Age: 55
End: 2020-07-16

## 2020-07-16 NOTE — TELEPHONE ENCOUNTER
MA contacted pt and spoke to pt wife, pt wife verbalized all understanding that  Pt can follow up with PCP for Omeprazole refill. If he wants to be seen here,we can get him scheduled for a visit with one of our NP. Pt wife said pt will reach out to his PCP. Pt wife verbalized all understanding.         ----- Message from Giovani Medeiros MD sent at 7/16/2020  7:45 AM CDT -----  Please have him follow up with PCP for Omeprazole refill. If he wants to be seen here, please schedule an appointment with NP/PA.

## 2021-02-11 ENCOUNTER — TELEPHONE (OUTPATIENT)
Dept: PAIN MEDICINE | Facility: CLINIC | Age: 56
End: 2021-02-11

## 2021-02-12 ENCOUNTER — OFFICE VISIT (OUTPATIENT)
Dept: PAIN MEDICINE | Facility: CLINIC | Age: 56
End: 2021-02-12
Payer: MEDICARE

## 2021-02-12 VITALS
HEIGHT: 72 IN | WEIGHT: 255.75 LBS | OXYGEN SATURATION: 100 % | HEART RATE: 83 BPM | BODY MASS INDEX: 34.64 KG/M2 | RESPIRATION RATE: 18 BRPM | TEMPERATURE: 99 F | DIASTOLIC BLOOD PRESSURE: 96 MMHG | SYSTOLIC BLOOD PRESSURE: 139 MMHG

## 2021-02-12 DIAGNOSIS — M51.36 DDD (DEGENERATIVE DISC DISEASE), LUMBAR: ICD-10-CM

## 2021-02-12 DIAGNOSIS — M54.12 CERVICAL RADICULOPATHY: ICD-10-CM

## 2021-02-12 DIAGNOSIS — Z98.1 S/P CERVICAL SPINAL FUSION: ICD-10-CM

## 2021-02-12 DIAGNOSIS — M47.816 LUMBAR SPONDYLOSIS: ICD-10-CM

## 2021-02-12 DIAGNOSIS — G89.4 CHRONIC PAIN DISORDER: ICD-10-CM

## 2021-02-12 DIAGNOSIS — M47.816 FACET ARTHRITIS OF LUMBAR REGION: ICD-10-CM

## 2021-02-12 DIAGNOSIS — G89.4 CHRONIC PAIN DISORDER: Primary | ICD-10-CM

## 2021-02-12 DIAGNOSIS — M47.812 SPONDYLOSIS OF CERVICAL REGION WITHOUT MYELOPATHY OR RADICULOPATHY: ICD-10-CM

## 2021-02-12 DIAGNOSIS — M50.30 DDD (DEGENERATIVE DISC DISEASE), CERVICAL: ICD-10-CM

## 2021-02-12 DIAGNOSIS — M79.10 MYALGIA: ICD-10-CM

## 2021-02-12 DIAGNOSIS — M79.18 MYOFASCIAL PAIN: ICD-10-CM

## 2021-02-12 PROCEDURE — 99999 PR PBB SHADOW E&M-EST. PATIENT-LVL V: ICD-10-PCS | Mod: PBBFAC,,, | Performed by: NURSE PRACTITIONER

## 2021-02-12 PROCEDURE — 99215 OFFICE O/P EST HI 40 MIN: CPT | Mod: PBBFAC | Performed by: NURSE PRACTITIONER

## 2021-02-12 PROCEDURE — 99213 PR OFFICE/OUTPT VISIT, EST, LEVL III, 20-29 MIN: ICD-10-PCS | Mod: S$GLB,,, | Performed by: NURSE PRACTITIONER

## 2021-02-12 PROCEDURE — 99999 PR PBB SHADOW E&M-EST. PATIENT-LVL V: CPT | Mod: PBBFAC,,, | Performed by: NURSE PRACTITIONER

## 2021-02-12 PROCEDURE — 99213 OFFICE O/P EST LOW 20 MIN: CPT | Mod: S$GLB,,, | Performed by: NURSE PRACTITIONER

## 2021-02-12 RX ORDER — HYDROCODONE BITARTRATE AND ACETAMINOPHEN 7.5; 325 MG/1; MG/1
1 TABLET ORAL EVERY 12 HOURS PRN
Qty: 60 TABLET | Refills: 0 | Status: SHIPPED | OUTPATIENT
Start: 2021-02-12 | End: 2021-06-17 | Stop reason: SDUPTHER

## 2021-02-12 RX ORDER — MELOXICAM 15 MG/1
15 TABLET ORAL DAILY
COMMUNITY

## 2021-02-25 ENCOUNTER — HOSPITAL ENCOUNTER (OUTPATIENT)
Dept: RADIOLOGY | Facility: OTHER | Age: 56
Discharge: HOME | End: 2021-02-25
Attending: NURSE PRACTITIONER
Payer: MEDICARE

## 2021-02-25 DIAGNOSIS — M54.12 CERVICAL RADICULOPATHY: ICD-10-CM

## 2021-02-25 DIAGNOSIS — M47.816 LUMBAR SPONDYLOSIS: ICD-10-CM

## 2021-02-25 PROCEDURE — 72070 X-RAY EXAM THORAC SPINE 2VWS: CPT | Mod: TC,FY

## 2021-02-25 PROCEDURE — 72052 X-RAY EXAM NECK SPINE 6/>VWS: CPT | Mod: TC,FY

## 2021-02-25 PROCEDURE — 72052 XR CERVICAL SPINE 5 VIEW WITH FLEX AND EXT: ICD-10-PCS | Mod: 26,,, | Performed by: RADIOLOGY

## 2021-02-25 PROCEDURE — 72070 XR THORACIC SPINE AP LATERAL: ICD-10-PCS | Mod: 26,,, | Performed by: RADIOLOGY

## 2021-02-25 PROCEDURE — 72148 MRI LUMBAR SPINE W/O DYE: CPT | Mod: 26,,, | Performed by: RADIOLOGY

## 2021-02-25 PROCEDURE — 72052 X-RAY EXAM NECK SPINE 6/>VWS: CPT | Mod: 26,,, | Performed by: RADIOLOGY

## 2021-02-25 PROCEDURE — 72148 MRI LUMBAR SPINE W/O DYE: CPT | Mod: TC

## 2021-02-25 PROCEDURE — 72148 MRI LUMBAR SPINE WITHOUT CONTRAST: ICD-10-PCS | Mod: 26,,, | Performed by: RADIOLOGY

## 2021-02-25 PROCEDURE — 72070 X-RAY EXAM THORAC SPINE 2VWS: CPT | Mod: 26,,, | Performed by: RADIOLOGY

## 2021-03-02 ENCOUNTER — OFFICE VISIT (OUTPATIENT)
Dept: PAIN MEDICINE | Facility: CLINIC | Age: 56
End: 2021-03-02
Payer: MEDICARE

## 2021-03-02 VITALS
WEIGHT: 259.06 LBS | HEART RATE: 86 BPM | HEIGHT: 72 IN | SYSTOLIC BLOOD PRESSURE: 122 MMHG | TEMPERATURE: 98 F | DIASTOLIC BLOOD PRESSURE: 89 MMHG | BODY MASS INDEX: 35.09 KG/M2

## 2021-03-02 DIAGNOSIS — M51.36 DDD (DEGENERATIVE DISC DISEASE), LUMBAR: ICD-10-CM

## 2021-03-02 DIAGNOSIS — G89.4 CHRONIC PAIN DISORDER: ICD-10-CM

## 2021-03-02 DIAGNOSIS — M47.812 CERVICAL SPONDYLOSIS: ICD-10-CM

## 2021-03-02 DIAGNOSIS — M50.30 DDD (DEGENERATIVE DISC DISEASE), CERVICAL: Primary | ICD-10-CM

## 2021-03-02 DIAGNOSIS — M47.816 LUMBAR SPONDYLOSIS: ICD-10-CM

## 2021-03-02 PROCEDURE — 99214 OFFICE O/P EST MOD 30 MIN: CPT | Mod: S$GLB,,, | Performed by: ANESTHESIOLOGY

## 2021-03-02 PROCEDURE — 99999 PR PBB SHADOW E&M-EST. PATIENT-LVL V: CPT | Mod: PBBFAC,,, | Performed by: ANESTHESIOLOGY

## 2021-03-02 PROCEDURE — 3008F BODY MASS INDEX DOCD: CPT | Mod: CPTII,S$GLB,, | Performed by: ANESTHESIOLOGY

## 2021-03-02 PROCEDURE — 1125F AMNT PAIN NOTED PAIN PRSNT: CPT | Mod: S$GLB,,, | Performed by: ANESTHESIOLOGY

## 2021-03-02 PROCEDURE — 99214 PR OFFICE/OUTPT VISIT, EST, LEVL IV, 30-39 MIN: ICD-10-PCS | Mod: S$GLB,,, | Performed by: ANESTHESIOLOGY

## 2021-03-02 PROCEDURE — 99999 PR PBB SHADOW E&M-EST. PATIENT-LVL V: ICD-10-PCS | Mod: PBBFAC,,, | Performed by: ANESTHESIOLOGY

## 2021-03-02 PROCEDURE — 3008F PR BODY MASS INDEX (BMI) DOCUMENTED: ICD-10-PCS | Mod: CPTII,S$GLB,, | Performed by: ANESTHESIOLOGY

## 2021-03-02 PROCEDURE — 1125F PR PAIN SEVERITY QUANTIFIED, PAIN PRESENT: ICD-10-PCS | Mod: S$GLB,,, | Performed by: ANESTHESIOLOGY

## 2021-03-02 RX ORDER — CYCLOBENZAPRINE HCL 10 MG
TABLET ORAL
COMMUNITY
End: 2021-04-09

## 2021-03-02 RX ORDER — TRAZODONE HYDROCHLORIDE 50 MG/1
TABLET ORAL
COMMUNITY
Start: 2021-02-22 | End: 2021-09-17

## 2021-03-02 RX ORDER — ALBUTEROL SULFATE 90 UG/1
AEROSOL, METERED RESPIRATORY (INHALATION)
COMMUNITY
Start: 2021-01-13

## 2021-03-09 ENCOUNTER — HOSPITAL ENCOUNTER (OUTPATIENT)
Facility: OTHER | Age: 56
Discharge: HOME | End: 2021-03-09
Attending: ANESTHESIOLOGY | Admitting: ANESTHESIOLOGY
Payer: MEDICARE

## 2021-03-09 VITALS
SYSTOLIC BLOOD PRESSURE: 113 MMHG | DIASTOLIC BLOOD PRESSURE: 66 MMHG | OXYGEN SATURATION: 98 % | HEART RATE: 65 BPM | RESPIRATION RATE: 15 BRPM | TEMPERATURE: 98 F

## 2021-03-09 DIAGNOSIS — G89.29 CHRONIC PAIN: ICD-10-CM

## 2021-03-09 DIAGNOSIS — M47.812 CERVICAL SPONDYLOSIS: Primary | ICD-10-CM

## 2021-03-09 PROCEDURE — 25000003 PHARM REV CODE 250: Performed by: ANESTHESIOLOGY

## 2021-03-09 PROCEDURE — 64633 DESTROY CERV/THOR FACET JNT: CPT | Mod: LT | Performed by: ANESTHESIOLOGY

## 2021-03-09 PROCEDURE — 64634 PR DESTROY C/TH FACET JNT ADDL: ICD-10-PCS | Mod: LT,,, | Performed by: ANESTHESIOLOGY

## 2021-03-09 PROCEDURE — 63600175 PHARM REV CODE 636 W HCPCS: Performed by: ANESTHESIOLOGY

## 2021-03-09 PROCEDURE — 64634 DESTROY C/TH FACET JNT ADDL: CPT | Mod: LT | Performed by: ANESTHESIOLOGY

## 2021-03-09 PROCEDURE — 64634 DESTROY C/TH FACET JNT ADDL: CPT | Mod: LT,,, | Performed by: ANESTHESIOLOGY

## 2021-03-09 PROCEDURE — 64633 DESTROY CERV/THOR FACET JNT: CPT | Mod: LT,,, | Performed by: ANESTHESIOLOGY

## 2021-03-09 PROCEDURE — 64633 PR DESTROY CERV/THOR FACET JNT: ICD-10-PCS | Mod: LT,,, | Performed by: ANESTHESIOLOGY

## 2021-03-09 RX ORDER — LIDOCAINE HYDROCHLORIDE 10 MG/ML
INJECTION INFILTRATION; PERINEURAL
Status: DISCONTINUED | OUTPATIENT
Start: 2021-03-09 | End: 2021-03-09 | Stop reason: HOSPADM

## 2021-03-09 RX ORDER — BUPIVACAINE HYDROCHLORIDE 2.5 MG/ML
INJECTION, SOLUTION EPIDURAL; INFILTRATION; INTRACAUDAL
Status: DISCONTINUED | OUTPATIENT
Start: 2021-03-09 | End: 2021-03-09 | Stop reason: HOSPADM

## 2021-03-09 RX ORDER — DEXAMETHASONE SODIUM PHOSPHATE 4 MG/ML
INJECTION, SOLUTION INTRA-ARTICULAR; INTRALESIONAL; INTRAMUSCULAR; INTRAVENOUS; SOFT TISSUE
Status: DISCONTINUED | OUTPATIENT
Start: 2021-03-09 | End: 2021-03-09 | Stop reason: HOSPADM

## 2021-03-09 RX ORDER — MIDAZOLAM HYDROCHLORIDE 1 MG/ML
INJECTION INTRAMUSCULAR; INTRAVENOUS
Status: DISCONTINUED | OUTPATIENT
Start: 2021-03-09 | End: 2021-03-09 | Stop reason: HOSPADM

## 2021-03-09 RX ORDER — FENTANYL CITRATE 50 UG/ML
INJECTION, SOLUTION INTRAMUSCULAR; INTRAVENOUS
Status: DISCONTINUED | OUTPATIENT
Start: 2021-03-09 | End: 2021-03-09 | Stop reason: HOSPADM

## 2021-03-09 RX ORDER — SODIUM CHLORIDE 9 MG/ML
INJECTION, SOLUTION INTRAVENOUS CONTINUOUS
Status: DISCONTINUED | OUTPATIENT
Start: 2021-03-09 | End: 2021-03-09 | Stop reason: HOSPADM

## 2021-03-09 RX ADMIN — SODIUM CHLORIDE: 0.9 INJECTION, SOLUTION INTRAVENOUS at 10:03

## 2021-03-26 ENCOUNTER — HOSPITAL ENCOUNTER (OUTPATIENT)
Facility: OTHER | Age: 56
Discharge: HOME | End: 2021-03-26
Attending: ANESTHESIOLOGY | Admitting: ANESTHESIOLOGY
Payer: MEDICARE

## 2021-03-26 VITALS
OXYGEN SATURATION: 95 % | HEIGHT: 72 IN | BODY MASS INDEX: 34.54 KG/M2 | DIASTOLIC BLOOD PRESSURE: 83 MMHG | HEART RATE: 57 BPM | RESPIRATION RATE: 14 BRPM | SYSTOLIC BLOOD PRESSURE: 141 MMHG | WEIGHT: 255 LBS

## 2021-03-26 DIAGNOSIS — G89.29 CHRONIC PAIN: ICD-10-CM

## 2021-03-26 DIAGNOSIS — M47.812 CERVICAL SPONDYLOSIS: Primary | ICD-10-CM

## 2021-03-26 PROCEDURE — 25000003 PHARM REV CODE 250: Performed by: ANESTHESIOLOGY

## 2021-03-26 PROCEDURE — 64633 DESTROY CERV/THOR FACET JNT: CPT | Mod: RT,,, | Performed by: ANESTHESIOLOGY

## 2021-03-26 PROCEDURE — 64633 PR DESTROY CERV/THOR FACET JNT: ICD-10-PCS | Mod: RT,,, | Performed by: ANESTHESIOLOGY

## 2021-03-26 PROCEDURE — 64634 DESTROY C/TH FACET JNT ADDL: CPT | Mod: RT,,, | Performed by: ANESTHESIOLOGY

## 2021-03-26 PROCEDURE — 64633 DESTROY CERV/THOR FACET JNT: CPT | Mod: RT | Performed by: ANESTHESIOLOGY

## 2021-03-26 PROCEDURE — 64634 DESTROY C/TH FACET JNT ADDL: CPT | Mod: RT | Performed by: ANESTHESIOLOGY

## 2021-03-26 PROCEDURE — 63600175 PHARM REV CODE 636 W HCPCS: Performed by: ANESTHESIOLOGY

## 2021-03-26 PROCEDURE — 64634 PR DESTROY C/TH FACET JNT ADDL: ICD-10-PCS | Mod: RT,,, | Performed by: ANESTHESIOLOGY

## 2021-03-26 RX ORDER — LIDOCAINE HYDROCHLORIDE 10 MG/ML
INJECTION INFILTRATION; PERINEURAL
Status: DISCONTINUED | OUTPATIENT
Start: 2021-03-26 | End: 2021-03-26 | Stop reason: HOSPADM

## 2021-03-26 RX ORDER — FENTANYL CITRATE 50 UG/ML
INJECTION, SOLUTION INTRAMUSCULAR; INTRAVENOUS
Status: DISCONTINUED | OUTPATIENT
Start: 2021-03-26 | End: 2021-03-26 | Stop reason: HOSPADM

## 2021-03-26 RX ORDER — MIDAZOLAM HYDROCHLORIDE 1 MG/ML
INJECTION INTRAMUSCULAR; INTRAVENOUS
Status: DISCONTINUED | OUTPATIENT
Start: 2021-03-26 | End: 2021-03-26 | Stop reason: HOSPADM

## 2021-03-26 RX ORDER — SODIUM CHLORIDE 9 MG/ML
INJECTION, SOLUTION INTRAVENOUS CONTINUOUS
Status: DISCONTINUED | OUTPATIENT
Start: 2021-03-26 | End: 2021-03-26 | Stop reason: HOSPADM

## 2021-03-26 RX ORDER — DEXAMETHASONE SODIUM PHOSPHATE 4 MG/ML
INJECTION, SOLUTION INTRA-ARTICULAR; INTRALESIONAL; INTRAMUSCULAR; INTRAVENOUS; SOFT TISSUE
Status: DISCONTINUED | OUTPATIENT
Start: 2021-03-26 | End: 2021-03-26 | Stop reason: HOSPADM

## 2021-03-26 RX ORDER — BUPIVACAINE HYDROCHLORIDE 2.5 MG/ML
INJECTION, SOLUTION EPIDURAL; INFILTRATION; INTRACAUDAL
Status: DISCONTINUED | OUTPATIENT
Start: 2021-03-26 | End: 2021-03-26 | Stop reason: HOSPADM

## 2021-04-08 ENCOUNTER — TELEPHONE (OUTPATIENT)
Dept: PAIN MEDICINE | Facility: CLINIC | Age: 56
End: 2021-04-08

## 2021-04-09 ENCOUNTER — OFFICE VISIT (OUTPATIENT)
Dept: PAIN MEDICINE | Facility: CLINIC | Age: 56
End: 2021-04-09
Payer: MEDICARE

## 2021-04-09 VITALS
OXYGEN SATURATION: 97 % | HEIGHT: 73 IN | WEIGHT: 264.31 LBS | BODY MASS INDEX: 35.03 KG/M2 | RESPIRATION RATE: 18 BRPM | HEART RATE: 82 BPM | DIASTOLIC BLOOD PRESSURE: 89 MMHG | TEMPERATURE: 99 F | SYSTOLIC BLOOD PRESSURE: 136 MMHG

## 2021-04-09 DIAGNOSIS — Z79.891 ENCOUNTER FOR MONITORING OPIOID MAINTENANCE THERAPY: ICD-10-CM

## 2021-04-09 DIAGNOSIS — Z98.1 S/P CERVICAL SPINAL FUSION: ICD-10-CM

## 2021-04-09 DIAGNOSIS — G89.4 CHRONIC PAIN DISORDER: Primary | ICD-10-CM

## 2021-04-09 DIAGNOSIS — M50.30 DDD (DEGENERATIVE DISC DISEASE), CERVICAL: ICD-10-CM

## 2021-04-09 DIAGNOSIS — M51.36 DDD (DEGENERATIVE DISC DISEASE), LUMBAR: ICD-10-CM

## 2021-04-09 DIAGNOSIS — M47.812 CERVICAL SPONDYLOSIS: ICD-10-CM

## 2021-04-09 DIAGNOSIS — Z51.81 ENCOUNTER FOR MONITORING OPIOID MAINTENANCE THERAPY: ICD-10-CM

## 2021-04-09 DIAGNOSIS — M47.816 LUMBAR SPONDYLOSIS: ICD-10-CM

## 2021-04-09 PROCEDURE — 99214 OFFICE O/P EST MOD 30 MIN: CPT | Mod: S$GLB,,, | Performed by: NURSE PRACTITIONER

## 2021-04-09 PROCEDURE — 80307 DRUG TEST PRSMV CHEM ANLYZR: CPT | Performed by: NURSE PRACTITIONER

## 2021-04-09 PROCEDURE — 3008F PR BODY MASS INDEX (BMI) DOCUMENTED: ICD-10-PCS | Mod: CPTII,S$GLB,, | Performed by: NURSE PRACTITIONER

## 2021-04-09 PROCEDURE — 99999 PR PBB SHADOW E&M-EST. PATIENT-LVL IV: CPT | Mod: PBBFAC,,, | Performed by: NURSE PRACTITIONER

## 2021-04-09 PROCEDURE — 99214 PR OFFICE/OUTPT VISIT, EST, LEVL IV, 30-39 MIN: ICD-10-PCS | Mod: S$GLB,,, | Performed by: NURSE PRACTITIONER

## 2021-04-09 PROCEDURE — 3008F BODY MASS INDEX DOCD: CPT | Mod: CPTII,S$GLB,, | Performed by: NURSE PRACTITIONER

## 2021-04-09 PROCEDURE — 1125F PR PAIN SEVERITY QUANTIFIED, PAIN PRESENT: ICD-10-PCS | Mod: S$GLB,,, | Performed by: NURSE PRACTITIONER

## 2021-04-09 PROCEDURE — 1125F AMNT PAIN NOTED PAIN PRSNT: CPT | Mod: S$GLB,,, | Performed by: NURSE PRACTITIONER

## 2021-04-09 PROCEDURE — 99999 PR PBB SHADOW E&M-EST. PATIENT-LVL IV: ICD-10-PCS | Mod: PBBFAC,,, | Performed by: NURSE PRACTITIONER

## 2021-04-13 LAB
6MAM UR QL: NOT DETECTED
7AMINOCLONAZEPAM UR QL: NOT DETECTED
A-OH ALPRAZ UR QL: NOT DETECTED
ALPHA-OH-MIDAZOLAM: NOT DETECTED
ALPRAZ UR QL: NOT DETECTED
AMPHET UR QL SCN: NOT DETECTED
ANNOTATION COMMENT IMP: NORMAL
ANNOTATION COMMENT IMP: NORMAL
BARBITURATES UR QL: NOT DETECTED
BUPRENORPHINE UR QL: NOT DETECTED
BZE UR QL: NOT DETECTED
CARBOXYTHC UR QL: NOT DETECTED
CARISOPRODOL UR QL: NOT DETECTED
CLONAZEPAM UR QL: NOT DETECTED
CODEINE UR QL: NOT DETECTED
CREAT UR-MCNC: 66.1 MG/DL (ref 20–400)
DIAZEPAM UR QL: NOT DETECTED
ETHYL GLUCURONIDE UR QL: NOT DETECTED
FENTANYL UR QL: NOT DETECTED
GABAPENTIN: PRESENT
HYDROCODONE UR QL: NOT DETECTED
HYDROMORPHONE UR QL: NOT DETECTED
LORAZEPAM UR QL: NOT DETECTED
MDA UR QL: NOT DETECTED
MDEA UR QL: NOT DETECTED
MDMA UR QL: NOT DETECTED
ME-PHENIDATE UR QL: NOT DETECTED
MEPERIDINE UR QL: NOT DETECTED
METHADONE UR QL: NOT DETECTED
METHAMPHET UR QL: NOT DETECTED
MIDAZOLAM UR QL SCN: NOT DETECTED
MORPHINE UR QL: NOT DETECTED
NALOXONE: NOT DETECTED
NORBUPRENORPHINE UR QL CFM: NOT DETECTED
NORDIAZEPAM UR QL: NOT DETECTED
NORFENTANYL UR QL: NOT DETECTED
NORHYDROCODONE UR QL CFM: NOT DETECTED
NOROXYCODONE UR QL CFM: NOT DETECTED
NOROXYMORPHONE UR QL SCN: NOT DETECTED
OXAZEPAM UR QL: NOT DETECTED
OXYCODONE UR QL: NOT DETECTED
OXYMORPHONE UR QL: NOT DETECTED
PATHOLOGY STUDY: NORMAL
PCP UR QL: NOT DETECTED
PHENTERMINE UR QL: NOT DETECTED
PREGABALIN: NOT DETECTED
SERVICE CMNT-IMP: NORMAL
TAPENTADOL UR QL SCN: NOT DETECTED
TAPENTADOL-O-SULF: NOT DETECTED
TEMAZEPAM UR QL: NOT DETECTED
TRAMADOL UR QL: NOT DETECTED
ZOLPIDEM METABOLITE: NOT DETECTED
ZOLPIDEM UR QL: NOT DETECTED

## 2021-04-22 ENCOUNTER — PATIENT MESSAGE (OUTPATIENT)
Dept: PAIN MEDICINE | Facility: OTHER | Age: 56
End: 2021-04-22

## 2021-04-23 ENCOUNTER — HOSPITAL ENCOUNTER (OUTPATIENT)
Facility: OTHER | Age: 56
Discharge: HOME | End: 2021-04-23
Attending: ANESTHESIOLOGY | Admitting: ANESTHESIOLOGY
Payer: MEDICARE

## 2021-04-23 VITALS
RESPIRATION RATE: 14 BRPM | DIASTOLIC BLOOD PRESSURE: 76 MMHG | BODY MASS INDEX: 34.46 KG/M2 | WEIGHT: 260 LBS | SYSTOLIC BLOOD PRESSURE: 120 MMHG | TEMPERATURE: 98 F | HEIGHT: 73 IN | OXYGEN SATURATION: 95 % | HEART RATE: 74 BPM

## 2021-04-23 DIAGNOSIS — G89.29 CHRONIC PAIN: ICD-10-CM

## 2021-04-23 DIAGNOSIS — M47.816 LUMBAR SPONDYLOSIS: Primary | ICD-10-CM

## 2021-04-23 PROCEDURE — 64636 DESTROY L/S FACET JNT ADDL: CPT | Mod: RT | Performed by: ANESTHESIOLOGY

## 2021-04-23 PROCEDURE — 64635 DESTROY LUMB/SAC FACET JNT: CPT | Mod: RT,,, | Performed by: ANESTHESIOLOGY

## 2021-04-23 PROCEDURE — 25000003 PHARM REV CODE 250: Performed by: ANESTHESIOLOGY

## 2021-04-23 PROCEDURE — 64635 PR DESTROY LUMB/SAC FACET JNT: ICD-10-PCS | Mod: RT,,, | Performed by: ANESTHESIOLOGY

## 2021-04-23 PROCEDURE — 64635 DESTROY LUMB/SAC FACET JNT: CPT | Mod: RT | Performed by: ANESTHESIOLOGY

## 2021-04-23 PROCEDURE — 63600175 PHARM REV CODE 636 W HCPCS: Performed by: ANESTHESIOLOGY

## 2021-04-23 PROCEDURE — 64636 PR DESTROY L/S FACET JNT ADDL: ICD-10-PCS | Mod: RT,,, | Performed by: ANESTHESIOLOGY

## 2021-04-23 PROCEDURE — 64636 DESTROY L/S FACET JNT ADDL: CPT | Mod: RT,,, | Performed by: ANESTHESIOLOGY

## 2021-04-23 RX ORDER — SODIUM CHLORIDE 9 MG/ML
INJECTION, SOLUTION INTRAVENOUS CONTINUOUS
Status: DISCONTINUED | OUTPATIENT
Start: 2021-04-23 | End: 2021-04-23 | Stop reason: HOSPADM

## 2021-04-23 RX ORDER — MIDAZOLAM HYDROCHLORIDE 1 MG/ML
INJECTION INTRAMUSCULAR; INTRAVENOUS
Status: DISCONTINUED | OUTPATIENT
Start: 2021-04-23 | End: 2021-04-23 | Stop reason: HOSPADM

## 2021-04-23 RX ORDER — LIDOCAINE HYDROCHLORIDE 10 MG/ML
INJECTION INFILTRATION; PERINEURAL
Status: DISCONTINUED | OUTPATIENT
Start: 2021-04-23 | End: 2021-04-23 | Stop reason: HOSPADM

## 2021-04-23 RX ORDER — FENTANYL CITRATE 50 UG/ML
INJECTION, SOLUTION INTRAMUSCULAR; INTRAVENOUS
Status: DISCONTINUED | OUTPATIENT
Start: 2021-04-23 | End: 2021-04-23 | Stop reason: HOSPADM

## 2021-04-23 RX ADMIN — SODIUM CHLORIDE: 0.9 INJECTION, SOLUTION INTRAVENOUS at 10:04

## 2021-05-06 ENCOUNTER — PATIENT MESSAGE (OUTPATIENT)
Dept: PAIN MEDICINE | Facility: OTHER | Age: 56
End: 2021-05-06

## 2021-05-07 ENCOUNTER — HOSPITAL ENCOUNTER (OUTPATIENT)
Facility: OTHER | Age: 56
Discharge: HOME | End: 2021-05-07
Attending: ANESTHESIOLOGY | Admitting: ANESTHESIOLOGY
Payer: MEDICARE

## 2021-05-07 VITALS
WEIGHT: 260 LBS | DIASTOLIC BLOOD PRESSURE: 90 MMHG | BODY MASS INDEX: 34.46 KG/M2 | HEART RATE: 71 BPM | TEMPERATURE: 98 F | OXYGEN SATURATION: 94 % | RESPIRATION RATE: 14 BRPM | HEIGHT: 73 IN | SYSTOLIC BLOOD PRESSURE: 130 MMHG

## 2021-05-07 DIAGNOSIS — M51.36 DDD (DEGENERATIVE DISC DISEASE), LUMBAR: ICD-10-CM

## 2021-05-07 DIAGNOSIS — G89.29 CHRONIC PAIN: ICD-10-CM

## 2021-05-07 DIAGNOSIS — M47.816 LUMBAR SPONDYLOSIS: Primary | ICD-10-CM

## 2021-05-07 PROCEDURE — 64636 DESTROY L/S FACET JNT ADDL: CPT | Mod: LT,,, | Performed by: ANESTHESIOLOGY

## 2021-05-07 PROCEDURE — 64635 DESTROY LUMB/SAC FACET JNT: CPT | Mod: LT,,, | Performed by: ANESTHESIOLOGY

## 2021-05-07 PROCEDURE — 64635 DESTROY LUMB/SAC FACET JNT: CPT | Mod: LT | Performed by: ANESTHESIOLOGY

## 2021-05-07 PROCEDURE — 64636 DESTROY L/S FACET JNT ADDL: CPT | Mod: LT | Performed by: ANESTHESIOLOGY

## 2021-05-07 PROCEDURE — 63600175 PHARM REV CODE 636 W HCPCS: Performed by: ANESTHESIOLOGY

## 2021-05-07 PROCEDURE — 64636 PR DESTROY L/S FACET JNT ADDL: ICD-10-PCS | Mod: LT,,, | Performed by: ANESTHESIOLOGY

## 2021-05-07 PROCEDURE — 25000003 PHARM REV CODE 250: Performed by: ANESTHESIOLOGY

## 2021-05-07 PROCEDURE — 64635 PR DESTROY LUMB/SAC FACET JNT: ICD-10-PCS | Mod: LT,,, | Performed by: ANESTHESIOLOGY

## 2021-05-07 RX ORDER — FENTANYL CITRATE 50 UG/ML
INJECTION, SOLUTION INTRAMUSCULAR; INTRAVENOUS
Status: DISCONTINUED | OUTPATIENT
Start: 2021-05-07 | End: 2021-05-07 | Stop reason: HOSPADM

## 2021-05-07 RX ORDER — SODIUM CHLORIDE 9 MG/ML
INJECTION, SOLUTION INTRAVENOUS CONTINUOUS
Status: DISCONTINUED | OUTPATIENT
Start: 2021-05-07 | End: 2021-05-07 | Stop reason: HOSPADM

## 2021-05-07 RX ORDER — BUPIVACAINE HYDROCHLORIDE 2.5 MG/ML
INJECTION, SOLUTION EPIDURAL; INFILTRATION; INTRACAUDAL
Status: DISCONTINUED | OUTPATIENT
Start: 2021-05-07 | End: 2021-05-07 | Stop reason: HOSPADM

## 2021-05-07 RX ORDER — LIDOCAINE HYDROCHLORIDE 10 MG/ML
INJECTION INFILTRATION; PERINEURAL
Status: DISCONTINUED | OUTPATIENT
Start: 2021-05-07 | End: 2021-05-07 | Stop reason: HOSPADM

## 2021-05-07 RX ORDER — DEXAMETHASONE SODIUM PHOSPHATE 4 MG/ML
INJECTION, SOLUTION INTRA-ARTICULAR; INTRALESIONAL; INTRAMUSCULAR; INTRAVENOUS; SOFT TISSUE
Status: DISCONTINUED | OUTPATIENT
Start: 2021-05-07 | End: 2021-05-07 | Stop reason: HOSPADM

## 2021-05-07 RX ORDER — MIDAZOLAM HYDROCHLORIDE 1 MG/ML
INJECTION INTRAMUSCULAR; INTRAVENOUS
Status: DISCONTINUED | OUTPATIENT
Start: 2021-05-07 | End: 2021-05-07 | Stop reason: HOSPADM

## 2021-06-16 ENCOUNTER — TELEPHONE (OUTPATIENT)
Dept: PAIN MEDICINE | Facility: CLINIC | Age: 56
End: 2021-06-16

## 2021-06-17 ENCOUNTER — OFFICE VISIT (OUTPATIENT)
Dept: PAIN MEDICINE | Facility: CLINIC | Age: 56
End: 2021-06-17
Payer: MEDICARE

## 2021-06-17 VITALS
TEMPERATURE: 97 F | RESPIRATION RATE: 18 BRPM | OXYGEN SATURATION: 98 % | HEIGHT: 73 IN | SYSTOLIC BLOOD PRESSURE: 128 MMHG | WEIGHT: 266 LBS | BODY MASS INDEX: 35.25 KG/M2 | HEART RATE: 72 BPM | DIASTOLIC BLOOD PRESSURE: 92 MMHG

## 2021-06-17 DIAGNOSIS — M47.816 LUMBAR SPONDYLOSIS: ICD-10-CM

## 2021-06-17 DIAGNOSIS — M47.812 SPONDYLOSIS OF CERVICAL REGION WITHOUT MYELOPATHY OR RADICULOPATHY: ICD-10-CM

## 2021-06-17 DIAGNOSIS — M50.30 DDD (DEGENERATIVE DISC DISEASE), CERVICAL: ICD-10-CM

## 2021-06-17 DIAGNOSIS — Z98.1 S/P CERVICAL SPINAL FUSION: ICD-10-CM

## 2021-06-17 DIAGNOSIS — M79.10 MYALGIA: ICD-10-CM

## 2021-06-17 DIAGNOSIS — M51.36 DDD (DEGENERATIVE DISC DISEASE), LUMBAR: ICD-10-CM

## 2021-06-17 DIAGNOSIS — G89.4 CHRONIC PAIN DISORDER: ICD-10-CM

## 2021-06-17 DIAGNOSIS — M47.812 CERVICAL SPONDYLOSIS: ICD-10-CM

## 2021-06-17 DIAGNOSIS — Z79.891 ENCOUNTER FOR MONITORING OPIOID MAINTENANCE THERAPY: ICD-10-CM

## 2021-06-17 DIAGNOSIS — M47.816 FACET ARTHRITIS OF LUMBAR REGION: ICD-10-CM

## 2021-06-17 DIAGNOSIS — G89.4 CHRONIC PAIN DISORDER: Primary | ICD-10-CM

## 2021-06-17 DIAGNOSIS — M79.18 MYOFASCIAL PAIN: ICD-10-CM

## 2021-06-17 DIAGNOSIS — M54.12 CERVICAL RADICULOPATHY: ICD-10-CM

## 2021-06-17 DIAGNOSIS — Z51.81 ENCOUNTER FOR MONITORING OPIOID MAINTENANCE THERAPY: ICD-10-CM

## 2021-06-17 PROCEDURE — 99213 OFFICE O/P EST LOW 20 MIN: CPT | Mod: S$GLB,,, | Performed by: NURSE PRACTITIONER

## 2021-06-17 PROCEDURE — 99213 PR OFFICE/OUTPT VISIT, EST, LEVL III, 20-29 MIN: ICD-10-PCS | Mod: S$GLB,,, | Performed by: NURSE PRACTITIONER

## 2021-06-17 PROCEDURE — 1125F AMNT PAIN NOTED PAIN PRSNT: CPT | Mod: S$GLB,,, | Performed by: NURSE PRACTITIONER

## 2021-06-17 PROCEDURE — 3008F PR BODY MASS INDEX (BMI) DOCUMENTED: ICD-10-PCS | Mod: CPTII,S$GLB,, | Performed by: NURSE PRACTITIONER

## 2021-06-17 PROCEDURE — 99999 PR PBB SHADOW E&M-EST. PATIENT-LVL V: CPT | Mod: PBBFAC,,, | Performed by: NURSE PRACTITIONER

## 2021-06-17 PROCEDURE — 1125F PR PAIN SEVERITY QUANTIFIED, PAIN PRESENT: ICD-10-PCS | Mod: S$GLB,,, | Performed by: NURSE PRACTITIONER

## 2021-06-17 PROCEDURE — 3008F BODY MASS INDEX DOCD: CPT | Mod: CPTII,S$GLB,, | Performed by: NURSE PRACTITIONER

## 2021-06-17 PROCEDURE — 99999 PR PBB SHADOW E&M-EST. PATIENT-LVL V: ICD-10-PCS | Mod: PBBFAC,,, | Performed by: NURSE PRACTITIONER

## 2021-06-17 RX ORDER — METHOCARBAMOL 500 MG/1
500 TABLET, FILM COATED ORAL 2 TIMES DAILY
Qty: 60 TABLET | Refills: 4 | Status: SHIPPED | OUTPATIENT
Start: 2021-06-17 | End: 2022-04-14 | Stop reason: SDUPTHER

## 2021-06-17 RX ORDER — HYDROCODONE BITARTRATE AND ACETAMINOPHEN 7.5; 325 MG/1; MG/1
1 TABLET ORAL EVERY 12 HOURS PRN
Qty: 60 TABLET | Refills: 0 | Status: SHIPPED | OUTPATIENT
Start: 2021-06-17 | End: 2021-09-17 | Stop reason: SDUPTHER

## 2021-06-17 RX ORDER — GABAPENTIN 300 MG/1
600 CAPSULE ORAL 2 TIMES DAILY
Qty: 120 CAPSULE | Refills: 5 | Status: SHIPPED | OUTPATIENT
Start: 2021-06-17 | End: 2022-06-27 | Stop reason: SDUPTHER

## 2021-09-17 ENCOUNTER — OFFICE VISIT (OUTPATIENT)
Dept: PAIN MEDICINE | Facility: CLINIC | Age: 56
End: 2021-09-17
Payer: MEDICARE

## 2021-09-17 VITALS
HEIGHT: 73 IN | RESPIRATION RATE: 19 BRPM | TEMPERATURE: 98 F | DIASTOLIC BLOOD PRESSURE: 81 MMHG | SYSTOLIC BLOOD PRESSURE: 117 MMHG | HEART RATE: 79 BPM | WEIGHT: 252.63 LBS | BODY MASS INDEX: 33.48 KG/M2

## 2021-09-17 DIAGNOSIS — Z51.81 ENCOUNTER FOR MONITORING OPIOID MAINTENANCE THERAPY: ICD-10-CM

## 2021-09-17 DIAGNOSIS — G89.4 CHRONIC PAIN DISORDER: ICD-10-CM

## 2021-09-17 DIAGNOSIS — M47.816 FACET ARTHRITIS OF LUMBAR REGION: ICD-10-CM

## 2021-09-17 DIAGNOSIS — M51.36 DDD (DEGENERATIVE DISC DISEASE), LUMBAR: ICD-10-CM

## 2021-09-17 DIAGNOSIS — Z98.1 S/P CERVICAL SPINAL FUSION: ICD-10-CM

## 2021-09-17 DIAGNOSIS — M79.18 MYOFASCIAL PAIN: ICD-10-CM

## 2021-09-17 DIAGNOSIS — M47.816 LUMBAR SPONDYLOSIS: ICD-10-CM

## 2021-09-17 DIAGNOSIS — M47.812 SPONDYLOSIS OF CERVICAL REGION WITHOUT MYELOPATHY OR RADICULOPATHY: ICD-10-CM

## 2021-09-17 DIAGNOSIS — Z79.891 ENCOUNTER FOR MONITORING OPIOID MAINTENANCE THERAPY: ICD-10-CM

## 2021-09-17 DIAGNOSIS — M47.812 CERVICAL SPONDYLOSIS: Primary | ICD-10-CM

## 2021-09-17 DIAGNOSIS — M50.30 DDD (DEGENERATIVE DISC DISEASE), CERVICAL: ICD-10-CM

## 2021-09-17 DIAGNOSIS — M79.10 MYALGIA: ICD-10-CM

## 2021-09-17 PROCEDURE — 4010F ACE/ARB THERAPY RXD/TAKEN: CPT | Mod: CPTII,S$GLB,, | Performed by: NURSE PRACTITIONER

## 2021-09-17 PROCEDURE — 1160F RVW MEDS BY RX/DR IN RCRD: CPT | Mod: CPTII,S$GLB,, | Performed by: NURSE PRACTITIONER

## 2021-09-17 PROCEDURE — 3079F DIAST BP 80-89 MM HG: CPT | Mod: CPTII,S$GLB,, | Performed by: NURSE PRACTITIONER

## 2021-09-17 PROCEDURE — 99213 OFFICE O/P EST LOW 20 MIN: CPT | Mod: S$GLB,,, | Performed by: NURSE PRACTITIONER

## 2021-09-17 PROCEDURE — 99213 PR OFFICE/OUTPT VISIT, EST, LEVL III, 20-29 MIN: ICD-10-PCS | Mod: S$GLB,,, | Performed by: NURSE PRACTITIONER

## 2021-09-17 PROCEDURE — 3079F PR MOST RECENT DIASTOLIC BLOOD PRESSURE 80-89 MM HG: ICD-10-PCS | Mod: CPTII,S$GLB,, | Performed by: NURSE PRACTITIONER

## 2021-09-17 PROCEDURE — 1159F PR MEDICATION LIST DOCUMENTED IN MEDICAL RECORD: ICD-10-PCS | Mod: CPTII,S$GLB,, | Performed by: NURSE PRACTITIONER

## 2021-09-17 PROCEDURE — 1160F PR REVIEW ALL MEDS BY PRESCRIBER/CLIN PHARMACIST DOCUMENTED: ICD-10-PCS | Mod: CPTII,S$GLB,, | Performed by: NURSE PRACTITIONER

## 2021-09-17 PROCEDURE — 3074F PR MOST RECENT SYSTOLIC BLOOD PRESSURE < 130 MM HG: ICD-10-PCS | Mod: CPTII,S$GLB,, | Performed by: NURSE PRACTITIONER

## 2021-09-17 PROCEDURE — 3008F BODY MASS INDEX DOCD: CPT | Mod: CPTII,S$GLB,, | Performed by: NURSE PRACTITIONER

## 2021-09-17 PROCEDURE — 1159F MED LIST DOCD IN RCRD: CPT | Mod: CPTII,S$GLB,, | Performed by: NURSE PRACTITIONER

## 2021-09-17 PROCEDURE — 99999 PR PBB SHADOW E&M-EST. PATIENT-LVL IV: ICD-10-PCS | Mod: PBBFAC,,, | Performed by: NURSE PRACTITIONER

## 2021-09-17 PROCEDURE — 99999 PR PBB SHADOW E&M-EST. PATIENT-LVL IV: CPT | Mod: PBBFAC,,, | Performed by: NURSE PRACTITIONER

## 2021-09-17 PROCEDURE — 4010F PR ACE/ARB THEARPY RXD/TAKEN: ICD-10-PCS | Mod: CPTII,S$GLB,, | Performed by: NURSE PRACTITIONER

## 2021-09-17 PROCEDURE — 3074F SYST BP LT 130 MM HG: CPT | Mod: CPTII,S$GLB,, | Performed by: NURSE PRACTITIONER

## 2021-09-17 PROCEDURE — 3008F PR BODY MASS INDEX (BMI) DOCUMENTED: ICD-10-PCS | Mod: CPTII,S$GLB,, | Performed by: NURSE PRACTITIONER

## 2021-09-17 PROCEDURE — 80307 DRUG TEST PRSMV CHEM ANLYZR: CPT | Performed by: NURSE PRACTITIONER

## 2021-09-17 RX ORDER — HYDROCODONE BITARTRATE AND ACETAMINOPHEN 7.5; 325 MG/1; MG/1
1 TABLET ORAL EVERY 12 HOURS PRN
Qty: 60 TABLET | Refills: 0 | Status: SHIPPED | OUTPATIENT
Start: 2021-09-17 | End: 2022-02-15 | Stop reason: SDUPTHER

## 2021-09-17 RX ORDER — METFORMIN HYDROCHLORIDE 500 MG/1
1 TABLET ORAL 2 TIMES DAILY
COMMUNITY
Start: 2021-09-13

## 2021-09-24 LAB
6MAM UR QL: NOT DETECTED
7AMINOCLONAZEPAM UR QL: NOT DETECTED
A-OH ALPRAZ UR QL: NOT DETECTED
ALPHA-OH-MIDAZOLAM: NOT DETECTED
ALPRAZ UR QL: NOT DETECTED
AMPHET UR QL SCN: NOT DETECTED
ANNOTATION COMMENT IMP: NORMAL
ANNOTATION COMMENT IMP: NORMAL
BARBITURATES UR QL: NOT DETECTED
BUPRENORPHINE UR QL: NOT DETECTED
BZE UR QL: NOT DETECTED
CARBOXYTHC UR QL: NOT DETECTED
CARISOPRODOL UR QL: NOT DETECTED
CLONAZEPAM UR QL: NOT DETECTED
CODEINE UR QL: NOT DETECTED
CREAT UR-MCNC: 61.9 MG/DL (ref 20–400)
DIAZEPAM UR QL: NOT DETECTED
ETHYL GLUCURONIDE UR QL: NOT DETECTED
FENTANYL UR QL: NOT DETECTED
GABAPENTIN: PRESENT
HYDROCODONE UR QL: PRESENT
HYDROMORPHONE UR QL: NOT DETECTED
LORAZEPAM UR QL: NOT DETECTED
MDA UR QL: NOT DETECTED
MDEA UR QL: NOT DETECTED
MDMA UR QL: NOT DETECTED
ME-PHENIDATE UR QL: NOT DETECTED
METHADONE UR QL: NOT DETECTED
METHAMPHET UR QL: NOT DETECTED
MIDAZOLAM UR QL SCN: NOT DETECTED
MORPHINE UR QL: NOT DETECTED
NALOXONE: NOT DETECTED
NORBUPRENORPHINE UR QL CFM: NOT DETECTED
NORDIAZEPAM UR QL: NOT DETECTED
NORFENTANYL UR QL: NOT DETECTED
NORHYDROCODONE UR QL CFM: PRESENT
NORMEPERIDINE UR QL CFM: NOT DETECTED
NOROXYCODONE UR QL CFM: NOT DETECTED
NOROXYMORPHONE UR QL SCN: NOT DETECTED
OXAZEPAM UR QL: NOT DETECTED
OXYCODONE UR QL: NOT DETECTED
OXYMORPHONE UR QL: NOT DETECTED
PATHOLOGY STUDY: NORMAL
PCP UR QL: NOT DETECTED
PHENTERMINE UR QL: NOT DETECTED
PREGABALIN: NOT DETECTED
SERVICE CMNT-IMP: NORMAL
TAPENTADOL UR QL SCN: NOT DETECTED
TAPENTADOL UR QL SCN: NOT DETECTED
TEMAZEPAM UR QL: NOT DETECTED
TRAMADOL UR QL: NOT DETECTED
ZOLPIDEM METABOLITE: NOT DETECTED
ZOLPIDEM UR QL: NOT DETECTED

## 2021-09-30 ENCOUNTER — PATIENT MESSAGE (OUTPATIENT)
Dept: PAIN MEDICINE | Facility: OTHER | Age: 56
End: 2021-09-30

## 2021-10-01 ENCOUNTER — HOSPITAL ENCOUNTER (OUTPATIENT)
Facility: OTHER | Age: 56
Discharge: HOME | End: 2021-10-01
Attending: ANESTHESIOLOGY | Admitting: ANESTHESIOLOGY
Payer: MEDICARE

## 2021-10-01 VITALS
SYSTOLIC BLOOD PRESSURE: 144 MMHG | RESPIRATION RATE: 16 BRPM | HEIGHT: 73 IN | TEMPERATURE: 99 F | OXYGEN SATURATION: 98 % | HEART RATE: 69 BPM | BODY MASS INDEX: 33.13 KG/M2 | WEIGHT: 250 LBS | DIASTOLIC BLOOD PRESSURE: 86 MMHG

## 2021-10-01 DIAGNOSIS — G89.29 CHRONIC PAIN: ICD-10-CM

## 2021-10-01 DIAGNOSIS — M47.812 CERVICAL SPONDYLOSIS WITHOUT MYELOPATHY: ICD-10-CM

## 2021-10-01 DIAGNOSIS — M47.812 OSTEOARTHRITIS OF CERVICAL SPINE, UNSPECIFIED SPINAL OSTEOARTHRITIS COMPLICATION STATUS: Primary | ICD-10-CM

## 2021-10-01 LAB — POCT GLUCOSE: 76 MG/DL (ref 70–110)

## 2021-10-01 PROCEDURE — 63600175 PHARM REV CODE 636 W HCPCS: Performed by: ANESTHESIOLOGY

## 2021-10-01 PROCEDURE — 64633 DESTROY CERV/THOR FACET JNT: CPT | Mod: LT | Performed by: ANESTHESIOLOGY

## 2021-10-01 PROCEDURE — 25000003 PHARM REV CODE 250: Performed by: STUDENT IN AN ORGANIZED HEALTH CARE EDUCATION/TRAINING PROGRAM

## 2021-10-01 PROCEDURE — 82947 ASSAY GLUCOSE BLOOD QUANT: CPT | Performed by: ANESTHESIOLOGY

## 2021-10-01 PROCEDURE — 25000003 PHARM REV CODE 250: Performed by: ANESTHESIOLOGY

## 2021-10-01 PROCEDURE — 64633 PR DESTROY CERV/THOR FACET JNT: ICD-10-PCS | Mod: LT,,, | Performed by: ANESTHESIOLOGY

## 2021-10-01 PROCEDURE — 64634 DESTROY C/TH FACET JNT ADDL: CPT | Mod: LT,,, | Performed by: ANESTHESIOLOGY

## 2021-10-01 PROCEDURE — 64634 PR DESTROY C/TH FACET JNT ADDL: ICD-10-PCS | Mod: LT,,, | Performed by: ANESTHESIOLOGY

## 2021-10-01 PROCEDURE — 64633 DESTROY CERV/THOR FACET JNT: CPT | Mod: LT,,, | Performed by: ANESTHESIOLOGY

## 2021-10-01 PROCEDURE — 64634 DESTROY C/TH FACET JNT ADDL: CPT | Mod: LT | Performed by: ANESTHESIOLOGY

## 2021-10-01 RX ORDER — FENTANYL CITRATE 50 UG/ML
INJECTION, SOLUTION INTRAMUSCULAR; INTRAVENOUS
Status: DISCONTINUED | OUTPATIENT
Start: 2021-10-01 | End: 2021-10-01 | Stop reason: HOSPADM

## 2021-10-01 RX ORDER — DEXAMETHASONE SODIUM PHOSPHATE 4 MG/ML
INJECTION, SOLUTION INTRA-ARTICULAR; INTRALESIONAL; INTRAMUSCULAR; INTRAVENOUS; SOFT TISSUE
Status: DISCONTINUED | OUTPATIENT
Start: 2021-10-01 | End: 2021-10-01 | Stop reason: HOSPADM

## 2021-10-01 RX ORDER — MIDAZOLAM HYDROCHLORIDE 1 MG/ML
INJECTION INTRAMUSCULAR; INTRAVENOUS
Status: DISCONTINUED | OUTPATIENT
Start: 2021-10-01 | End: 2021-10-01 | Stop reason: HOSPADM

## 2021-10-01 RX ORDER — SODIUM CHLORIDE 9 MG/ML
INJECTION, SOLUTION INTRAVENOUS CONTINUOUS
Status: DISCONTINUED | OUTPATIENT
Start: 2021-10-01 | End: 2021-10-01 | Stop reason: HOSPADM

## 2021-10-01 RX ORDER — LIDOCAINE HYDROCHLORIDE 10 MG/ML
INJECTION INFILTRATION; PERINEURAL
Status: DISCONTINUED | OUTPATIENT
Start: 2021-10-01 | End: 2021-10-01 | Stop reason: HOSPADM

## 2021-10-01 RX ORDER — BUPIVACAINE HYDROCHLORIDE 2.5 MG/ML
INJECTION, SOLUTION EPIDURAL; INFILTRATION; INTRACAUDAL
Status: DISCONTINUED | OUTPATIENT
Start: 2021-10-01 | End: 2021-10-01 | Stop reason: HOSPADM

## 2021-10-01 RX ADMIN — SODIUM CHLORIDE: 0.9 INJECTION, SOLUTION INTRAVENOUS at 02:10

## 2021-10-15 ENCOUNTER — HOSPITAL ENCOUNTER (OUTPATIENT)
Facility: OTHER | Age: 56
Discharge: HOME | End: 2021-10-15
Attending: ANESTHESIOLOGY | Admitting: ANESTHESIOLOGY
Payer: MEDICARE

## 2021-10-15 VITALS
TEMPERATURE: 97 F | RESPIRATION RATE: 16 BRPM | OXYGEN SATURATION: 96 % | DIASTOLIC BLOOD PRESSURE: 90 MMHG | HEART RATE: 55 BPM | SYSTOLIC BLOOD PRESSURE: 125 MMHG

## 2021-10-15 DIAGNOSIS — M47.812 SPONDYLOSIS WITHOUT MYELOPATHY OR RADICULOPATHY, CERVICAL REGION: ICD-10-CM

## 2021-10-15 DIAGNOSIS — M47.812 OSTEOARTHRITIS OF CERVICAL SPINE, UNSPECIFIED SPINAL OSTEOARTHRITIS COMPLICATION STATUS: Primary | ICD-10-CM

## 2021-10-15 DIAGNOSIS — G89.29 CHRONIC PAIN: ICD-10-CM

## 2021-10-15 LAB — POCT GLUCOSE: 88 MG/DL (ref 70–110)

## 2021-10-15 PROCEDURE — 64633 PR DESTROY CERV/THOR FACET JNT: ICD-10-PCS | Mod: RT,,, | Performed by: ANESTHESIOLOGY

## 2021-10-15 PROCEDURE — 64634 DESTROY C/TH FACET JNT ADDL: CPT | Mod: RT,,, | Performed by: ANESTHESIOLOGY

## 2021-10-15 PROCEDURE — 82947 ASSAY GLUCOSE BLOOD QUANT: CPT | Performed by: ANESTHESIOLOGY

## 2021-10-15 PROCEDURE — 64634 PR DESTROY C/TH FACET JNT ADDL: ICD-10-PCS | Mod: RT,,, | Performed by: ANESTHESIOLOGY

## 2021-10-15 PROCEDURE — 25000003 PHARM REV CODE 250: Performed by: ANESTHESIOLOGY

## 2021-10-15 PROCEDURE — 64634 DESTROY C/TH FACET JNT ADDL: CPT | Mod: RT | Performed by: ANESTHESIOLOGY

## 2021-10-15 PROCEDURE — 63600175 PHARM REV CODE 636 W HCPCS: Performed by: ANESTHESIOLOGY

## 2021-10-15 PROCEDURE — 64633 DESTROY CERV/THOR FACET JNT: CPT | Mod: RT,,, | Performed by: ANESTHESIOLOGY

## 2021-10-15 PROCEDURE — 25000003 PHARM REV CODE 250: Performed by: STUDENT IN AN ORGANIZED HEALTH CARE EDUCATION/TRAINING PROGRAM

## 2021-10-15 PROCEDURE — 64633 DESTROY CERV/THOR FACET JNT: CPT | Mod: RT | Performed by: ANESTHESIOLOGY

## 2021-10-15 RX ORDER — FENTANYL CITRATE 50 UG/ML
INJECTION, SOLUTION INTRAMUSCULAR; INTRAVENOUS
Status: DISCONTINUED | OUTPATIENT
Start: 2021-10-15 | End: 2021-10-15 | Stop reason: HOSPADM

## 2021-10-15 RX ORDER — LIDOCAINE HYDROCHLORIDE 10 MG/ML
INJECTION INFILTRATION; PERINEURAL
Status: DISCONTINUED | OUTPATIENT
Start: 2021-10-15 | End: 2021-10-15 | Stop reason: HOSPADM

## 2021-10-15 RX ORDER — SODIUM CHLORIDE 9 MG/ML
INJECTION, SOLUTION INTRAVENOUS CONTINUOUS
Status: DISCONTINUED | OUTPATIENT
Start: 2021-10-15 | End: 2021-10-15 | Stop reason: HOSPADM

## 2021-10-15 RX ORDER — MIDAZOLAM HYDROCHLORIDE 1 MG/ML
INJECTION INTRAMUSCULAR; INTRAVENOUS
Status: DISCONTINUED | OUTPATIENT
Start: 2021-10-15 | End: 2021-10-15 | Stop reason: HOSPADM

## 2021-10-15 RX ORDER — BUPIVACAINE HYDROCHLORIDE 2.5 MG/ML
INJECTION, SOLUTION EPIDURAL; INFILTRATION; INTRACAUDAL
Status: DISCONTINUED | OUTPATIENT
Start: 2021-10-15 | End: 2021-10-15 | Stop reason: HOSPADM

## 2021-10-15 RX ORDER — DEXAMETHASONE SODIUM PHOSPHATE 4 MG/ML
INJECTION, SOLUTION INTRA-ARTICULAR; INTRALESIONAL; INTRAMUSCULAR; INTRAVENOUS; SOFT TISSUE
Status: DISCONTINUED | OUTPATIENT
Start: 2021-10-15 | End: 2021-10-15 | Stop reason: HOSPADM

## 2021-10-15 RX ADMIN — SODIUM CHLORIDE: 0.9 INJECTION, SOLUTION INTRAVENOUS at 11:10

## 2021-11-04 ENCOUNTER — TELEPHONE (OUTPATIENT)
Dept: PAIN MEDICINE | Facility: CLINIC | Age: 56
End: 2021-11-04
Payer: MEDICARE

## 2021-11-05 ENCOUNTER — OFFICE VISIT (OUTPATIENT)
Dept: PAIN MEDICINE | Facility: CLINIC | Age: 56
End: 2021-11-05
Payer: MEDICARE

## 2021-11-05 VITALS
HEART RATE: 63 BPM | BODY MASS INDEX: 32.31 KG/M2 | TEMPERATURE: 98 F | WEIGHT: 243.81 LBS | SYSTOLIC BLOOD PRESSURE: 124 MMHG | HEIGHT: 73 IN | DIASTOLIC BLOOD PRESSURE: 85 MMHG

## 2021-11-05 DIAGNOSIS — M47.812 CERVICAL SPONDYLOSIS WITHOUT MYELOPATHY: ICD-10-CM

## 2021-11-05 DIAGNOSIS — Z98.1 S/P CERVICAL SPINAL FUSION: ICD-10-CM

## 2021-11-05 DIAGNOSIS — M79.18 MYOFASCIAL PAIN: ICD-10-CM

## 2021-11-05 DIAGNOSIS — G89.4 CHRONIC PAIN DISORDER: Primary | ICD-10-CM

## 2021-11-05 PROCEDURE — 1160F RVW MEDS BY RX/DR IN RCRD: CPT | Mod: CPTII,S$GLB,, | Performed by: NURSE PRACTITIONER

## 2021-11-05 PROCEDURE — 4010F ACE/ARB THERAPY RXD/TAKEN: CPT | Mod: CPTII,S$GLB,, | Performed by: NURSE PRACTITIONER

## 2021-11-05 PROCEDURE — 3008F BODY MASS INDEX DOCD: CPT | Mod: CPTII,S$GLB,, | Performed by: NURSE PRACTITIONER

## 2021-11-05 PROCEDURE — 3074F PR MOST RECENT SYSTOLIC BLOOD PRESSURE < 130 MM HG: ICD-10-PCS | Mod: CPTII,S$GLB,, | Performed by: NURSE PRACTITIONER

## 2021-11-05 PROCEDURE — 4010F PR ACE/ARB THEARPY RXD/TAKEN: ICD-10-PCS | Mod: CPTII,S$GLB,, | Performed by: NURSE PRACTITIONER

## 2021-11-05 PROCEDURE — 1160F PR REVIEW ALL MEDS BY PRESCRIBER/CLIN PHARMACIST DOCUMENTED: ICD-10-PCS | Mod: CPTII,S$GLB,, | Performed by: NURSE PRACTITIONER

## 2021-11-05 PROCEDURE — 3079F PR MOST RECENT DIASTOLIC BLOOD PRESSURE 80-89 MM HG: ICD-10-PCS | Mod: CPTII,S$GLB,, | Performed by: NURSE PRACTITIONER

## 2021-11-05 PROCEDURE — 99999 PR PBB SHADOW E&M-EST. PATIENT-LVL IV: CPT | Mod: PBBFAC,,, | Performed by: NURSE PRACTITIONER

## 2021-11-05 PROCEDURE — 1159F PR MEDICATION LIST DOCUMENTED IN MEDICAL RECORD: ICD-10-PCS | Mod: CPTII,S$GLB,, | Performed by: NURSE PRACTITIONER

## 2021-11-05 PROCEDURE — 99213 OFFICE O/P EST LOW 20 MIN: CPT | Mod: S$GLB,,, | Performed by: NURSE PRACTITIONER

## 2021-11-05 PROCEDURE — 3008F PR BODY MASS INDEX (BMI) DOCUMENTED: ICD-10-PCS | Mod: CPTII,S$GLB,, | Performed by: NURSE PRACTITIONER

## 2021-11-05 PROCEDURE — 1159F MED LIST DOCD IN RCRD: CPT | Mod: CPTII,S$GLB,, | Performed by: NURSE PRACTITIONER

## 2021-11-05 PROCEDURE — 99213 PR OFFICE/OUTPT VISIT, EST, LEVL III, 20-29 MIN: ICD-10-PCS | Mod: S$GLB,,, | Performed by: NURSE PRACTITIONER

## 2021-11-05 PROCEDURE — 3079F DIAST BP 80-89 MM HG: CPT | Mod: CPTII,S$GLB,, | Performed by: NURSE PRACTITIONER

## 2021-11-05 PROCEDURE — 99999 PR PBB SHADOW E&M-EST. PATIENT-LVL IV: ICD-10-PCS | Mod: PBBFAC,,, | Performed by: NURSE PRACTITIONER

## 2021-11-05 PROCEDURE — 3074F SYST BP LT 130 MM HG: CPT | Mod: CPTII,S$GLB,, | Performed by: NURSE PRACTITIONER

## 2021-11-05 RX ORDER — METHYLPREDNISOLONE 4 MG/1
TABLET ORAL
Qty: 21 EACH | Refills: 0 | Status: SHIPPED | OUTPATIENT
Start: 2021-11-05 | End: 2021-11-26

## 2022-02-14 ENCOUNTER — TELEPHONE (OUTPATIENT)
Dept: PAIN MEDICINE | Facility: CLINIC | Age: 57
End: 2022-02-14
Payer: MEDICARE

## 2022-02-14 NOTE — TELEPHONE ENCOUNTER
This message is for patient in regards to his/her appointment 02/15/22 at 10:20a       [facility] Policy: For the safety of all patients and staff members.     Patient Visitor policy: Due to social distancing and limited seating staff are requesting patient to arrive to their schedule appointments on time. During this visit we're asking all patients to only have one visitor over the age of 18yrs old to accompany to be seen by Angelique Barahona np. If patient do not required assistance with their visit, we're asking all visitors to remain outside the waiting area.    Upon arriving to your schedule appointment; patients are required to wear a face mask, if patient do not have a face mask one will be provided entering the facility. We ask patients to contact clinical staff at this number [phone] to notify staff that they have arrived or they may do so by utilizing the Mobile checked in [facility](if patient have patient portal; clinical staff will send a message through there letting them know it's okay to proceed to their visit). If you have any questions or concerns please contact [phone]    Patient verbalized understanding.

## 2022-02-15 ENCOUNTER — OFFICE VISIT (OUTPATIENT)
Dept: PAIN MEDICINE | Facility: CLINIC | Age: 57
End: 2022-02-15
Payer: MEDICARE

## 2022-02-15 VITALS
SYSTOLIC BLOOD PRESSURE: 128 MMHG | HEIGHT: 73 IN | WEIGHT: 241.19 LBS | RESPIRATION RATE: 18 BRPM | BODY MASS INDEX: 31.96 KG/M2 | DIASTOLIC BLOOD PRESSURE: 86 MMHG | TEMPERATURE: 98 F

## 2022-02-15 DIAGNOSIS — G89.4 CHRONIC PAIN DISORDER: ICD-10-CM

## 2022-02-15 DIAGNOSIS — M53.3 SACROILIAC JOINT PAIN: ICD-10-CM

## 2022-02-15 DIAGNOSIS — M51.36 DDD (DEGENERATIVE DISC DISEASE), LUMBAR: ICD-10-CM

## 2022-02-15 DIAGNOSIS — M50.30 DDD (DEGENERATIVE DISC DISEASE), CERVICAL: ICD-10-CM

## 2022-02-15 DIAGNOSIS — Z98.1 S/P CERVICAL SPINAL FUSION: ICD-10-CM

## 2022-02-15 DIAGNOSIS — M79.18 MYOFASCIAL PAIN: ICD-10-CM

## 2022-02-15 DIAGNOSIS — M47.816 LUMBAR SPONDYLOSIS: ICD-10-CM

## 2022-02-15 DIAGNOSIS — M79.10 MYALGIA: ICD-10-CM

## 2022-02-15 DIAGNOSIS — G89.4 CHRONIC PAIN DISORDER: Primary | ICD-10-CM

## 2022-02-15 DIAGNOSIS — M47.812 CERVICAL SPONDYLOSIS WITHOUT MYELOPATHY: ICD-10-CM

## 2022-02-15 DIAGNOSIS — M47.812 SPONDYLOSIS OF CERVICAL REGION WITHOUT MYELOPATHY OR RADICULOPATHY: ICD-10-CM

## 2022-02-15 DIAGNOSIS — M47.816 FACET ARTHRITIS OF LUMBAR REGION: ICD-10-CM

## 2022-02-15 PROCEDURE — 3008F PR BODY MASS INDEX (BMI) DOCUMENTED: ICD-10-PCS | Mod: CPTII,S$GLB,, | Performed by: NURSE PRACTITIONER

## 2022-02-15 PROCEDURE — 99214 PR OFFICE/OUTPT VISIT, EST, LEVL IV, 30-39 MIN: ICD-10-PCS | Mod: S$GLB,,, | Performed by: NURSE PRACTITIONER

## 2022-02-15 PROCEDURE — 3079F DIAST BP 80-89 MM HG: CPT | Mod: CPTII,S$GLB,, | Performed by: NURSE PRACTITIONER

## 2022-02-15 PROCEDURE — 99214 OFFICE O/P EST MOD 30 MIN: CPT | Mod: S$GLB,,, | Performed by: NURSE PRACTITIONER

## 2022-02-15 PROCEDURE — 1159F PR MEDICATION LIST DOCUMENTED IN MEDICAL RECORD: ICD-10-PCS | Mod: CPTII,S$GLB,, | Performed by: NURSE PRACTITIONER

## 2022-02-15 PROCEDURE — 3008F BODY MASS INDEX DOCD: CPT | Mod: CPTII,S$GLB,, | Performed by: NURSE PRACTITIONER

## 2022-02-15 PROCEDURE — 1159F MED LIST DOCD IN RCRD: CPT | Mod: CPTII,S$GLB,, | Performed by: NURSE PRACTITIONER

## 2022-02-15 PROCEDURE — 3079F PR MOST RECENT DIASTOLIC BLOOD PRESSURE 80-89 MM HG: ICD-10-PCS | Mod: CPTII,S$GLB,, | Performed by: NURSE PRACTITIONER

## 2022-02-15 PROCEDURE — 1160F RVW MEDS BY RX/DR IN RCRD: CPT | Mod: CPTII,S$GLB,, | Performed by: NURSE PRACTITIONER

## 2022-02-15 PROCEDURE — 99999 PR PBB SHADOW E&M-EST. PATIENT-LVL IV: CPT | Mod: PBBFAC,,, | Performed by: NURSE PRACTITIONER

## 2022-02-15 PROCEDURE — 99999 PR PBB SHADOW E&M-EST. PATIENT-LVL IV: ICD-10-PCS | Mod: PBBFAC,,, | Performed by: NURSE PRACTITIONER

## 2022-02-15 PROCEDURE — 1160F PR REVIEW ALL MEDS BY PRESCRIBER/CLIN PHARMACIST DOCUMENTED: ICD-10-PCS | Mod: CPTII,S$GLB,, | Performed by: NURSE PRACTITIONER

## 2022-02-15 PROCEDURE — 3074F SYST BP LT 130 MM HG: CPT | Mod: CPTII,S$GLB,, | Performed by: NURSE PRACTITIONER

## 2022-02-15 PROCEDURE — 3074F PR MOST RECENT SYSTOLIC BLOOD PRESSURE < 130 MM HG: ICD-10-PCS | Mod: CPTII,S$GLB,, | Performed by: NURSE PRACTITIONER

## 2022-02-15 RX ORDER — HYDROCODONE BITARTRATE AND ACETAMINOPHEN 7.5; 325 MG/1; MG/1
1 TABLET ORAL EVERY 12 HOURS PRN
Qty: 60 TABLET | Refills: 0 | Status: SHIPPED | OUTPATIENT
Start: 2022-02-15 | End: 2022-08-02 | Stop reason: SDUPTHER

## 2022-02-15 NOTE — H&P (VIEW-ONLY)
"Chronic patient Established Note (Follow up visit)      Interval History 2/15/2022:  The patient returns to clinic today for follow up of neck and back pain. He reports increased low back pain over the last two weeks. He describes this pain as sharp and aching in nature. He does report intermittent "catching" pain in his lower back, worse with getting out of bed. He denies any radicular leg pain. He continues to report benefit from previous cervical procedures. He reports intermittent neck pain. He continues to take Gabapentin with benefit. He continues to take Norco as needed with benefit and without adverse effects. He denies any other health changes. His pain today is 6/10.    Interval History 11/5/2021:  The patient returns to clinic today for follow up of neck and back pain. He is s/p left C4,5,6,7 RFA on 10/1/2021 and right C4,5,6,7 RFA on 10/15/2021. He reports 50% relief of his neck pain. He reports increased burning and itching pain to his skin near injection sites. He denies any radicular arm pain. He does report increased left sided muscle pain. He is concerned that one of the screws in his cervical hardware is moving. He continues to report benefit with previous lumbar procedures. He reports intermittent low back pain without radicular leg pain. He continues to take Gabapentin with benefit. He continues to take Norco as needed with benefit and without adverse effects. He denies any weakness. He denies any other health changes. His pain today is 5/10.    Interval History 9/17/2021:  The patient returns to clinic today for follow up of neck and back pain. He reports increased neck pain. He denies any radicular arm pain today. He does report intermittent radiating pain into his left shoulder blade and mid back. His pain is worse with activity. He reports that sometimes his pain takes his breath away. He continues to report low back pain, that is tolerable at this time. He continues to take Gabapentin and " Norco with benefit and without adverse effects. He denies any other health changes. His pain today is 6/10.    Interval History 6/17/2021:  The patient returns to clinic today for follow up of neck and back pain. He is s/p right L3,4,5 RFA on 4/23/2021 and left L3,4,5 RFA on 5/7/2021. He reports 50% relief of his low back pain. He continues to report intermittent low back pain. He denies any radicular leg pain. He continues to report neck pain, especially in between the scapula. His pain is worse with prolonged standing, walking, and activity. He continues to take Gabapentin with benefit. He continues to take Norco sparingly for severe pain with benefit and without adverse effects. He denies any other health changes. His pain today is 4/10.    Interval History 4/9/2021:  The patient returns to clinic today for follow up of neck and back pain. He is s/p left C4,5,6,7 RFA on 3/9/2021 and right C4,5,6,7 RFA on 3/26/2021. He reports 50% relief of his neck pain. He reports intermittent neck pain. He has good days and bad days. He continues to report low back pain that is constant, sharp, and aching. He reports intermittent radiating pain into the lateral aspect of his left leg to his knee. He continues to take Gabapentin with benefit. He continues to take Norco as needed sparingly for severe pain. He denies any adverse effects. He denies any other health changes. His pain today is 5/10.    Interval History 3/2/2021:  Sal Navarro presents to the clinic for a follow-up appointment for neck pain . Since the last visit, Sal Navarro states the pain has been worsening. Current pain intensity is 6/10.  Was seen by Angelique Barahona NP on 2/12/2021.     Interval History 2/12/2021:  The patient returns to clinic today for follow up of back pain. He has not been seen in over a year. He did not have imaging due to coronavirus outbreak. He would to reschedule this. He was also considering SCS trial prior to the pandemic. He  "continues to report low back pain that radiates into the lateral aspect of his left leg to his knee. He denies any radicular right leg pain. His pain is worse with bending and walking. He continues to take Gabapentin with benefit. He also takes Norco as needed for severe pain. He denies any adverse effects. He denies any other health changes. His pain today is 6/10.    HPI:  Sal Navarro is a 56 y.o. male who presents today s/p C4-7 ACDF with C5/6 PSIF in 2/2016 with residual chronic midline neck pain that radiates into his shoulder blades bilaterally, R>>L.  This is associated with bilateral hand numbness and tingling.  The hand symptoms did improve following the surgery.  The shooting pains in his arms resolved completely.   This pain is described in detail below.  His post-operative course was complicated by dysphagia that is being treated with speech therapy.  The numbness and the tingling were relieved by the surgery. Now experiences "deep pain along the spine"  Patient has not been seen for over a year, had to re-schedule his imaging studies due to pandemic(now completed). Prior to the pandemic, he was considering a SCS.   Only time he gets relief is laying in bed on a very thin pillow, but that doesn't last very long.   Pain aggravated by movement and even breathing/playing with his grandchild/holding the grandchild's hand. Heat and massage (has a vest) are helpful.   Was seen by Dr. Jan srivastava and received multiple EIS and RFAs.   Compliant with his medication.     Pain Disability Index Review:  Last 3 PDI Scores 2/15/2022 11/5/2021 9/17/2021   Pain Disability Index (PDI) [phone]       Pain Medications:  Gabapentin  Norco sparingly  Robaxin    Opioid Contract: yes     report:  Reviewed and consistent with medication use as prescribed.    Pain Procedures:   10/15/2021- Right C4,5,6,7 RFA  10/1/2021- Left C4,5,6,7 RFA  5/7/2021- Left L3,4,5 RFA   4/23/2021- Right L3,4,5 RFA  3/26/2021- Right " C4,5,6,7 RFA  3/9/2021- Left C4,5,6,7 RFA  10/11/19: L4/5 Interlaminar Epidural Steroid Injection  19:Cervical Conventional Radiofreqiency Ablation: C4-7, left  19:Left L2-5 Lumbar Medial Branch Nerve Thermal Radiofrequency Ablation  18:Right L2-5 Lumbar Medial Branch Nerve Thermal Radiofrequency Ablation  18:Cervical Thermal Radiofreqiency Ablation: Right C4-7  18:C7/F6Cwdsmoil Steroid Injection  18:LL2/3 Interlaminar Epidural Steroid Injection   18:L2/3 Interlaminar Epidural Steroid Injection   17:Bilateral L2-5 Lumbar Medial Branch Nerve Coolief Thermal Radiofrequency Ablation  17:Bilateral L2-5 Lumbar medial branch blocks   17:Cervical Conventional Radiofreqiency Ablation: Left C4-7  17:Cervical Conventional Radiofreqiency Ablation: Right C4-7  10/25/16:Cervical Conventional Radiofreqiency Ablation: Left C4-7  10/11/16: Cervical Conventional Radiofreqiency Ablation: Right C4-7  10/04/16:Cervical Medial Branch Blocks, Bilateral C4-7.     16:C7/H3Xiqoerqj Steroid Injection    Physical Therapy/Home Exercise: does home exercises now, but not in formal PT.    - 2019 was last time in PT     Imagin21 Mri Of L-Spine:  Impression:     Multilevel degenerative disc and joint disease which is mild and results in mild central canal and neural foraminal stenosis as outlined above.     21 x-ray of C-Spine:  Impression:     Postsurgical changes to the thoracic spine without additional evidence for degenerative disc or joint disease.  21 x-ray of T-Spine:  Impression:     Normal evaluation of the thoracic spine.       17 Mri of L-Spine:  IMPRESSION:      Multilevel degenerative lumbar spondylosis resulting in mild spinal canal narrowing and mild to moderate neuroforaminal narrowing.   17 Mri of C-Spine:  IMPRESSION:      Status post anterior cervical discectomy and fusion, with mild multilevel neuroforaminal narrowing  present    07/07/17 X-ray of L-Spine:  IMPRESSION:    Unremarkable examination of lumbar spine.    07/07/17 X-ray of C-Spine  IMPRESSION:    Postoperative change status post anterior cervical fusion C4-C7.   01/24/17 X-ray of C-Spine:  IMPRESSION:    Postop cervical spine stable     10/11/16 CT of C-Spine:    IMPRESSION:         Multilevel degenerative change as detailed above, most severe at C5-6.    08/08/16 Mri of T-Spine:  IMPRESSION:    There is no central canal or neural foraminal stenosis on this exam.  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    08/08/16 Mri of C-spine:  IMPRESSION:    Multilevel degenerative joint disease persist in this patient status post C4-C7 anterior spinal fusion with discectomy.  There is no severe central canal or neuroforaminal stenosis identified at any level  07/09/16 X-ray of C-Spine:  IMPRESSION:    No complication seen  Allergies:   Review of patient's allergies indicates:   Allergen Reactions    Pcn [penicillins] Hives and Rash    Lipitor [atorvastatin] Other (See Comments)     Muscle cramps, weakness       Current Medications:   Current Outpatient Medications   Medication Sig Dispense Refill    acetaminophen (TYLENOL) 500 MG tablet Take 500 mg by mouth every 6 (six) hours as needed for Pain.      albuterol (PROVENTIL/VENTOLIN HFA) 90 mcg/actuation inhaler       aspirin (ECOTRIN) 81 MG EC tablet Take 81 mg by mouth once daily.      cetirizine (ZYRTEC) 10 MG tablet Take 10 mg by mouth nightly.  5    FOLIC ACID/MULTIVIT-MIN/LUTEIN (CENTRUM SILVER ORAL) Take by mouth once daily.      gabapentin (NEURONTIN) 300 MG capsule Take 2 capsules (600 mg total) by mouth 2 (two) times daily. 120 capsule 5    levothyroxine (SYNTHROID) 50 MCG tablet Take 50 mcg by mouth once daily.      meclizine (ANTIVERT) 25 mg tablet Take 25 mg by mouth once daily.       meloxicam (MOBIC) 15 MG tablet Take 15 mg by mouth once daily.       metFORMIN (GLUCOPHAGE) 500 MG tablet Take 1 tablet by mouth 2 (two) times a day.      methocarbamoL (ROBAXIN) 500 MG Tab Take 1 tablet (500 mg total) by mouth 2 (two) times daily. 60 tablet 4    omeprazole (PRILOSEC) 20 MG capsule Take 1 capsule (20 mg total) by mouth once daily. 90 capsule 3    pravastatin (PRAVACHOL) 20 MG tablet       valsartan (DIOVAN) 80 MG tablet Take 40 mg by mouth once daily.   1    HYDROcodone-acetaminophen (NORCO) 7.5-325 mg per tablet Take 1 tablet by mouth every 12 (twelve) hours as needed for Pain. 60 tablet 0     No current facility-administered medications for this visit.       REVIEW OF SYSTEMS:    GENERAL:  No weight loss, malaise or fevers.  HEENT:  Negative for frequent or significant headaches.  NECK:  Negative for lumps, goiter  and significant neck swelling. + Neck pain  RESPIRATORY:  Negative for cough, wheezing or shortness of breath.  CARDIOVASCULAR:  Negative for chest pain, leg swelling or palpitations.  GI:  Negative for abdominal discomfort, blood in stools or black stools or change in bowel habits.  MUSCULOSKELETAL:  See HPI.  SKIN:  Negative for lesions, rash, and itching.  PSYCH:  Negative for sleep disturbance, mood disorder and recent psychosocial stressors.  HEMATOLOGY/LYMPHOLOGY:  Negative for prolonged bleeding, bruising easily or swollen nodes.  NEURO:   No history of headaches, syncope, paralysis, seizures or tremors.   All other reviewed and negative other than HPI.    Past Medical History:  Past Medical History:   Diagnosis Date    Arthritis     Cataract     Cervical back pain with evidence of disc disease     Hiatal hernia     Hypertension     Thyroid disease        Past Surgical History:  Past Surgical History:   Procedure Laterality Date    ACROMIOCLAVICULAR JOINT CYST EXCISION Right     BACK SURGERY      cataracts Bilateral     CERVICAL FUSION      COLONOSCOPY N/A 4/27/2018    Procedure: COLONOSCOPY;  Surgeon: Giovani Medeiros MD;   Location: [facility] ENDO (4TH FLR);  Service: Endoscopy;  Laterality: N/A;    EPIDURAL STEROID INJECTION N/A 10/11/2019    Procedure: INJECTION, STEROID, EPIDURAL;  Surgeon: Kasandra Montoya MD;  Location: [facility] PAIN MGT;  Service: Pain Management;  Laterality: N/A;  Lumbar NGUYEN L4/5    NGUYEN      EYE SURGERY      RADIOFREQUENCY ABLATION  10/2016    cervical spine/Montoya    RADIOFREQUENCY ABLATION Left 5/3/2019    Procedure: RADIOFREQUENCY ABLATION, L2-L5 MEDIALBRANCH;  Surgeon: Kasandra Montoya MD;  Location: [facility] PAIN MGT;  Service: Pain Management;  Laterality: Left;    RADIOFREQUENCY ABLATION Left 6/13/2019    Procedure: RADIOFREQUENCY ABLATION C4-7;  Surgeon: Kasandra Montoya MD;  Location: [facility] PAIN MGT;  Service: Pain Management;  Laterality: Left;    RADIOFREQUENCY ABLATION Left 3/9/2021    Procedure: RADIOFREQUENCY ABLATION C4,C5,C6,C7;  Surgeon: Alex Callahan MD;  Location: [facility] PAIN MGT;  Service: Pain Management;  Laterality: Left;  1 of 2    RADIOFREQUENCY ABLATION Right 3/26/2021    Procedure: RADIOFREQUENCY ABLATION C4,C6,C6,C7;  Surgeon: Alex Callahan MD;  Location: [facility] PAIN MGT;  Service: Pain Management;  Laterality: Right;  2 of 2    RADIOFREQUENCY ABLATION Right 4/23/2021    Procedure: RADIOFREQUENCY ABLATION L3,4,5;  Surgeon: Alex Callahan MD;  Location: [facility] PAIN MGT;  Service: Pain Management;  Laterality: Right;  1 of 2    RADIOFREQUENCY ABLATION Left 5/7/2021    Procedure: RADIOFREQUENCY ABLATION L3,4,5;  Surgeon: Alex Callahan MD;  Location: [facility] PAIN MGT;  Service: Pain Management;  Laterality: Left;  2 of 2    RADIOFREQUENCY ABLATION Left 10/1/2021    Procedure: RADIOFREQUENCY ABLATION LEFT, C4,5,6,7 1 of 2 CONSENT NEEDED;  Surgeon: Alex Callahan MD;  Location: [facility] PAIN MGT;  Service: Pain Management;  Laterality: Left;    RADIOFREQUENCY ABLATION Right 10/15/2021    Procedure: RADIOFREQUENCY ABLATION  RIGHT C4,5,6,7 2 of 2 CONSENT NEEDED;  Surgeon: Alex Callahan MD;   "Location: [facility] PAIN MGT;  Service: Pain Management;  Laterality: Right;    RETINAL DETACHMENT SURGERY      ROTATOR CUFF REPAIR Right     SPINE SURGERY      cerival fusion     TRIGGER POINT INJECTION Left 6/13/2019    Procedure: INJECTION, TRIGGER POINT;  Surgeon: Kasandra Montoya MD;  Location: [facility] PAIN MGT;  Service: Pain Management;  Laterality: Left;       Family History:  Family History   Problem Relation Age of Onset    Heart disease Mother     Cancer Father     Leukemia Brother     Colon cancer Neg Hx     Celiac disease Neg Hx     Crohn's disease Neg Hx     Esophageal cancer Neg Hx     Inflammatory bowel disease Neg Hx     Irritable bowel syndrome Neg Hx     Liver cancer Neg Hx     Rectal cancer Neg Hx     Stomach cancer Neg Hx     Ulcerative colitis Neg Hx        Social History:  Social History     Socioeconomic History    Marital status:    Tobacco Use    Smoking status: Never Smoker    Smokeless tobacco: Never Used   Substance and Sexual Activity    Alcohol use: No    Drug use: No       OBJECTIVE:    /86 (BP Location: Left arm, Patient Position: Sitting)   Temp 98.2 °F (36.8 °C) (Temporal)   Resp 18   Ht 6' 1" (1.854 m)   Wt 109.4 kg (241 lb 2.9 oz)   BMI 31.82 kg/m²     PHYSICAL EXAMINATION:    General appearance: Well appearing, in no acute distress, alert and oriented x3.  Psych:  Mood and affect appropriate.  Skin: Skin color, texture, turgor normal, no rashes or lesions, in both upper and lower body.  Head/face:  Atraumatic, normocephalic. No palpable lymph nodes  Neck: There is mild pain to palpation over the cervical paraspinous and trapezius muscles bilaterally. Spurling Negative. Limited ROM with mild pain on extension    Cor: RRR  Pulm: Symmetric chest rise, no respiratory distress noted.   Back: Straight leg raising in the sitting position is negative to radicular pain bilaterally. There is pain with palpation over lumbar paraspinals and facet joints " bilaterally. Limited ROM with pain on flexion and extension. Positive facet loading bilaterally.   Extremities: No deformities, edema, or skin discoloration. Good capillary refill.  Musculoskeletal: Shoulder maneuvers are negative. There is pain with palpation over bilateral SI joints. FABERs is positive bilaterally. Bilateral upper and lower extremity strength is normal and symmetric. No atrophy or tone abnormalities are noted.  Neuro: Bilateral upper and lower extremity coordination and muscle stretch reflexes are physiologic and symmetric.  Plantar response are downgoing. No loss of sensation is noted.  Gait: Antalgic, ambulates w/ a cane for assistance     ASSESSMENT: 57 y.o. year old male with neck and lumbar pain, consistent with      1. Chronic pain disorder     2. Lumbar spondylosis  Procedure Order to Pain Management   3. DDD (degenerative disc disease), lumbar     4. Sacroiliac joint pain     5. S/P cervical spinal fusion     6. Cervical spondylosis without myelopathy     7. DDD (degenerative disc disease), cervical     8. Myofascial pain           PLAN:     - Previous imaging reviewed today.    - Schedule for left then right L3,4,5 RFA, one side at a time, two weeks apart.     - He is s/p cervical RFA with benefit.     - I have stressed the importance of physical activity and a home exercise plan to help with pain and improve health.    - Continue Gabapentin.     - Continue Robaxin 500 mg PRN muscle pain.     - Pain contract signed 2/12/2021.     - Continue Norco 7.5/325 mg BID PRN. Refill provided.     - The patient is here today for a refill of current pain medications and they believe these provide effective pain control and improvements in quality of life.  The patient notes no serious side effects, and feels the benefits outweigh the risks.  The patient was reminded of the pain contract that they signed previously as well as the risks and benefits of the medication including possible death.  The  updated [place] Board  Pharmacy prescription monitoring program was reviewed, and the patient has been found to be compliant with current treatment plan.    - UDS from 9/17/2021 reviewed and consistent.     - Continue home exercise routine.     - RTC 3 weeks after above procedure.     - Dr. Callahan was consulted on the patient and agrees with this plan.    The above plan and management options were discussed at length with patient. Patient is in agreement with the above and verbalized understanding.    Angelique Barahona NP  02/15/2022

## 2022-02-15 NOTE — PROGRESS NOTES
"Chronic patient Established Note (Follow up visit)      Interval History 2/15/2022:  The patient returns to clinic today for follow up of neck and back pain. He reports increased low back pain over the last two weeks. He describes this pain as sharp and aching in nature. He does report intermittent "catching" pain in his lower back, worse with getting out of bed. He denies any radicular leg pain. He continues to report benefit from previous cervical procedures. He reports intermittent neck pain. He continues to take Gabapentin with benefit. He continues to take Norco as needed with benefit and without adverse effects. He denies any other health changes. His pain today is 6/10.    Interval History 11/5/2021:  The patient returns to clinic today for follow up of neck and back pain. He is s/p left C4,5,6,7 RFA on 10/1/2021 and right C4,5,6,7 RFA on 10/15/2021. He reports 50% relief of his neck pain. He reports increased burning and itching pain to his skin near injection sites. He denies any radicular arm pain. He does report increased left sided muscle pain. He is concerned that one of the screws in his cervical hardware is moving. He continues to report benefit with previous lumbar procedures. He reports intermittent low back pain without radicular leg pain. He continues to take Gabapentin with benefit. He continues to take Norco as needed with benefit and without adverse effects. He denies any weakness. He denies any other health changes. His pain today is 5/10.    Interval History 9/17/2021:  The patient returns to clinic today for follow up of neck and back pain. He reports increased neck pain. He denies any radicular arm pain today. He does report intermittent radiating pain into his left shoulder blade and mid back. His pain is worse with activity. He reports that sometimes his pain takes his breath away. He continues to report low back pain, that is tolerable at this time. He continues to take Gabapentin and " Norco with benefit and without adverse effects. He denies any other health changes. His pain today is 6/10.    Interval History 6/17/2021:  The patient returns to clinic today for follow up of neck and back pain. He is s/p right L3,4,5 RFA on 4/23/2021 and left L3,4,5 RFA on 5/7/2021. He reports 50% relief of his low back pain. He continues to report intermittent low back pain. He denies any radicular leg pain. He continues to report neck pain, especially in between the scapula. His pain is worse with prolonged standing, walking, and activity. He continues to take Gabapentin with benefit. He continues to take Norco sparingly for severe pain with benefit and without adverse effects. He denies any other health changes. His pain today is 4/10.    Interval History 4/9/2021:  The patient returns to clinic today for follow up of neck and back pain. He is s/p left C4,5,6,7 RFA on 3/9/2021 and right C4,5,6,7 RFA on 3/26/2021. He reports 50% relief of his neck pain. He reports intermittent neck pain. He has good days and bad days. He continues to report low back pain that is constant, sharp, and aching. He reports intermittent radiating pain into the lateral aspect of his left leg to his knee. He continues to take Gabapentin with benefit. He continues to take Norco as needed sparingly for severe pain. He denies any adverse effects. He denies any other health changes. His pain today is 5/10.    Interval History 3/2/2021:  Sal Navarro presents to the clinic for a follow-up appointment for neck pain . Since the last visit, Sal Navarro states the pain has been worsening. Current pain intensity is 6/10.  Was seen by Angelique Barahona NP on 2/12/2021.     Interval History 2/12/2021:  The patient returns to clinic today for follow up of back pain. He has not been seen in over a year. He did not have imaging due to coronavirus outbreak. He would to reschedule this. He was also considering SCS trial prior to the pandemic. He  "continues to report low back pain that radiates into the lateral aspect of his left leg to his knee. He denies any radicular right leg pain. His pain is worse with bending and walking. He continues to take Gabapentin with benefit. He also takes Norco as needed for severe pain. He denies any adverse effects. He denies any other health changes. His pain today is 6/10.    HPI:  Sal Navarro is a 56 y.o. male who presents today s/p C4-7 ACDF with C5/6 PSIF in 2/2016 with residual chronic midline neck pain that radiates into his shoulder blades bilaterally, R>>L.  This is associated with bilateral hand numbness and tingling.  The hand symptoms did improve following the surgery.  The shooting pains in his arms resolved completely.   This pain is described in detail below.  His post-operative course was complicated by dysphagia that is being treated with speech therapy.  The numbness and the tingling were relieved by the surgery. Now experiences "deep pain along the spine"  Patient has not been seen for over a year, had to re-schedule his imaging studies due to pandemic(now completed). Prior to the pandemic, he was considering a SCS.   Only time he gets relief is laying in bed on a very thin pillow, but that doesn't last very long.   Pain aggravated by movement and even breathing/playing with his grandchild/holding the grandchild's hand. Heat and massage (has a vest) are helpful.   Was seen by Dr. Jan srivastava and received multiple EIS and RFAs.   Compliant with his medication.     Pain Disability Index Review:  Last 3 PDI Scores 2/15/2022 11/5/2021 9/17/2021   Pain Disability Index (PDI) [phone]       Pain Medications:  Gabapentin  Norco sparingly  Robaxin    Opioid Contract: yes     report:  Reviewed and consistent with medication use as prescribed.    Pain Procedures:   10/15/2021- Right C4,5,6,7 RFA  10/1/2021- Left C4,5,6,7 RFA  5/7/2021- Left L3,4,5 RFA   4/23/2021- Right L3,4,5 RFA  3/26/2021- Right " C4,5,6,7 RFA  3/9/2021- Left C4,5,6,7 RFA  10/11/19: L4/5 Interlaminar Epidural Steroid Injection  19:Cervical Conventional Radiofreqiency Ablation: C4-7, left  19:Left L2-5 Lumbar Medial Branch Nerve Thermal Radiofrequency Ablation  18:Right L2-5 Lumbar Medial Branch Nerve Thermal Radiofrequency Ablation  18:Cervical Thermal Radiofreqiency Ablation: Right C4-7  18:C7/[id number] Steroid Injection  18:LL2/3 Interlaminar Epidural Steroid Injection   18:L2/3 Interlaminar Epidural Steroid Injection   17:Bilateral L2-5 Lumbar Medial Branch Nerve Coolief Thermal Radiofrequency Ablation  17:Bilateral L2-5 Lumbar medial branch blocks   17:Cervical Conventional Radiofreqiency Ablation: Left C4-7  17:Cervical Conventional Radiofreqiency Ablation: Right C4-7  10/25/16:Cervical Conventional Radiofreqiency Ablation: Left C4-7  10/11/16: Cervical Conventional Radiofreqiency Ablation: Right C4-7  10/04/16:Cervical Medial Branch Blocks, Bilateral C4-7.     16:C7/[id number] Steroid Injection    Physical Therapy/Home Exercise: does home exercises now, but not in formal PT.    - 2019 was last time in PT     Imagin21 Mri Of L-Spine:  Impression:     Multilevel degenerative disc and joint disease which is mild and results in mild central canal and neural foraminal stenosis as outlined above.     21 x-ray of C-Spine:  Impression:     Postsurgical changes to the thoracic spine without additional evidence for degenerative disc or joint disease.  21 x-ray of T-Spine:  Impression:     Normal evaluation of the thoracic spine.       17 Mri of L-Spine:  IMPRESSION:      Multilevel degenerative lumbar spondylosis resulting in mild spinal canal narrowing and mild to moderate neuroforaminal narrowing.   17 Mri of C-Spine:  IMPRESSION:      Status post anterior cervical discectomy and fusion, with mild multilevel neuroforaminal narrowing  present    07/07/17 X-ray of L-Spine:  IMPRESSION:    Unremarkable examination of lumbar spine.    07/07/17 X-ray of C-Spine  IMPRESSION:    Postoperative change status post anterior cervical fusion C4-C7.   01/24/17 X-ray of C-Spine:  IMPRESSION:    Postop cervical spine stable     10/11/16 CT of C-Spine:    IMPRESSION:         Multilevel degenerative change as detailed above, most severe at C5-6.    08/08/16 Mri of T-Spine:  IMPRESSION:    There is no central canal or neural foraminal stenosis on this exam.  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    08/08/16 Mri of C-spine:  IMPRESSION:    Multilevel degenerative joint disease persist in this patient status post C4-C7 anterior spinal fusion with discectomy.  There is no severe central canal or neuroforaminal stenosis identified at any level  07/09/16 X-ray of C-Spine:  IMPRESSION:    No complication seen  Allergies:   Review of patient's allergies indicates:   Allergen Reactions    Pcn [penicillins] Hives and Rash    Lipitor [atorvastatin] Other (See Comments)     Muscle cramps, weakness       Current Medications:   Current Outpatient Medications   Medication Sig Dispense Refill    acetaminophen (TYLENOL) 500 MG tablet Take 500 mg by mouth every 6 (six) hours as needed for Pain.      albuterol (PROVENTIL/VENTOLIN HFA) 90 mcg/actuation inhaler       aspirin (ECOTRIN) 81 MG EC tablet Take 81 mg by mouth once daily.      cetirizine (ZYRTEC) 10 MG tablet Take 10 mg by mouth nightly.  5    FOLIC ACID/MULTIVIT-MIN/LUTEIN (CENTRUM SILVER ORAL) Take by mouth once daily.      gabapentin (NEURONTIN) 300 MG capsule Take 2 capsules (600 mg total) by mouth 2 (two) times daily. 120 capsule 5    levothyroxine (SYNTHROID) 50 MCG tablet Take 50 mcg by mouth once daily.      meclizine (ANTIVERT) 25 mg tablet Take 25 mg by mouth once daily.       meloxicam (MOBIC) 15 MG tablet Take 15 mg by mouth once daily.       metFORMIN (GLUCOPHAGE) 500 MG tablet Take 1 tablet by mouth 2 (two) times a day.      methocarbamoL (ROBAXIN) 500 MG Tab Take 1 tablet (500 mg total) by mouth 2 (two) times daily. 60 tablet 4    omeprazole (PRILOSEC) 20 MG capsule Take 1 capsule (20 mg total) by mouth once daily. 90 capsule 3    pravastatin (PRAVACHOL) 20 MG tablet       valsartan (DIOVAN) 80 MG tablet Take 40 mg by mouth once daily.   1    HYDROcodone-acetaminophen (NORCO) 7.5-325 mg per tablet Take 1 tablet by mouth every 12 (twelve) hours as needed for Pain. 60 tablet 0     No current facility-administered medications for this visit.       REVIEW OF SYSTEMS:    GENERAL:  No weight loss, malaise or fevers.  HEENT:  Negative for frequent or significant headaches.  NECK:  Negative for lumps, goiter  and significant neck swelling. + Neck pain  RESPIRATORY:  Negative for cough, wheezing or shortness of breath.  CARDIOVASCULAR:  Negative for chest pain, leg swelling or palpitations.  GI:  Negative for abdominal discomfort, blood in stools or black stools or change in bowel habits.  MUSCULOSKELETAL:  See HPI.  SKIN:  Negative for lesions, rash, and itching.  PSYCH:  Negative for sleep disturbance, mood disorder and recent psychosocial stressors.  HEMATOLOGY/LYMPHOLOGY:  Negative for prolonged bleeding, bruising easily or swollen nodes.  NEURO:   No history of headaches, syncope, paralysis, seizures or tremors.   All other reviewed and negative other than HPI.    Past Medical History:  Past Medical History:   Diagnosis Date    Arthritis     Cataract     Cervical back pain with evidence of disc disease     Hiatal hernia     Hypertension     Thyroid disease        Past Surgical History:  Past Surgical History:   Procedure Laterality Date    ACROMIOCLAVICULAR JOINT CYST EXCISION Right     BACK SURGERY      cataracts Bilateral     CERVICAL FUSION      COLONOSCOPY N/A 4/27/2018    Procedure: COLONOSCOPY;  Surgeon: Giovani Medeiros MD;   Location: [facility] ENDO (4TH FLR);  Service: Endoscopy;  Laterality: N/A;    EPIDURAL STEROID INJECTION N/A 10/11/2019    Procedure: INJECTION, STEROID, EPIDURAL;  Surgeon: Kasandra Montoya MD;  Location: [facility] PAIN MGT;  Service: Pain Management;  Laterality: N/A;  Lumbar NGUYEN L4/5    NGUYEN      EYE SURGERY      RADIOFREQUENCY ABLATION  10/2016    cervical spine/Montoya    RADIOFREQUENCY ABLATION Left 5/3/2019    Procedure: RADIOFREQUENCY ABLATION, L2-L5 MEDIALBRANCH;  Surgeon: Kasandra Montoya MD;  Location: [facility] PAIN MGT;  Service: Pain Management;  Laterality: Left;    RADIOFREQUENCY ABLATION Left 6/13/2019    Procedure: RADIOFREQUENCY ABLATION C4-7;  Surgeon: Kasandra Montoya MD;  Location: [facility] PAIN MGT;  Service: Pain Management;  Laterality: Left;    RADIOFREQUENCY ABLATION Left 3/9/2021    Procedure: RADIOFREQUENCY ABLATION C4,C5,C6,C7;  Surgeon: Alex Callahan MD;  Location: [facility] PAIN MGT;  Service: Pain Management;  Laterality: Left;  1 of 2    RADIOFREQUENCY ABLATION Right 3/26/2021    Procedure: RADIOFREQUENCY ABLATION C4,C6,C6,C7;  Surgeon: Alex Callahan MD;  Location: [facility] PAIN MGT;  Service: Pain Management;  Laterality: Right;  2 of 2    RADIOFREQUENCY ABLATION Right 4/23/2021    Procedure: RADIOFREQUENCY ABLATION L3,4,5;  Surgeon: Alex Callahan MD;  Location: [facility] PAIN MGT;  Service: Pain Management;  Laterality: Right;  1 of 2    RADIOFREQUENCY ABLATION Left 5/7/2021    Procedure: RADIOFREQUENCY ABLATION L3,4,5;  Surgeon: Alex Callahan MD;  Location: [facility] PAIN MGT;  Service: Pain Management;  Laterality: Left;  2 of 2    RADIOFREQUENCY ABLATION Left 10/1/2021    Procedure: RADIOFREQUENCY ABLATION LEFT, C4,5,6,7 1 of 2 CONSENT NEEDED;  Surgeon: Alex Callahan MD;  Location: [facility] PAIN MGT;  Service: Pain Management;  Laterality: Left;    RADIOFREQUENCY ABLATION Right 10/15/2021    Procedure: RADIOFREQUENCY ABLATION  RIGHT C4,5,6,7 2 of 2 CONSENT NEEDED;  Surgeon: Alex Callahan MD;   "Location: [facility] PAIN MGT;  Service: Pain Management;  Laterality: Right;    RETINAL DETACHMENT SURGERY      ROTATOR CUFF REPAIR Right     SPINE SURGERY      cerival fusion     TRIGGER POINT INJECTION Left 6/13/2019    Procedure: INJECTION, TRIGGER POINT;  Surgeon: Kasandra Montoya MD;  Location: [facility] PAIN MGT;  Service: Pain Management;  Laterality: Left;       Family History:  Family History   Problem Relation Age of Onset    Heart disease Mother     Cancer Father     Leukemia Brother     Colon cancer Neg Hx     Celiac disease Neg Hx     Crohn's disease Neg Hx     Esophageal cancer Neg Hx     Inflammatory bowel disease Neg Hx     Irritable bowel syndrome Neg Hx     Liver cancer Neg Hx     Rectal cancer Neg Hx     Stomach cancer Neg Hx     Ulcerative colitis Neg Hx        Social History:  Social History     Socioeconomic History    Marital status:    Tobacco Use    Smoking status: Never Smoker    Smokeless tobacco: Never Used   Substance and Sexual Activity    Alcohol use: No    Drug use: No       OBJECTIVE:    /86 (BP Location: Left arm, Patient Position: Sitting)   Temp 98.2 °F (36.8 °C) (Temporal)   Resp 18   Ht 6' 1" (1.854 m)   Wt 109.4 kg (241 lb 2.9 oz)   BMI 31.82 kg/m²     PHYSICAL EXAMINATION:    General appearance: Well appearing, in no acute distress, alert and oriented x3.  Psych:  Mood and affect appropriate.  Skin: Skin color, texture, turgor normal, no rashes or lesions, in both upper and lower body.  Head/face:  Atraumatic, normocephalic. No palpable lymph nodes  Neck: There is mild pain to palpation over the cervical paraspinous and trapezius muscles bilaterally. Spurling Negative. Limited ROM with mild pain on extension    Cor: RRR  Pulm: Symmetric chest rise, no respiratory distress noted.   Back: Straight leg raising in the sitting position is negative to radicular pain bilaterally. There is pain with palpation over lumbar paraspinals and facet joints " bilaterally. Limited ROM with pain on flexion and extension. Positive facet loading bilaterally.   Extremities: No deformities, edema, or skin discoloration. Good capillary refill.  Musculoskeletal: Shoulder maneuvers are negative. There is pain with palpation over bilateral SI joints. FABERs is positive bilaterally. Bilateral upper and lower extremity strength is normal and symmetric. No atrophy or tone abnormalities are noted.  Neuro: Bilateral upper and lower extremity coordination and muscle stretch reflexes are physiologic and symmetric.  Plantar response are downgoing. No loss of sensation is noted.  Gait: Antalgic, ambulates w/ a cane for assistance     ASSESSMENT: 57 y.o. year old male with neck and lumbar pain, consistent with      1. Chronic pain disorder     2. Lumbar spondylosis  Procedure Order to Pain Management   3. DDD (degenerative disc disease), lumbar     4. Sacroiliac joint pain     5. S/P cervical spinal fusion     6. Cervical spondylosis without myelopathy     7. DDD (degenerative disc disease), cervical     8. Myofascial pain           PLAN:     - Previous imaging reviewed today.    - Schedule for left then right L3,4,5 RFA, one side at a time, two weeks apart.     - He is s/p cervical RFA with benefit.     - I have stressed the importance of physical activity and a home exercise plan to help with pain and improve health.    - Continue Gabapentin.     - Continue Robaxin 500 mg PRN muscle pain.     - Pain contract signed 2/12/2021.     - Continue Norco 7.5/325 mg BID PRN. Refill provided.     - The patient is here today for a refill of current pain medications and they believe these provide effective pain control and improvements in quality of life.  The patient notes no serious side effects, and feels the benefits outweigh the risks.  The patient was reminded of the pain contract that they signed previously as well as the risks and benefits of the medication including possible death.  The  updated [place] [employer]  Pharmacy prescription monitoring program was reviewed, and the patient has been found to be compliant with current treatment plan.    - UDS from 9/17/2021 reviewed and consistent.     - Continue home exercise routine.     - RTC 3 weeks after above procedure.     - Dr. Callahan was consulted on the patient and agrees with this plan.    The above plan and management options were discussed at length with patient. Patient is in agreement with the above and verbalized understanding.    Angelique Barahona NP  02/15/2022

## 2022-02-15 NOTE — H&P (VIEW-ONLY)
"Chronic patient Established Note (Follow up visit)      Interval History 2/15/2022:  The patient returns to clinic today for follow up of neck and back pain. He reports increased low back pain over the last two weeks. He describes this pain as sharp and aching in nature. He does report intermittent "catching" pain in his lower back, worse with getting out of bed. He denies any radicular leg pain. He continues to report benefit from previous cervical procedures. He reports intermittent neck pain. He continues to take Gabapentin with benefit. He continues to take Norco as needed with benefit and without adverse effects. He denies any other health changes. His pain today is 6/10.    Interval History 11/5/2021:  The patient returns to clinic today for follow up of neck and back pain. He is s/p left C4,5,6,7 RFA on 10/1/2021 and right C4,5,6,7 RFA on 10/15/2021. He reports 50% relief of his neck pain. He reports increased burning and itching pain to his skin near injection sites. He denies any radicular arm pain. He does report increased left sided muscle pain. He is concerned that one of the screws in his cervical hardware is moving. He continues to report benefit with previous lumbar procedures. He reports intermittent low back pain without radicular leg pain. He continues to take Gabapentin with benefit. He continues to take Norco as needed with benefit and without adverse effects. He denies any weakness. He denies any other health changes. His pain today is 5/10.    Interval History 9/17/2021:  The patient returns to clinic today for follow up of neck and back pain. He reports increased neck pain. He denies any radicular arm pain today. He does report intermittent radiating pain into his left shoulder blade and mid back. His pain is worse with activity. He reports that sometimes his pain takes his breath away. He continues to report low back pain, that is tolerable at this time. He continues to take Gabapentin and " Norco with benefit and without adverse effects. He denies any other health changes. His pain today is 6/10.    Interval History 6/17/2021:  The patient returns to clinic today for follow up of neck and back pain. He is s/p right L3,4,5 RFA on 4/23/2021 and left L3,4,5 RFA on 5/7/2021. He reports 50% relief of his low back pain. He continues to report intermittent low back pain. He denies any radicular leg pain. He continues to report neck pain, especially in between the scapula. His pain is worse with prolonged standing, walking, and activity. He continues to take Gabapentin with benefit. He continues to take Norco sparingly for severe pain with benefit and without adverse effects. He denies any other health changes. His pain today is 4/10.    Interval History 4/9/2021:  The patient returns to clinic today for follow up of neck and back pain. He is s/p left C4,5,6,7 RFA on 3/9/2021 and right C4,5,6,7 RFA on 3/26/2021. He reports 50% relief of his neck pain. He reports intermittent neck pain. He has good days and bad days. He continues to report low back pain that is constant, sharp, and aching. He reports intermittent radiating pain into the lateral aspect of his left leg to his knee. He continues to take Gabapentin with benefit. He continues to take Norco as needed sparingly for severe pain. He denies any adverse effects. He denies any other health changes. His pain today is 5/10.    Interval History 3/2/2021:  Sal Navarro presents to the clinic for a follow-up appointment for neck pain . Since the last visit, Sal Navarro states the pain has been worsening. Current pain intensity is 6/10.  Was seen by Angelique Barahona NP on 2/12/2021.     Interval History 2/12/2021:  The patient returns to clinic today for follow up of back pain. He has not been seen in over a year. He did not have imaging due to coronavirus outbreak. He would to reschedule this. He was also considering SCS trial prior to the pandemic. He  "continues to report low back pain that radiates into the lateral aspect of his left leg to his knee. He denies any radicular right leg pain. His pain is worse with bending and walking. He continues to take Gabapentin with benefit. He also takes Norco as needed for severe pain. He denies any adverse effects. He denies any other health changes. His pain today is 6/10.    HPI:  Sal Navarro is a 56 y.o. male who presents today s/p C4-7 ACDF with C5/6 PSIF in 2/2016 with residual chronic midline neck pain that radiates into his shoulder blades bilaterally, R>>L.  This is associated with bilateral hand numbness and tingling.  The hand symptoms did improve following the surgery.  The shooting pains in his arms resolved completely.   This pain is described in detail below.  His post-operative course was complicated by dysphagia that is being treated with speech therapy.  The numbness and the tingling were relieved by the surgery. Now experiences "deep pain along the spine"  Patient has not been seen for over a year, had to re-schedule his imaging studies due to pandemic(now completed). Prior to the pandemic, he was considering a SCS.   Only time he gets relief is laying in bed on a very thin pillow, but that doesn't last very long.   Pain aggravated by movement and even breathing/playing with his grandchild/holding the grandchild's hand. Heat and massage (has a vest) are helpful.   Was seen by Dr. Jan srivastava and received multiple EIS and RFAs.   Compliant with his medication.     Pain Disability Index Review:  Last 3 PDI Scores 2/15/2022 11/5/2021 9/17/2021   Pain Disability Index (PDI) 38 [phone]       Pain Medications:  Gabapentin  Norco sparingly  Robaxin    Opioid Contract: yes     report:  Reviewed and consistent with medication use as prescribed.    Pain Procedures:   10/15/2021- Right C4,5,6,7 RFA  10/1/2021- Left C4,5,6,7 RFA  5/7/2021- Left L3,4,5 RFA   4/23/2021- Right L3,4,5 RFA  3/26/2021- Right " C4,5,6,7 RFA  3/9/2021- Left C4,5,6,7 RFA  10/11/19: L4/5 Interlaminar Epidural Steroid Injection  19:Cervical Conventional Radiofreqiency Ablation: C4-7, left  19:Left L2-5 Lumbar Medial Branch Nerve Thermal Radiofrequency Ablation  18:Right L2-5 Lumbar Medial Branch Nerve Thermal Radiofrequency Ablation  18:Cervical Thermal Radiofreqiency Ablation: Right C4-7  18:C7/[id number] Steroid Injection  18:LL2/3 Interlaminar Epidural Steroid Injection   18:L2/3 Interlaminar Epidural Steroid Injection   17:Bilateral L2-5 Lumbar Medial Branch Nerve Coolief Thermal Radiofrequency Ablation  17:Bilateral L2-5 Lumbar medial branch blocks   17:Cervical Conventional Radiofreqiency Ablation: Left C4-7  17:Cervical Conventional Radiofreqiency Ablation: Right C4-7  10/25/16:Cervical Conventional Radiofreqiency Ablation: Left C4-7  10/11/16: Cervical Conventional Radiofreqiency Ablation: Right C4-7  10/04/16:Cervical Medial Branch Blocks, Bilateral C4-7.     16:C7/E4Navdyqkw Steroid Injection    Physical Therapy/Home Exercise: does home exercises now, but not in formal PT.    - 2019 was last time in PT     Imagin21 Mri Of L-Spine:  Impression:     Multilevel degenerative disc and joint disease which is mild and results in mild central canal and neural foraminal stenosis as outlined above.     21 x-ray of C-Spine:  Impression:     Postsurgical changes to the thoracic spine without additional evidence for degenerative disc or joint disease.  21 x-ray of T-Spine:  Impression:     Normal evaluation of the thoracic spine.       17 Mri of L-Spine:  IMPRESSION:      Multilevel degenerative lumbar spondylosis resulting in mild spinal canal narrowing and mild to moderate neuroforaminal narrowing.   17 Mri of C-Spine:  IMPRESSION:      Status post anterior cervical discectomy and fusion, with mild multilevel neuroforaminal narrowing  present    07/07/17 X-ray of L-Spine:  IMPRESSION:    Unremarkable examination of lumbar spine.    07/07/17 X-ray of C-Spine  IMPRESSION:    Postoperative change status post anterior cervical fusion C4-C7.   01/24/17 X-ray of C-Spine:  IMPRESSION:    Postop cervical spine stable     10/11/16 CT of C-Spine:    IMPRESSION:         Multilevel degenerative change as detailed above, most severe at C5-6.    08/08/16 Mri of T-Spine:  IMPRESSION:    There is no central canal or neural foraminal stenosis on this exam.  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    08/08/16 Mri of C-spine:  IMPRESSION:    Multilevel degenerative joint disease persist in this patient status post C4-C7 anterior spinal fusion with discectomy.  There is no severe central canal or neuroforaminal stenosis identified at any level  07/09/16 X-ray of C-Spine:  IMPRESSION:    No complication seen  Allergies:   Review of patient's allergies indicates:   Allergen Reactions    Pcn [penicillins] Hives and Rash    Lipitor [atorvastatin] Other (See Comments)     Muscle cramps, weakness       Current Medications:   Current Outpatient Medications   Medication Sig Dispense Refill    acetaminophen (TYLENOL) 500 MG tablet Take 500 mg by mouth every 6 (six) hours as needed for Pain.      albuterol (PROVENTIL/VENTOLIN HFA) 90 mcg/actuation inhaler       aspirin (ECOTRIN) 81 MG EC tablet Take 81 mg by mouth once daily.      cetirizine (ZYRTEC) 10 MG tablet Take 10 mg by mouth nightly.  5    FOLIC ACID/MULTIVIT-MIN/LUTEIN (CENTRUM SILVER ORAL) Take by mouth once daily.      gabapentin (NEURONTIN) 300 MG capsule Take 2 capsules (600 mg total) by mouth 2 (two) times daily. 120 capsule 5    levothyroxine (SYNTHROID) 50 MCG tablet Take 50 mcg by mouth once daily.      meclizine (ANTIVERT) 25 mg tablet Take 25 mg by mouth once daily.       meloxicam (MOBIC) 15 MG tablet Take 15 mg by mouth once daily.       metFORMIN (GLUCOPHAGE) 500 MG tablet Take 1 tablet by mouth 2 (two) times a day.      methocarbamoL (ROBAXIN) 500 MG Tab Take 1 tablet (500 mg total) by mouth 2 (two) times daily. 60 tablet 4    omeprazole (PRILOSEC) 20 MG capsule Take 1 capsule (20 mg total) by mouth once daily. 90 capsule 3    pravastatin (PRAVACHOL) 20 MG tablet       valsartan (DIOVAN) 80 MG tablet Take 40 mg by mouth once daily.   1    HYDROcodone-acetaminophen (NORCO) 7.5-325 mg per tablet Take 1 tablet by mouth every 12 (twelve) hours as needed for Pain. 60 tablet 0     No current facility-administered medications for this visit.       REVIEW OF SYSTEMS:    GENERAL:  No weight loss, malaise or fevers.  HEENT:  Negative for frequent or significant headaches.  NECK:  Negative for lumps, goiter  and significant neck swelling. + Neck pain  RESPIRATORY:  Negative for cough, wheezing or shortness of breath.  CARDIOVASCULAR:  Negative for chest pain, leg swelling or palpitations.  GI:  Negative for abdominal discomfort, blood in stools or black stools or change in bowel habits.  MUSCULOSKELETAL:  See HPI.  SKIN:  Negative for lesions, rash, and itching.  PSYCH:  Negative for sleep disturbance, mood disorder and recent psychosocial stressors.  HEMATOLOGY/LYMPHOLOGY:  Negative for prolonged bleeding, bruising easily or swollen nodes.  NEURO:   No history of headaches, syncope, paralysis, seizures or tremors.   All other reviewed and negative other than HPI.    Past Medical History:  Past Medical History:   Diagnosis Date    Arthritis     Cataract     Cervical back pain with evidence of disc disease     Hiatal hernia     Hypertension     Thyroid disease        Past Surgical History:  Past Surgical History:   Procedure Laterality Date    ACROMIOCLAVICULAR JOINT CYST EXCISION Right     BACK SURGERY      cataracts Bilateral     CERVICAL FUSION      COLONOSCOPY N/A 4/27/2018    Procedure: COLONOSCOPY;  Surgeon: Giovani Medeiros MD;   Location: [facility] ENDO (4TH FLR);  Service: Endoscopy;  Laterality: N/A;    EPIDURAL STEROID INJECTION N/A 10/11/2019    Procedure: INJECTION, STEROID, EPIDURAL;  Surgeon: Kasandra Montoya MD;  Location: [facility] PAIN MGT;  Service: Pain Management;  Laterality: N/A;  Lumbar NGUYEN L4/5    NGUYEN      EYE SURGERY      RADIOFREQUENCY ABLATION  10/2016    cervical spine/Montoya    RADIOFREQUENCY ABLATION Left 5/3/2019    Procedure: RADIOFREQUENCY ABLATION, L2-L5 MEDIALBRANCH;  Surgeon: Kasandra Montoya MD;  Location: [facility] PAIN MGT;  Service: Pain Management;  Laterality: Left;    RADIOFREQUENCY ABLATION Left 6/13/2019    Procedure: RADIOFREQUENCY ABLATION C4-7;  Surgeon: Kasandra Montoya MD;  Location: [facility] PAIN MGT;  Service: Pain Management;  Laterality: Left;    RADIOFREQUENCY ABLATION Left 3/9/2021    Procedure: RADIOFREQUENCY ABLATION C4,C5,C6,C7;  Surgeon: Alex Callahan MD;  Location: [facility] PAIN MGT;  Service: Pain Management;  Laterality: Left;  1 of 2    RADIOFREQUENCY ABLATION Right 3/26/2021    Procedure: RADIOFREQUENCY ABLATION C4,C6,C6,C7;  Surgeon: Alex Callahan MD;  Location: [facility] PAIN MGT;  Service: Pain Management;  Laterality: Right;  2 of 2    RADIOFREQUENCY ABLATION Right 4/23/2021    Procedure: RADIOFREQUENCY ABLATION L3,4,5;  Surgeon: Alex Callahan MD;  Location: [facility] PAIN MGT;  Service: Pain Management;  Laterality: Right;  1 of 2    RADIOFREQUENCY ABLATION Left 5/7/2021    Procedure: RADIOFREQUENCY ABLATION L3,4,5;  Surgeon: Alex Callahan MD;  Location: [facility] PAIN MGT;  Service: Pain Management;  Laterality: Left;  2 of 2    RADIOFREQUENCY ABLATION Left 10/1/2021    Procedure: RADIOFREQUENCY ABLATION LEFT, C4,5,6,7 1 of 2 CONSENT NEEDED;  Surgeon: Alex Callahan MD;  Location: [facility] PAIN MGT;  Service: Pain Management;  Laterality: Left;    RADIOFREQUENCY ABLATION Right 10/15/2021    Procedure: RADIOFREQUENCY ABLATION  RIGHT C4,5,6,7 2 of 2 CONSENT NEEDED;  Surgeon: Alex Callahan MD;   "Location: [facility] PAIN MGT;  Service: Pain Management;  Laterality: Right;    RETINAL DETACHMENT SURGERY      ROTATOR CUFF REPAIR Right     SPINE SURGERY      cerival fusion     TRIGGER POINT INJECTION Left 6/13/2019    Procedure: INJECTION, TRIGGER POINT;  Surgeon: Kasandra Montoya MD;  Location: [facility] PAIN MGT;  Service: Pain Management;  Laterality: Left;       Family History:  Family History   Problem Relation Age of Onset    Heart disease Mother     Cancer Father     Leukemia Brother     Colon cancer Neg Hx     Celiac disease Neg Hx     Crohn's disease Neg Hx     Esophageal cancer Neg Hx     Inflammatory bowel disease Neg Hx     Irritable bowel syndrome Neg Hx     Liver cancer Neg Hx     Rectal cancer Neg Hx     Stomach cancer Neg Hx     Ulcerative colitis Neg Hx        Social History:  Social History     Socioeconomic History    Marital status:    Tobacco Use    Smoking status: Never Smoker    Smokeless tobacco: Never Used   Substance and Sexual Activity    Alcohol use: No    Drug use: No       OBJECTIVE:    /86 (BP Location: Left arm, Patient Position: Sitting)   Temp 98.2 °F (36.8 °C) (Temporal)   Resp 18   Ht 6' 1" (1.854 m)   Wt 109.4 kg (241 lb 2.9 oz)   BMI 31.82 kg/m²     PHYSICAL EXAMINATION:    General appearance: Well appearing, in no acute distress, alert and oriented x3.  Psych:  Mood and affect appropriate.  Skin: Skin color, texture, turgor normal, no rashes or lesions, in both upper and lower body.  Head/face:  Atraumatic, normocephalic. No palpable lymph nodes  Neck: There is mild pain to palpation over the cervical paraspinous and trapezius muscles bilaterally. Spurling Negative. Limited ROM with mild pain on extension    Cor: RRR  Pulm: Symmetric chest rise, no respiratory distress noted.   Back: Straight leg raising in the sitting position is negative to radicular pain bilaterally. There is pain with palpation over lumbar paraspinals and facet joints " bilaterally. Limited ROM with pain on flexion and extension. Positive facet loading bilaterally.   Extremities: No deformities, edema, or skin discoloration. Good capillary refill.  Musculoskeletal: Shoulder maneuvers are negative. There is pain with palpation over bilateral SI joints. FABERs is positive bilaterally. Bilateral upper and lower extremity strength is normal and symmetric. No atrophy or tone abnormalities are noted.  Neuro: Bilateral upper and lower extremity coordination and muscle stretch reflexes are physiologic and symmetric.  Plantar response are downgoing. No loss of sensation is noted.  Gait: Antalgic, ambulates w/ a cane for assistance     ASSESSMENT: 57 y.o. year old male with neck and lumbar pain, consistent with      1. Chronic pain disorder     2. Lumbar spondylosis  Procedure Order to Pain Management   3. DDD (degenerative disc disease), lumbar     4. Sacroiliac joint pain     5. S/P cervical spinal fusion     6. Cervical spondylosis without myelopathy     7. DDD (degenerative disc disease), cervical     8. Myofascial pain           PLAN:     - Previous imaging reviewed today.    - Schedule for left then right L3,4,5 RFA, one side at a time, two weeks apart.     - He is s/p cervical RFA with benefit.     - I have stressed the importance of physical activity and a home exercise plan to help with pain and improve health.    - Continue Gabapentin.     - Continue Robaxin 500 mg PRN muscle pain.     - Pain contract signed 2/12/2021.     - Continue Norco 7.5/325 mg BID PRN. Refill provided.     - The patient is here today for a refill of current pain medications and they believe these provide effective pain control and improvements in quality of life.  The patient notes no serious side effects, and feels the benefits outweigh the risks.  The patient was reminded of the pain contract that they signed previously as well as the risks and benefits of the medication including possible death.  The  updated [place] Board  Pharmacy prescription monitoring program was reviewed, and the patient has been found to be compliant with current treatment plan.    - UDS from 9/17/2021 reviewed and consistent.     - Continue home exercise routine.     - RTC 3 weeks after above procedure.     - Dr. Callahan was consulted on the patient and agrees with this plan.    The above plan and management options were discussed at length with patient. Patient is in agreement with the above and verbalized understanding.    Angelique Barahona NP  02/15/2022

## 2022-02-24 RX ORDER — SODIUM CHLORIDE 9 MG/ML
INJECTION, SOLUTION INTRAVENOUS CONTINUOUS
Status: CANCELLED | OUTPATIENT
Start: 2022-02-24

## 2022-02-25 ENCOUNTER — HOSPITAL ENCOUNTER (OUTPATIENT)
Facility: OTHER | Age: 57
Discharge: HOME | End: 2022-02-25
Attending: ANESTHESIOLOGY | Admitting: ANESTHESIOLOGY
Payer: MEDICARE

## 2022-02-25 VITALS
HEART RATE: 67 BPM | OXYGEN SATURATION: 95 % | WEIGHT: 240 LBS | TEMPERATURE: 98 F | RESPIRATION RATE: 16 BRPM | SYSTOLIC BLOOD PRESSURE: 133 MMHG | BODY MASS INDEX: 31.81 KG/M2 | HEIGHT: 73 IN | DIASTOLIC BLOOD PRESSURE: 85 MMHG

## 2022-02-25 DIAGNOSIS — M47.816 OSTEOARTHRITIS OF LUMBAR SPINE, UNSPECIFIED SPINAL OSTEOARTHRITIS COMPLICATION STATUS: Primary | ICD-10-CM

## 2022-02-25 DIAGNOSIS — M47.9 SPONDYLOSIS: ICD-10-CM

## 2022-02-25 DIAGNOSIS — G89.29 CHRONIC PAIN: ICD-10-CM

## 2022-02-25 LAB — POCT GLUCOSE: 121 MG/DL (ref 70–110)

## 2022-02-25 PROCEDURE — 63600175 PHARM REV CODE 636 W HCPCS: Performed by: ANESTHESIOLOGY

## 2022-02-25 PROCEDURE — 64635 DESTROY LUMB/SAC FACET JNT: CPT | Mod: LT | Performed by: ANESTHESIOLOGY

## 2022-02-25 PROCEDURE — 82947 ASSAY GLUCOSE BLOOD QUANT: CPT | Performed by: ANESTHESIOLOGY

## 2022-02-25 PROCEDURE — 64636 DESTROY L/S FACET JNT ADDL: CPT | Mod: LT | Performed by: ANESTHESIOLOGY

## 2022-02-25 PROCEDURE — 25000003 PHARM REV CODE 250: Performed by: ANESTHESIOLOGY

## 2022-02-25 PROCEDURE — 64635 DESTROY LUMB/SAC FACET JNT: CPT | Mod: LT,,, | Performed by: ANESTHESIOLOGY

## 2022-02-25 PROCEDURE — 64636 PR DESTROY L/S FACET JNT ADDL: ICD-10-PCS | Mod: LT,,, | Performed by: ANESTHESIOLOGY

## 2022-02-25 PROCEDURE — 64636 DESTROY L/S FACET JNT ADDL: CPT | Mod: LT,,, | Performed by: ANESTHESIOLOGY

## 2022-02-25 PROCEDURE — 64635 PR DESTROY LUMB/SAC FACET JNT: ICD-10-PCS | Mod: LT,,, | Performed by: ANESTHESIOLOGY

## 2022-02-25 RX ORDER — FENTANYL CITRATE 50 UG/ML
INJECTION, SOLUTION INTRAMUSCULAR; INTRAVENOUS
Status: DISCONTINUED | OUTPATIENT
Start: 2022-02-25 | End: 2022-02-25 | Stop reason: HOSPADM

## 2022-02-25 RX ORDER — BUPIVACAINE HYDROCHLORIDE 2.5 MG/ML
INJECTION, SOLUTION EPIDURAL; INFILTRATION; INTRACAUDAL
Status: DISCONTINUED | OUTPATIENT
Start: 2022-02-25 | End: 2022-02-25 | Stop reason: HOSPADM

## 2022-02-25 RX ORDER — DEXAMETHASONE SODIUM PHOSPHATE 10 MG/ML
INJECTION INTRAMUSCULAR; INTRAVENOUS
Status: DISCONTINUED | OUTPATIENT
Start: 2022-02-25 | End: 2022-02-25 | Stop reason: HOSPADM

## 2022-02-25 RX ORDER — LIDOCAINE HYDROCHLORIDE 20 MG/ML
INJECTION, SOLUTION INFILTRATION; PERINEURAL
Status: DISCONTINUED | OUTPATIENT
Start: 2022-02-25 | End: 2022-02-25 | Stop reason: HOSPADM

## 2022-02-25 RX ORDER — MIDAZOLAM HYDROCHLORIDE 1 MG/ML
INJECTION INTRAMUSCULAR; INTRAVENOUS
Status: DISCONTINUED | OUTPATIENT
Start: 2022-02-25 | End: 2022-02-25 | Stop reason: HOSPADM

## 2022-02-25 NOTE — DISCHARGE INSTRUCTIONS
Thank you for allowing us to care for you today. You may receive a survey about the care we provided. Your feedback is valuable and helps us provide excellent care throughout the community.     Home Care Instructions for Pain Management:    1. DIET:   You may resume your normal diet today.   2. BATHING:   You may shower with luke warm water. No tub baths or anything that will soak injection sites under water for the next 24 hours.  3. DRESSING:   You may remove your bandage today.   4. ACTIVITY LEVEL:   You may resume your normal activities 24 hrs after your procedure. Nothing strenuous today.  5. MEDICATIONS:   You may resume your normal medications today. To restart blood thinners, ask your doctor.  6. DRIVING    If you have received any sedatives by mouth today, you may not drive for 12 hours.    If you have received any sedation through your IV, you may not drive for 24 hrs.   7. SPECIAL INSTRUCTIONS:   No heat to the injection site for 24 hrs including, hot bath or shower, heating pad, moist heat, or hot tubs.    Use ice pack to injection site for any pain or discomfort.  Apply ice packs for 20 minute intervals as needed.    IF you have diabetes, be sure to monitor your blood sugar more closely. IF your injection contained steroids your blood sugar levels may become higher than normal.    If you are still having pain upon discharge:  Your pain may improve over the next 48 hours. The anesthetic (numbing medication) works immediately to 48 hours. IF your injection contained a steroid (anti-inflammatory medication), it takes approximately 3 days to start feeling relief and 7-10 days to see your greatest results from the medication. It is possible you may need subsequent injections. This would be discussed at your follow up appointment with pain management or your referring doctor.    Please call the PAIN MANAGEMENT office at [phone] or ON CALL pager at [phone] if you experienced any:   -Weakness or  loss of sensation  -Fever > 101.5  -Pain uncontrolled with oral medications   -Persistent nausea, vomiting, or diarrhea  -Redness or drainage from the injection sites, or any other worrisome concerns.   If physician on call was not reached or could not communicate with our office for any reason please go to the nearest emergency department. Adult Procedural Sedation Instructions    Recovery After Procedural Sedation (Adult)  You have been given medicine by vein to make you sleep during your surgery. This may have included both a pain medicine and sleeping medicine. Most of the effects have worn off. But you may still have some drowsiness for the next 6 to 8 hours.  Home care  Follow these guidelines when you get home:  For the next 8 hours, you should be watched by a responsible adult. This person should make sure your condition is not getting worse.  Don't drink any alcohol for the next 24 hours.  Don't drive, operate dangerous machinery, or make important business or personal decisions during the next 24 hours.  Note: Your healthcare provider may tell you not to take any medicine by mouth for pain or sleep in the next 4 hours. These medicines may react with the medicines you were given in the hospital. This could cause a much stronger response than usual.  Follow-up care  Follow up with your healthcare provider if you are not alert and back to your usual level of activity within 12 hours.  When to seek medical advice  Call your healthcare provider right away if any of these occur:  Drowsiness gets worse  Weakness or dizziness gets worse  Repeated vomiting  You can't be awakened   Date Last Reviewed: 10/18/2016  © 6905-9560 [employer] [address] [place] [address], [place] [address]. All rights reserved. This information is not intended as a substitute for professional medical care. Always follow your healthcare professional's instructions.

## 2022-02-25 NOTE — OP NOTE
Therapeutic Lumbar Medial Branch Radiofrequency Ablation under Fluoroscopy     The procedure, risks, benefits, and options were discussed with the patient. There are no contraindications to the procedure. The patent expressed understanding and agreed to the procedure. Informed written consent was obtained prior to the start of the procedure and can be found in the patient's chart.        PATIENT NAME: Sal Navarro   MRN: [id number]     DATE OF PROCEDURE: 02/25/2022     PROCEDURE:  Left L3, L4 and L5 Lumbar Radiofrequency Ablation under Fluoroscopy    PRE-OP DIAGNOSIS: Lumbar spondylosis [M47.816]    POST-OP DIAGNOSIS: Same    PHYSICIAN: Alex Callahan MD    ASSISTANTS: Dr. Segal     MEDICATIONS INJECTED:  Preservative-free Decadron 10mg with 9cc of Bupivicaine 0.25%    LOCAL ANESTHETIC INJECTED:   Xylocaine 2%    SEDATION:   Versed 2mg and Fentanyl 50mcg                                                                                                                                                                                     Conscious sedation ordered by M.D. Patient re-evaluation prior to administration of conscious sedation. No changes noted in patient's status from initial evaluation. The patient's vital signs were monitored by RN and patient remained hemodynamically stable throughout the procedure.    Event Time In   Sedation Start 1058       ESTIMATED BLOOD LOSS:  None    COMPLICATIONS:  None     INTERVAL HISTORY: Patient has clinical and imaging findings suggestive of recurrent facet mediated pain. Patient has undergone a previous RFA at specified levels with at least 50% relief for at least 6 months. Successful diagnostic medial branch blocks have been completed within the past 2 years.    TECHNIQUE: Time-out was performed to identify the patient and procedure to be performed. With the patient laying in a prone position, the surgical area was prepped and draped in the usual sterile fashion using  ChloraPrep and fenestrated drape. The levels were determined under fluoroscopic guidance. Skin anesthesia was achieved by injecting Lidocaine 2% over the injection sites. A 20 gauge 10mm curved active tip needle was introduced to the anatomic local of the medial branch at each of the above levels using AP, lateral and/or contralateral oblique fluoroscopic imaging. Then sensory and motor testing was performed to confirm that the needle tips were in the correct location. After negative aspiration for blood or CSF was confirmed, 1 mL of the lidocaine 2% listed above was injected slowly at each site. This was followed by thermal lesioning at 80 degrees celsius for 90 seconds. That was followed by slowly injecting 1 mL of the medication mixture listed above at each site. The needles were removed and bleeding was nil. A sterile dressing was applied. No specimens collected. The patient tolerated the procedure well and did not have any procedure related motor deficit at the conclusion of the procedure.    The patient was monitored after the procedure in the recovery area. They were given post-procedure and discharge instructions to follow at home. The patient was discharged in a stable condition.    I reviewed and edited the fellow's note. I conducted my own interview and physical examination. I agree with the findings. I was present and supervising all critical portions of the procedure.    Alex Callahan MD

## 2022-03-11 ENCOUNTER — HOSPITAL ENCOUNTER (OUTPATIENT)
Facility: OTHER | Age: 57
Discharge: HOME | End: 2022-03-11
Attending: ANESTHESIOLOGY | Admitting: ANESTHESIOLOGY
Payer: MEDICARE

## 2022-03-11 VITALS
RESPIRATION RATE: 16 BRPM | HEIGHT: 73 IN | BODY MASS INDEX: 32.47 KG/M2 | OXYGEN SATURATION: 98 % | SYSTOLIC BLOOD PRESSURE: 119 MMHG | TEMPERATURE: 97 F | DIASTOLIC BLOOD PRESSURE: 79 MMHG | HEART RATE: 58 BPM | WEIGHT: 245 LBS

## 2022-03-11 DIAGNOSIS — M47.9 SPONDYLOSIS: ICD-10-CM

## 2022-03-11 DIAGNOSIS — G89.29 CHRONIC PAIN: ICD-10-CM

## 2022-03-11 DIAGNOSIS — M47.816 OSTEOARTHRITIS OF LUMBAR SPINE, UNSPECIFIED SPINAL OSTEOARTHRITIS COMPLICATION STATUS: ICD-10-CM

## 2022-03-11 LAB — POCT GLUCOSE: 94 MG/DL (ref 70–110)

## 2022-03-11 PROCEDURE — 25000003 PHARM REV CODE 250: Performed by: STUDENT IN AN ORGANIZED HEALTH CARE EDUCATION/TRAINING PROGRAM

## 2022-03-11 PROCEDURE — 64636 PR DESTROY L/S FACET JNT ADDL: ICD-10-PCS | Mod: RT,,, | Performed by: ANESTHESIOLOGY

## 2022-03-11 PROCEDURE — 99152 PR MOD CONSCIOUS SEDATION, SAME PHYS, 5+ YRS, FIRST 15 MIN: ICD-10-PCS | Mod: ,,, | Performed by: ANESTHESIOLOGY

## 2022-03-11 PROCEDURE — 99152 MOD SED SAME PHYS/QHP 5/>YRS: CPT | Mod: ,,, | Performed by: ANESTHESIOLOGY

## 2022-03-11 PROCEDURE — 64636 DESTROY L/S FACET JNT ADDL: CPT | Mod: RT | Performed by: ANESTHESIOLOGY

## 2022-03-11 PROCEDURE — 63600175 PHARM REV CODE 636 W HCPCS: Performed by: ANESTHESIOLOGY

## 2022-03-11 PROCEDURE — 64635 PR DESTROY LUMB/SAC FACET JNT: ICD-10-PCS | Mod: RT,,, | Performed by: ANESTHESIOLOGY

## 2022-03-11 PROCEDURE — 64636 DESTROY L/S FACET JNT ADDL: CPT | Mod: RT,,, | Performed by: ANESTHESIOLOGY

## 2022-03-11 PROCEDURE — 99152 MOD SED SAME PHYS/QHP 5/>YRS: CPT | Performed by: ANESTHESIOLOGY

## 2022-03-11 PROCEDURE — 82947 ASSAY GLUCOSE BLOOD QUANT: CPT | Performed by: ANESTHESIOLOGY

## 2022-03-11 PROCEDURE — 25000003 PHARM REV CODE 250: Performed by: ANESTHESIOLOGY

## 2022-03-11 PROCEDURE — 64635 DESTROY LUMB/SAC FACET JNT: CPT | Mod: RT | Performed by: ANESTHESIOLOGY

## 2022-03-11 PROCEDURE — 64635 DESTROY LUMB/SAC FACET JNT: CPT | Mod: RT,,, | Performed by: ANESTHESIOLOGY

## 2022-03-11 RX ORDER — SODIUM CHLORIDE 9 MG/ML
INJECTION, SOLUTION INTRAVENOUS CONTINUOUS
Status: DISCONTINUED | OUTPATIENT
Start: 2022-03-11 | End: 2022-03-11 | Stop reason: HOSPADM

## 2022-03-11 RX ORDER — DEXAMETHASONE SODIUM PHOSPHATE 10 MG/ML
INJECTION INTRAMUSCULAR; INTRAVENOUS
Status: DISCONTINUED | OUTPATIENT
Start: 2022-03-11 | End: 2022-03-11 | Stop reason: HOSPADM

## 2022-03-11 RX ORDER — BUPIVACAINE HYDROCHLORIDE 2.5 MG/ML
INJECTION, SOLUTION EPIDURAL; INFILTRATION; INTRACAUDAL
Status: DISCONTINUED | OUTPATIENT
Start: 2022-03-11 | End: 2022-03-11 | Stop reason: HOSPADM

## 2022-03-11 RX ORDER — LIDOCAINE HYDROCHLORIDE 20 MG/ML
INJECTION, SOLUTION INFILTRATION; PERINEURAL
Status: DISCONTINUED | OUTPATIENT
Start: 2022-03-11 | End: 2022-03-11 | Stop reason: HOSPADM

## 2022-03-11 RX ORDER — MIDAZOLAM HYDROCHLORIDE 1 MG/ML
INJECTION INTRAMUSCULAR; INTRAVENOUS
Status: DISCONTINUED | OUTPATIENT
Start: 2022-03-11 | End: 2022-03-11 | Stop reason: HOSPADM

## 2022-03-11 RX ORDER — FENTANYL CITRATE 50 UG/ML
INJECTION, SOLUTION INTRAMUSCULAR; INTRAVENOUS
Status: DISCONTINUED | OUTPATIENT
Start: 2022-03-11 | End: 2022-03-11 | Stop reason: HOSPADM

## 2022-03-11 RX ADMIN — SODIUM CHLORIDE: 0.9 INJECTION, SOLUTION INTRAVENOUS at 11:03

## 2022-03-11 NOTE — OP NOTE
Therapeutic Lumbar Medial Branch Radiofrequency Ablation under Fluoroscopy     The procedure, risks, benefits, and options were discussed with the patient. There are no contraindications to the procedure. The patent expressed understanding and agreed to the procedure. Informed written consent was obtained prior to the start of the procedure and can be found in the patient's chart.        PATIENT NAME: Sal Navarro   MRN: [id number]     DATE OF PROCEDURE: 03/11/2022     PROCEDURE:  Right L3, L4 and L5 Lumbar Radiofrequency Ablation under Fluoroscopy    PRE-OP DIAGNOSIS: Lumbar spondylosis [M47.816]    POST-OP DIAGNOSIS: Same    PHYSICIAN: Alex Callahan MD    ASSISTANTS: Dr. Regan     MEDICATIONS INJECTED:  Preservative-free Decadron 10mg with 9cc of Bupivicaine 0.25%    LOCAL ANESTHETIC INJECTED:   Xylocaine 2%    SEDATION:   Versed 2mg and Fentanyl 50mcg                                                                                                                                                                                     Conscious sedation ordered by M.D. Patient re-evaluation prior to administration of conscious sedation. No changes noted in patient's status from initial evaluation. The patient's vital signs were monitored by RN and patient remained hemodynamically stable throughout the procedure.    Event Time In   Sedation Start 1154   Sedation End 1204       ESTIMATED BLOOD LOSS:  None    COMPLICATIONS:  None     INTERVAL HISTORY: Patient has clinical and imaging findings suggestive of recurrent facet mediated pain. Patient has undergone a previous RFA at specified levels with at least 50% relief for at least 6 months. Successful diagnostic medial branch blocks have been completed within the past 2 years.    TECHNIQUE: Time-out was performed to identify the patient and procedure to be performed. With the patient laying in a prone position, the surgical area was prepped and draped in the usual  sterile fashion using ChloraPrep and fenestrated drape. The levels were determined under fluoroscopic guidance. Skin anesthesia was achieved by injecting Lidocaine 2% over the injection sites. A 20 gauge 10mm curved active tip needle was introduced to the anatomic local of the medial branch at each of the above levels using AP, lateral and/or contralateral oblique fluoroscopic imaging. Then sensory and motor testing was performed to confirm that the needle tips were in the correct location. After negative aspiration for blood or CSF was confirmed, 1 mL of the lidocaine 2% listed above was injected slowly at each site. This was followed by thermal lesioning at 80 degrees celsius for 90 seconds. That was followed by slowly injecting 1 mL of the medication mixture listed above at each site. The needles were removed and bleeding was nil. A sterile dressing was applied. No specimens collected. The patient tolerated the procedure well and did not have any procedure related motor deficit at the conclusion of the procedure.    The patient was monitored after the procedure in the recovery area. They were given post-procedure and discharge instructions to follow at home. The patient was discharged in a stable condition.    I reviewed and edited the fellow's note. I conducted my own interview and physical examination. I agree with the findings. I was present and supervising all critical portions of the procedure.    Alex Callahan MD

## 2022-03-11 NOTE — INTERVAL H&P NOTE
The patient has been examined and the H&P has been reviewed:    I concur with the findings and no changes have occurred since H&P was written.    Procedure risks, benefits and alternative options discussed and understood by patient/family.    Proceed with right L3,4,5 RFA as planned.    There are no hospital problems to display for this patient.

## 2022-03-11 NOTE — DISCHARGE SUMMARY
Discharge Note  Short Stay      SUMMARY     Admit Date: 3/11/2022    Attending Physician: Alex Callahan      Discharge Physician: Alex Callahan      Discharge Date: 3/11/2022 12:06 PM    Procedure(s) (LRB):  RADIOFREQUENCY ABLATION RIGHT L3,4,5 2 of 2, needs consent (Right)    Final Diagnosis: Lumbar spondylosis [M47.816]    Disposition: Home or self care    Patient Instructions:   Current Discharge Medication List      CONTINUE these medications which have NOT CHANGED    Details   acetaminophen (TYLENOL) 500 MG tablet Take 500 mg by mouth every 6 (six) hours as needed for Pain.      albuterol (PROVENTIL/VENTOLIN HFA) 90 mcg/actuation inhaler       aspirin (ECOTRIN) 81 MG EC tablet Take 81 mg by mouth once daily.      cetirizine (ZYRTEC) 10 MG tablet Take 10 mg by mouth nightly.  Refills: 5      FOLIC ACID/MULTIVIT-MIN/LUTEIN (CENTRUM SILVER ORAL) Take by mouth once daily.      gabapentin (NEURONTIN) 300 MG capsule Take 2 capsules (600 mg total) by mouth 2 (two) times daily.  Qty: 120 capsule, Refills: 5    Associated Diagnoses: Chronic pain disorder; Lumbar spondylosis; DDD (degenerative disc disease), lumbar; S/P cervical spinal fusion; Cervical radiculopathy; Myofascial pain      HYDROcodone-acetaminophen (NORCO) 7.5-325 mg per tablet Take 1 tablet by mouth every 12 (twelve) hours as needed for Pain.  Qty: 60 tablet, Refills: 0    Comments: Patient with chronic intractable pain.  Medically necessary for > 7 days  Associated Diagnoses: Chronic pain disorder; Lumbar spondylosis; DDD (degenerative disc disease), lumbar; Facet arthritis of lumbar region; Myalgia; Spondylosis of cervical region without myelopathy or radiculopathy; S/P cervical spinal fusion      levothyroxine (SYNTHROID) 50 MCG tablet Take 50 mcg by mouth once daily.      meclizine (ANTIVERT) 25 mg tablet Take 25 mg by mouth once daily.       meloxicam (MOBIC) 15 MG tablet Take 15 mg by mouth once daily.      metFORMIN (GLUCOPHAGE) 500 MG tablet  Take 1 tablet by mouth 2 (two) times a day.      methocarbamoL (ROBAXIN) 500 MG Tab Take 1 tablet (500 mg total) by mouth 2 (two) times daily.  Qty: 60 tablet, Refills: 4    Associated Diagnoses: Myofascial pain      omeprazole (PRILOSEC) 20 MG capsule Take 1 capsule (20 mg total) by mouth once daily.  Qty: 90 capsule, Refills: 3    Associated Diagnoses: Gastroesophageal reflux disease, esophagitis presence not specified      pravastatin (PRAVACHOL) 20 MG tablet       valsartan (DIOVAN) 80 MG tablet Take 40 mg by mouth once daily.   Refills: 1                 Discharge Diagnosis: Lumbar spondylosis [M47.816]  Condition on Discharge: Stable with no complications to procedure   Diet on Discharge: Same as before.  Activity: as per instruction sheet.  Discharge to: Home with a responsible adult.  Follow up: 2-4 weeks

## 2022-03-11 NOTE — DISCHARGE INSTRUCTIONS
Thank you for allowing us to care for you today. You may receive a survey about the care we provided. Your feedback is valuable and helps us provide excellent care throughout the community.     Home Care Instructions for Pain Management:    1. DIET:   You may resume your normal diet today.   2. BATHING:   You may shower with luke warm water. No tub baths or anything that will soak injection sites under water for the next 24 hours.  3. DRESSING:   You may remove your bandage today.   4. ACTIVITY LEVEL:   You may resume your normal activities 24 hrs after your procedure. Nothing strenuous today.  5. MEDICATIONS:   You may resume your normal medications today. To restart blood thinners, ask your doctor.  6. DRIVING    If you have received any sedatives by mouth today, you may not drive for 12 hours.    If you have received any sedation through your IV, you may not drive for 24 hrs.   7. SPECIAL INSTRUCTIONS:   No heat to the injection site for 24 hrs including, hot bath or shower, heating pad, moist heat, or hot tubs.    Use ice pack to injection site for any pain or discomfort.  Apply ice packs for 20 minute intervals as needed.    IF you have diabetes, be sure to monitor your blood sugar more closely. IF your injection contained steroids your blood sugar levels may become higher than normal.    If you are still having pain upon discharge:  Your pain may improve over the next 48 hours. The anesthetic (numbing medication) works immediately to 48 hours. IF your injection contained a steroid (anti-inflammatory medication), it takes approximately 3 days to start feeling relief and 7-10 days to see your greatest results from the medication. It is possible you may need subsequent injections. This would be discussed at your follow up appointment with pain management or your referring doctor.    Please call the PAIN MANAGEMENT office at [phone] or ON CALL pager at [phone] if you experienced any:   -Weakness or  loss of sensation  -Fever > 101.5  -Pain uncontrolled with oral medications   -Persistent nausea, vomiting, or diarrhea  -Redness or drainage from the injection sites, or any other worrisome concerns.   If physician on call was not reached or could not communicate with our office for any reason please go to the nearest emergency department. Adult Procedural Sedation Instructions    Recovery After Procedural Sedation (Adult)  You have been given medicine by vein to make you sleep during your surgery. This may have included both a pain medicine and sleeping medicine. Most of the effects have worn off. But you may still have some drowsiness for the next 6 to 8 hours.  Home care  Follow these guidelines when you get home:  For the next 8 hours, you should be watched by a responsible adult. This person should make sure your condition is not getting worse.  Don't drink any alcohol for the next 24 hours.  Don't drive, operate dangerous machinery, or make important business or personal decisions during the next 24 hours.  Note: Your healthcare provider may tell you not to take any medicine by mouth for pain or sleep in the next 4 hours. These medicines may react with the medicines you were given in the hospital. This could cause a much stronger response than usual.  Follow-up care  Follow up with your healthcare provider if you are not alert and back to your usual level of activity within 12 hours.  When to seek medical advice  Call your healthcare provider right away if any of these occur:  Drowsiness gets worse  Weakness or dizziness gets worse  Repeated vomiting  You can't be awakened   Date Last Reviewed: 10/18/2016  © 8578-4122 [employer] [address] [place] [address], [place] [address]. All rights reserved. This information is not intended as a substitute for professional medical care. Always follow your healthcare professional's instructions.

## 2022-03-31 ENCOUNTER — TELEPHONE (OUTPATIENT)
Dept: SPINE | Facility: CLINIC | Age: 57
End: 2022-03-31
Payer: MEDICARE

## 2022-03-31 NOTE — TELEPHONE ENCOUNTER
This message is for patient in regards to his/her appointment 04/01/22 at 10:20a       [facility] Policy: For the safety of all patients and staff members.     Patient Visitor policy: Due to social distancing and limited seating staff are requesting patient to arrive to their schedule appointments on time. During this visit we're asking all patients to only have one visitor over the age of 18yrs old to accompany to be seen by Angelique Barahona NP. If patient do not required assistance with their visit, we're asking all visitors to remain outside the waiting area.    Upon arriving to your schedule appointment; patients are required to wear a face mask, if patient do not have a face mask one will be provided entering the facility. We ask patients to contact clinical staff at this number [phone] to notify staff that they have arrived or they may do so by utilizing the Mobile checked in [facility](if patient have patient portal; clinical staff will send a message through there letting them know it's okay to proceed to their visit). If you have any questions or concerns please contact [phone]    Staff called and spoke with pt. Wife, confirmed appt. SG

## 2022-04-01 ENCOUNTER — OFFICE VISIT (OUTPATIENT)
Dept: PAIN MEDICINE | Facility: CLINIC | Age: 57
End: 2022-04-01
Payer: MEDICARE

## 2022-04-01 VITALS
TEMPERATURE: 98 F | WEIGHT: 242 LBS | BODY MASS INDEX: 32.07 KG/M2 | SYSTOLIC BLOOD PRESSURE: 115 MMHG | HEIGHT: 73 IN | RESPIRATION RATE: 18 BRPM | HEART RATE: 67 BPM | DIASTOLIC BLOOD PRESSURE: 77 MMHG

## 2022-04-01 DIAGNOSIS — M50.30 DDD (DEGENERATIVE DISC DISEASE), CERVICAL: ICD-10-CM

## 2022-04-01 DIAGNOSIS — M53.3 SACROILIAC JOINT PAIN: ICD-10-CM

## 2022-04-01 DIAGNOSIS — M51.36 DDD (DEGENERATIVE DISC DISEASE), LUMBAR: ICD-10-CM

## 2022-04-01 DIAGNOSIS — M54.16 LUMBAR RADICULOPATHY: ICD-10-CM

## 2022-04-01 DIAGNOSIS — G89.4 CHRONIC PAIN DISORDER: Primary | ICD-10-CM

## 2022-04-01 DIAGNOSIS — M47.812 CERVICAL SPONDYLOSIS WITHOUT MYELOPATHY: ICD-10-CM

## 2022-04-01 DIAGNOSIS — M79.18 MYOFASCIAL PAIN: ICD-10-CM

## 2022-04-01 DIAGNOSIS — M47.816 LUMBAR SPONDYLOSIS: ICD-10-CM

## 2022-04-01 DIAGNOSIS — Z98.1 S/P CERVICAL SPINAL FUSION: ICD-10-CM

## 2022-04-01 PROCEDURE — 1159F MED LIST DOCD IN RCRD: CPT | Mod: CPTII,S$GLB,, | Performed by: NURSE PRACTITIONER

## 2022-04-01 PROCEDURE — 4010F PR ACE/ARB THEARPY RXD/TAKEN: ICD-10-PCS | Mod: CPTII,S$GLB,, | Performed by: NURSE PRACTITIONER

## 2022-04-01 PROCEDURE — 1160F PR REVIEW ALL MEDS BY PRESCRIBER/CLIN PHARMACIST DOCUMENTED: ICD-10-PCS | Mod: CPTII,S$GLB,, | Performed by: NURSE PRACTITIONER

## 2022-04-01 PROCEDURE — 3074F PR MOST RECENT SYSTOLIC BLOOD PRESSURE < 130 MM HG: ICD-10-PCS | Mod: CPTII,S$GLB,, | Performed by: NURSE PRACTITIONER

## 2022-04-01 PROCEDURE — 3008F BODY MASS INDEX DOCD: CPT | Mod: CPTII,S$GLB,, | Performed by: NURSE PRACTITIONER

## 2022-04-01 PROCEDURE — 1159F PR MEDICATION LIST DOCUMENTED IN MEDICAL RECORD: ICD-10-PCS | Mod: CPTII,S$GLB,, | Performed by: NURSE PRACTITIONER

## 2022-04-01 PROCEDURE — 99214 OFFICE O/P EST MOD 30 MIN: CPT | Mod: S$GLB,,, | Performed by: NURSE PRACTITIONER

## 2022-04-01 PROCEDURE — 3078F DIAST BP <80 MM HG: CPT | Mod: CPTII,S$GLB,, | Performed by: NURSE PRACTITIONER

## 2022-04-01 PROCEDURE — 3008F PR BODY MASS INDEX (BMI) DOCUMENTED: ICD-10-PCS | Mod: CPTII,S$GLB,, | Performed by: NURSE PRACTITIONER

## 2022-04-01 PROCEDURE — 99214 PR OFFICE/OUTPT VISIT, EST, LEVL IV, 30-39 MIN: ICD-10-PCS | Mod: S$GLB,,, | Performed by: NURSE PRACTITIONER

## 2022-04-01 PROCEDURE — 3074F SYST BP LT 130 MM HG: CPT | Mod: CPTII,S$GLB,, | Performed by: NURSE PRACTITIONER

## 2022-04-01 PROCEDURE — 4010F ACE/ARB THERAPY RXD/TAKEN: CPT | Mod: CPTII,S$GLB,, | Performed by: NURSE PRACTITIONER

## 2022-04-01 PROCEDURE — 99999 PR PBB SHADOW E&M-EST. PATIENT-LVL IV: CPT | Mod: PBBFAC,,, | Performed by: NURSE PRACTITIONER

## 2022-04-01 PROCEDURE — 1160F RVW MEDS BY RX/DR IN RCRD: CPT | Mod: CPTII,S$GLB,, | Performed by: NURSE PRACTITIONER

## 2022-04-01 PROCEDURE — 99999 PR PBB SHADOW E&M-EST. PATIENT-LVL IV: ICD-10-PCS | Mod: PBBFAC,,, | Performed by: NURSE PRACTITIONER

## 2022-04-01 PROCEDURE — 3078F PR MOST RECENT DIASTOLIC BLOOD PRESSURE < 80 MM HG: ICD-10-PCS | Mod: CPTII,S$GLB,, | Performed by: NURSE PRACTITIONER

## 2022-04-01 NOTE — PROGRESS NOTES
"Chronic patient Established Note (Follow up visit)      Interval History 4/1/2022:  The patient returns to clinic today for follow up of back pain. He is s/p left L3,4,5 RFA on 2/25/2022 and right L3,4,5 RFA on 3/11/2022. He reports relief of his back pain. He reports increased back pain that radiates into the posterior aspect of his right leg to his knee. He denies any left leg pain. His pain is worse with prolonged walking. He reports that he sometimes drags his right leg. He denies any falls. He denies any bowel or bladder incontinence. He continues to take Gabapentin and Norco as needed with benefit. He denies any adverse effects. He denies any other health changes. His pain today is 7/10.    Interval History 2/15/2022:  The patient returns to clinic today for follow up of neck and back pain. He reports increased low back pain over the last two weeks. He describes this pain as sharp and aching in nature. He does report intermittent "catching" pain in his lower back, worse with getting out of bed. He denies any radicular leg pain. He continues to report benefit from previous cervical procedures. He reports intermittent neck pain. He continues to take Gabapentin with benefit. He continues to take Norco as needed with benefit and without adverse effects. He denies any other health changes. His pain today is 6/10.    Interval History 11/5/2021:  The patient returns to clinic today for follow up of neck and back pain. He is s/p left C4,5,6,7 RFA on 10/1/2021 and right C4,5,6,7 RFA on 10/15/2021. He reports 50% relief of his neck pain. He reports increased burning and itching pain to his skin near injection sites. He denies any radicular arm pain. He does report increased left sided muscle pain. He is concerned that one of the screws in his cervical hardware is moving. He continues to report benefit with previous lumbar procedures. He reports intermittent low back pain without radicular leg pain. He continues to take " Gabapentin with benefit. He continues to take Norco as needed with benefit and without adverse effects. He denies any weakness. He denies any other health changes. His pain today is 5/10.    Interval History 9/17/2021:  The patient returns to clinic today for follow up of neck and back pain. He reports increased neck pain. He denies any radicular arm pain today. He does report intermittent radiating pain into his left shoulder blade and mid back. His pain is worse with activity. He reports that sometimes his pain takes his breath away. He continues to report low back pain, that is tolerable at this time. He continues to take Gabapentin and Norco with benefit and without adverse effects. He denies any other health changes. His pain today is 6/10.    Interval History 6/17/2021:  The patient returns to clinic today for follow up of neck and back pain. He is s/p right L3,4,5 RFA on 4/23/2021 and left L3,4,5 RFA on 5/7/2021. He reports 50% relief of his low back pain. He continues to report intermittent low back pain. He denies any radicular leg pain. He continues to report neck pain, especially in between the scapula. His pain is worse with prolonged standing, walking, and activity. He continues to take Gabapentin with benefit. He continues to take Norco sparingly for severe pain with benefit and without adverse effects. He denies any other health changes. His pain today is 4/10.    Interval History 4/9/2021:  The patient returns to clinic today for follow up of neck and back pain. He is s/p left C4,5,6,7 RFA on 3/9/2021 and right C4,5,6,7 RFA on 3/26/2021. He reports 50% relief of his neck pain. He reports intermittent neck pain. He has good days and bad days. He continues to report low back pain that is constant, sharp, and aching. He reports intermittent radiating pain into the lateral aspect of his left leg to his knee. He continues to take Gabapentin with benefit. He continues to take Norco as needed sparingly for  "severe pain. He denies any adverse effects. He denies any other health changes. His pain today is 5/10.    Interval History 3/2/2021:  Sal Navarro presents to the clinic for a follow-up appointment for neck pain . Since the last visit, Sal Navarro states the pain has been worsening. Current pain intensity is 6/10.  Was seen by Angelique Barahona NP on 2/12/2021.     Interval History 2/12/2021:  The patient returns to clinic today for follow up of back pain. He has not been seen in over a year. He did not have imaging due to coronavirus outbreak. He would to reschedule this. He was also considering SCS trial prior to the pandemic. He continues to report low back pain that radiates into the lateral aspect of his left leg to his knee. He denies any radicular right leg pain. His pain is worse with bending and walking. He continues to take Gabapentin with benefit. He also takes Norco as needed for severe pain. He denies any adverse effects. He denies any other health changes. His pain today is 6/10.    HPI:  Sal Navarro is a 56 y.o. male who presents today s/p C4-7 ACDF with C5/6 PSIF in 2/2016 with residual chronic midline neck pain that radiates into his shoulder blades bilaterally, R>>L.  This is associated with bilateral hand numbness and tingling.  The hand symptoms did improve following the surgery.  The shooting pains in his arms resolved completely.   This pain is described in detail below.  His post-operative course was complicated by dysphagia that is being treated with speech therapy.  The numbness and the tingling were relieved by the surgery. Now experiences "deep pain along the spine"  Patient has not been seen for over a year, had to re-schedule his imaging studies due to pandemic(now completed). Prior to the pandemic, he was considering a SCS.   Only time he gets relief is laying in bed on a very thin pillow, but that doesn't last very long.   Pain aggravated by movement and even " breathing/playing with his grandchild/holding the grandchild's hand. Heat and massage (has a vest) are helpful.   Was seen by Dr. Jan srivastava and received multiple EIS and RFAs.   Compliant with his medication.     Pain Disability Index Review:  Last 3 PDI Scores 4/1/2022 2/15/2022 11/5/2021   Pain Disability Index (PDI) [phone]       Pain Medications:  Gabapentin  Norco sparingly  Robaxin    Opioid Contract: yes     report:  Reviewed and consistent with medication use as prescribed.    Pain Procedures:   3/11/2022- Right L3,4,5 RFA  2/25/2022- Left L3,4,5 RFA  10/15/2021- Right C4,5,6,7 RFA  10/1/2021- Left C4,5,6,7 RFA  5/7/2021- Left L3,4,5 RFA   4/23/2021- Right L3,4,5 RFA  3/26/2021- Right C4,5,6,7 RFA  3/9/2021- Left C4,5,6,7 RFA  10/11/19: L4/5 Interlaminar Epidural Steroid Injection  06/13/19:Cervical Conventional Radiofreqiency Ablation: C4-7, left  05/03/19:Left L2-5 Lumbar Medial Branch Nerve Thermal Radiofrequency Ablation  12/21/18:Right L2-5 Lumbar Medial Branch Nerve Thermal Radiofrequency Ablation  11/23/18:Cervical Thermal Radiofreqiency Ablation: Right C4-7  09/14/18:C7/[id number] Steroid Injection  06/29/18:LL2/3 Interlaminar Epidural Steroid Injection   03/06/18:L2/3 Interlaminar Epidural Steroid Injection   09/26/17:Bilateral L2-5 Lumbar Medial Branch Nerve Coolief Thermal Radiofrequency Ablation  08/31/17:Bilateral L2-5 Lumbar medial branch blocks   03/28/17:Cervical Conventional Radiofreqiency Ablation: Left C4-7  03/14/17:Cervical Conventional Radiofreqiency Ablation: Right C4-7  10/25/16:Cervical Conventional Radiofreqiency Ablation: Left C4-7  10/11/16: Cervical Conventional Radiofreqiency Ablation: Right C4-7  10/04/16:Cervical Medial Branch Blocks, Bilateral C4-7.     08/31/16:C7/[id number] Steroid Injection    Physical Therapy/Home Exercise: does home exercises now, but not in formal PT.    - 2019 was last time in PT     Imaging:   MRI Lumbar Spine  2/25/2021:  COMPARISON:  07/11/2017     FINDINGS:  The vertebral bodies maintain normal height, signal intensity and alignment.  There is redemonstration of few hemangiomas at multiple levels.  The intervertebral disc spaces are preserved although there is some disc desiccation at multiple levels.  The conus terminates at level L1.  Evaluation of the localizer images and structures surrounding the lumbar spine shows no abnormalities.     L1-L2: No significant disc or joint pathology.     L2-L3: Mild diffuse disc bulge with mild bilateral facet arthropathy and ligamentum flavum hypertrophy results in mild central canal stenosis.  Mild bilateral neural foraminal stenosis is also identified.     L3-L4: There is a diffuse disc bulge with no significant facet arthropathy or ligamentum flavum hypertrophy.  There is mild central canal stenosis and mild bilateral neural foraminal stenosis.     L4-L5: There is a diffuse disc bulge with ligamentum flavum hypertrophy.  No significant facet arthropathy.  There is mild central canal stenosis with only minimal encroachment on the left neural foramen.  Mild right neural foraminal stenosis is present.     L5-S1: There is a diffuse disc bulge with mild to moderate right facet arthropathy.  No left facet arthropathy.  The ligamentum flavum is normal.  The central canal is patent and the neural foramina are largely patent.  Incidental note is made of an annulus fibrosus tear posteriorly measuring approximately 2 mm.     Impression:     Multilevel degenerative disc and joint disease which is mild and results in mild central canal and neural foraminal stenosis as outlined above.    Xray Cervical Spine 2/25/2021:  COMPARISON:  07/07/2017 .     FINDINGS:  The patient has undergone ACDF from C4 through C7. The instrumentation is intact without evidence of complication.  The vertebral bodies maintain normal height and alignment. The remaining intervertebral disc spaces are preserved.  No  significant uncovertebral joint hypertrophy.  The prevertebral soft tissues, odontoid views and lung apices are normal. The neural foramen are patent     Impression:     Postsurgical changes to the thoracic spine without additional evidence for degenerative disc or joint disease.     Allergies:   Review of patient's allergies indicates:   Allergen Reactions    Pcn [penicillins] Hives and Rash    Lipitor [atorvastatin] Other (See Comments)     Muscle cramps, weakness       Current Medications:   Current Outpatient Medications   Medication Sig Dispense Refill    acetaminophen (TYLENOL) 500 MG tablet Take 500 mg by mouth every 6 (six) hours as needed for Pain.      albuterol (PROVENTIL/VENTOLIN HFA) 90 mcg/actuation inhaler       aspirin (ECOTRIN) 81 MG EC tablet Take 81 mg by mouth once daily.      cetirizine (ZYRTEC) 10 MG tablet Take 10 mg by mouth nightly.  5    FOLIC ACID/MULTIVIT-MIN/LUTEIN (CENTRUM SILVER ORAL) Take by mouth once daily.      gabapentin (NEURONTIN) 300 MG capsule Take 2 capsules (600 mg total) by mouth 2 (two) times daily. 120 capsule 5    HYDROcodone-acetaminophen (NORCO) 7.5-325 mg per tablet Take 1 tablet by mouth every 12 (twelve) hours as needed for Pain. 60 tablet 0    levothyroxine (SYNTHROID) 50 MCG tablet Take 50 mcg by mouth once daily.      meclizine (ANTIVERT) 25 mg tablet Take 25 mg by mouth once daily.       meloxicam (MOBIC) 15 MG tablet Take 15 mg by mouth once daily.      metFORMIN (GLUCOPHAGE) 500 MG tablet Take 1 tablet by mouth 2 (two) times a day.      methocarbamoL (ROBAXIN) 500 MG Tab Take 1 tablet (500 mg total) by mouth 2 (two) times daily. 60 tablet 4    omeprazole (PRILOSEC) 20 MG capsule Take 1 capsule (20 mg total) by mouth once daily. 90 capsule 3    pravastatin (PRAVACHOL) 20 MG tablet       valsartan (DIOVAN) 80 MG tablet Take 40 mg by mouth once daily.   1     No current facility-administered medications for this visit.       REVIEW OF  SYSTEMS:    GENERAL:  No weight loss, malaise or fevers.  HEENT:  Negative for frequent or significant headaches.  NECK:  Negative for lumps, goiter  and significant neck swelling.   RESPIRATORY:  Negative for cough, wheezing or shortness of breath.  CARDIOVASCULAR:  Negative for chest pain, leg swelling or palpitations. HTN  GI:  Negative for abdominal discomfort, blood in stools or black stools or change in bowel habits.  MUSCULOSKELETAL:  See HPI.  SKIN:  Negative for lesions, rash, and itching.  PSYCH:  Negative for sleep disturbance, mood disorder and recent psychosocial stressors.  HEMATOLOGY/LYMPHOLOGY:  Negative for prolonged bleeding, bruising easily or swollen nodes.  NEURO:   No history of headaches, syncope, paralysis, seizures or tremors.   ENDO: Thyroid disorder.   All other reviewed and negative other than HPI.    Past Medical History:  Past Medical History:   Diagnosis Date    Arthritis     Cataract     Cervical back pain with evidence of disc disease     Hiatal hernia     Hypertension     Thyroid disease        Past Surgical History:  Past Surgical History:   Procedure Laterality Date    ACROMIOCLAVICULAR JOINT CYST EXCISION Right     BACK SURGERY      cataracts Bilateral     CERVICAL FUSION      COLONOSCOPY N/A 4/27/2018    Procedure: COLONOSCOPY;  Surgeon: Giovani Medeiros MD;  Location: [address]);  Service: Endoscopy;  Laterality: N/A;    EPIDURAL STEROID INJECTION N/A 10/11/2019    Procedure: INJECTION, STEROID, EPIDURAL;  Surgeon: Kasandra Montoya MD;  Location: [facility];  Service: Pain Management;  Laterality: N/A;  Lumbar NGUYEN L4/5    NGUYEN      EYE SURGERY      RADIOFREQUENCY ABLATION  10/2016    cervical spine/Jan    RADIOFREQUENCY ABLATION Left 5/3/2019    Procedure: RADIOFREQUENCY ABLATION, L2-L5 MEDIALBRANCH;  Surgeon: Kasandra Montoya MD;  Location: [facility];  Service: Pain Management;  Laterality: Left;    RADIOFREQUENCY ABLATION Left 6/13/2019     Procedure: RADIOFREQUENCY ABLATION C4-7;  Surgeon: Kasandra Montoya MD;  Location: [facility];  Service: Pain Management;  Laterality: Left;    RADIOFREQUENCY ABLATION Left 3/9/2021    Procedure: RADIOFREQUENCY ABLATION C4,C5,C6,C7;  Surgeon: Alex Callahan MD;  Location: [facility];  Service: Pain Management;  Laterality: Left;  1 of 2    RADIOFREQUENCY ABLATION Right 3/26/2021    Procedure: RADIOFREQUENCY ABLATION C4,C6,C6,C7;  Surgeon: Alex Callahan MD;  Location: [facility];  Service: Pain Management;  Laterality: Right;  2 of 2    RADIOFREQUENCY ABLATION Right 4/23/2021    Procedure: RADIOFREQUENCY ABLATION L3,4,5;  Surgeon: Alex Callahan MD;  Location: [facility];  Service: Pain Management;  Laterality: Right;  1 of 2    RADIOFREQUENCY ABLATION Left 5/7/2021    Procedure: RADIOFREQUENCY ABLATION L3,4,5;  Surgeon: Alex Callahan MD;  Location: [facility];  Service: Pain Management;  Laterality: Left;  2 of 2    RADIOFREQUENCY ABLATION Left 10/1/2021    Procedure: RADIOFREQUENCY ABLATION LEFT, C4,5,6,7 1 of 2 CONSENT NEEDED;  Surgeon: Alex Callahan MD;  Location: [facility];  Service: Pain Management;  Laterality: Left;    RADIOFREQUENCY ABLATION Right 10/15/2021    Procedure: RADIOFREQUENCY ABLATION  RIGHT C4,5,6,7 2 of 2 CONSENT NEEDED;  Surgeon: Alex Callahan MD;  Location: [facility];  Service: Pain Management;  Laterality: Right;    RADIOFREQUENCY ABLATION Left 2/25/2022    Procedure: RADIOFREQUENCY ABLATION LEFT L3,4,5 1 of 2, needs consent;  Surgeon: Alex Callahan MD;  Location: [facility];  Service: Pain Management;  Laterality: Left;    RADIOFREQUENCY ABLATION Right 3/11/2022    Procedure: RADIOFREQUENCY ABLATION RIGHT L3,4,5 2 of 2, needs consent;  Surgeon: Alex Callahan MD;  Location: [facility];  Service: Pain Management;  Laterality: Right;    RETINAL DETACHMENT SURGERY      ROTATOR CUFF REPAIR Right     SPINE SURGERY      cerival fusion   "   TRIGGER POINT INJECTION Left 6/13/2019    Procedure: INJECTION, TRIGGER POINT;  Surgeon: Kasandra Montoya MD;  [facility];  Service: Pain Management;  Laterality: Left;       Family History:  Family History   Problem Relation Age of Onset    Heart disease Mother     Cancer Father     Leukemia Brother     Colon cancer Neg Hx     Celiac disease Neg Hx     Crohn's disease Neg Hx     Esophageal cancer Neg Hx     Inflammatory bowel disease Neg Hx     Irritable bowel syndrome Neg Hx     Liver cancer Neg Hx     Rectal cancer Neg Hx     Stomach cancer Neg Hx     Ulcerative colitis Neg Hx        Social History:  Social History     Socioeconomic History    Marital status:    Tobacco Use    Smoking status: Never Smoker    Smokeless tobacco: Never Used   Substance and Sexual Activity    Alcohol use: No    Drug use: No       OBJECTIVE:    /77 (BP Location: Right arm, Patient Position: Sitting, BP Method: Medium (Automatic))   Pulse 67   Temp 97.8 °F (36.6 °C) (Oral)   Resp 18   Ht 6' 1" (1.854 m)   Wt 109.8 kg (242 lb)   BMI 31.93 kg/m²     PHYSICAL EXAMINATION:    General appearance: Well appearing, in no acute distress, alert and oriented x3.  Psych:  Mood and affect appropriate.  Skin: Skin color, texture, turgor normal, no rashes or lesions, in both upper and lower body.  Head/face:  Atraumatic, normocephalic. No palpable lymph nodes  Neck: There is mild pain to palpation over the cervical paraspinous and trapezius muscles bilaterally. Spurling Negative. Limited ROM with mild pain on extension    Cor: RRR  Pulm: Symmetric chest rise, no respiratory distress noted.   Back: Straight leg raising in the sitting position is positive to radicular pain on the right. There is pain with palpation over lumbar paraspinals and facet joints on the right. Limited ROM with pain on flexion and extension. Positive facet loading bilaterally.   Extremities: No deformities, edema, or skin " discoloration. Good capillary refill.  Musculoskeletal: Shoulder maneuvers are negative. There is mild pain with palpation over bilateral SI joints. FABERs is negative bilaterally. Bilateral upper and lower extremity strength is normal and symmetric. No atrophy or tone abnormalities are noted.  Neuro: Bilateral upper and lower extremity coordination and muscle stretch reflexes are physiologic and symmetric.  Plantar response are downgoing. No loss of sensation is noted.  Gait: Antalgic, ambulates with cane for assistance     ASSESSMENT: 57 y.o. year old male with neck and lumbar pain, consistent with      1. Chronic pain disorder     2. Lumbar radiculopathy  Procedure Order to Pain Management   3. Lumbar spondylosis     4. DDD (degenerative disc disease), lumbar     5. Sacroiliac joint pain     6. S/P cervical spinal fusion     7. Cervical spondylosis without myelopathy     8. DDD (degenerative disc disease), cervical     9. Myofascial pain           PLAN:     - Previous imaging reviewed today.    - He is s/p lumbar RFA with benefit. We can repeat this as needed.     - Schedule for right L4/5 and L5/S1 TF NGUYEN.      - I have stressed the importance of physical activity and a home exercise plan to help with pain and improve health.    - Continue Gabapentin.     - Continue Robaxin 500 mg PRN muscle pain.     - Pain contract signed 2/12/2021.     - Continue Norco 7.5/325 mg BID PRN. Refill already sent.      - The patient is here today for a refill of current pain medications and they believe these provide effective pain control and improvements in quality of life.  The patient notes no serious side effects, and feels the benefits outweigh the risks.  The patient was reminded of the pain contract that they signed previously as well as the risks and benefits of the medication including possible death.  The updated [place] [employer] prescription monitoring program was reviewed, and the patient has been found to  be compliant with current treatment plan.    - UDS from 9/17/2021 reviewed and consistent. Repeat UDS next visit.     - Continue home exercise routine.     - RTC 2 weeks after above procedure.     - Dr. Callahan was consulted on the patient and agrees with this plan.    The above plan and management options were discussed at length with patient. Patient is in agreement with the above and verbalized understanding.    Angelique Barahona NP  04/01/2022

## 2022-04-01 NOTE — H&P (VIEW-ONLY)
"Chronic patient Established Note (Follow up visit)      Interval History 4/1/2022:  The patient returns to clinic today for follow up of back pain. He is s/p left L3,4,5 RFA on 2/25/2022 and right L3,4,5 RFA on 3/11/2022. He reports relief of his back pain. He reports increased back pain that radiates into the posterior aspect of his right leg to his knee. He denies any left leg pain. His pain is worse with prolonged walking. He reports that he sometimes drags his right leg. He denies any falls. He denies any bowel or bladder incontinence. He continues to take Gabapentin and Norco as needed with benefit. He denies any adverse effects. He denies any other health changes. His pain today is 7/10.    Interval History 2/15/2022:  The patient returns to clinic today for follow up of neck and back pain. He reports increased low back pain over the last two weeks. He describes this pain as sharp and aching in nature. He does report intermittent "catching" pain in his lower back, worse with getting out of bed. He denies any radicular leg pain. He continues to report benefit from previous cervical procedures. He reports intermittent neck pain. He continues to take Gabapentin with benefit. He continues to take Norco as needed with benefit and without adverse effects. He denies any other health changes. His pain today is 6/10.    Interval History 11/5/2021:  The patient returns to clinic today for follow up of neck and back pain. He is s/p left C4,5,6,7 RFA on 10/1/2021 and right C4,5,6,7 RFA on 10/15/2021. He reports 50% relief of his neck pain. He reports increased burning and itching pain to his skin near injection sites. He denies any radicular arm pain. He does report increased left sided muscle pain. He is concerned that one of the screws in his cervical hardware is moving. He continues to report benefit with previous lumbar procedures. He reports intermittent low back pain without radicular leg pain. He continues to take " Gabapentin with benefit. He continues to take Norco as needed with benefit and without adverse effects. He denies any weakness. He denies any other health changes. His pain today is 5/10.    Interval History 9/17/2021:  The patient returns to clinic today for follow up of neck and back pain. He reports increased neck pain. He denies any radicular arm pain today. He does report intermittent radiating pain into his left shoulder blade and mid back. His pain is worse with activity. He reports that sometimes his pain takes his breath away. He continues to report low back pain, that is tolerable at this time. He continues to take Gabapentin and Norco with benefit and without adverse effects. He denies any other health changes. His pain today is 6/10.    Interval History 6/17/2021:  The patient returns to clinic today for follow up of neck and back pain. He is s/p right L3,4,5 RFA on 4/23/2021 and left L3,4,5 RFA on 5/7/2021. He reports 50% relief of his low back pain. He continues to report intermittent low back pain. He denies any radicular leg pain. He continues to report neck pain, especially in between the scapula. His pain is worse with prolonged standing, walking, and activity. He continues to take Gabapentin with benefit. He continues to take Norco sparingly for severe pain with benefit and without adverse effects. He denies any other health changes. His pain today is 4/10.    Interval History 4/9/2021:  The patient returns to clinic today for follow up of neck and back pain. He is s/p left C4,5,6,7 RFA on 3/9/2021 and right C4,5,6,7 RFA on 3/26/2021. He reports 50% relief of his neck pain. He reports intermittent neck pain. He has good days and bad days. He continues to report low back pain that is constant, sharp, and aching. He reports intermittent radiating pain into the lateral aspect of his left leg to his knee. He continues to take Gabapentin with benefit. He continues to take Norco as needed sparingly for  "severe pain. He denies any adverse effects. He denies any other health changes. His pain today is 5/10.    Interval History 3/2/2021:  Sal Navarro presents to the clinic for a follow-up appointment for neck pain . Since the last visit, Sal Navarro states the pain has been worsening. Current pain intensity is 6/10.  Was seen by Angelique Barahona NP on 2/12/2021.     Interval History 2/12/2021:  The patient returns to clinic today for follow up of back pain. He has not been seen in over a year. He did not have imaging due to coronavirus outbreak. He would to reschedule this. He was also considering SCS trial prior to the pandemic. He continues to report low back pain that radiates into the lateral aspect of his left leg to his knee. He denies any radicular right leg pain. His pain is worse with bending and walking. He continues to take Gabapentin with benefit. He also takes Norco as needed for severe pain. He denies any adverse effects. He denies any other health changes. His pain today is 6/10.    HPI:  Sal Navarro is a 56 y.o. male who presents today s/p C4-7 ACDF with C5/6 PSIF in 2/2016 with residual chronic midline neck pain that radiates into his shoulder blades bilaterally, R>>L.  This is associated with bilateral hand numbness and tingling.  The hand symptoms did improve following the surgery.  The shooting pains in his arms resolved completely.   This pain is described in detail below.  His post-operative course was complicated by dysphagia that is being treated with speech therapy.  The numbness and the tingling were relieved by the surgery. Now experiences "deep pain along the spine"  Patient has not been seen for over a year, had to re-schedule his imaging studies due to pandemic(now completed). Prior to the pandemic, he was considering a SCS.   Only time he gets relief is laying in bed on a very thin pillow, but that doesn't last very long.   Pain aggravated by movement and even " breathing/playing with his grandchild/holding the grandchild's hand. Heat and massage (has a vest) are helpful.   Was seen by Dr. Jan srivastava and received multiple EIS and RFAs.   Compliant with his medication.     Pain Disability Index Review:  Last 3 PDI Scores 4/1/2022 2/15/2022 11/5/2021   Pain Disability Index (PDI) 47 [phone]       Pain Medications:  Gabapentin  Norco sparingly  Robaxin    Opioid Contract: yes     report:  Reviewed and consistent with medication use as prescribed.    Pain Procedures:   3/11/2022- Right L3,4,5 RFA  2/25/2022- Left L3,4,5 RFA  10/15/2021- Right C4,5,6,7 RFA  10/1/2021- Left C4,5,6,7 RFA  5/7/2021- Left L3,4,5 RFA   4/23/2021- Right L3,4,5 RFA  3/26/2021- Right C4,5,6,7 RFA  3/9/2021- Left C4,5,6,7 RFA  10/11/19: L4/5 Interlaminar Epidural Steroid Injection  06/13/19:Cervical Conventional Radiofreqiency Ablation: C4-7, left  05/03/19:Left L2-5 Lumbar Medial Branch Nerve Thermal Radiofrequency Ablation  12/21/18:Right L2-5 Lumbar Medial Branch Nerve Thermal Radiofrequency Ablation  11/23/18:Cervical Thermal Radiofreqiency Ablation: Right C4-7  09/14/18:C7/R9Bybwiodl Steroid Injection  06/29/18:LL2/3 Interlaminar Epidural Steroid Injection   03/06/18:L2/3 Interlaminar Epidural Steroid Injection   09/26/17:Bilateral L2-5 Lumbar Medial Branch Nerve Coolief Thermal Radiofrequency Ablation  08/31/17:Bilateral L2-5 Lumbar medial branch blocks   03/28/17:Cervical Conventional Radiofreqiency Ablation: Left C4-7  03/14/17:Cervical Conventional Radiofreqiency Ablation: Right C4-7  10/25/16:Cervical Conventional Radiofreqiency Ablation: Left C4-7  10/11/16: Cervical Conventional Radiofreqiency Ablation: Right C4-7  10/04/16:Cervical Medial Branch Blocks, Bilateral C4-7.     08/31/16:C7/W2Gpohmkrf Steroid Injection    Physical Therapy/Home Exercise: does home exercises now, but not in formal PT.    - 2019 was last time in PT     Imaging:   MRI Lumbar Spine  2/25/2021:  COMPARISON:  07/11/2017     FINDINGS:  The vertebral bodies maintain normal height, signal intensity and alignment.  There is redemonstration of few hemangiomas at multiple levels.  The intervertebral disc spaces are preserved although there is some disc desiccation at multiple levels.  The conus terminates at level L1.  Evaluation of the localizer images and structures surrounding the lumbar spine shows no abnormalities.     L1-L2: No significant disc or joint pathology.     L2-L3: Mild diffuse disc bulge with mild bilateral facet arthropathy and ligamentum flavum hypertrophy results in mild central canal stenosis.  Mild bilateral neural foraminal stenosis is also identified.     L3-L4: There is a diffuse disc bulge with no significant facet arthropathy or ligamentum flavum hypertrophy.  There is mild central canal stenosis and mild bilateral neural foraminal stenosis.     L4-L5: There is a diffuse disc bulge with ligamentum flavum hypertrophy.  No significant facet arthropathy.  There is mild central canal stenosis with only minimal encroachment on the left neural foramen.  Mild right neural foraminal stenosis is present.     L5-S1: There is a diffuse disc bulge with mild to moderate right facet arthropathy.  No left facet arthropathy.  The ligamentum flavum is normal.  The central canal is patent and the neural foramina are largely patent.  Incidental note is made of an annulus fibrosus tear posteriorly measuring approximately 2 mm.     Impression:     Multilevel degenerative disc and joint disease which is mild and results in mild central canal and neural foraminal stenosis as outlined above.    Xray Cervical Spine 2/25/2021:  COMPARISON:  07/07/2017 .     FINDINGS:  The patient has undergone ACDF from C4 through C7. The instrumentation is intact without evidence of complication.  The vertebral bodies maintain normal height and alignment. The remaining intervertebral disc spaces are preserved.  No  significant uncovertebral joint hypertrophy.  The prevertebral soft tissues, odontoid views and lung apices are normal. The neural foramen are patent     Impression:     Postsurgical changes to the thoracic spine without additional evidence for degenerative disc or joint disease.     Allergies:   Review of patient's allergies indicates:   Allergen Reactions    Pcn [penicillins] Hives and Rash    Lipitor [atorvastatin] Other (See Comments)     Muscle cramps, weakness       Current Medications:   Current Outpatient Medications   Medication Sig Dispense Refill    acetaminophen (TYLENOL) 500 MG tablet Take 500 mg by mouth every 6 (six) hours as needed for Pain.      albuterol (PROVENTIL/VENTOLIN HFA) 90 mcg/actuation inhaler       aspirin (ECOTRIN) 81 MG EC tablet Take 81 mg by mouth once daily.      cetirizine (ZYRTEC) 10 MG tablet Take 10 mg by mouth nightly.  5    FOLIC ACID/MULTIVIT-MIN/LUTEIN (CENTRUM SILVER ORAL) Take by mouth once daily.      gabapentin (NEURONTIN) 300 MG capsule Take 2 capsules (600 mg total) by mouth 2 (two) times daily. 120 capsule 5    HYDROcodone-acetaminophen (NORCO) 7.5-325 mg per tablet Take 1 tablet by mouth every 12 (twelve) hours as needed for Pain. 60 tablet 0    levothyroxine (SYNTHROID) 50 MCG tablet Take 50 mcg by mouth once daily.      meclizine (ANTIVERT) 25 mg tablet Take 25 mg by mouth once daily.       meloxicam (MOBIC) 15 MG tablet Take 15 mg by mouth once daily.      metFORMIN (GLUCOPHAGE) 500 MG tablet Take 1 tablet by mouth 2 (two) times a day.      methocarbamoL (ROBAXIN) 500 MG Tab Take 1 tablet (500 mg total) by mouth 2 (two) times daily. 60 tablet 4    omeprazole (PRILOSEC) 20 MG capsule Take 1 capsule (20 mg total) by mouth once daily. 90 capsule 3    pravastatin (PRAVACHOL) 20 MG tablet       valsartan (DIOVAN) 80 MG tablet Take 40 mg by mouth once daily.   1     No current facility-administered medications for this visit.       REVIEW OF  SYSTEMS:    GENERAL:  No weight loss, malaise or fevers.  HEENT:  Negative for frequent or significant headaches.  NECK:  Negative for lumps, goiter  and significant neck swelling.   RESPIRATORY:  Negative for cough, wheezing or shortness of breath.  CARDIOVASCULAR:  Negative for chest pain, leg swelling or palpitations. HTN  GI:  Negative for abdominal discomfort, blood in stools or black stools or change in bowel habits.  MUSCULOSKELETAL:  See HPI.  SKIN:  Negative for lesions, rash, and itching.  PSYCH:  Negative for sleep disturbance, mood disorder and recent psychosocial stressors.  HEMATOLOGY/LYMPHOLOGY:  Negative for prolonged bleeding, bruising easily or swollen nodes.  NEURO:   No history of headaches, syncope, paralysis, seizures or tremors.   ENDO: Thyroid disorder.   All other reviewed and negative other than HPI.    Past Medical History:  Past Medical History:   Diagnosis Date    Arthritis     Cataract     Cervical back pain with evidence of disc disease     Hiatal hernia     Hypertension     Thyroid disease        Past Surgical History:  Past Surgical History:   Procedure Laterality Date    ACROMIOCLAVICULAR JOINT CYST EXCISION Right     BACK SURGERY      cataracts Bilateral     CERVICAL FUSION      COLONOSCOPY N/A 4/27/2018    Procedure: COLONOSCOPY;  Surgeon: Givoani Medeiros MD;  Location: [address]);  Service: Endoscopy;  Laterality: N/A;    EPIDURAL STEROID INJECTION N/A 10/11/2019    Procedure: INJECTION, STEROID, EPIDURAL;  Surgeon: Kasandra Montoya MD;  Location: [facility];  Service: Pain Management;  Laterality: N/A;  Lumbar NGUYEN L4/5    NGUYEN      EYE SURGERY      RADIOFREQUENCY ABLATION  10/2016    cervical spine/Jan    RADIOFREQUENCY ABLATION Left 5/3/2019    Procedure: RADIOFREQUENCY ABLATION, L2-L5 MEDIALBRANCH;  Surgeon: Kasandra Montoya MD;  Location: [facility];  Service: Pain Management;  Laterality: Left;    RADIOFREQUENCY ABLATION Left 6/13/2019     Procedure: RADIOFREQUENCY ABLATION C4-7;  Surgeon: Kasandra Montoya MD;  Location: [facility];  Service: Pain Management;  Laterality: Left;    RADIOFREQUENCY ABLATION Left 3/9/2021    Procedure: RADIOFREQUENCY ABLATION C4,C5,C6,C7;  Surgeon: Alex Callahan MD;  Location: [facility];  Service: Pain Management;  Laterality: Left;  1 of 2    RADIOFREQUENCY ABLATION Right 3/26/2021    Procedure: RADIOFREQUENCY ABLATION C4,C6,C6,C7;  Surgeon: Alex Callahan MD;  Location: [facility];  Service: Pain Management;  Laterality: Right;  2 of 2    RADIOFREQUENCY ABLATION Right 4/23/2021    Procedure: RADIOFREQUENCY ABLATION L3,4,5;  Surgeon: Alex Callahan MD;  Location: [facility];  Service: Pain Management;  Laterality: Right;  1 of 2    RADIOFREQUENCY ABLATION Left 5/7/2021    Procedure: RADIOFREQUENCY ABLATION L3,4,5;  Surgeon: Alex Callahan MD;  Location: [facility];  Service: Pain Management;  Laterality: Left;  2 of 2    RADIOFREQUENCY ABLATION Left 10/1/2021    Procedure: RADIOFREQUENCY ABLATION LEFT, C4,5,6,7 1 of 2 CONSENT NEEDED;  Surgeon: Alex Callahan MD;  Location: [facility];  Service: Pain Management;  Laterality: Left;    RADIOFREQUENCY ABLATION Right 10/15/2021    Procedure: RADIOFREQUENCY ABLATION  RIGHT C4,5,6,7 2 of 2 CONSENT NEEDED;  Surgeon: Alex Callahan MD;  Location: [facility];  Service: Pain Management;  Laterality: Right;    RADIOFREQUENCY ABLATION Left 2/25/2022    Procedure: RADIOFREQUENCY ABLATION LEFT L3,4,5 1 of 2, needs consent;  Surgeon: Alex Callahan MD;  Location: [facility];  Service: Pain Management;  Laterality: Left;    RADIOFREQUENCY ABLATION Right 3/11/2022    Procedure: RADIOFREQUENCY ABLATION RIGHT L3,4,5 2 of 2, needs consent;  Surgeon: Alex Callahan MD;  Location: [facility];  Service: Pain Management;  Laterality: Right;    RETINAL DETACHMENT SURGERY      ROTATOR CUFF REPAIR Right     SPINE SURGERY      cerival fusion   "   TRIGGER POINT INJECTION Left 6/13/2019    Procedure: INJECTION, TRIGGER POINT;  Surgeon: Kasandra Montoya MD;  Location: [facility];  Service: Pain Management;  Laterality: Left;       Family History:  Family History   Problem Relation Age of Onset    Heart disease Mother     Cancer Father     Leukemia Brother     Colon cancer Neg Hx     Celiac disease Neg Hx     Crohn's disease Neg Hx     Esophageal cancer Neg Hx     Inflammatory bowel disease Neg Hx     Irritable bowel syndrome Neg Hx     Liver cancer Neg Hx     Rectal cancer Neg Hx     Stomach cancer Neg Hx     Ulcerative colitis Neg Hx        Social History:  Social History     Socioeconomic History    Marital status:    Tobacco Use    Smoking status: Never Smoker    Smokeless tobacco: Never Used   Substance and Sexual Activity    Alcohol use: No    Drug use: No       OBJECTIVE:    /77 (BP Location: Right arm, Patient Position: Sitting, BP Method: Medium (Automatic))   Pulse 67   Temp 97.8 °F (36.6 °C) (Oral)   Resp 18   Ht 6' 1" (1.854 m)   Wt 109.8 kg (242 lb)   BMI 31.93 kg/m²     PHYSICAL EXAMINATION:    General appearance: Well appearing, in no acute distress, alert and oriented x3.  Psych:  Mood and affect appropriate.  Skin: Skin color, texture, turgor normal, no rashes or lesions, in both upper and lower body.  Head/face:  Atraumatic, normocephalic. No palpable lymph nodes  Neck: There is mild pain to palpation over the cervical paraspinous and trapezius muscles bilaterally. Spurling Negative. Limited ROM with mild pain on extension    Cor: RRR  Pulm: Symmetric chest rise, no respiratory distress noted.   Back: Straight leg raising in the sitting position is positive to radicular pain on the right. There is pain with palpation over lumbar paraspinals and facet joints on the right. Limited ROM with pain on flexion and extension. Positive facet loading bilaterally.   Extremities: No deformities, edema, or skin " discoloration. Good capillary refill.  Musculoskeletal: Shoulder maneuvers are negative. There is mild pain with palpation over bilateral SI joints. FABERs is negative bilaterally. Bilateral upper and lower extremity strength is normal and symmetric. No atrophy or tone abnormalities are noted.  Neuro: Bilateral upper and lower extremity coordination and muscle stretch reflexes are physiologic and symmetric.  Plantar response are downgoing. No loss of sensation is noted.  Gait: Antalgic, ambulates with cane for assistance     ASSESSMENT: 57 y.o. year old male with neck and lumbar pain, consistent with      1. Chronic pain disorder     2. Lumbar radiculopathy  Procedure Order to Pain Management   3. Lumbar spondylosis     4. DDD (degenerative disc disease), lumbar     5. Sacroiliac joint pain     6. S/P cervical spinal fusion     7. Cervical spondylosis without myelopathy     8. DDD (degenerative disc disease), cervical     9. Myofascial pain           PLAN:     - Previous imaging reviewed today.    - He is s/p lumbar RFA with benefit. We can repeat this as needed.     - Schedule for right L4/5 and L5/S1 TF NGUYEN.      - I have stressed the importance of physical activity and a home exercise plan to help with pain and improve health.    - Continue Gabapentin.     - Continue Robaxin 500 mg PRN muscle pain.     - Pain contract signed 2/12/2021.     - Continue Norco 7.5/325 mg BID PRN. Refill already sent.      - The patient is here today for a refill of current pain medications and they believe these provide effective pain control and improvements in quality of life.  The patient notes no serious side effects, and feels the benefits outweigh the risks.  The patient was reminded of the pain contract that they signed previously as well as the risks and benefits of the medication including possible death.  The updated [place] Board [employer] prescription monitoring program was reviewed, and the patient has been found to  be compliant with current treatment plan.    - UDS from 9/17/2021 reviewed and consistent. Repeat UDS next visit.     - Continue home exercise routine.     - RTC 2 weeks after above procedure.     - Dr. Callahan was consulted on the patient and agrees with this plan.    The above plan and management options were discussed at length with patient. Patient is in agreement with the above and verbalized understanding.    Angelique Barahona NP  04/01/2022

## 2022-04-14 ENCOUNTER — HOSPITAL ENCOUNTER (OUTPATIENT)
Facility: OTHER | Age: 57
Discharge: HOME | End: 2022-04-14
Attending: ANESTHESIOLOGY | Admitting: ANESTHESIOLOGY
Payer: MEDICARE

## 2022-04-14 VITALS
OXYGEN SATURATION: 93 % | TEMPERATURE: 98 F | RESPIRATION RATE: 16 BRPM | DIASTOLIC BLOOD PRESSURE: 75 MMHG | SYSTOLIC BLOOD PRESSURE: 121 MMHG | HEART RATE: 58 BPM

## 2022-04-14 DIAGNOSIS — M79.18 MYOFASCIAL PAIN: ICD-10-CM

## 2022-04-14 DIAGNOSIS — M47.816 LUMBAR SPONDYLOSIS: Primary | ICD-10-CM

## 2022-04-14 DIAGNOSIS — G89.29 CHRONIC PAIN: ICD-10-CM

## 2022-04-14 LAB — POCT GLUCOSE: 98 MG/DL (ref 70–110)

## 2022-04-14 PROCEDURE — 25000003 PHARM REV CODE 250: Performed by: ANESTHESIOLOGY

## 2022-04-14 PROCEDURE — 25500020 PHARM REV CODE 255: Performed by: ANESTHESIOLOGY

## 2022-04-14 PROCEDURE — 82947 ASSAY GLUCOSE BLOOD QUANT: CPT | Performed by: ANESTHESIOLOGY

## 2022-04-14 PROCEDURE — 64484 NJX AA&/STRD TFRM EPI L/S EA: CPT | Mod: RT | Performed by: ANESTHESIOLOGY

## 2022-04-14 PROCEDURE — 64484 NJX AA&/STRD TFRM EPI L/S EA: CPT | Mod: RT,,, | Performed by: ANESTHESIOLOGY

## 2022-04-14 PROCEDURE — 64484 PRA INJECT ANES/STEROID FORAMEN LUMBAR/SACRAL W IMG GUIDE ,EA ADD LEVEL: ICD-10-PCS | Mod: RT,,, | Performed by: ANESTHESIOLOGY

## 2022-04-14 PROCEDURE — 64483 NJX AA&/STRD TFRM EPI L/S 1: CPT | Mod: RT,,, | Performed by: ANESTHESIOLOGY

## 2022-04-14 PROCEDURE — 64483 PR EPIDURAL INJ, ANES/STEROID, TRANSFORAMINAL, LUMB/SACR, SNGL LEVL: ICD-10-PCS | Mod: RT,,, | Performed by: ANESTHESIOLOGY

## 2022-04-14 PROCEDURE — 64483 NJX AA&/STRD TFRM EPI L/S 1: CPT | Mod: RT | Performed by: ANESTHESIOLOGY

## 2022-04-14 PROCEDURE — 25000003 PHARM REV CODE 250: Performed by: STUDENT IN AN ORGANIZED HEALTH CARE EDUCATION/TRAINING PROGRAM

## 2022-04-14 PROCEDURE — 63600175 PHARM REV CODE 636 W HCPCS: Performed by: ANESTHESIOLOGY

## 2022-04-14 RX ORDER — METHOCARBAMOL 500 MG/1
500 TABLET, FILM COATED ORAL 2 TIMES DAILY
Qty: 60 TABLET | Refills: 4 | Status: SHIPPED | OUTPATIENT
Start: 2022-04-14

## 2022-04-14 RX ORDER — MIDAZOLAM HYDROCHLORIDE 1 MG/ML
INJECTION INTRAMUSCULAR; INTRAVENOUS
Status: DISCONTINUED | OUTPATIENT
Start: 2022-04-14 | End: 2022-04-14 | Stop reason: HOSPADM

## 2022-04-14 RX ORDER — LIDOCAINE HYDROCHLORIDE 20 MG/ML
INJECTION, SOLUTION INFILTRATION; PERINEURAL
Status: DISCONTINUED | OUTPATIENT
Start: 2022-04-14 | End: 2022-04-14 | Stop reason: HOSPADM

## 2022-04-14 RX ORDER — SODIUM CHLORIDE 9 MG/ML
500 INJECTION, SOLUTION INTRAVENOUS CONTINUOUS
Status: DISCONTINUED | OUTPATIENT
Start: 2022-04-14 | End: 2022-04-14 | Stop reason: HOSPADM

## 2022-04-14 RX ORDER — DEXAMETHASONE SODIUM PHOSPHATE 10 MG/ML
INJECTION INTRAMUSCULAR; INTRAVENOUS
Status: DISCONTINUED | OUTPATIENT
Start: 2022-04-14 | End: 2022-04-14 | Stop reason: HOSPADM

## 2022-04-14 RX ORDER — FENTANYL CITRATE 50 UG/ML
INJECTION, SOLUTION INTRAMUSCULAR; INTRAVENOUS
Status: DISCONTINUED | OUTPATIENT
Start: 2022-04-14 | End: 2022-04-14 | Stop reason: HOSPADM

## 2022-04-14 RX ORDER — LIDOCAINE HYDROCHLORIDE 10 MG/ML
INJECTION, SOLUTION EPIDURAL; INFILTRATION; INTRACAUDAL; PERINEURAL
Status: DISCONTINUED | OUTPATIENT
Start: 2022-04-14 | End: 2022-04-14 | Stop reason: HOSPADM

## 2022-04-14 RX ADMIN — SODIUM CHLORIDE 500 ML: 0.9 INJECTION, SOLUTION INTRAVENOUS at 10:04

## 2022-04-14 NOTE — DISCHARGE INSTRUCTIONS
Thank you for allowing us to care for you today. You may receive a survey about the care we provided. Your feedback is valuable and helps us provide excellent care throughout the community.     Home Care Instructions for Pain Management:    1. DIET:   You may resume your normal diet today.   2. BATHING:   You may shower with luke warm water. No tub baths or anything that will soak injection sites under water for the next 24 hours.  3. DRESSING:   You may remove your bandage today.   4. ACTIVITY LEVEL:   You may resume your normal activities 24 hrs after your procedure. Nothing strenuous today.  5. MEDICATIONS:   You may resume your normal medications today. To restart blood thinners, ask your doctor.  6. DRIVING    If you have received any sedatives by mouth today, you may not drive for 12 hours.    If you have received any sedation through your IV, you may not drive for 24 hrs.   7. SPECIAL INSTRUCTIONS:   No heat to the injection site for 24 hrs including, hot bath or shower, heating pad, moist heat, or hot tubs.    Use ice pack to injection site for any pain or discomfort.  Apply ice packs for 20 minute intervals as needed.    IF you have diabetes, be sure to monitor your blood sugar more closely. IF your injection contained steroids your blood sugar levels may become higher than normal.    If you are still having pain upon discharge:  Your pain may improve over the next 48 hours. The anesthetic (numbing medication) works immediately to 48 hours. IF your injection contained a steroid (anti-inflammatory medication), it takes approximately 3 days to start feeling relief and 7-10 days to see your greatest results from the medication. It is possible you may need subsequent injections. This would be discussed at your follow up appointment with pain management or your referring doctor.    Please call the PAIN MANAGEMENT office at [phone] or ON CALL pager at [phone] if you experienced any:   -Weakness or  loss of sensation  -Fever > 101.5  -Pain uncontrolled with oral medications   -Persistent nausea, vomiting, or diarrhea  -Redness or drainage from the injection sites, or any other worrisome concerns.   If physician on call was not reached or could not communicate with our office for any reason please go to the nearest emergency department. Adult Procedural Sedation Instructions    Recovery After Procedural Sedation (Adult)  You have been given medicine by vein to make you sleep during your surgery. This may have included both a pain medicine and sleeping medicine. Most of the effects have worn off. But you may still have some drowsiness for the next 6 to 8 hours.  Home care  Follow these guidelines when you get home:  For the next 8 hours, you should be watched by a responsible adult. This person should make sure your condition is not getting worse.  Don't drink any alcohol for the next 24 hours.  Don't drive, operate dangerous machinery, or make important business or personal decisions during the next 24 hours.  Note: Your healthcare provider may tell you not to take any medicine by mouth for pain or sleep in the next 4 hours. These medicines may react with the medicines you were given in the hospital. This could cause a much stronger response than usual.  Follow-up care  Follow up with your healthcare provider if you are not alert and back to your usual level of activity within 12 hours.  When to seek medical advice  Call your healthcare provider right away if any of these occur:  Drowsiness gets worse  Weakness or dizziness gets worse  Repeated vomiting  You can't be awakened   Date Last Reviewed: 10/18/2016  © 4414-4389 [employer]. [address] [place] [address], [place] [address]. All rights reserved. This information is not intended as a substitute for professional medical care. Always follow your healthcare professional's instructions.

## 2022-04-14 NOTE — DISCHARGE SUMMARY
Discharge Note  Short Stay      SUMMARY     Admit Date: 4/14/2022    Attending Physician: Alex Callahan      Discharge Physician: Alex Callahan      Discharge Date: 4/14/2022 11:06 AM    Procedure(s) (LRB):  INJECTION, STEROID, EPIDURAL, TRANSFORAMINAL APPROACH RIGHT L4/5 & L5/S1 Needs Consent (Right)    Final Diagnosis: Lumbar radiculopathy [M54.16]    Disposition: Home or self care    Patient Instructions:   Current Discharge Medication List      CONTINUE these medications which have NOT CHANGED    Details   acetaminophen (TYLENOL) 500 MG tablet Take 500 mg by mouth every 6 (six) hours as needed for Pain.      albuterol (PROVENTIL/VENTOLIN HFA) 90 mcg/actuation inhaler       aspirin (ECOTRIN) 81 MG EC tablet Take 81 mg by mouth once daily.      cetirizine (ZYRTEC) 10 MG tablet Take 10 mg by mouth nightly.  Refills: 5      FOLIC ACID/MULTIVIT-MIN/LUTEIN (CENTRUM SILVER ORAL) Take by mouth once daily.      gabapentin (NEURONTIN) 300 MG capsule Take 2 capsules (600 mg total) by mouth 2 (two) times daily.  Qty: 120 capsule, Refills: 5    Associated Diagnoses: Chronic pain disorder; Lumbar spondylosis; DDD (degenerative disc disease), lumbar; S/P cervical spinal fusion; Cervical radiculopathy; Myofascial pain      HYDROcodone-acetaminophen (NORCO) 7.5-325 mg per tablet Take 1 tablet by mouth every 12 (twelve) hours as needed for Pain.  Qty: 60 tablet, Refills: 0    Comments: Patient with chronic intractable pain.  Medically necessary for > 7 days  Associated Diagnoses: Chronic pain disorder; Lumbar spondylosis; DDD (degenerative disc disease), lumbar; Facet arthritis of lumbar region; Myalgia; Spondylosis of cervical region without myelopathy or radiculopathy; S/P cervical spinal fusion      levothyroxine (SYNTHROID) 50 MCG tablet Take 50 mcg by mouth once daily.      meclizine (ANTIVERT) 25 mg tablet Take 25 mg by mouth once daily.       meloxicam (MOBIC) 15 MG tablet Take 15 mg by mouth once daily.      metFORMIN  (GLUCOPHAGE) 500 MG tablet Take 1 tablet by mouth 2 (two) times a day.      methocarbamoL (ROBAXIN) 500 MG Tab Take 1 tablet (500 mg total) by mouth 2 (two) times daily.  Qty: 60 tablet, Refills: 4    Associated Diagnoses: Myofascial pain      omeprazole (PRILOSEC) 20 MG capsule Take 1 capsule (20 mg total) by mouth once daily.  Qty: 90 capsule, Refills: 3    Associated Diagnoses: Gastroesophageal reflux disease, esophagitis presence not specified      pravastatin (PRAVACHOL) 20 MG tablet       valsartan (DIOVAN) 80 MG tablet Take 40 mg by mouth once daily.   Refills: 1                 Discharge Diagnosis: Lumbar radiculopathy [M54.16]  Condition on Discharge: Stable with no complications to procedure   Diet on Discharge: Same as before.  Activity: as per instruction sheet.  Discharge to: Home with a responsible adult.  Follow up: 2-4 weeks

## 2022-05-06 ENCOUNTER — TELEPHONE (OUTPATIENT)
Dept: PAIN MEDICINE | Facility: CLINIC | Age: 57
End: 2022-05-06
Payer: MEDICARE

## 2022-05-06 NOTE — TELEPHONE ENCOUNTER
This message is for patient in regards to his/her appointment 05/09/22 at 11:00am       [facility] Policy: For the safety of all patients and staff members.     Patient Visitor policy: During this visit we're asking all patients to only have one visitor over the age of 18yrs old to accompany to be seen by Dr. Angelique LOCO. If patient do not required assistance with their visit, we're asking all visitors to remain outside the waiting area.    Upon arriving to your schedule appointment; patients are required to wear a face mask, if patient do not have a face mask one will be provided entering the facility. If you have any questions or concerns please contact [phone]    Pt spouse verbalized understanding and has confirmed appointment

## 2022-05-09 ENCOUNTER — OFFICE VISIT (OUTPATIENT)
Dept: PAIN MEDICINE | Facility: CLINIC | Age: 57
End: 2022-05-09
Payer: MEDICARE

## 2022-05-09 VITALS
SYSTOLIC BLOOD PRESSURE: 123 MMHG | RESPIRATION RATE: 18 BRPM | TEMPERATURE: 98 F | HEIGHT: 73 IN | HEART RATE: 70 BPM | DIASTOLIC BLOOD PRESSURE: 84 MMHG | BODY MASS INDEX: 32.14 KG/M2 | WEIGHT: 242.5 LBS

## 2022-05-09 DIAGNOSIS — Z79.891 ENCOUNTER FOR MONITORING OPIOID MAINTENANCE THERAPY: ICD-10-CM

## 2022-05-09 DIAGNOSIS — G89.4 CHRONIC PAIN DISORDER: Primary | ICD-10-CM

## 2022-05-09 DIAGNOSIS — M50.30 DDD (DEGENERATIVE DISC DISEASE), CERVICAL: ICD-10-CM

## 2022-05-09 DIAGNOSIS — Z98.1 S/P CERVICAL SPINAL FUSION: ICD-10-CM

## 2022-05-09 DIAGNOSIS — M47.816 LUMBAR SPONDYLOSIS: ICD-10-CM

## 2022-05-09 DIAGNOSIS — Z51.81 ENCOUNTER FOR MONITORING OPIOID MAINTENANCE THERAPY: ICD-10-CM

## 2022-05-09 DIAGNOSIS — M53.3 SACROILIAC JOINT PAIN: ICD-10-CM

## 2022-05-09 DIAGNOSIS — M54.16 LUMBAR RADICULOPATHY: ICD-10-CM

## 2022-05-09 DIAGNOSIS — M47.812 CERVICAL SPONDYLOSIS WITHOUT MYELOPATHY: ICD-10-CM

## 2022-05-09 DIAGNOSIS — M51.36 DDD (DEGENERATIVE DISC DISEASE), LUMBAR: ICD-10-CM

## 2022-05-09 PROCEDURE — 1160F RVW MEDS BY RX/DR IN RCRD: CPT | Mod: CPTII,S$GLB,, | Performed by: NURSE PRACTITIONER

## 2022-05-09 PROCEDURE — 99213 OFFICE O/P EST LOW 20 MIN: CPT | Mod: S$GLB,,, | Performed by: NURSE PRACTITIONER

## 2022-05-09 PROCEDURE — 4010F PR ACE/ARB THEARPY RXD/TAKEN: ICD-10-PCS | Mod: CPTII,S$GLB,, | Performed by: NURSE PRACTITIONER

## 2022-05-09 PROCEDURE — 4010F ACE/ARB THERAPY RXD/TAKEN: CPT | Mod: CPTII,S$GLB,, | Performed by: NURSE PRACTITIONER

## 2022-05-09 PROCEDURE — 99999 PR PBB SHADOW E&M-EST. PATIENT-LVL IV: ICD-10-PCS | Mod: PBBFAC,,, | Performed by: NURSE PRACTITIONER

## 2022-05-09 PROCEDURE — 3079F PR MOST RECENT DIASTOLIC BLOOD PRESSURE 80-89 MM HG: ICD-10-PCS | Mod: CPTII,S$GLB,, | Performed by: NURSE PRACTITIONER

## 2022-05-09 PROCEDURE — 3008F BODY MASS INDEX DOCD: CPT | Mod: CPTII,S$GLB,, | Performed by: NURSE PRACTITIONER

## 2022-05-09 PROCEDURE — 1159F MED LIST DOCD IN RCRD: CPT | Mod: CPTII,S$GLB,, | Performed by: NURSE PRACTITIONER

## 2022-05-09 PROCEDURE — 99999 PR PBB SHADOW E&M-EST. PATIENT-LVL IV: CPT | Mod: PBBFAC,,, | Performed by: NURSE PRACTITIONER

## 2022-05-09 PROCEDURE — 80307 DRUG TEST PRSMV CHEM ANLYZR: CPT | Performed by: NURSE PRACTITIONER

## 2022-05-09 PROCEDURE — 1159F PR MEDICATION LIST DOCUMENTED IN MEDICAL RECORD: ICD-10-PCS | Mod: CPTII,S$GLB,, | Performed by: NURSE PRACTITIONER

## 2022-05-09 PROCEDURE — 3008F PR BODY MASS INDEX (BMI) DOCUMENTED: ICD-10-PCS | Mod: CPTII,S$GLB,, | Performed by: NURSE PRACTITIONER

## 2022-05-09 PROCEDURE — 3074F SYST BP LT 130 MM HG: CPT | Mod: CPTII,S$GLB,, | Performed by: NURSE PRACTITIONER

## 2022-05-09 PROCEDURE — 3074F PR MOST RECENT SYSTOLIC BLOOD PRESSURE < 130 MM HG: ICD-10-PCS | Mod: CPTII,S$GLB,, | Performed by: NURSE PRACTITIONER

## 2022-05-09 PROCEDURE — 99213 PR OFFICE/OUTPT VISIT, EST, LEVL III, 20-29 MIN: ICD-10-PCS | Mod: S$GLB,,, | Performed by: NURSE PRACTITIONER

## 2022-05-09 PROCEDURE — 1160F PR REVIEW ALL MEDS BY PRESCRIBER/CLIN PHARMACIST DOCUMENTED: ICD-10-PCS | Mod: CPTII,S$GLB,, | Performed by: NURSE PRACTITIONER

## 2022-05-09 PROCEDURE — 3079F DIAST BP 80-89 MM HG: CPT | Mod: CPTII,S$GLB,, | Performed by: NURSE PRACTITIONER

## 2022-05-09 NOTE — PROGRESS NOTES
"Chronic patient Established Note (Follow up visit)      Interval History 5/9/2022:  The patient returns to clinic today for follow up of back pain. He is s/p right L4/5 and L5/S1 TF NGUYEN on 4/14/2022. He reports 50-60% relief of his low back and leg pain. He has been more active since the procedure. He continues to report low back and hip pain, worse with bending and activity. He denies any radicular leg pain. He continues to repot intermittent neck pain. He continues to take Gabapentin with benefit. He continues to take Norco sparingly as needed. He denies any other health changes. His pain today is 5/10.    Interval History 4/1/2022:  The patient returns to clinic today for follow up of back pain. He is s/p left L3,4,5 RFA on 2/25/2022 and right L3,4,5 RFA on 3/11/2022. He reports relief of his back pain. He reports increased back pain that radiates into the posterior aspect of his right leg to his knee. He denies any left leg pain. His pain is worse with prolonged walking. He reports that he sometimes drags his right leg. He denies any falls. He denies any bowel or bladder incontinence. He continues to take Gabapentin and Norco as needed with benefit. He denies any adverse effects. He denies any other health changes. His pain today is 7/10.    Interval History 2/15/2022:  The patient returns to clinic today for follow up of neck and back pain. He reports increased low back pain over the last two weeks. He describes this pain as sharp and aching in nature. He does report intermittent "catching" pain in his lower back, worse with getting out of bed. He denies any radicular leg pain. He continues to report benefit from previous cervical procedures. He reports intermittent neck pain. He continues to take Gabapentin with benefit. He continues to take Norco as needed with benefit and without adverse effects. He denies any other health changes. His pain today is 6/10.    Interval History 11/5/2021:  The patient returns " to clinic today for follow up of neck and back pain. He is s/p left C4,5,6,7 RFA on 10/1/2021 and right C4,5,6,7 RFA on 10/15/2021. He reports 50% relief of his neck pain. He reports increased burning and itching pain to his skin near injection sites. He denies any radicular arm pain. He does report increased left sided muscle pain. He is concerned that one of the screws in his cervical hardware is moving. He continues to report benefit with previous lumbar procedures. He reports intermittent low back pain without radicular leg pain. He continues to take Gabapentin with benefit. He continues to take Norco as needed with benefit and without adverse effects. He denies any weakness. He denies any other health changes. His pain today is 5/10.    Interval History 9/17/2021:  The patient returns to clinic today for follow up of neck and back pain. He reports increased neck pain. He denies any radicular arm pain today. He does report intermittent radiating pain into his left shoulder blade and mid back. His pain is worse with activity. He reports that sometimes his pain takes his breath away. He continues to report low back pain, that is tolerable at this time. He continues to take Gabapentin and Norco with benefit and without adverse effects. He denies any other health changes. His pain today is 6/10.    Interval History 6/17/2021:  The patient returns to clinic today for follow up of neck and back pain. He is s/p right L3,4,5 RFA on 4/23/2021 and left L3,4,5 RFA on 5/7/2021. He reports 50% relief of his low back pain. He continues to report intermittent low back pain. He denies any radicular leg pain. He continues to report neck pain, especially in between the scapula. His pain is worse with prolonged standing, walking, and activity. He continues to take Gabapentin with benefit. He continues to take Norco sparingly for severe pain with benefit and without adverse effects. He denies any other health changes. His pain  today is 4/10.    Interval History 4/9/2021:  The patient returns to clinic today for follow up of neck and back pain. He is s/p left C4,5,6,7 RFA on 3/9/2021 and right C4,5,6,7 RFA on 3/26/2021. He reports 50% relief of his neck pain. He reports intermittent neck pain. He has good days and bad days. He continues to report low back pain that is constant, sharp, and aching. He reports intermittent radiating pain into the lateral aspect of his left leg to his knee. He continues to take Gabapentin with benefit. He continues to take Norco as needed sparingly for severe pain. He denies any adverse effects. He denies any other health changes. His pain today is 5/10.    Interval History 3/2/2021:  Sal Navarro presents to the clinic for a follow-up appointment for neck pain . Since the last visit, Sal Navarro states the pain has been worsening. Current pain intensity is 6/10.  Was seen by Angelique Barahona NP on 2/12/2021.     Interval History 2/12/2021:  The patient returns to clinic today for follow up of back pain. He has not been seen in over a year. He did not have imaging due to coronavirus outbreak. He would to reschedule this. He was also considering SCS trial prior to the pandemic. He continues to report low back pain that radiates into the lateral aspect of his left leg to his knee. He denies any radicular right leg pain. His pain is worse with bending and walking. He continues to take Gabapentin with benefit. He also takes Norco as needed for severe pain. He denies any adverse effects. He denies any other health changes. His pain today is 6/10.    HPI:  Sal Navarro is a 56 y.o. male who presents today s/p C4-7 ACDF with C5/6 PSIF in 2/2016 with residual chronic midline neck pain that radiates into his shoulder blades bilaterally, R>>L.  This is associated with bilateral hand numbness and tingling.  The hand symptoms did improve following the surgery.  The shooting pains in his arms resolved  "completely.   This pain is described in detail below.  His post-operative course was complicated by dysphagia that is being treated with speech therapy.  The numbness and the tingling were relieved by the surgery. Now experiences "deep pain along the spine"  Patient has not been seen for over a year, had to re-schedule his imaging studies due to pandemic(now completed). Prior to the pandemic, he was considering a SCS.   Only time he gets relief is laying in bed on a very thin pillow, but that doesn't last very long.   Pain aggravated by movement and even breathing/playing with his grandchild/holding the grandchild's hand. Heat and massage (has a vest) are helpful.   Was seen by Dr. Jan srivastava and received multiple EIS and RFAs.   Compliant with his medication.     Pain Disability Index Review:  Last 3 PDI Scores 5/9/2022 4/1/2022 2/15/2022   Pain Disability Index (PDI) 40 [phone]       Pain Medications:  Gabapentin  Norco sparingly  Robaxin    Opioid Contract: yes     report:  Reviewed and consistent with medication use as prescribed.    Pain Procedures:   4/14/2022- Right L4/5 and L5/S1 TF NGUYEN  3/11/2022- Right L3,4,5 RFA  2/25/2022- Left L3,4,5 RFA  10/15/2021- Right C4,5,6,7 RFA  10/1/2021- Left C4,5,6,7 RFA  5/7/2021- Left L3,4,5 RFA   4/23/2021- Right L3,4,5 RFA  3/26/2021- Right C4,5,6,7 RFA  3/9/2021- Left C4,5,6,7 RFA  10/11/19: L4/5 Interlaminar Epidural Steroid Injection  06/13/19- Left C4-7 RFA  05/03/19:Left L2-5 Lumbar Medial Branch Nerve Thermal Radiofrequency Ablation  12/21/18:Right L2-5 Lumbar Medial Branch Nerve Thermal Radiofrequency Ablation  11/23/18:Cervical Thermal Radiofreqiency Ablation: Right C4-7  09/14/18:C7/[id number] Steroid Injection  06/29/18:LL2/3 Interlaminar Epidural Steroid Injection   03/06/18:L2/3 Interlaminar Epidural Steroid Injection   09/26/17:Bilateral L2-5 Lumbar Medial Branch Nerve Coolief Thermal Radiofrequency Ablation  08/31/17:Bilateral L2-5 Lumbar medial branch " blocks   03/28/17:Cervical Conventional Radiofreqiency Ablation: Left C4-7  03/14/17:Cervical Conventional Radiofreqiency Ablation: Right C4-7  10/25/16:Cervical Conventional Radiofreqiency Ablation: Left C4-7  10/11/16: Cervical Conventional Radiofreqiency Ablation: Right C4-7  10/04/16- Cervical Medial Branch Blocks, Bilateral C4-7.     08/31/16:C7/[id number] Steroid Injection    Physical Therapy/Home Exercise: does home exercises now, but not in formal PT.    - 2019 was last time in PT     Imaging:   MRI Lumbar Spine 2/25/2021:  COMPARISON:  07/11/2017     FINDINGS:  The vertebral bodies maintain normal height, signal intensity and alignment.  There is redemonstration of few hemangiomas at multiple levels.  The intervertebral disc spaces are preserved although there is some disc desiccation at multiple levels.  The conus terminates at level L1.  Evaluation of the localizer images and structures surrounding the lumbar spine shows no abnormalities.     L1-L2: No significant disc or joint pathology.     L2-L3: Mild diffuse disc bulge with mild bilateral facet arthropathy and ligamentum flavum hypertrophy results in mild central canal stenosis.  Mild bilateral neural foraminal stenosis is also identified.     L3-L4: There is a diffuse disc bulge with no significant facet arthropathy or ligamentum flavum hypertrophy.  There is mild central canal stenosis and mild bilateral neural foraminal stenosis.     L4-L5: There is a diffuse disc bulge with ligamentum flavum hypertrophy.  No significant facet arthropathy.  There is mild central canal stenosis with only minimal encroachment on the left neural foramen.  Mild right neural foraminal stenosis is present.     L5-S1: There is a diffuse disc bulge with mild to moderate right facet arthropathy.  No left facet arthropathy.  The ligamentum flavum is normal.  The central canal is patent and the neural foramina are largely patent.  Incidental note is made of an annulus  fibrosus tear posteriorly measuring approximately 2 mm.     Impression:     Multilevel degenerative disc and joint disease which is mild and results in mild central canal and neural foraminal stenosis as outlined above.    Xray Cervical Spine 2/25/2021:  COMPARISON:  07/07/2017 .     FINDINGS:  The patient has undergone ACDF from C4 through C7. The instrumentation is intact without evidence of complication.  The vertebral bodies maintain normal height and alignment. The remaining intervertebral disc spaces are preserved.  No significant uncovertebral joint hypertrophy.  The prevertebral soft tissues, odontoid views and lung apices are normal. The neural foramen are patent     Impression:     Postsurgical changes to the thoracic spine without additional evidence for degenerative disc or joint disease.     Allergies:   Review of patient's allergies indicates:   Allergen Reactions    Pcn [penicillins] Hives and Rash    Lipitor [atorvastatin] Other (See Comments)     Muscle cramps, weakness       Current Medications:   Current Outpatient Medications   Medication Sig Dispense Refill    acetaminophen (TYLENOL) 500 MG tablet Take 500 mg by mouth every 6 (six) hours as needed for Pain.      albuterol (PROVENTIL/VENTOLIN HFA) 90 mcg/actuation inhaler       aspirin (ECOTRIN) 81 MG EC tablet Take 81 mg by mouth once daily.      cetirizine (ZYRTEC) 10 MG tablet Take 10 mg by mouth nightly.  5    FOLIC ACID/MULTIVIT-MIN/LUTEIN (CENTRUM SILVER ORAL) Take by mouth once daily.      gabapentin (NEURONTIN) 300 MG capsule Take 2 capsules (600 mg total) by mouth 2 (two) times daily. 120 capsule 5    HYDROcodone-acetaminophen (NORCO) 7.5-325 mg per tablet Take 1 tablet by mouth every 12 (twelve) hours as needed for Pain. 60 tablet 0    levothyroxine (SYNTHROID) 50 MCG tablet Take 50 mcg by mouth once daily.      meclizine (ANTIVERT) 25 mg tablet Take 25 mg by mouth once daily.       meloxicam (MOBIC) 15 MG tablet Take 15 mg  by mouth once daily.      metFORMIN (GLUCOPHAGE) 500 MG tablet Take 1 tablet by mouth 2 (two) times a day.      methocarbamoL (ROBAXIN) 500 MG Tab Take 1 tablet (500 mg total) by mouth 2 (two) times daily. 60 tablet 4    omeprazole (PRILOSEC) 20 MG capsule Take 1 capsule (20 mg total) by mouth once daily. 90 capsule 3    pravastatin (PRAVACHOL) 20 MG tablet       valsartan (DIOVAN) 80 MG tablet Take 40 mg by mouth once daily.   1     No current facility-administered medications for this visit.       REVIEW OF SYSTEMS:    GENERAL:  No weight loss, malaise or fevers.  HEENT:  Negative for frequent or significant headaches.  NECK:  Negative for lumps, goiter  and significant neck swelling.   RESPIRATORY:  Negative for cough, wheezing or shortness of breath.  CARDIOVASCULAR:  Negative for chest pain, leg swelling or palpitations. HTN  GI:  Negative for abdominal discomfort, blood in stools or black stools or change in bowel habits.  MUSCULOSKELETAL:  See HPI.  SKIN:  Negative for lesions, rash, and itching.  PSYCH:  Negative for sleep disturbance, mood disorder and recent psychosocial stressors.  HEMATOLOGY/LYMPHOLOGY:  Negative for prolonged bleeding, bruising easily or swollen nodes.  NEURO:   No history of headaches, syncope, paralysis, seizures or tremors.   ENDO: Thyroid disorder.   All other reviewed and negative other than HPI.    Past Medical History:  Past Medical History:   Diagnosis Date    Arthritis     Cataract     Cervical back pain with evidence of disc disease     Hiatal hernia     Hypertension     Thyroid disease        Past Surgical History:  Past Surgical History:   Procedure Laterality Date    ACROMIOCLAVICULAR JOINT CYST EXCISION Right     BACK SURGERY      cataracts Bilateral     CERVICAL FUSION      COLONOSCOPY N/A 4/27/2018    Procedure: COLONOSCOPY;  Surgeon: Giovani Medeiros MD;  Location: [facility] ([address] [place] [address]);  Service: Endoscopy;  Laterality: N/A;    EPIDURAL STEROID  INJECTION N/A 10/11/2019    Procedure: INJECTION, STEROID, EPIDURAL;  Surgeon: Kasandra Montoya MD;  Location: [facility];  Service: Pain Management;  Laterality: N/A;  Lumbar NGUYEN L4/5    NGUYEN      EYE SURGERY      RADIOFREQUENCY ABLATION  10/2016    cervical spine/Jan    RADIOFREQUENCY ABLATION Left 5/3/2019    Procedure: RADIOFREQUENCY ABLATION, L2-L5 MEDIALBRANCH;  Surgeon: Kasandra Montoya MD;  Location: [facility];  Service: Pain Management;  Laterality: Left;    RADIOFREQUENCY ABLATION Left 6/13/2019    Procedure: RADIOFREQUENCY ABLATION C4-7;  Surgeon: Kasandra Montoya MD;  Location: [facility];  Service: Pain Management;  Laterality: Left;    RADIOFREQUENCY ABLATION Left 3/9/2021    Procedure: RADIOFREQUENCY ABLATION C4,C5,C6,C7;  Surgeon: Alex Callahan MD;  Location: [facility];  Service: Pain Management;  Laterality: Left;  1 of 2    RADIOFREQUENCY ABLATION Right 3/26/2021    Procedure: RADIOFREQUENCY ABLATION C4,C6,C6,C7;  Surgeon: Alex Callahan MD;  Location[facility];  Service: Pain Management;  Laterality: Right;  2 of 2    RADIOFREQUENCY ABLATION Right 4/23/2021    Procedure: RADIOFREQUENCY ABLATION L3,4,5;  Surgeon: Alex Callahan MD;  Location: [facility];  Service: Pain Management;  Laterality: Right;  1 of 2    RADIOFREQUENCY ABLATION Left 5/7/2021    Procedure: RADIOFREQUENCY ABLATION L3,4,5;  Surgeon: Alex Callahan MD;  Location: [facility];  Service: Pain Management;  Laterality: Left;  2 of 2    RADIOFREQUENCY ABLATION Left 10/1/2021    Procedure: RADIOFREQUENCY ABLATION LEFT, C4,5,6,7 1 of 2 CONSENT NEEDED;  Surgeon: Alex Callahan MD;  Location: [facility];  Service: Pain Management;  Laterality: Left;    RADIOFREQUENCY ABLATION Right 10/15/2021    Procedure: RADIOFREQUENCY ABLATION  RIGHT C4,5,6,7 2 of 2 CONSENT NEEDED;  Surgeon: Alex Callahan MD;  Location: [facility];  Service: Pain Management;  Laterality: Right;    RADIOFREQUENCY  "ABLATION Left 2/25/2022    Procedure: RADIOFREQUENCY ABLATION LEFT L3,4,5 1 of 2, needs consent;  Surgeon: Alex Callahan MD;  Location: [facility];  Service: Pain Management;  Laterality: Left;    RADIOFREQUENCY ABLATION Right 3/11/2022    Procedure: RADIOFREQUENCY ABLATION RIGHT L3,4,5 2 of 2, needs consent;  Surgeon: Alex Callahan MD;  Location: [facility];  Service: Pain Management;  Laterality: Right;    RETINAL DETACHMENT SURGERY      ROTATOR CUFF REPAIR Right     SPINE SURGERY      cerival fusion     TRANSFORAMINAL EPIDURAL INJECTION OF STEROID Right 4/14/2022    Procedure: INJECTION, STEROID, EPIDURAL, TRANSFORAMINAL APPROACH RIGHT L4/5 & L5/S1 Needs Consent;  Surgeon: Alex Callahan MD;  Location: [facility];  Service: Pain Management;  Laterality: Right;    TRIGGER POINT INJECTION Left 6/13/2019    Procedure: INJECTION, TRIGGER POINT;  Surgeon: Kasandra Montoya MD;  [facility]: [facility];  Service: Pain Management;  Laterality: Left;       Family History:  Family History   Problem Relation Age of Onset    Heart disease Mother     Cancer Father     Leukemia Brother     Colon cancer Neg Hx     Celiac disease Neg Hx     Crohn's disease Neg Hx     Esophageal cancer Neg Hx     Inflammatory bowel disease Neg Hx     Irritable bowel syndrome Neg Hx     Liver cancer Neg Hx     Rectal cancer Neg Hx     Stomach cancer Neg Hx     Ulcerative colitis Neg Hx        Social History:  Social History     Socioeconomic History    Marital status:    Tobacco Use    Smoking status: Never Smoker    Smokeless tobacco: Never Used   Substance and Sexual Activity    Alcohol use: No    Drug use: No       OBJECTIVE:    /84   Pulse 70   Temp 97.8 °F (36.6 °C)   Resp 18   Ht 6' 1" (1.854 m)   Wt 110 kg (242 lb 8.1 oz)   BMI 31.99 kg/m²     PHYSICAL EXAMINATION:    General appearance: Well appearing, in no acute distress, alert and oriented x3.  Psych:  Mood and affect " appropriate.  Skin: Skin color, texture, turgor normal, no rashes or lesions, in both upper and lower body.  Head/face:  Atraumatic, normocephalic. No palpable lymph nodes  Neck: There is mild pain to palpation over the cervical paraspinous and trapezius muscles bilaterally. Spurling Negative. Limited ROM with mild pain on extension    Cor: RRR  Pulm: Symmetric chest rise, no respiratory distress noted.   Back: Straight leg raising in the sitting position is negative for radicular leg pain. There is pain with palpation over lumbar paraspinals and facet joints bilaterally. Limited ROM with pain on flexion and extension. Positive facet loading bilaterally.   Extremities: No deformities, edema, or skin discoloration. Good capillary refill.  Musculoskeletal: Shoulder maneuvers are negative. There is mild pain with palpation over bilateral SI joints. FABERs is negative bilaterally. Bilateral upper and lower extremity strength is normal and symmetric. No atrophy or tone abnormalities are noted.  Neuro: Bilateral upper and lower extremity coordination and muscle stretch reflexes are physiologic and symmetric.  Plantar response are downgoing. No loss of sensation is noted.  Gait: Antalgic, ambulates with cane for assistance     ASSESSMENT: 57 y.o. year old male with neck and lumbar pain, consistent with      1. Chronic pain disorder     2. Lumbar radiculopathy     3. Lumbar spondylosis     4. DDD (degenerative disc disease), lumbar     5. Sacroiliac joint pain     6. S/P cervical spinal fusion     7. Cervical spondylosis without myelopathy     8. DDD (degenerative disc disease), cervical     9. Encounter for monitoring opioid maintenance therapy  Pain Clinic Drug Screen         PLAN:     - Previous imaging reviewed today.    - He is s/p right L4/5 and L5/S1 TF NGUYEN.      - We can repeat lumbar RFA as needed.     - Consider SI joint injections in the future.     - I have stressed the importance of physical activity and a home  exercise plan to help with pain and improve health.    - Continue Gabapentin.     - Continue Robaxin 500 mg PRN muscle pain.     - Pain contract signed 2/12/2021.     - Continue Norco 7.5/325 mg BID PRN. He does not need a refill today.      - The patient is here today for a refill of current pain medications and they believe these provide effective pain control and improvements in quality of life.  The patient notes no serious side effects, and feels the benefits outweigh the risks.  The patient was reminded of the pain contract that they signed previously as well as the risks and benefits of the medication including possible death.  The updated [place] [employer] prescription monitoring program was reviewed, and the patient has been found to be compliant with current treatment plan.    - UDS from 9/17/2021 reviewed and consistent. Repeat UDS today.      - Continue home exercise routine.     - RTC in 3 months or sooner if needed.     The above plan and management options were discussed at length with patient. Patient is in agreement with the above and verbalized understanding.    Angelique Barahona NP  05/09/2022

## 2022-05-13 LAB
6MAM UR QL: NOT DETECTED
7AMINOCLONAZEPAM UR QL: NOT DETECTED
A-OH ALPRAZ UR QL: NOT DETECTED
ALPHA-OH-MIDAZOLAM: NOT DETECTED
ALPRAZ UR QL: NOT DETECTED
AMPHET UR QL SCN: NOT DETECTED
ANNOTATION COMMENT IMP: NORMAL
ANNOTATION COMMENT IMP: NORMAL
BARBITURATES UR QL: NOT DETECTED
BUPRENORPHINE UR QL: NOT DETECTED
BZE UR QL: NOT DETECTED
CARBOXYTHC UR QL: NOT DETECTED
CARISOPRODOL UR QL: NOT DETECTED
CLONAZEPAM UR QL: NOT DETECTED
CODEINE UR QL: NOT DETECTED
CREAT UR-MCNC: 157.8 MG/DL (ref 20–400)
DIAZEPAM UR QL: NOT DETECTED
ETHYL GLUCURONIDE UR QL: NOT DETECTED
FENTANYL UR QL: NOT DETECTED
GABAPENTIN: PRESENT
HYDROCODONE UR QL: NOT DETECTED
HYDROMORPHONE UR QL: PRESENT
LORAZEPAM UR QL: NOT DETECTED
MDA UR QL: NOT DETECTED
MDEA UR QL: NOT DETECTED
MDMA UR QL: NOT DETECTED
ME-PHENIDATE UR QL: NOT DETECTED
METHADONE UR QL: NOT DETECTED
METHAMPHET UR QL: NOT DETECTED
MIDAZOLAM UR QL SCN: NOT DETECTED
MORPHINE UR QL: NOT DETECTED
NALOXONE: NOT DETECTED
NORBUPRENORPHINE UR QL CFM: NOT DETECTED
NORDIAZEPAM UR QL: NOT DETECTED
NORFENTANYL UR QL: NOT DETECTED
NORHYDROCODONE UR QL CFM: PRESENT
NORMEPERIDINE UR QL CFM: NOT DETECTED
NOROXYCODONE UR QL CFM: NOT DETECTED
NOROXYMORPHONE UR QL SCN: NOT DETECTED
OXAZEPAM UR QL: NOT DETECTED
OXYCODONE UR QL: NOT DETECTED
OXYMORPHONE UR QL: NOT DETECTED
PATHOLOGY STUDY: NORMAL
PCP UR QL: NOT DETECTED
PHENTERMINE UR QL: NOT DETECTED
PREGABALIN: NOT DETECTED
SERVICE CMNT-IMP: NORMAL
TAPENTADOL UR QL SCN: NOT DETECTED
TAPENTADOL UR QL SCN: NOT DETECTED
TEMAZEPAM UR QL: NOT DETECTED
TRAMADOL UR QL: NOT DETECTED
ZOLPIDEM METABOLITE: NOT DETECTED
ZOLPIDEM UR QL: NOT DETECTED

## 2022-06-27 DIAGNOSIS — M54.12 CERVICAL RADICULOPATHY: ICD-10-CM

## 2022-06-27 DIAGNOSIS — G89.4 CHRONIC PAIN DISORDER: ICD-10-CM

## 2022-06-27 DIAGNOSIS — M47.816 LUMBAR SPONDYLOSIS: ICD-10-CM

## 2022-06-27 DIAGNOSIS — M51.36 DDD (DEGENERATIVE DISC DISEASE), LUMBAR: ICD-10-CM

## 2022-06-27 DIAGNOSIS — Z98.1 S/P CERVICAL SPINAL FUSION: ICD-10-CM

## 2022-06-27 DIAGNOSIS — M79.18 MYOFASCIAL PAIN: ICD-10-CM

## 2022-06-27 RX ORDER — GABAPENTIN 300 MG/1
600 CAPSULE ORAL 2 TIMES DAILY
Qty: 120 CAPSULE | Refills: 5 | Status: SHIPPED | OUTPATIENT
Start: 2022-06-27 | End: 2022-12-24

## 2022-06-30 ENCOUNTER — TELEPHONE (OUTPATIENT)
Dept: SPINE | Facility: CLINIC | Age: 57
End: 2022-06-30
Payer: MEDICARE

## 2022-06-30 NOTE — TELEPHONE ENCOUNTER
This message is for patient in regards to his/her appointment 07/01/22 at 10:20a   With Angelique Barahona NP.      [facility] Policy: For the safety of all patients and staff members.     Patient Visitor policy: During this visit we're informing all patients that face mask are now optional, upon visit you have the options of requesting clinical staff to wear a mask for your protection and thiers.      If you have any questions or concerns please contact [phone]    Staff caitlyn HAGAN

## 2022-07-01 ENCOUNTER — OFFICE VISIT (OUTPATIENT)
Dept: PAIN MEDICINE | Facility: CLINIC | Age: 57
End: 2022-07-01
Payer: MEDICARE

## 2022-07-01 VITALS
HEIGHT: 73 IN | SYSTOLIC BLOOD PRESSURE: 133 MMHG | DIASTOLIC BLOOD PRESSURE: 91 MMHG | BODY MASS INDEX: 32.67 KG/M2 | TEMPERATURE: 97 F | HEART RATE: 63 BPM | WEIGHT: 246.5 LBS

## 2022-07-01 DIAGNOSIS — G89.4 CHRONIC PAIN DISORDER: ICD-10-CM

## 2022-07-01 DIAGNOSIS — M51.36 DDD (DEGENERATIVE DISC DISEASE), LUMBAR: ICD-10-CM

## 2022-07-01 DIAGNOSIS — M53.3 SACROILIAC JOINT PAIN: Primary | ICD-10-CM

## 2022-07-01 DIAGNOSIS — M47.816 LUMBAR SPONDYLOSIS: ICD-10-CM

## 2022-07-01 PROCEDURE — 3080F DIAST BP >= 90 MM HG: CPT | Mod: CPTII,S$GLB,, | Performed by: NURSE PRACTITIONER

## 2022-07-01 PROCEDURE — 99213 OFFICE O/P EST LOW 20 MIN: CPT | Mod: S$GLB,,, | Performed by: NURSE PRACTITIONER

## 2022-07-01 PROCEDURE — 3075F PR MOST RECENT SYSTOLIC BLOOD PRESS GE 130-139MM HG: ICD-10-PCS | Mod: CPTII,S$GLB,, | Performed by: NURSE PRACTITIONER

## 2022-07-01 PROCEDURE — 1159F PR MEDICATION LIST DOCUMENTED IN MEDICAL RECORD: ICD-10-PCS | Mod: CPTII,S$GLB,, | Performed by: NURSE PRACTITIONER

## 2022-07-01 PROCEDURE — 4010F ACE/ARB THERAPY RXD/TAKEN: CPT | Mod: CPTII,S$GLB,, | Performed by: NURSE PRACTITIONER

## 2022-07-01 PROCEDURE — 99999 PR PBB SHADOW E&M-EST. PATIENT-LVL III: ICD-10-PCS | Mod: PBBFAC,,, | Performed by: NURSE PRACTITIONER

## 2022-07-01 PROCEDURE — 1159F MED LIST DOCD IN RCRD: CPT | Mod: CPTII,S$GLB,, | Performed by: NURSE PRACTITIONER

## 2022-07-01 PROCEDURE — 3080F PR MOST RECENT DIASTOLIC BLOOD PRESSURE >= 90 MM HG: ICD-10-PCS | Mod: CPTII,S$GLB,, | Performed by: NURSE PRACTITIONER

## 2022-07-01 PROCEDURE — 3008F PR BODY MASS INDEX (BMI) DOCUMENTED: ICD-10-PCS | Mod: CPTII,S$GLB,, | Performed by: NURSE PRACTITIONER

## 2022-07-01 PROCEDURE — 1160F RVW MEDS BY RX/DR IN RCRD: CPT | Mod: CPTII,S$GLB,, | Performed by: NURSE PRACTITIONER

## 2022-07-01 PROCEDURE — 3008F BODY MASS INDEX DOCD: CPT | Mod: CPTII,S$GLB,, | Performed by: NURSE PRACTITIONER

## 2022-07-01 PROCEDURE — 4010F PR ACE/ARB THEARPY RXD/TAKEN: ICD-10-PCS | Mod: CPTII,S$GLB,, | Performed by: NURSE PRACTITIONER

## 2022-07-01 PROCEDURE — 1160F PR REVIEW ALL MEDS BY PRESCRIBER/CLIN PHARMACIST DOCUMENTED: ICD-10-PCS | Mod: CPTII,S$GLB,, | Performed by: NURSE PRACTITIONER

## 2022-07-01 PROCEDURE — 99213 PR OFFICE/OUTPT VISIT, EST, LEVL III, 20-29 MIN: ICD-10-PCS | Mod: S$GLB,,, | Performed by: NURSE PRACTITIONER

## 2022-07-01 PROCEDURE — 3075F SYST BP GE 130 - 139MM HG: CPT | Mod: CPTII,S$GLB,, | Performed by: NURSE PRACTITIONER

## 2022-07-01 PROCEDURE — 99999 PR PBB SHADOW E&M-EST. PATIENT-LVL III: CPT | Mod: PBBFAC,,, | Performed by: NURSE PRACTITIONER

## 2022-07-01 NOTE — PROGRESS NOTES
Chronic patient Established Note (Follow up visit)      Interval History 7/1/2022:  The patient returns to clinic today for follow up of neck and back pain. He reports increased low back and buttock pain.His pain is worse with prolonged sitting and moving from sitting to standing. He also reports increased pain with bending over. He does report intermittent radiating pain into his right posterior thigh stopping at mid thigh. He does report a fall, which was sitting hard on the ground about a week ago. He denies any acute injury. He continues to take Gabapentin and Norco. He denies any other health changes. His pain today is 6/10.     Interval History 5/9/2022:  The patient returns to clinic today for follow up of back pain. He is s/p right L4/5 and L5/S1 TF NGUYEN on 4/14/2022. He reports 50-60% relief of his low back and leg pain. He has been more active since the procedure. He continues to report low back and hip pain, worse with bending and activity. He denies any radicular leg pain. He continues to repot intermittent neck pain. He continues to take Gabapentin with benefit. He continues to take Norco sparingly as needed. He denies any other health changes. His pain today is 5/10.    Interval History 4/1/2022:  The patient returns to clinic today for follow up of back pain. He is s/p left L3,4,5 RFA on 2/25/2022 and right L3,4,5 RFA on 3/11/2022. He reports relief of his back pain. He reports increased back pain that radiates into the posterior aspect of his right leg to his knee. He denies any left leg pain. His pain is worse with prolonged walking. He reports that he sometimes drags his right leg. He denies any falls. He denies any bowel or bladder incontinence. He continues to take Gabapentin and Norco as needed with benefit. He denies any adverse effects. He denies any other health changes. His pain today is 7/10.    Interval History 2/15/2022:  The patient returns to clinic today for follow up of neck and back  "pain. He reports increased low back pain over the last two weeks. He describes this pain as sharp and aching in nature. He does report intermittent "catching" pain in his lower back, worse with getting out of bed. He denies any radicular leg pain. He continues to report benefit from previous cervical procedures. He reports intermittent neck pain. He continues to take Gabapentin with benefit. He continues to take Norco as needed with benefit and without adverse effects. He denies any other health changes. His pain today is 6/10.    Interval History 11/5/2021:  The patient returns to clinic today for follow up of neck and back pain. He is s/p left C4,5,6,7 RFA on 10/1/2021 and right C4,5,6,7 RFA on 10/15/2021. He reports 50% relief of his neck pain. He reports increased burning and itching pain to his skin near injection sites. He denies any radicular arm pain. He does report increased left sided muscle pain. He is concerned that one of the screws in his cervical hardware is moving. He continues to report benefit with previous lumbar procedures. He reports intermittent low back pain without radicular leg pain. He continues to take Gabapentin with benefit. He continues to take Norco as needed with benefit and without adverse effects. He denies any weakness. He denies any other health changes. His pain today is 5/10.    Interval History 9/17/2021:  The patient returns to clinic today for follow up of neck and back pain. He reports increased neck pain. He denies any radicular arm pain today. He does report intermittent radiating pain into his left shoulder blade and mid back. His pain is worse with activity. He reports that sometimes his pain takes his breath away. He continues to report low back pain, that is tolerable at this time. He continues to take Gabapentin and Norco with benefit and without adverse effects. He denies any other health changes. His pain today is 6/10.    Interval History 6/17/2021:  The patient " returns to clinic today for follow up of neck and back pain. He is s/p right L3,4,5 RFA on 4/23/2021 and left L3,4,5 RFA on 5/7/2021. He reports 50% relief of his low back pain. He continues to report intermittent low back pain. He denies any radicular leg pain. He continues to report neck pain, especially in between the scapula. His pain is worse with prolonged standing, walking, and activity. He continues to take Gabapentin with benefit. He continues to take Norco sparingly for severe pain with benefit and without adverse effects. He denies any other health changes. His pain today is 4/10.    Interval History 4/9/2021:  The patient returns to clinic today for follow up of neck and back pain. He is s/p left C4,5,6,7 RFA on 3/9/2021 and right C4,5,6,7 RFA on 3/26/2021. He reports 50% relief of his neck pain. He reports intermittent neck pain. He has good days and bad days. He continues to report low back pain that is constant, sharp, and aching. He reports intermittent radiating pain into the lateral aspect of his left leg to his knee. He continues to take Gabapentin with benefit. He continues to take Norco as needed sparingly for severe pain. He denies any adverse effects. He denies any other health changes. His pain today is 5/10.    Interval History 3/2/2021:  Sal Navarro presents to the clinic for a follow-up appointment for neck pain . Since the last visit, aSl Navarro states the pain has been worsening. Current pain intensity is 6/10.  Was seen by Angelique Barahona NP on 2/12/2021.     Interval History 2/12/2021:  The patient returns to clinic today for follow up of back pain. He has not been seen in over a year. He did not have imaging due to coronavirus outbreak. He would to reschedule this. He was also considering SCS trial prior to the pandemic. He continues to report low back pain that radiates into the lateral aspect of his left leg to his knee. He denies any radicular right leg pain. His pain  "is worse with bending and walking. He continues to take Gabapentin with benefit. He also takes Norco as needed for severe pain. He denies any adverse effects. He denies any other health changes. His pain today is 6/10.    HPI:  Sal Navarro is a 56 y.o. male who presents today s/p C4-7 ACDF with C5/6 PSIF in 2/2016 with residual chronic midline neck pain that radiates into his shoulder blades bilaterally, R>>L.  This is associated with bilateral hand numbness and tingling.  The hand symptoms did improve following the surgery.  The shooting pains in his arms resolved completely.   This pain is described in detail below.  His post-operative course was complicated by dysphagia that is being treated with speech therapy.  The numbness and the tingling were relieved by the surgery. Now experiences "deep pain along the spine"  Patient has not been seen for over a year, had to re-schedule his imaging studies due to pandemic(now completed). Prior to the pandemic, he was considering a SCS.   Only time he gets relief is laying in bed on a very thin pillow, but that doesn't last very long.   Pain aggravated by movement and even breathing/playing with his grandchild/holding the grandchild's hand. Heat and massage (has a vest) are helpful.   Was seen by Dr. Jan srivastava and received multiple EIS and RFAs.   Compliant with his medication.     Pain Disability Index Review:  Last 3 PDI Scores 7/1/2022 5/9/2022 4/1/2022   Pain Disability Index (PDI) 40 40 47       Pain Medications:  Gabapentin  Norco sparingly  Robaxin    Opioid Contract: yes     report:  Reviewed and consistent with medication use as prescribed.    Pain Procedures:   4/14/2022- Right L4/5 and L5/S1 TF NGUYEN  3/11/2022- Right L3,4,5 RFA  2/25/2022- Left L3,4,5 RFA  10/15/2021- Right C4,5,6,7 RFA  10/1/2021- Left C4,5,6,7 RFA  5/7/2021- Left L3,4,5 RFA   4/23/2021- Right L3,4,5 RFA  3/26/2021- Right C4,5,6,7 RFA  3/9/2021- Left C4,5,6,7 RFA  10/11/19: L4/5 " Interlaminar Epidural Steroid Injection  06/13/19- Left C4-7 RFA  05/03/19:Left L2-5 Lumbar Medial Branch Nerve Thermal Radiofrequency Ablation  12/21/18:Right L2-5 Lumbar Medial Branch Nerve Thermal Radiofrequency Ablation  11/23/18:Cervical Thermal Radiofreqiency Ablation: Right C4-7  09/14/18:C7/Y3Walmeshs Steroid Injection  06/29/18:LL2/3 Interlaminar Epidural Steroid Injection   03/06/18:L2/3 Interlaminar Epidural Steroid Injection   09/26/17:Bilateral L2-5 Lumbar Medial Branch Nerve Coolief Thermal Radiofrequency Ablation  08/31/17:Bilateral L2-5 Lumbar medial branch blocks   03/28/17:Cervical Conventional Radiofreqiency Ablation: Left C4-7  03/14/17:Cervical Conventional Radiofreqiency Ablation: Right C4-7  10/25/16:Cervical Conventional Radiofreqiency Ablation: Left C4-7  10/11/16: Cervical Conventional Radiofreqiency Ablation: Right C4-7  10/04/16- Cervical Medial Branch Blocks, Bilateral C4-7.     08/31/16:C7/[id number] Steroid Injection    Physical Therapy/Home Exercise: does home exercises now, but not in formal PT.    - 2019 was last time in PT     Imaging:   MRI Lumbar Spine 2/25/2021:  COMPARISON:  07/11/2017     FINDINGS:  The vertebral bodies maintain normal height, signal intensity and alignment.  There is redemonstration of few hemangiomas at multiple levels.  The intervertebral disc spaces are preserved although there is some disc desiccation at multiple levels.  The conus terminates at level L1.  Evaluation of the localizer images and structures surrounding the lumbar spine shows no abnormalities.     L1-L2: No significant disc or joint pathology.     L2-L3: Mild diffuse disc bulge with mild bilateral facet arthropathy and ligamentum flavum hypertrophy results in mild central canal stenosis.  Mild bilateral neural foraminal stenosis is also identified.     L3-L4: There is a diffuse disc bulge with no significant facet arthropathy or ligamentum flavum hypertrophy.  There is mild central canal  stenosis and mild bilateral neural foraminal stenosis.     L4-L5: There is a diffuse disc bulge with ligamentum flavum hypertrophy.  No significant facet arthropathy.  There is mild central canal stenosis with only minimal encroachment on the left neural foramen.  Mild right neural foraminal stenosis is present.     L5-S1: There is a diffuse disc bulge with mild to moderate right facet arthropathy.  No left facet arthropathy.  The ligamentum flavum is normal.  The central canal is patent and the neural foramina are largely patent.  Incidental note is made of an annulus fibrosus tear posteriorly measuring approximately 2 mm.     Impression:     Multilevel degenerative disc and joint disease which is mild and results in mild central canal and neural foraminal stenosis as outlined above.    Xray Cervical Spine 2/25/2021:  COMPARISON:  07/07/2017 .     FINDINGS:  The patient has undergone ACDF from C4 through C7. The instrumentation is intact without evidence of complication.  The vertebral bodies maintain normal height and alignment. The remaining intervertebral disc spaces are preserved.  No significant uncovertebral joint hypertrophy.  The prevertebral soft tissues, odontoid views and lung apices are normal. The neural foramen are patent     Impression:     Postsurgical changes to the thoracic spine without additional evidence for degenerative disc or joint disease.     Allergies:   Review of patient's allergies indicates:   Allergen Reactions    Pcn [penicillins] Hives and Rash    Lipitor [atorvastatin] Other (See Comments)     Muscle cramps, weakness       Current Medications:   Current Outpatient Medications   Medication Sig Dispense Refill    acetaminophen (TYLENOL) 500 MG tablet Take 500 mg by mouth every 6 (six) hours as needed for Pain.      albuterol (PROVENTIL/VENTOLIN HFA) 90 mcg/actuation inhaler       aspirin (ECOTRIN) 81 MG EC tablet Take 81 mg by mouth once daily.      cetirizine (ZYRTEC) 10 MG  tablet Take 10 mg by mouth nightly.  5    FOLIC ACID/MULTIVIT-MIN/LUTEIN (CENTRUM SILVER ORAL) Take by mouth once daily.      gabapentin (NEURONTIN) 300 MG capsule Take 2 capsules (600 mg total) by mouth 2 (two) times daily. 120 capsule 5    HYDROcodone-acetaminophen (NORCO) 7.5-325 mg per tablet Take 1 tablet by mouth every 12 (twelve) hours as needed for Pain. 60 tablet 0    levothyroxine (SYNTHROID) 50 MCG tablet Take 50 mcg by mouth once daily.      meclizine (ANTIVERT) 25 mg tablet Take 25 mg by mouth once daily.       meloxicam (MOBIC) 15 MG tablet Take 15 mg by mouth once daily.      metFORMIN (GLUCOPHAGE) 500 MG tablet Take 1 tablet by mouth 2 (two) times a day.      methocarbamoL (ROBAXIN) 500 MG Tab Take 1 tablet (500 mg total) by mouth 2 (two) times daily. 60 tablet 4    omeprazole (PRILOSEC) 20 MG capsule Take 1 capsule (20 mg total) by mouth once daily. 90 capsule 3    pravastatin (PRAVACHOL) 20 MG tablet       valsartan (DIOVAN) 80 MG tablet Take 40 mg by mouth once daily.   1     No current facility-administered medications for this visit.       REVIEW OF SYSTEMS:    GENERAL:  No weight loss, malaise or fevers.  HEENT:  Negative for frequent or significant headaches.  NECK:  Negative for lumps, goiter  and significant neck swelling.   RESPIRATORY:  Negative for cough, wheezing or shortness of breath.  CARDIOVASCULAR:  Negative for chest pain, leg swelling or palpitations. HTN  GI:  Negative for abdominal discomfort, blood in stools or black stools or change in bowel habits.  MUSCULOSKELETAL:  See HPI.  SKIN:  Negative for lesions, rash, and itching.  PSYCH:  Negative for sleep disturbance, mood disorder and recent psychosocial stressors.  HEMATOLOGY/LYMPHOLOGY:  Negative for prolonged bleeding, bruising easily or swollen nodes.  NEURO:   No history of headaches, syncope, paralysis, seizures or tremors.   ENDO: Thyroid disorder.   All other reviewed and negative other than HPI.    Past  Medical History:  Past Medical History:   Diagnosis Date    Arthritis     Cataract     Cervical back pain with evidence of disc disease     Hiatal hernia     Hypertension     Thyroid disease        Past Surgical History:  Past Surgical History:   Procedure Laterality Date    ACROMIOCLAVICULAR JOINT CYST EXCISION Right     BACK SURGERY      cataracts Bilateral     CERVICAL FUSION      COLONOSCOPY N/A 4/27/2018    Procedure: COLONOSCOPY;  Surgeon: Giovani Medeiros MD;  [facility]  Service: Endoscopy;  Laterality: N/A;    EPIDURAL STEROID INJECTION N/A 10/11/2019    Procedure: INJECTION, STEROID, EPIDURAL;  Surgeon: Kasandra Montoya MD;  Location: [facility] PAIN MGT;  Service: Pain Management;  Laterality: N/A;  Lumbar NGUYEN L4/5    NGUYEN      EYE SURGERY      RADIOFREQUENCY ABLATION  10/2016    cervical spine/Montoya    RADIOFREQUENCY ABLATION Left 5/3/2019    Procedure: RADIOFREQUENCY ABLATION, L2-L5 MEDIALBRANCH;  Surgeon: Kasandra Montoya MD;  Location: [facility] PAIN MGT;  Service: Pain Management;  Laterality: Left;    RADIOFREQUENCY ABLATION Left 6/13/2019    Procedure: RADIOFREQUENCY ABLATION C4-7;  Surgeon: Kasandra Montoya MD;  Location: [facility] PAIN MGT;  Service: Pain Management;  Laterality: Left;    RADIOFREQUENCY ABLATION Left 3/9/2021    Procedure: RADIOFREQUENCY ABLATION C4,C5,C6,C7;  Surgeon: Alex Callahan MD;  Location: [facility] PAIN MGT;  Service: Pain Management;  Laterality: Left;  1 of 2    RADIOFREQUENCY ABLATION Right 3/26/2021    Procedure: RADIOFREQUENCY ABLATION C4,C6,C6,C7;  Surgeon: Alex Callahan MD;  Location: [facility] PAIN MGT;  Service: Pain Management;  Laterality: Right;  2 of 2    RADIOFREQUENCY ABLATION Right 4/23/2021    Procedure: RADIOFREQUENCY ABLATION L3,4,5;  Surgeon: Alex Callahan MD;  Location: [facility] PAIN [facility]GT;  Service: Pain Management;  Laterality: Right;  1 of 2    RADIOFREQUENCY ABLATION Left 5/7/2021    Procedure: RADIOFREQUENCY ABLATION L3,4,5;   Surgeon: Alex Callahan MD;  Location: [facility] PAIN MGT;  Service: Pain Management;  Laterality: Left;  2 of 2    RADIOFREQUENCY ABLATION Left 10/1/2021    Procedure: RADIOFREQUENCY ABLATION LEFT, C4,5,6,7 1 of 2 CONSENT NEEDED;  Surgeon: Alex Callahan MD;  Location: [facility] PAIN MGT;  Service: Pain Management;  Laterality: Left;    RADIOFREQUENCY ABLATION Right 10/15/2021    Procedure: RADIOFREQUENCY ABLATION  RIGHT C4,5,6,7 2 of 2 CONSENT NEEDED;  Surgeon: Alex Callahan MD;  Location: [facility] PAIN MGT;  Service: Pain Management;  Laterality: Right;    RADIOFREQUENCY ABLATION Left 2/25/2022    Procedure: RADIOFREQUENCY ABLATION LEFT L3,4,5 1 of 2, needs consent;  Surgeon: Alex Callahan MD;  Location: [facility] PAIN [facility]GT;  Service: Pain Management;  Laterality: Left;    RADIOFREQUENCY ABLATION Right 3/11/2022    Procedure: RADIOFREQUENCY ABLATION RIGHT L3,4,5 2 of 2, needs consent;  Surgeon: Alex Callahan MD;  Location: [facility] PAIN [facility]GT;  Service: Pain Management;  Laterality: Right;    RETINAL DETACHMENT SURGERY      ROTATOR CUFF REPAIR Right     SPINE SURGERY      cerival fusion     TRANSFORAMINAL EPIDURAL INJECTION OF STEROID Right 4/14/2022    Procedure: INJECTION, STEROID, EPIDURAL, TRANSFORAMINAL APPROACH RIGHT L4/5 & L5/S1 Needs Consent;  Surgeon: Alex Callahan MD;  Location: [facility] PAIN [facility]GT;  Service: Pain Management;  Laterality: Right;    TRIGGER POINT INJECTION Left 6/13/2019    Procedure: INJECTION, TRIGGER POINT;  Surgeon: Kasandra Montoya MD;  Location: [facility] PAIN MGT;  Service: Pain Management;  Laterality: Left;       Family History:  Family History   Problem Relation Age of Onset    Heart disease Mother     Cancer Father     Leukemia Brother     Colon cancer Neg Hx     Celiac disease Neg Hx     Crohn's disease Neg Hx     Esophageal cancer Neg Hx     Inflammatory bowel disease Neg Hx     Irritable bowel syndrome Neg Hx     Liver cancer Neg Hx     Rectal cancer Neg Hx     Stomach  "cancer Neg Hx     Ulcerative colitis Neg Hx        Social History:  Social History     Socioeconomic History    Marital status:    Tobacco Use    Smoking status: Never Smoker    Smokeless tobacco: Never Used   Substance and Sexual Activity    Alcohol use: No    Drug use: No       OBJECTIVE:    BP (!) 133/91 (BP Location: Right arm, Patient Position: Sitting, BP Method: Medium (Automatic))   Pulse 63   Temp 97.1 °F (36.2 °C)   Ht 6' 1" (1.854 m)   Wt 111.8 kg (246 lb 7.6 oz)   BMI 32.52 kg/m²     PHYSICAL EXAMINATION:    General appearance: Well appearing, in no acute distress, alert and oriented x3.  Psych:  Mood and affect appropriate.  Skin: Skin color, texture, turgor normal, no rashes or lesions, in both upper and lower body.  Head/face:  Atraumatic, normocephalic. No palpable lymph nodes  Neck: There is mild pain to palpation over the cervical paraspinous and trapezius muscles bilaterally. Spurling Negative. Limited ROM with mild pain on extension.    Cor: RRR  Pulm: Symmetric chest rise, no respiratory distress noted.   Back: Straight leg raising in the sitting position is negative for radicular leg pain. There is mild pain with palpation over lumbar paraspinals and facet joints bilaterally, R>L. Limited ROM with mild pain on extension. Positive facet loading bilaterally.   Extremities: No deformities, edema, or skin discoloration. Good capillary refill.  Musculoskeletal: Shoulder maneuvers are negative. There is pain with palpation over bilateral SI joints. FABERs is positive bilaterally. Bilateral upper and lower extremity strength is normal and symmetric. No atrophy or tone abnormalities are noted.  Neuro: Bilateral upper and lower extremity coordination and muscle stretch reflexes are physiologic and symmetric.  Plantar response are downgoing. No loss of sensation is noted.  Gait: Antalgic, ambulates with cane for assistance     ASSESSMENT: 57 y.o. year old male with neck and lumbar pain, " consistent with      1. Sacroiliac joint pain  Procedure Order to Pain Management   2. Lumbar spondylosis     3. DDD (degenerative disc disease), lumbar     4. Chronic pain disorder           PLAN:     - Previous imaging reviewed today.    - Schedule for bilateral SI joint injections.     - We can repeat right L4/5 and L5/S1 TF NGUYEN if his radicular pain returns.       - We can repeat lumbar RFA as needed.      - I have stressed the importance of physical activity and a home exercise plan to help with pain and improve health.    - Continue Gabapentin.     - Continue Robaxin 500 mg PRN muscle pain.     - Pain contract signed 2/12/2021.     - Continue Norco 7.5/325 mg BID PRN. He does not need a refill today.      - The patient is here today for a refill of current pain medications and they believe these provide effective pain control and improvements in quality of life.  The patient notes no serious side effects, and feels the benefits outweigh the risks.  The patient was reminded of the pain contract that they signed previously as well as the risks and benefits of the medication including possible death.  The updated [place] [employer] prescription monitoring program was reviewed, and the patient has been found to be compliant with current treatment plan.    - UDS from 5/9/2022 reviewed and consistent.     - Continue home exercise routine.     - RTC 2 weeks after above procedure.     - Dr. Callahan was consulted on the patient and agrees with this plan.    The above plan and management options were discussed at length with patient. Patient is in agreement with the above and verbalized understanding.    Angelique Barahona NP  07/01/2022

## 2022-07-13 ENCOUNTER — PATIENT MESSAGE (OUTPATIENT)
Dept: PAIN MEDICINE | Facility: OTHER | Age: 57
End: 2022-07-13
Payer: MEDICARE

## 2022-07-14 ENCOUNTER — HOSPITAL ENCOUNTER (OUTPATIENT)
Facility: OTHER | Age: 57
Discharge: HOME | End: 2022-07-14
Attending: ANESTHESIOLOGY | Admitting: ANESTHESIOLOGY
Payer: MEDICARE

## 2022-07-14 VITALS
WEIGHT: 245 LBS | SYSTOLIC BLOOD PRESSURE: 126 MMHG | OXYGEN SATURATION: 98 % | BODY MASS INDEX: 32.47 KG/M2 | HEART RATE: 50 BPM | DIASTOLIC BLOOD PRESSURE: 86 MMHG | TEMPERATURE: 99 F | HEIGHT: 73 IN | RESPIRATION RATE: 18 BRPM

## 2022-07-14 DIAGNOSIS — M46.1 SACROILIITIS: Primary | ICD-10-CM

## 2022-07-14 DIAGNOSIS — G89.29 CHRONIC PAIN: ICD-10-CM

## 2022-07-14 PROCEDURE — 63600175 PHARM REV CODE 636 W HCPCS: Performed by: ANESTHESIOLOGY

## 2022-07-14 PROCEDURE — 27096 PR INJECTION,SACROILIAC JOINT: ICD-10-PCS | Mod: 50,,, | Performed by: ANESTHESIOLOGY

## 2022-07-14 PROCEDURE — 27096 INJECT SACROILIAC JOINT: CPT | Mod: 50 | Performed by: ANESTHESIOLOGY

## 2022-07-14 PROCEDURE — 25000003 PHARM REV CODE 250: Performed by: ANESTHESIOLOGY

## 2022-07-14 PROCEDURE — 27096 INJECT SACROILIAC JOINT: CPT | Mod: 50,,, | Performed by: ANESTHESIOLOGY

## 2022-07-14 RX ORDER — TRIAMCINOLONE ACETONIDE 40 MG/ML
INJECTION, SUSPENSION INTRA-ARTICULAR; INTRAMUSCULAR
Status: DISCONTINUED | OUTPATIENT
Start: 2022-07-14 | End: 2022-07-14 | Stop reason: HOSPADM

## 2022-07-14 RX ORDER — SODIUM CHLORIDE 9 MG/ML
500 INJECTION, SOLUTION INTRAVENOUS CONTINUOUS
Status: DISCONTINUED | OUTPATIENT
Start: 2022-07-14 | End: 2022-07-14 | Stop reason: HOSPADM

## 2022-07-14 RX ORDER — BUPIVACAINE HYDROCHLORIDE 2.5 MG/ML
INJECTION, SOLUTION EPIDURAL; INFILTRATION; INTRACAUDAL
Status: DISCONTINUED | OUTPATIENT
Start: 2022-07-14 | End: 2022-07-14 | Stop reason: HOSPADM

## 2022-07-14 RX ORDER — LIDOCAINE HYDROCHLORIDE 20 MG/ML
INJECTION, SOLUTION INFILTRATION; PERINEURAL
Status: DISCONTINUED | OUTPATIENT
Start: 2022-07-14 | End: 2022-07-14 | Stop reason: HOSPADM

## 2022-07-14 NOTE — DISCHARGE INSTRUCTIONS

## 2022-07-14 NOTE — OP NOTE
Sacroiliac Joint Injection under Fluoroscopic Guidance    The procedure, risks, benefits, and options were discussed with the patient. There are no contraindications to the procedure. The patent expressed understanding and agreed to the procedure. Informed written consent was obtained prior to the start of the procedure and can be found in the patient's chart.    PATIENT NAME: Sal Navarro   MRN: [id number]     DATE OF PROCEDURE: 07/14/2022    PROCEDURE: Bilateral Sacroiliac Joint Injection under Fluoroscopic Guidance    PRE-OP DIAGNOSIS: Sacroiliac joint pain [M53.3]    POST-OP DIAGNOSIS: Same    PHYSICIAN: Alex Callahan MD    ASSISTANTS: Aimee Munson MD     MEDICATIONS INJECTED: Preservative-free Kenalog 40mg with 7cc of Bupivacine 0.25%     LOCAL ANESTHETIC INJECTED: Xylocaine 2%     SEDATION: None    ESTIMATED BLOOD LOSS: None    COMPLICATIONS: None    TECHNIQUE: Time-out was performed to identify the patient and procedure to be performed. With the patient laying in a prone position, the surgical area was prepped and draped in the usual sterile fashion using ChloraPrep and a fenestrated drape. The sacroiliac joint was determined under fluoroscopy guidance. Skin anesthesia was achieved by injecting Lidocaine 2% over the injection site. The sacroiliac joint was  then approached with a 25 gauge, 3.5 inch spinal quinke needle that was introduced under fluoroscopic guidance in the AP and Lateral views. Once the needle tip was in the area of the joint, and there was no blood, contrast dye Omnipaque (300mg/mL) was injected to confirm placement and there was no vascular runoff. Fluoroscopic imaging in the AP and lateral views revealed a clear outline of the joint space. 4 mL of the medication mixture listed above was injected slowly intraarticular and lemuel-articular. Displacement of the radio opaque contrast after injection of the medication confirmed that the medication went into the area of the joint. The needles  were removed and bleeding was nil. A sterile dressing was applied. No specimens collected. The patient tolerated the procedure well.       The patient was monitored after the procedure in the recovery area. They were given post-procedure and discharge instructions to follow at home. The patient was discharged in a stable condition.      Alex Callahan MD

## 2022-07-14 NOTE — DISCHARGE SUMMARY
Discharge Note  Short Stay      SUMMARY     Admit Date: 7/14/2022    Attending Physician: Alex Callahan      Discharge Physician: Alex Callahan      Discharge Date: 7/14/2022 3:44 PM    Procedure(s) (LRB):  INJECTION, JOINT, SI BILATERAL  needs consent (Bilateral)    Final Diagnosis: Sacroiliac joint pain [M53.3]    Disposition: Home or self care    Patient Instructions:   Current Discharge Medication List      CONTINUE these medications which have NOT CHANGED    Details   acetaminophen (TYLENOL) 500 MG tablet Take 500 mg by mouth every 6 (six) hours as needed for Pain.      albuterol (PROVENTIL/VENTOLIN HFA) 90 mcg/actuation inhaler       aspirin (ECOTRIN) 81 MG EC tablet Take 81 mg by mouth once daily.      cetirizine (ZYRTEC) 10 MG tablet Take 10 mg by mouth nightly.  Refills: 5      FOLIC ACID/MULTIVIT-MIN/LUTEIN (CENTRUM SILVER ORAL) Take by mouth once daily.      gabapentin (NEURONTIN) 300 MG capsule Take 2 capsules (600 mg total) by mouth 2 (two) times daily.  Qty: 120 capsule, Refills: 5    Associated Diagnoses: Chronic pain disorder; Lumbar spondylosis; DDD (degenerative disc disease), lumbar; S/P cervical spinal fusion; Cervical radiculopathy; Myofascial pain      HYDROcodone-acetaminophen (NORCO) 7.5-325 mg per tablet Take 1 tablet by mouth every 12 (twelve) hours as needed for Pain.  Qty: 60 tablet, Refills: 0    Comments: Patient with chronic intractable pain.  Medically necessary for > 7 days  Associated Diagnoses: Chronic pain disorder; Lumbar spondylosis; DDD (degenerative disc disease), lumbar; Facet arthritis of lumbar region; Myalgia; Spondylosis of cervical region without myelopathy or radiculopathy; S/P cervical spinal fusion      levothyroxine (SYNTHROID) 50 MCG tablet Take 50 mcg by mouth once daily.      meclizine (ANTIVERT) 25 mg tablet Take 25 mg by mouth once daily.       meloxicam (MOBIC) 15 MG tablet Take 15 mg by mouth once daily.      metFORMIN (GLUCOPHAGE) 500 MG tablet Take 1  tablet by mouth 2 (two) times a day.      methocarbamoL (ROBAXIN) 500 MG Tab Take 1 tablet (500 mg total) by mouth 2 (two) times daily.  Qty: 60 tablet, Refills: 4    Associated Diagnoses: Myofascial pain      omeprazole (PRILOSEC) 20 MG capsule Take 1 capsule (20 mg total) by mouth once daily.  Qty: 90 capsule, Refills: 3    Associated Diagnoses: Gastroesophageal reflux disease, esophagitis presence not specified      pravastatin (PRAVACHOL) 20 MG tablet       valsartan (DIOVAN) 80 MG tablet Take 40 mg by mouth once daily.   Refills: 1                 Discharge Diagnosis: Sacroiliac joint pain [M53.3]  Condition on Discharge: Stable with no complications to procedure   Diet on Discharge: Same as before.  Activity: as per instruction sheet.  Discharge to: Home with a responsible adult.  Follow up: 2-4 weeks

## 2022-07-15 ENCOUNTER — PATIENT MESSAGE (OUTPATIENT)
Dept: PAIN MEDICINE | Facility: OTHER | Age: 57
End: 2022-07-15
Payer: MEDICARE

## 2022-07-15 LAB — POCT GLUCOSE: 79 MG/DL (ref 70–110)

## 2022-07-28 ENCOUNTER — PATIENT MESSAGE (OUTPATIENT)
Dept: PAIN MEDICINE | Facility: CLINIC | Age: 57
End: 2022-07-28
Payer: MEDICARE

## 2022-08-01 ENCOUNTER — PATIENT MESSAGE (OUTPATIENT)
Dept: PAIN MEDICINE | Facility: CLINIC | Age: 57
End: 2022-08-01
Payer: MEDICARE

## 2022-08-02 ENCOUNTER — OFFICE VISIT (OUTPATIENT)
Dept: PAIN MEDICINE | Facility: CLINIC | Age: 57
End: 2022-08-02
Payer: MEDICARE

## 2022-08-02 VITALS
HEART RATE: 57 BPM | WEIGHT: 246 LBS | DIASTOLIC BLOOD PRESSURE: 75 MMHG | SYSTOLIC BLOOD PRESSURE: 106 MMHG | RESPIRATION RATE: 18 BRPM | BODY MASS INDEX: 32.6 KG/M2 | HEIGHT: 73 IN

## 2022-08-02 DIAGNOSIS — M47.812 SPONDYLOSIS OF CERVICAL REGION WITHOUT MYELOPATHY OR RADICULOPATHY: ICD-10-CM

## 2022-08-02 DIAGNOSIS — Z51.81 ENCOUNTER FOR MONITORING OPIOID MAINTENANCE THERAPY: ICD-10-CM

## 2022-08-02 DIAGNOSIS — M47.816 LUMBAR SPONDYLOSIS: ICD-10-CM

## 2022-08-02 DIAGNOSIS — M53.3 SACROILIAC JOINT PAIN: ICD-10-CM

## 2022-08-02 DIAGNOSIS — Z79.891 ENCOUNTER FOR MONITORING OPIOID MAINTENANCE THERAPY: ICD-10-CM

## 2022-08-02 DIAGNOSIS — M51.36 DDD (DEGENERATIVE DISC DISEASE), LUMBAR: ICD-10-CM

## 2022-08-02 DIAGNOSIS — Z98.1 S/P CERVICAL SPINAL FUSION: ICD-10-CM

## 2022-08-02 DIAGNOSIS — M79.10 MYALGIA: ICD-10-CM

## 2022-08-02 DIAGNOSIS — G89.4 CHRONIC PAIN DISORDER: ICD-10-CM

## 2022-08-02 DIAGNOSIS — M54.16 LUMBAR RADICULOPATHY: ICD-10-CM

## 2022-08-02 DIAGNOSIS — M50.30 DDD (DEGENERATIVE DISC DISEASE), CERVICAL: ICD-10-CM

## 2022-08-02 DIAGNOSIS — M47.812 CERVICAL SPONDYLOSIS WITHOUT MYELOPATHY: ICD-10-CM

## 2022-08-02 DIAGNOSIS — G89.4 CHRONIC PAIN DISORDER: Primary | ICD-10-CM

## 2022-08-02 DIAGNOSIS — M47.816 FACET ARTHRITIS OF LUMBAR REGION: ICD-10-CM

## 2022-08-02 PROCEDURE — 3074F SYST BP LT 130 MM HG: CPT | Mod: CPTII,S$GLB,, | Performed by: NURSE PRACTITIONER

## 2022-08-02 PROCEDURE — 99999 PR PBB SHADOW E&M-EST. PATIENT-LVL IV: CPT | Mod: PBBFAC,,, | Performed by: NURSE PRACTITIONER

## 2022-08-02 PROCEDURE — 3008F PR BODY MASS INDEX (BMI) DOCUMENTED: ICD-10-PCS | Mod: CPTII,S$GLB,, | Performed by: NURSE PRACTITIONER

## 2022-08-02 PROCEDURE — 3074F PR MOST RECENT SYSTOLIC BLOOD PRESSURE < 130 MM HG: ICD-10-PCS | Mod: CPTII,S$GLB,, | Performed by: NURSE PRACTITIONER

## 2022-08-02 PROCEDURE — 1160F PR REVIEW ALL MEDS BY PRESCRIBER/CLIN PHARMACIST DOCUMENTED: ICD-10-PCS | Mod: CPTII,S$GLB,, | Performed by: NURSE PRACTITIONER

## 2022-08-02 PROCEDURE — 1160F RVW MEDS BY RX/DR IN RCRD: CPT | Mod: CPTII,S$GLB,, | Performed by: NURSE PRACTITIONER

## 2022-08-02 PROCEDURE — 99213 PR OFFICE/OUTPT VISIT, EST, LEVL III, 20-29 MIN: ICD-10-PCS | Mod: S$GLB,,, | Performed by: NURSE PRACTITIONER

## 2022-08-02 PROCEDURE — 4010F PR ACE/ARB THEARPY RXD/TAKEN: ICD-10-PCS | Mod: CPTII,S$GLB,, | Performed by: NURSE PRACTITIONER

## 2022-08-02 PROCEDURE — 4010F ACE/ARB THERAPY RXD/TAKEN: CPT | Mod: CPTII,S$GLB,, | Performed by: NURSE PRACTITIONER

## 2022-08-02 PROCEDURE — 3078F PR MOST RECENT DIASTOLIC BLOOD PRESSURE < 80 MM HG: ICD-10-PCS | Mod: CPTII,S$GLB,, | Performed by: NURSE PRACTITIONER

## 2022-08-02 PROCEDURE — 3008F BODY MASS INDEX DOCD: CPT | Mod: CPTII,S$GLB,, | Performed by: NURSE PRACTITIONER

## 2022-08-02 PROCEDURE — 99999 PR PBB SHADOW E&M-EST. PATIENT-LVL IV: ICD-10-PCS | Mod: PBBFAC,,, | Performed by: NURSE PRACTITIONER

## 2022-08-02 PROCEDURE — 3078F DIAST BP <80 MM HG: CPT | Mod: CPTII,S$GLB,, | Performed by: NURSE PRACTITIONER

## 2022-08-02 PROCEDURE — 99213 OFFICE O/P EST LOW 20 MIN: CPT | Mod: S$GLB,,, | Performed by: NURSE PRACTITIONER

## 2022-08-02 PROCEDURE — 1159F PR MEDICATION LIST DOCUMENTED IN MEDICAL RECORD: ICD-10-PCS | Mod: CPTII,S$GLB,, | Performed by: NURSE PRACTITIONER

## 2022-08-02 PROCEDURE — 1159F MED LIST DOCD IN RCRD: CPT | Mod: CPTII,S$GLB,, | Performed by: NURSE PRACTITIONER

## 2022-08-02 RX ORDER — HYDROCODONE BITARTRATE AND ACETAMINOPHEN 7.5; 325 MG/1; MG/1
1 TABLET ORAL EVERY 12 HOURS PRN
Qty: 60 TABLET | Refills: 0 | Status: SHIPPED | OUTPATIENT
Start: 2022-08-02 | End: 2022-10-27 | Stop reason: SDUPTHER

## 2022-08-02 NOTE — PROGRESS NOTES
Chronic patient Established Note (Follow up visit)      Interval History 8/2/2022:  The aptient returns to clinic today for follow up of neck and back pain. He is s/p bilateral SI joint injection on 7/14/2022. He reports 50-60% relief of his low back and buttock pain. He reports intermittent low back and buttock pain. He reports increased neck pain, left side greater than right. He denies any radicular arm pain. He has good days and bad days. He continues to take Gabapentin. He continues to take Norco sparingly with benefit and without adverse effects. He denies any other health changes. His pain today is 5/10.    Interval History 7/1/2022:  The patient returns to clinic today for follow up of neck and back pain. He reports increased low back and buttock pain.His pain is worse with prolonged sitting and moving from sitting to standing. He also reports increased pain with bending over. He does report intermittent radiating pain into his right posterior thigh stopping at mid thigh. He does report a fall, which was sitting hard on the ground about a week ago. He denies any acute injury. He continues to take Gabapentin and Norco. He denies any other health changes. His pain today is 6/10.     Interval History 5/9/2022:  The patient returns to clinic today for follow up of back pain. He is s/p right L4/5 and L5/S1 TF NGUYEN on 4/14/2022. He reports 50-60% relief of his low back and leg pain. He has been more active since the procedure. He continues to report low back and hip pain, worse with bending and activity. He denies any radicular leg pain. He continues to repot intermittent neck pain. He continues to take Gabapentin with benefit. He continues to take Norco sparingly as needed. He denies any other health changes. His pain today is 5/10.    Interval History 4/1/2022:  The patient returns to clinic today for follow up of back pain. He is s/p left L3,4,5 RFA on 2/25/2022 and right L3,4,5 RFA on 3/11/2022. He reports  "relief of his back pain. He reports increased back pain that radiates into the posterior aspect of his right leg to his knee. He denies any left leg pain. His pain is worse with prolonged walking. He reports that he sometimes drags his right leg. He denies any falls. He denies any bowel or bladder incontinence. He continues to take Gabapentin and Norco as needed with benefit. He denies any adverse effects. He denies any other health changes. His pain today is 7/10.    Interval History 2/15/2022:  The patient returns to clinic today for follow up of neck and back pain. He reports increased low back pain over the last two weeks. He describes this pain as sharp and aching in nature. He does report intermittent "catching" pain in his lower back, worse with getting out of bed. He denies any radicular leg pain. He continues to report benefit from previous cervical procedures. He reports intermittent neck pain. He continues to take Gabapentin with benefit. He continues to take Norco as needed with benefit and without adverse effects. He denies any other health changes. His pain today is 6/10.    Interval History 11/5/2021:  The patient returns to clinic today for follow up of neck and back pain. He is s/p left C4,5,6,7 RFA on 10/1/2021 and right C4,5,6,7 RFA on 10/15/2021. He reports 50% relief of his neck pain. He reports increased burning and itching pain to his skin near injection sites. He denies any radicular arm pain. He does report increased left sided muscle pain. He is concerned that one of the screws in his cervical hardware is moving. He continues to report benefit with previous lumbar procedures. He reports intermittent low back pain without radicular leg pain. He continues to take Gabapentin with benefit. He continues to take Norco as needed with benefit and without adverse effects. He denies any weakness. He denies any other health changes. His pain today is 5/10.    Interval History 9/17/2021:  The patient " returns to clinic today for follow up of neck and back pain. He reports increased neck pain. He denies any radicular arm pain today. He does report intermittent radiating pain into his left shoulder blade and mid back. His pain is worse with activity. He reports that sometimes his pain takes his breath away. He continues to report low back pain, that is tolerable at this time. He continues to take Gabapentin and Norco with benefit and without adverse effects. He denies any other health changes. His pain today is 6/10.    Interval History 6/17/2021:  The patient returns to clinic today for follow up of neck and back pain. He is s/p right L3,4,5 RFA on 4/23/2021 and left L3,4,5 RFA on 5/7/2021. He reports 50% relief of his low back pain. He continues to report intermittent low back pain. He denies any radicular leg pain. He continues to report neck pain, especially in between the scapula. His pain is worse with prolonged standing, walking, and activity. He continues to take Gabapentin with benefit. He continues to take Norco sparingly for severe pain with benefit and without adverse effects. He denies any other health changes. His pain today is 4/10.    Interval History 4/9/2021:  The patient returns to clinic today for follow up of neck and back pain. He is s/p left C4,5,6,7 RFA on 3/9/2021 and right C4,5,6,7 RFA on 3/26/2021. He reports 50% relief of his neck pain. He reports intermittent neck pain. He has good days and bad days. He continues to report low back pain that is constant, sharp, and aching. He reports intermittent radiating pain into the lateral aspect of his left leg to his knee. He continues to take Gabapentin with benefit. He continues to take Norco as needed sparingly for severe pain. He denies any adverse effects. He denies any other health changes. His pain today is 5/10.    Interval History 3/2/2021:  Sal Navarro presents to the clinic for a follow-up appointment for neck pain . Since the  "last visit, Sal Navarro states the pain has been worsening. Current pain intensity is 6/10.  Was seen by Angelique Barahona NP on 2/12/2021.     Interval History 2/12/2021:  The patient returns to clinic today for follow up of back pain. He has not been seen in over a year. He did not have imaging due to coronavirus outbreak. He would to reschedule this. He was also considering SCS trial prior to the pandemic. He continues to report low back pain that radiates into the lateral aspect of his left leg to his knee. He denies any radicular right leg pain. His pain is worse with bending and walking. He continues to take Gabapentin with benefit. He also takes Norco as needed for severe pain. He denies any adverse effects. He denies any other health changes. His pain today is 6/10.    HPI:  Sal Navarro is a 56 y.o. male who presents today s/p C4-7 ACDF with C5/6 PSIF in 2/2016 with residual chronic midline neck pain that radiates into his shoulder blades bilaterally, R>>L.  This is associated with bilateral hand numbness and tingling.  The hand symptoms did improve following the surgery.  The shooting pains in his arms resolved completely.   This pain is described in detail below.  His post-operative course was complicated by dysphagia that is being treated with speech therapy.  The numbness and the tingling were relieved by the surgery. Now experiences "deep pain along the spine"  Patient has not been seen for over a year, had to re-schedule his imaging studies due to pandemic(now completed). Prior to the pandemic, he was considering a SCS.   Only time he gets relief is laying in bed on a very thin pillow, but that doesn't last very long.   Pain aggravated by movement and even breathing/playing with his grandchild/holding the grandchild's hand. Heat and massage (has a vest) are helpful.   Was seen by Dr. Jan srivastava and received multiple EIS and RFAs.   Compliant with his medication.     Pain Disability Index " Review:  Last 3 PDI Scores 8/2/2022 7/1/2022 5/9/2022   Pain Disability Index (PDI) 25 40 40       Pain Medications:  Gabapentin  Norco sparingly  Robaxin    Opioid Contract: yes     report:  Reviewed and consistent with medication use as prescribed.    Pain Procedures:   4/14/2022- Right L4/5 and L5/S1 TF NGUYEN  3/11/2022- Right L3,4,5 RFA  2/25/2022- Left L3,4,5 RFA  10/15/2021- Right C4,5,6,7 RFA  10/1/2021- Left C4,5,6,7 RFA  5/7/2021- Left L3,4,5 RFA   4/23/2021- Right L3,4,5 RFA  3/26/2021- Right C4,5,6,7 RFA  3/9/2021- Left C4,5,6,7 RFA  10/11/19: L4/5 Interlaminar Epidural Steroid Injection  06/13/19- Left C4-7 RFA  05/03/19:Left L2-5 Lumbar Medial Branch Nerve Thermal Radiofrequency Ablation  12/21/18:Right L2-5 Lumbar Medial Branch Nerve Thermal Radiofrequency Ablation  11/23/18:Cervical Thermal Radiofreqiency Ablation: Right C4-7  09/14/18:C7/L4Hdfldsmt Steroid Injection  06/29/18:LL2/3 Interlaminar Epidural Steroid Injection   03/06/18:L2/3 Interlaminar Epidural Steroid Injection   09/26/17:Bilateral L2-5 Lumbar Medial Branch Nerve Coolief Thermal Radiofrequency Ablation  08/31/17:Bilateral L2-5 Lumbar medial branch blocks   03/28/17:Cervical Conventional Radiofreqiency Ablation: Left C4-7  03/14/17:Cervical Conventional Radiofreqiency Ablation: Right C4-7  10/25/16:Cervical Conventional Radiofreqiency Ablation: Left C4-7  10/11/16: Cervical Conventional Radiofreqiency Ablation: Right C4-7  10/04/16- Cervical Medial Branch Blocks, Bilateral C4-7.     08/31/16:C7/G0Lowvkmwq Steroid Injection    Physical Therapy/Home Exercise: does home exercises now, but not in formal PT.    - 2019 was last time in PT     Imaging:   MRI Lumbar Spine 2/25/2021:  COMPARISON:  07/11/2017     FINDINGS:  The vertebral bodies maintain normal height, signal intensity and alignment.  There is redemonstration of few hemangiomas at multiple levels.  The intervertebral disc spaces are preserved although there is some disc  desiccation at multiple levels.  The conus terminates at level L1.  Evaluation of the localizer images and structures surrounding the lumbar spine shows no abnormalities.     L1-L2: No significant disc or joint pathology.     L2-L3: Mild diffuse disc bulge with mild bilateral facet arthropathy and ligamentum flavum hypertrophy results in mild central canal stenosis.  Mild bilateral neural foraminal stenosis is also identified.     L3-L4: There is a diffuse disc bulge with no significant facet arthropathy or ligamentum flavum hypertrophy.  There is mild central canal stenosis and mild bilateral neural foraminal stenosis.     L4-L5: There is a diffuse disc bulge with ligamentum flavum hypertrophy.  No significant facet arthropathy.  There is mild central canal stenosis with only minimal encroachment on the left neural foramen.  Mild right neural foraminal stenosis is present.     L5-S1: There is a diffuse disc bulge with mild to moderate right facet arthropathy.  No left facet arthropathy.  The ligamentum flavum is normal.  The central canal is patent and the neural foramina are largely patent.  Incidental note is made of an annulus fibrosus tear posteriorly measuring approximately 2 mm.     Impression:     Multilevel degenerative disc and joint disease which is mild and results in mild central canal and neural foraminal stenosis as outlined above.    Xray Cervical Spine 2/25/2021:  COMPARISON:  07/07/2017 .     FINDINGS:  The patient has undergone ACDF from C4 through C7. The instrumentation is intact without evidence of complication.  The vertebral bodies maintain normal height and alignment. The remaining intervertebral disc spaces are preserved.  No significant uncovertebral joint hypertrophy.  The prevertebral soft tissues, odontoid views and lung apices are normal. The neural foramen are patent     Impression:     Postsurgical changes to the thoracic spine without additional evidence for degenerative disc or  joint disease.     Allergies:   Review of patient's allergies indicates:   Allergen Reactions    Pcn [penicillins] Hives and Rash    Lipitor [atorvastatin] Other (See Comments)     Muscle cramps, weakness       Current Medications:   Current Outpatient Medications   Medication Sig Dispense Refill    acetaminophen (TYLENOL) 500 MG tablet Take 500 mg by mouth every 6 (six) hours as needed for Pain.      albuterol (PROVENTIL/VENTOLIN HFA) 90 mcg/actuation inhaler       aspirin (ECOTRIN) 81 MG EC tablet Take 81 mg by mouth once daily.      cetirizine (ZYRTEC) 10 MG tablet Take 10 mg by mouth nightly.  5    FOLIC ACID/MULTIVIT-MIN/LUTEIN (CENTRUM SILVER ORAL) Take by mouth once daily.      gabapentin (NEURONTIN) 300 MG capsule Take 2 capsules (600 mg total) by mouth 2 (two) times daily. 120 capsule 5    HYDROcodone-acetaminophen (NORCO) 7.5-325 mg per tablet Take 1 tablet by mouth every 12 (twelve) hours as needed for Pain. 60 tablet 0    levothyroxine (SYNTHROID) 50 MCG tablet Take 50 mcg by mouth once daily.      meclizine (ANTIVERT) 25 mg tablet Take 25 mg by mouth once daily.       meloxicam (MOBIC) 15 MG tablet Take 15 mg by mouth once daily.      metFORMIN (GLUCOPHAGE) 500 MG tablet Take 1 tablet by mouth 2 (two) times a day.      methocarbamoL (ROBAXIN) 500 MG Tab Take 1 tablet (500 mg total) by mouth 2 (two) times daily. 60 tablet 4    omeprazole (PRILOSEC) 20 MG capsule Take 1 capsule (20 mg total) by mouth once daily. 90 capsule 3    pravastatin (PRAVACHOL) 20 MG tablet       valsartan (DIOVAN) 80 MG tablet Take 40 mg by mouth once daily.   1     No current facility-administered medications for this visit.       REVIEW OF SYSTEMS:    GENERAL:  No weight loss, malaise or fevers.  HEENT:  Negative for frequent or significant headaches.  NECK:  Negative for lumps, goiter  and significant neck swelling.   RESPIRATORY:  Negative for cough, wheezing or shortness of breath.  CARDIOVASCULAR:  Negative  for chest pain, leg swelling or palpitations. HTN  GI:  Negative for abdominal discomfort, blood in stools or black stools or change in bowel habits.  MUSCULOSKELETAL:  See HPI.  SKIN:  Negative for lesions, rash, and itching.  PSYCH:  Negative for sleep disturbance, mood disorder and recent psychosocial stressors.  HEMATOLOGY/LYMPHOLOGY:  Negative for prolonged bleeding, bruising easily or swollen nodes.  NEURO:   No history of headaches, syncope, paralysis, seizures or tremors.   ENDO: Thyroid disorder.   All other reviewed and negative other than HPI.    Past Medical History:  Past Medical History:   Diagnosis Date    Arthritis     Cataract     Cervical back pain with evidence of disc disease     Hiatal hernia     Hypertension     Thyroid disease        Past Surgical History:  Past Surgical History:   Procedure Laterality Date    ACROMIOCLAVICULAR JOINT CYST EXCISION Right     BACK SURGERY      cataracts Bilateral     CERVICAL FUSION      COLONOSCOPY N/A 4/27/2018    Procedure: COLONOSCOPY;  Surgeon: Giovani Medeiros MD;  Location: [facility] ENDO ([address] [place] [address]);  Service: Endoscopy;  Laterality: N/A;    EPIDURAL STEROID INJECTION N/A 10/11/2019    Procedure: INJECTION, STEROID, EPIDURAL;  Surgeon: Kasandra Montoya MD;  Location: [facility];  Service: Pain Management;  Laterality: N/A;  Lumbar NGUYEN L4/5    NGUYEN      EYE SURGERY      INJECTION OF JOINT Bilateral 7/14/2022    Procedure: INJECTION, JOINT, SI BILATERAL  needs consent;  Surgeon: Alex Callahan MD;  [facility];  Service: Pain Management;  Laterality: Bilateral;    RADIOFREQUENCY ABLATION  10/2016    cervical spine/Jan    RADIOFREQUENCY ABLATION Left 5/3/2019    Procedure: RADIOFREQUENCY ABLATION, L2-L5 MEDIALBRANCH;  Surgeon: Kasandra Montoya MD;  Location[facility]IN [facility];  Service: Pain Management;  Laterality: Left;    RADIOFREQUENCY ABLATION Left 6/13/2019    Procedure: RADIOFREQUENCY ABLATION C4-7;  Surgeon: Kasandra PARRISH  MD Jan;  Location: [facility] PAIN MGT;  Service: Pain Management;  Laterality: Left;    RADIOFREQUENCY ABLATION Left 3/9/2021    Procedure: RADIOFREQUENCY ABLATION C4,C5,C6,C7;  Surgeon: Alex Callahan MD;  Location: [facility] PAIN [facility]GT;  Service: Pain Management;  Laterality: Left;  1 of 2    RADIOFREQUENCY ABLATION Right 3/26/2021    Procedure: RADIOFREQUENCY ABLATION C4,C6,C6,C7;  Surgeon: Alex Callahan MD;  Location: [facility] PAIN MGT;  Service: Pain Management;  Laterality: Right;  2 of 2    RADIOFREQUENCY ABLATION Right 4/23/2021    Procedure: RADIOFREQUENCY ABLATION L3,4,5;  Surgeon: Alex Callahan MD;  Location: [facility]IN [facility]GT;  Service: Pain Management;  Laterality: Right;  1 of 2    RADIOFREQUENCY ABLATION Left 5/7/2021    Procedure: RADIOFREQUENCY ABLATION L3,4,5;  Surgeon: Alex Callahan MD;  Location: [facility];  Service: Pain Management;  Laterality: Left;  2 of 2    RADIOFREQUENCY ABLATION Left 10/1/2021    Procedure: RADIOFREQUENCY ABLATION LEFT, C4,5,6,7 1 of 2 CONSENT NEEDED;  Surgeon: Alex Callahan MD;  Location: [facility];  Service: Pain Management;  Laterality: Left;    RADIOFREQUENCY ABLATION Right 10/15/2021    Procedure: RADIOFREQUENCY ABLATION  RIGHT C4,5,6,7 2 of 2 CONSENT NEEDED;  Surgeon: Alex Callahan MD;  Location: [facility];  Service: Pain Management;  Laterality: Right;    RADIOFREQUENCY ABLATION Left 2/25/2022    Procedure: RADIOFREQUENCY ABLATION LEFT L3,4,5 1 of 2, needs consent;  Surgeon: Alex Callahan MD;  Location: [facility];  Service: Pain Management;  Laterality: Left;    RADIOFREQUENCY ABLATION Right 3/11/2022    Procedure: RADIOFREQUENCY ABLATION RIGHT L3,4,5 2 of 2, needs consent;  Surgeon: Alex Callahan MD;  Location: [facility];  Service: Pain Management;  Laterality: Right;    RETINAL DETACHMENT SURGERY      ROTATOR CUFF REPAIR Right     SPINE SURGERY      cerival fusion     TRANSFORAMINAL EPIDURAL INJECTION OF STEROID Right  "4/14/2022    Procedure: INJECTION, STEROID, EPIDURAL, TRANSFORAMINAL APPROACH RIGHT L4/5 & L5/S1 Needs Consent;  Surgeon: Alex Callahan MD;  Location: [facility] PAIN MGT;  Service: Pain Management;  Laterality: Right;    TRIGGER POINT INJECTION Left 6/13/2019    Procedure: INJECTION, TRIGGER POINT;  Surgeon: Kasandra Montoya MD;  Location: [facility] PAIN MGT;  Service: Pain Management;  Laterality: Left;       Family History:  Family History   Problem Relation Age of Onset    Heart disease Mother     Cancer Father     Leukemia Brother     Colon cancer Neg Hx     Celiac disease Neg Hx     Crohn's disease Neg Hx     Esophageal cancer Neg Hx     Inflammatory bowel disease Neg Hx     Irritable bowel syndrome Neg Hx     Liver cancer Neg Hx     Rectal cancer Neg Hx     Stomach cancer Neg Hx     Ulcerative colitis Neg Hx        Social History:  Social History     Socioeconomic History    Marital status:    Tobacco Use    Smoking status: Never Smoker    Smokeless tobacco: Never Used   Substance and Sexual Activity    Alcohol use: No    Drug use: No       OBJECTIVE:    /75 (BP Location: Right arm, Patient Position: Sitting, BP Method: Large (Automatic))   Pulse (!) 57   Resp 18   Ht 6' 1" (1.854 m)   Wt 111.6 kg (246 lb)   BMI 32.46 kg/m²     PHYSICAL EXAMINATION:    General appearance: Well appearing, in no acute distress, alert and oriented x3.  Psych:  Mood and affect appropriate.  Skin: Skin color, texture, turgor normal, no rashes or lesions, in both upper and lower body.  Head/face:  Atraumatic, normocephalic. No palpable lymph nodes  Neck: There is pain to palpation over the cervical paraspinous and trapezius muscles bilaterally. Spurling Negative. Limited ROM with mild pain on extension.    Cor: RRR  Pulm: Symmetric chest rise, no respiratory distress noted.   Back: Straight leg raising in the sitting position is negative for radicular leg pain. There is mild pain with palpation over " lumbar paraspinals and facet joints bilaterally, R>L. Limited ROM with mild pain on extension. Positive facet loading bilaterally.   Extremities: No deformities, edema, or skin discoloration. Good capillary refill.  Musculoskeletal: Shoulder maneuvers are negative. There is mild pain with palpation over bilateral SI joints. FABERs is positive bilaterally. Bilateral upper and lower extremity strength is normal and symmetric. No atrophy or tone abnormalities are noted.  Neuro: Bilateral upper and lower extremity coordination and muscle stretch reflexes are physiologic and symmetric.  Plantar response are downgoing. No loss of sensation is noted.  Gait: Antalgic, ambulates with cane for assistance     ASSESSMENT: 57 y.o. year old male with neck and lumbar pain, consistent with      1. Chronic pain disorder     2. Sacroiliac joint pain     3. Lumbar spondylosis     4. DDD (degenerative disc disease), lumbar     5. Lumbar radiculopathy     6. S/P cervical spinal fusion     7. Cervical spondylosis without myelopathy     8. DDD (degenerative disc disease), cervical     9. Encounter for monitoring opioid maintenance therapy           PLAN:     - Previous imaging reviewed today.    - He is s/p bilateral SI joint injections with benefit. We can repeat this as needed.     - We can repeat right L4/5 and L5/S1 TF NGUYEN if his radicular pain returns.       - We can repeat lumbar RFA as needed.      - We can repeat cervical RFA as needed.     - I have stressed the importance of physical activity and a home exercise plan to help with pain and improve health.    - Continue Gabapentin.     - Continue Robaxin 500 mg PRN muscle pain.     - Pain contract signed 2/12/2021.     - Continue Norco 7.5/325 mg BID PRN. Refill provided.     - The patient is here today for a refill of current pain medications and they believe these provide effective pain control and improvements in quality of life.  The patient notes no serious side effects, and  feels the benefits outweigh the risks.  The patient was reminded of the pain contract that they signed previously as well as the risks and benefits of the medication including possible death.  The updated [place] Board  Pharmacy prescription monitoring program was reviewed, and the patient has been found to be compliant with current treatment plan.    - UDS from 5/9/2022 reviewed and consistent.     - Continue home exercise routine.     - RTC in 3 months or sooner if needed.     - Dr. Callahan was consulted on the patient and agrees with this plan.    The above plan and management options were discussed at length with patient. Patient is in agreement with the above and verbalized understanding.    Angelique Barahona NP  08/02/2022

## 2022-09-09 ENCOUNTER — TELEPHONE (OUTPATIENT)
Dept: PAIN MEDICINE | Facility: CLINIC | Age: 57
End: 2022-09-09
Payer: MEDICARE

## 2022-09-09 NOTE — TELEPHONE ENCOUNTER
Staff contacted [facility] and provided pharmacist with 's MARTA number

## 2022-09-09 NOTE — TELEPHONE ENCOUNTER
----- Message from Fatemeh Frederick sent at 9/9/2022 10:09 AM CDT -----  Name of Who is Calling:[facility]              What is the request in detail:Requesting a call back for MARTA number.              Can the clinic reply by [facility]:no              What Number to Call Back if not in [facility]:[phone]

## 2022-10-19 ENCOUNTER — TELEPHONE (OUTPATIENT)
Dept: PAIN MEDICINE | Facility: CLINIC | Age: 57
End: 2022-10-19
Payer: MEDICARE

## 2022-10-19 NOTE — TELEPHONE ENCOUNTER
Staff left pt a voicemail informing him that his appointment on 11/02/22 with Angelique Barahona NP will need to be rescheduled due to the provider being out of office. Staff also informed the pt that his appointment will be canceled and to contact office at [phone] regarding rescheduling.

## 2022-10-26 ENCOUNTER — TELEPHONE (OUTPATIENT)
Dept: PAIN MEDICINE | Facility: CLINIC | Age: 57
End: 2022-10-26
Payer: MEDICARE

## 2022-10-26 DIAGNOSIS — M47.816 LUMBAR SPONDYLOSIS: ICD-10-CM

## 2022-10-26 DIAGNOSIS — Z98.1 S/P CERVICAL SPINAL FUSION: ICD-10-CM

## 2022-10-26 DIAGNOSIS — M51.36 DDD (DEGENERATIVE DISC DISEASE), LUMBAR: ICD-10-CM

## 2022-10-26 DIAGNOSIS — M47.812 SPONDYLOSIS OF CERVICAL REGION WITHOUT MYELOPATHY OR RADICULOPATHY: ICD-10-CM

## 2022-10-26 DIAGNOSIS — M79.10 MYALGIA: ICD-10-CM

## 2022-10-26 DIAGNOSIS — G89.4 CHRONIC PAIN DISORDER: ICD-10-CM

## 2022-10-26 DIAGNOSIS — M47.816 FACET ARTHRITIS OF LUMBAR REGION: ICD-10-CM

## 2022-10-26 RX ORDER — HYDROCODONE BITARTRATE AND ACETAMINOPHEN 7.5; 325 MG/1; MG/1
1 TABLET ORAL EVERY 12 HOURS PRN
Qty: 60 TABLET | Refills: 0 | Status: CANCELLED | OUTPATIENT
Start: 2022-10-26

## 2022-10-26 NOTE — TELEPHONE ENCOUNTER
This message is for patient in regards to his/her appointment 10/27/22 at 2:00 pm   With GORAN Richards.      [facility] Policy: For the safety of all patients and staff members.   During this visit we're informing all patients and visitors to wear face mask at all time here at [facility].  If you have any questions or concerns please contact [phone]

## 2022-10-27 ENCOUNTER — OFFICE VISIT (OUTPATIENT)
Dept: PAIN MEDICINE | Facility: CLINIC | Age: 57
End: 2022-10-27
Payer: MEDICARE

## 2022-10-27 ENCOUNTER — PATIENT MESSAGE (OUTPATIENT)
Dept: PAIN MEDICINE | Facility: OTHER | Age: 57
End: 2022-10-27
Payer: MEDICARE

## 2022-10-27 VITALS
SYSTOLIC BLOOD PRESSURE: 136 MMHG | HEIGHT: 73 IN | TEMPERATURE: 97 F | WEIGHT: 249 LBS | HEART RATE: 74 BPM | RESPIRATION RATE: 18 BRPM | DIASTOLIC BLOOD PRESSURE: 86 MMHG | BODY MASS INDEX: 33 KG/M2

## 2022-10-27 DIAGNOSIS — Z51.81 ENCOUNTER FOR MONITORING OPIOID MAINTENANCE THERAPY: ICD-10-CM

## 2022-10-27 DIAGNOSIS — M50.30 DDD (DEGENERATIVE DISC DISEASE), CERVICAL: ICD-10-CM

## 2022-10-27 DIAGNOSIS — G89.4 CHRONIC PAIN DISORDER: ICD-10-CM

## 2022-10-27 DIAGNOSIS — M79.10 MYALGIA: ICD-10-CM

## 2022-10-27 DIAGNOSIS — M47.816 LUMBAR SPONDYLOSIS: ICD-10-CM

## 2022-10-27 DIAGNOSIS — M47.816 FACET ARTHRITIS OF LUMBAR REGION: ICD-10-CM

## 2022-10-27 DIAGNOSIS — M51.36 DDD (DEGENERATIVE DISC DISEASE), LUMBAR: ICD-10-CM

## 2022-10-27 DIAGNOSIS — Z98.1 S/P CERVICAL SPINAL FUSION: ICD-10-CM

## 2022-10-27 DIAGNOSIS — G89.4 CHRONIC PAIN DISORDER: Primary | ICD-10-CM

## 2022-10-27 DIAGNOSIS — M54.16 LUMBAR RADICULOPATHY: ICD-10-CM

## 2022-10-27 DIAGNOSIS — M47.812 CERVICAL SPONDYLOSIS WITHOUT MYELOPATHY: ICD-10-CM

## 2022-10-27 DIAGNOSIS — Z79.891 ENCOUNTER FOR MONITORING OPIOID MAINTENANCE THERAPY: ICD-10-CM

## 2022-10-27 DIAGNOSIS — M53.3 SACROILIAC JOINT PAIN: ICD-10-CM

## 2022-10-27 DIAGNOSIS — M47.812 SPONDYLOSIS OF CERVICAL REGION WITHOUT MYELOPATHY OR RADICULOPATHY: ICD-10-CM

## 2022-10-27 PROCEDURE — 99999 PR PBB SHADOW E&M-EST. PATIENT-LVL IV: CPT | Mod: PBBFAC,,, | Performed by: NURSE PRACTITIONER

## 2022-10-27 PROCEDURE — 4010F PR ACE/ARB THEARPY RXD/TAKEN: ICD-10-PCS | Mod: CPTII,S$GLB,, | Performed by: NURSE PRACTITIONER

## 2022-10-27 PROCEDURE — 99999 PR PBB SHADOW E&M-EST. PATIENT-LVL IV: ICD-10-PCS | Mod: PBBFAC,,, | Performed by: NURSE PRACTITIONER

## 2022-10-27 PROCEDURE — 4010F ACE/ARB THERAPY RXD/TAKEN: CPT | Mod: CPTII,S$GLB,, | Performed by: NURSE PRACTITIONER

## 2022-10-27 PROCEDURE — 1159F MED LIST DOCD IN RCRD: CPT | Mod: CPTII,S$GLB,, | Performed by: NURSE PRACTITIONER

## 2022-10-27 PROCEDURE — 1160F PR REVIEW ALL MEDS BY PRESCRIBER/CLIN PHARMACIST DOCUMENTED: ICD-10-PCS | Mod: CPTII,S$GLB,, | Performed by: NURSE PRACTITIONER

## 2022-10-27 PROCEDURE — 3079F DIAST BP 80-89 MM HG: CPT | Mod: CPTII,S$GLB,, | Performed by: NURSE PRACTITIONER

## 2022-10-27 PROCEDURE — 99214 PR OFFICE/OUTPT VISIT, EST, LEVL IV, 30-39 MIN: ICD-10-PCS | Mod: S$GLB,,, | Performed by: NURSE PRACTITIONER

## 2022-10-27 PROCEDURE — 1159F PR MEDICATION LIST DOCUMENTED IN MEDICAL RECORD: ICD-10-PCS | Mod: CPTII,S$GLB,, | Performed by: NURSE PRACTITIONER

## 2022-10-27 PROCEDURE — 99214 OFFICE O/P EST MOD 30 MIN: CPT | Mod: S$GLB,,, | Performed by: NURSE PRACTITIONER

## 2022-10-27 PROCEDURE — 3079F PR MOST RECENT DIASTOLIC BLOOD PRESSURE 80-89 MM HG: ICD-10-PCS | Mod: CPTII,S$GLB,, | Performed by: NURSE PRACTITIONER

## 2022-10-27 PROCEDURE — 3075F PR MOST RECENT SYSTOLIC BLOOD PRESS GE 130-139MM HG: ICD-10-PCS | Mod: CPTII,S$GLB,, | Performed by: NURSE PRACTITIONER

## 2022-10-27 PROCEDURE — 3075F SYST BP GE 130 - 139MM HG: CPT | Mod: CPTII,S$GLB,, | Performed by: NURSE PRACTITIONER

## 2022-10-27 PROCEDURE — 1160F RVW MEDS BY RX/DR IN RCRD: CPT | Mod: CPTII,S$GLB,, | Performed by: NURSE PRACTITIONER

## 2022-10-27 RX ORDER — ROSUVASTATIN CALCIUM 20 MG/1
20 TABLET, COATED ORAL DAILY
COMMUNITY

## 2022-10-27 RX ORDER — HYDROCODONE BITARTRATE AND ACETAMINOPHEN 7.5; 325 MG/1; MG/1
1 TABLET ORAL EVERY 12 HOURS PRN
Qty: 60 TABLET | Refills: 0 | Status: SHIPPED | OUTPATIENT
Start: 2022-10-27 | End: 2023-03-21 | Stop reason: SDUPTHER

## 2022-10-27 NOTE — PROGRESS NOTES
Chronic patient Established Note (Follow up visit)      Interval History 10/27/2022:  The patient returns to clinic today for follow up of neck and back pain. He reports increased low back and bilateral buttock pain. He denies any radicular leg pain. His pain is worse with prolonged sitting and moving from sitting to standing. He does endorse a fall recently while getting out of the vehicle onto his side. He continues to report neck pain. He continues to take Gabapentin and Robaxin. He continues to take Norco sparingly with benefit and without adverse effects. He denies any other health changes. His pain today is 6/10.    Interval History 8/2/2022:  The aptient returns to clinic today for follow up of neck and back pain. He is s/p bilateral SI joint injection on 7/14/2022. He reports 50-60% relief of his low back and buttock pain. He reports intermittent low back and buttock pain. He reports increased neck pain, left side greater than right. He denies any radicular arm pain. He has good days and bad days. He continues to take Gabapentin. He continues to take Norco sparingly with benefit and without adverse effects. He denies any other health changes. His pain today is 5/10.    Interval History 7/1/2022:  The patient returns to clinic today for follow up of neck and back pain. He reports increased low back and buttock pain.His pain is worse with prolonged sitting and moving from sitting to standing. He also reports increased pain with bending over. He does report intermittent radiating pain into his right posterior thigh stopping at mid thigh. He does report a fall, which was sitting hard on the ground about a week ago. He denies any acute injury. He continues to take Gabapentin and Norco. He denies any other health changes. His pain today is 6/10.     Interval History 5/9/2022:  The patient returns to clinic today for follow up of back pain. He is s/p right L4/5 and L5/S1 TF NGUYEN on 4/14/2022. He reports 50-60%  "relief of his low back and leg pain. He has been more active since the procedure. He continues to report low back and hip pain, worse with bending and activity. He denies any radicular leg pain. He continues to repot intermittent neck pain. He continues to take Gabapentin with benefit. He continues to take Norco sparingly as needed. He denies any other health changes. His pain today is 5/10.    Interval History 4/1/2022:  The patient returns to clinic today for follow up of back pain. He is s/p left L3,4,5 RFA on 2/25/2022 and right L3,4,5 RFA on 3/11/2022. He reports relief of his back pain. He reports increased back pain that radiates into the posterior aspect of his right leg to his knee. He denies any left leg pain. His pain is worse with prolonged walking. He reports that he sometimes drags his right leg. He denies any falls. He denies any bowel or bladder incontinence. He continues to take Gabapentin and Norco as needed with benefit. He denies any adverse effects. He denies any other health changes. His pain today is 7/10.    Interval History 2/15/2022:  The patient returns to clinic today for follow up of neck and back pain. He reports increased low back pain over the last two weeks. He describes this pain as sharp and aching in nature. He does report intermittent "catching" pain in his lower back, worse with getting out of bed. He denies any radicular leg pain. He continues to report benefit from previous cervical procedures. He reports intermittent neck pain. He continues to take Gabapentin with benefit. He continues to take Norco as needed with benefit and without adverse effects. He denies any other health changes. His pain today is 6/10.    Interval History 11/5/2021:  The patient returns to clinic today for follow up of neck and back pain. He is s/p left C4,5,6,7 RFA on 10/1/2021 and right C4,5,6,7 RFA on 10/15/2021. He reports 50% relief of his neck pain. He reports increased burning and itching pain " to his skin near injection sites. He denies any radicular arm pain. He does report increased left sided muscle pain. He is concerned that one of the screws in his cervical hardware is moving. He continues to report benefit with previous lumbar procedures. He reports intermittent low back pain without radicular leg pain. He continues to take Gabapentin with benefit. He continues to take Norco as needed with benefit and without adverse effects. He denies any weakness. He denies any other health changes. His pain today is 5/10.    Interval History 9/17/2021:  The patient returns to clinic today for follow up of neck and back pain. He reports increased neck pain. He denies any radicular arm pain today. He does report intermittent radiating pain into his left shoulder blade and mid back. His pain is worse with activity. He reports that sometimes his pain takes his breath away. He continues to report low back pain, that is tolerable at this time. He continues to take Gabapentin and Norco with benefit and without adverse effects. He denies any other health changes. His pain today is 6/10.    Interval History 6/17/2021:  The patient returns to clinic today for follow up of neck and back pain. He is s/p right L3,4,5 RFA on 4/23/2021 and left L3,4,5 RFA on 5/7/2021. He reports 50% relief of his low back pain. He continues to report intermittent low back pain. He denies any radicular leg pain. He continues to report neck pain, especially in between the scapula. His pain is worse with prolonged standing, walking, and activity. He continues to take Gabapentin with benefit. He continues to take Norco sparingly for severe pain with benefit and without adverse effects. He denies any other health changes. His pain today is 4/10.    Interval History 4/9/2021:  The patient returns to clinic today for follow up of neck and back pain. He is s/p left C4,5,6,7 RFA on 3/9/2021 and right C4,5,6,7 RFA on 3/26/2021. He reports 50% relief of  his neck pain. He reports intermittent neck pain. He has good days and bad days. He continues to report low back pain that is constant, sharp, and aching. He reports intermittent radiating pain into the lateral aspect of his left leg to his knee. He continues to take Gabapentin with benefit. He continues to take Norco as needed sparingly for severe pain. He denies any adverse effects. He denies any other health changes. His pain today is 5/10.    Interval History 3/2/2021:  Sal Navarro presents to the clinic for a follow-up appointment for neck pain . Since the last visit, Sal Navarro states the pain has been worsening. Current pain intensity is 6/10.  Was seen by Angelique Barahona NP on 2/12/2021.     Interval History 2/12/2021:  The patient returns to clinic today for follow up of back pain. He has not been seen in over a year. He did not have imaging due to coronavirus outbreak. He would to reschedule this. He was also considering SCS trial prior to the pandemic. He continues to report low back pain that radiates into the lateral aspect of his left leg to his knee. He denies any radicular right leg pain. His pain is worse with bending and walking. He continues to take Gabapentin with benefit. He also takes Norco as needed for severe pain. He denies any adverse effects. He denies any other health changes. His pain today is 6/10.    HPI:  Sal Navarro is a 56 y.o. male who presents today s/p C4-7 ACDF with C5/6 PSIF in 2/2016 with residual chronic midline neck pain that radiates into his shoulder blades bilaterally, R>>L.  This is associated with bilateral hand numbness and tingling.  The hand symptoms did improve following the surgery.  The shooting pains in his arms resolved completely.   This pain is described in detail below.  His post-operative course was complicated by dysphagia that is being treated with speech therapy.  The numbness and the tingling were relieved by the surgery. Now  "experiences "deep pain along the spine"  Patient has not been seen for over a year, had to re-schedule his imaging studies due to pandemic(now completed). Prior to the pandemic, he was considering a SCS.   Only time he gets relief is laying in bed on a very thin pillow, but that doesn't last very long.   Pain aggravated by movement and even breathing/playing with his grandchild/holding the grandchild's hand. Heat and massage (has a vest) are helpful.   Was seen by Dr. Jan srivastava and received multiple EIS and RFAs.   Compliant with his medication.     Pain Disability Index Review:  Last 3 PDI Scores 10/27/2022 8/2/2022 7/1/2022   Pain Disability Index (PDI) 48 25 40       Pain Medications:  Gabapentin  Norco sparingly  Robaxin    Opioid Contract: yes     report:  Reviewed and consistent with medication use as prescribed.    Pain Procedures:   4/14/2022- Right L4/5 and L5/S1 TF NGUYEN  3/11/2022- Right L3,4,5 RFA  2/25/2022- Left L3,4,5 RFA  10/15/2021- Right C4,5,6,7 RFA  10/1/2021- Left C4,5,6,7 RFA  5/7/2021- Left L3,4,5 RFA   4/23/2021- Right L3,4,5 RFA  3/26/2021- Right C4,5,6,7 RFA  3/9/2021- Left C4,5,6,7 RFA  10/11/19: L4/5 Interlaminar Epidural Steroid Injection  06/13/19- Left C4-7 RFA  05/03/19:Left L2-5 Lumbar Medial Branch Nerve Thermal Radiofrequency Ablation  12/21/18:Right L2-5 Lumbar Medial Branch Nerve Thermal Radiofrequency Ablation  11/23/18:Cervical Thermal Radiofreqiency Ablation: Right C4-7  09/14/18:C7/[id number] Steroid Injection  06/29/18:LL2/3 Interlaminar Epidural Steroid Injection   03/06/18:L2/3 Interlaminar Epidural Steroid Injection   09/26/17:Bilateral L2-5 Lumbar Medial Branch Nerve Coolief Thermal Radiofrequency Ablation  08/31/17:Bilateral L2-5 Lumbar medial branch blocks   03/28/17:Cervical Conventional Radiofreqiency Ablation: Left C4-7  03/14/17:Cervical Conventional Radiofreqiency Ablation: Right C4-7  10/25/16:Cervical Conventional Radiofreqiency Ablation: Left " C4-7  10/11/16: Cervical Conventional Radiofreqiency Ablation: Right C4-7  10/04/16- Cervical Medial Branch Blocks, Bilateral C4-7.     08/31/16:C7/[id number] Steroid Injection    Physical Therapy/Home Exercise: does home exercises now, but not in formal PT.    - 2019 was last time in PT     Imaging:   MRI Lumbar Spine 2/25/2021:  COMPARISON:  07/11/2017     FINDINGS:  The vertebral bodies maintain normal height, signal intensity and alignment.  There is redemonstration of few hemangiomas at multiple levels.  The intervertebral disc spaces are preserved although there is some disc desiccation at multiple levels.  The conus terminates at level L1.  Evaluation of the localizer images and structures surrounding the lumbar spine shows no abnormalities.     L1-L2: No significant disc or joint pathology.     L2-L3: Mild diffuse disc bulge with mild bilateral facet arthropathy and ligamentum flavum hypertrophy results in mild central canal stenosis.  Mild bilateral neural foraminal stenosis is also identified.     L3-L4: There is a diffuse disc bulge with no significant facet arthropathy or ligamentum flavum hypertrophy.  There is mild central canal stenosis and mild bilateral neural foraminal stenosis.     L4-L5: There is a diffuse disc bulge with ligamentum flavum hypertrophy.  No significant facet arthropathy.  There is mild central canal stenosis with only minimal encroachment on the left neural foramen.  Mild right neural foraminal stenosis is present.     L5-S1: There is a diffuse disc bulge with mild to moderate right facet arthropathy.  No left facet arthropathy.  The ligamentum flavum is normal.  The central canal is patent and the neural foramina are largely patent.  Incidental note is made of an annulus fibrosus tear posteriorly measuring approximately 2 mm.     Impression:     Multilevel degenerative disc and joint disease which is mild and results in mild central canal and neural foraminal stenosis as outlined  above.    Xray Cervical Spine 2/25/2021:  COMPARISON:  07/07/2017 .     FINDINGS:  The patient has undergone ACDF from C4 through C7. The instrumentation is intact without evidence of complication.  The vertebral bodies maintain normal height and alignment. The remaining intervertebral disc spaces are preserved.  No significant uncovertebral joint hypertrophy.  The prevertebral soft tissues, odontoid views and lung apices are normal. The neural foramen are patent     Impression:     Postsurgical changes to the thoracic spine without additional evidence for degenerative disc or joint disease.     Allergies:   Review of patient's allergies indicates:   Allergen Reactions    Pcn [penicillins] Hives and Rash    Lipitor [atorvastatin] Other (See Comments)     Muscle cramps, weakness       Current Medications:   Current Outpatient Medications   Medication Sig Dispense Refill    acetaminophen (TYLENOL) 500 MG tablet Take 500 mg by mouth every 6 (six) hours as needed for Pain.      albuterol (PROVENTIL/VENTOLIN HFA) 90 mcg/actuation inhaler       aspirin (ECOTRIN) 81 MG EC tablet Take 81 mg by mouth once daily.      cetirizine (ZYRTEC) 10 MG tablet Take 10 mg by mouth nightly.  5    FOLIC ACID/MULTIVIT-MIN/LUTEIN (CENTRUM SILVER ORAL) Take by mouth once daily.      gabapentin (NEURONTIN) 300 MG capsule Take 2 capsules (600 mg total) by mouth 2 (two) times daily. 120 capsule 5    HYDROcodone-acetaminophen (NORCO) 7.5-325 mg per tablet Take 1 tablet by mouth every 12 (twelve) hours as needed for Pain. 60 tablet 0    levothyroxine (SYNTHROID) 50 MCG tablet Take 50 mcg by mouth once daily.      meclizine (ANTIVERT) 25 mg tablet Take 25 mg by mouth once daily.       meloxicam (MOBIC) 15 MG tablet Take 15 mg by mouth once daily.      metFORMIN (GLUCOPHAGE) 500 MG tablet Take 1 tablet by mouth 2 (two) times a day.      methocarbamoL (ROBAXIN) 500 MG Tab Take 1 tablet (500 mg total) by mouth 2 (two) times daily. 60 tablet 4     omeprazole (PRILOSEC) 20 MG capsule Take 1 capsule (20 mg total) by mouth once daily. 90 capsule 3    rosuvastatin (CRESTOR) 20 MG tablet Take 20 mg by mouth once daily.      valsartan (DIOVAN) 80 MG tablet Take 40 mg by mouth once daily.   1    pravastatin (PRAVACHOL) 20 MG tablet        No current facility-administered medications for this visit.       REVIEW OF SYSTEMS:    GENERAL:  No weight loss, malaise or fevers.  HEENT:  Negative for frequent or significant headaches.  NECK:  Negative for lumps, goiter  and significant neck swelling.   RESPIRATORY:  Negative for cough, wheezing or shortness of breath.  CARDIOVASCULAR:  Negative for chest pain, leg swelling or palpitations. HTN  GI:  Negative for abdominal discomfort, blood in stools or black stools or change in bowel habits.  MUSCULOSKELETAL:  See HPI.  SKIN:  Negative for lesions, rash, and itching.  PSYCH:  Negative for sleep disturbance, mood disorder and recent psychosocial stressors.  HEMATOLOGY/LYMPHOLOGY:  Negative for prolonged bleeding, bruising easily or swollen nodes.  NEURO:   No history of headaches, syncope, paralysis, seizures or tremors.   ENDO: Thyroid disorder.   All other reviewed and negative other than HPI.    Past Medical History:  Past Medical History:   Diagnosis Date    Arthritis     Cataract     Cervical back pain with evidence of disc disease     Hiatal hernia     Hypertension     Thyroid disease        Past Surgical History:  Past Surgical History:   Procedure Laterality Date    ACROMIOCLAVICULAR JOINT CYST EXCISION Right     BACK SURGERY      cataracts Bilateral     CERVICAL FUSION      COLONOSCOPY N/A 4/27/2018    Procedure: COLONOSCOPY;  Surgeon: Giovani Medeiros MD;  Location: [facility] ENDO ([address] [place] [address]);  Service: Endoscopy;  Laterality: N/A;    EPIDURAL STEROID INJECTION N/A 10/11/2019    Procedure: INJECTION, STEROID, EPIDURAL;  Surgeon: Kasandra Montoya MD;  Location: [facility] PAIN MGT;  Service: Pain Management;  Laterality: N/A;   Lumbar NGUYEN L4/5    NGUYEN      EYE SURGERY      INJECTION OF JOINT Bilateral 7/14/2022    Procedure: INJECTION, JOINT, SI BILATERAL  needs consent;  Surgeon: Alex Callahan MD;  Location: [facility] PAIN [facility]GT;  Service: Pain Management;  Laterality: Bilateral;    RADIOFREQUENCY ABLATION  10/2016    cervical spine/Montoya    RADIOFREQUENCY ABLATION Left 5/3/2019    Procedure: RADIOFREQUENCY ABLATION, L2-L5 MEDIALBRANCH;  Surgeon: Kasandra Montoya MD;  Location: [facility]IN [facility]GT;  Service: Pain Management;  Laterality: Left;    RADIOFREQUENCY ABLATION Left 6/13/2019    Procedure: RADIOFREQUENCY ABLATION C4-7;  Surgeon: Kasandra Montoya MD;  Location: [facility];  Service: Pain Management;  Laterality: Left;    RADIOFREQUENCY ABLATION Left 3/9/2021    Procedure: RADIOFREQUENCY ABLATION C4,C5,C6,C7;  Surgeon: Alex Callahan MD;  Location: [facility];  Service: Pain Management;  Laterality: Left;  1 of 2    RADIOFREQUENCY ABLATION Right 3/26/2021    Procedure: RADIOFREQUENCY ABLATION C4,C6,C6,C7;  Surgeon: Alex Callahan MD;  Location: [facility] PAIN MGT;  Service: Pain Management;  Laterality: Right;  2 of 2    RADIOFREQUENCY ABLATION Right 4/23/2021    Procedure: RADIOFREQUENCY ABLATION L3,4,5;  Surgeon: Alex Callahan MD;  Location: [facility] PAIN MGT;  Service: Pain Management;  Laterality: Right;  1 of 2    RADIOFREQUENCY ABLATION Left 5/7/2021    Procedure: RADIOFREQUENCY ABLATION L3,4,5;  Surgeon: Alex Callahan MD;  Location: [facility] PAIN MGT;  Service: Pain Management;  Laterality: Left;  2 of 2    RADIOFREQUENCY ABLATION Left 10/1/2021    Procedure: RADIOFREQUENCY ABLATION LEFT, C4,5,6,7 1 of 2 CONSENT NEEDED;  Surgeon: Alex Callahan MD;  Location: [facility];  Service: Pain Management;  Laterality: Left;    RADIOFREQUENCY ABLATION Right 10/15/2021    Procedure: RADIOFREQUENCY ABLATION  RIGHT C4,5,6,7 2 of 2 CONSENT NEEDED;  Surgeon: Alex Callahan MD;  Location: [facility];  Service: Pain Management;   "Laterality: Right;    RADIOFREQUENCY ABLATION Left 2/25/2022    Procedure: RADIOFREQUENCY ABLATION LEFT L3,4,5 1 of 2, needs consent;  Surgeon: Alex Callahan MD;  Location: [facility];  Service: Pain Management;  Laterality: Left;    RADIOFREQUENCY ABLATION Right 3/11/2022    Procedure: RADIOFREQUENCY ABLATION RIGHT L3,4,5 2 of 2, needs consent;  Surgeon: Alex Callahan MD;  Location: [facility];  Service: Pain Management;  Laterality: Right;    RETINAL DETACHMENT SURGERY      ROTATOR CUFF REPAIR Right     SPINE SURGERY      cerival fusion     TRANSFORAMINAL EPIDURAL INJECTION OF STEROID Right 4/14/2022    Procedure: INJECTION, STEROID, EPIDURAL, TRANSFORAMINAL APPROACH RIGHT L4/5 & L5/S1 Needs Consent;  Surgeon: Alex Callahan MD;  Location: [facility];  Service: Pain Management;  Laterality: Right;    TRIGGER POINT INJECTION Left 6/13/2019    Procedure: INJECTION, TRIGGER POINT;  Surgeon: Kasandra Montoya MD;  Location: [facility] PAIN MGT;  Service: Pain Management;  Laterality: Left;       Family History:  Family History   Problem Relation Age of Onset    Heart disease Mother     Cancer Father     Leukemia Brother     Colon cancer Neg Hx     Celiac disease Neg Hx     Crohn's disease Neg Hx     Esophageal cancer Neg Hx     Inflammatory bowel disease Neg Hx     Irritable bowel syndrome Neg Hx     Liver cancer Neg Hx     Rectal cancer Neg Hx     Stomach cancer Neg Hx     Ulcerative colitis Neg Hx        Social History:  Social History     Socioeconomic History    Marital status:    Tobacco Use    Smoking status: Never    Smokeless tobacco: Never   Substance and Sexual Activity    Alcohol use: No    Drug use: No       OBJECTIVE:    /86   Pulse 74   Temp 97 °F (36.1 °C) (Oral)   Resp 18   Ht 6' 1" (1.854 m)   Wt 112.9 kg (249 lb)   BMI 32.85 kg/m²     PHYSICAL EXAMINATION:    General appearance: Well appearing, in no acute distress, alert and oriented x3.  Psych:  Mood and affect " appropriate.  Skin: Skin color, texture, turgor normal, no rashes or lesions, in both upper and lower body.  Head/face:  Atraumatic, normocephalic. No palpable lymph nodes  Neck: There is pain to palpation over the cervical paraspinous and trapezius muscles bilaterally. Spurling Negative. Limited ROM with mild pain on extension.    Cor: RRR  Pulm: Symmetric chest rise, no respiratory distress noted.   Back: Straight leg raising in the sitting position is negative for radicular leg pain. There is mild pain with palpation over lumbar paraspinals and facet joints bilaterally. Limited ROM with pain on extension. Positive facet loading bilaterally.   Extremities: No deformities, edema, or skin discoloration. Good capillary refill.  Musculoskeletal: Shoulder maneuvers are negative. There is pain with palpation over bilateral SI joints. FABERs is positive bilaterally. Bilateral upper and lower extremity strength is normal and symmetric. No atrophy or tone abnormalities are noted.  Neuro: Bilateral upper and lower extremity coordination and muscle stretch reflexes are physiologic and symmetric.  Plantar response are downgoing. No loss of sensation is noted.  Gait: Antalgic, ambulates with cane for assistance     ASSESSMENT: 57 y.o. year old male with neck and lumbar pain, consistent with      1. Chronic pain disorder        2. Sacroiliac joint pain  Procedure Order to Pain Management      3. Lumbar spondylosis        4. Lumbar radiculopathy        5. DDD (degenerative disc disease), lumbar        6. S/P cervical spinal fusion        7. Cervical spondylosis without myelopathy        8. DDD (degenerative disc disease), cervical                PLAN:     - Previous imaging reviewed today.    - Schedule for bilateral SI joint injections.     - Consider repeat lumbar RFA in the future.     - We can repeat right L4/5 and L5/S1 TF NGUYEN if his radicular pain returns.       - We can repeat lumbar RFA as needed.      - We can repeat  cervical RFA as needed.     - I have stressed the importance of physical activity and a home exercise plan to help with pain and improve health.    - Continue Gabapentin.     - Continue Robaxin 500 mg PRN muscle pain.     - Pain contract signed 2/12/2021.     - Continue Norco 7.5/325 mg BID PRN. Refill provided today.     - The patient is here today for a refill of current pain medications and they believe these provide effective pain control and improvements in quality of life.  The patient notes no serious side effects, and feels the benefits outweigh the risks.  The patient was reminded of the pain contract that they signed previously as well as the risks and benefits of the medication including possible death.  The updated [place] Board  Pharmacy prescription monitoring program was reviewed, and the patient has been found to be compliant with current treatment plan. Medication management provided by Dr. Callahan.     - UDS from 5/9/2022 reviewed and consistent.     - Continue home exercise routine.     - RTC 2 weeks after above procedure.     The above plan and management options were discussed at length with patient. Patient is in agreement with the above and verbalized understanding.    Angelique Barahona NP  10/27/2022

## 2022-10-27 NOTE — TELEPHONE ENCOUNTER
Staff spoke with the pharmacy and medication has already been picked up on 10/26/22 the hydrocodone 7.5 mg.

## 2022-10-27 NOTE — H&P (VIEW-ONLY)
Chronic patient Established Note (Follow up visit)      Interval History 10/27/2022:  The patient returns to clinic today for follow up of neck and back pain. He reports increased low back and bilateral buttock pain. He denies any radicular leg pain. His pain is worse with prolonged sitting and moving from sitting to standing. He does endorse a fall recently while getting out of the vehicle onto his side. He continues to report neck pain. He continues to take Gabapentin and Robaxin. He continues to take Norco sparingly with benefit and without adverse effects. He denies any other health changes. His pain today is 6/10.    Interval History 8/2/2022:  The aptient returns to clinic today for follow up of neck and back pain. He is s/p bilateral SI joint injection on 7/14/2022. He reports 50-60% relief of his low back and buttock pain. He reports intermittent low back and buttock pain. He reports increased neck pain, left side greater than right. He denies any radicular arm pain. He has good days and bad days. He continues to take Gabapentin. He continues to take Norco sparingly with benefit and without adverse effects. He denies any other health changes. His pain today is 5/10.    Interval History 7/1/2022:  The patient returns to clinic today for follow up of neck and back pain. He reports increased low back and buttock pain.His pain is worse with prolonged sitting and moving from sitting to standing. He also reports increased pain with bending over. He does report intermittent radiating pain into his right posterior thigh stopping at mid thigh. He does report a fall, which was sitting hard on the ground about a week ago. He denies any acute injury. He continues to take Gabapentin and Norco. He denies any other health changes. His pain today is 6/10.     Interval History 5/9/2022:  The patient returns to clinic today for follow up of back pain. He is s/p right L4/5 and L5/S1 TF NGUYEN on 4/14/2022. He reports 50-60%  "relief of his low back and leg pain. He has been more active since the procedure. He continues to report low back and hip pain, worse with bending and activity. He denies any radicular leg pain. He continues to repot intermittent neck pain. He continues to take Gabapentin with benefit. He continues to take Norco sparingly as needed. He denies any other health changes. His pain today is 5/10.    Interval History 4/1/2022:  The patient returns to clinic today for follow up of back pain. He is s/p left L3,4,5 RFA on 2/25/2022 and right L3,4,5 RFA on 3/11/2022. He reports relief of his back pain. He reports increased back pain that radiates into the posterior aspect of his right leg to his knee. He denies any left leg pain. His pain is worse with prolonged walking. He reports that he sometimes drags his right leg. He denies any falls. He denies any bowel or bladder incontinence. He continues to take Gabapentin and Norco as needed with benefit. He denies any adverse effects. He denies any other health changes. His pain today is 7/10.    Interval History 2/15/2022:  The patient returns to clinic today for follow up of neck and back pain. He reports increased low back pain over the last two weeks. He describes this pain as sharp and aching in nature. He does report intermittent "catching" pain in his lower back, worse with getting out of bed. He denies any radicular leg pain. He continues to report benefit from previous cervical procedures. He reports intermittent neck pain. He continues to take Gabapentin with benefit. He continues to take Norco as needed with benefit and without adverse effects. He denies any other health changes. His pain today is 6/10.    Interval History 11/5/2021:  The patient returns to clinic today for follow up of neck and back pain. He is s/p left C4,5,6,7 RFA on 10/1/2021 and right C4,5,6,7 RFA on 10/15/2021. He reports 50% relief of his neck pain. He reports increased burning and itching pain " to his skin near injection sites. He denies any radicular arm pain. He does report increased left sided muscle pain. He is concerned that one of the screws in his cervical hardware is moving. He continues to report benefit with previous lumbar procedures. He reports intermittent low back pain without radicular leg pain. He continues to take Gabapentin with benefit. He continues to take Norco as needed with benefit and without adverse effects. He denies any weakness. He denies any other health changes. His pain today is 5/10.    Interval History 9/17/2021:  The patient returns to clinic today for follow up of neck and back pain. He reports increased neck pain. He denies any radicular arm pain today. He does report intermittent radiating pain into his left shoulder blade and mid back. His pain is worse with activity. He reports that sometimes his pain takes his breath away. He continues to report low back pain, that is tolerable at this time. He continues to take Gabapentin and Norco with benefit and without adverse effects. He denies any other health changes. His pain today is 6/10.    Interval History 6/17/2021:  The patient returns to clinic today for follow up of neck and back pain. He is s/p right L3,4,5 RFA on 4/23/2021 and left L3,4,5 RFA on 5/7/2021. He reports 50% relief of his low back pain. He continues to report intermittent low back pain. He denies any radicular leg pain. He continues to report neck pain, especially in between the scapula. His pain is worse with prolonged standing, walking, and activity. He continues to take Gabapentin with benefit. He continues to take Norco sparingly for severe pain with benefit and without adverse effects. He denies any other health changes. His pain today is 4/10.    Interval History 4/9/2021:  The patient returns to clinic today for follow up of neck and back pain. He is s/p left C4,5,6,7 RFA on 3/9/2021 and right C4,5,6,7 RFA on 3/26/2021. He reports 50% relief of  his neck pain. He reports intermittent neck pain. He has good days and bad days. He continues to report low back pain that is constant, sharp, and aching. He reports intermittent radiating pain into the lateral aspect of his left leg to his knee. He continues to take Gabapentin with benefit. He continues to take Norco as needed sparingly for severe pain. He denies any adverse effects. He denies any other health changes. His pain today is 5/10.    Interval History 3/2/2021:  Sal Navarro presents to the clinic for a follow-up appointment for neck pain . Since the last visit, Sal Navarro states the pain has been worsening. Current pain intensity is 6/10.  Was seen by Angelique Barahona NP on 2/12/2021.     Interval History 2/12/2021:  The patient returns to clinic today for follow up of back pain. He has not been seen in over a year. He did not have imaging due to coronavirus outbreak. He would to reschedule this. He was also considering SCS trial prior to the pandemic. He continues to report low back pain that radiates into the lateral aspect of his left leg to his knee. He denies any radicular right leg pain. His pain is worse with bending and walking. He continues to take Gabapentin with benefit. He also takes Norco as needed for severe pain. He denies any adverse effects. He denies any other health changes. His pain today is 6/10.    HPI:  Sal Navarro is a 56 y.o. male who presents today s/p C4-7 ACDF with C5/6 PSIF in 2/2016 with residual chronic midline neck pain that radiates into his shoulder blades bilaterally, R>>L.  This is associated with bilateral hand numbness and tingling.  The hand symptoms did improve following the surgery.  The shooting pains in his arms resolved completely.   This pain is described in detail below.  His post-operative course was complicated by dysphagia that is being treated with speech therapy.  The numbness and the tingling were relieved by the surgery. Now  "experiences "deep pain along the spine"  Patient has not been seen for over a year, had to re-schedule his imaging studies due to pandemic(now completed). Prior to the pandemic, he was considering a SCS.   Only time he gets relief is laying in bed on a very thin pillow, but that doesn't last very long.   Pain aggravated by movement and even breathing/playing with his grandchild/holding the grandchild's hand. Heat and massage (has a vest) are helpful.   Was seen by Dr. Jan srivastava and received multiple EIS and RFAs.   Compliant with his medication.     Pain Disability Index Review:  Last 3 PDI Scores 10/27/2022 8/2/2022 7/1/2022   Pain Disability Index (PDI) 48 25 40       Pain Medications:  Gabapentin  Norco sparingly  Robaxin    Opioid Contract: yes     report:  Reviewed and consistent with medication use as prescribed.    Pain Procedures:   4/14/2022- Right L4/5 and L5/S1 TF NGUYEN  3/11/2022- Right L3,4,5 RFA  2/25/2022- Left L3,4,5 RFA  10/15/2021- Right C4,5,6,7 RFA  10/1/2021- Left C4,5,6,7 RFA  5/7/2021- Left L3,4,5 RFA   4/23/2021- Right L3,4,5 RFA  3/26/2021- Right C4,5,6,7 RFA  3/9/2021- Left C4,5,6,7 RFA  10/11/19: L4/5 Interlaminar Epidural Steroid Injection  06/13/19- Left C4-7 RFA  05/03/19:Left L2-5 Lumbar Medial Branch Nerve Thermal Radiofrequency Ablation  12/21/18:Right L2-5 Lumbar Medial Branch Nerve Thermal Radiofrequency Ablation  11/23/18:Cervical Thermal Radiofreqiency Ablation: Right C4-7  09/14/18:C7/L9Zyeklfrd Steroid Injection  06/29/18:LL2/3 Interlaminar Epidural Steroid Injection   03/06/18:L2/3 Interlaminar Epidural Steroid Injection   09/26/17:Bilateral L2-5 Lumbar Medial Branch Nerve Coolief Thermal Radiofrequency Ablation  08/31/17:Bilateral L2-5 Lumbar medial branch blocks   03/28/17:Cervical Conventional Radiofreqiency Ablation: Left C4-7  03/14/17:Cervical Conventional Radiofreqiency Ablation: Right C4-7  10/25/16:Cervical Conventional Radiofreqiency Ablation: Left " C4-7  10/11/16: Cervical Conventional Radiofreqiency Ablation: Right C4-7  10/04/16- Cervical Medial Branch Blocks, Bilateral C4-7.     08/31/16:C7/W8Pygitxnd Steroid Injection    Physical Therapy/Home Exercise: does home exercises now, but not in formal PT.    - 2019 was last time in PT     Imaging:   MRI Lumbar Spine 2/25/2021:  COMPARISON:  07/11/2017     FINDINGS:  The vertebral bodies maintain normal height, signal intensity and alignment.  There is redemonstration of few hemangiomas at multiple levels.  The intervertebral disc spaces are preserved although there is some disc desiccation at multiple levels.  The conus terminates at level L1.  Evaluation of the localizer images and structures surrounding the lumbar spine shows no abnormalities.     L1-L2: No significant disc or joint pathology.     L2-L3: Mild diffuse disc bulge with mild bilateral facet arthropathy and ligamentum flavum hypertrophy results in mild central canal stenosis.  Mild bilateral neural foraminal stenosis is also identified.     L3-L4: There is a diffuse disc bulge with no significant facet arthropathy or ligamentum flavum hypertrophy.  There is mild central canal stenosis and mild bilateral neural foraminal stenosis.     L4-L5: There is a diffuse disc bulge with ligamentum flavum hypertrophy.  No significant facet arthropathy.  There is mild central canal stenosis with only minimal encroachment on the left neural foramen.  Mild right neural foraminal stenosis is present.     L5-S1: There is a diffuse disc bulge with mild to moderate right facet arthropathy.  No left facet arthropathy.  The ligamentum flavum is normal.  The central canal is patent and the neural foramina are largely patent.  Incidental note is made of an annulus fibrosus tear posteriorly measuring approximately 2 mm.     Impression:     Multilevel degenerative disc and joint disease which is mild and results in mild central canal and neural foraminal stenosis as outlined  above.    Xray Cervical Spine 2/25/2021:  COMPARISON:  07/07/2017 .     FINDINGS:  The patient has undergone ACDF from C4 through C7. The instrumentation is intact without evidence of complication.  The vertebral bodies maintain normal height and alignment. The remaining intervertebral disc spaces are preserved.  No significant uncovertebral joint hypertrophy.  The prevertebral soft tissues, odontoid views and lung apices are normal. The neural foramen are patent     Impression:     Postsurgical changes to the thoracic spine without additional evidence for degenerative disc or joint disease.     Allergies:   Review of patient's allergies indicates:   Allergen Reactions    Pcn [penicillins] Hives and Rash    Lipitor [atorvastatin] Other (See Comments)     Muscle cramps, weakness       Current Medications:   Current Outpatient Medications   Medication Sig Dispense Refill    acetaminophen (TYLENOL) 500 MG tablet Take 500 mg by mouth every 6 (six) hours as needed for Pain.      albuterol (PROVENTIL/VENTOLIN HFA) 90 mcg/actuation inhaler       aspirin (ECOTRIN) 81 MG EC tablet Take 81 mg by mouth once daily.      cetirizine (ZYRTEC) 10 MG tablet Take 10 mg by mouth nightly.  5    FOLIC ACID/MULTIVIT-MIN/LUTEIN (CENTRUM SILVER ORAL) Take by mouth once daily.      gabapentin (NEURONTIN) 300 MG capsule Take 2 capsules (600 mg total) by mouth 2 (two) times daily. 120 capsule 5    HYDROcodone-acetaminophen (NORCO) 7.5-325 mg per tablet Take 1 tablet by mouth every 12 (twelve) hours as needed for Pain. 60 tablet 0    levothyroxine (SYNTHROID) 50 MCG tablet Take 50 mcg by mouth once daily.      meclizine (ANTIVERT) 25 mg tablet Take 25 mg by mouth once daily.       meloxicam (MOBIC) 15 MG tablet Take 15 mg by mouth once daily.      metFORMIN (GLUCOPHAGE) 500 MG tablet Take 1 tablet by mouth 2 (two) times a day.      methocarbamoL (ROBAXIN) 500 MG Tab Take 1 tablet (500 mg total) by mouth 2 (two) times daily. 60 tablet 4     omeprazole (PRILOSEC) 20 MG capsule Take 1 capsule (20 mg total) by mouth once daily. 90 capsule 3    rosuvastatin (CRESTOR) 20 MG tablet Take 20 mg by mouth once daily.      valsartan (DIOVAN) 80 MG tablet Take 40 mg by mouth once daily.   1    pravastatin (PRAVACHOL) 20 MG tablet        No current facility-administered medications for this visit.       REVIEW OF SYSTEMS:    GENERAL:  No weight loss, malaise or fevers.  HEENT:  Negative for frequent or significant headaches.  NECK:  Negative for lumps, goiter  and significant neck swelling.   RESPIRATORY:  Negative for cough, wheezing or shortness of breath.  CARDIOVASCULAR:  Negative for chest pain, leg swelling or palpitations. HTN  GI:  Negative for abdominal discomfort, blood in stools or black stools or change in bowel habits.  MUSCULOSKELETAL:  See HPI.  SKIN:  Negative for lesions, rash, and itching.  PSYCH:  Negative for sleep disturbance, mood disorder and recent psychosocial stressors.  HEMATOLOGY/LYMPHOLOGY:  Negative for prolonged bleeding, bruising easily or swollen nodes.  NEURO:   No history of headaches, syncope, paralysis, seizures or tremors.   ENDO: Thyroid disorder.   All other reviewed and negative other than HPI.    Past Medical History:  Past Medical History:   Diagnosis Date    Arthritis     Cataract     Cervical back pain with evidence of disc disease     Hiatal hernia     Hypertension     Thyroid disease        Past Surgical History:  Past Surgical History:   Procedure Laterality Date    ACROMIOCLAVICULAR JOINT CYST EXCISION Right     BACK SURGERY      cataracts Bilateral     CERVICAL FUSION      COLONOSCOPY N/A 4/27/2018    Procedure: COLONOSCOPY;  Surgeon: Giovani Medeiros MD;  Location: [facility] ENDO ([address] [place] [address]);  Service: Endoscopy;  Laterality: N/A;    EPIDURAL STEROID INJECTION N/A 10/11/2019    Procedure: INJECTION, STEROID, EPIDURAL;  Surgeon: Kasandra Montoya MD;  Location: [facility] PAIN MGT;  Service: Pain Management;  Laterality: N/A;   Lumbar NGUYEN L4/5    NGUYEN      EYE SURGERY      INJECTION OF JOINT Bilateral 7/14/2022    Procedure: INJECTION, JOINT, SI BILATERAL  needs consent;  Surgeon: Alex Callahan MD;  Location: [facility] PAIN MGT;  Service: Pain Management;  Laterality: Bilateral;    RADIOFREQUENCY ABLATION  10/2016    cervical spine/[place]    RADIOFREQUENCY ABLATION Left 5/3/2019    Procedure: RADIOFREQUENCY ABLATION, L2-L5 MEDIALBRANCH;  Surgeon: Kasandra Montoya MD;  Location: [facility] PAIN MGT;  Service: Pain Management;  Laterality: Left;    RADIOFREQUENCY ABLATION Left 6/13/2019    Procedure: RADIOFREQUENCY ABLATION C4-7;  Surgeon: Kasandra Montoya MD;  Location: [facility] PAIN MGT;  Service: Pain Management;  Laterality: Left;    RADIOFREQUENCY ABLATION Left 3/9/2021    Procedure: RADIOFREQUENCY ABLATION C4,C5,C6,C7;  Surgeon: Alex Callahan MD;  Location: [facility] PAIN [facility]GT;  Service: Pain Management;  Laterality: Left;  1 of 2    RADIOFREQUENCY ABLATION Right 3/26/2021    Procedure: RADIOFREQUENCY ABLATION C4,C6,C6,C7;  Surgeon: Alex Callahan MD;  Location: [facility] [place] PAIN MGT;  Service: Pain Management;  Laterality: Right;  2 of 2    RADIOFREQUENCY ABLATION Right 4/23/2021    Procedure: RADIOFREQUENCY ABLATION L3,4,5;  Surgeon: Alex Callahan MD;  Location: [facility] [place] PAIN MGT;  Service: Pain Management;  Laterality: Right;  1 of 2    RADIOFREQUENCY ABLATION Left 5/7/2021    Procedure: RADIOFREQUENCY ABLATION L3,4,5;  Surgeon: Alex Callahan MD;  Location: [facility] [place] PAIN MGT;  Service: Pain Management;  Laterality: Left;  2 of 2    RADIOFREQUENCY ABLATION Left 10/1/2021    Procedure: RADIOFREQUENCY ABLATION LEFT, C4,5,6,7 1 of 2 CONSENT NEEDED;  Surgeon: Alex Callahan MD;  Location: [facility] PAIN [facility]GT;  Service: Pain Management;  Laterality: Left;    RADIOFREQUENCY ABLATION Right 10/15/2021    Procedure: RADIOFREQUENCY ABLATION  RIGHT C4,5,6,7 2 of 2 CONSENT NEEDED;  Surgeon: Alex Callahan MD;  Location: [facility] PAIN [facility]GT;  Service: Pain Management;   "Laterality: Right;    RADIOFREQUENCY ABLATION Left 2/25/2022    Procedure: RADIOFREQUENCY ABLATION LEFT L3,4,5 1 of 2, needs consent;  Surgeon: Alex Callahan MD;  Location: [facility]IN [facility]GT;  Service: Pain Management;  Laterality: Left;    RADIOFREQUENCY ABLATION Right 3/11/2022    Procedure: RADIOFREQUENCY ABLATION RIGHT L3,4,5 2 of 2, needs consent;  Surgeon: Alex Callahan MD;  Location: [facility]IN [facility]GT;  Service: Pain Management;  Laterality: Right;    RETINAL DETACHMENT SURGERY      ROTATOR CUFF REPAIR Right     SPINE SURGERY      cerival fusion     TRANSFORAMINAL EPIDURAL INJECTION OF STEROID Right 4/14/2022    Procedure: INJECTION, STEROID, EPIDURAL, TRANSFORAMINAL APPROACH RIGHT L4/5 & L5/S1 Needs Consent;  Surgeon: Alex Callahan MD;  Location: [facility] PAIN [facility]GT;  Service: Pain Management;  Laterality: Right;    TRIGGER POINT INJECTION Left 6/13/2019    Procedure: INJECTION, TRIGGER POINT;  Surgeon: Kasandra Montoya MD;  Location: [facility]IN MGT;  Service: Pain Management;  Laterality: Left;       Family History:  Family History   Problem Relation Age of Onset    Heart disease Mother     Cancer Father     Leukemia Brother     Colon cancer Neg Hx     Celiac disease Neg Hx     Crohn's disease Neg Hx     Esophageal cancer Neg Hx     Inflammatory bowel disease Neg Hx     Irritable bowel syndrome Neg Hx     Liver cancer Neg Hx     Rectal cancer Neg Hx     Stomach cancer Neg Hx     Ulcerative colitis Neg Hx        Social History:  Social History     Socioeconomic History    Marital status:    Tobacco Use    Smoking status: Never    Smokeless tobacco: Never   Substance and Sexual Activity    Alcohol use: No    Drug use: No       OBJECTIVE:    /86   Pulse 74   Temp 97 °F (36.1 °C) (Oral)   Resp 18   Ht 6' 1" (1.854 m)   Wt 112.9 kg (249 lb)   BMI 32.85 kg/m²     PHYSICAL EXAMINATION:    General appearance: Well appearing, in no acute distress, alert and oriented x3.  Psych:  Mood and affect " appropriate.  Skin: Skin color, texture, turgor normal, no rashes or lesions, in both upper and lower body.  Head/face:  Atraumatic, normocephalic. No palpable lymph nodes  Neck: There is pain to palpation over the cervical paraspinous and trapezius muscles bilaterally. Spurling Negative. Limited ROM with mild pain on extension.    Cor: RRR  Pulm: Symmetric chest rise, no respiratory distress noted.   Back: Straight leg raising in the sitting position is negative for radicular leg pain. There is mild pain with palpation over lumbar paraspinals and facet joints bilaterally. Limited ROM with pain on extension. Positive facet loading bilaterally.   Extremities: No deformities, edema, or skin discoloration. Good capillary refill.  Musculoskeletal: Shoulder maneuvers are negative. There is pain with palpation over bilateral SI joints. FABERs is positive bilaterally. Bilateral upper and lower extremity strength is normal and symmetric. No atrophy or tone abnormalities are noted.  Neuro: Bilateral upper and lower extremity coordination and muscle stretch reflexes are physiologic and symmetric.  Plantar response are downgoing. No loss of sensation is noted.  Gait: Antalgic, ambulates with cane for assistance     ASSESSMENT: 57 y.o. year old male with neck and lumbar pain, consistent with      1. Chronic pain disorder        2. Sacroiliac joint pain  Procedure Order to Pain Management      3. Lumbar spondylosis        4. Lumbar radiculopathy        5. DDD (degenerative disc disease), lumbar        6. S/P cervical spinal fusion        7. Cervical spondylosis without myelopathy        8. DDD (degenerative disc disease), cervical                PLAN:     - Previous imaging reviewed today.    - Schedule for bilateral SI joint injections.     - Consider repeat lumbar RFA in the future.     - We can repeat right L4/5 and L5/S1 TF NGUYEN if his radicular pain returns.       - We can repeat lumbar RFA as needed.      - We can repeat  cervical RFA as needed.     - I have stressed the importance of physical activity and a home exercise plan to help with pain and improve health.    - Continue Gabapentin.     - Continue Robaxin 500 mg PRN muscle pain.     - Pain contract signed 2/12/2021.     - Continue Norco 7.5/325 mg BID PRN. Refill provided today.     - The patient is here today for a refill of current pain medications and they believe these provide effective pain control and improvements in quality of life.  The patient notes no serious side effects, and feels the benefits outweigh the risks.  The patient was reminded of the pain contract that they signed previously as well as the risks and benefits of the medication including possible death.  The updated [place] Board  Pharmacy prescription monitoring program was reviewed, and the patient has been found to be compliant with current treatment plan. Medication management provided by Dr. Callahan.     - UDS from 5/9/2022 reviewed and consistent.     - Continue home exercise routine.     - RTC 2 weeks after above procedure.     The above plan and management options were discussed at length with patient. Patient is in agreement with the above and verbalized understanding.    Angelique Barahona NP  10/27/2022

## 2022-11-04 ENCOUNTER — HOSPITAL ENCOUNTER (OUTPATIENT)
Facility: OTHER | Age: 57
Discharge: HOME | End: 2022-11-04
Attending: ANESTHESIOLOGY | Admitting: ANESTHESIOLOGY
Payer: MEDICARE

## 2022-11-04 VITALS
DIASTOLIC BLOOD PRESSURE: 95 MMHG | SYSTOLIC BLOOD PRESSURE: 132 MMHG | OXYGEN SATURATION: 96 % | RESPIRATION RATE: 16 BRPM | BODY MASS INDEX: 33.13 KG/M2 | HEART RATE: 64 BPM | TEMPERATURE: 99 F | HEIGHT: 73 IN | WEIGHT: 250 LBS

## 2022-11-04 DIAGNOSIS — M46.1 SACROILIITIS: Primary | ICD-10-CM

## 2022-11-04 DIAGNOSIS — G89.29 CHRONIC PAIN: ICD-10-CM

## 2022-11-04 LAB — POCT GLUCOSE: 101 MG/DL (ref 70–110)

## 2022-11-04 PROCEDURE — 27096 INJECT SACROILIAC JOINT: CPT | Mod: 50,,, | Performed by: ANESTHESIOLOGY

## 2022-11-04 PROCEDURE — 27096 INJECT SACROILIAC JOINT: CPT | Mod: 50 | Performed by: ANESTHESIOLOGY

## 2022-11-04 PROCEDURE — 27096 PR INJECTION,SACROILIAC JOINT: ICD-10-PCS | Mod: 50,,, | Performed by: ANESTHESIOLOGY

## 2022-11-04 RX ORDER — SODIUM CHLORIDE 9 MG/ML
500 INJECTION, SOLUTION INTRAVENOUS CONTINUOUS
Status: DISCONTINUED | OUTPATIENT
Start: 2022-11-04 | End: 2022-11-04 | Stop reason: HOSPADM

## 2022-11-04 NOTE — DISCHARGE INSTRUCTIONS

## 2022-11-04 NOTE — OP NOTE
Sacroiliac Joint Injection under Fluoroscopic Guidance    The procedure, risks, benefits, and options were discussed with the patient. There are no contraindications to the procedure. The patent expressed understanding and agreed to the procedure. Informed written consent was obtained prior to the start of the procedure and can be found in the patient's chart.    PATIENT NAME: Sal Navarro   MRN: [id number]     DATE OF PROCEDURE: 11/04/2022    PROCEDURE: Bilateral Sacroiliac Joint Injection under Fluoroscopic Guidance    PRE-OP DIAGNOSIS: Sacroiliac joint pain [M53.3]    POST-OP DIAGNOSIS: Same    PHYSICIAN: Alex Callahan MD    ASSISTANTS: Aimee Munson MD    MEDICATIONS INJECTED: Preservative-free Kenalog 40mg with 3cc of Bupivacine 0.25%     LOCAL ANESTHETIC INJECTED: Xylocaine 2%     SEDATION: None    ESTIMATED BLOOD LOSS: None    COMPLICATIONS: None    TECHNIQUE: Time-out was performed to identify the patient and procedure to be performed. With the patient laying in a prone position, the surgical area was prepped and draped in the usual sterile fashion using ChloraPrep and a fenestrated drape. The sacroiliac joint was determined under fluoroscopy guidance. Skin anesthesia was achieved by injecting Lidocaine 2% over the injection site. The sacroiliac joint was  then approached with a 22 gauge, 5 inch spinal quinke needle that was introduced under fluoroscopic guidance in the AP and Lateral views. Once the needle tip was in the area of the joint, and there was no blood, contrast dye Omnipaque (240mg/mL) was injected to confirm placement and there was no vascular runoff. Fluoroscopic imaging in the AP and lateral views revealed a clear outline of the joint space. 4 mL of the medication mixture listed above was injected slowly intraarticular and lemuel-articular. Displacement of the radio opaque contrast after injection of the medication confirmed that the medication went into the area of the joint. The needles were  removed and bleeding was nil. A sterile dressing was applied. No specimens collected. The patient tolerated the procedure well.       The patient was monitored after the procedure in the recovery area. They were given post-procedure and discharge instructions to follow at home. The patient was discharged in a stable condition.      Alex Callahan MD

## 2022-11-04 NOTE — BRIEF OP NOTE
Discharge Note  Short Stay      SUMMARY     Admit Date: 11/4/2022    Attending Physician: Alex Callahan      Discharge Physician: Alex Callahan      Discharge Date: 11/4/2022 9:50 AM    Procedure(s) (LRB):  INJECTION,SACROILIAC JOINT BILATERAL CONTRAST (Bilateral)    Final Diagnosis: Sacroiliac joint pain [M53.3]    Disposition: Home or self care    Patient Instructions:   Current Discharge Medication List        CONTINUE these medications which have NOT CHANGED    Details   acetaminophen (TYLENOL) 500 MG tablet Take 500 mg by mouth every 6 (six) hours as needed for Pain.      albuterol (PROVENTIL/VENTOLIN HFA) 90 mcg/actuation inhaler       aspirin (ECOTRIN) 81 MG EC tablet Take 81 mg by mouth once daily.      cetirizine (ZYRTEC) 10 MG tablet Take 10 mg by mouth nightly.  Refills: 5      FOLIC ACID/MULTIVIT-MIN/LUTEIN (CENTRUM SILVER ORAL) Take by mouth once daily.      gabapentin (NEURONTIN) 300 MG capsule Take 2 capsules (600 mg total) by mouth 2 (two) times daily.  Qty: 120 capsule, Refills: 5    Associated Diagnoses: Chronic pain disorder; Lumbar spondylosis; DDD (degenerative disc disease), lumbar; S/P cervical spinal fusion; Cervical radiculopathy; Myofascial pain      HYDROcodone-acetaminophen (NORCO) 7.5-325 mg per tablet Take 1 tablet by mouth every 12 (twelve) hours as needed for Pain.  Qty: 60 tablet, Refills: 0    Comments: Patient with chronic intractable pain.  Medically necessary for > 7 days  Associated Diagnoses: Chronic pain disorder; Lumbar spondylosis; DDD (degenerative disc disease), lumbar; Facet arthritis of lumbar region; Myalgia; Spondylosis of cervical region without myelopathy or radiculopathy; S/P cervical spinal fusion      levothyroxine (SYNTHROID) 50 MCG tablet Take 50 mcg by mouth once daily.      meclizine (ANTIVERT) 25 mg tablet Take 25 mg by mouth once daily.       meloxicam (MOBIC) 15 MG tablet Take 15 mg by mouth once daily.      metFORMIN (GLUCOPHAGE) 500 MG tablet Take 1  tablet by mouth 2 (two) times a day.      methocarbamoL (ROBAXIN) 500 MG Tab Take 1 tablet (500 mg total) by mouth 2 (two) times daily.  Qty: 60 tablet, Refills: 4    Associated Diagnoses: Myofascial pain      omeprazole (PRILOSEC) 20 MG capsule Take 1 capsule (20 mg total) by mouth once daily.  Qty: 90 capsule, Refills: 3    Associated Diagnoses: Gastroesophageal reflux disease, esophagitis presence not specified      rosuvastatin (CRESTOR) 20 MG tablet Take 20 mg by mouth once daily.      valsartan (DIOVAN) 80 MG tablet Take 40 mg by mouth once daily.   Refills: 1                 Discharge Diagnosis: Sacroiliac joint pain [M53.3]  Condition on Discharge: Stable with no complications to procedure   Diet on Discharge: Same as before.  Activity: as per instruction sheet.  Discharge to: Home with a responsible adult.  Follow up: 2-4 weeks

## 2022-11-17 ENCOUNTER — TELEPHONE (OUTPATIENT)
Dept: PAIN MEDICINE | Facility: CLINIC | Age: 57
End: 2022-11-17
Payer: MEDICARE

## 2022-11-18 ENCOUNTER — OFFICE VISIT (OUTPATIENT)
Dept: PAIN MEDICINE | Facility: CLINIC | Age: 57
End: 2022-11-18
Payer: MEDICARE

## 2022-11-18 ENCOUNTER — PATIENT MESSAGE (OUTPATIENT)
Dept: PAIN MEDICINE | Facility: OTHER | Age: 57
End: 2022-11-18
Payer: MEDICARE

## 2022-11-18 VITALS
DIASTOLIC BLOOD PRESSURE: 81 MMHG | TEMPERATURE: 97 F | HEIGHT: 73 IN | BODY MASS INDEX: 32.43 KG/M2 | WEIGHT: 244.69 LBS | HEART RATE: 81 BPM | RESPIRATION RATE: 18 BRPM | SYSTOLIC BLOOD PRESSURE: 119 MMHG

## 2022-11-18 DIAGNOSIS — Z79.891 ENCOUNTER FOR MONITORING OPIOID MAINTENANCE THERAPY: ICD-10-CM

## 2022-11-18 DIAGNOSIS — M50.30 DDD (DEGENERATIVE DISC DISEASE), CERVICAL: ICD-10-CM

## 2022-11-18 DIAGNOSIS — M47.812 CERVICAL SPONDYLOSIS WITHOUT MYELOPATHY: ICD-10-CM

## 2022-11-18 DIAGNOSIS — G89.4 CHRONIC PAIN DISORDER: Primary | ICD-10-CM

## 2022-11-18 DIAGNOSIS — M51.36 DDD (DEGENERATIVE DISC DISEASE), LUMBAR: ICD-10-CM

## 2022-11-18 DIAGNOSIS — Z98.1 S/P CERVICAL SPINAL FUSION: ICD-10-CM

## 2022-11-18 DIAGNOSIS — M53.3 SACROILIAC JOINT PAIN: ICD-10-CM

## 2022-11-18 DIAGNOSIS — M47.816 LUMBAR SPONDYLOSIS: ICD-10-CM

## 2022-11-18 DIAGNOSIS — Z51.81 ENCOUNTER FOR MONITORING OPIOID MAINTENANCE THERAPY: ICD-10-CM

## 2022-11-18 DIAGNOSIS — M54.16 LUMBAR RADICULOPATHY: ICD-10-CM

## 2022-11-18 PROCEDURE — 99213 PR OFFICE/OUTPT VISIT, EST, LEVL III, 20-29 MIN: ICD-10-PCS | Mod: S$GLB,,, | Performed by: NURSE PRACTITIONER

## 2022-11-18 PROCEDURE — 4010F ACE/ARB THERAPY RXD/TAKEN: CPT | Mod: CPTII,S$GLB,, | Performed by: NURSE PRACTITIONER

## 2022-11-18 PROCEDURE — 3008F PR BODY MASS INDEX (BMI) DOCUMENTED: ICD-10-PCS | Mod: CPTII,S$GLB,, | Performed by: NURSE PRACTITIONER

## 2022-11-18 PROCEDURE — 1159F PR MEDICATION LIST DOCUMENTED IN MEDICAL RECORD: ICD-10-PCS | Mod: CPTII,S$GLB,, | Performed by: NURSE PRACTITIONER

## 2022-11-18 PROCEDURE — 1160F PR REVIEW ALL MEDS BY PRESCRIBER/CLIN PHARMACIST DOCUMENTED: ICD-10-PCS | Mod: CPTII,S$GLB,, | Performed by: NURSE PRACTITIONER

## 2022-11-18 PROCEDURE — 1159F MED LIST DOCD IN RCRD: CPT | Mod: CPTII,S$GLB,, | Performed by: NURSE PRACTITIONER

## 2022-11-18 PROCEDURE — 1160F RVW MEDS BY RX/DR IN RCRD: CPT | Mod: CPTII,S$GLB,, | Performed by: NURSE PRACTITIONER

## 2022-11-18 PROCEDURE — 99999 PR PBB SHADOW E&M-EST. PATIENT-LVL IV: CPT | Mod: PBBFAC,,, | Performed by: NURSE PRACTITIONER

## 2022-11-18 PROCEDURE — 3074F PR MOST RECENT SYSTOLIC BLOOD PRESSURE < 130 MM HG: ICD-10-PCS | Mod: CPTII,S$GLB,, | Performed by: NURSE PRACTITIONER

## 2022-11-18 PROCEDURE — 3079F DIAST BP 80-89 MM HG: CPT | Mod: CPTII,S$GLB,, | Performed by: NURSE PRACTITIONER

## 2022-11-18 PROCEDURE — 4010F PR ACE/ARB THEARPY RXD/TAKEN: ICD-10-PCS | Mod: CPTII,S$GLB,, | Performed by: NURSE PRACTITIONER

## 2022-11-18 PROCEDURE — 3079F PR MOST RECENT DIASTOLIC BLOOD PRESSURE 80-89 MM HG: ICD-10-PCS | Mod: CPTII,S$GLB,, | Performed by: NURSE PRACTITIONER

## 2022-11-18 PROCEDURE — 99999 PR PBB SHADOW E&M-EST. PATIENT-LVL IV: ICD-10-PCS | Mod: PBBFAC,,, | Performed by: NURSE PRACTITIONER

## 2022-11-18 PROCEDURE — 3008F BODY MASS INDEX DOCD: CPT | Mod: CPTII,S$GLB,, | Performed by: NURSE PRACTITIONER

## 2022-11-18 PROCEDURE — 3074F SYST BP LT 130 MM HG: CPT | Mod: CPTII,S$GLB,, | Performed by: NURSE PRACTITIONER

## 2022-11-18 PROCEDURE — 99213 OFFICE O/P EST LOW 20 MIN: CPT | Mod: S$GLB,,, | Performed by: NURSE PRACTITIONER

## 2022-11-18 NOTE — PROGRESS NOTES
Chronic patient Established Note (Follow up visit)      Interval History 11/18/2022:  The patient returns to clinic today for follow up of neck and back pain. He is s/p bilateral SI joint injections on 11/4/2022. He reports 60% relief of his low back and buttock pain. He continues to report low back pain. He denies any radicular leg pain. He reports increased neck pain, left side greater than right. He denies any radicular arm. He does report some pain into his shoulder blades. His pain is worse with activity. He continues to take Gabapentin and Robaxin. He continues to take Norco as needed sparingly with benefit. He denies any other health changes. His pain today is 7/10.    Interval History 10/27/2022:  The patient returns to clinic today for follow up of neck and back pain. He reports increased low back and bilateral buttock pain. He denies any radicular leg pain. His pain is worse with prolonged sitting and moving from sitting to standing. He does endorse a fall recently while getting out of the vehicle onto his side. He continues to report neck pain. He continues to take Gabapentin and Robaxin. He continues to take Norco sparingly with benefit and without adverse effects. He denies any other health changes. His pain today is 6/10.    Interval History 8/2/2022:  The aptient returns to clinic today for follow up of neck and back pain. He is s/p bilateral SI joint injection on 7/14/2022. He reports 50-60% relief of his low back and buttock pain. He reports intermittent low back and buttock pain. He reports increased neck pain, left side greater than right. He denies any radicular arm pain. He has good days and bad days. He continues to take Gabapentin. He continues to take Norco sparingly with benefit and without adverse effects. He denies any other health changes. His pain today is 5/10.    Interval History 7/1/2022:  The patient returns to clinic today for follow up of neck and back pain. He reports increased  "low back and buttock pain.His pain is worse with prolonged sitting and moving from sitting to standing. He also reports increased pain with bending over. He does report intermittent radiating pain into his right posterior thigh stopping at mid thigh. He does report a fall, which was sitting hard on the ground about a week ago. He denies any acute injury. He continues to take Gabapentin and Norco. He denies any other health changes. His pain today is 6/10.     Interval History 5/9/2022:  The patient returns to clinic today for follow up of back pain. He is s/p right L4/5 and L5/S1 TF NGUYEN on 4/14/2022. He reports 50-60% relief of his low back and leg pain. He has been more active since the procedure. He continues to report low back and hip pain, worse with bending and activity. He denies any radicular leg pain. He continues to repot intermittent neck pain. He continues to take Gabapentin with benefit. He continues to take Norco sparingly as needed. He denies any other health changes. His pain today is 5/10.    Interval History 4/1/2022:  The patient returns to clinic today for follow up of back pain. He is s/p left L3,4,5 RFA on 2/25/2022 and right L3,4,5 RFA on 3/11/2022. He reports relief of his back pain. He reports increased back pain that radiates into the posterior aspect of his right leg to his knee. He denies any left leg pain. His pain is worse with prolonged walking. He reports that he sometimes drags his right leg. He denies any falls. He denies any bowel or bladder incontinence. He continues to take Gabapentin and Norco as needed with benefit. He denies any adverse effects. He denies any other health changes. His pain today is 7/10.    Interval History 2/15/2022:  The patient returns to clinic today for follow up of neck and back pain. He reports increased low back pain over the last two weeks. He describes this pain as sharp and aching in nature. He does report intermittent "catching" pain in his lower " back, worse with getting out of bed. He denies any radicular leg pain. He continues to report benefit from previous cervical procedures. He reports intermittent neck pain. He continues to take Gabapentin with benefit. He continues to take Norco as needed with benefit and without adverse effects. He denies any other health changes. His pain today is 6/10.    Interval History 11/5/2021:  The patient returns to clinic today for follow up of neck and back pain. He is s/p left C4,5,6,7 RFA on 10/1/2021 and right C4,5,6,7 RFA on 10/15/2021. He reports 50% relief of his neck pain. He reports increased burning and itching pain to his skin near injection sites. He denies any radicular arm pain. He does report increased left sided muscle pain. He is concerned that one of the screws in his cervical hardware is moving. He continues to report benefit with previous lumbar procedures. He reports intermittent low back pain without radicular leg pain. He continues to take Gabapentin with benefit. He continues to take Norco as needed with benefit and without adverse effects. He denies any weakness. He denies any other health changes. His pain today is 5/10.    Interval History 9/17/2021:  The patient returns to clinic today for follow up of neck and back pain. He reports increased neck pain. He denies any radicular arm pain today. He does report intermittent radiating pain into his left shoulder blade and mid back. His pain is worse with activity. He reports that sometimes his pain takes his breath away. He continues to report low back pain, that is tolerable at this time. He continues to take Gabapentin and Norco with benefit and without adverse effects. He denies any other health changes. His pain today is 6/10.    Interval History 6/17/2021:  The patient returns to clinic today for follow up of neck and back pain. He is s/p right L3,4,5 RFA on 4/23/2021 and left L3,4,5 RFA on 5/7/2021. He reports 50% relief of his low back pain.  He continues to report intermittent low back pain. He denies any radicular leg pain. He continues to report neck pain, especially in between the scapula. His pain is worse with prolonged standing, walking, and activity. He continues to take Gabapentin with benefit. He continues to take Norco sparingly for severe pain with benefit and without adverse effects. He denies any other health changes. His pain today is 4/10.    Interval History 4/9/2021:  The patient returns to clinic today for follow up of neck and back pain. He is s/p left C4,5,6,7 RFA on 3/9/2021 and right C4,5,6,7 RFA on 3/26/2021. He reports 50% relief of his neck pain. He reports intermittent neck pain. He has good days and bad days. He continues to report low back pain that is constant, sharp, and aching. He reports intermittent radiating pain into the lateral aspect of his left leg to his knee. He continues to take Gabapentin with benefit. He continues to take Norco as needed sparingly for severe pain. He denies any adverse effects. He denies any other health changes. His pain today is 5/10.    Interval History 3/2/2021:  Sal Navarro presents to the clinic for a follow-up appointment for neck pain . Since the last visit, Sal Navarro states the pain has been worsening. Current pain intensity is 6/10.  Was seen by Angelique Barahona NP on 2/12/2021.     Interval History 2/12/2021:  The patient returns to clinic today for follow up of back pain. He has not been seen in over a year. He did not have imaging due to coronavirus outbreak. He would to reschedule this. He was also considering SCS trial prior to the pandemic. He continues to report low back pain that radiates into the lateral aspect of his left leg to his knee. He denies any radicular right leg pain. His pain is worse with bending and walking. He continues to take Gabapentin with benefit. He also takes Norco as needed for severe pain. He denies any adverse effects. He denies any other  "health changes. His pain today is 6/10.    HPI:  Sal Navarro is a 56 y.o. male who presents today s/p C4-7 ACDF with C5/6 PSIF in 2/2016 with residual chronic midline neck pain that radiates into his shoulder blades bilaterally, R>>L.  This is associated with bilateral hand numbness and tingling.  The hand symptoms did improve following the surgery.  The shooting pains in his arms resolved completely.   This pain is described in detail below.  His post-operative course was complicated by dysphagia that is being treated with speech therapy.  The numbness and the tingling were relieved by the surgery. Now experiences "deep pain along the spine"  Patient has not been seen for over a year, had to re-schedule his imaging studies due to pandemic(now completed). Prior to the pandemic, he was considering a SCS.   Only time he gets relief is laying in bed on a very thin pillow, but that doesn't last very long.   Pain aggravated by movement and even breathing/playing with his grandchild/holding the grandchild's hand. Heat and massage (has a vest) are helpful.   Was seen by Dr. Jan srivastava and received multiple EIS and RFAs.   Compliant with his medication.     Pain Disability Index Review:  Last 3 PDI Scores 11/18/2022 10/27/2022 8/2/2022   Pain Disability Index (PDI) 37 48 25       Pain Medications:  Gabapentin  Norco sparingly  Robaxin    Opioid Contract: yes     report:  Reviewed and consistent with medication use as prescribed.    Pain Procedures:   11/4/2022- Bilateral SI joint injections- 60% relief   4/14/2022- Right L4/5 and L5/S1 TF NGUYEN  3/11/2022- Right L3,4,5 RFA  2/25/2022- Left L3,4,5 RFA  10/15/2021- Right C4,5,6,7 RFA  10/1/2021- Left C4,5,6,7 RFA  5/7/2021- Left L3,4,5 RFA   4/23/2021- Right L3,4,5 RFA  3/26/2021- Right C4,5,6,7 RFA  3/9/2021- Left C4,5,6,7 RFA  10/11/19: L4/5 Interlaminar Epidural Steroid Injection  06/13/19- Left C4-7 RFA  05/03/19:Left L2-5 Lumbar Medial Branch Nerve Thermal " Radiofrequency Ablation  12/21/18:Right L2-5 Lumbar Medial Branch Nerve Thermal Radiofrequency Ablation  11/23/18:Cervical Thermal Radiofreqiency Ablation: Right C4-7  09/14/18:C7/H4Lirmwlne Steroid Injection  06/29/18:LL2/3 Interlaminar Epidural Steroid Injection   03/06/18:L2/3 Interlaminar Epidural Steroid Injection   09/26/17:Bilateral L2-5 Lumbar Medial Branch Nerve Coolief Thermal Radiofrequency Ablation  08/31/17:Bilateral L2-5 Lumbar medial branch blocks   03/28/17:Cervical Conventional Radiofreqiency Ablation: Left C4-7  03/14/17:Cervical Conventional Radiofreqiency Ablation: Right C4-7  10/25/16:Cervical Conventional Radiofreqiency Ablation: Left C4-7  10/11/16: Cervical Conventional Radiofreqiency Ablation: Right C4-7  10/04/16- Cervical Medial Branch Blocks, Bilateral C4-7.     08/31/16:C7/X3Swvfzwvh Steroid Injection    Physical Therapy/Home Exercise: does home exercises now, but not in formal PT.    - 2019 was last time in PT     Imaging:   MRI Lumbar Spine 2/25/2021:  COMPARISON:  07/11/2017     FINDINGS:  The vertebral bodies maintain normal height, signal intensity and alignment.  There is redemonstration of few hemangiomas at multiple levels.  The intervertebral disc spaces are preserved although there is some disc desiccation at multiple levels.  The conus terminates at level L1.  Evaluation of the localizer images and structures surrounding the lumbar spine shows no abnormalities.     L1-L2: No significant disc or joint pathology.     L2-L3: Mild diffuse disc bulge with mild bilateral facet arthropathy and ligamentum flavum hypertrophy results in mild central canal stenosis.  Mild bilateral neural foraminal stenosis is also identified.     L3-L4: There is a diffuse disc bulge with no significant facet arthropathy or ligamentum flavum hypertrophy.  There is mild central canal stenosis and mild bilateral neural foraminal stenosis.     L4-L5: There is a diffuse disc bulge with ligamentum flavum  hypertrophy.  No significant facet arthropathy.  There is mild central canal stenosis with only minimal encroachment on the left neural foramen.  Mild right neural foraminal stenosis is present.     L5-S1: There is a diffuse disc bulge with mild to moderate right facet arthropathy.  No left facet arthropathy.  The ligamentum flavum is normal.  The central canal is patent and the neural foramina are largely patent.  Incidental note is made of an annulus fibrosus tear posteriorly measuring approximately 2 mm.     Impression:     Multilevel degenerative disc and joint disease which is mild and results in mild central canal and neural foraminal stenosis as outlined above.    Xray Cervical Spine 2/25/2021:  COMPARISON:  07/07/2017 .     FINDINGS:  The patient has undergone ACDF from C4 through C7. The instrumentation is intact without evidence of complication.  The vertebral bodies maintain normal height and alignment. The remaining intervertebral disc spaces are preserved.  No significant uncovertebral joint hypertrophy.  The prevertebral soft tissues, odontoid views and lung apices are normal. The neural foramen are patent     Impression:     Postsurgical changes to the thoracic spine without additional evidence for degenerative disc or joint disease.     Allergies:   Review of patient's allergies indicates:   Allergen Reactions    Pcn [penicillins] Hives and Rash    Lipitor [atorvastatin] Other (See Comments)     Muscle cramps, weakness       Current Medications:   Current Outpatient Medications   Medication Sig Dispense Refill    acetaminophen (TYLENOL) 500 MG tablet Take 500 mg by mouth every 6 (six) hours as needed for Pain.      albuterol (PROVENTIL/VENTOLIN HFA) 90 mcg/actuation inhaler       aspirin (ECOTRIN) 81 MG EC tablet Take 81 mg by mouth once daily.      cetirizine (ZYRTEC) 10 MG tablet Take 10 mg by mouth nightly.  5    FOLIC ACID/MULTIVIT-MIN/LUTEIN (CENTRUM SILVER ORAL) Take by mouth once daily.       gabapentin (NEURONTIN) 300 MG capsule Take 2 capsules (600 mg total) by mouth 2 (two) times daily. 120 capsule 5    HYDROcodone-acetaminophen (NORCO) 7.5-325 mg per tablet Take 1 tablet by mouth every 12 (twelve) hours as needed for Pain. 60 tablet 0    levothyroxine (SYNTHROID) 50 MCG tablet Take 50 mcg by mouth once daily.      meclizine (ANTIVERT) 25 mg tablet Take 25 mg by mouth once daily.       meloxicam (MOBIC) 15 MG tablet Take 15 mg by mouth once daily.      metFORMIN (GLUCOPHAGE) 500 MG tablet Take 1 tablet by mouth 2 (two) times a day.      methocarbamoL (ROBAXIN) 500 MG Tab Take 1 tablet (500 mg total) by mouth 2 (two) times daily. 60 tablet 4    omeprazole (PRILOSEC) 20 MG capsule Take 1 capsule (20 mg total) by mouth once daily. 90 capsule 3    rosuvastatin (CRESTOR) 20 MG tablet Take 20 mg by mouth once daily.      valsartan (DIOVAN) 80 MG tablet Take 40 mg by mouth once daily.   1     No current facility-administered medications for this visit.       REVIEW OF SYSTEMS:    GENERAL:  No weight loss, malaise or fevers.  HEENT:  Negative for frequent or significant headaches.  NECK:  Negative for lumps, goiter  and significant neck swelling.   RESPIRATORY:  Negative for cough, wheezing or shortness of breath.  CARDIOVASCULAR:  Negative for chest pain, leg swelling or palpitations. HTN  GI:  Negative for abdominal discomfort, blood in stools or black stools or change in bowel habits.  MUSCULOSKELETAL:  See HPI.  SKIN:  Negative for lesions, rash, and itching.  PSYCH:  Negative for sleep disturbance, mood disorder and recent psychosocial stressors.  HEMATOLOGY/LYMPHOLOGY:  Negative for prolonged bleeding, bruising easily or swollen nodes.  NEURO:   No history of headaches, syncope, paralysis, seizures or tremors.   ENDO: Thyroid disorder.   All other reviewed and negative other than HPI.    Past Medical History:  Past Medical History:   Diagnosis Date    Arthritis     Cataract     Cervical back pain with  evidence of disc disease     Hiatal hernia     Hypertension     Thyroid disease        Past Surgical History:  Past Surgical History:   Procedure Laterality Date    ACROMIOCLAVICULAR JOINT CYST EXCISION Right     BACK SURGERY      cataracts Bilateral     CERVICAL FUSION      COLONOSCOPY N/A 4/27/2018    Procedure: COLONOSCOPY;  Surgeon: Giovani Medeiros MD;  [facility]  Service: Endoscopy;  Laterality: N/A;    EPIDURAL STEROID INJECTION N/A 10/11/2019    Procedure: INJECTION, STEROID, EPIDURAL;  Surgeon: Kasandra Montoya MD;  Location: [facility] PAIN MGT;  Service: Pain Management;  Laterality: N/A;  Lumbar NGUYEN L4/5    NGUYEN      EYE SURGERY      INJECTION OF JOINT Bilateral 7/14/2022    Procedure: INJECTION, JOINT, SI BILATERAL  needs consent;  Surgeon: Alex Callahan MD;  Location: [facility] PAIN MGT;  Service: Pain Management;  Laterality: Bilateral;    INJECTION, SACROILIAC JOINT Bilateral 11/4/2022    Procedure: INJECTION,SACROILIAC JOINT BILATERAL CONTRAST;  Surgeon: Alex Callahan MD;  Location: [facility] PAIN MGT;  Service: Pain Management;  Laterality: Bilateral;    RADIOFREQUENCY ABLATION  10/2016    cervical spine/Montoya    RADIOFREQUENCY ABLATION Left 5/3/2019    Procedure: RADIOFREQUENCY ABLATION, L2-L5 MEDIALBRANCH;  Surgeon: Kasandra Montoya MD;  Location: [facility] PAIN MGT;  Service: Pain Management;  Laterality: Left;    RADIOFREQUENCY ABLATION Left 6/13/2019    Procedure: RADIOFREQUENCY ABLATION C4-7;  Surgeon: Kasandra Montoya MD;  Location: [facility] PAIN MGT;  Service: Pain Management;  Laterality: Left;    RADIOFREQUENCY ABLATION Left 3/9/2021    Procedure: RADIOFREQUENCY ABLATION C4,C5,C6,C7;  Surgeon: Alex Callahan MD;  Location: [facility] PAIN [facility]GT;  Service: Pain Management;  Laterality: Left;  1 of 2    RADIOFREQUENCY ABLATION Right 3/26/2021    Procedure: RADIOFREQUENCY ABLATION C4,C6,C6,C7;  Surgeon: Alex Callahan MD;  Location: [facility] PAIN MGT;  Service: Pain Management;  Laterality: Right;   2 of 2    RADIOFREQUENCY ABLATION Right 4/23/2021    Procedure: RADIOFREQUENCY ABLATION L3,4,5;  Surgeon: Alex Callahan MD;  Location: [facility] PAIN MGT;  Service: Pain Management;  Laterality: Right;  1 of 2    RADIOFREQUENCY ABLATION Left 5/7/2021    Procedure: RADIOFREQUENCY ABLATION L3,4,5;  Surgeon: Alex Callahan MD;  Location: [facility] PAIN MGT;  Service: Pain Management;  Laterality: Left;  2 of 2    RADIOFREQUENCY ABLATION Left 10/1/2021    Procedure: RADIOFREQUENCY ABLATION LEFT, C4,5,6,7 1 of 2 CONSENT NEEDED;  Surgeon: Alex Callahan MD;  Location: [facility] PAIN MGT;  Service: Pain Management;  Laterality: Left;    RADIOFREQUENCY ABLATION Right 10/15/2021    Procedure: RADIOFREQUENCY ABLATION  RIGHT C4,5,6,7 2 of 2 CONSENT NEEDED;  Surgeon: Alex Callahan MD;  Location: [facility] PAIN [facility]GT;  Service: Pain Management;  Laterality: Right;    RADIOFREQUENCY ABLATION Left 2/25/2022    Procedure: RADIOFREQUENCY ABLATION LEFT L3,4,5 1 of 2, needs consent;  Surgeon: Alex Callahan MD;  Location: [facility] PAIN [facility]GT;  Service: Pain Management;  Laterality: Left;    RADIOFREQUENCY ABLATION Right 3/11/2022    Procedure: RADIOFREQUENCY ABLATION RIGHT L3,4,5 2 of 2, needs consent;  Surgeon: Alex Callahan MD;  Location: [facility] PAIN [facility]GT;  Service: Pain Management;  Laterality: Right;    RETINAL DETACHMENT SURGERY      ROTATOR CUFF REPAIR Right     SPINE SURGERY      cerival fusion     TRANSFORAMINAL EPIDURAL INJECTION OF STEROID Right 4/14/2022    Procedure: INJECTION, STEROID, EPIDURAL, TRANSFORAMINAL APPROACH RIGHT L4/5 & L5/S1 Needs Consent;  Surgeon: Alex Callahan MD;  Location: [facility] PAIN MGT;  Service: Pain Management;  Laterality: Right;    TRIGGER POINT INJECTION Left 6/13/2019    Procedure: INJECTION, TRIGGER POINT;  Surgeon: Kasandra Montoya MD;  Location: [facility] PAIN [facility]GT;  Service: Pain Management;  Laterality: Left;       Family History:  Family History   Problem Relation Age of Onset    Heart disease Mother      "Cancer Father     Leukemia Brother     Colon cancer Neg Hx     Celiac disease Neg Hx     Crohn's disease Neg Hx     Esophageal cancer Neg Hx     Inflammatory bowel disease Neg Hx     Irritable bowel syndrome Neg Hx     Liver cancer Neg Hx     Rectal cancer Neg Hx     Stomach cancer Neg Hx     Ulcerative colitis Neg Hx        Social History:  Social History     Socioeconomic History    Marital status:    Tobacco Use    Smoking status: Never    Smokeless tobacco: Never   Substance and Sexual Activity    Alcohol use: No    Drug use: No       OBJECTIVE:    /81   Pulse 81   Temp 97.1 °F (36.2 °C)   Resp 18   Ht 6' 1" (1.854 m)   Wt 111 kg (244 lb 11.4 oz)   BMI 32.29 kg/m²     PHYSICAL EXAMINATION:    General appearance: Well appearing, in no acute distress, alert and oriented x3.  Psych:  Mood and affect appropriate.  Skin: Skin color, texture, turgor normal, no rashes or lesions, in both upper and lower body.  Head/face:  Atraumatic, normocephalic. No palpable lymph nodes  Neck: There is pain to palpation over the cervical paraspinous and trapezius muscles bilaterally. Spurling Negative. Limited ROM with pain on extension and lateral rotation.    Cor: RRR  Pulm: Symmetric chest rise, no respiratory distress noted.   Back: Straight leg raising in the sitting position is negative for radicular leg pain. There is mild pain with palpation over lumbar paraspinals and facet joints bilaterally. Limited ROM with pain on extension. Positive facet loading bilaterally.   Extremities: No deformities, edema, or skin discoloration. Good capillary refill.  Musculoskeletal: Shoulder maneuvers are negative. There is mild pain with palpation over bilateral SI joints. Bilateral upper and lower extremity strength is normal and symmetric. No atrophy or tone abnormalities are noted.  Neuro: Bilateral upper and lower extremity coordination and muscle stretch reflexes are physiologic and symmetric.  Plantar response are " downgoing. No loss of sensation is noted.  Gait: Antalgic, ambulates with cane for assistance     ASSESSMENT: 57 y.o. year old male with neck and lumbar pain, consistent with      1. Chronic pain disorder        2. Cervical spondylosis without myelopathy  Procedure Order to Pain Management      3. DDD (degenerative disc disease), cervical        4. S/P cervical spinal fusion        5. Lumbar spondylosis        6. Lumbar radiculopathy        7. DDD (degenerative disc disease), lumbar        8. Sacroiliac joint pain        9. Encounter for monitoring opioid maintenance therapy                  PLAN:     - Previous imaging reviewed today.    - He is s/p bilateral SI joint injections with benefit.     - Schedule for right then left C5,6,7 RFA, one side at a time, two weeks apart.     - Consider repeat lumbar RFA in the future.     - We can repeat right L4/5 and L5/S1 TF NGUYEN if his radicular pain returns.       - I have stressed the importance of physical activity and a home exercise plan to help with pain and improve health.    - Continue Gabapentin.     - Continue Robaxin 500 mg PRN muscle pain.     - Pain contract signed 2/12/2021.     - Continue Norco 7.5/325 mg BID PRN. He does not need a refill at this time.     - The patient is here today for a refill of current pain medications and they believe these provide effective pain control and improvements in quality of life.  The patient notes no serious side effects, and feels the benefits outweigh the risks.  The patient was reminded of the pain contract that they signed previously as well as the risks and benefits of the medication including possible death.  The updated [place] [employer] prescription monitoring program was reviewed, and the patient has been found to be compliant with current treatment plan. Medication management provided by Dr. Callahan.     - UDS from 5/9/2022 reviewed and consistent. Repeat UDS next visit, unable to repeat today due to  power outage.     - Continue home exercise routine.     - RTC 3 weeks after above procedure.     The above plan and management options were discussed at length with patient. Patient is in agreement with the above and verbalized understanding.    Angelique Barahona NP  11/18/2022

## 2022-11-18 NOTE — H&P (VIEW-ONLY)
Chronic patient Established Note (Follow up visit)      Interval History 11/18/2022:  The patient returns to clinic today for follow up of neck and back pain. He is s/p bilateral SI joint injections on 11/4/2022. He reports 60% relief of his low back and buttock pain. He continues to report low back pain. He denies any radicular leg pain. He reports increased neck pain, left side greater than right. He denies any radicular arm. He does report some pain into his shoulder blades. His pain is worse with activity. He continues to take Gabapentin and Robaxin. He continues to take Norco as needed sparingly with benefit. He denies any other health changes. His pain today is 7/10.    Interval History 10/27/2022:  The patient returns to clinic today for follow up of neck and back pain. He reports increased low back and bilateral buttock pain. He denies any radicular leg pain. His pain is worse with prolonged sitting and moving from sitting to standing. He does endorse a fall recently while getting out of the vehicle onto his side. He continues to report neck pain. He continues to take Gabapentin and Robaxin. He continues to take Norco sparingly with benefit and without adverse effects. He denies any other health changes. His pain today is 6/10.    Interval History 8/2/2022:  The aptient returns to clinic today for follow up of neck and back pain. He is s/p bilateral SI joint injection on 7/14/2022. He reports 50-60% relief of his low back and buttock pain. He reports intermittent low back and buttock pain. He reports increased neck pain, left side greater than right. He denies any radicular arm pain. He has good days and bad days. He continues to take Gabapentin. He continues to take Norco sparingly with benefit and without adverse effects. He denies any other health changes. His pain today is 5/10.    Interval History 7/1/2022:  The patient returns to clinic today for follow up of neck and back pain. He reports increased  "low back and buttock pain.His pain is worse with prolonged sitting and moving from sitting to standing. He also reports increased pain with bending over. He does report intermittent radiating pain into his right posterior thigh stopping at mid thigh. He does report a fall, which was sitting hard on the ground about a week ago. He denies any acute injury. He continues to take Gabapentin and Norco. He denies any other health changes. His pain today is 6/10.     Interval History 5/9/2022:  The patient returns to clinic today for follow up of back pain. He is s/p right L4/5 and L5/S1 TF NGUYEN on 4/14/2022. He reports 50-60% relief of his low back and leg pain. He has been more active since the procedure. He continues to report low back and hip pain, worse with bending and activity. He denies any radicular leg pain. He continues to repot intermittent neck pain. He continues to take Gabapentin with benefit. He continues to take Norco sparingly as needed. He denies any other health changes. His pain today is 5/10.    Interval History 4/1/2022:  The patient returns to clinic today for follow up of back pain. He is s/p left L3,4,5 RFA on 2/25/2022 and right L3,4,5 RFA on 3/11/2022. He reports relief of his back pain. He reports increased back pain that radiates into the posterior aspect of his right leg to his knee. He denies any left leg pain. His pain is worse with prolonged walking. He reports that he sometimes drags his right leg. He denies any falls. He denies any bowel or bladder incontinence. He continues to take Gabapentin and Norco as needed with benefit. He denies any adverse effects. He denies any other health changes. His pain today is 7/10.    Interval History 2/15/2022:  The patient returns to clinic today for follow up of neck and back pain. He reports increased low back pain over the last two weeks. He describes this pain as sharp and aching in nature. He does report intermittent "catching" pain in his lower " back, worse with getting out of bed. He denies any radicular leg pain. He continues to report benefit from previous cervical procedures. He reports intermittent neck pain. He continues to take Gabapentin with benefit. He continues to take Norco as needed with benefit and without adverse effects. He denies any other health changes. His pain today is 6/10.    Interval History 11/5/2021:  The patient returns to clinic today for follow up of neck and back pain. He is s/p left C4,5,6,7 RFA on 10/1/2021 and right C4,5,6,7 RFA on 10/15/2021. He reports 50% relief of his neck pain. He reports increased burning and itching pain to his skin near injection sites. He denies any radicular arm pain. He does report increased left sided muscle pain. He is concerned that one of the screws in his cervical hardware is moving. He continues to report benefit with previous lumbar procedures. He reports intermittent low back pain without radicular leg pain. He continues to take Gabapentin with benefit. He continues to take Norco as needed with benefit and without adverse effects. He denies any weakness. He denies any other health changes. His pain today is 5/10.    Interval History 9/17/2021:  The patient returns to clinic today for follow up of neck and back pain. He reports increased neck pain. He denies any radicular arm pain today. He does report intermittent radiating pain into his left shoulder blade and mid back. His pain is worse with activity. He reports that sometimes his pain takes his breath away. He continues to report low back pain, that is tolerable at this time. He continues to take Gabapentin and Norco with benefit and without adverse effects. He denies any other health changes. His pain today is 6/10.    Interval History 6/17/2021:  The patient returns to clinic today for follow up of neck and back pain. He is s/p right L3,4,5 RFA on 4/23/2021 and left L3,4,5 RFA on 5/7/2021. He reports 50% relief of his low back pain.  He continues to report intermittent low back pain. He denies any radicular leg pain. He continues to report neck pain, especially in between the scapula. His pain is worse with prolonged standing, walking, and activity. He continues to take Gabapentin with benefit. He continues to take Norco sparingly for severe pain with benefit and without adverse effects. He denies any other health changes. His pain today is 4/10.    Interval History 4/9/2021:  The patient returns to clinic today for follow up of neck and back pain. He is s/p left C4,5,6,7 RFA on 3/9/2021 and right C4,5,6,7 RFA on 3/26/2021. He reports 50% relief of his neck pain. He reports intermittent neck pain. He has good days and bad days. He continues to report low back pain that is constant, sharp, and aching. He reports intermittent radiating pain into the lateral aspect of his left leg to his knee. He continues to take Gabapentin with benefit. He continues to take Norco as needed sparingly for severe pain. He denies any adverse effects. He denies any other health changes. His pain today is 5/10.    Interval History 3/2/2021:  Sal Navarro presents to the clinic for a follow-up appointment for neck pain . Since the last visit, Sal Navarro states the pain has been worsening. Current pain intensity is 6/10.  Was seen by Angelique Barahona NP on 2/12/2021.     Interval History 2/12/2021:  The patient returns to clinic today for follow up of back pain. He has not been seen in over a year. He did not have imaging due to coronavirus outbreak. He would to reschedule this. He was also considering SCS trial prior to the pandemic. He continues to report low back pain that radiates into the lateral aspect of his left leg to his knee. He denies any radicular right leg pain. His pain is worse with bending and walking. He continues to take Gabapentin with benefit. He also takes Norco as needed for severe pain. He denies any adverse effects. He denies any other  "health changes. His pain today is 6/10.    HPI:  Sal Navarro is a 56 y.o. male who presents today s/p C4-7 ACDF with C5/6 PSIF in 2/2016 with residual chronic midline neck pain that radiates into his shoulder blades bilaterally, R>>L.  This is associated with bilateral hand numbness and tingling.  The hand symptoms did improve following the surgery.  The shooting pains in his arms resolved completely.   This pain is described in detail below.  His post-operative course was complicated by dysphagia that is being treated with speech therapy.  The numbness and the tingling were relieved by the surgery. Now experiences "deep pain along the spine"  Patient has not been seen for over a year, had to re-schedule his imaging studies due to pandemic(now completed). Prior to the pandemic, he was considering a SCS.   Only time he gets relief is laying in bed on a very thin pillow, but that doesn't last very long.   Pain aggravated by movement and even breathing/playing with his grandchild/holding the grandchild's hand. Heat and massage (has a vest) are helpful.   Was seen by Dr. Jan srivastava and received multiple EIS and RFAs.   Compliant with his medication.     Pain Disability Index Review:  Last 3 PDI Scores 11/18/2022 10/27/2022 8/2/2022   Pain Disability Index (PDI) 37 48 25       Pain Medications:  Gabapentin  Norco sparingly  Robaxin    Opioid Contract: yes     report:  Reviewed and consistent with medication use as prescribed.    Pain Procedures:   11/4/2022- Bilateral SI joint injections- 60% relief   4/14/2022- Right L4/5 and L5/S1 TF NGUYEN  3/11/2022- Right L3,4,5 RFA  2/25/2022- Left L3,4,5 RFA  10/15/2021- Right C4,5,6,7 RFA  10/1/2021- Left C4,5,6,7 RFA  5/7/2021- Left L3,4,5 RFA   4/23/2021- Right L3,4,5 RFA  3/26/2021- Right C4,5,6,7 RFA  3/9/2021- Left C4,5,6,7 RFA  10/11/19: L4/5 Interlaminar Epidural Steroid Injection  06/13/19- Left C4-7 RFA  05/03/19:Left L2-5 Lumbar Medial Branch Nerve Thermal " Radiofrequency Ablation  12/21/18:Right L2-5 Lumbar Medial Branch Nerve Thermal Radiofrequency Ablation  11/23/18:Cervical Thermal Radiofreqiency Ablation: Right C4-7  09/14/18:C7/[id number] Steroid Injection  06/29/18:LL2/3 Interlaminar Epidural Steroid Injection   03/06/18:L2/3 Interlaminar Epidural Steroid Injection   09/26/17:Bilateral L2-5 Lumbar Medial Branch Nerve Coolief Thermal Radiofrequency Ablation  08/31/17:Bilateral L2-5 Lumbar medial branch blocks   03/28/17:Cervical Conventional Radiofreqiency Ablation: Left C4-7  03/14/17:Cervical Conventional Radiofreqiency Ablation: Right C4-7  10/25/16:Cervical Conventional Radiofreqiency Ablation: Left C4-7  10/11/16: Cervical Conventional Radiofreqiency Ablation: Right C4-7  10/04/16- Cervical Medial Branch Blocks, Bilateral C4-7.     08/31/16:C7/[id number] Steroid Injection    Physical Therapy/Home Exercise: does home exercises now, but not in formal PT.    - 2019 was last time in PT     Imaging:   MRI Lumbar Spine 2/25/2021:  COMPARISON:  07/11/2017     FINDINGS:  The vertebral bodies maintain normal height, signal intensity and alignment.  There is redemonstration of few hemangiomas at multiple levels.  The intervertebral disc spaces are preserved although there is some disc desiccation at multiple levels.  The conus terminates at level L1.  Evaluation of the localizer images and structures surrounding the lumbar spine shows no abnormalities.     L1-L2: No significant disc or joint pathology.     L2-L3: Mild diffuse disc bulge with mild bilateral facet arthropathy and ligamentum flavum hypertrophy results in mild central canal stenosis.  Mild bilateral neural foraminal stenosis is also identified.     L3-L4: There is a diffuse disc bulge with no significant facet arthropathy or ligamentum flavum hypertrophy.  There is mild central canal stenosis and mild bilateral neural foraminal stenosis.     L4-L5: There is a diffuse disc bulge with ligamentum flavum  hypertrophy.  No significant facet arthropathy.  There is mild central canal stenosis with only minimal encroachment on the left neural foramen.  Mild right neural foraminal stenosis is present.     L5-S1: There is a diffuse disc bulge with mild to moderate right facet arthropathy.  No left facet arthropathy.  The ligamentum flavum is normal.  The central canal is patent and the neural foramina are largely patent.  Incidental note is made of an annulus fibrosus tear posteriorly measuring approximately 2 mm.     Impression:     Multilevel degenerative disc and joint disease which is mild and results in mild central canal and neural foraminal stenosis as outlined above.    Xray Cervical Spine 2/25/2021:  COMPARISON:  07/07/2017 .     FINDINGS:  The patient has undergone ACDF from C4 through C7. The instrumentation is intact without evidence of complication.  The vertebral bodies maintain normal height and alignment. The remaining intervertebral disc spaces are preserved.  No significant uncovertebral joint hypertrophy.  The prevertebral soft tissues, odontoid views and lung apices are normal. The neural foramen are patent     Impression:     Postsurgical changes to the thoracic spine without additional evidence for degenerative disc or joint disease.     Allergies:   Review of patient's allergies indicates:   Allergen Reactions    Pcn [penicillins] Hives and Rash    Lipitor [atorvastatin] Other (See Comments)     Muscle cramps, weakness       Current Medications:   Current Outpatient Medications   Medication Sig Dispense Refill    acetaminophen (TYLENOL) 500 MG tablet Take 500 mg by mouth every 6 (six) hours as needed for Pain.      albuterol (PROVENTIL/VENTOLIN HFA) 90 mcg/actuation inhaler       aspirin (ECOTRIN) 81 MG EC tablet Take 81 mg by mouth once daily.      cetirizine (ZYRTEC) 10 MG tablet Take 10 mg by mouth nightly.  5    FOLIC ACID/MULTIVIT-MIN/LUTEIN (CENTRUM SILVER ORAL) Take by mouth once daily.       gabapentin (NEURONTIN) 300 MG capsule Take 2 capsules (600 mg total) by mouth 2 (two) times daily. 120 capsule 5    HYDROcodone-acetaminophen (NORCO) 7.5-325 mg per tablet Take 1 tablet by mouth every 12 (twelve) hours as needed for Pain. 60 tablet 0    levothyroxine (SYNTHROID) 50 MCG tablet Take 50 mcg by mouth once daily.      meclizine (ANTIVERT) 25 mg tablet Take 25 mg by mouth once daily.       meloxicam (MOBIC) 15 MG tablet Take 15 mg by mouth once daily.      metFORMIN (GLUCOPHAGE) 500 MG tablet Take 1 tablet by mouth 2 (two) times a day.      methocarbamoL (ROBAXIN) 500 MG Tab Take 1 tablet (500 mg total) by mouth 2 (two) times daily. 60 tablet 4    omeprazole (PRILOSEC) 20 MG capsule Take 1 capsule (20 mg total) by mouth once daily. 90 capsule 3    rosuvastatin (CRESTOR) 20 MG tablet Take 20 mg by mouth once daily.      valsartan (DIOVAN) 80 MG tablet Take 40 mg by mouth once daily.   1     No current facility-administered medications for this visit.       REVIEW OF SYSTEMS:    GENERAL:  No weight loss, malaise or fevers.  HEENT:  Negative for frequent or significant headaches.  NECK:  Negative for lumps, goiter  and significant neck swelling.   RESPIRATORY:  Negative for cough, wheezing or shortness of breath.  CARDIOVASCULAR:  Negative for chest pain, leg swelling or palpitations. HTN  GI:  Negative for abdominal discomfort, blood in stools or black stools or change in bowel habits.  MUSCULOSKELETAL:  See HPI.  SKIN:  Negative for lesions, rash, and itching.  PSYCH:  Negative for sleep disturbance, mood disorder and recent psychosocial stressors.  HEMATOLOGY/LYMPHOLOGY:  Negative for prolonged bleeding, bruising easily or swollen nodes.  NEURO:   No history of headaches, syncope, paralysis, seizures or tremors.   ENDO: Thyroid disorder.   All other reviewed and negative other than HPI.    Past Medical History:  Past Medical History:   Diagnosis Date    Arthritis     Cataract     Cervical back pain with  evidence of disc disease     Hiatal hernia     Hypertension     Thyroid disease        Past Surgical History:  Past Surgical History:   Procedure Laterality Date    ACROMIOCLAVICULAR JOINT CYST EXCISION Right     BACK SURGERY      cataracts Bilateral     CERVICAL FUSION      COLONOSCOPY N/A 4/27/2018    Procedure: COLONOSCOPY;  Surgeon: Giovani Medeiros MD;  [facility]  Service: Endoscopy;  Laterality: N/A;    EPIDURAL STEROID INJECTION N/A 10/11/2019    Procedure: INJECTION, STEROID, EPIDURAL;  Surgeon: Kasandra Montoya MD;  Location: [facility] [place] [facility];  Service: Pain Management;  Laterality: N/A;  Lumbar NGUYEN L4/5    NGUYEN      EYE SURGERY      INJECTION OF JOINT Bilateral 7/14/2022    Procedure: INJECTION, JOINT, SI BILATERAL  needs consent;  Surgeon: Alex Callahan MD;  Location: [facility] [place] [facility];  Service: Pain Management;  Laterality: Bilateral;    INJECTION, SACROILIAC JOINT Bilateral 11/4/2022    Procedure: INJECTION,SACROILIAC JOINT BILATERAL CONTRAST;  Surgeon: Alex Callahan MD;  Location: [facility] [place] [facility];  Service: Pain Management;  Laterality: Bilateral;    RADIOFREQUENCY ABLATION  10/2016    cervical spine/Montoya    RADIOFREQUENCY ABLATION Left 5/3/2019    Procedure: RADIOFREQUENCY ABLATION, L2-L5 MEDIALBRANCH;  Surgeon: Kasandra Montoya MD;  Location: [facility] [place] [facility];  Service: Pain Management;  Laterality: Left;    RADIOFREQUENCY ABLATION Left 6/13/2019    Procedure: RADIOFREQUENCY ABLATION C4-7;  Surgeon: Kasandra Montoya MD;  Location: [facility] [place] [facility];  Service: Pain Management;  Laterality: Left;    RADIOFREQUENCY ABLATION Left 3/9/2021    Procedure: RADIOFREQUENCY ABLATION C4,C5,C6,C7;  Surgeon: Alex Callahan MD;  Location: [facility] [place] [facility];  Service: Pain Management;  Laterality: Left;  1 of 2    RADIOFREQUENCY ABLATION Right 3/26/2021    Procedure: RADIOFREQUENCY ABLATION C4,C6,C6,C7;  Surgeon: Alex Callahan MD;  Location: [facility] [place] [facility];  Service: Pain Management;  Laterality: Right;   2 of 2    RADIOFREQUENCY ABLATION Right 4/23/2021    Procedure: RADIOFREQUENCY ABLATION L3,4,5;  Surgeon: Alex Callahan MD;  Location: [facility]IN [facility]GT;  Service: Pain Management;  Laterality: Right;  1 of 2    RADIOFREQUENCY ABLATION Left 5/7/2021    Procedure: RADIOFREQUENCY ABLATION L3,4,5;  Surgeon: Alex Callahan MD;  Location: [facility];  Service: Pain Management;  Laterality: Left;  2 of 2    RADIOFREQUENCY ABLATION Left 10/1/2021    Procedure: RADIOFREQUENCY ABLATION LEFT, C4,5,6,7 1 of 2 CONSENT NEEDED;  Surgeon: Alex Callahan MD;  Location: [facility];  Service: Pain Management;  Laterality: Left;    RADIOFREQUENCY ABLATION Right 10/15/2021    Procedure: RADIOFREQUENCY ABLATION  RIGHT C4,5,6,7 2 of 2 CONSENT NEEDED;  Surgeon: Alex Callahan MD;  Location: [facility];  Service: Pain Management;  Laterality: Right;    RADIOFREQUENCY ABLATION Left 2/25/2022    Procedure: RADIOFREQUENCY ABLATION LEFT L3,4,5 1 of 2, needs consent;  Surgeon: Alex Callahan MD;  Location: [facility];  Service: Pain Management;  Laterality: Left;    RADIOFREQUENCY ABLATION Right 3/11/2022    Procedure: RADIOFREQUENCY ABLATION RIGHT L3,4,5 2 of 2, needs consent;  Surgeon: Alex Callahan MD;  Location: [facility];  Service: Pain Management;  Laterality: Right;    RETINAL DETACHMENT SURGERY      ROTATOR CUFF REPAIR Right     SPINE SURGERY      cerival fusion     TRANSFORAMINAL EPIDURAL INJECTION OF STEROID Right 4/14/2022    Procedure: INJECTION, STEROID, EPIDURAL, TRANSFORAMINAL APPROACH RIGHT L4/5 & L5/S1 Needs Consent;  Surgeon: Alex Callahan MD;  Location: [facility];  Service: Pain Management;  Laterality: Right;    TRIGGER POINT INJECTION Left 6/13/2019    Procedure: INJECTION, TRIGGER POINT;  Surgeon: Kasandra Montoya MD;  Location: [facility];  Service: Pain Management;  Laterality: Left;       Family History:  Family History   Problem Relation Age of Onset    Heart disease Mother      "Cancer Father     Leukemia Brother     Colon cancer Neg Hx     Celiac disease Neg Hx     Crohn's disease Neg Hx     Esophageal cancer Neg Hx     Inflammatory bowel disease Neg Hx     Irritable bowel syndrome Neg Hx     Liver cancer Neg Hx     Rectal cancer Neg Hx     Stomach cancer Neg Hx     Ulcerative colitis Neg Hx        Social History:  Social History     Socioeconomic History    Marital status:    Tobacco Use    Smoking status: Never    Smokeless tobacco: Never   Substance and Sexual Activity    Alcohol use: No    Drug use: No       OBJECTIVE:    /81   Pulse 81   Temp 97.1 °F (36.2 °C)   Resp 18   Ht 6' 1" (1.854 m)   Wt 111 kg (244 lb 11.4 oz)   BMI 32.29 kg/m²     PHYSICAL EXAMINATION:    General appearance: Well appearing, in no acute distress, alert and oriented x3.  Psych:  Mood and affect appropriate.  Skin: Skin color, texture, turgor normal, no rashes or lesions, in both upper and lower body.  Head/face:  Atraumatic, normocephalic. No palpable lymph nodes  Neck: There is pain to palpation over the cervical paraspinous and trapezius muscles bilaterally. Spurling Negative. Limited ROM with pain on extension and lateral rotation.    Cor: RRR  Pulm: Symmetric chest rise, no respiratory distress noted.   Back: Straight leg raising in the sitting position is negative for radicular leg pain. There is mild pain with palpation over lumbar paraspinals and facet joints bilaterally. Limited ROM with pain on extension. Positive facet loading bilaterally.   Extremities: No deformities, edema, or skin discoloration. Good capillary refill.  Musculoskeletal: Shoulder maneuvers are negative. There is mild pain with palpation over bilateral SI joints. Bilateral upper and lower extremity strength is normal and symmetric. No atrophy or tone abnormalities are noted.  Neuro: Bilateral upper and lower extremity coordination and muscle stretch reflexes are physiologic and symmetric.  Plantar response are " downgoing. No loss of sensation is noted.  Gait: Antalgic, ambulates with cane for assistance     ASSESSMENT: 57 y.o. year old male with neck and lumbar pain, consistent with      1. Chronic pain disorder        2. Cervical spondylosis without myelopathy  Procedure Order to Pain Management      3. DDD (degenerative disc disease), cervical        4. S/P cervical spinal fusion        5. Lumbar spondylosis        6. Lumbar radiculopathy        7. DDD (degenerative disc disease), lumbar        8. Sacroiliac joint pain        9. Encounter for monitoring opioid maintenance therapy                  PLAN:     - Previous imaging reviewed today.    - He is s/p bilateral SI joint injections with benefit.     - Schedule for right then left C5,6,7 RFA, one side at a time, two weeks apart.     - Consider repeat lumbar RFA in the future.     - We can repeat right L4/5 and L5/S1 TF NGUYEN if his radicular pain returns.       - I have stressed the importance of physical activity and a home exercise plan to help with pain and improve health.    - Continue Gabapentin.     - Continue Robaxin 500 mg PRN muscle pain.     - Pain contract signed 2/12/2021.     - Continue Norco 7.5/325 mg BID PRN. He does not need a refill at this time.     - The patient is here today for a refill of current pain medications and they believe these provide effective pain control and improvements in quality of life.  The patient notes no serious side effects, and feels the benefits outweigh the risks.  The patient was reminded of the pain contract that they signed previously as well as the risks and benefits of the medication including possible death.  The updated [place] [employer] prescription monitoring program was reviewed, and the patient has been found to be compliant with current treatment plan. Medication management provided by Dr. Callahan.     - UDS from 5/9/2022 reviewed and consistent. Repeat UDS next visit, unable to repeat today due to  power outage.     - Continue home exercise routine.     - RTC 3 weeks after above procedure.     The above plan and management options were discussed at length with patient. Patient is in agreement with the above and verbalized understanding.    Angelique Barahona NP  11/18/2022

## 2022-12-13 ENCOUNTER — TELEPHONE (OUTPATIENT)
Dept: PAIN MEDICINE | Facility: CLINIC | Age: 57
End: 2022-12-13
Payer: MEDICARE

## 2022-12-13 ENCOUNTER — TELEPHONE (OUTPATIENT)
Dept: PAIN MEDICINE | Facility: OTHER | Age: 57
End: 2022-12-13
Payer: MEDICARE

## 2022-12-13 NOTE — TELEPHONE ENCOUNTER
Patient was contact and provided with the procedure area phone number for an earlier time.    Verbalized understanding

## 2022-12-13 NOTE — TELEPHONE ENCOUNTER
----- Message from Fatemeh Frederick sent at 12/13/2022 11:40 AM CST -----  Name of Who is Calling:VAN ESCOTO [[id number]]              What is the request in detail:Requesting an earlier time.               Can the clinic reply by [facility]:              [facility][phone]

## 2022-12-13 NOTE — TELEPHONE ENCOUNTER
IWLLIAM Gross RN  Caller: Unspecified (Yesterday,  9:40 AM)  You are correct, C4,5,6,7     Angelique           Previous Messages     ----- Message -----   From: Shamika Leal RN   Sent: 12/12/2022   9:41 AM CST   To: Angelique Barahona NP     Please confirm procedure that was ordered. Seems like patient has had C4, C5, C6, & C7 RFAs in the past.     Thanks,     Shamika

## 2022-12-15 ENCOUNTER — HOSPITAL ENCOUNTER (OUTPATIENT)
Facility: OTHER | Age: 57
Discharge: HOME | End: 2022-12-15
Attending: ANESTHESIOLOGY | Admitting: ANESTHESIOLOGY
Payer: MEDICARE

## 2022-12-15 VITALS
WEIGHT: 245 LBS | HEART RATE: 58 BPM | BODY MASS INDEX: 32.47 KG/M2 | TEMPERATURE: 98 F | RESPIRATION RATE: 14 BRPM | DIASTOLIC BLOOD PRESSURE: 91 MMHG | OXYGEN SATURATION: 96 % | SYSTOLIC BLOOD PRESSURE: 143 MMHG | HEIGHT: 73 IN

## 2022-12-15 DIAGNOSIS — M47.9 OSTEOARTHRITIS OF SPINE, UNSPECIFIED SPINAL OSTEOARTHRITIS COMPLICATION STATUS, UNSPECIFIED SPINAL REGION: ICD-10-CM

## 2022-12-15 DIAGNOSIS — G89.29 CHRONIC PAIN: ICD-10-CM

## 2022-12-15 DIAGNOSIS — M47.812 CERVICAL SPONDYLOSIS: Primary | ICD-10-CM

## 2022-12-15 LAB — POCT GLUCOSE: 87 MG/DL (ref 70–110)

## 2022-12-15 PROCEDURE — 99152 PR MOD CONSCIOUS SEDATION, SAME PHYS, 5+ YRS, FIRST 15 MIN: ICD-10-PCS | Mod: ,,, | Performed by: ANESTHESIOLOGY

## 2022-12-15 PROCEDURE — 99152 MOD SED SAME PHYS/QHP 5/>YRS: CPT | Mod: ,,, | Performed by: ANESTHESIOLOGY

## 2022-12-15 PROCEDURE — 64634 DESTROY C/TH FACET JNT ADDL: CPT | Mod: LT,,, | Performed by: ANESTHESIOLOGY

## 2022-12-15 PROCEDURE — 99152 MOD SED SAME PHYS/QHP 5/>YRS: CPT | Performed by: ANESTHESIOLOGY

## 2022-12-15 PROCEDURE — 64634 PR DESTROY C/TH FACET JNT ADDL: ICD-10-PCS | Mod: LT,,, | Performed by: ANESTHESIOLOGY

## 2022-12-15 PROCEDURE — 64634 DESTROY C/TH FACET JNT ADDL: CPT | Mod: LT | Performed by: ANESTHESIOLOGY

## 2022-12-15 PROCEDURE — 64633 DESTROY CERV/THOR FACET JNT: CPT | Mod: LT | Performed by: ANESTHESIOLOGY

## 2022-12-15 PROCEDURE — 63600175 PHARM REV CODE 636 W HCPCS: Performed by: ANESTHESIOLOGY

## 2022-12-15 PROCEDURE — 64633 DESTROY CERV/THOR FACET JNT: CPT | Mod: LT,,, | Performed by: ANESTHESIOLOGY

## 2022-12-15 PROCEDURE — 25000003 PHARM REV CODE 250: Performed by: ANESTHESIOLOGY

## 2022-12-15 PROCEDURE — 64633 PR DESTROY CERV/THOR FACET JNT: ICD-10-PCS | Mod: LT,,, | Performed by: ANESTHESIOLOGY

## 2022-12-15 RX ORDER — LIDOCAINE HYDROCHLORIDE 20 MG/ML
INJECTION, SOLUTION INFILTRATION; PERINEURAL
Status: DISCONTINUED | OUTPATIENT
Start: 2022-12-15 | End: 2022-12-15 | Stop reason: HOSPADM

## 2022-12-15 RX ORDER — SODIUM CHLORIDE 9 MG/ML
500 INJECTION, SOLUTION INTRAVENOUS CONTINUOUS
Status: DISCONTINUED | OUTPATIENT
Start: 2022-12-15 | End: 2022-12-15 | Stop reason: HOSPADM

## 2022-12-15 RX ORDER — BUPIVACAINE HYDROCHLORIDE 2.5 MG/ML
INJECTION, SOLUTION EPIDURAL; INFILTRATION; INTRACAUDAL
Status: DISCONTINUED | OUTPATIENT
Start: 2022-12-15 | End: 2022-12-15 | Stop reason: HOSPADM

## 2022-12-15 RX ORDER — FENTANYL CITRATE 50 UG/ML
INJECTION, SOLUTION INTRAMUSCULAR; INTRAVENOUS
Status: DISCONTINUED | OUTPATIENT
Start: 2022-12-15 | End: 2022-12-15 | Stop reason: HOSPADM

## 2022-12-15 RX ORDER — MIDAZOLAM HYDROCHLORIDE 1 MG/ML
INJECTION INTRAMUSCULAR; INTRAVENOUS
Status: DISCONTINUED | OUTPATIENT
Start: 2022-12-15 | End: 2022-12-15 | Stop reason: HOSPADM

## 2022-12-15 RX ORDER — DEXAMETHASONE SODIUM PHOSPHATE 10 MG/ML
INJECTION INTRAMUSCULAR; INTRAVENOUS
Status: DISCONTINUED | OUTPATIENT
Start: 2022-12-15 | End: 2022-12-15 | Stop reason: HOSPADM

## 2022-12-15 NOTE — DISCHARGE INSTRUCTIONS

## 2022-12-15 NOTE — DISCHARGE SUMMARY
Discharge Note  Short Stay      SUMMARY     Admit Date: 12/15/2022    Attending Physician: Alex Callahan      Discharge Physician: Alex Callahan      Discharge Date: 12/15/2022 1:37 PM    Procedure(s) (LRB):  RADIOFREQUENCY ABLATION LEFT C4, C5, C6, C7  ONE OF TWO NEEDS CONSENT (Left)    Final Diagnosis: Cervical spondylosis without myelopathy [M47.812]    Disposition: Home or self care    Patient Instructions:   Current Discharge Medication List        CONTINUE these medications which have NOT CHANGED    Details   acetaminophen (TYLENOL) 500 MG tablet Take 500 mg by mouth every 6 (six) hours as needed for Pain.      albuterol (PROVENTIL/VENTOLIN HFA) 90 mcg/actuation inhaler       aspirin (ECOTRIN) 81 MG EC tablet Take 81 mg by mouth once daily.      cetirizine (ZYRTEC) 10 MG tablet Take 10 mg by mouth nightly.  Refills: 5      FOLIC ACID/MULTIVIT-MIN/LUTEIN (CENTRUM SILVER ORAL) Take by mouth once daily.      gabapentin (NEURONTIN) 300 MG capsule Take 2 capsules (600 mg total) by mouth 2 (two) times daily.  Qty: 120 capsule, Refills: 5    Associated Diagnoses: Chronic pain disorder; Lumbar spondylosis; DDD (degenerative disc disease), lumbar; S/P cervical spinal fusion; Cervical radiculopathy; Myofascial pain      HYDROcodone-acetaminophen (NORCO) 7.5-325 mg per tablet Take 1 tablet by mouth every 12 (twelve) hours as needed for Pain.  Qty: 60 tablet, Refills: 0    Comments: Patient with chronic intractable pain.  Medically necessary for > 7 days  Associated Diagnoses: Chronic pain disorder; Lumbar spondylosis; DDD (degenerative disc disease), lumbar; Facet arthritis of lumbar region; Myalgia; Spondylosis of cervical region without myelopathy or radiculopathy; S/P cervical spinal fusion      levothyroxine (SYNTHROID) 50 MCG tablet Take 50 mcg by mouth once daily.      meclizine (ANTIVERT) 25 mg tablet Take 25 mg by mouth once daily.       meloxicam (MOBIC) 15 MG tablet Take 15 mg by mouth once daily.       metFORMIN (GLUCOPHAGE) 500 MG tablet Take 1 tablet by mouth 2 (two) times a day.      methocarbamoL (ROBAXIN) 500 MG Tab Take 1 tablet (500 mg total) by mouth 2 (two) times daily.  Qty: 60 tablet, Refills: 4    Associated Diagnoses: Myofascial pain      omeprazole (PRILOSEC) 20 MG capsule Take 1 capsule (20 mg total) by mouth once daily.  Qty: 90 capsule, Refills: 3    Associated Diagnoses: Gastroesophageal reflux disease, esophagitis presence not specified      rosuvastatin (CRESTOR) 20 MG tablet Take 20 mg by mouth once daily.      valsartan (DIOVAN) 80 MG tablet Take 40 mg by mouth once daily.   Refills: 1                 Discharge Diagnosis: Cervical spondylosis without myelopathy [M47.812]  Condition on Discharge: Stable with no complications to procedure   Diet on Discharge: Same as before.  Activity: as per instruction sheet.  Discharge to: Home with a responsible adult.  Follow up: 2-4 weeks

## 2022-12-15 NOTE — OP NOTE
Therapeutic Cervical Medial Branch Radiofrequency Ablation Under Fluoroscopy     The procedure, risks, benefits, and options were discussed with the patient. There are no contraindications to the procedure. The patent expressed understanding and agreed to the procedure. Informed written consent was obtained prior to the start of the procedure and can be found in the patient's chart.        PATIENT NAME: Sal Navarro   MRN: [id number]     DATE OF PROCEDURE: 12/15/2022       PROCEDURE: Therapeutic Left C4, C5, C6, and C7 Cervical Radiofrequency Ablation under Fluoroscopy    PRE-OP DIAGNOSIS: Cervical spondylosis without myelopathy [M47.812] Cervical Spondylosis [M47.812]    POST-OP DIAGNOSIS: Same    PHYSICIAN: Alex Callahan MD    ASSISTANTS: Dr. Cohen     MEDICATIONS INJECTED:  Preservative-free Decadron 10mg with 9cc of Bupivicaine 0.25%    LOCAL ANESTHETIC INJECTED:   Xylocaine 2%    SEDATION: Versed 2mg and Fentanyl 100mcg                                                                                                                                                                                     Conscious sedation ordered by M.D. Patient re-evaluation prior to administration of conscious sedation. No changes noted in patient's status from initial evaluation. The patient's vital signs were monitored by RN and patient remained hemodynamically stable throughout the procedure.    Event Time In   Sedation Start 1322   Sedation End 1336       ESTIMATED BLOOD LOSS:  None    COMPLICATIONS:  None.     INTERVAL HISTORY: Patient has clinical and imaging findings suggestive of recurrent facet mediated pain. Patient has undergone a previous RFA at specified levels with at least 50% relief for at least 6 months. Successful diagnostic medial branch blocks have been completed within the past 2 years.    TECHNIQUE: Time-out was performed to identify the patient and procedure to be performed. With the patient laying in a  prone position, the surgical area was prepped and draped in the usual sterile fashion using ChloraPrep and fenestrated drape. The levels were determined under fluoroscopic guidance. Skin anesthesia was achieved by injecting Lidocaine 2% over the injection sites. A 20 gauge 10mm curved active tip needle was introduced to the anatomic local of the medial branch at each of the above levels using AP, lateral and/or contralateral oblique fluoroscopic imaging. Then the sensory and motor testing was performed to confirm that the needle tips were in the correct location. After negative aspiration for blood or CSF was confirmed, 1 mL of the lidocaine 2% listed above was injected slowly at each site. This was followed by thermal lesioning at 80 degrees celsius for 90 seconds. That was followed by slowly injecting 1 mL of the medication mixture listed above at each site. The needles were removed and bleeding was nil. A sterile dressing was applied. No specimens collected. The patient tolerated the procedure well and did not have any procedure related motor deficit at the conclusion of the procedure.    The patient was monitored after the procedure in the recovery area. They were given post-procedure and discharge instructions to follow at home. The patient was discharged in a stable condition.         Alex Callahan MD

## 2022-12-29 ENCOUNTER — HOSPITAL ENCOUNTER (OUTPATIENT)
Facility: OTHER | Age: 57
Discharge: HOME | End: 2022-12-29
Attending: ANESTHESIOLOGY | Admitting: ANESTHESIOLOGY
Payer: MEDICARE

## 2022-12-29 VITALS
DIASTOLIC BLOOD PRESSURE: 88 MMHG | SYSTOLIC BLOOD PRESSURE: 139 MMHG | TEMPERATURE: 98 F | RESPIRATION RATE: 16 BRPM | HEART RATE: 58 BPM | OXYGEN SATURATION: 95 %

## 2022-12-29 DIAGNOSIS — G89.29 CHRONIC PAIN: ICD-10-CM

## 2022-12-29 DIAGNOSIS — M47.812 SPONDYLOSIS OF CERVICAL REGION WITHOUT MYELOPATHY OR RADICULOPATHY: ICD-10-CM

## 2022-12-29 DIAGNOSIS — M47.812 CERVICAL SPONDYLOSIS: Primary | ICD-10-CM

## 2022-12-29 LAB — POCT GLUCOSE: 92 MG/DL (ref 70–110)

## 2022-12-29 PROCEDURE — 64634 PR DESTROY C/TH FACET JNT ADDL: ICD-10-PCS | Mod: RT,,, | Performed by: ANESTHESIOLOGY

## 2022-12-29 PROCEDURE — 99152 PR MOD CONSCIOUS SEDATION, SAME PHYS, 5+ YRS, FIRST 15 MIN: ICD-10-PCS | Mod: ,,, | Performed by: ANESTHESIOLOGY

## 2022-12-29 PROCEDURE — 82947 ASSAY GLUCOSE BLOOD QUANT: CPT | Performed by: ANESTHESIOLOGY

## 2022-12-29 PROCEDURE — 99152 MOD SED SAME PHYS/QHP 5/>YRS: CPT | Performed by: ANESTHESIOLOGY

## 2022-12-29 PROCEDURE — 64634 DESTROY C/TH FACET JNT ADDL: CPT | Mod: RT,,, | Performed by: ANESTHESIOLOGY

## 2022-12-29 PROCEDURE — 25000003 PHARM REV CODE 250: Performed by: ANESTHESIOLOGY

## 2022-12-29 PROCEDURE — 64633 DESTROY CERV/THOR FACET JNT: CPT | Mod: RT | Performed by: ANESTHESIOLOGY

## 2022-12-29 PROCEDURE — 64633 PR DESTROY CERV/THOR FACET JNT: ICD-10-PCS | Mod: RT,,, | Performed by: ANESTHESIOLOGY

## 2022-12-29 PROCEDURE — 63600175 PHARM REV CODE 636 W HCPCS: Performed by: ANESTHESIOLOGY

## 2022-12-29 PROCEDURE — 64634 DESTROY C/TH FACET JNT ADDL: CPT | Mod: RT | Performed by: ANESTHESIOLOGY

## 2022-12-29 PROCEDURE — 25000003 PHARM REV CODE 250: Performed by: STUDENT IN AN ORGANIZED HEALTH CARE EDUCATION/TRAINING PROGRAM

## 2022-12-29 PROCEDURE — 99152 MOD SED SAME PHYS/QHP 5/>YRS: CPT | Mod: ,,, | Performed by: ANESTHESIOLOGY

## 2022-12-29 PROCEDURE — 64633 DESTROY CERV/THOR FACET JNT: CPT | Mod: RT,,, | Performed by: ANESTHESIOLOGY

## 2022-12-29 RX ORDER — FENTANYL CITRATE 50 UG/ML
INJECTION, SOLUTION INTRAMUSCULAR; INTRAVENOUS
Status: DISCONTINUED | OUTPATIENT
Start: 2022-12-29 | End: 2022-12-29 | Stop reason: HOSPADM

## 2022-12-29 RX ORDER — LIDOCAINE HYDROCHLORIDE 20 MG/ML
INJECTION, SOLUTION INFILTRATION; PERINEURAL
Status: DISCONTINUED | OUTPATIENT
Start: 2022-12-29 | End: 2022-12-29 | Stop reason: HOSPADM

## 2022-12-29 RX ORDER — MIDAZOLAM HYDROCHLORIDE 1 MG/ML
INJECTION INTRAMUSCULAR; INTRAVENOUS
Status: DISCONTINUED | OUTPATIENT
Start: 2022-12-29 | End: 2022-12-29 | Stop reason: HOSPADM

## 2022-12-29 RX ORDER — SODIUM CHLORIDE 9 MG/ML
500 INJECTION, SOLUTION INTRAVENOUS CONTINUOUS
Status: DISCONTINUED | OUTPATIENT
Start: 2022-12-29 | End: 2022-12-29 | Stop reason: HOSPADM

## 2022-12-29 RX ORDER — DEXAMETHASONE SODIUM PHOSPHATE 10 MG/ML
INJECTION INTRAMUSCULAR; INTRAVENOUS
Status: DISCONTINUED | OUTPATIENT
Start: 2022-12-29 | End: 2022-12-29 | Stop reason: HOSPADM

## 2022-12-29 RX ORDER — BUPIVACAINE HYDROCHLORIDE 2.5 MG/ML
INJECTION, SOLUTION EPIDURAL; INFILTRATION; INTRACAUDAL
Status: DISCONTINUED | OUTPATIENT
Start: 2022-12-29 | End: 2022-12-29 | Stop reason: HOSPADM

## 2022-12-29 NOTE — DISCHARGE SUMMARY
Discharge Note  Short Stay      SUMMARY     Admit Date: 12/29/2022    Attending Physician: Alex Callahan      Discharge Physician: Alex Callahan      Discharge Date: 12/29/2022 11:12 AM    Procedure(s) (LRB):  RADIOFREQUENCY ABLATION RIGHT C4, C5, C6, C7  TWO OF TWO NEEDS CONSENT (Right)    Final Diagnosis: Cervical spondylosis without myelopathy [M47.812]    Disposition: Home or self care    Patient Instructions:   Current Discharge Medication List        CONTINUE these medications which have NOT CHANGED    Details   acetaminophen (TYLENOL) 500 MG tablet Take 500 mg by mouth every 6 (six) hours as needed for Pain.      albuterol (PROVENTIL/VENTOLIN HFA) 90 mcg/actuation inhaler       aspirin (ECOTRIN) 81 MG EC tablet Take 81 mg by mouth once daily.      cetirizine (ZYRTEC) 10 MG tablet Take 10 mg by mouth nightly.  Refills: 5      FOLIC ACID/MULTIVIT-MIN/LUTEIN (CENTRUM SILVER ORAL) Take by mouth once daily.      gabapentin (NEURONTIN) 300 MG capsule Take 2 capsules (600 mg total) by mouth 2 (two) times daily.  Qty: 120 capsule, Refills: 5    Associated Diagnoses: Chronic pain disorder; Lumbar spondylosis; DDD (degenerative disc disease), lumbar; S/P cervical spinal fusion; Cervical radiculopathy; Myofascial pain      HYDROcodone-acetaminophen (NORCO) 7.5-325 mg per tablet Take 1 tablet by mouth every 12 (twelve) hours as needed for Pain.  Qty: 60 tablet, Refills: 0    Comments: Patient with chronic intractable pain.  Medically necessary for > 7 days  Associated Diagnoses: Chronic pain disorder; Lumbar spondylosis; DDD (degenerative disc disease), lumbar; Facet arthritis of lumbar region; Myalgia; Spondylosis of cervical region without myelopathy or radiculopathy; S/P cervical spinal fusion      levothyroxine (SYNTHROID) 50 MCG tablet Take 50 mcg by mouth once daily.      meclizine (ANTIVERT) 25 mg tablet Take 25 mg by mouth once daily.       meloxicam (MOBIC) 15 MG tablet Take 15 mg by mouth once daily.       metFORMIN (GLUCOPHAGE) 500 MG tablet Take 1 tablet by mouth 2 (two) times a day.      methocarbamoL (ROBAXIN) 500 MG Tab Take 1 tablet (500 mg total) by mouth 2 (two) times daily.  Qty: 60 tablet, Refills: 4    Associated Diagnoses: Myofascial pain      omeprazole (PRILOSEC) 20 MG capsule Take 1 capsule (20 mg total) by mouth once daily.  Qty: 90 capsule, Refills: 3    Associated Diagnoses: Gastroesophageal reflux disease, esophagitis presence not specified      rosuvastatin (CRESTOR) 20 MG tablet Take 20 mg by mouth once daily.      valsartan (DIOVAN) 80 MG tablet Take 40 mg by mouth once daily.   Refills: 1                 Discharge Diagnosis: Cervical spondylosis without myelopathy [M47.812]  Condition on Discharge: Stable with no complications to procedure   Diet on Discharge: Same as before.  Activity: as per instruction sheet.  Discharge to: Home with a responsible adult.  Follow up: 2-4 weeks

## 2022-12-29 NOTE — H&P
HPI  Patient presenting for Procedure(s) (LRB):  RADIOFREQUENCY ABLATION RIGHT C4, C5, C6, C7  TWO OF TWO NEEDS CONSENT (Right)     Patient on Anti-coagulation No    No health changes since previous encounter    Past Medical History:   Diagnosis Date    Arthritis     Cataract     Cervical back pain with evidence of disc disease     Hiatal hernia     Hypertension     Thyroid disease      Past Surgical History:   Procedure Laterality Date    ACROMIOCLAVICULAR JOINT CYST EXCISION Right     BACK SURGERY      cataracts Bilateral     CERVICAL FUSION      COLONOSCOPY N/A 4/27/2018    Procedure: COLONOSCOPY;  Surgeon: Giovani Medeiros MD;  [facility]  Service: Endoscopy;  Laterality: N/A;    EPIDURAL STEROID INJECTION N/A 10/11/2019    Procedure: INJECTION, STEROID, EPIDURAL;  Surgeon: Kasandra Montoya MD;  Location: [facility] PAIN MGT;  Service: Pain Management;  Laterality: N/A;  Lumbar NGUYEN L4/5    NGUYEN      EYE SURGERY      INJECTION OF JOINT Bilateral 7/14/2022    Procedure: INJECTION, JOINT, SI BILATERAL  needs consent;  Surgeon: Alex Callahan MD;  Location: [facility] PAIN MGT;  Service: Pain Management;  Laterality: Bilateral;    INJECTION, SACROILIAC JOINT Bilateral 11/4/2022    Procedure: INJECTION,SACROILIAC JOINT BILATERAL CONTRAST;  Surgeon: Alex Callahan MD;  Location: [facility] PAIN MGT;  Service: Pain Management;  Laterality: Bilateral;    RADIOFREQUENCY ABLATION  10/2016    cervical spine/Montoya    RADIOFREQUENCY ABLATION Left 5/3/2019    Procedure: RADIOFREQUENCY ABLATION, L2-L5 MEDIALBRANCH;  Surgeon: Kasandra Montoya MD;  Location: [facility] PAIN MGT;  Service: Pain Management;  Laterality: Left;    RADIOFREQUENCY ABLATION Left 6/13/2019    Procedure: RADIOFREQUENCY ABLATION C4-7;  Surgeon: Kasandra Montoya MD;  Location: [facility] PAIN MGT;  Service: Pain Management;  Laterality: Left;    RADIOFREQUENCY ABLATION Left 3/9/2021    Procedure: RADIOFREQUENCY ABLATION C4,C5,C6,C7;  Surgeon: Alex Callahan  MD;  Location: [facility] PAIN MGT;  Service: Pain Management;  Laterality: Left;  1 of 2    RADIOFREQUENCY ABLATION Right 3/26/2021    Procedure: RADIOFREQUENCY ABLATION C4,C6,C6,C7;  Surgeon: Alex Callahan MD;  Location: [facility] PAIN MGT;  Service: Pain Management;  Laterality: Right;  2 of 2    RADIOFREQUENCY ABLATION Right 4/23/2021    Procedure: RADIOFREQUENCY ABLATION L3,4,5;  Surgeon: Alex Callahan MD;  Location: [facility] PAIN MGT;  Service: Pain Management;  Laterality: Right;  1 of 2    RADIOFREQUENCY ABLATION Left 5/7/2021    Procedure: RADIOFREQUENCY ABLATION L3,4,5;  Surgeon: Alex Callahan MD;  Location: [facility] PAIN MGT;  Service: Pain Management;  Laterality: Left;  2 of 2    RADIOFREQUENCY ABLATION Left 10/1/2021    Procedure: RADIOFREQUENCY ABLATION LEFT, C4,5,6,7 1 of 2 CONSENT NEEDED;  Surgeon: Alex Callahan MD;  Location: [facility] PAIN MGT;  Service: Pain Management;  Laterality: Left;    RADIOFREQUENCY ABLATION Right 10/15/2021    Procedure: RADIOFREQUENCY ABLATION  RIGHT C4,5,6,7 2 of 2 CONSENT NEEDED;  Surgeon: Alex Callahan MD;  Location: [facility] PAIN MGT;  Service: Pain Management;  Laterality: Right;    RADIOFREQUENCY ABLATION Left 2/25/2022    Procedure: RADIOFREQUENCY ABLATION LEFT L3,4,5 1 of 2, needs consent;  Surgeon: Alex Callahan MD;  Location: [facility] PAIN MGT;  Service: Pain Management;  Laterality: Left;    RADIOFREQUENCY ABLATION Right 3/11/2022    Procedure: RADIOFREQUENCY ABLATION RIGHT L3,4,5 2 of 2, needs consent;  Surgeon: Alex Callahan MD;  Location: [facility] PAIN [facility]GT;  Service: Pain Management;  Laterality: Right;    RADIOFREQUENCY ABLATION Left 12/15/2022    Procedure: RADIOFREQUENCY ABLATION LEFT C4, C5, C6, C7  ONE OF TWO NEEDS CONSENT;  Surgeon: Alex Callahan MD;  Location: [facility] PAIN MGT;  Service: Pain Management;  Laterality: Left;    RETINAL DETACHMENT SURGERY      ROTATOR CUFF REPAIR Right     SPINE SURGERY      cerival fusion     TRANSFORAMINAL EPIDURAL INJECTION OF  STEROID Right 4/14/2022    Procedure: INJECTION, STEROID, EPIDURAL, TRANSFORAMINAL APPROACH RIGHT L4/5 & L5/S1 Needs Consent;  Surgeon: Alex Callahan MD;  Location: [facility] [place]GT;  Service: Pain Management;  Laterality: Right;    TRIGGER POINT INJECTION Left 6/13/2019    Procedure: INJECTION, TRIGGER POINT;  Surgeon: Kasandra Montoya MD;  Location: [facility] [place]IN [facility]GT;  Service: Pain Management;  Laterality: Left;     Review of patient's allergies indicates:   Allergen Reactions    Pcn [penicillins] Hives and Rash    Lipitor [atorvastatin] Other (See Comments)     Muscle cramps, weakness      No current facility-administered medications for this encounter.       PMHx, PSHx, Allergies, Medications reviewed in epic    ROS negative except pain complaints in HPI    OBJECTIVE:    There were no vitals taken for this visit.    PHYSICAL EXAMINATION:    GENERAL: Well appearing, in no acute distress, alert and oriented x3.  PSYCH:  Mood and affect appropriate.  SKIN: Skin color, texture, turgor normal, no rashes or lesions which will impact the procedure.  CV: RRR with palpation of the radial artery.  PULM: No evidence of respiratory difficulty, symmetric chest rise. Clear to auscultation.  NEURO: Cranial nerves grossly intact.    Plan:    Proceed with procedure as planned Procedure(s) (LRB):  RADIOFREQUENCY ABLATION RIGHT C4, C5, C6, C7  TWO OF TWO NEEDS CONSENT (Right)    Fredy Casper  12/29/2022

## 2022-12-29 NOTE — DISCHARGE INSTRUCTIONS
Thank you for allowing us to care for you today. You may receive a survey about the care we provided. Your feedback is valuable and helps us provide excellent care throughout the community.     Home Care Instructions for Pain Management:    1. DIET:   You may resume your normal diet today.   2. BATHING:   You may shower with luke warm water. No tub baths or anything that will soak injection sites under water for the next 24 hours.  3. DRESSING:   You may remove your bandage today.   4. ACTIVITY LEVEL:   You may resume your normal activities 24 hrs after your procedure. Nothing strenuous today.  5. MEDICATIONS:   You may resume your normal medications today. To restart blood thinners, ask your doctor.  6. DRIVING    If you have received any sedatives by mouth today, you may not drive for 12 hours.    If you have received any sedation through your IV, you may not drive for 24 hrs.   7. SPECIAL INSTRUCTIONS:   No heat to the injection site for 24 hrs including, hot bath or shower, heating pad, moist heat, or hot tubs.    Use ice pack to injection site for any pain or discomfort.  Apply ice packs for 20 minute intervals as needed.    IF you have diabetes, be sure to monitor your blood sugar more closely. IF your injection contained steroids your blood sugar levels may become higher than normal.    If you are still having pain upon discharge:  Your pain may improve over the next 48 hours. The anesthetic (numbing medication) works immediately to 48 hours. IF your injection contained a steroid (anti-inflammatory medication), it takes approximately 3 days to start feeling relief and 7-10 days to see your greatest results from the medication. It is possible you may need subsequent injections. This would be discussed at your follow up appointment with pain management or your referring doctor.    Please call the PAIN MANAGEMENT office at [phone] or ON CALL pager at [phone] if you experienced any:   -Weakness or  loss of sensation  -Fever > 101.5  -Pain uncontrolled with oral medications   -Persistent nausea, vomiting, or diarrhea  -Redness or drainage from the injection sites, or any other worrisome concerns.   If physician on call was not reached or could not communicate with our office for any reason please go to the nearest emergency department.   Thank you for allowing us to care for you today. You may receive a survey about the care we provided. Your feedback is valuable and helps us provide excellent care throughout the community.  Adult Procedural Sedation Instructions    Recovery After Procedural Sedation (Adult)  You have been given medicine by vein to make you sleep during your surgery. This may have included both a pain medicine and sleeping medicine. Most of the effects have worn off. But you may still have some drowsiness for the next 6 to 8 hours.  Home care  Follow these guidelines when you get home:  For the next 8 hours, you should be watched by a responsible adult. This person should make sure your condition is not getting worse.  Don't drink any alcohol for the next 24 hours.  Don't drive, operate dangerous machinery, or make important business or personal decisions during the next 24 hours.  Note: Your healthcare provider may tell you not to take any medicine by mouth for pain or sleep in the next 4 hours. These medicines may react with the medicines you were given in the hospital. This could cause a much stronger response than usual.  Follow-up care  Follow up with your healthcare provider if you are not alert and back to your usual level of activity within 12 hours.  When to seek medical advice  Call your healthcare provider right away if any of these occur:  Drowsiness gets worse  Weakness or dizziness gets worse  Repeated vomiting  You can't be awakened   Date Last Reviewed: 10/18/2016  © 5009-3753 [employer]. [address] [place] [address], [place] [address]. All rights reserved. This  information is not intended as a substitute for professional medical care. Always follow your healthcare professional's instructions.

## 2022-12-29 NOTE — OP NOTE
Therapeutic Cervical Medial Branch Radiofrequency Ablation Under Fluoroscopy     The procedure, risks, benefits, and options were discussed with the patient. There are no contraindications to the procedure. The patent expressed understanding and agreed to the procedure. Informed written consent was obtained prior to the start of the procedure and can be found in the patient's chart.        PATIENT NAME: Sal Navarro   MRN: [id number]     DATE OF PROCEDURE: 12/29/2022       PROCEDURE: Therapeutic Right C4, C5, C6, and C7 Cervical Radiofrequency Ablation under Fluoroscopy    PRE-OP DIAGNOSIS: Cervical spondylosis without myelopathy [M47.812] Cervical Spondylosis [M47.812]    POST-OP DIAGNOSIS: Same    PHYSICIAN: Alex Callahan MD    ASSISTANTS: Rafael Cohen MD     MEDICATIONS INJECTED:  Preservative-free Decadron 10mg with 9cc of Bupivicaine 0.5%    LOCAL ANESTHETIC INJECTED:   Xylocaine 2%    SEDATION: Versed 2mg and Fentanyl 100mcg                                                                                                                                                                                     Conscious sedation ordered by M.D. Patient re-evaluation prior to administration of conscious sedation. No changes noted in patient's status from initial evaluation. The patient's vital signs were monitored by RN and patient remained hemodynamically stable throughout the procedure.    Event Time In   Sedation Start 1056   Sedation End 1111       ESTIMATED BLOOD LOSS:  None    COMPLICATIONS:  None.     INTERVAL HISTORY: Patient has clinical and imaging findings suggestive of facet mediated pain. Patients has completed 2 previous diagnostic medial branch blocks at specified levels with at least 80% relief for the expected duration of the local anesthetic utilized.    TECHNIQUE: Time-out was performed to identify the patient and procedure to be performed. With the patient laying in a prone position, the  surgical area was prepped and draped in the usual sterile fashion using ChloraPrep and fenestrated drape. The levels were determined under fluoroscopic guidance. Skin anesthesia was achieved by injecting Lidocaine 2% over the injection sites. A 20 gauge 10mm curved active tip needle was introduced to the anatomic local of the medial branch at each of the above levels using AP, lateral and/or contralateral oblique fluoroscopic imaging. Then the sensory and motor testing was performed to confirm that the needle tips were in the correct location. After negative aspiration for blood or CSF was confirmed, 1 mL of the lidocaine 2% listed above was injected slowly at each site. This was followed by thermal lesioning at 80 degrees celsius for 90 seconds. That was followed by slowly injecting 1 mL of the medication mixture listed above at each site. The needles were removed and bleeding was nil. A sterile dressing was applied. No specimens collected. The patient tolerated the procedure well and did not have any procedure related motor deficit at the conclusion of the procedure.    The patient was monitored after the procedure in the recovery area. They were given post-procedure and discharge instructions to follow at home. The patient was discharged in a stable condition.         Alex Callahan MD

## 2023-01-31 ENCOUNTER — PATIENT MESSAGE (OUTPATIENT)
Dept: PAIN MEDICINE | Facility: OTHER | Age: 58
End: 2023-01-31
Payer: MEDICARE

## 2023-01-31 ENCOUNTER — OFFICE VISIT (OUTPATIENT)
Dept: PAIN MEDICINE | Facility: CLINIC | Age: 58
End: 2023-01-31
Payer: MEDICARE

## 2023-01-31 VITALS
HEART RATE: 79 BPM | HEIGHT: 73 IN | BODY MASS INDEX: 32.9 KG/M2 | RESPIRATION RATE: 19 BRPM | TEMPERATURE: 98 F | DIASTOLIC BLOOD PRESSURE: 77 MMHG | SYSTOLIC BLOOD PRESSURE: 127 MMHG | WEIGHT: 248.25 LBS

## 2023-01-31 DIAGNOSIS — M53.3 SACROILIAC JOINT PAIN: ICD-10-CM

## 2023-01-31 DIAGNOSIS — Z79.891 ENCOUNTER FOR MONITORING OPIOID MAINTENANCE THERAPY: ICD-10-CM

## 2023-01-31 DIAGNOSIS — M50.30 DDD (DEGENERATIVE DISC DISEASE), CERVICAL: ICD-10-CM

## 2023-01-31 DIAGNOSIS — Z51.81 ENCOUNTER FOR MONITORING OPIOID MAINTENANCE THERAPY: ICD-10-CM

## 2023-01-31 DIAGNOSIS — M47.816 LUMBAR SPONDYLOSIS: ICD-10-CM

## 2023-01-31 DIAGNOSIS — M51.36 DDD (DEGENERATIVE DISC DISEASE), LUMBAR: ICD-10-CM

## 2023-01-31 DIAGNOSIS — Z98.1 S/P CERVICAL SPINAL FUSION: ICD-10-CM

## 2023-01-31 DIAGNOSIS — G89.4 CHRONIC PAIN DISORDER: Primary | ICD-10-CM

## 2023-01-31 DIAGNOSIS — M47.812 SPONDYLOSIS OF CERVICAL REGION WITHOUT MYELOPATHY OR RADICULOPATHY: ICD-10-CM

## 2023-01-31 PROCEDURE — 99213 PR OFFICE/OUTPT VISIT, EST, LEVL III, 20-29 MIN: ICD-10-PCS | Mod: S$GLB,,, | Performed by: NURSE PRACTITIONER

## 2023-01-31 PROCEDURE — 1160F PR REVIEW ALL MEDS BY PRESCRIBER/CLIN PHARMACIST DOCUMENTED: ICD-10-PCS | Mod: CPTII,S$GLB,, | Performed by: NURSE PRACTITIONER

## 2023-01-31 PROCEDURE — 3074F SYST BP LT 130 MM HG: CPT | Mod: CPTII,S$GLB,, | Performed by: NURSE PRACTITIONER

## 2023-01-31 PROCEDURE — 3008F PR BODY MASS INDEX (BMI) DOCUMENTED: ICD-10-PCS | Mod: CPTII,S$GLB,, | Performed by: NURSE PRACTITIONER

## 2023-01-31 PROCEDURE — 3008F BODY MASS INDEX DOCD: CPT | Mod: CPTII,S$GLB,, | Performed by: NURSE PRACTITIONER

## 2023-01-31 PROCEDURE — 1159F MED LIST DOCD IN RCRD: CPT | Mod: CPTII,S$GLB,, | Performed by: NURSE PRACTITIONER

## 2023-01-31 PROCEDURE — 1159F PR MEDICATION LIST DOCUMENTED IN MEDICAL RECORD: ICD-10-PCS | Mod: CPTII,S$GLB,, | Performed by: NURSE PRACTITIONER

## 2023-01-31 PROCEDURE — 99999 PR PBB SHADOW E&M-EST. PATIENT-LVL IV: ICD-10-PCS | Mod: PBBFAC,,, | Performed by: NURSE PRACTITIONER

## 2023-01-31 PROCEDURE — 3078F PR MOST RECENT DIASTOLIC BLOOD PRESSURE < 80 MM HG: ICD-10-PCS | Mod: CPTII,S$GLB,, | Performed by: NURSE PRACTITIONER

## 2023-01-31 PROCEDURE — 80326 AMPHETAMINES 5 OR MORE: CPT | Performed by: NURSE PRACTITIONER

## 2023-01-31 PROCEDURE — 3074F PR MOST RECENT SYSTOLIC BLOOD PRESSURE < 130 MM HG: ICD-10-PCS | Mod: CPTII,S$GLB,, | Performed by: NURSE PRACTITIONER

## 2023-01-31 PROCEDURE — 3078F DIAST BP <80 MM HG: CPT | Mod: CPTII,S$GLB,, | Performed by: NURSE PRACTITIONER

## 2023-01-31 PROCEDURE — 99999 PR PBB SHADOW E&M-EST. PATIENT-LVL IV: CPT | Mod: PBBFAC,,, | Performed by: NURSE PRACTITIONER

## 2023-01-31 PROCEDURE — 4010F ACE/ARB THERAPY RXD/TAKEN: CPT | Mod: CPTII,S$GLB,, | Performed by: NURSE PRACTITIONER

## 2023-01-31 PROCEDURE — 1160F RVW MEDS BY RX/DR IN RCRD: CPT | Mod: CPTII,S$GLB,, | Performed by: NURSE PRACTITIONER

## 2023-01-31 PROCEDURE — 4010F PR ACE/ARB THEARPY RXD/TAKEN: ICD-10-PCS | Mod: CPTII,S$GLB,, | Performed by: NURSE PRACTITIONER

## 2023-01-31 PROCEDURE — 99213 OFFICE O/P EST LOW 20 MIN: CPT | Mod: S$GLB,,, | Performed by: NURSE PRACTITIONER

## 2023-01-31 NOTE — PROGRESS NOTES
Chronic patient Established Note (Follow up visit)      Interval History 1/31/2023:  The patient returns to clinic today for follow up of neck and back pain. He is s/p left C4,5,6,7 on 12/15/2022 and right C4,5,6,7 RFA on 12/29/2022. He reports 50% relief of his neck pain. He reports intermittent neck pain, left side greater than right. He denies any radicular arm pain. He does report pain into the shoulder blades. He reports increased low back pain. He denies any radicular leg pain. His pain is worse with standing, walking, and activity. He does endorse stiffness. He is taking Gabapentin and Robaxin. He also takes Norco sparingly with benefit. He denies any other health changes. His pain today is 6/10.    Interval History 11/18/2022:  The patient returns to clinic today for follow up of neck and back pain. He is s/p bilateral SI joint injections on 11/4/2022. He reports 60% relief of his low back and buttock pain. He continues to report low back pain. He denies any radicular leg pain. He reports increased neck pain, left side greater than right. He denies any radicular arm. He does report some pain into his shoulder blades. His pain is worse with activity. He continues to take Gabapentin and Robaxin. He continues to take Norco as needed sparingly with benefit. He denies any other health changes. His pain today is 7/10.    Interval History 10/27/2022:  The patient returns to clinic today for follow up of neck and back pain. He reports increased low back and bilateral buttock pain. He denies any radicular leg pain. His pain is worse with prolonged sitting and moving from sitting to standing. He does endorse a fall recently while getting out of the vehicle onto his side. He continues to report neck pain. He continues to take Gabapentin and Robaxin. He continues to take Norco sparingly with benefit and without adverse effects. He denies any other health changes. His pain today is 6/10.    Interval History  8/2/2022:  The aptient returns to clinic today for follow up of neck and back pain. He is s/p bilateral SI joint injection on 7/14/2022. He reports 50-60% relief of his low back and buttock pain. He reports intermittent low back and buttock pain. He reports increased neck pain, left side greater than right. He denies any radicular arm pain. He has good days and bad days. He continues to take Gabapentin. He continues to take Norco sparingly with benefit and without adverse effects. He denies any other health changes. His pain today is 5/10.    Interval History 7/1/2022:  The patient returns to clinic today for follow up of neck and back pain. He reports increased low back and buttock pain.His pain is worse with prolonged sitting and moving from sitting to standing. He also reports increased pain with bending over. He does report intermittent radiating pain into his right posterior thigh stopping at mid thigh. He does report a fall, which was sitting hard on the ground about a week ago. He denies any acute injury. He continues to take Gabapentin and Norco. He denies any other health changes. His pain today is 6/10.     Interval History 5/9/2022:  The patient returns to clinic today for follow up of back pain. He is s/p right L4/5 and L5/S1 TF NGUYEN on 4/14/2022. He reports 50-60% relief of his low back and leg pain. He has been more active since the procedure. He continues to report low back and hip pain, worse with bending and activity. He denies any radicular leg pain. He continues to repot intermittent neck pain. He continues to take Gabapentin with benefit. He continues to take Norco sparingly as needed. He denies any other health changes. His pain today is 5/10.    Interval History 4/1/2022:  The patient returns to clinic today for follow up of back pain. He is s/p left L3,4,5 RFA on 2/25/2022 and right L3,4,5 RFA on 3/11/2022. He reports relief of his back pain. He reports increased back pain that radiates into  "the posterior aspect of his right leg to his knee. He denies any left leg pain. His pain is worse with prolonged walking. He reports that he sometimes drags his right leg. He denies any falls. He denies any bowel or bladder incontinence. He continues to take Gabapentin and Norco as needed with benefit. He denies any adverse effects. He denies any other health changes. His pain today is 7/10.    Interval History 2/15/2022:  The patient returns to clinic today for follow up of neck and back pain. He reports increased low back pain over the last two weeks. He describes this pain as sharp and aching in nature. He does report intermittent "catching" pain in his lower back, worse with getting out of bed. He denies any radicular leg pain. He continues to report benefit from previous cervical procedures. He reports intermittent neck pain. He continues to take Gabapentin with benefit. He continues to take Norco as needed with benefit and without adverse effects. He denies any other health changes. His pain today is 6/10.    Interval History 11/5/2021:  The patient returns to clinic today for follow up of neck and back pain. He is s/p left C4,5,6,7 RFA on 10/1/2021 and right C4,5,6,7 RFA on 10/15/2021. He reports 50% relief of his neck pain. He reports increased burning and itching pain to his skin near injection sites. He denies any radicular arm pain. He does report increased left sided muscle pain. He is concerned that one of the screws in his cervical hardware is moving. He continues to report benefit with previous lumbar procedures. He reports intermittent low back pain without radicular leg pain. He continues to take Gabapentin with benefit. He continues to take Norco as needed with benefit and without adverse effects. He denies any weakness. He denies any other health changes. His pain today is 5/10.    Interval History 9/17/2021:  The patient returns to clinic today for follow up of neck and back pain. He reports " increased neck pain. He denies any radicular arm pain today. He does report intermittent radiating pain into his left shoulder blade and mid back. His pain is worse with activity. He reports that sometimes his pain takes his breath away. He continues to report low back pain, that is tolerable at this time. He continues to take Gabapentin and Norco with benefit and without adverse effects. He denies any other health changes. His pain today is 6/10.    Interval History 6/17/2021:  The patient returns to clinic today for follow up of neck and back pain. He is s/p right L3,4,5 RFA on 4/23/2021 and left L3,4,5 RFA on 5/7/2021. He reports 50% relief of his low back pain. He continues to report intermittent low back pain. He denies any radicular leg pain. He continues to report neck pain, especially in between the scapula. His pain is worse with prolonged standing, walking, and activity. He continues to take Gabapentin with benefit. He continues to take Norco sparingly for severe pain with benefit and without adverse effects. He denies any other health changes. His pain today is 4/10.    Interval History 4/9/2021:  The patient returns to clinic today for follow up of neck and back pain. He is s/p left C4,5,6,7 RFA on 3/9/2021 and right C4,5,6,7 RFA on 3/26/2021. He reports 50% relief of his neck pain. He reports intermittent neck pain. He has good days and bad days. He continues to report low back pain that is constant, sharp, and aching. He reports intermittent radiating pain into the lateral aspect of his left leg to his knee. He continues to take Gabapentin with benefit. He continues to take Norco as needed sparingly for severe pain. He denies any adverse effects. He denies any other health changes. His pain today is 5/10.    Interval History 3/2/2021:  Sal Sebastian Navarro presents to the clinic for a follow-up appointment for neck pain . Since the last visit, Sal Cortes Melanie states the pain has been worsening.  "Current pain intensity is 6/10.  Was seen by Angelique Barahona NP on 2/12/2021.     Interval History 2/12/2021:  The patient returns to clinic today for follow up of back pain. He has not been seen in over a year. He did not have imaging due to coronavirus outbreak. He would to reschedule this. He was also considering SCS trial prior to the pandemic. He continues to report low back pain that radiates into the lateral aspect of his left leg to his knee. He denies any radicular right leg pain. His pain is worse with bending and walking. He continues to take Gabapentin with benefit. He also takes Norco as needed for severe pain. He denies any adverse effects. He denies any other health changes. His pain today is 6/10.    HPI:  Sal Navarro is a 56 y.o. male who presents today s/p C4-7 ACDF with C5/6 PSIF in 2/2016 with residual chronic midline neck pain that radiates into his shoulder blades bilaterally, R>>L.  This is associated with bilateral hand numbness and tingling.  The hand symptoms did improve following the surgery.  The shooting pains in his arms resolved completely.   This pain is described in detail below.  His post-operative course was complicated by dysphagia that is being treated with speech therapy.  The numbness and the tingling were relieved by the surgery. Now experiences "deep pain along the spine"  Patient has not been seen for over a year, had to re-schedule his imaging studies due to pandemic(now completed). Prior to the pandemic, he was considering a SCS.   Only time he gets relief is laying in bed on a very thin pillow, but that doesn't last very long.   Pain aggravated by movement and even breathing/playing with his grandchild/holding the grandchild's hand. Heat and massage (has a vest) are helpful.   Was seen by Dr. Jan srivastava and received multiple EIS and RFAs.   Compliant with his medication.     Pain Disability Index Review:  Last 3 PDI Scores 1/31/2023 11/18/2022 10/27/2022   Pain " Disability Index (PDI) 22 [phone] [id number]       Pain Medications:  Gabapentin  Norco sparingly  Robaxin    Opioid Contract: yes     report:  Reviewed and consistent with medication use as prescribed.    Pain Procedures:   12/29/2022- Right C4,5,6,7 RFA  12/15/2022- Left C4,5,6,7 RFA  11/4/2022- Bilateral SI joint injections- 60% relief   4/14/2022- Right L4/5 and L5/S1 TF NGUYEN  3/11/2022- Right L3,4,5 RFA  2/25/2022- Left L3,4,5 RFA  10/15/2021- Right C4,5,6,7 RFA  10/1/2021- Left C4,5,6,7 RFA  5/7/2021- Left L3,4,5 RFA   4/23/2021- Right L3,4,5 RFA  3/26/2021- Right C4,5,6,7 RFA  3/9/2021- Left C4,5,6,7 RFA  10/11/19: L4/5 Interlaminar Epidural Steroid Injection  06/13/19- Left C4-7 RFA  05/03/19:Left L2-5 Lumbar Medial Branch Nerve Thermal Radiofrequency Ablation  12/21/18:Right L2-5 Lumbar Medial Branch Nerve Thermal Radiofrequency Ablation  11/23/18:Cervical Thermal Radiofreqiency Ablation: Right C4-7  09/14/18:C7/V8Nteaoler Steroid Injection  06/29/18:LL2/3 Interlaminar Epidural Steroid Injection   03/06/18:L2/3 Interlaminar Epidural Steroid Injection   09/26/17:Bilateral L2-5 Lumbar Medial Branch Nerve Coolief Thermal Radiofrequency Ablation  08/31/17:Bilateral L2-5 Lumbar medial branch blocks   03/28/17:Cervical Conventional Radiofreqiency Ablation: Left C4-7  03/14/17:Cervical Conventional Radiofreqiency Ablation: Right C4-7  10/25/16:Cervical Conventional Radiofreqiency Ablation: Left C4-7  10/11/16: Cervical Conventional Radiofreqiency Ablation: Right C4-7  10/04/16- Cervical Medial Branch Blocks, Bilateral C4-7.     08/31/16:C7/R7Iuqyjzne Steroid Injection    Physical Therapy/Home Exercise: does home exercises now, but not in formal PT.    - 2019 was last time in PT     Imaging:   MRI Lumbar Spine 2/25/2021:  COMPARISON:  07/11/2017     FINDINGS:  The vertebral bodies maintain normal height, signal intensity and alignment.  There is redemonstration of few hemangiomas at multiple levels.  The intervertebral disc  spaces are preserved although there is some disc desiccation at multiple levels.  The conus terminates at level L1.  Evaluation of the localizer images and structures surrounding the lumbar spine shows no abnormalities.     L1-L2: No significant disc or joint pathology.     L2-L3: Mild diffuse disc bulge with mild bilateral facet arthropathy and ligamentum flavum hypertrophy results in mild central canal stenosis.  Mild bilateral neural foraminal stenosis is also identified.     L3-L4: There is a diffuse disc bulge with no significant facet arthropathy or ligamentum flavum hypertrophy.  There is mild central canal stenosis and mild bilateral neural foraminal stenosis.     L4-L5: There is a diffuse disc bulge with ligamentum flavum hypertrophy.  No significant facet arthropathy.  There is mild central canal stenosis with only minimal encroachment on the left neural foramen.  Mild right neural foraminal stenosis is present.     L5-S1: There is a diffuse disc bulge with mild to moderate right facet arthropathy.  No left facet arthropathy.  The ligamentum flavum is normal.  The central canal is patent and the neural foramina are largely patent.  Incidental note is made of an annulus fibrosus tear posteriorly measuring approximately 2 mm.     Impression:     Multilevel degenerative disc and joint disease which is mild and results in mild central canal and neural foraminal stenosis as outlined above.    Xray Cervical Spine 2/25/2021:  COMPARISON:  07/07/2017 .     FINDINGS:  The patient has undergone ACDF from C4 through C7. The instrumentation is intact without evidence of complication.  The vertebral bodies maintain normal height and alignment. The remaining intervertebral disc spaces are preserved.  No significant uncovertebral joint hypertrophy.  The prevertebral soft tissues, odontoid views and lung apices are normal. The neural foramen are patent     Impression:     Postsurgical changes to the thoracic spine without  additional evidence for degenerative disc or joint disease.     Allergies:   Review of patient's allergies indicates:   Allergen Reactions    Pcn [penicillins] Hives and Rash    Lipitor [atorvastatin] Other (See Comments)     Muscle cramps, weakness       Current Medications:   Current Outpatient Medications   Medication Sig Dispense Refill    acetaminophen (TYLENOL) 500 MG tablet Take 500 mg by mouth every 6 (six) hours as needed for Pain.      albuterol (PROVENTIL/VENTOLIN HFA) 90 mcg/actuation inhaler       aspirin (ECOTRIN) 81 MG EC tablet Take 81 mg by mouth once daily.      cetirizine (ZYRTEC) 10 MG tablet Take 10 mg by mouth nightly.  5    FOLIC ACID/MULTIVIT-MIN/LUTEIN (CENTRUM SILVER ORAL) Take by mouth once daily.      HYDROcodone-acetaminophen (NORCO) 7.5-325 mg per tablet Take 1 tablet by mouth every 12 (twelve) hours as needed for Pain. 60 tablet 0    levothyroxine (SYNTHROID) 50 MCG tablet Take 50 mcg by mouth once daily.      meclizine (ANTIVERT) 25 mg tablet Take 25 mg by mouth once daily.       meloxicam (MOBIC) 15 MG tablet Take 15 mg by mouth once daily.      metFORMIN (GLUCOPHAGE) 500 MG tablet Take 1 tablet by mouth 2 (two) times a day.      methocarbamoL (ROBAXIN) 500 MG Tab Take 1 tablet (500 mg total) by mouth 2 (two) times daily. 60 tablet 4    omeprazole (PRILOSEC) 20 MG capsule Take 1 capsule (20 mg total) by mouth once daily. 90 capsule 3    rosuvastatin (CRESTOR) 20 MG tablet Take 20 mg by mouth once daily.      valsartan (DIOVAN) 80 MG tablet Take 40 mg by mouth once daily.   1    gabapentin (NEURONTIN) 300 MG capsule Take 2 capsules (600 mg total) by mouth 2 (two) times daily. 120 capsule 5     No current facility-administered medications for this visit.       REVIEW OF SYSTEMS:    GENERAL:  No weight loss, malaise or fevers.  HEENT:  Negative for frequent or significant headaches.  NECK:  Negative for lumps, goiter  and significant neck swelling.   RESPIRATORY:  Negative for cough,  wheezing or shortness of breath.  CARDIOVASCULAR:  Negative for chest pain, leg swelling or palpitations. HTN  GI:  Negative for abdominal discomfort, blood in stools or black stools or change in bowel habits.  MUSCULOSKELETAL:  See HPI.  SKIN:  Negative for lesions, rash, and itching.  PSYCH:  Negative for sleep disturbance, mood disorder and recent psychosocial stressors.  HEMATOLOGY/LYMPHOLOGY:  Negative for prolonged bleeding, bruising easily or swollen nodes.  NEURO:   No history of headaches, syncope, paralysis, seizures or tremors.   ENDO: Thyroid disorder.   All other reviewed and negative other than HPI.    Past Medical History:  Past Medical History:   Diagnosis Date    Arthritis     Cataract     Cervical back pain with evidence of disc disease     Hiatal hernia     Hypertension     Thyroid disease        Past Surgical History:  Past Surgical History:   Procedure Laterality Date    ACROMIOCLAVICULAR JOINT CYST EXCISION Right     BACK SURGERY      cataracts Bilateral     CERVICAL FUSION      COLONOSCOPY N/A 4/27/2018    Procedure: COLONOSCOPY;  Surgeon: Giovani Medeiros MD;  Location: [address]);  Service: Endoscopy;  Laterality: N/A;    EPIDURAL STEROID INJECTION N/A 10/11/2019    Procedure: INJECTION, STEROID, EPIDURAL;  Surgeon: Kasandra Montoya MD;  Location: [facility] PAIN T;  Service: Pain Management;  Laterality: N/A;  Lumbar NGUYEN L4/5    NGUYEN      EYE SURGERY      INJECTION OF JOINT Bilateral 7/14/2022    Procedure: INJECTION, JOINT, SI BILATERAL  needs consent;  Surgeon: Alex Callahan MD;  Location: [facility] PAIN T;  Service: Pain Management;  Laterality: Bilateral;    INJECTION, SACROILIAC JOINT Bilateral 11/4/2022    Procedure: INJECTION,SACROILIAC JOINT BILATERAL CONTRAST;  Surgeon: Alex Callahan MD;  Location: [facility]IN T;  Service: Pain Management;  Laterality: Bilateral;    RADIOFREQUENCY ABLATION  10/2016    cervical spine/Jan    RADIOFREQUENCY ABLATION Left 5/3/2019     Procedure: RADIOFREQUENCY ABLATION, L2-L5 MEDIALBRANCH;  Surgeon: Kasandra Montoya MD;  Location: [facility]IN [facility]GT;  Service: Pain Management;  Laterality: Left;    RADIOFREQUENCY ABLATION Left 6/13/2019    Procedure: RADIOFREQUENCY ABLATION C4-7;  Surgeon: Kasandra Montoya MD;  Location: [facility]IN [facility]GT;  Service: Pain Management;  Laterality: Left;    RADIOFREQUENCY ABLATION Left 3/9/2021    Procedure: RADIOFREQUENCY ABLATION C4,C5,C6,C7;  Surgeon: Alex Callahan MD;  Location: [facility];  Service: Pain Management;  Laterality: Left;  1 of 2    RADIOFREQUENCY ABLATION Right 3/26/2021    Procedure: RADIOFREQUENCY ABLATION C4,C6,C6,C7;  Surgeon: Alex Callahan MD;  Location: [facility];  Service: Pain Management;  Laterality: Right;  2 of 2    RADIOFREQUENCY ABLATION Right 4/23/2021    Procedure: RADIOFREQUENCY ABLATION L3,4,5;  Surgeon: Alex Callahan MD;  Location: [facility]IN [facility]GT;  Service: Pain Management;  Laterality: Right;  1 of 2    RADIOFREQUENCY ABLATION Left 5/7/2021    Procedure: RADIOFREQUENCY ABLATION L3,4,5;  Surgeon: Alex Callahan MD;  Location: [facility]IN [facility]GT;  Service: Pain Management;  Laterality: Left;  2 of 2    RADIOFREQUENCY ABLATION Left 10/1/2021    Procedure: RADIOFREQUENCY ABLATION LEFT, C4,5,6,7 1 of 2 CONSENT NEEDED;  Surgeon: Alex Callahan MD;  Location: [facility]IN [facility]GT;  Service: Pain Management;  Laterality: Left;    RADIOFREQUENCY ABLATION Right 10/15/2021    Procedure: RADIOFREQUENCY ABLATION  RIGHT C4,5,6,7 2 of 2 CONSENT NEEDED;  Surgeon: Alex Callahan MD;  Location: [facility]IN [facility]GT;  Service: Pain Management;  Laterality: Right;    RADIOFREQUENCY ABLATION Left 2/25/2022    Procedure: RADIOFREQUENCY ABLATION LEFT L3,4,5 1 of 2, needs consent;  Surgeon: Alex Callahan MD;  Location: [facility];  Service: Pain Management;  Laterality: Left;    RADIOFREQUENCY ABLATION Right 3/11/2022    Procedure: RADIOFREQUENCY ABLATION RIGHT L3,4,5 2 of 2, needs consent;  Surgeon:  "Alex Callahan MD;  Location: [facility]IN MGT;  Service: Pain Management;  Laterality: Right;    RADIOFREQUENCY ABLATION Left 12/15/2022    Procedure: RADIOFREQUENCY ABLATION LEFT C4, C5, C6, C7  ONE OF TWO NEEDS CONSENT;  Surgeon: Alex Callahan MD;  Location: [facility];  Service: Pain Management;  Laterality: Left;    RADIOFREQUENCY ABLATION Right 12/29/2022    Procedure: RADIOFREQUENCY ABLATION RIGHT C4, C5, C6, C7  TWO OF TWO NEEDS CONSENT;  Surgeon: Alex Callahan MD;  Location: [facility];  Service: Pain Management;  Laterality: Right;    RETINAL DETACHMENT SURGERY      ROTATOR CUFF REPAIR Right     SPINE SURGERY      cerival fusion     TRANSFORAMINAL EPIDURAL INJECTION OF STEROID Right 4/14/2022    Procedure: INJECTION, STEROID, EPIDURAL, TRANSFORAMINAL APPROACH RIGHT L4/5 & L5/S1 Needs Consent;  Surgeon: Alex Callahan MD;  Location: [facility];  Service: Pain Management;  Laterality: Right;    TRIGGER POINT INJECTION Left 6/13/2019    Procedure: INJECTION, TRIGGER POINT;  Surgeon: Kasandra Montoya MD;  Location: [facility] PAIN MGT;  Service: Pain Management;  Laterality: Left;       Family History:  Family History   Problem Relation Age of Onset    Heart disease Mother     Cancer Father     Leukemia Brother     Colon cancer Neg Hx     Celiac disease Neg Hx     Crohn's disease Neg Hx     Esophageal cancer Neg Hx     Inflammatory bowel disease Neg Hx     Irritable bowel syndrome Neg Hx     Liver cancer Neg Hx     Rectal cancer Neg Hx     Stomach cancer Neg Hx     Ulcerative colitis Neg Hx        Social History:  Social History     Socioeconomic History    Marital status:    Tobacco Use    Smoking status: Never    Smokeless tobacco: Never   Substance and Sexual Activity    Alcohol use: No    Drug use: No       OBJECTIVE:    /77 (BP Location: Right arm, Patient Position: Sitting)   Pulse 79   Temp 97.5 °F (36.4 °C) (Oral)   Resp 19   Ht 6' 1" (1.854 m)   Wt 112.6 kg (248 lb 3.8 oz)  "  BMI 32.75 kg/m²     PHYSICAL EXAMINATION:    General appearance: Well appearing, in no acute distress, alert and oriented x3.  Psych:  Mood and affect appropriate.  Skin: Skin color, texture, turgor normal, no rashes or lesions, in both upper and lower body.  Head/face:  Atraumatic, normocephalic. No palpable lymph nodes  Neck: There is mild pain to palpation over the cervical paraspinous and trapezius muscles bilaterally, left greater than right. Spurling Negative. Limited ROM with pain on extension and lateral rotation.    Cor: RRR  Pulm: Symmetric chest rise, no respiratory distress noted.   Back: Straight leg raising in the sitting position is negative for radicular leg pain. There is pain with palpation over lumbar paraspinals and facet joints bilaterally. Limited ROM with pain on extension. Positive facet loading bilaterally.   Extremities: No deformities, edema, or skin discoloration. Good capillary refill.  Musculoskeletal: There is mild pain with palpation over bilateral SI joints. Bilateral upper and lower extremity strength is normal and symmetric. No atrophy or tone abnormalities are noted.  Neuro:  No loss of sensation is noted.  Gait: Antalgic, ambulates with cane for assistance     ASSESSMENT: 58 y.o. year old male with neck and lumbar pain, consistent with      1. Chronic pain disorder        2. Lumbar spondylosis  Procedure Order to Pain Management      3. DDD (degenerative disc disease), lumbar        4. Sacroiliac joint pain        5. Spondylosis of cervical region without myelopathy or radiculopathy        6. DDD (degenerative disc disease), cervical        7. S/P cervical spinal fusion        8. Encounter for monitoring opioid maintenance therapy  Pain Clinic Drug Screen          PLAN:     - Previous imaging reviewed today.    - He is s/p cervical RFA with benefit.     - Schedule for left then right L3,4,5 RFA, one side at a time, two weeks apart. Previous RFA provided 60% relief for 10  months.     - We can repeat right L4/5 and L5/S1 TF NGUYEN if his radicular pain returns.       - I have stressed the importance of physical activity and a home exercise plan to help with pain and improve health.    - Continue Gabapentin.     - Continue Robaxin 500 mg PRN muscle pain.     - Pain contract signed 2/12/2021.     - Continue Norco 7.5/325 mg BID PRN. He does not need a refill at this time.     - The patient is here today for a refill of current pain medications and they believe these provide effective pain control and improvements in quality of life.  The patient notes no serious side effects, and feels the benefits outweigh the risks.  The patient was reminded of the pain contract that they signed previously as well as the risks and benefits of the medication including possible death.  The updated [place] [employer] prescription monitoring program was reviewed, and the patient has been found to be compliant with current treatment plan. Medication management provided by Dr. Callahan.     - UDS from 5/9/2022 reviewed and consistent. Repeat UDS today.     - Continue home exercise routine.     - RTC 3 weeks after above procedures.     The above plan and management options were discussed at length with patient. Patient is in agreement with the above and verbalized understanding.    Angelique Barahona NP  01/31/2023

## 2023-01-31 NOTE — H&P (VIEW-ONLY)
Chronic patient Established Note (Follow up visit)      Interval History 1/31/2023:  The patient returns to clinic today for follow up of neck and back pain. He is s/p left C4,5,6,7 on 12/15/2022 and right C4,5,6,7 RFA on 12/29/2022. He reports 50% relief of his neck pain. He reports intermittent neck pain, left side greater than right. He denies any radicular arm pain. He does report pain into the shoulder blades. He reports increased low back pain. He denies any radicular leg pain. His pain is worse with standing, walking, and activity. He does endorse stiffness. He is taking Gabapentin and Robaxin. He also takes Norco sparingly with benefit. He denies any other health changes. His pain today is 6/10.    Interval History 11/18/2022:  The patient returns to clinic today for follow up of neck and back pain. He is s/p bilateral SI joint injections on 11/4/2022. He reports 60% relief of his low back and buttock pain. He continues to report low back pain. He denies any radicular leg pain. He reports increased neck pain, left side greater than right. He denies any radicular arm. He does report some pain into his shoulder blades. His pain is worse with activity. He continues to take Gabapentin and Robaxin. He continues to take Norco as needed sparingly with benefit. He denies any other health changes. His pain today is 7/10.    Interval History 10/27/2022:  The patient returns to clinic today for follow up of neck and back pain. He reports increased low back and bilateral buttock pain. He denies any radicular leg pain. His pain is worse with prolonged sitting and moving from sitting to standing. He does endorse a fall recently while getting out of the vehicle onto his side. He continues to report neck pain. He continues to take Gabapentin and Robaxin. He continues to take Norco sparingly with benefit and without adverse effects. He denies any other health changes. His pain today is 6/10.    Interval History  8/2/2022:  The aptient returns to clinic today for follow up of neck and back pain. He is s/p bilateral SI joint injection on 7/14/2022. He reports 50-60% relief of his low back and buttock pain. He reports intermittent low back and buttock pain. He reports increased neck pain, left side greater than right. He denies any radicular arm pain. He has good days and bad days. He continues to take Gabapentin. He continues to take Norco sparingly with benefit and without adverse effects. He denies any other health changes. His pain today is 5/10.    Interval History 7/1/2022:  The patient returns to clinic today for follow up of neck and back pain. He reports increased low back and buttock pain.His pain is worse with prolonged sitting and moving from sitting to standing. He also reports increased pain with bending over. He does report intermittent radiating pain into his right posterior thigh stopping at mid thigh. He does report a fall, which was sitting hard on the ground about a week ago. He denies any acute injury. He continues to take Gabapentin and Norco. He denies any other health changes. His pain today is 6/10.     Interval History 5/9/2022:  The patient returns to clinic today for follow up of back pain. He is s/p right L4/5 and L5/S1 TF NGUYEN on 4/14/2022. He reports 50-60% relief of his low back and leg pain. He has been more active since the procedure. He continues to report low back and hip pain, worse with bending and activity. He denies any radicular leg pain. He continues to repot intermittent neck pain. He continues to take Gabapentin with benefit. He continues to take Norco sparingly as needed. He denies any other health changes. His pain today is 5/10.    Interval History 4/1/2022:  The patient returns to clinic today for follow up of back pain. He is s/p left L3,4,5 RFA on 2/25/2022 and right L3,4,5 RFA on 3/11/2022. He reports relief of his back pain. He reports increased back pain that radiates into  "the posterior aspect of his right leg to his knee. He denies any left leg pain. His pain is worse with prolonged walking. He reports that he sometimes drags his right leg. He denies any falls. He denies any bowel or bladder incontinence. He continues to take Gabapentin and Norco as needed with benefit. He denies any adverse effects. He denies any other health changes. His pain today is 7/10.    Interval History 2/15/2022:  The patient returns to clinic today for follow up of neck and back pain. He reports increased low back pain over the last two weeks. He describes this pain as sharp and aching in nature. He does report intermittent "catching" pain in his lower back, worse with getting out of bed. He denies any radicular leg pain. He continues to report benefit from previous cervical procedures. He reports intermittent neck pain. He continues to take Gabapentin with benefit. He continues to take Norco as needed with benefit and without adverse effects. He denies any other health changes. His pain today is 6/10.    Interval History 11/5/2021:  The patient returns to clinic today for follow up of neck and back pain. He is s/p left C4,5,6,7 RFA on 10/1/2021 and right C4,5,6,7 RFA on 10/15/2021. He reports 50% relief of his neck pain. He reports increased burning and itching pain to his skin near injection sites. He denies any radicular arm pain. He does report increased left sided muscle pain. He is concerned that one of the screws in his cervical hardware is moving. He continues to report benefit with previous lumbar procedures. He reports intermittent low back pain without radicular leg pain. He continues to take Gabapentin with benefit. He continues to take Norco as needed with benefit and without adverse effects. He denies any weakness. He denies any other health changes. His pain today is 5/10.    Interval History 9/17/2021:  The patient returns to clinic today for follow up of neck and back pain. He reports " increased neck pain. He denies any radicular arm pain today. He does report intermittent radiating pain into his left shoulder blade and mid back. His pain is worse with activity. He reports that sometimes his pain takes his breath away. He continues to report low back pain, that is tolerable at this time. He continues to take Gabapentin and Norco with benefit and without adverse effects. He denies any other health changes. His pain today is 6/10.    Interval History 6/17/2021:  The patient returns to clinic today for follow up of neck and back pain. He is s/p right L3,4,5 RFA on 4/23/2021 and left L3,4,5 RFA on 5/7/2021. He reports 50% relief of his low back pain. He continues to report intermittent low back pain. He denies any radicular leg pain. He continues to report neck pain, especially in between the scapula. His pain is worse with prolonged standing, walking, and activity. He continues to take Gabapentin with benefit. He continues to take Norco sparingly for severe pain with benefit and without adverse effects. He denies any other health changes. His pain today is 4/10.    Interval History 4/9/2021:  The patient returns to clinic today for follow up of neck and back pain. He is s/p left C4,5,6,7 RFA on 3/9/2021 and right C4,5,6,7 RFA on 3/26/2021. He reports 50% relief of his neck pain. He reports intermittent neck pain. He has good days and bad days. He continues to report low back pain that is constant, sharp, and aching. He reports intermittent radiating pain into the lateral aspect of his left leg to his knee. He continues to take Gabapentin with benefit. He continues to take Norco as needed sparingly for severe pain. He denies any adverse effects. He denies any other health changes. His pain today is 5/10.    Interval History 3/2/2021:  Sal Sebastian Navarro presents to the clinic for a follow-up appointment for neck pain . Since the last visit, Sal Cortes Melanie states the pain has been worsening.  "Current pain intensity is 6/10.  Was seen by Angelique Baraohna NP on 2/12/2021.     Interval History 2/12/2021:  The patient returns to clinic today for follow up of back pain. He has not been seen in over a year. He did not have imaging due to coronavirus outbreak. He would to reschedule this. He was also considering SCS trial prior to the pandemic. He continues to report low back pain that radiates into the lateral aspect of his left leg to his knee. He denies any radicular right leg pain. His pain is worse with bending and walking. He continues to take Gabapentin with benefit. He also takes Norco as needed for severe pain. He denies any adverse effects. He denies any other health changes. His pain today is 6/10.    HPI:  Sal Navarro is a 56 y.o. male who presents today s/p C4-7 ACDF with C5/6 PSIF in 2/2016 with residual chronic midline neck pain that radiates into his shoulder blades bilaterally, R>>L.  This is associated with bilateral hand numbness and tingling.  The hand symptoms did improve following the surgery.  The shooting pains in his arms resolved completely.   This pain is described in detail below.  His post-operative course was complicated by dysphagia that is being treated with speech therapy.  The numbness and the tingling were relieved by the surgery. Now experiences "deep pain along the spine"  Patient has not been seen for over a year, had to re-schedule his imaging studies due to pandemic(now completed). Prior to the pandemic, he was considering a SCS.   Only time he gets relief is laying in bed on a very thin pillow, but that doesn't last very long.   Pain aggravated by movement and even breathing/playing with his grandchild/holding the grandchild's hand. Heat and massage (has a vest) are helpful.   Was seen by Dr. Jan srivastava and received multiple EIS and RFAs.   Compliant with his medication.     Pain Disability Index Review:  Last 3 PDI Scores 1/31/2023 11/18/2022 10/27/2022   Pain " Disability Index (PDI) 22 [phone] [id number]       Pain Medications:  Gabapentin  Norco sparingly  Robaxin    Opioid Contract: yes     report:  Reviewed and consistent with medication use as prescribed.    Pain Procedures:   12/29/2022- Right C4,5,6,7 RFA  12/15/2022- Left C4,5,6,7 RFA  11/4/2022- Bilateral SI joint injections- 60% relief   4/14/2022- Right L4/5 and L5/S1 TF NGUYEN  3/11/2022- Right L3,4,5 RFA  2/25/2022- Left L3,4,5 RFA  10/15/2021- Right C4,5,6,7 RFA  10/1/2021- Left C4,5,6,7 RFA  5/7/2021- Left L3,4,5 RFA   4/23/2021- Right L3,4,5 RFA  3/26/2021- Right C4,5,6,7 RFA  3/9/2021- Left C4,5,6,7 RFA  10/11/19: L4/5 Interlaminar Epidural Steroid Injection  06/13/19- Left C4-7 RFA  05/03/19:Left L2-5 Lumbar Medial Branch Nerve Thermal Radiofrequency Ablation  12/21/18:Right L2-5 Lumbar Medial Branch Nerve Thermal Radiofrequency Ablation  11/23/18:Cervical Thermal Radiofreqiency Ablation: Right C4-7  09/14/18:C7/G0Tcmoxiiv Steroid Injection  06/29/18:LL2/3 Interlaminar Epidural Steroid Injection   03/06/18:L2/3 Interlaminar Epidural Steroid Injection   09/26/17:Bilateral L2-5 Lumbar Medial Branch Nerve Coolief Thermal Radiofrequency Ablation  08/31/17:Bilateral L2-5 Lumbar medial branch blocks   03/28/17:Cervical Conventional Radiofreqiency Ablation: Left C4-7  03/14/17:Cervical Conventional Radiofreqiency Ablation: Right C4-7  10/25/16:Cervical Conventional Radiofreqiency Ablation: Left C4-7  10/11/16: Cervical Conventional Radiofreqiency Ablation: Right C4-7  10/04/16- Cervical Medial Branch Blocks, Bilateral C4-7.     08/31/16:C7/G9Xnuluzig Steroid Injection    Physical Therapy/Home Exercise: does home exercises now, but not in formal PT.    - 2019 was last time in PT     Imaging:   MRI Lumbar Spine 2/25/2021:  COMPARISON:  07/11/2017     FINDINGS:  The vertebral bodies maintain normal height, signal intensity and alignment.  There is redemonstration of few hemangiomas at multiple levels.  The intervertebral disc  spaces are preserved although there is some disc desiccation at multiple levels.  The conus terminates at level L1.  Evaluation of the localizer images and structures surrounding the lumbar spine shows no abnormalities.     L1-L2: No significant disc or joint pathology.     L2-L3: Mild diffuse disc bulge with mild bilateral facet arthropathy and ligamentum flavum hypertrophy results in mild central canal stenosis.  Mild bilateral neural foraminal stenosis is also identified.     L3-L4: There is a diffuse disc bulge with no significant facet arthropathy or ligamentum flavum hypertrophy.  There is mild central canal stenosis and mild bilateral neural foraminal stenosis.     L4-L5: There is a diffuse disc bulge with ligamentum flavum hypertrophy.  No significant facet arthropathy.  There is mild central canal stenosis with only minimal encroachment on the left neural foramen.  Mild right neural foraminal stenosis is present.     L5-S1: There is a diffuse disc bulge with mild to moderate right facet arthropathy.  No left facet arthropathy.  The ligamentum flavum is normal.  The central canal is patent and the neural foramina are largely patent.  Incidental note is made of an annulus fibrosus tear posteriorly measuring approximately 2 mm.     Impression:     Multilevel degenerative disc and joint disease which is mild and results in mild central canal and neural foraminal stenosis as outlined above.    Xray Cervical Spine 2/25/2021:  COMPARISON:  07/07/2017 .     FINDINGS:  The patient has undergone ACDF from C4 through C7. The instrumentation is intact without evidence of complication.  The vertebral bodies maintain normal height and alignment. The remaining intervertebral disc spaces are preserved.  No significant uncovertebral joint hypertrophy.  The prevertebral soft tissues, odontoid views and lung apices are normal. The neural foramen are patent     Impression:     Postsurgical changes to the thoracic spine without  additional evidence for degenerative disc or joint disease.     Allergies:   Review of patient's allergies indicates:   Allergen Reactions    Pcn [penicillins] Hives and Rash    Lipitor [atorvastatin] Other (See Comments)     Muscle cramps, weakness       Current Medications:   Current Outpatient Medications   Medication Sig Dispense Refill    acetaminophen (TYLENOL) 500 MG tablet Take 500 mg by mouth every 6 (six) hours as needed for Pain.      albuterol (PROVENTIL/VENTOLIN HFA) 90 mcg/actuation inhaler       aspirin (ECOTRIN) 81 MG EC tablet Take 81 mg by mouth once daily.      cetirizine (ZYRTEC) 10 MG tablet Take 10 mg by mouth nightly.  5    FOLIC ACID/MULTIVIT-MIN/LUTEIN (CENTRUM SILVER ORAL) Take by mouth once daily.      HYDROcodone-acetaminophen (NORCO) 7.5-325 mg per tablet Take 1 tablet by mouth every 12 (twelve) hours as needed for Pain. 60 tablet 0    levothyroxine (SYNTHROID) 50 MCG tablet Take 50 mcg by mouth once daily.      meclizine (ANTIVERT) 25 mg tablet Take 25 mg by mouth once daily.       meloxicam (MOBIC) 15 MG tablet Take 15 mg by mouth once daily.      metFORMIN (GLUCOPHAGE) 500 MG tablet Take 1 tablet by mouth 2 (two) times a day.      methocarbamoL (ROBAXIN) 500 MG Tab Take 1 tablet (500 mg total) by mouth 2 (two) times daily. 60 tablet 4    omeprazole (PRILOSEC) 20 MG capsule Take 1 capsule (20 mg total) by mouth once daily. 90 capsule 3    rosuvastatin (CRESTOR) 20 MG tablet Take 20 mg by mouth once daily.      valsartan (DIOVAN) 80 MG tablet Take 40 mg by mouth once daily.   1    gabapentin (NEURONTIN) 300 MG capsule Take 2 capsules (600 mg total) by mouth 2 (two) times daily. 120 capsule 5     No current facility-administered medications for this visit.       REVIEW OF SYSTEMS:    GENERAL:  No weight loss, malaise or fevers.  HEENT:  Negative for frequent or significant headaches.  NECK:  Negative for lumps, goiter  and significant neck swelling.   RESPIRATORY:  Negative for cough,  wheezing or shortness of breath.  CARDIOVASCULAR:  Negative for chest pain, leg swelling or palpitations. HTN  GI:  Negative for abdominal discomfort, blood in stools or black stools or change in bowel habits.  MUSCULOSKELETAL:  See HPI.  SKIN:  Negative for lesions, rash, and itching.  PSYCH:  Negative for sleep disturbance, mood disorder and recent psychosocial stressors.  HEMATOLOGY/LYMPHOLOGY:  Negative for prolonged bleeding, bruising easily or swollen nodes.  NEURO:   No history of headaches, syncope, paralysis, seizures or tremors.   ENDO: Thyroid disorder.   All other reviewed and negative other than HPI.    Past Medical History:  Past Medical History:   Diagnosis Date    Arthritis     Cataract     Cervical back pain with evidence of disc disease     Hiatal hernia     Hypertension     Thyroid disease        Past Surgical History:  Past Surgical History:   Procedure Laterality Date    ACROMIOCLAVICULAR JOINT CYST EXCISION Right     BACK SURGERY      cataracts Bilateral     CERVICAL FUSION      COLONOSCOPY N/A 4/27/2018    Procedure: COLONOSCOPY;  Surgeon: Giovani Medeiros MD;  Location: [address]);  Service: Endoscopy;  Laterality: N/A;    EPIDURAL STEROID INJECTION N/A 10/11/2019    Procedure: INJECTION, STEROID, EPIDURAL;  Surgeon: Kasandra Montoya MD;  Location: [facility];  Service: Pain Management;  Laterality: N/A;  Lumbar NGUYEN L4/5    NGUYEN      EYE SURGERY      INJECTION OF JOINT Bilateral 7/14/2022    Procedure: INJECTION, JOINT, SI BILATERAL  needs consent;  Surgeon: Alex Callahan MD;  Location: [facility];  Service: Pain Management;  Laterality: Bilateral;    INJECTION, SACROILIAC JOINT Bilateral 11/4/2022    Procedure: INJECTION,SACROILIAC JOINT BILATERAL CONTRAST;  Surgeon: Alex Callahan MD;  Location: [facility];  Service: Pain Management;  Laterality: Bilateral;    RADIOFREQUENCY ABLATION  10/2016    cervical spine/Jan    RADIOFREQUENCY ABLATION Left 5/3/2019     Procedure: RADIOFREQUENCY ABLATION, L2-L5 MEDIALBRANCH;  Surgeon: Kasandra Montoya MD;  Location: [facility];  Service: Pain Management;  Laterality: Left;    RADIOFREQUENCY ABLATION Left 6/13/2019    Procedure: RADIOFREQUENCY ABLATION C4-7;  Surgeon: Kasandra Montoya MD;  Location: [facility];  Service: Pain Management;  Laterality: Left;    RADIOFREQUENCY ABLATION Left 3/9/2021    Procedure: RADIOFREQUENCY ABLATION C4,C5,C6,C7;  Surgeon: Alex Callahan MD;  Location: [facility];  Service: Pain Management;  Laterality: Left;  1 of 2    RADIOFREQUENCY ABLATION Right 3/26/2021    Procedure: RADIOFREQUENCY ABLATION C4,C6,C6,C7;  Surgeon: Alex Callahan MD;  Location: [facility];  Service: Pain Management;  Laterality: Right;  2 of 2    RADIOFREQUENCY ABLATION Right 4/23/2021    Procedure: RADIOFREQUENCY ABLATION L3,4,5;  Surgeon: Alex Callahan MD;  Location: [facility];  Service: Pain Management;  Laterality: Right;  1 of 2    RADIOFREQUENCY ABLATION Left 5/7/2021    Procedure: RADIOFREQUENCY ABLATION L3,4,5;  Surgeon: Alex Callahan MD;  Location: [facility];  Service: Pain Management;  Laterality: Left;  2 of 2    RADIOFREQUENCY ABLATION Left 10/1/2021    Procedure: RADIOFREQUENCY ABLATION LEFT, C4,5,6,7 1 of 2 CONSENT NEEDED;  Surgeon: Alex Callahan MD;  Location: [facility];  Service: Pain Management;  Laterality: Left;    RADIOFREQUENCY ABLATION Right 10/15/2021    Procedure: RADIOFREQUENCY ABLATION  RIGHT C4,5,6,7 2 of 2 CONSENT NEEDED;  Surgeon: Alex Callahan MD;  Location: [facility];  Service: Pain Management;  Laterality: Right;    RADIOFREQUENCY ABLATION Left 2/25/2022    Procedure: RADIOFREQUENCY ABLATION LEFT L3,4,5 1 of 2, needs consent;  Surgeon: Alex Callahan MD;  Location: [facility];  Service: Pain Management;  Laterality: Left;    RADIOFREQUENCY ABLATION Right 3/11/2022    Procedure: RADIOFREQUENCY ABLATION RIGHT L3,4,5 2 of 2, needs consent;  Surgeon:  "Alex Callahan MD;  Location: [facility];  Service: Pain Management;  Laterality: Right;    RADIOFREQUENCY ABLATION Left 12/15/2022    Procedure: RADIOFREQUENCY ABLATION LEFT C4, C5, C6, C7  ONE OF TWO NEEDS CONSENT;  Surgeon: Alex Callahan MD;  Location: [facility];  Service: Pain Management;  Laterality: Left;    RADIOFREQUENCY ABLATION Right 12/29/2022    Procedure: RADIOFREQUENCY ABLATION RIGHT C4, C5, C6, C7  TWO OF TWO NEEDS CONSENT;  Surgeon: Alex Callahan MD;  Location: [facility];  Service: Pain Management;  Laterality: Right;    RETINAL DETACHMENT SURGERY      ROTATOR CUFF REPAIR Right     SPINE SURGERY      cerival fusion     TRANSFORAMINAL EPIDURAL INJECTION OF STEROID Right 4/14/2022    Procedure: INJECTION, STEROID, EPIDURAL, TRANSFORAMINAL APPROACH RIGHT L4/5 & L5/S1 Needs Consent;  Surgeon: Alex Callahan MD;  Location: [facility];  Service: Pain Management;  Laterality: Right;    TRIGGER POINT INJECTION Left 6/13/2019    Procedure: INJECTION, TRIGGER POINT;  Surgeon: Kasandra Montoya MD;  Location: [facility];  Service: Pain Management;  Laterality: Left;       Family History:  Family History   Problem Relation Age of Onset    Heart disease Mother     Cancer Father     Leukemia Brother     Colon cancer Neg Hx     Celiac disease Neg Hx     Crohn's disease Neg Hx     Esophageal cancer Neg Hx     Inflammatory bowel disease Neg Hx     Irritable bowel syndrome Neg Hx     Liver cancer Neg Hx     Rectal cancer Neg Hx     Stomach cancer Neg Hx     Ulcerative colitis Neg Hx        Social History:  Social History     Socioeconomic History    Marital status:    Tobacco Use    Smoking status: Never    Smokeless tobacco: Never   Substance and Sexual Activity    Alcohol use: No    Drug use: No       OBJECTIVE:    /77 (BP Location: Right arm, Patient Position: Sitting)   Pulse 79   Temp 97.5 °F (36.4 °C) (Oral)   Resp 19   Ht 6' 1" (1.854 m)   Wt 112.6 kg (248 lb 3.8 oz)  "  BMI 32.75 kg/m²     PHYSICAL EXAMINATION:    General appearance: Well appearing, in no acute distress, alert and oriented x3.  Psych:  Mood and affect appropriate.  Skin: Skin color, texture, turgor normal, no rashes or lesions, in both upper and lower body.  Head/face:  Atraumatic, normocephalic. No palpable lymph nodes  Neck: There is mild pain to palpation over the cervical paraspinous and trapezius muscles bilaterally, left greater than right. Spurling Negative. Limited ROM with pain on extension and lateral rotation.    Cor: RRR  Pulm: Symmetric chest rise, no respiratory distress noted.   Back: Straight leg raising in the sitting position is negative for radicular leg pain. There is pain with palpation over lumbar paraspinals and facet joints bilaterally. Limited ROM with pain on extension. Positive facet loading bilaterally.   Extremities: No deformities, edema, or skin discoloration. Good capillary refill.  Musculoskeletal: There is mild pain with palpation over bilateral SI joints. Bilateral upper and lower extremity strength is normal and symmetric. No atrophy or tone abnormalities are noted.  Neuro:  No loss of sensation is noted.  Gait: Antalgic, ambulates with cane for assistance     ASSESSMENT: 58 y.o. year old male with neck and lumbar pain, consistent with      1. Chronic pain disorder        2. Lumbar spondylosis  Procedure Order to Pain Management      3. DDD (degenerative disc disease), lumbar        4. Sacroiliac joint pain        5. Spondylosis of cervical region without myelopathy or radiculopathy        6. DDD (degenerative disc disease), cervical        7. S/P cervical spinal fusion        8. Encounter for monitoring opioid maintenance therapy  Pain Clinic Drug Screen          PLAN:     - Previous imaging reviewed today.    - He is s/p cervical RFA with benefit.     - Schedule for left then right L3,4,5 RFA, one side at a time, two weeks apart. Previous RFA provided 60% relief for 10  months.     - We can repeat right L4/5 and L5/S1 TF NGUYEN if his radicular pain returns.       - I have stressed the importance of physical activity and a home exercise plan to help with pain and improve health.    - Continue Gabapentin.     - Continue Robaxin 500 mg PRN muscle pain.     - Pain contract signed 2/12/2021.     - Continue Norco 7.5/325 mg BID PRN. He does not need a refill at this time.     - The patient is here today for a refill of current pain medications and they believe these provide effective pain control and improvements in quality of life.  The patient notes no serious side effects, and feels the benefits outweigh the risks.  The patient was reminded of the pain contract that they signed previously as well as the risks and benefits of the medication including possible death.  The updated [place] [employer] prescription monitoring program was reviewed, and the patient has been found to be compliant with current treatment plan. Medication management provided by Dr. Callahan.     - UDS from 5/9/2022 reviewed and consistent. Repeat UDS today.     - Continue home exercise routine.     - RTC 3 weeks after above procedures.     The above plan and management options were discussed at length with patient. Patient is in agreement with the above and verbalized understanding.    Angelique Barahona NP  01/31/2023

## 2023-02-04 LAB
6MAM UR QL: NOT DETECTED
7AMINOCLONAZEPAM UR QL: NOT DETECTED
A-OH ALPRAZ UR QL: NOT DETECTED
ALPHA-OH-MIDAZOLAM: NOT DETECTED
ALPRAZ UR QL: NOT DETECTED
AMPHET UR QL SCN: NOT DETECTED
ANNOTATION COMMENT IMP: NORMAL
ANNOTATION COMMENT IMP: NORMAL
BARBITURATES UR QL: NOT DETECTED
BUPRENORPHINE UR QL: NOT DETECTED
BZE UR QL: NOT DETECTED
CARBOXYTHC UR QL: NOT DETECTED
CARISOPRODOL UR QL: NOT DETECTED
CLONAZEPAM UR QL: NOT DETECTED
CODEINE UR QL: NOT DETECTED
CREAT UR-MCNC: 39.3 MG/DL (ref 20–400)
DIAZEPAM UR QL: NOT DETECTED
ETHYL GLUCURONIDE UR QL: NOT DETECTED
FENTANYL UR QL: NOT DETECTED
GABAPENTIN: PRESENT
HYDROCODONE UR QL: NOT DETECTED
HYDROMORPHONE UR QL: NOT DETECTED
LORAZEPAM UR QL: NOT DETECTED
MDA UR QL: NOT DETECTED
MDEA UR QL: NOT DETECTED
MDMA UR QL: NOT DETECTED
ME-PHENIDATE UR QL: NOT DETECTED
METHADONE UR QL: NOT DETECTED
METHAMPHET UR QL: NOT DETECTED
MIDAZOLAM UR QL SCN: NOT DETECTED
MORPHINE UR QL: NOT DETECTED
NALOXONE: NOT DETECTED
NORBUPRENORPHINE UR QL CFM: NOT DETECTED
NORDIAZEPAM UR QL: NOT DETECTED
NORFENTANYL UR QL: NOT DETECTED
NORHYDROCODONE UR QL CFM: NOT DETECTED
NORMEPERIDINE UR QL CFM: NOT DETECTED
NOROXYCODONE UR QL CFM: NOT DETECTED
NOROXYMORPHONE UR QL SCN: NOT DETECTED
OXAZEPAM UR QL: NOT DETECTED
OXYCODONE UR QL: NOT DETECTED
OXYMORPHONE UR QL: NOT DETECTED
PATHOLOGY STUDY: NORMAL
PCP UR QL: NOT DETECTED
PHENTERMINE UR QL: NOT DETECTED
PREGABALIN: NOT DETECTED
SERVICE CMNT-IMP: NORMAL
TAPENTADOL UR QL SCN: NOT DETECTED
TAPENTADOL UR QL SCN: NOT DETECTED
TEMAZEPAM UR QL: NOT DETECTED
TRAMADOL UR QL: NOT DETECTED
ZOLPIDEM METABOLITE: NOT DETECTED
ZOLPIDEM UR QL: NOT DETECTED

## 2023-02-24 ENCOUNTER — HOSPITAL ENCOUNTER (OUTPATIENT)
Facility: OTHER | Age: 58
Discharge: HOME | End: 2023-02-24
Attending: ANESTHESIOLOGY | Admitting: ANESTHESIOLOGY
Payer: MEDICARE

## 2023-02-24 VITALS
DIASTOLIC BLOOD PRESSURE: 85 MMHG | SYSTOLIC BLOOD PRESSURE: 124 MMHG | TEMPERATURE: 98 F | OXYGEN SATURATION: 97 % | WEIGHT: 245 LBS | RESPIRATION RATE: 16 BRPM | HEIGHT: 73 IN | BODY MASS INDEX: 32.47 KG/M2 | HEART RATE: 62 BPM

## 2023-02-24 DIAGNOSIS — M47.816 LUMBAR SPONDYLOSIS: Primary | ICD-10-CM

## 2023-02-24 DIAGNOSIS — M47.816 OSTEOARTHRITIS OF LUMBAR SPINE WITHOUT MYELOPATHY OR RADICULOPATHY: ICD-10-CM

## 2023-02-24 DIAGNOSIS — G89.29 CHRONIC PAIN: ICD-10-CM

## 2023-02-24 LAB — POCT GLUCOSE: 78 MG/DL (ref 70–110)

## 2023-02-24 PROCEDURE — 99152 PR MOD CONSCIOUS SEDATION, SAME PHYS, 5+ YRS, FIRST 15 MIN: ICD-10-PCS | Mod: ,,, | Performed by: ANESTHESIOLOGY

## 2023-02-24 PROCEDURE — 64636 DESTROY L/S FACET JNT ADDL: CPT | Mod: LT | Performed by: ANESTHESIOLOGY

## 2023-02-24 PROCEDURE — 64635 DESTROY LUMB/SAC FACET JNT: CPT | Mod: LT | Performed by: ANESTHESIOLOGY

## 2023-02-24 PROCEDURE — 99152 MOD SED SAME PHYS/QHP 5/>YRS: CPT | Performed by: ANESTHESIOLOGY

## 2023-02-24 PROCEDURE — 64636 DESTROY L/S FACET JNT ADDL: CPT | Mod: LT,,, | Performed by: ANESTHESIOLOGY

## 2023-02-24 PROCEDURE — 82947 ASSAY GLUCOSE BLOOD QUANT: CPT | Performed by: ANESTHESIOLOGY

## 2023-02-24 PROCEDURE — 64636 PR DESTROY L/S FACET JNT ADDL: ICD-10-PCS | Mod: LT,,, | Performed by: ANESTHESIOLOGY

## 2023-02-24 PROCEDURE — 63600175 PHARM REV CODE 636 W HCPCS: Performed by: ANESTHESIOLOGY

## 2023-02-24 PROCEDURE — 99152 MOD SED SAME PHYS/QHP 5/>YRS: CPT | Mod: ,,, | Performed by: ANESTHESIOLOGY

## 2023-02-24 PROCEDURE — 64635 PR DESTROY LUMB/SAC FACET JNT: ICD-10-PCS | Mod: LT,,, | Performed by: ANESTHESIOLOGY

## 2023-02-24 PROCEDURE — 64635 DESTROY LUMB/SAC FACET JNT: CPT | Mod: LT,,, | Performed by: ANESTHESIOLOGY

## 2023-02-24 PROCEDURE — 25000003 PHARM REV CODE 250: Performed by: ANESTHESIOLOGY

## 2023-02-24 RX ORDER — LIDOCAINE HYDROCHLORIDE 20 MG/ML
INJECTION, SOLUTION INFILTRATION; PERINEURAL
Status: DISCONTINUED | OUTPATIENT
Start: 2023-02-24 | End: 2023-02-24 | Stop reason: HOSPADM

## 2023-02-24 RX ORDER — MIDAZOLAM HYDROCHLORIDE 1 MG/ML
INJECTION INTRAMUSCULAR; INTRAVENOUS
Status: DISCONTINUED | OUTPATIENT
Start: 2023-02-24 | End: 2023-02-24 | Stop reason: HOSPADM

## 2023-02-24 RX ORDER — BUPIVACAINE HYDROCHLORIDE 2.5 MG/ML
INJECTION, SOLUTION EPIDURAL; INFILTRATION; INTRACAUDAL
Status: DISCONTINUED | OUTPATIENT
Start: 2023-02-24 | End: 2023-02-24 | Stop reason: HOSPADM

## 2023-02-24 RX ORDER — SODIUM CHLORIDE 9 MG/ML
500 INJECTION, SOLUTION INTRAVENOUS CONTINUOUS
Status: DISCONTINUED | OUTPATIENT
Start: 2023-02-24 | End: 2023-02-24 | Stop reason: HOSPADM

## 2023-02-24 RX ORDER — DEXAMETHASONE SODIUM PHOSPHATE 10 MG/ML
INJECTION INTRAMUSCULAR; INTRAVENOUS
Status: DISCONTINUED | OUTPATIENT
Start: 2023-02-24 | End: 2023-02-24 | Stop reason: HOSPADM

## 2023-02-24 RX ORDER — FENTANYL CITRATE 50 UG/ML
INJECTION, SOLUTION INTRAMUSCULAR; INTRAVENOUS
Status: DISCONTINUED | OUTPATIENT
Start: 2023-02-24 | End: 2023-02-24 | Stop reason: HOSPADM

## 2023-02-24 NOTE — DISCHARGE INSTRUCTIONS

## 2023-02-24 NOTE — OP NOTE
Therapeutic Lumbar Medial Branch Radiofrequency Ablation under Fluoroscopy     The procedure, risks, benefits, and options were discussed with the patient. There are no contraindications to the procedure. The patent expressed understanding and agreed to the procedure. Informed written consent was obtained prior to the start of the procedure and can be found in the patient's chart.        PATIENT NAME: Sal Navarro   MRN: [id number]     DATE OF PROCEDURE: 02/24/2023     PROCEDURE:  Left L3, L4, and L5 Lumbar Radiofrequency Ablation under Fluoroscopy    PRE-OP DIAGNOSIS: Lumbar spondylosis [M47.816] Lumbar spondylosis [M47.816]    POST-OP DIAGNOSIS: Same    PHYSICIAN: Alex Callahan MD    ASSISTANTS: None     MEDICATIONS INJECTED:  Preservative-free Decadron 10mg with 9cc of Bupivicaine 0.25%    LOCAL ANESTHETIC INJECTED:   Xylocaine 2%    SEDATION: Versed 2mg and Fentanyl 100mcg                                                                                                                                                                                     Conscious sedation ordered by M.D. Patient re-evaluation prior to administration of conscious sedation. No changes noted in patient's status from initial evaluation. The patient's vital signs were monitored by RN and patient remained hemodynamically stable throughout the procedure.    Event Time In   Sedation Start 1428   Sedation End 1446       ESTIMATED BLOOD LOSS:  None    COMPLICATIONS:  None     INTERVAL HISTORY: Patient has clinical and imaging findings suggestive of facet mediated pain. Patients has completed 2 previous diagnostic medial branch blocks at specified levels with at least 80% relief for the expected duration of the local anesthetic utilized.    TECHNIQUE: Time-out was performed to identify the patient and procedure to be performed. With the patient laying in a prone position, the surgical area was prepped and draped in the usual sterile fashion  using ChloraPrep and fenestrated drape. The levels were determined under fluoroscopic guidance. Skin anesthesia was achieved by injecting Lidocaine 2% over the injection sites. A 20 gauge 10mm curved active tip needle was introduced to the anatomic local of the medial branch at each of the above levels using AP, lateral and/or contralateral oblique fluoroscopic imaging. Then sensory and motor testing was performed to confirm that the needle tips were in the correct location. After negative aspiration for blood or CSF was confirmed, 1 mL of the lidocaine 2% listed above was injected slowly at each site. This was followed by thermal lesioning at 80 degrees celsius for 90 seconds. That was followed by slowly injecting 1 mL of the medication mixture listed above at each site. The needles were removed and bleeding was nil. A sterile dressing was applied. No specimens collected. The patient tolerated the procedure well and did not have any procedure related motor deficit at the conclusion of the procedure.    The patient was monitored after the procedure in the recovery area. They were given post-procedure and discharge instructions to follow at home. The patient was discharged in a stable condition.        Alex Callahan MD

## 2023-02-24 NOTE — DISCHARGE SUMMARY
Discharge Note  Short Stay      SUMMARY     Admit Date: 2/24/2023    Attending Physician: Alex Callahan      Discharge Physician: Alex Callahan      Discharge Date: 2/24/2023 2:47 PM    Procedure(s) (LRB):  RADIOFREQUENCY ABLATION LEFT L3,L4,L5 (Left)    Final Diagnosis: Lumbar spondylosis [M47.816]    Disposition: Home or self care    Patient Instructions:   Current Discharge Medication List        CONTINUE these medications which have NOT CHANGED    Details   acetaminophen (TYLENOL) 500 MG tablet Take 500 mg by mouth every 6 (six) hours as needed for Pain.      albuterol (PROVENTIL/VENTOLIN HFA) 90 mcg/actuation inhaler       aspirin (ECOTRIN) 81 MG EC tablet Take 81 mg by mouth once daily.      cetirizine (ZYRTEC) 10 MG tablet Take 10 mg by mouth nightly.  Refills: 5      FOLIC ACID/MULTIVIT-MIN/LUTEIN (CENTRUM SILVER ORAL) Take by mouth once daily.      gabapentin (NEURONTIN) 300 MG capsule Take 2 capsules (600 mg total) by mouth 2 (two) times daily.  Qty: 120 capsule, Refills: 5    Associated Diagnoses: Chronic pain disorder; Lumbar spondylosis; DDD (degenerative disc disease), lumbar; S/P cervical spinal fusion; Cervical radiculopathy; Myofascial pain      HYDROcodone-acetaminophen (NORCO) 7.5-325 mg per tablet Take 1 tablet by mouth every 12 (twelve) hours as needed for Pain.  Qty: 60 tablet, Refills: 0    Comments: Patient with chronic intractable pain.  Medically necessary for > 7 days  Associated Diagnoses: Chronic pain disorder; Lumbar spondylosis; DDD (degenerative disc disease), lumbar; Facet arthritis of lumbar region; Myalgia; Spondylosis of cervical region without myelopathy or radiculopathy; S/P cervical spinal fusion      levothyroxine (SYNTHROID) 50 MCG tablet Take 50 mcg by mouth once daily.      meclizine (ANTIVERT) 25 mg tablet Take 25 mg by mouth once daily.       meloxicam (MOBIC) 15 MG tablet Take 15 mg by mouth once daily.      metFORMIN (GLUCOPHAGE) 500 MG tablet Take 1 tablet by mouth  2 (two) times a day.      methocarbamoL (ROBAXIN) 500 MG Tab Take 1 tablet (500 mg total) by mouth 2 (two) times daily.  Qty: 60 tablet, Refills: 4    Associated Diagnoses: Myofascial pain      omeprazole (PRILOSEC) 20 MG capsule Take 1 capsule (20 mg total) by mouth once daily.  Qty: 90 capsule, Refills: 3    Associated Diagnoses: Gastroesophageal reflux disease, esophagitis presence not specified      rosuvastatin (CRESTOR) 20 MG tablet Take 20 mg by mouth once daily.      valsartan (DIOVAN) 80 MG tablet Take 40 mg by mouth once daily.   Refills: 1                 Discharge Diagnosis: Lumbar spondylosis [M47.816]  Condition on Discharge: Stable with no complications to procedure   Diet on Discharge: Same as before.  Activity: as per instruction sheet.  Discharge to: Home with a responsible adult.  Follow up: 2-4 weeks

## 2023-03-10 ENCOUNTER — HOSPITAL ENCOUNTER (OUTPATIENT)
Facility: OTHER | Age: 58
Discharge: HOME | End: 2023-03-10
Attending: ANESTHESIOLOGY | Admitting: ANESTHESIOLOGY
Payer: MEDICARE

## 2023-03-10 VITALS
SYSTOLIC BLOOD PRESSURE: 119 MMHG | TEMPERATURE: 98 F | HEART RATE: 56 BPM | BODY MASS INDEX: 32.47 KG/M2 | OXYGEN SATURATION: 92 % | RESPIRATION RATE: 16 BRPM | HEIGHT: 73 IN | DIASTOLIC BLOOD PRESSURE: 72 MMHG | WEIGHT: 245 LBS

## 2023-03-10 DIAGNOSIS — G89.29 CHRONIC PAIN: ICD-10-CM

## 2023-03-10 DIAGNOSIS — M47.816 LUMBAR SPONDYLOSIS: Primary | ICD-10-CM

## 2023-03-10 DIAGNOSIS — M47.9 OSTEOARTHRITIS OF SPINE, UNSPECIFIED SPINAL OSTEOARTHRITIS COMPLICATION STATUS, UNSPECIFIED SPINAL REGION: ICD-10-CM

## 2023-03-10 DIAGNOSIS — M47.816 SPONDYLOSIS WITHOUT MYELOPATHY OR RADICULOPATHY, LUMBAR REGION: ICD-10-CM

## 2023-03-10 LAB — POCT GLUCOSE: 97 MG/DL (ref 70–110)

## 2023-03-10 PROCEDURE — 64636 PR DESTROY L/S FACET JNT ADDL: ICD-10-PCS | Mod: RT,,, | Performed by: ANESTHESIOLOGY

## 2023-03-10 PROCEDURE — 99152 MOD SED SAME PHYS/QHP 5/>YRS: CPT | Mod: ,,, | Performed by: ANESTHESIOLOGY

## 2023-03-10 PROCEDURE — 63600175 PHARM REV CODE 636 W HCPCS: Performed by: ANESTHESIOLOGY

## 2023-03-10 PROCEDURE — 64636 DESTROY L/S FACET JNT ADDL: CPT | Mod: RT | Performed by: ANESTHESIOLOGY

## 2023-03-10 PROCEDURE — 64635 PR DESTROY LUMB/SAC FACET JNT: ICD-10-PCS | Mod: RT,,, | Performed by: ANESTHESIOLOGY

## 2023-03-10 PROCEDURE — 99152 PR MOD CONSCIOUS SEDATION, SAME PHYS, 5+ YRS, FIRST 15 MIN: ICD-10-PCS | Mod: ,,, | Performed by: ANESTHESIOLOGY

## 2023-03-10 PROCEDURE — 64635 DESTROY LUMB/SAC FACET JNT: CPT | Mod: RT,,, | Performed by: ANESTHESIOLOGY

## 2023-03-10 PROCEDURE — 64635 DESTROY LUMB/SAC FACET JNT: CPT | Mod: RT | Performed by: ANESTHESIOLOGY

## 2023-03-10 PROCEDURE — 99152 MOD SED SAME PHYS/QHP 5/>YRS: CPT | Performed by: ANESTHESIOLOGY

## 2023-03-10 PROCEDURE — 64636 DESTROY L/S FACET JNT ADDL: CPT | Mod: RT,,, | Performed by: ANESTHESIOLOGY

## 2023-03-10 PROCEDURE — 25000003 PHARM REV CODE 250: Performed by: ANESTHESIOLOGY

## 2023-03-10 RX ORDER — SODIUM CHLORIDE 9 MG/ML
500 INJECTION, SOLUTION INTRAVENOUS CONTINUOUS
Status: DISCONTINUED | OUTPATIENT
Start: 2023-03-10 | End: 2023-03-10 | Stop reason: HOSPADM

## 2023-03-10 RX ORDER — DEXAMETHASONE SODIUM PHOSPHATE 10 MG/ML
INJECTION INTRAMUSCULAR; INTRAVENOUS
Status: DISCONTINUED | OUTPATIENT
Start: 2023-03-10 | End: 2023-03-10 | Stop reason: HOSPADM

## 2023-03-10 RX ORDER — BUPIVACAINE HYDROCHLORIDE 2.5 MG/ML
INJECTION, SOLUTION EPIDURAL; INFILTRATION; INTRACAUDAL
Status: DISCONTINUED | OUTPATIENT
Start: 2023-03-10 | End: 2023-03-10 | Stop reason: HOSPADM

## 2023-03-10 RX ORDER — MIDAZOLAM HYDROCHLORIDE 1 MG/ML
INJECTION INTRAMUSCULAR; INTRAVENOUS
Status: DISCONTINUED | OUTPATIENT
Start: 2023-03-10 | End: 2023-03-10 | Stop reason: HOSPADM

## 2023-03-10 RX ORDER — FENTANYL CITRATE 50 UG/ML
INJECTION, SOLUTION INTRAMUSCULAR; INTRAVENOUS
Status: DISCONTINUED | OUTPATIENT
Start: 2023-03-10 | End: 2023-03-10 | Stop reason: HOSPADM

## 2023-03-10 RX ORDER — LIDOCAINE HYDROCHLORIDE 20 MG/ML
INJECTION, SOLUTION INFILTRATION; PERINEURAL
Status: DISCONTINUED | OUTPATIENT
Start: 2023-03-10 | End: 2023-03-10 | Stop reason: HOSPADM

## 2023-03-10 NOTE — DISCHARGE SUMMARY
Discharge Note  Short Stay      SUMMARY     Admit Date: 3/10/2023    Attending Physician: Alex Callahan      Discharge Physician: Alex Callahan      Discharge Date: 3/10/2023 10:33 AM    Procedure(s) (LRB):  RADIOFREQUENCY ABLATION RIGHT L3,L4,L5 (Right)    Final Diagnosis: Lumbar spondylosis [M47.816]    Disposition: Home or self care    Patient Instructions:   Current Discharge Medication List        CONTINUE these medications which have NOT CHANGED    Details   acetaminophen (TYLENOL) 500 MG tablet Take 500 mg by mouth every 6 (six) hours as needed for Pain.      albuterol (PROVENTIL/VENTOLIN HFA) 90 mcg/actuation inhaler       aspirin (ECOTRIN) 81 MG EC tablet Take 81 mg by mouth once daily.      cetirizine (ZYRTEC) 10 MG tablet Take 10 mg by mouth nightly.  Refills: 5      FOLIC ACID/MULTIVIT-MIN/LUTEIN (CENTRUM SILVER ORAL) Take by mouth once daily.      gabapentin (NEURONTIN) 300 MG capsule Take 2 capsules (600 mg total) by mouth 2 (two) times daily.  Qty: 120 capsule, Refills: 5    Associated Diagnoses: Chronic pain disorder; Lumbar spondylosis; DDD (degenerative disc disease), lumbar; S/P cervical spinal fusion; Cervical radiculopathy; Myofascial pain      HYDROcodone-acetaminophen (NORCO) 7.5-325 mg per tablet Take 1 tablet by mouth every 12 (twelve) hours as needed for Pain.  Qty: 60 tablet, Refills: 0    Comments: Patient with chronic intractable pain.  Medically necessary for > 7 days  Associated Diagnoses: Chronic pain disorder; Lumbar spondylosis; DDD (degenerative disc disease), lumbar; Facet arthritis of lumbar region; Myalgia; Spondylosis of cervical region without myelopathy or radiculopathy; S/P cervical spinal fusion      levothyroxine (SYNTHROID) 50 MCG tablet Take 50 mcg by mouth once daily.      meclizine (ANTIVERT) 25 mg tablet Take 25 mg by mouth once daily.       meloxicam (MOBIC) 15 MG tablet Take 15 mg by mouth once daily.      metFORMIN (GLUCOPHAGE) 500 MG tablet Take 1 tablet by  mouth 2 (two) times a day.      methocarbamoL (ROBAXIN) 500 MG Tab Take 1 tablet (500 mg total) by mouth 2 (two) times daily.  Qty: 60 tablet, Refills: 4    Associated Diagnoses: Myofascial pain      omeprazole (PRILOSEC) 20 MG capsule Take 1 capsule (20 mg total) by mouth once daily.  Qty: 90 capsule, Refills: 3    Associated Diagnoses: Gastroesophageal reflux disease, esophagitis presence not specified      rosuvastatin (CRESTOR) 20 MG tablet Take 20 mg by mouth once daily.      valsartan (DIOVAN) 80 MG tablet Take 40 mg by mouth once daily.   Refills: 1                 Discharge Diagnosis: Lumbar spondylosis [M47.816]  Condition on Discharge: Stable with no complications to procedure   Diet on Discharge: Same as before.  Activity: as per instruction sheet.  Discharge to: Home with a responsible adult.  Follow up: 2-4 weeks

## 2023-03-10 NOTE — OP NOTE
Therapeutic Lumbar Medial Branch Radiofrequency Ablation under Fluoroscopy     The procedure, risks, benefits, and options were discussed with the patient. There are no contraindications to the procedure. The patent expressed understanding and agreed to the procedure. Informed written consent was obtained prior to the start of the procedure and can be found in the patient's chart.        PATIENT NAME: Sal Navarro   MRN: [id number]     DATE OF PROCEDURE: 03/10/2023     PROCEDURE:  Right L3, L4, and L5 Lumbar Radiofrequency Ablation under Fluoroscopy    PRE-OP DIAGNOSIS: Lumbar spondylosis [M47.816] Lumbar spondylosis [M47.816]    POST-OP DIAGNOSIS: Same    PHYSICIAN: Alex Callahan MD    ASSISTANTS: Rafael Donohue MD [employer] Pain Fellow      MEDICATIONS INJECTED:  Preservative-free Decadron 10mg with 9cc of Bupivicaine 0.25%    LOCAL ANESTHETIC INJECTED:   Xylocaine 2%    SEDATION: Versed 2mg and Fentanyl 100mcg                                                                                                                                                                                     Conscious sedation ordered by M.D. Patient re-evaluation prior to administration of conscious sedation. No changes noted in patient's status from initial evaluation. The patient's vital signs were monitored by RN and patient remained hemodynamically stable throughout the procedure.    Event Time In   Sedation Start 1042   Sedation End 1052       ESTIMATED BLOOD LOSS:  None    COMPLICATIONS:  None     INTERVAL HISTORY: Patient has clinical and imaging findings suggestive of facet mediated pain. Patients has completed 2 previous diagnostic medial branch blocks at specified levels with at least 80% relief for the expected duration of the local anesthetic utilized.    TECHNIQUE: Time-out was performed to identify the patient and procedure to be performed. With the patient laying in a prone position, the surgical area was prepped and  draped in the usual sterile fashion using ChloraPrep and fenestrated drape. The levels were determined under fluoroscopic guidance. Skin anesthesia was achieved by injecting Lidocaine 2% over the injection sites. A 18 gauge 10mm curved active tip needle was introduced to the anatomic local of the medial branch at each of the above levels using AP, lateral and/or contralateral oblique fluoroscopic imaging. Then sensory and motor testing was performed to confirm that the needle tips were in the correct location. After negative aspiration for blood or CSF was confirmed, 1 mL of the lidocaine 2% listed above was injected slowly at each site. This was followed by thermal lesioning at 80 degrees celsius for 90 seconds. That was followed by slowly injecting 1.5 mL of the medication mixture listed above at each site. The needles were removed and bleeding was nil. A sterile dressing was applied. No specimens collected. The patient tolerated the procedure well and did not have any procedure related motor deficit at the conclusion of the procedure.    The patient was monitored after the procedure in the recovery area. They were given post-procedure and discharge instructions to follow at home. The patient was discharged in a stable condition.        Alex Callahan MD

## 2023-03-10 NOTE — H&P
HPI  Patient presenting for Procedure(s) (LRB):  RADIOFREQUENCY ABLATION RIGHT L3,L4,L5 (Right)     Patient on Anti-coagulation No    No health changes since previous encounter    Past Medical History:   Diagnosis Date    Arthritis     Cataract     Cervical back pain with evidence of disc disease     Hiatal hernia     Hypertension     Thyroid disease      Past Surgical History:   Procedure Laterality Date    ACROMIOCLAVICULAR JOINT CYST EXCISION Right     BACK SURGERY      cataracts Bilateral     CERVICAL FUSION      COLONOSCOPY N/A 4/27/2018    Procedure: COLONOSCOPY;  Surgeon: Giovani Medeiros MD;  [facility]  Service: Endoscopy;  Laterality: N/A;    EPIDURAL STEROID INJECTION N/A 10/11/2019    Procedure: INJECTION, STEROID, EPIDURAL;  Surgeon: Kasandra Montoya MD;  Location: [facility] PAIN MGT;  Service: Pain Management;  Laterality: N/A;  Lumbar NGUYEN L4/5    NGUYEN      EYE SURGERY      INJECTION OF JOINT Bilateral 7/14/2022    Procedure: INJECTION, JOINT, SI BILATERAL  needs consent;  Surgeon: Alex Callahan MD;  Location: [facility] PAIN [facility]GT;  Service: Pain Management;  Laterality: Bilateral;    INJECTION, SACROILIAC JOINT Bilateral 11/4/2022    Procedure: INJECTION,SACROILIAC JOINT BILATERAL CONTRAST;  Surgeon: Alex Callahan MD;  Location: [facility] PAIN MGT;  Service: Pain Management;  Laterality: Bilateral;    RADIOFREQUENCY ABLATION  10/2016    cervical spine/Montoya    RADIOFREQUENCY ABLATION Left 5/3/2019    Procedure: RADIOFREQUENCY ABLATION, L2-L5 MEDIALBRANCH;  Surgeon: Kasandra Montoya MD;  Location: [facility] PAIN MGT;  Service: Pain Management;  Laterality: Left;    RADIOFREQUENCY ABLATION Left 6/13/2019    Procedure: RADIOFREQUENCY ABLATION C4-7;  Surgeon: Kasandra Montoya MD;  Location: [facility] PAIN MGT;  Service: Pain Management;  Laterality: Left;    RADIOFREQUENCY ABLATION Left 3/9/2021    Procedure: RADIOFREQUENCY ABLATION C4,C5,C6,C7;  Surgeon: Alex Callahan MD;  Location: [facility] PAIN MGT;   Service: Pain Management;  Laterality: Left;  1 of 2    RADIOFREQUENCY ABLATION Right 3/26/2021    Procedure: RADIOFREQUENCY ABLATION C4,C6,C6,C7;  Surgeon: Alex Callahan MD;  Location: [facility] PAIN MGT;  Service: Pain Management;  Laterality: Right;  2 of 2    RADIOFREQUENCY ABLATION Right 4/23/2021    Procedure: RADIOFREQUENCY ABLATION L3,4,5;  Surgeon: Alex Callahan MD;  Location: [facility] PAIN MGT;  Service: Pain Management;  Laterality: Right;  1 of 2    RADIOFREQUENCY ABLATION Left 5/7/2021    Procedure: RADIOFREQUENCY ABLATION L3,4,5;  Surgeon: Alex Callahan MD;  Location: [facility] PAIN MGT;  Service: Pain Management;  Laterality: Left;  2 of 2    RADIOFREQUENCY ABLATION Left 10/1/2021    Procedure: RADIOFREQUENCY ABLATION LEFT, C4,5,6,7 1 of 2 CONSENT NEEDED;  Surgeon: Alex Callahan MD;  Location: [facility] PAIN MGT;  Service: Pain Management;  Laterality: Left;    RADIOFREQUENCY ABLATION Right 10/15/2021    Procedure: RADIOFREQUENCY ABLATION  RIGHT C4,5,6,7 2 of 2 CONSENT NEEDED;  Surgeon: Alex Callahan MD;  Location: [facility] PAIN MGT;  Service: Pain Management;  Laterality: Right;    RADIOFREQUENCY ABLATION Left 2/25/2022    Procedure: RADIOFREQUENCY ABLATION LEFT L3,4,5 1 of 2, needs consent;  Surgeon: Alex Callahan MD;  Location: [facility] PAIN [facility]GT;  Service: Pain Management;  Laterality: Left;    RADIOFREQUENCY ABLATION Right 3/11/2022    Procedure: RADIOFREQUENCY ABLATION RIGHT L3,4,5 2 of 2, needs consent;  Surgeon: Alex Callahan MD;  Location: [facility] PAIN [facility]GT;  Service: Pain Management;  Laterality: Right;    RADIOFREQUENCY ABLATION Left 12/15/2022    Procedure: RADIOFREQUENCY ABLATION LEFT C4, C5, C6, C7  ONE OF TWO NEEDS CONSENT;  Surgeon: Alex Callahan MD;  Location: [facility] PAIN [facility]GT;  Service: Pain Management;  Laterality: Left;    RADIOFREQUENCY ABLATION Right 12/29/2022    Procedure: RADIOFREQUENCY ABLATION RIGHT C4, C5, C6, C7  TWO OF TWO NEEDS CONSENT;  Surgeon: Alex Callahan MD;  Location:  [facility] PA[facility];  Service: Pain Management;  Laterality: Right;    RADIOFREQUENCY ABLATION Left 2/24/2023    Procedure: RADIOFREQUENCY ABLATION LEFT L3,L4,L5;  Surgeon: Alex Callahan MD;  Location: [facility] PAIN [facility]GT;  Service: Pain Management;  Laterality: Left;    RETINAL DETACHMENT SURGERY      ROTATOR CUFF REPAIR Right     SPINE SURGERY      cerival fusion     TRANSFORAMINAL EPIDURAL INJECTION OF STEROID Right 4/14/2022    Procedure: INJECTION, STEROID, EPIDURAL, TRANSFORAMINAL APPROACH RIGHT L4/5 & L5/S1 Needs Consent;  Surgeon: Alex Callahan MD;  Location: [facility] PAIN [facility]GT;  Service: Pain Management;  Laterality: Right;    TRIGGER POINT INJECTION Left 6/13/2019    Procedure: INJECTION, TRIGGER POINT;  Surgeon: Kasandra Montoya MD;  Location: [facility]IN [facility]GT;  Service: Pain Management;  Laterality: Left;     Review of patient's allergies indicates:   Allergen Reactions    Pcn [penicillins] Hives and Rash    Lipitor [atorvastatin] Other (See Comments)     Muscle cramps, weakness      No current facility-administered medications for this encounter.       PMHx, PSHx, Allergies, Medications reviewed in epic    ROS negative except pain complaints in HPI    OBJECTIVE:    There were no vitals taken for this visit.    PHYSICAL EXAMINATION:    GENERAL: Well appearing, in no acute distress, alert and oriented x3.  PSYCH:  Mood and affect appropriate.  SKIN: Skin color, texture, turgor normal, no rashes or lesions which will impact the procedure.  CV: RRR with palpation of the radial artery.  PULM: No evidence of respiratory difficulty, symmetric chest rise. Clear to auscultation.  NEURO: Cranial nerves grossly intact.    Plan:    Proceed with procedure as planned Procedure(s) (LRB):  RADIOFREQUENCY ABLATION RIGHT L3,L4,L5 (Right)    Rafael Rajantanez Wallis  03/10/2023

## 2023-03-10 NOTE — DISCHARGE INSTRUCTIONS
Thank you for allowing us to care for you today. You may receive a survey about the care we provided. Your feedback is valuable and helps us provide excellent care throughout the community.     Home Care Instructions for Pain Management:    1. DIET:   You may resume your normal diet today.   2. BATHING:   You may shower with luke warm water. No tub baths or anything that will soak injection sites under water for the next 24 hours.  3. DRESSING:   You may remove your bandage today.   4. ACTIVITY LEVEL:   You may resume your normal activities 24 hrs after your procedure. Nothing strenuous today.  5. MEDICATIONS:   You may resume your normal medications today. To restart blood thinners, ask your doctor.  6. DRIVING    If you have received any sedatives by mouth today, you may not drive for 12 hours.    If you have received any sedation through your IV, you may not drive for 24 hrs.   7. SPECIAL INSTRUCTIONS:   No heat to the injection site for 24 hrs including, hot bath or shower, heating pad, moist heat, or hot tubs.    Use ice pack to injection site for any pain or discomfort.  Apply ice packs for 20 minute intervals as needed.    IF you have diabetes, be sure to monitor your blood sugar more closely. IF your injection contained steroids your blood sugar levels may become higher than normal.    If you are still having pain upon discharge:  Your pain may improve over the next 48 hours. The anesthetic (numbing medication) works immediately to 48 hours. IF your injection contained a steroid (anti-inflammatory medication), it takes approximately 3 days to start feeling relief and 7-10 days to see your greatest results from the medication. It is possible you may need subsequent injections. This would be discussed at your follow up appointment with pain management or your referring doctor.    Please call the PAIN MANAGEMENT office at [phone] or ON CALL pager at [phone] if you experienced any:   -Weakness or  loss of sensation  -Fever > 101.5  -Pain uncontrolled with oral medications   -Persistent nausea, vomiting, or diarrhea  -Redness or drainage from the injection sites, or any other worrisome concerns.   If physician on call was not reached or could not communicate with our office for any reason please go to the nearest emergency department. Adult Procedural Sedation Instructions    Recovery After Procedural Sedation (Adult)  You have been given medicine by vein to make you sleep during your surgery. This may have included both a pain medicine and sleeping medicine. Most of the effects have worn off. But you may still have some drowsiness for the next 6 to 8 hours.  Home care  Follow these guidelines when you get home:  For the next 8 hours, you should be watched by a responsible adult. This person should make sure your condition is not getting worse.  Don't drink any alcohol for the next 24 hours.  Don't drive, operate dangerous machinery, or make important business or personal decisions during the next 24 hours.  Note: Your healthcare provider may tell you not to take any medicine by mouth for pain or sleep in the next 4 hours. These medicines may react with the medicines you were given in the hospital. This could cause a much stronger response than usual.  Follow-up care  Follow up with your healthcare provider if you are not alert and back to your usual level of activity within 12 hours.  When to seek medical advice  Call your healthcare provider right away if any of these occur:  Drowsiness gets worse  Weakness or dizziness gets worse  Repeated vomiting  You can't be awakened   Date Last Reviewed: 10/18/2016  © 8852-8559 [employer]. [address] [place] [address], [place] [address]. All rights reserved. This information is not intended as a substitute for professional medical care. Always follow your healthcare professional's instructions.

## 2023-03-17 ENCOUNTER — PATIENT MESSAGE (OUTPATIENT)
Dept: PAIN MEDICINE | Facility: CLINIC | Age: 58
End: 2023-03-17
Payer: MEDICARE

## 2023-03-20 ENCOUNTER — TELEPHONE (OUTPATIENT)
Dept: PAIN MEDICINE | Facility: CLINIC | Age: 58
End: 2023-03-20
Payer: MEDICARE

## 2023-03-21 ENCOUNTER — OFFICE VISIT (OUTPATIENT)
Dept: PAIN MEDICINE | Facility: CLINIC | Age: 58
End: 2023-03-21
Payer: MEDICARE

## 2023-03-21 ENCOUNTER — HOSPITAL ENCOUNTER (OUTPATIENT)
Dept: RADIOLOGY | Facility: OTHER | Age: 58
Discharge: HOME | End: 2023-03-21
Attending: NURSE PRACTITIONER
Payer: MEDICARE

## 2023-03-21 VITALS
HEART RATE: 65 BPM | BODY MASS INDEX: 32.87 KG/M2 | HEIGHT: 73 IN | WEIGHT: 248 LBS | SYSTOLIC BLOOD PRESSURE: 131 MMHG | DIASTOLIC BLOOD PRESSURE: 85 MMHG

## 2023-03-21 DIAGNOSIS — M47.812 SPONDYLOSIS OF CERVICAL REGION WITHOUT MYELOPATHY OR RADICULOPATHY: ICD-10-CM

## 2023-03-21 DIAGNOSIS — M54.16 LUMBAR RADICULOPATHY: ICD-10-CM

## 2023-03-21 DIAGNOSIS — M79.10 MYALGIA: ICD-10-CM

## 2023-03-21 DIAGNOSIS — Z98.1 S/P CERVICAL SPINAL FUSION: ICD-10-CM

## 2023-03-21 DIAGNOSIS — M47.816 LUMBAR SPONDYLOSIS: ICD-10-CM

## 2023-03-21 DIAGNOSIS — M47.816 FACET ARTHRITIS OF LUMBAR REGION: ICD-10-CM

## 2023-03-21 DIAGNOSIS — M53.3 SACROILIAC JOINT PAIN: ICD-10-CM

## 2023-03-21 DIAGNOSIS — G89.4 CHRONIC PAIN DISORDER: ICD-10-CM

## 2023-03-21 DIAGNOSIS — M51.36 DDD (DEGENERATIVE DISC DISEASE), LUMBAR: ICD-10-CM

## 2023-03-21 DIAGNOSIS — M50.30 DDD (DEGENERATIVE DISC DISEASE), CERVICAL: ICD-10-CM

## 2023-03-21 DIAGNOSIS — M54.16 LUMBAR RADICULOPATHY: Primary | ICD-10-CM

## 2023-03-21 PROCEDURE — 99999 PR PBB SHADOW E&M-EST. PATIENT-LVL V: CPT | Mod: PBBFAC,,, | Performed by: NURSE PRACTITIONER

## 2023-03-21 PROCEDURE — 3075F PR MOST RECENT SYSTOLIC BLOOD PRESS GE 130-139MM HG: ICD-10-PCS | Mod: CPTII,S$GLB,, | Performed by: NURSE PRACTITIONER

## 2023-03-21 PROCEDURE — 72114 X-RAY EXAM L-S SPINE BENDING: CPT | Mod: 26,,, | Performed by: RADIOLOGY

## 2023-03-21 PROCEDURE — 1160F RVW MEDS BY RX/DR IN RCRD: CPT | Mod: CPTII,S$GLB,, | Performed by: NURSE PRACTITIONER

## 2023-03-21 PROCEDURE — 99214 OFFICE O/P EST MOD 30 MIN: CPT | Mod: S$GLB,,, | Performed by: NURSE PRACTITIONER

## 2023-03-21 PROCEDURE — 99214 PR OFFICE/OUTPT VISIT, EST, LEVL IV, 30-39 MIN: ICD-10-PCS | Mod: S$GLB,,, | Performed by: NURSE PRACTITIONER

## 2023-03-21 PROCEDURE — 72070 X-RAY EXAM THORAC SPINE 2VWS: CPT | Mod: TC,FY

## 2023-03-21 PROCEDURE — 3079F DIAST BP 80-89 MM HG: CPT | Mod: CPTII,S$GLB,, | Performed by: NURSE PRACTITIONER

## 2023-03-21 PROCEDURE — 3008F PR BODY MASS INDEX (BMI) DOCUMENTED: ICD-10-PCS | Mod: CPTII,S$GLB,, | Performed by: NURSE PRACTITIONER

## 2023-03-21 PROCEDURE — 72070 X-RAY EXAM THORAC SPINE 2VWS: CPT | Mod: 26,,, | Performed by: RADIOLOGY

## 2023-03-21 PROCEDURE — 4010F PR ACE/ARB THEARPY RXD/TAKEN: ICD-10-PCS | Mod: CPTII,S$GLB,, | Performed by: NURSE PRACTITIONER

## 2023-03-21 PROCEDURE — 1159F PR MEDICATION LIST DOCUMENTED IN MEDICAL RECORD: ICD-10-PCS | Mod: CPTII,S$GLB,, | Performed by: NURSE PRACTITIONER

## 2023-03-21 PROCEDURE — 3079F PR MOST RECENT DIASTOLIC BLOOD PRESSURE 80-89 MM HG: ICD-10-PCS | Mod: CPTII,S$GLB,, | Performed by: NURSE PRACTITIONER

## 2023-03-21 PROCEDURE — 1160F PR REVIEW ALL MEDS BY PRESCRIBER/CLIN PHARMACIST DOCUMENTED: ICD-10-PCS | Mod: CPTII,S$GLB,, | Performed by: NURSE PRACTITIONER

## 2023-03-21 PROCEDURE — 4010F ACE/ARB THERAPY RXD/TAKEN: CPT | Mod: CPTII,S$GLB,, | Performed by: NURSE PRACTITIONER

## 2023-03-21 PROCEDURE — 3075F SYST BP GE 130 - 139MM HG: CPT | Mod: CPTII,S$GLB,, | Performed by: NURSE PRACTITIONER

## 2023-03-21 PROCEDURE — 72114 XR LUMBAR SPINE 5 VIEW WITH FLEX AND EXT: ICD-10-PCS | Mod: 26,,, | Performed by: RADIOLOGY

## 2023-03-21 PROCEDURE — 99999 PR PBB SHADOW E&M-EST. PATIENT-LVL V: ICD-10-PCS | Mod: PBBFAC,,, | Performed by: NURSE PRACTITIONER

## 2023-03-21 PROCEDURE — 72114 X-RAY EXAM L-S SPINE BENDING: CPT | Mod: TC,FY

## 2023-03-21 PROCEDURE — 3008F BODY MASS INDEX DOCD: CPT | Mod: CPTII,S$GLB,, | Performed by: NURSE PRACTITIONER

## 2023-03-21 PROCEDURE — 72070 XR THORACIC SPINE AP LATERAL: ICD-10-PCS | Mod: 26,,, | Performed by: RADIOLOGY

## 2023-03-21 PROCEDURE — 1159F MED LIST DOCD IN RCRD: CPT | Mod: CPTII,S$GLB,, | Performed by: NURSE PRACTITIONER

## 2023-03-21 RX ORDER — HYDROCODONE BITARTRATE AND ACETAMINOPHEN 7.5; 325 MG/1; MG/1
1 TABLET ORAL EVERY 12 HOURS PRN
Qty: 60 TABLET | Refills: 0 | Status: SHIPPED | OUTPATIENT
Start: 2023-03-21 | End: 2023-08-11 | Stop reason: SDUPTHER

## 2023-03-21 NOTE — PROGRESS NOTES
Chronic patient Established Note (Follow up visit)      Interval History 3/21/2023:  The patient returns to clinic today for follow up of back pain. He is s/p left L3,4,5 RFA on 2/24/2023 and right L3,4,5 RFA on 3/10/2023. He is unsure of relief at this time. He reports increased left sided low back pain over the last two weeks. 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 He did feel a pop. He thought he pulled a muscle, but pain has been worsening. He reports left sided low back pain that radiates into the left hip and lower abdomen. He also reports intermittent radiating pain into the posterior left leg. He describes this pain as numb and tingling in nature. His pain is worse with activity. He does endorse muscle spasms. He is taking Gabapentin and Robaxin. He has had to take more Norco recently due to increased pain. He denies any weakness. He denies any other health changes. His pain today is 8/10.    Interval History 1/31/2023:  The patient returns to clinic today for follow up of neck and back pain. He is s/p left C4,5,6,7 on 12/15/2022 and right C4,5,6,7 RFA on 12/29/2022. He reports 50% relief of his neck pain. He reports intermittent neck pain, left side greater than right. He denies any radicular arm pain. He does report pain into the shoulder blades. He reports increased low back pain. He denies any radicular leg pain. His pain is worse with standing, walking, and activity. He does endorse stiffness. He is taking Gabapentin and Robaxin. He also takes Norco sparingly with benefit. He denies any other health changes. His pain today is 6/10.    Interval History 11/18/2022:  The patient returns to clinic today for follow up of neck and back pain. He is s/p bilateral SI joint injections on 11/4/2022. He reports 60% relief of his low back and buttock pain. He continues to report low back pain. He denies any radicular leg pain. He reports increased neck pain, left side greater than right.  He denies any radicular arm. He does report some pain into his shoulder blades. His pain is worse with activity. He continues to take Gabapentin and Robaxin. He continues to take Norco as needed sparingly with benefit. He denies any other health changes. His pain today is 7/10.    Interval History 10/27/2022:  The patient returns to clinic today for follow up of neck and back pain. He reports increased low back and bilateral buttock pain. He denies any radicular leg pain. His pain is worse with prolonged sitting and moving from sitting to standing. He does endorse a fall recently while getting out of the vehicle onto his side. He continues to report neck pain. He continues to take Gabapentin and Robaxin. He continues to take Norco sparingly with benefit and without adverse effects. He denies any other health changes. His pain today is 6/10.    Interval History 8/2/2022:  The aptient returns to clinic today for follow up of neck and back pain. He is s/p bilateral SI joint injection on 7/14/2022. He reports 50-60% relief of his low back and buttock pain. He reports intermittent low back and buttock pain. He reports increased neck pain, left side greater than right. He denies any radicular arm pain. He has good days and bad days. He continues to take Gabapentin. He continues to take Norco sparingly with benefit and without adverse effects. He denies any other health changes. His pain today is 5/10.    Interval History 7/1/2022:  The patient returns to clinic today for follow up of neck and back pain. He reports increased low back and buttock pain.His pain is worse with prolonged sitting and moving from sitting to standing. He also reports increased pain with bending over. He does report intermittent radiating pain into his right posterior thigh stopping at mid thigh. He does report a fall, which was sitting hard on the ground about a week ago. He denies any acute injury. He continues to take Gabapentin and Norco. He  "denies any other health changes. His pain today is 6/10.     Interval History 5/9/2022:  The patient returns to clinic today for follow up of back pain. He is s/p right L4/5 and L5/S1 TF NGUYNE on 4/14/2022. He reports 50-60% relief of his low back and leg pain. He has been more active since the procedure. He continues to report low back and hip pain, worse with bending and activity. He denies any radicular leg pain. He continues to repot intermittent neck pain. He continues to take Gabapentin with benefit. He continues to take Norco sparingly as needed. He denies any other health changes. His pain today is 5/10.    Interval History 4/1/2022:  The patient returns to clinic today for follow up of back pain. He is s/p left L3,4,5 RFA on 2/25/2022 and right L3,4,5 RFA on 3/11/2022. He reports relief of his back pain. He reports increased back pain that radiates into the posterior aspect of his right leg to his knee. He denies any left leg pain. His pain is worse with prolonged walking. He reports that he sometimes drags his right leg. He denies any falls. He denies any bowel or bladder incontinence. He continues to take Gabapentin and Norco as needed with benefit. He denies any adverse effects. He denies any other health changes. His pain today is 7/10.    Interval History 2/15/2022:  The patient returns to clinic today for follow up of neck and back pain. He reports increased low back pain over the last two weeks. He describes this pain as sharp and aching in nature. He does report intermittent "catching" pain in his lower back, worse with getting out of bed. He denies any radicular leg pain. He continues to report benefit from previous cervical procedures. He reports intermittent neck pain. He continues to take Gabapentin with benefit. He continues to take Norco as needed with benefit and without adverse effects. He denies any other health changes. His pain today is 6/10.    Interval History 11/5/2021:  The patient " returns to clinic today for follow up of neck and back pain. He is s/p left C4,5,6,7 RFA on 10/1/2021 and right C4,5,6,7 RFA on 10/15/2021. He reports 50% relief of his neck pain. He reports increased burning and itching pain to his skin near injection sites. He denies any radicular arm pain. He does report increased left sided muscle pain. He is concerned that one of the screws in his cervical hardware is moving. He continues to report benefit with previous lumbar procedures. He reports intermittent low back pain without radicular leg pain. He continues to take Gabapentin with benefit. He continues to take Norco as needed with benefit and without adverse effects. He denies any weakness. He denies any other health changes. His pain today is 5/10.    Interval History 9/17/2021:  The patient returns to clinic today for follow up of neck and back pain. He reports increased neck pain. He denies any radicular arm pain today. He does report intermittent radiating pain into his left shoulder blade and mid back. His pain is worse with activity. He reports that sometimes his pain takes his breath away. He continues to report low back pain, that is tolerable at this time. He continues to take Gabapentin and Norco with benefit and without adverse effects. He denies any other health changes. His pain today is 6/10.    Interval History 6/17/2021:  The patient returns to clinic today for follow up of neck and back pain. He is s/p right L3,4,5 RFA on 4/23/2021 and left L3,4,5 RFA on 5/7/2021. He reports 50% relief of his low back pain. He continues to report intermittent low back pain. He denies any radicular leg pain. He continues to report neck pain, especially in between the scapula. His pain is worse with prolonged standing, walking, and activity. He continues to take Gabapentin with benefit. He continues to take Norco sparingly for severe pain with benefit and without adverse effects. He denies any other health changes. His  pain today is 4/10.    Interval History 4/9/2021:  The patient returns to clinic today for follow up of neck and back pain. He is s/p left C4,5,6,7 RFA on 3/9/2021 and right C4,5,6,7 RFA on 3/26/2021. He reports 50% relief of his neck pain. He reports intermittent neck pain. He has good days and bad days. He continues to report low back pain that is constant, sharp, and aching. He reports intermittent radiating pain into the lateral aspect of his left leg to his knee. He continues to take Gabapentin with benefit. He continues to take Norco as needed sparingly for severe pain. He denies any adverse effects. He denies any other health changes. His pain today is 5/10.    Interval History 3/2/2021:  Sal Navarro presents to the clinic for a follow-up appointment for neck pain . Since the last visit, Sal Navarro states the pain has been worsening. Current pain intensity is 6/10.  Was seen by Angelique Barahona NP on 2/12/2021.     Interval History 2/12/2021:  The patient returns to clinic today for follow up of back pain. He has not been seen in over a year. He did not have imaging due to coronavirus outbreak. He would to reschedule this. He was also considering SCS trial prior to the pandemic. He continues to report low back pain that radiates into the lateral aspect of his left leg to his knee. He denies any radicular right leg pain. His pain is worse with bending and walking. He continues to take Gabapentin with benefit. He also takes Norco as needed for severe pain. He denies any adverse effects. He denies any other health changes. His pain today is 6/10.    HPI:  Sal Navarro is a 56 y.o. male who presents today s/p C4-7 ACDF with C5/6 PSIF in 2/2016 with residual chronic midline neck pain that radiates into his shoulder blades bilaterally, R>>L.  This is associated with bilateral hand numbness and tingling.  The hand symptoms did improve following the surgery.  The shooting pains in his arms resolved  "completely.   This pain is described in detail below.  His post-operative course was complicated by dysphagia that is being treated with speech therapy.  The numbness and the tingling were relieved by the surgery. Now experiences "deep pain along the spine"  Patient has not been seen for over a year, had to re-schedule his imaging studies due to pandemic(now completed). Prior to the pandemic, he was considering a SCS.   Only time he gets relief is laying in bed on a very thin pillow, but that doesn't last very long.   Pain aggravated by movement and even breathing/playing with his grandchild/holding the grandchild's hand. Heat and massage (has a vest) are helpful.   Was seen by Dr. Jan srivastava and received multiple EIS and RFAs.   Compliant with his medication.     Pain Disability Index Review:  Last 3 PDI Scores 3/21/2023 1/31/2023 11/18/2022   Pain Disability Index (PDI) 40 22 37       Pain Medications:  Gabapentin  Norco sparingly  Robaxin    Opioid Contract: yes     report:  Reviewed and consistent with medication use as prescribed.    Pain Procedures:   3/10/2023- Right L3,4,5 RFA  2/24/2023- Left L3,4,5 RFA  12/29/2022- Right C4,5,6,7 RFA  12/15/2022- Left C4,5,6,7 RFA  11/4/2022- Bilateral SI joint injections- 60% relief   4/14/2022- Right L4/5 and L5/S1 TF NGUYEN  3/11/2022- Right L3,4,5 RFA  2/25/2022- Left L3,4,5 RFA  10/15/2021- Right C4,5,6,7 RFA  10/1/2021- Left C4,5,6,7 RFA  5/7/2021- Left L3,4,5 RFA   4/23/2021- Right L3,4,5 RFA  3/26/2021- Right C4,5,6,7 RFA  3/9/2021- Left C4,5,6,7 RFA  10/11/19: L4/5 Interlaminar Epidural Steroid Injection  06/13/19- Left C4-7 RFA  05/03/19:Left L2-5 Lumbar Medial Branch Nerve Thermal Radiofrequency Ablation  12/21/18:Right L2-5 Lumbar Medial Branch Nerve Thermal Radiofrequency Ablation  11/23/18:Cervical Thermal Radiofreqiency Ablation: Right C4-7  09/14/18:C7/Y7Hpyaosrt Steroid Injection  06/29/18:LL2/3 Interlaminar Epidural Steroid Injection   03/06/18:L2/3 " Interlaminar Epidural Steroid Injection   09/26/17:Bilateral L2-5 Lumbar Medial Branch Nerve Coolief Thermal Radiofrequency Ablation  08/31/17:Bilateral L2-5 Lumbar medial branch blocks   03/28/17:Cervical Conventional Radiofreqiency Ablation: Left C4-7  03/14/17:Cervical Conventional Radiofreqiency Ablation: Right C4-7  10/25/16:Cervical Conventional Radiofreqiency Ablation: Left C4-7  10/11/16: Cervical Conventional Radiofreqiency Ablation: Right C4-7  10/04/16- Cervical Medial Branch Blocks, Bilateral C4-7.     08/31/16:C7/A4Mvlkfsff Steroid Injection    Physical Therapy/Home Exercise: does home exercises now, but not in formal PT.    - 2019 was last time in PT     Imaging:   MRI Lumbar Spine 2/25/2021:  COMPARISON:  07/11/2017     FINDINGS:  The vertebral bodies maintain normal height, signal intensity and alignment.  There is redemonstration of few hemangiomas at multiple levels.  The intervertebral disc spaces are preserved although there is some disc desiccation at multiple levels.  The conus terminates at level L1.  Evaluation of the localizer images and structures surrounding the lumbar spine shows no abnormalities.     L1-L2: No significant disc or joint pathology.     L2-L3: Mild diffuse disc bulge with mild bilateral facet arthropathy and ligamentum flavum hypertrophy results in mild central canal stenosis.  Mild bilateral neural foraminal stenosis is also identified.     L3-L4: There is a diffuse disc bulge with no significant facet arthropathy or ligamentum flavum hypertrophy.  There is mild central canal stenosis and mild bilateral neural foraminal stenosis.     L4-L5: There is a diffuse disc bulge with ligamentum flavum hypertrophy.  No significant facet arthropathy.  There is mild central canal stenosis with only minimal encroachment on the left neural foramen.  Mild right neural foraminal stenosis is present.     L5-S1: There is a diffuse disc bulge with mild to moderate right facet arthropathy.   No left facet arthropathy.  The ligamentum flavum is normal.  The central canal is patent and the neural foramina are largely patent.  Incidental note is made of an annulus fibrosus tear posteriorly measuring approximately 2 mm.     Impression:     Multilevel degenerative disc and joint disease which is mild and results in mild central canal and neural foraminal stenosis as outlined above.    Xray Cervical Spine 2/25/2021:  COMPARISON:  07/07/2017 .     FINDINGS:  The patient has undergone ACDF from C4 through C7. The instrumentation is intact without evidence of complication.  The vertebral bodies maintain normal height and alignment. The remaining intervertebral disc spaces are preserved.  No significant uncovertebral joint hypertrophy.  The prevertebral soft tissues, odontoid views and lung apices are normal. The neural foramen are patent     Impression:     Postsurgical changes to the thoracic spine without additional evidence for degenerative disc or joint disease.     Allergies:   Review of patient's allergies indicates:   Allergen Reactions    Pcn [penicillins] Hives and Rash    Lipitor [atorvastatin] Other (See Comments)     Muscle cramps, weakness       Current Medications:   Current Outpatient Medications   Medication Sig Dispense Refill    acetaminophen (TYLENOL) 500 MG tablet Take 500 mg by mouth every 6 (six) hours as needed for Pain.      albuterol (PROVENTIL/VENTOLIN HFA) 90 mcg/actuation inhaler       aspirin (ECOTRIN) 81 MG EC tablet Take 81 mg by mouth once daily.      cetirizine (ZYRTEC) 10 MG tablet Take 10 mg by mouth nightly.  5    FOLIC ACID/MULTIVIT-MIN/LUTEIN (CENTRUM SILVER ORAL) Take by mouth once daily.      HYDROcodone-acetaminophen (NORCO) 7.5-325 mg per tablet Take 1 tablet by mouth every 12 (twelve) hours as needed for Pain. 60 tablet 0    levothyroxine (SYNTHROID) 50 MCG tablet Take 50 mcg by mouth once daily.      meclizine (ANTIVERT) 25 mg tablet Take 25 mg by mouth once daily.        meloxicam (MOBIC) 15 MG tablet Take 15 mg by mouth once daily.      metFORMIN (GLUCOPHAGE) 500 MG tablet Take 1 tablet by mouth 2 (two) times a day.      methocarbamoL (ROBAXIN) 500 MG Tab Take 1 tablet (500 mg total) by mouth 2 (two) times daily. 60 tablet 4    omeprazole (PRILOSEC) 20 MG capsule Take 1 capsule (20 mg total) by mouth once daily. 90 capsule 3    rosuvastatin (CRESTOR) 20 MG tablet Take 20 mg by mouth once daily.      valsartan (DIOVAN) 80 MG tablet Take 40 mg by mouth once daily.   1    gabapentin (NEURONTIN) 300 MG capsule Take 2 capsules (600 mg total) by mouth 2 (two) times daily. 120 capsule 5     No current facility-administered medications for this visit.       REVIEW OF SYSTEMS:    GENERAL:  No weight loss, malaise or fevers.  HEENT:  Negative for frequent or significant headaches.  NECK:  Negative for lumps, goiter  and significant neck swelling.   RESPIRATORY:  Negative for cough, wheezing or shortness of breath.  CARDIOVASCULAR:  Negative for chest pain, leg swelling or palpitations. HTN  GI:  Negative for abdominal discomfort, blood in stools or black stools or change in bowel habits.  MUSCULOSKELETAL:  See HPI.  SKIN:  Negative for lesions, rash, and itching.  PSYCH:  Negative for sleep disturbance, mood disorder and recent psychosocial stressors.  HEMATOLOGY/LYMPHOLOGY:  Negative for prolonged bleeding, bruising easily or swollen nodes.  NEURO:   No history of headaches, syncope, paralysis, seizures or tremors.   ENDO: Thyroid disorder.   All other reviewed and negative other than HPI.    Past Medical History:  Past Medical History:   Diagnosis Date    Arthritis     Cataract     Cervical back pain with evidence of disc disease     Hiatal hernia     Hypertension     Thyroid disease        Past Surgical History:  Past Surgical History:   Procedure Laterality Date    ACROMIOCLAVICULAR JOINT CYST EXCISION Right     BACK SURGERY      cataracts Bilateral     CERVICAL FUSION       COLONOSCOPY N/A 4/27/2018    Procedure: COLONOSCOPY;  Surgeon: Giovani Medeiros MD;  [facility]  Service: Endoscopy;  Laterality: N/A;    EPIDURAL STEROID INJECTION N/A 10/11/2019    Procedure: INJECTION, STEROID, EPIDURAL;  Surgeon: Kasandra Montoya MD;  Location: [facility] PAIN MGT;  Service: Pain Management;  Laterality: N/A;  Lumbar NGUYEN L4/5    NGUYEN      EYE SURGERY      INJECTION OF JOINT Bilateral 7/14/2022    Procedure: INJECTION, JOINT, SI BILATERAL  needs consent;  Surgeon: Alex Callahan MD;  Location: [facility]IN [facility]GT;  Service: Pain Management;  Laterality: Bilateral;    INJECTION, SACROILIAC JOINT Bilateral 11/4/2022    Procedure: INJECTION,SACROILIAC JOINT BILATERAL CONTRAST;  Surgeon: Alex Callahan MD;  Location: [facility] PAIN [facility]GT;  Service: Pain Management;  Laterality: Bilateral;    RADIOFREQUENCY ABLATION  10/2016    cervical spine/Montoya    RADIOFREQUENCY ABLATION Left 5/3/2019    Procedure: RADIOFREQUENCY ABLATION, L2-L5 MEDIALBRANCH;  Surgeon: Kasandra Montoya MD;  Location: [facility] PAIN [facility]GT;  Service: Pain Management;  Laterality: Left;    RADIOFREQUENCY ABLATION Left 6/13/2019    Procedure: RADIOFREQUENCY ABLATION C4-7;  Surgeon: Kasandra Montoya MD;  Location: [facility] PAIN [facility]GT;  Service: Pain Management;  Laterality: Left;    RADIOFREQUENCY ABLATION Left 3/9/2021    Procedure: RADIOFREQUENCY ABLATION C4,C5,C6,C7;  Surgeon: Alex Callahan MD;  Location: [facility] PAIN [facility]GT;  Service: Pain Management;  Laterality: Left;  1 of 2    RADIOFREQUENCY ABLATION Right 3/26/2021    Procedure: RADIOFREQUENCY ABLATION C4,C6,C6,C7;  Surgeon: Alex Callahan MD;  Location: [facility] PAIN [facility]GT;  Service: Pain Management;  Laterality: Right;  2 of 2    RADIOFREQUENCY ABLATION Right 4/23/2021    Procedure: RADIOFREQUENCY ABLATION L3,4,5;  Surgeon: Alex Callahan MD;  Location: [facility] PAIN [facility]GT;  Service: Pain Management;  Laterality: Right;  1 of 2    RADIOFREQUENCY ABLATION Left 5/7/2021    Procedure:  RADIOFREQUENCY ABLATION L3,4,5;  Surgeon: Alex Callahan MD;  Location: [facility];  Service: Pain Management;  Laterality: Left;  2 of 2    RADIOFREQUENCY ABLATION Left 10/1/2021    Procedure: RADIOFREQUENCY ABLATION LEFT, C4,5,6,7 1 of 2 CONSENT NEEDED;  Surgeon: Alex Callahan MD;  Location: [facility];  Service: Pain Management;  Laterality: Left;    RADIOFREQUENCY ABLATION Right 10/15/2021    Procedure: RADIOFREQUENCY ABLATION  RIGHT C4,5,6,7 2 of 2 CONSENT NEEDED;  Surgeon: Alex Callahan MD;  Location: [facility];  Service: Pain Management;  Laterality: Right;    RADIOFREQUENCY ABLATION Left 2/25/2022    Procedure: RADIOFREQUENCY ABLATION LEFT L3,4,5 1 of 2, needs consent;  Surgeon: Alex Callahan MD;  Location: [facility];  Service: Pain Management;  Laterality: Left;    RADIOFREQUENCY ABLATION Right 3/11/2022    Procedure: RADIOFREQUENCY ABLATION RIGHT L3,4,5 2 of 2, needs consent;  Surgeon: Alex Callahan MD;  Location: [facility] PAIN MGT;  Service: Pain Management;  Laterality: Right;    RADIOFREQUENCY ABLATION Left 12/15/2022    Procedure: RADIOFREQUENCY ABLATION LEFT C4, C5, C6, C7  ONE OF TWO NEEDS CONSENT;  Surgeon: Alex Callahan MD;  Location: [facility];  Service: Pain Management;  Laterality: Left;    RADIOFREQUENCY ABLATION Right 12/29/2022    Procedure: RADIOFREQUENCY ABLATION RIGHT C4, C5, C6, C7  TWO OF TWO NEEDS CONSENT;  Surgeon: Alex Callahan MD;  Location: [facility];  Service: Pain Management;  Laterality: Right;    RADIOFREQUENCY ABLATION Left 2/24/2023    Procedure: RADIOFREQUENCY ABLATION LEFT L3,L4,L5;  Surgeon: Alex Callahan MD;  Location: [facility] PAIN MGT;  Service: Pain Management;  Laterality: Left;    RADIOFREQUENCY ABLATION Right 3/10/2023    Procedure: RADIOFREQUENCY ABLATION RIGHT L3,L4,L5;  Surgeon: Alex Callahan MD;  Location: [facility] PAIN MGT;  Service: Pain Management;  Laterality: Right;    RETINAL DETACHMENT SURGERY      ROTATOR CUFF REPAIR  "Right     SPINE SURGERY      cerival fusion     TRANSFORAMINAL EPIDURAL INJECTION OF STEROID Right 4/14/2022    Procedure: INJECTION, STEROID, EPIDURAL, TRANSFORAMINAL APPROACH RIGHT L4/5 & L5/S1 Needs Consent;  Surgeon: Alex Callahan MD;  Location: [facility] PAIN MGT;  Service: Pain Management;  Laterality: Right;    TRIGGER POINT INJECTION Left 6/13/2019    Procedure: INJECTION, TRIGGER POINT;  Surgeon: Kasandra Montoya MD;  Location: [facility] PAIN MGT;  Service: Pain Management;  Laterality: Left;       Family History:  Family History   Problem Relation Age of Onset    Heart disease Mother     Cancer Father     Leukemia Brother     Colon cancer Neg Hx     Celiac disease Neg Hx     Crohn's disease Neg Hx     Esophageal cancer Neg Hx     Inflammatory bowel disease Neg Hx     Irritable bowel syndrome Neg Hx     Liver cancer Neg Hx     Rectal cancer Neg Hx     Stomach cancer Neg Hx     Ulcerative colitis Neg Hx        Social History:  Social History     Socioeconomic History    Marital status:    Tobacco Use    Smoking status: Never    Smokeless tobacco: Never   Substance and Sexual Activity    Alcohol use: No    Drug use: No       OBJECTIVE:    /85 (BP Location: Right arm, Patient Position: Sitting, BP Method: Large (Automatic))   Pulse 65   Ht 6' 1" (1.854 m)   Wt 112.5 kg (248 lb 0.3 oz)   BMI 32.72 kg/m²     PHYSICAL EXAMINATION:    General appearance: Well appearing, in no acute distress, alert and oriented x3.  Psych:  Mood and affect appropriate.  Skin: Skin color, texture, turgor normal, no rashes or lesions, in both upper and lower body.  Head/face:  Atraumatic, normocephalic. No palpable lymph nodes   Cor: RRR  Pulm: Symmetric chest rise, no respiratory distress noted.   Back: Straight leg raising in the sitting position is positive for radicular leg pain on the left. There is pain with palpation over lumbar paraspinals and facet joints bilaterally, left greater than right. Limited ROM with pain " on extension.   Extremities: No deformities, edema, or skin discoloration. Good capillary refill.  Musculoskeletal: There is mild pain with palpation over bilateral SI joints. Bilateral upper and lower extremity strength is normal and symmetric. No atrophy or tone abnormalities are noted.  Neuro:  No loss of sensation is noted.  Gait: Antalgic, ambulates with cane for assistance     ASSESSMENT: 58 y.o. year old male with neck and lumbar pain, consistent with      1. Lumbar radiculopathy  X-Ray Thoracic Spine AP Lateral    X-Ray Lumbar Complete Including Flex And Ext    MRI Lumbar Spine Without Contrast      2. Lumbar spondylosis        3. DDD (degenerative disc disease), lumbar        4. Sacroiliac joint pain        5. Spondylosis of cervical region without myelopathy or radiculopathy        6. DDD (degenerative disc disease), cervical        7. S/P cervical spinal fusion        8. Chronic pain disorder            PLAN:     - Previous imaging reviewed today.    - Obtain xray of thoracic and lumbar spine. Obtain updated lumbar MRI given worsening pain.     - Consider NGUYEN in the future, pending results.      - I have stressed the importance of physical activity and a home exercise plan to help with pain and improve health.    - Continue Gabapentin.     - Continue Robaxin 500 mg PRN muscle pain.     - Pain contract signed 2/12/2021.     - Continue Norco 7.5/325 mg BID PRN. Refill provided.     - The patient is here today for a refill of current pain medications and they believe these provide effective pain control and improvements in quality of life.  The patient notes no serious side effects, and feels the benefits outweigh the risks.  The patient was reminded of the pain contract that they signed previously as well as the risks and benefits of the medication including possible death.  The updated [place] [employer] prescription monitoring program was reviewed, and the patient has been found to be compliant  with current treatment plan. Medication management provided by Dr. Callahan.     - UDS from 1/31/2023 reviewed, negative for medications, he does take sparingly.     - Continue home exercise routine.     - RTC PRN, will call with imaging results.     The above plan and management options were discussed at length with patient. Patient is in agreement with the above and verbalized understanding.    Angelique Barahona NP  03/21/2023

## 2023-03-22 ENCOUNTER — TELEPHONE (OUTPATIENT)
Dept: PAIN MEDICINE | Facility: CLINIC | Age: 58
End: 2023-03-22
Payer: MEDICARE

## 2023-03-30 ENCOUNTER — HOSPITAL ENCOUNTER (OUTPATIENT)
Dept: RADIOLOGY | Facility: OTHER | Age: 58
Discharge: HOME | End: 2023-03-30
Attending: NURSE PRACTITIONER
Payer: MEDICARE

## 2023-03-30 DIAGNOSIS — M54.16 LUMBAR RADICULOPATHY: ICD-10-CM

## 2023-03-30 PROCEDURE — 72148 MRI LUMBAR SPINE W/O DYE: CPT | Mod: 26,,, | Performed by: RADIOLOGY

## 2023-03-30 PROCEDURE — 72148 MRI LUMBAR SPINE W/O DYE: CPT | Mod: TC

## 2023-03-30 PROCEDURE — 72148 MRI LUMBAR SPINE WITHOUT CONTRAST: ICD-10-PCS | Mod: 26,,, | Performed by: RADIOLOGY

## 2023-04-04 ENCOUNTER — PATIENT MESSAGE (OUTPATIENT)
Dept: PAIN MEDICINE | Facility: CLINIC | Age: 58
End: 2023-04-04
Payer: MEDICARE

## 2023-04-04 ENCOUNTER — TELEPHONE (OUTPATIENT)
Dept: PAIN MEDICINE | Facility: CLINIC | Age: 58
End: 2023-04-04
Payer: MEDICARE

## 2023-04-04 DIAGNOSIS — M54.16 LUMBAR RADICULOPATHY: Primary | ICD-10-CM

## 2023-04-04 RX ORDER — METHYLPREDNISOLONE 4 MG/1
TABLET ORAL
Qty: 1 EACH | Refills: 0 | Status: SHIPPED | OUTPATIENT
Start: 2023-04-04 | End: 2023-04-25

## 2023-07-05 ENCOUNTER — OFFICE VISIT (OUTPATIENT)
Dept: PAIN MEDICINE | Facility: CLINIC | Age: 58
End: 2023-07-05
Payer: MEDICARE

## 2023-07-05 VITALS
DIASTOLIC BLOOD PRESSURE: 77 MMHG | HEIGHT: 73 IN | WEIGHT: 248.25 LBS | SYSTOLIC BLOOD PRESSURE: 116 MMHG | BODY MASS INDEX: 32.9 KG/M2

## 2023-07-05 DIAGNOSIS — M51.36 DDD (DEGENERATIVE DISC DISEASE), LUMBAR: ICD-10-CM

## 2023-07-05 DIAGNOSIS — Z98.1 S/P CERVICAL SPINAL FUSION: ICD-10-CM

## 2023-07-05 DIAGNOSIS — M50.30 DDD (DEGENERATIVE DISC DISEASE), CERVICAL: ICD-10-CM

## 2023-07-05 DIAGNOSIS — M53.3 SACROILIAC JOINT PAIN: Primary | ICD-10-CM

## 2023-07-05 DIAGNOSIS — M47.812 SPONDYLOSIS OF CERVICAL REGION WITHOUT MYELOPATHY OR RADICULOPATHY: ICD-10-CM

## 2023-07-05 DIAGNOSIS — M47.816 LUMBAR SPONDYLOSIS: ICD-10-CM

## 2023-07-05 DIAGNOSIS — G89.4 CHRONIC PAIN DISORDER: ICD-10-CM

## 2023-07-05 PROCEDURE — 3074F PR MOST RECENT SYSTOLIC BLOOD PRESSURE < 130 MM HG: ICD-10-PCS | Mod: CPTII,S$GLB,, | Performed by: NURSE PRACTITIONER

## 2023-07-05 PROCEDURE — 99213 OFFICE O/P EST LOW 20 MIN: CPT | Mod: S$GLB,,, | Performed by: NURSE PRACTITIONER

## 2023-07-05 PROCEDURE — 3078F PR MOST RECENT DIASTOLIC BLOOD PRESSURE < 80 MM HG: ICD-10-PCS | Mod: CPTII,S$GLB,, | Performed by: NURSE PRACTITIONER

## 2023-07-05 PROCEDURE — 1159F MED LIST DOCD IN RCRD: CPT | Mod: CPTII,S$GLB,, | Performed by: NURSE PRACTITIONER

## 2023-07-05 PROCEDURE — 99213 PR OFFICE/OUTPT VISIT, EST, LEVL III, 20-29 MIN: ICD-10-PCS | Mod: S$GLB,,, | Performed by: NURSE PRACTITIONER

## 2023-07-05 PROCEDURE — 1159F PR MEDICATION LIST DOCUMENTED IN MEDICAL RECORD: ICD-10-PCS | Mod: CPTII,S$GLB,, | Performed by: NURSE PRACTITIONER

## 2023-07-05 PROCEDURE — 99999 PR PBB SHADOW E&M-EST. PATIENT-LVL III: CPT | Mod: PBBFAC,,, | Performed by: NURSE PRACTITIONER

## 2023-07-05 PROCEDURE — 3078F DIAST BP <80 MM HG: CPT | Mod: CPTII,S$GLB,, | Performed by: NURSE PRACTITIONER

## 2023-07-05 PROCEDURE — 1160F PR REVIEW ALL MEDS BY PRESCRIBER/CLIN PHARMACIST DOCUMENTED: ICD-10-PCS | Mod: CPTII,S$GLB,, | Performed by: NURSE PRACTITIONER

## 2023-07-05 PROCEDURE — 99999 PR PBB SHADOW E&M-EST. PATIENT-LVL III: ICD-10-PCS | Mod: PBBFAC,,, | Performed by: NURSE PRACTITIONER

## 2023-07-05 PROCEDURE — 3008F BODY MASS INDEX DOCD: CPT | Mod: CPTII,S$GLB,, | Performed by: NURSE PRACTITIONER

## 2023-07-05 PROCEDURE — 3008F PR BODY MASS INDEX (BMI) DOCUMENTED: ICD-10-PCS | Mod: CPTII,S$GLB,, | Performed by: NURSE PRACTITIONER

## 2023-07-05 PROCEDURE — 4010F PR ACE/ARB THEARPY RXD/TAKEN: ICD-10-PCS | Mod: CPTII,S$GLB,, | Performed by: NURSE PRACTITIONER

## 2023-07-05 PROCEDURE — 4010F ACE/ARB THERAPY RXD/TAKEN: CPT | Mod: CPTII,S$GLB,, | Performed by: NURSE PRACTITIONER

## 2023-07-05 PROCEDURE — 1160F RVW MEDS BY RX/DR IN RCRD: CPT | Mod: CPTII,S$GLB,, | Performed by: NURSE PRACTITIONER

## 2023-07-05 PROCEDURE — 3074F SYST BP LT 130 MM HG: CPT | Mod: CPTII,S$GLB,, | Performed by: NURSE PRACTITIONER

## 2023-07-05 NOTE — PROGRESS NOTES
Chronic patient Established Note (Follow up visit)      Interval History 7/5/2023:  The patient returns to clinic today for follow up of back pain. He reports increased low back and buttock pain. He denies any radicular leg pain. His pain is worse with prolonged sitting, moving from sitting to standing, and prolonged activity. He continues to report intermittent left abdominal pain. He also reports increased neck pain. He is taking Gabapentin and Robaxin. He also takes Norco as needed for severe pain. He denies any adverse effects. His pain today is 6/10.    Interval History 3/21/2023:  The patient returns to clinic today for follow up of back pain. He is s/p left L3,4,5 RFA on 2/24/2023 and right L3,4,5 RFA on 3/10/2023. He is unsure of relief at this time. He reports increased left sided low back pain over the last two weeks. 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 He did feel a pop. He thought he pulled a muscle, but pain has been worsening. He reports left sided low back pain that radiates into the left hip and lower abdomen. He also reports intermittent radiating pain into the posterior left leg. He describes this pain as numb and tingling in nature. His pain is worse with activity. He does endorse muscle spasms. He is taking Gabapentin and Robaxin. He has had to take more Norco recently due to increased pain. He denies any weakness. He denies any other health changes. His pain today is 8/10.    Interval History 1/31/2023:  The patient returns to clinic today for follow up of neck and back pain. He is s/p left C4,5,6,7 on 12/15/2022 and right C4,5,6,7 RFA on 12/29/2022. He reports 50% relief of his neck pain. He reports intermittent neck pain, left side greater than right. He denies any radicular arm pain. He does report pain into the shoulder blades. He reports increased low back pain. He denies any radicular leg pain. His pain is worse with standing, walking, and activity. He does  endorse stiffness. He is taking Gabapentin and Robaxin. He also takes Norco sparingly with benefit. He denies any other health changes. His pain today is 6/10.    Interval History 11/18/2022:  The patient returns to clinic today for follow up of neck and back pain. He is s/p bilateral SI joint injections on 11/4/2022. He reports 60% relief of his low back and buttock pain. He continues to report low back pain. He denies any radicular leg pain. He reports increased neck pain, left side greater than right. He denies any radicular arm. He does report some pain into his shoulder blades. His pain is worse with activity. He continues to take Gabapentin and Robaxin. He continues to take Norco as needed sparingly with benefit. He denies any other health changes. His pain today is 7/10.    Interval History 10/27/2022:  The patient returns to clinic today for follow up of neck and back pain. He reports increased low back and bilateral buttock pain. He denies any radicular leg pain. His pain is worse with prolonged sitting and moving from sitting to standing. He does endorse a fall recently while getting out of the vehicle onto his side. He continues to report neck pain. He continues to take Gabapentin and Robaxin. He continues to take Norco sparingly with benefit and without adverse effects. He denies any other health changes. His pain today is 6/10.    Interval History 8/2/2022:  The aptient returns to clinic today for follow up of neck and back pain. He is s/p bilateral SI joint injection on 7/14/2022. He reports 50-60% relief of his low back and buttock pain. He reports intermittent low back and buttock pain. He reports increased neck pain, left side greater than right. He denies any radicular arm pain. He has good days and bad days. He continues to take Gabapentin. He continues to take Norco sparingly with benefit and without adverse effects. He denies any other health changes. His pain today is 5/10.    Interval History  7/1/2022:  The patient returns to clinic today for follow up of neck and back pain. He reports increased low back and buttock pain.His pain is worse with prolonged sitting and moving from sitting to standing. He also reports increased pain with bending over. He does report intermittent radiating pain into his right posterior thigh stopping at mid thigh. He does report a fall, which was sitting hard on the ground about a week ago. He denies any acute injury. He continues to take Gabapentin and Norco. He denies any other health changes. His pain today is 6/10.     Interval History 5/9/2022:  The patient returns to clinic today for follow up of back pain. He is s/p right L4/5 and L5/S1 TF NGUYEN on 4/14/2022. He reports 50-60% relief of his low back and leg pain. He has been more active since the procedure. He continues to report low back and hip pain, worse with bending and activity. He denies any radicular leg pain. He continues to repot intermittent neck pain. He continues to take Gabapentin with benefit. He continues to take Norco sparingly as needed. He denies any other health changes. His pain today is 5/10.    Interval History 4/1/2022:  The patient returns to clinic today for follow up of back pain. He is s/p left L3,4,5 RFA on 2/25/2022 and right L3,4,5 RFA on 3/11/2022. He reports relief of his back pain. He reports increased back pain that radiates into the posterior aspect of his right leg to his knee. He denies any left leg pain. His pain is worse with prolonged walking. He reports that he sometimes drags his right leg. He denies any falls. He denies any bowel or bladder incontinence. He continues to take Gabapentin and Norco as needed with benefit. He denies any adverse effects. He denies any other health changes. His pain today is 7/10.    Interval History 2/15/2022:  The patient returns to clinic today for follow up of neck and back pain. He reports increased low back pain over the last two weeks. He  "describes this pain as sharp and aching in nature. He does report intermittent "catching" pain in his lower back, worse with getting out of bed. He denies any radicular leg pain. He continues to report benefit from previous cervical procedures. He reports intermittent neck pain. He continues to take Gabapentin with benefit. He continues to take Norco as needed with benefit and without adverse effects. He denies any other health changes. His pain today is 6/10.    Interval History 11/5/2021:  The patient returns to clinic today for follow up of neck and back pain. He is s/p left C4,5,6,7 RFA on 10/1/2021 and right C4,5,6,7 RFA on 10/15/2021. He reports 50% relief of his neck pain. He reports increased burning and itching pain to his skin near injection sites. He denies any radicular arm pain. He does report increased left sided muscle pain. He is concerned that one of the screws in his cervical hardware is moving. He continues to report benefit with previous lumbar procedures. He reports intermittent low back pain without radicular leg pain. He continues to take Gabapentin with benefit. He continues to take Norco as needed with benefit and without adverse effects. He denies any weakness. He denies any other health changes. His pain today is 5/10.    Interval History 9/17/2021:  The patient returns to clinic today for follow up of neck and back pain. He reports increased neck pain. He denies any radicular arm pain today. He does report intermittent radiating pain into his left shoulder blade and mid back. His pain is worse with activity. He reports that sometimes his pain takes his breath away. He continues to report low back pain, that is tolerable at this time. He continues to take Gabapentin and Norco with benefit and without adverse effects. He denies any other health changes. His pain today is 6/10.    Interval History 6/17/2021:  The patient returns to clinic today for follow up of neck and back pain. He is s/p " right L3,4,5 RFA on 4/23/2021 and left L3,4,5 RFA on 5/7/2021. He reports 50% relief of his low back pain. He continues to report intermittent low back pain. He denies any radicular leg pain. He continues to report neck pain, especially in between the scapula. His pain is worse with prolonged standing, walking, and activity. He continues to take Gabapentin with benefit. He continues to take Norco sparingly for severe pain with benefit and without adverse effects. He denies any other health changes. His pain today is 4/10.    Interval History 4/9/2021:  The patient returns to clinic today for follow up of neck and back pain. He is s/p left C4,5,6,7 RFA on 3/9/2021 and right C4,5,6,7 RFA on 3/26/2021. He reports 50% relief of his neck pain. He reports intermittent neck pain. He has good days and bad days. He continues to report low back pain that is constant, sharp, and aching. He reports intermittent radiating pain into the lateral aspect of his left leg to his knee. He continues to take Gabapentin with benefit. He continues to take Norco as needed sparingly for severe pain. He denies any adverse effects. He denies any other health changes. His pain today is 5/10.    Interval History 3/2/2021:  Sal Navarro presents to the clinic for a follow-up appointment for neck pain . Since the last visit, Sal Navarro states the pain has been worsening. Current pain intensity is 6/10.  Was seen by Angelique Barahona NP on 2/12/2021.     Interval History 2/12/2021:  The patient returns to clinic today for follow up of back pain. He has not been seen in over a year. He did not have imaging due to coronavirus outbreak. He would to reschedule this. He was also considering SCS trial prior to the pandemic. He continues to report low back pain that radiates into the lateral aspect of his left leg to his knee. He denies any radicular right leg pain. His pain is worse with bending and walking. He continues to take Gabapentin with  "benefit. He also takes Norco as needed for severe pain. He denies any adverse effects. He denies any other health changes. His pain today is 6/10.    HPI:  Sal Navarro is a 56 y.o. male who presents today s/p C4-7 ACDF with C5/6 PSIF in 2/2016 with residual chronic midline neck pain that radiates into his shoulder blades bilaterally, R>>L.  This is associated with bilateral hand numbness and tingling.  The hand symptoms did improve following the surgery.  The shooting pains in his arms resolved completely.   This pain is described in detail below.  His post-operative course was complicated by dysphagia that is being treated with speech therapy.  The numbness and the tingling were relieved by the surgery. Now experiences "deep pain along the spine"  Patient has not been seen for over a year, had to re-schedule his imaging studies due to pandemic(now completed). Prior to the pandemic, he was considering a SCS.   Only time he gets relief is laying in bed on a very thin pillow, but that doesn't last very long.   Pain aggravated by movement and even breathing/playing with his grandchild/holding the grandchild's hand. Heat and massage (has a vest) are helpful.   Was seen by Dr. Jan srivastava and received multiple EIS and RFAs.   Compliant with his medication.     Pain Disability Index Review:  Last 3 PDI Scores 3/21/2023 1/31/2023 11/18/2022   Pain Disability Index (PDI) 40 22 37       Pain Medications:  Gabapentin  Norco sparingly  Robaxin    Opioid Contract: yes     report:  Reviewed and consistent with medication use as prescribed.    Pain Procedures:   3/10/2023- Right L3,4,5 RFA  2/24/2023- Left L3,4,5 RFA  12/29/2022- Right C4,5,6,7 RFA  12/15/2022- Left C4,5,6,7 RFA  11/4/2022- Bilateral SI joint injections- 60% relief   4/14/2022- Right L4/5 and L5/S1 TF NGUYEN  3/11/2022- Right L3,4,5 RFA  2/25/2022- Left L3,4,5 RFA  10/15/2021- Right C4,5,6,7 RFA  10/1/2021- Left C4,5,6,7 RFA  5/7/2021- Left L3,4,5 RFA "   4/23/2021- Right L3,4,5 RFA  3/26/2021- Right C4,5,6,7 RFA  3/9/2021- Left C4,5,6,7 RFA  10/11/19: L4/5 Interlaminar Epidural Steroid Injection  06/13/19- Left C4-7 RFA  05/03/19:Left L2-5 Lumbar Medial Branch Nerve Thermal Radiofrequency Ablation  12/21/18:Right L2-5 Lumbar Medial Branch Nerve Thermal Radiofrequency Ablation  11/23/18:Cervical Thermal Radiofreqiency Ablation: Right C4-7  09/14/18:C7/[id number] Steroid Injection  06/29/18:LL2/3 Interlaminar Epidural Steroid Injection   03/06/18:L2/3 Interlaminar Epidural Steroid Injection   09/26/17:Bilateral L2-5 Lumbar Medial Branch Nerve Coolief Thermal Radiofrequency Ablation  08/31/17:Bilateral L2-5 Lumbar medial branch blocks   03/28/17:Cervical Conventional Radiofreqiency Ablation: Left C4-7  03/14/17:Cervical Conventional Radiofreqiency Ablation: Right C4-7  10/25/16:Cervical Conventional Radiofreqiency Ablation: Left C4-7  10/11/16: Cervical Conventional Radiofreqiency Ablation: Right C4-7  10/04/16- Cervical Medial Branch Blocks, Bilateral C4-7.     08/31/16:C7/[id number] Steroid Injection    Physical Therapy/Home Exercise: does home exercises now, but not in formal PT.    - 2019 was last time in PT     Imaging:   MRI Lumbar Spine 2/25/2021:  COMPARISON:  07/11/2017     FINDINGS:  The vertebral bodies maintain normal height, signal intensity and alignment.  There is redemonstration of few hemangiomas at multiple levels.  The intervertebral disc spaces are preserved although there is some disc desiccation at multiple levels.  The conus terminates at level L1.  Evaluation of the localizer images and structures surrounding the lumbar spine shows no abnormalities.     L1-L2: No significant disc or joint pathology.     L2-L3: Mild diffuse disc bulge with mild bilateral facet arthropathy and ligamentum flavum hypertrophy results in mild central canal stenosis.  Mild bilateral neural foraminal stenosis is also identified.     L3-L4: There is a diffuse disc  bulge with no significant facet arthropathy or ligamentum flavum hypertrophy.  There is mild central canal stenosis and mild bilateral neural foraminal stenosis.     L4-L5: There is a diffuse disc bulge with ligamentum flavum hypertrophy.  No significant facet arthropathy.  There is mild central canal stenosis with only minimal encroachment on the left neural foramen.  Mild right neural foraminal stenosis is present.     L5-S1: There is a diffuse disc bulge with mild to moderate right facet arthropathy.  No left facet arthropathy.  The ligamentum flavum is normal.  The central canal is patent and the neural foramina are largely patent.  Incidental note is made of an annulus fibrosus tear posteriorly measuring approximately 2 mm.     Impression:     Multilevel degenerative disc and joint disease which is mild and results in mild central canal and neural foraminal stenosis as outlined above.    Xray Cervical Spine 2/25/2021:  COMPARISON:  07/07/2017 .     FINDINGS:  The patient has undergone ACDF from C4 through C7. The instrumentation is intact without evidence of complication.  The vertebral bodies maintain normal height and alignment. The remaining intervertebral disc spaces are preserved.  No significant uncovertebral joint hypertrophy.  The prevertebral soft tissues, odontoid views and lung apices are normal. The neural foramen are patent     Impression:     Postsurgical changes to the thoracic spine without additional evidence for degenerative disc or joint disease.     Allergies:   Review of patient's allergies indicates:   Allergen Reactions    Pcn [penicillins] Hives and Rash    Lipitor [atorvastatin] Other (See Comments)     Muscle cramps, weakness       Current Medications:   Current Outpatient Medications   Medication Sig Dispense Refill    acetaminophen (TYLENOL) 500 MG tablet Take 500 mg by mouth every 6 (six) hours as needed for Pain.      albuterol (PROVENTIL/VENTOLIN HFA) 90 mcg/actuation inhaler        aspirin (ECOTRIN) 81 MG EC tablet Take 81 mg by mouth once daily.      cetirizine (ZYRTEC) 10 MG tablet Take 10 mg by mouth nightly.  5    FOLIC ACID/MULTIVIT-MIN/LUTEIN (CENTRUM SILVER ORAL) Take by mouth once daily.      HYDROcodone-acetaminophen (NORCO) 7.5-325 mg per tablet Take 1 tablet by mouth every 12 (twelve) hours as needed for Pain. 60 tablet 0    levothyroxine (SYNTHROID) 50 MCG tablet Take 50 mcg by mouth once daily.      meclizine (ANTIVERT) 25 mg tablet Take 25 mg by mouth once daily.       meloxicam (MOBIC) 15 MG tablet Take 15 mg by mouth once daily.      metFORMIN (GLUCOPHAGE) 500 MG tablet Take 1 tablet by mouth 2 (two) times a day.      methocarbamoL (ROBAXIN) 500 MG Tab Take 1 tablet (500 mg total) by mouth 2 (two) times daily. 60 tablet 4    omeprazole (PRILOSEC) 20 MG capsule Take 1 capsule (20 mg total) by mouth once daily. 90 capsule 3    rosuvastatin (CRESTOR) 20 MG tablet Take 20 mg by mouth once daily.      valsartan (DIOVAN) 80 MG tablet Take 40 mg by mouth once daily.   1    gabapentin (NEURONTIN) 300 MG capsule Take 2 capsules (600 mg total) by mouth 2 (two) times daily. 120 capsule 5     No current facility-administered medications for this visit.       REVIEW OF SYSTEMS:    GENERAL:  No weight loss, malaise or fevers.  HEENT:  Negative for frequent or significant headaches.  NECK:  Negative for lumps, goiter  and significant neck swelling.   RESPIRATORY:  Negative for cough, wheezing or shortness of breath.  CARDIOVASCULAR:  Negative for chest pain, leg swelling or palpitations. HTN  GI:  Negative for abdominal discomfort, blood in stools or black stools or change in bowel habits.  MUSCULOSKELETAL:  See HPI.  SKIN:  Negative for lesions, rash, and itching.  PSYCH:  Negative for sleep disturbance, mood disorder and recent psychosocial stressors.  HEMATOLOGY/LYMPHOLOGY:  Negative for prolonged bleeding, bruising easily or swollen nodes.  NEURO:   No history of headaches, syncope,  paralysis, seizures or tremors.   ENDO: Thyroid disorder.   All other reviewed and negative other than HPI.    Past Medical History:  Past Medical History:   Diagnosis Date    Arthritis     Cataract     Cervical back pain with evidence of disc disease     Hiatal hernia     Hypertension     Thyroid disease        Past Surgical History:  Past Surgical History:   Procedure Laterality Date    ACROMIOCLAVICULAR JOINT CYST EXCISION Right     BACK SURGERY      cataracts Bilateral     CERVICAL FUSION      COLONOSCOPY N/A 4/27/2018    Procedure: COLONOSCOPY;  Surgeon: Giovani Medeiros MD;  [facility]  Service: Endoscopy;  Laterality: N/A;    EPIDURAL STEROID INJECTION N/A 10/11/2019    Procedure: INJECTION, STEROID, EPIDURAL;  Surgeon: Kasandra Montoya MD;  Location: [facility] PAIN MGT;  Service: Pain Management;  Laterality: N/A;  Lumbar NGUYEN L4/5    NGUYEN      EYE SURGERY      INJECTION OF JOINT Bilateral 7/14/2022    Procedure: INJECTION, JOINT, SI BILATERAL  needs consent;  Surgeon: Alex Callahan MD;  Location: [facility] PAIN MGT;  Service: Pain Management;  Laterality: Bilateral;    INJECTION, SACROILIAC JOINT Bilateral 11/4/2022    Procedure: INJECTION,SACROILIAC JOINT BILATERAL CONTRAST;  Surgeon: Alex Callahan MD;  Location: [facility] PAIN MGT;  Service: Pain Management;  Laterality: Bilateral;    RADIOFREQUENCY ABLATION  10/2016    cervical spine/Montoya    RADIOFREQUENCY ABLATION Left 5/3/2019    Procedure: RADIOFREQUENCY ABLATION, L2-L5 MEDIALBRANCH;  Surgeon: Kasandra Montoya MD;  Location: [facility] PAIN MGT;  Service: Pain Management;  Laterality: Left;    RADIOFREQUENCY ABLATION Left 6/13/2019    Procedure: RADIOFREQUENCY ABLATION C4-7;  Surgeon: Kasandra Montoya MD;  Location: [facility] PAIN MGT;  Service: Pain Management;  Laterality: Left;    RADIOFREQUENCY ABLATION Left 3/9/2021    Procedure: RADIOFREQUENCY ABLATION C4,C5,C6,C7;  Surgeon: Alex Callahan MD;  Location: [facility] PAIN MGT;  Service: Pain  Management;  Laterality: Left;  1 of 2    RADIOFREQUENCY ABLATION Right 3/26/2021    Procedure: RADIOFREQUENCY ABLATION C4,C6,C6,C7;  Surgeon: Alex Callahan MD;  Location: [facility] PAIN MGT;  Service: Pain Management;  Laterality: Right;  2 of 2    RADIOFREQUENCY ABLATION Right 4/23/2021    Procedure: RADIOFREQUENCY ABLATION L3,4,5;  Surgeon: Alex Callahan MD;  Location: [facility] PAIN MGT;  Service: Pain Management;  Laterality: Right;  1 of 2    RADIOFREQUENCY ABLATION Left 5/7/2021    Procedure: RADIOFREQUENCY ABLATION L3,4,5;  Surgeon: Alex Callahan MD;  Location: [facility] PAIN MGT;  Service: Pain Management;  Laterality: Left;  2 of 2    RADIOFREQUENCY ABLATION Left 10/1/2021    Procedure: RADIOFREQUENCY ABLATION LEFT, C4,5,6,7 1 of 2 CONSENT NEEDED;  Surgeon: Alex Callahan MD;  Location: [facility] PAIN MGT;  Service: Pain Management;  Laterality: Left;    RADIOFREQUENCY ABLATION Right 10/15/2021    Procedure: RADIOFREQUENCY ABLATION  RIGHT C4,5,6,7 2 of 2 CONSENT NEEDED;  Surgeon: Alex Callahan MD;  Location: [facility] PAIN [facility]GT;  Service: Pain Management;  Laterality: Right;    RADIOFREQUENCY ABLATION Left 2/25/2022    Procedure: RADIOFREQUENCY ABLATION LEFT L3,4,5 1 of 2, needs consent;  Surgeon: Alex Callahan MD;  Location: [facility] PAIN [facility]GT;  Service: Pain Management;  Laterality: Left;    RADIOFREQUENCY ABLATION Right 3/11/2022    Procedure: RADIOFREQUENCY ABLATION RIGHT L3,4,5 2 of 2, needs consent;  Surgeon: Alex Callahan MD;  Location: [facility] PAIN [facility]GT;  Service: Pain Management;  Laterality: Right;    RADIOFREQUENCY ABLATION Left 12/15/2022    Procedure: RADIOFREQUENCY ABLATION LEFT C4, C5, C6, C7  ONE OF TWO NEEDS CONSENT;  Surgeon: Alex Callahan MD;  Location: [facility] PAIN [facility]GT;  Service: Pain Management;  Laterality: Left;    RADIOFREQUENCY ABLATION Right 12/29/2022    Procedure: RADIOFREQUENCY ABLATION RIGHT C4, C5, C6, C7  TWO OF TWO NEEDS CONSENT;  Surgeon: Alex Callahan MD;  Location: [facility]GT;   "Service: Pain Management;  Laterality: Right;    RADIOFREQUENCY ABLATION Left 2/24/2023    Procedure: RADIOFREQUENCY ABLATION LEFT L3,L4,L5;  Surgeon: Alex Callahan MD;  Location: [facility] PAIN MGT;  Service: Pain Management;  Laterality: Left;    RADIOFREQUENCY ABLATION Right 3/10/2023    Procedure: RADIOFREQUENCY ABLATION RIGHT L3,L4,L5;  Surgeon: Alex Callahan MD;  Location: [facility] PAIN MGT;  Service: Pain Management;  Laterality: Right;    RETINAL DETACHMENT SURGERY      ROTATOR CUFF REPAIR Right     SPINE SURGERY      cerival fusion     TRANSFORAMINAL EPIDURAL INJECTION OF STEROID Right 4/14/2022    Procedure: INJECTION, STEROID, EPIDURAL, TRANSFORAMINAL APPROACH RIGHT L4/5 & L5/S1 Needs Consent;  Surgeon: Alex Callahan MD;  Location: [facility] PAIN MGT;  Service: Pain Management;  Laterality: Right;    TRIGGER POINT INJECTION Left 6/13/2019    Procedure: INJECTION, TRIGGER POINT;  Surgeon: Kasandra Montoya MD;  Location: [facility] PAIN MGT;  Service: Pain Management;  Laterality: Left;       Family History:  Family History   Problem Relation Age of Onset    Heart disease Mother     Cancer Father     Leukemia Brother     Colon cancer Neg Hx     Celiac disease Neg Hx     Crohn's disease Neg Hx     Esophageal cancer Neg Hx     Inflammatory bowel disease Neg Hx     Irritable bowel syndrome Neg Hx     Liver cancer Neg Hx     Rectal cancer Neg Hx     Stomach cancer Neg Hx     Ulcerative colitis Neg Hx        Social History:  Social History     Socioeconomic History    Marital status:    Tobacco Use    Smoking status: Never    Smokeless tobacco: Never   Substance and Sexual Activity    Alcohol use: No    Drug use: No       OBJECTIVE:    /77 (BP Location: Right arm, Patient Position: Sitting, BP Method: Large (Automatic))   Ht 6' 1" (1.854 m)   Wt 112.6 kg (248 lb 3.8 oz)   BMI 32.75 kg/m²     PHYSICAL EXAMINATION:    General appearance: Well appearing, in no acute distress, alert and oriented x3.  Psych:  " Mood and affect appropriate.  Skin: Skin color, texture, turgor normal, no rashes or lesions, in both upper and lower body.  Head/face:  Atraumatic, normocephalic. No palpable lymph nodes   Cor: RRR  Pulm: Symmetric chest rise, no respiratory distress noted.   Back: Straight leg raising in the sitting position is negative for radicular leg pain bilaterally. There is pain with palpation over lumbar paraspinals and facet joints bilaterally. Limited ROM with pain on extension.   Extremities: No deformities, edema, or skin discoloration. Good capillary refill.  Musculoskeletal: There is pain with palpation over bilateral SI joints. Positive FABERs, Gaenslens, and SI compression bilaterally. Bilateral upper and lower extremity strength is normal and symmetric. No atrophy or tone abnormalities are noted.  Neuro:  No loss of sensation is noted.  Gait: Antalgic, ambulates with cane for assistance     ASSESSMENT: 58 y.o. year old male with neck and lumbar pain, consistent with      1. Sacroiliac joint pain  Procedure Order to Pain Management      2. Lumbar spondylosis        3. DDD (degenerative disc disease), lumbar        4. Spondylosis of cervical region without myelopathy or radiculopathy        5. DDD (degenerative disc disease), cervical        6. S/P cervical spinal fusion        7. Chronic pain disorder              PLAN:     - Previous imaging reviewed today.    - Schedule for bilateral SI joint injections.     - We can repeat lumbar RFA as needed.     - We can repeat cervical RFA as needed.     - I have stressed the importance of physical activity and a home exercise plan to help with pain and improve health.    - Continue Gabapentin.     - Continue Robaxin 500 mg PRN muscle pain.     - Pain contract signed 2/12/2021.     - Continue Norco 7.5/325 mg BID PRN. He does not need a refill at this time.     - The patient is here today for a refill of current pain medications and they believe these provide effective pain  control and improvements in quality of life.  The patient notes no serious side effects, and feels the benefits outweigh the risks.  The patient was reminded of the pain contract that they signed previously as well as the risks and benefits of the medication including possible death.  The updated [place] [employer] prescription monitoring program was reviewed, and the patient has been found to be compliant with current treatment plan. Medication management provided by Dr. Callahan.     - UDS from 1/31/2023 reviewed, negative for medications, he does take sparingly.     - Continue home exercise routine.     - RTC 2 weeks after above procedure.     The above plan and management options were discussed at length with patient. Patient is in agreement with the above and verbalized understanding.    Angelique Barahona NP  07/05/2023

## 2023-07-06 ENCOUNTER — PATIENT MESSAGE (OUTPATIENT)
Dept: PAIN MEDICINE | Facility: OTHER | Age: 58
End: 2023-07-06
Payer: MEDICARE

## 2023-07-27 ENCOUNTER — HOSPITAL ENCOUNTER (OUTPATIENT)
Facility: OTHER | Age: 58
Discharge: HOME | End: 2023-07-27
Attending: ANESTHESIOLOGY | Admitting: ANESTHESIOLOGY
Payer: MEDICARE

## 2023-07-27 VITALS
BODY MASS INDEX: 32.47 KG/M2 | HEART RATE: 53 BPM | HEIGHT: 73 IN | DIASTOLIC BLOOD PRESSURE: 82 MMHG | OXYGEN SATURATION: 98 % | SYSTOLIC BLOOD PRESSURE: 135 MMHG | TEMPERATURE: 98 F | RESPIRATION RATE: 18 BRPM | WEIGHT: 245 LBS

## 2023-07-27 DIAGNOSIS — G89.29 CHRONIC PAIN: ICD-10-CM

## 2023-07-27 DIAGNOSIS — G89.4 CHRONIC PAIN SYNDROME: Primary | ICD-10-CM

## 2023-07-27 DIAGNOSIS — M53.3 SACROILIAC JOINT DYSFUNCTION: ICD-10-CM

## 2023-07-27 DIAGNOSIS — M47.816 LUMBAR SPONDYLOSIS: ICD-10-CM

## 2023-07-27 DIAGNOSIS — M51.36 DDD (DEGENERATIVE DISC DISEASE), LUMBAR: ICD-10-CM

## 2023-07-27 LAB — POCT GLUCOSE: 81 MG/DL (ref 70–110)

## 2023-07-27 PROCEDURE — 27096 PR INJECTION,SACROILIAC JOINT: ICD-10-PCS | Mod: 50,,, | Performed by: ANESTHESIOLOGY

## 2023-07-27 PROCEDURE — 63600175 PHARM REV CODE 636 W HCPCS: Performed by: ANESTHESIOLOGY

## 2023-07-27 PROCEDURE — 25000003 PHARM REV CODE 250: Performed by: ANESTHESIOLOGY

## 2023-07-27 PROCEDURE — 27096 INJECT SACROILIAC JOINT: CPT | Mod: 50 | Performed by: ANESTHESIOLOGY

## 2023-07-27 PROCEDURE — 27096 INJECT SACROILIAC JOINT: CPT | Mod: 50,,, | Performed by: ANESTHESIOLOGY

## 2023-07-27 PROCEDURE — 25500020 PHARM REV CODE 255: Performed by: ANESTHESIOLOGY

## 2023-07-27 RX ORDER — BUPIVACAINE HYDROCHLORIDE 2.5 MG/ML
INJECTION, SOLUTION EPIDURAL; INFILTRATION; INTRACAUDAL
Status: DISCONTINUED | OUTPATIENT
Start: 2023-07-27 | End: 2023-07-27 | Stop reason: HOSPADM

## 2023-07-27 RX ORDER — LIDOCAINE HYDROCHLORIDE 20 MG/ML
INJECTION, SOLUTION INFILTRATION; PERINEURAL
Status: DISCONTINUED | OUTPATIENT
Start: 2023-07-27 | End: 2023-07-27 | Stop reason: HOSPADM

## 2023-07-27 RX ORDER — SODIUM CHLORIDE 9 MG/ML
INJECTION, SOLUTION INTRAVENOUS CONTINUOUS
Status: DISCONTINUED | OUTPATIENT
Start: 2023-07-27 | End: 2023-07-27 | Stop reason: HOSPADM

## 2023-07-27 RX ORDER — TRIAMCINOLONE ACETONIDE 40 MG/ML
INJECTION, SUSPENSION INTRA-ARTICULAR; INTRAMUSCULAR
Status: DISCONTINUED | OUTPATIENT
Start: 2023-07-27 | End: 2023-07-27 | Stop reason: HOSPADM

## 2023-07-27 NOTE — OP NOTE
Sacroiliac Joint Injection under Fluoroscopic Guidance    The procedure, risks, benefits, and options were discussed with the patient. There are no contraindications to the procedure. The patent expressed understanding and agreed to the procedure. Informed written consent was obtained prior to the start of the procedure and can be found in the patient's chart.    PATIENT NAME: Sal Navarro   MRN: [id number]     DATE OF PROCEDURE: 07/27/2023    PROCEDURE: Bilateral Sacroiliac Joint Injection under Fluoroscopic Guidance    PRE-OP DIAGNOSIS: Sacroiliac joint pain [M53.3]    POST-OP DIAGNOSIS: Same    PHYSICIAN: Alex Callahan MD    ASSISTANTS: Javid Miner MD     MEDICATIONS INJECTED: Preservative-free Kenalog 40mg with 7cc of Bupivacine 0.25%     LOCAL ANESTHETIC INJECTED: Xylocaine 2%     SEDATION: None    ESTIMATED BLOOD LOSS: None    COMPLICATIONS: None    TECHNIQUE: Time-out was performed to identify the patient and procedure to be performed. With the patient laying in a prone position, the surgical area was prepped and draped in the usual sterile fashion using ChloraPrep and a fenestrated drape. The sacroiliac joint was determined under fluoroscopy guidance. Skin anesthesia was achieved by injecting Lidocaine 2% over the injection site. The sacroiliac joint was  then approached with a 25 gauge, 3.5 inch spinal quinke needle that was introduced under fluoroscopic guidance in the AP and Lateral views. Once the needle tip was in the area of the joint, and there was no blood, contrast dye Omnipaque (300mg/mL) was injected to confirm placement and there was no vascular runoff. Fluoroscopic imaging in the AP and lateral views revealed a clear outline of the joint space. 4 mL of the medication mixture listed above was injected slowly intraarticular and lemuel-articular. Displacement of the radio opaque contrast after injection of the medication confirmed that the medication went into the area of the joint. The needles  were removed and bleeding was nil. The same procedure was repeated on the contralateral side. A sterile dressing was applied. No specimens collected. The patient tolerated the procedure well.       The patient was monitored after the procedure in the recovery area. They were given post-procedure and discharge instructions to follow at home. The patient was discharged in a stable condition.    Cedric Woody MD    I reviewed and edited the fellow's note. I conducted my own interview and physical examination. I agree with the findings. I was present and supervising all critical portions of the procedure.    Alex Callahan MD

## 2023-07-27 NOTE — DISCHARGE SUMMARY
Discharge Note  Short Stay      SUMMARY     Admit Date: 7/27/2023    Attending Physician: Cedric Woody      Discharge Physician: Cedric Woody      Discharge Date: 7/27/2023 4:16 PM    Procedure(s) (LRB):  INJECTION,SACROILIAC JOINT, BILATERAL SOONER DATE (Bilateral)    Final Diagnosis: Sacroiliac joint pain [M53.3]    Disposition: Home or self care    Patient Instructions:   Current Discharge Medication List        CONTINUE these medications which have NOT CHANGED    Details   acetaminophen (TYLENOL) 500 MG tablet Take 500 mg by mouth every 6 (six) hours as needed for Pain.      albuterol (PROVENTIL/VENTOLIN HFA) 90 mcg/actuation inhaler       aspirin (ECOTRIN) 81 MG EC tablet Take 81 mg by mouth once daily.      cetirizine (ZYRTEC) 10 MG tablet Take 10 mg by mouth nightly.  Refills: 5      FOLIC ACID/MULTIVIT-MIN/LUTEIN (CENTRUM SILVER ORAL) Take by mouth once daily.      gabapentin (NEURONTIN) 300 MG capsule Take 2 capsules (600 mg total) by mouth 2 (two) times daily.  Qty: 120 capsule, Refills: 5    Associated Diagnoses: Chronic pain disorder; Lumbar spondylosis; DDD (degenerative disc disease), lumbar; S/P cervical spinal fusion; Cervical radiculopathy; Myofascial pain      HYDROcodone-acetaminophen (NORCO) 7.5-325 mg per tablet Take 1 tablet by mouth every 12 (twelve) hours as needed for Pain.  Qty: 60 tablet, Refills: 0    Comments: Patient with chronic intractable pain.  Medically necessary for > 7 days  Associated Diagnoses: Chronic pain disorder; Lumbar spondylosis; DDD (degenerative disc disease), lumbar; Facet arthritis of lumbar region; Myalgia; Spondylosis of cervical region without myelopathy or radiculopathy; S/P cervical spinal fusion      levothyroxine (SYNTHROID) 50 MCG tablet Take 50 mcg by mouth once daily.      meclizine (ANTIVERT) 25 mg tablet Take 25 mg by mouth once daily.       meloxicam (MOBIC) 15 MG tablet Take 15 mg by mouth once daily.      metFORMIN (GLUCOPHAGE) 500 MG tablet Take 1  tablet by mouth 2 (two) times a day.      methocarbamoL (ROBAXIN) 500 MG Tab Take 1 tablet (500 mg total) by mouth 2 (two) times daily.  Qty: 60 tablet, Refills: 4    Associated Diagnoses: Myofascial pain      omeprazole (PRILOSEC) 20 MG capsule Take 1 capsule (20 mg total) by mouth once daily.  Qty: 90 capsule, Refills: 3    Associated Diagnoses: Gastroesophageal reflux disease, esophagitis presence not specified      rosuvastatin (CRESTOR) 20 MG tablet Take 20 mg by mouth once daily.      valsartan (DIOVAN) 80 MG tablet Take 40 mg by mouth once daily.   Refills: 1                 Discharge Diagnosis: Sacroiliac joint pain [M53.3]  Condition on Discharge: Stable with no complications to procedure   Diet on Discharge: Same as before.  Activity: as per instruction sheet.  Discharge to: Home with a responsible adult.  Follow up: 2-4 weeks       Please call my office or pager at [phone] if experienced any weakness or loss of sensation, fever > 101.5, pain uncontrolled with oral medications, persistent nausea/vomiting/or diarrhea, redness or drainage from the incisions, or any other worrisome concerns. If physician on call was not reached or could not communicate with our office for any reason please go to the nearest emergency department

## 2023-07-27 NOTE — DISCHARGE INSTRUCTIONS

## 2023-08-11 ENCOUNTER — OFFICE VISIT (OUTPATIENT)
Dept: PAIN MEDICINE | Facility: CLINIC | Age: 58
End: 2023-08-11
Payer: MEDICARE

## 2023-08-11 VITALS
BODY MASS INDEX: 32.58 KG/M2 | DIASTOLIC BLOOD PRESSURE: 68 MMHG | HEART RATE: 73 BPM | RESPIRATION RATE: 18 BRPM | SYSTOLIC BLOOD PRESSURE: 115 MMHG | OXYGEN SATURATION: 100 % | TEMPERATURE: 97 F | WEIGHT: 246.94 LBS

## 2023-08-11 DIAGNOSIS — M47.816 FACET ARTHRITIS OF LUMBAR REGION: ICD-10-CM

## 2023-08-11 DIAGNOSIS — M51.36 DDD (DEGENERATIVE DISC DISEASE), LUMBAR: ICD-10-CM

## 2023-08-11 DIAGNOSIS — M50.30 DDD (DEGENERATIVE DISC DISEASE), CERVICAL: ICD-10-CM

## 2023-08-11 DIAGNOSIS — M47.816 LUMBAR SPONDYLOSIS: ICD-10-CM

## 2023-08-11 DIAGNOSIS — G89.4 CHRONIC PAIN DISORDER: ICD-10-CM

## 2023-08-11 DIAGNOSIS — M47.812 SPONDYLOSIS OF CERVICAL REGION WITHOUT MYELOPATHY OR RADICULOPATHY: ICD-10-CM

## 2023-08-11 DIAGNOSIS — M79.10 MYALGIA: ICD-10-CM

## 2023-08-11 DIAGNOSIS — M53.3 SACROILIAC JOINT DYSFUNCTION: ICD-10-CM

## 2023-08-11 DIAGNOSIS — Z98.1 S/P CERVICAL SPINAL FUSION: ICD-10-CM

## 2023-08-11 DIAGNOSIS — G89.4 CHRONIC PAIN SYNDROME: Primary | ICD-10-CM

## 2023-08-11 PROCEDURE — 4010F ACE/ARB THERAPY RXD/TAKEN: CPT | Mod: CPTII,S$GLB,, | Performed by: NURSE PRACTITIONER

## 2023-08-11 PROCEDURE — 99214 PR OFFICE/OUTPT VISIT, EST, LEVL IV, 30-39 MIN: ICD-10-PCS | Mod: S$GLB,,, | Performed by: NURSE PRACTITIONER

## 2023-08-11 PROCEDURE — 99999 PR PBB SHADOW E&M-EST. PATIENT-LVL IV: CPT | Mod: PBBFAC,,, | Performed by: NURSE PRACTITIONER

## 2023-08-11 PROCEDURE — 4010F PR ACE/ARB THEARPY RXD/TAKEN: ICD-10-PCS | Mod: CPTII,S$GLB,, | Performed by: NURSE PRACTITIONER

## 2023-08-11 PROCEDURE — 1159F MED LIST DOCD IN RCRD: CPT | Mod: CPTII,S$GLB,, | Performed by: NURSE PRACTITIONER

## 2023-08-11 PROCEDURE — 3008F BODY MASS INDEX DOCD: CPT | Mod: CPTII,S$GLB,, | Performed by: NURSE PRACTITIONER

## 2023-08-11 PROCEDURE — 3078F DIAST BP <80 MM HG: CPT | Mod: CPTII,S$GLB,, | Performed by: NURSE PRACTITIONER

## 2023-08-11 PROCEDURE — 3074F PR MOST RECENT SYSTOLIC BLOOD PRESSURE < 130 MM HG: ICD-10-PCS | Mod: CPTII,S$GLB,, | Performed by: NURSE PRACTITIONER

## 2023-08-11 PROCEDURE — 1160F RVW MEDS BY RX/DR IN RCRD: CPT | Mod: CPTII,S$GLB,, | Performed by: NURSE PRACTITIONER

## 2023-08-11 PROCEDURE — 3008F PR BODY MASS INDEX (BMI) DOCUMENTED: ICD-10-PCS | Mod: CPTII,S$GLB,, | Performed by: NURSE PRACTITIONER

## 2023-08-11 PROCEDURE — 3078F PR MOST RECENT DIASTOLIC BLOOD PRESSURE < 80 MM HG: ICD-10-PCS | Mod: CPTII,S$GLB,, | Performed by: NURSE PRACTITIONER

## 2023-08-11 PROCEDURE — 99999 PR PBB SHADOW E&M-EST. PATIENT-LVL IV: ICD-10-PCS | Mod: PBBFAC,,, | Performed by: NURSE PRACTITIONER

## 2023-08-11 PROCEDURE — 1159F PR MEDICATION LIST DOCUMENTED IN MEDICAL RECORD: ICD-10-PCS | Mod: CPTII,S$GLB,, | Performed by: NURSE PRACTITIONER

## 2023-08-11 PROCEDURE — 3074F SYST BP LT 130 MM HG: CPT | Mod: CPTII,S$GLB,, | Performed by: NURSE PRACTITIONER

## 2023-08-11 PROCEDURE — 99214 OFFICE O/P EST MOD 30 MIN: CPT | Mod: S$GLB,,, | Performed by: NURSE PRACTITIONER

## 2023-08-11 PROCEDURE — 1160F PR REVIEW ALL MEDS BY PRESCRIBER/CLIN PHARMACIST DOCUMENTED: ICD-10-PCS | Mod: CPTII,S$GLB,, | Performed by: NURSE PRACTITIONER

## 2023-08-11 RX ORDER — HYDROCODONE BITARTRATE AND ACETAMINOPHEN 7.5; 325 MG/1; MG/1
1 TABLET ORAL EVERY 12 HOURS PRN
Qty: 60 TABLET | Refills: 0 | Status: SHIPPED | OUTPATIENT
Start: 2023-08-11

## 2023-08-11 NOTE — PROGRESS NOTES
Chronic patient Established Note (Follow up visit)      Interval History 8/11/2023:  The patient returns to clinic today for follow up of back pain. He is s/p bilateral SI joint injections on 7/27/2023. He reports 50-60% relief of his buttock pain. He reports worsened neck pain over the last month. He does report some pain into the left shoulder. He denies any radicular arm pain. His pain is worse with turning his head to the side. He does endorse stiffness. He continues to report intermittent left abdominal pain. This pain is worse with prolonged activity. He also notes 2 lumps in the area, especially at the end of the day. He is taking Gabapentin and Robaxin. He also takes Norco as needed for severe pain sparingly. He denies any other health changes. His pain today is 6/10.    Interval History 7/5/2023:  The patient returns to clinic today for follow up of back pain. He reports increased low back and buttock pain. He denies any radicular leg pain. His pain is worse with prolonged sitting, moving from sitting to standing, and prolonged activity. He continues to report intermittent left abdominal pain. He also reports increased neck pain. He is taking Gabapentin and Robaxin. He also takes Norco as needed for severe pain. He denies any adverse effects. His pain today is 6/10.    Interval History 3/21/2023:  The patient returns to clinic today for follow up of back pain. He is s/p left L3,4,5 RFA on 2/24/2023 and right L3,4,5 RFA on 3/10/2023. He is unsure of relief at this time. He reports increased left sided low back pain over the last two weeks. 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 He did feel a pop. He thought he pulled a muscle, but pain has been worsening. He reports left sided low back pain that radiates into the left hip and lower abdomen. He also reports intermittent radiating pain into the posterior left leg. He describes this pain as numb and tingling in nature. His pain is  worse with activity. He does endorse muscle spasms. He is taking Gabapentin and Robaxin. He has had to take more Norco recently due to increased pain. He denies any weakness. He denies any other health changes. His pain today is 8/10.    Interval History 1/31/2023:  The patient returns to clinic today for follow up of neck and back pain. He is s/p left C4,5,6,7 on 12/15/2022 and right C4,5,6,7 RFA on 12/29/2022. He reports 50% relief of his neck pain. He reports intermittent neck pain, left side greater than right. He denies any radicular arm pain. He does report pain into the shoulder blades. He reports increased low back pain. He denies any radicular leg pain. His pain is worse with standing, walking, and activity. He does endorse stiffness. He is taking Gabapentin and Robaxin. He also takes Norco sparingly with benefit. He denies any other health changes. His pain today is 6/10.    Interval History 11/18/2022:  The patient returns to clinic today for follow up of neck and back pain. He is s/p bilateral SI joint injections on 11/4/2022. He reports 60% relief of his low back and buttock pain. He continues to report low back pain. He denies any radicular leg pain. He reports increased neck pain, left side greater than right. He denies any radicular arm. He does report some pain into his shoulder blades. His pain is worse with activity. He continues to take Gabapentin and Robaxin. He continues to take Norco as needed sparingly with benefit. He denies any other health changes. His pain today is 7/10.    Interval History 10/27/2022:  The patient returns to clinic today for follow up of neck and back pain. He reports increased low back and bilateral buttock pain. He denies any radicular leg pain. His pain is worse with prolonged sitting and moving from sitting to standing. He does endorse a fall recently while getting out of the vehicle onto his side. He continues to report neck pain. He continues to take Gabapentin and  Robaxin. He continues to take Norco sparingly with benefit and without adverse effects. He denies any other health changes. His pain today is 6/10.    Interval History 8/2/2022:  The aptient returns to clinic today for follow up of neck and back pain. He is s/p bilateral SI joint injection on 7/14/2022. He reports 50-60% relief of his low back and buttock pain. He reports intermittent low back and buttock pain. He reports increased neck pain, left side greater than right. He denies any radicular arm pain. He has good days and bad days. He continues to take Gabapentin. He continues to take Norco sparingly with benefit and without adverse effects. He denies any other health changes. His pain today is 5/10.    Interval History 7/1/2022:  The patient returns to clinic today for follow up of neck and back pain. He reports increased low back and buttock pain.His pain is worse with prolonged sitting and moving from sitting to standing. He also reports increased pain with bending over. He does report intermittent radiating pain into his right posterior thigh stopping at mid thigh. He does report a fall, which was sitting hard on the ground about a week ago. He denies any acute injury. He continues to take Gabapentin and Norco. He denies any other health changes. His pain today is 6/10.     Interval History 5/9/2022:  The patient returns to clinic today for follow up of back pain. He is s/p right L4/5 and L5/S1 TF NGUYEN on 4/14/2022. He reports 50-60% relief of his low back and leg pain. He has been more active since the procedure. He continues to report low back and hip pain, worse with bending and activity. He denies any radicular leg pain. He continues to repot intermittent neck pain. He continues to take Gabapentin with benefit. He continues to take Norco sparingly as needed. He denies any other health changes. His pain today is 5/10.    Interval History 4/1/2022:  The patient returns to clinic today for follow up of back  "pain. He is s/p left L3,4,5 RFA on 2/25/2022 and right L3,4,5 RFA on 3/11/2022. He reports relief of his back pain. He reports increased back pain that radiates into the posterior aspect of his right leg to his knee. He denies any left leg pain. His pain is worse with prolonged walking. He reports that he sometimes drags his right leg. He denies any falls. He denies any bowel or bladder incontinence. He continues to take Gabapentin and Norco as needed with benefit. He denies any adverse effects. He denies any other health changes. His pain today is 7/10.    Interval History 2/15/2022:  The patient returns to clinic today for follow up of neck and back pain. He reports increased low back pain over the last two weeks. He describes this pain as sharp and aching in nature. He does report intermittent "catching" pain in his lower back, worse with getting out of bed. He denies any radicular leg pain. He continues to report benefit from previous cervical procedures. He reports intermittent neck pain. He continues to take Gabapentin with benefit. He continues to take Norco as needed with benefit and without adverse effects. He denies any other health changes. His pain today is 6/10.    Interval History 11/5/2021:  The patient returns to clinic today for follow up of neck and back pain. He is s/p left C4,5,6,7 RFA on 10/1/2021 and right C4,5,6,7 RFA on 10/15/2021. He reports 50% relief of his neck pain. He reports increased burning and itching pain to his skin near injection sites. He denies any radicular arm pain. He does report increased left sided muscle pain. He is concerned that one of the screws in his cervical hardware is moving. He continues to report benefit with previous lumbar procedures. He reports intermittent low back pain without radicular leg pain. He continues to take Gabapentin with benefit. He continues to take Norco as needed with benefit and without adverse effects. He denies any weakness. He denies any " other health changes. His pain today is 5/10.    Interval History 9/17/2021:  The patient returns to clinic today for follow up of neck and back pain. He reports increased neck pain. He denies any radicular arm pain today. He does report intermittent radiating pain into his left shoulder blade and mid back. His pain is worse with activity. He reports that sometimes his pain takes his breath away. He continues to report low back pain, that is tolerable at this time. He continues to take Gabapentin and Norco with benefit and without adverse effects. He denies any other health changes. His pain today is 6/10.    Interval History 6/17/2021:  The patient returns to clinic today for follow up of neck and back pain. He is s/p right L3,4,5 RFA on 4/23/2021 and left L3,4,5 RFA on 5/7/2021. He reports 50% relief of his low back pain. He continues to report intermittent low back pain. He denies any radicular leg pain. He continues to report neck pain, especially in between the scapula. His pain is worse with prolonged standing, walking, and activity. He continues to take Gabapentin with benefit. He continues to take Norco sparingly for severe pain with benefit and without adverse effects. He denies any other health changes. His pain today is 4/10.    Interval History 4/9/2021:  The patient returns to clinic today for follow up of neck and back pain. He is s/p left C4,5,6,7 RFA on 3/9/2021 and right C4,5,6,7 RFA on 3/26/2021. He reports 50% relief of his neck pain. He reports intermittent neck pain. He has good days and bad days. He continues to report low back pain that is constant, sharp, and aching. He reports intermittent radiating pain into the lateral aspect of his left leg to his knee. He continues to take Gabapentin with benefit. He continues to take Norco as needed sparingly for severe pain. He denies any adverse effects. He denies any other health changes. His pain today is 5/10.    Interval History 3/2/2021:  Sal  "Sebastian Navarro presents to the clinic for a follow-up appointment for neck pain . Since the last visit, Sal Navarro states the pain has been worsening. Current pain intensity is 6/10.  Was seen by Angelique Barahona NP on 2/12/2021.     Interval History 2/12/2021:  The patient returns to clinic today for follow up of back pain. He has not been seen in over a year. He did not have imaging due to coronavirus outbreak. He would to reschedule this. He was also considering SCS trial prior to the pandemic. He continues to report low back pain that radiates into the lateral aspect of his left leg to his knee. He denies any radicular right leg pain. His pain is worse with bending and walking. He continues to take Gabapentin with benefit. He also takes Norco as needed for severe pain. He denies any adverse effects. He denies any other health changes. His pain today is 6/10.    HPI:  Sal Navarro is a 56 y.o. male who presents today s/p C4-7 ACDF with C5/6 PSIF in 2/2016 with residual chronic midline neck pain that radiates into his shoulder blades bilaterally, R>>L.  This is associated with bilateral hand numbness and tingling.  The hand symptoms did improve following the surgery.  The shooting pains in his arms resolved completely.   This pain is described in detail below.  His post-operative course was complicated by dysphagia that is being treated with speech therapy.  The numbness and the tingling were relieved by the surgery. Now experiences "deep pain along the spine"  Patient has not been seen for over a year, had to re-schedule his imaging studies due to pandemic(now completed). Prior to the pandemic, he was considering a SCS.   Only time he gets relief is laying in bed on a very thin pillow, but that doesn't last very long.   Pain aggravated by movement and even breathing/playing with his grandchild/holding the grandchild's hand. Heat and massage (has a vest) are helpful.   Was seen by Dr. Jan srivastava and " received multiple EIS and RFAs.   Compliant with his medication.     Pain Disability Index Review:  Last 3 PDI Scores 8/11/2023 3/21/2023 1/31/2023   Pain Disability Index (PDI) 18 40 22       Pain Medications:  Gabapentin  Norco sparingly  Robaxin    Opioid Contract: yes     report:  Reviewed and consistent with medication use as prescribed.    Pain Procedures:   7/27/2023- Bilateral SI joint injections  3/10/2023- Right L3,4,5 RFA  2/24/2023- Left L3,4,5 RFA  12/29/2022- Right C4,5,6,7 RFA  12/15/2022- Left C4,5,6,7 RFA  11/4/2022- Bilateral SI joint injections- 60% relief   4/14/2022- Right L4/5 and L5/S1 TF NGUYEN  3/11/2022- Right L3,4,5 RFA  2/25/2022- Left L3,4,5 RFA  10/15/2021- Right C4,5,6,7 RFA  10/1/2021- Left C4,5,6,7 RFA  5/7/2021- Left L3,4,5 RFA   4/23/2021- Right L3,4,5 RFA  3/26/2021- Right C4,5,6,7 RFA  3/9/2021- Left C4,5,6,7 RFA  10/11/19: L4/5 Interlaminar Epidural Steroid Injection  06/13/19- Left C4-7 RFA  05/03/19:Left L2-5 Lumbar Medial Branch Nerve Thermal Radiofrequency Ablation  12/21/18:Right L2-5 Lumbar Medial Branch Nerve Thermal Radiofrequency Ablation  11/23/18:Cervical Thermal Radiofreqiency Ablation: Right C4-7  09/14/18:C7/[id number] Steroid Injection  06/29/18:LL2/3 Interlaminar Epidural Steroid Injection   03/06/18:L2/3 Interlaminar Epidural Steroid Injection   09/26/17:Bilateral L2-5 Lumbar Medial Branch Nerve Coolief Thermal Radiofrequency Ablation  08/31/17:Bilateral L2-5 Lumbar medial branch blocks   03/28/17:Cervical Conventional Radiofreqiency Ablation: Left C4-7  03/14/17:Cervical Conventional Radiofreqiency Ablation: Right C4-7  10/25/16:Cervical Conventional Radiofreqiency Ablation: Left C4-7  10/11/16: Cervical Conventional Radiofreqiency Ablation: Right C4-7  10/04/16- Cervical Medial Branch Blocks, Bilateral C4-7.     08/31/16:C7/[id number] Steroid Injection    Physical Therapy/Home Exercise: does home exercises now, but not in formal PT.    - 2019 was last time in  PT     Imaging:   MRI Lumbar Spine 2/25/2021:  COMPARISON:  07/11/2017     FINDINGS:  The vertebral bodies maintain normal height, signal intensity and alignment.  There is redemonstration of few hemangiomas at multiple levels.  The intervertebral disc spaces are preserved although there is some disc desiccation at multiple levels.  The conus terminates at level L1.  Evaluation of the localizer images and structures surrounding the lumbar spine shows no abnormalities.     L1-L2: No significant disc or joint pathology.     L2-L3: Mild diffuse disc bulge with mild bilateral facet arthropathy and ligamentum flavum hypertrophy results in mild central canal stenosis.  Mild bilateral neural foraminal stenosis is also identified.     L3-L4: There is a diffuse disc bulge with no significant facet arthropathy or ligamentum flavum hypertrophy.  There is mild central canal stenosis and mild bilateral neural foraminal stenosis.     L4-L5: There is a diffuse disc bulge with ligamentum flavum hypertrophy.  No significant facet arthropathy.  There is mild central canal stenosis with only minimal encroachment on the left neural foramen.  Mild right neural foraminal stenosis is present.     L5-S1: There is a diffuse disc bulge with mild to moderate right facet arthropathy.  No left facet arthropathy.  The ligamentum flavum is normal.  The central canal is patent and the neural foramina are largely patent.  Incidental note is made of an annulus fibrosus tear posteriorly measuring approximately 2 mm.     Impression:     Multilevel degenerative disc and joint disease which is mild and results in mild central canal and neural foraminal stenosis as outlined above.    Xray Cervical Spine 2/25/2021:  COMPARISON:  07/07/2017 .     FINDINGS:  The patient has undergone ACDF from C4 through C7. The instrumentation is intact without evidence of complication.  The vertebral bodies maintain normal height and alignment. The remaining  intervertebral disc spaces are preserved.  No significant uncovertebral joint hypertrophy.  The prevertebral soft tissues, odontoid views and lung apices are normal. The neural foramen are patent     Impression:     Postsurgical changes to the thoracic spine without additional evidence for degenerative disc or joint disease.     Allergies:   Review of patient's allergies indicates:   Allergen Reactions    Pcn [penicillins] Hives and Rash    Lipitor [atorvastatin] Other (See Comments)     Muscle cramps, weakness       Current Medications:   Current Outpatient Medications   Medication Sig Dispense Refill    acetaminophen (TYLENOL) 500 MG tablet Take 500 mg by mouth every 6 (six) hours as needed for Pain.      albuterol (PROVENTIL/VENTOLIN HFA) 90 mcg/actuation inhaler       aspirin (ECOTRIN) 81 MG EC tablet Take 81 mg by mouth once daily.      cetirizine (ZYRTEC) 10 MG tablet Take 10 mg by mouth nightly.  5    FOLIC ACID/MULTIVIT-MIN/LUTEIN (CENTRUM SILVER ORAL) Take by mouth once daily.      gabapentin (NEURONTIN) 300 MG capsule Take 2 capsules (600 mg total) by mouth 2 (two) times daily. 120 capsule 5    HYDROcodone-acetaminophen (NORCO) 7.5-325 mg per tablet Take 1 tablet by mouth every 12 (twelve) hours as needed for Pain. 60 tablet 0    levothyroxine (SYNTHROID) 50 MCG tablet Take 50 mcg by mouth once daily.      meclizine (ANTIVERT) 25 mg tablet Take 25 mg by mouth once daily.       meloxicam (MOBIC) 15 MG tablet Take 15 mg by mouth once daily.      metFORMIN (GLUCOPHAGE) 500 MG tablet Take 1 tablet by mouth 2 (two) times a day.      methocarbamoL (ROBAXIN) 500 MG Tab Take 1 tablet (500 mg total) by mouth 2 (two) times daily. 60 tablet 4    omeprazole (PRILOSEC) 20 MG capsule Take 1 capsule (20 mg total) by mouth once daily. 90 capsule 3    rosuvastatin (CRESTOR) 20 MG tablet Take 20 mg by mouth once daily.      valsartan (DIOVAN) 80 MG tablet Take 40 mg by mouth once daily.   1     No current  facility-administered medications for this visit.       REVIEW OF SYSTEMS:    GENERAL:  No weight loss, malaise or fevers.  HEENT:  Negative for frequent or significant headaches.  NECK:  Negative for lumps, goiter  and significant neck swelling.   RESPIRATORY:  Negative for cough, wheezing or shortness of breath.  CARDIOVASCULAR:  Negative for chest pain, leg swelling or palpitations. HTN  GI:  Negative for abdominal discomfort, blood in stools or black stools or change in bowel habits.  MUSCULOSKELETAL:  See HPI.  SKIN:  Negative for lesions, rash, and itching.  PSYCH:  Negative for sleep disturbance, mood disorder and recent psychosocial stressors.  HEMATOLOGY/LYMPHOLOGY:  Negative for prolonged bleeding, bruising easily or swollen nodes.  NEURO:   No history of headaches, syncope, paralysis, seizures or tremors.   ENDO: Thyroid disorder.   All other reviewed and negative other than HPI.    Past Medical History:  Past Medical History:   Diagnosis Date    Arthritis     Cataract     Cervical back pain with evidence of disc disease     Hiatal hernia     Hypertension     Thyroid disease        Past Surgical History:  Past Surgical History:   Procedure Laterality Date    ACROMIOCLAVICULAR JOINT CYST EXCISION Right     BACK SURGERY      cataracts Bilateral     CERVICAL FUSION      COLONOSCOPY N/A 4/27/2018    Procedure: COLONOSCOPY;  Surgeon: Giovani Medeiros MD;  Location: [facility] ([address] [place] [address]);  Service: Endoscopy;  Laterality: N/A;    EPIDURAL STEROID INJECTION N/A 10/11/2019    Procedure: INJECTION, STEROID, EPIDURAL;  Surgeon: Kasandra Montoya MD;  Location: [facility];  Service: Pain Management;  Laterality: N/A;  Lumbar NGUYEN L4/5    NGUYEN      EYE SURGERY      INJECTION OF JOINT Bilateral 7/14/2022    Procedure: INJECTION, JOINT, SI BILATERAL  needs consent;  Surgeon: Alex Callahan MD;  Location: [facility];  Service: Pain Management;  Laterality: Bilateral;    INJECTION, SACROILIAC JOINT Bilateral  11/4/2022    Procedure: INJECTION,SACROILIAC JOINT BILATERAL CONTRAST;  Surgeon: Alex Callahan MD;  Location: [facility];  Service: Pain Management;  Laterality: Bilateral;    INJECTION, SACROILIAC JOINT Bilateral 7/27/2023    Procedure: INJECTION,SACROILIAC JOINT, BILATERAL SOONER DATE;  Surgeon: Alex Callahan MD;  Location: [facility];  Service: Pain Management;  Laterality: Bilateral;    RADIOFREQUENCY ABLATION  10/2016    cervical spine/Montoya    RADIOFREQUENCY ABLATION Left 5/3/2019    Procedure: RADIOFREQUENCY ABLATION, L2-L5 MEDIALBRANCH;  Surgeon: Kasandra Montoya MD;  Location: [facility];  Service: Pain Management;  Laterality: Left;    RADIOFREQUENCY ABLATION Left 6/13/2019    Procedure: RADIOFREQUENCY ABLATION C4-7;  Surgeon: Kasandra Montoya MD;  Location: [facility];  Service: Pain Management;  Laterality: Left;    RADIOFREQUENCY ABLATION Left 3/9/2021    Procedure: RADIOFREQUENCY ABLATION C4,C5,C6,C7;  Surgeon: Alex Callahan MD;  Location: [facility];  Service: Pain Management;  Laterality: Left;  1 of 2    RADIOFREQUENCY ABLATION Right 3/26/2021    Procedure: RADIOFREQUENCY ABLATION C4,C6,C6,C7;  Surgeon: Alex Callahan MD;  Location: [facility];  Service: Pain Management;  Laterality: Right;  2 of 2    RADIOFREQUENCY ABLATION Right 4/23/2021    Procedure: RADIOFREQUENCY ABLATION L3,4,5;  Surgeon: Alex Callahan MD;  Location: [facility];  Service: Pain Management;  Laterality: Right;  1 of 2    RADIOFREQUENCY ABLATION Left 5/7/2021    Procedure: RADIOFREQUENCY ABLATION L3,4,5;  Surgeon: Alex Callahan MD;  Location: [facility];  Service: Pain Management;  Laterality: Left;  2 of 2    RADIOFREQUENCY ABLATION Left 10/1/2021    Procedure: RADIOFREQUENCY ABLATION LEFT, C4,5,6,7 1 of 2 CONSENT NEEDED;  Surgeon: Alex Callahan MD;  Location: [facility];  Service: Pain Management;  Laterality: Left;    RADIOFREQUENCY ABLATION Right 10/15/2021    Procedure:  RADIOFREQUENCY ABLATION  RIGHT C4,5,6,7 2 of 2 CONSENT NEEDED;  Surgeon: Alex Callahan MD;  Location: [facility];  Service: Pain Management;  Laterality: Right;    RADIOFREQUENCY ABLATION Left 2/25/2022    Procedure: RADIOFREQUENCY ABLATION LEFT L3,4,5 1 of 2, needs consent;  Surgeon: Alex Callahan MD;  Location: [facility];  Service: Pain Management;  Laterality: Left;    RADIOFREQUENCY ABLATION Right 3/11/2022    Procedure: RADIOFREQUENCY ABLATION RIGHT L3,4,5 2 of 2, needs consent;  Surgeon: Alex Callahan MD;  Location: [facility];  Service: Pain Management;  Laterality: Right;    RADIOFREQUENCY ABLATION Left 12/15/2022    Procedure: RADIOFREQUENCY ABLATION LEFT C4, C5, C6, C7  ONE OF TWO NEEDS CONSENT;  Surgeon: Alex Callahan MD;  Location: [facility];  Service: Pain Management;  Laterality: Left;    RADIOFREQUENCY ABLATION Right 12/29/2022    Procedure: RADIOFREQUENCY ABLATION RIGHT C4, C5, C6, C7  TWO OF TWO NEEDS CONSENT;  Surgeon: Alex Callahan MD;  Location: [facility];  Service: Pain Management;  Laterality: Right;    RADIOFREQUENCY ABLATION Left 2/24/2023    Procedure: RADIOFREQUENCY ABLATION LEFT L3,L4,L5;  Surgeon: Alex Callahan MD;  Location: [facility];  Service: Pain Management;  Laterality: Left;    RADIOFREQUENCY ABLATION Right 3/10/2023    Procedure: RADIOFREQUENCY ABLATION RIGHT L3,L4,L5;  Surgeon: Alex Callahan MD;  Location: [facility];  Service: Pain Management;  Laterality: Right;    RETINAL DETACHMENT SURGERY      ROTATOR CUFF REPAIR Right     SPINE SURGERY      cerival fusion     TRANSFORAMINAL EPIDURAL INJECTION OF STEROID Right 4/14/2022    Procedure: INJECTION, STEROID, EPIDURAL, TRANSFORAMINAL APPROACH RIGHT L4/5 & L5/S1 Needs Consent;  Surgeon: Alex Callahan MD;  Location: [facility];  Service: Pain Management;  Laterality: Right;    TRIGGER POINT INJECTION Left 6/13/2019    Procedure: INJECTION, TRIGGER POINT;  Surgeon: Kasandra Montoya,  MD;  Location: [facility];  Service: Pain Management;  Laterality: Left;       Family History:  Family History   Problem Relation Age of Onset    Heart disease Mother     Cancer Father     Leukemia Brother     Colon cancer Neg Hx     Celiac disease Neg Hx     Crohn's disease Neg Hx     Esophageal cancer Neg Hx     Inflammatory bowel disease Neg Hx     Irritable bowel syndrome Neg Hx     Liver cancer Neg Hx     Rectal cancer Neg Hx     Stomach cancer Neg Hx     Ulcerative colitis Neg Hx        Social History:  Social History     Socioeconomic History    Marital status:    Tobacco Use    Smoking status: Never    Smokeless tobacco: Never   Substance and Sexual Activity    Alcohol use: No    Drug use: No       OBJECTIVE:    /68   Pulse 73   Temp 97.1 °F (36.2 °C)   Resp 18   Wt 112 kg (246 lb 14.6 oz)   SpO2 100%   BMI 32.58 kg/m²     PHYSICAL EXAMINATION:    General appearance: Well appearing, in no acute distress, alert and oriented x3.  Psych:  Mood and affect appropriate.  Skin: Skin color, texture, turgor normal, no rashes or lesions, in both upper and lower body.  Head/face:  Atraumatic, normocephalic. No palpable lymph nodes   Neck: There is pain with palpation over cervical paraspinals and facet joints bilaterally. Limited ROM with pain on flexion, extension, and lateral rotation.   Cor: RRR  Pulm: Symmetric chest rise, no respiratory distress noted.   Abdomen: Soft, pain with palpation over LUQ.  Back: Straight leg raising in the sitting position is negative for radicular leg pain bilaterally. There is pain with palpation over lumbar paraspinals and facet joints bilaterally. Limited ROM with pain on extension.   Extremities: No deformities, edema, or skin discoloration. Good capillary refill.  Musculoskeletal: Bilateral upper and lower extremity strength is normal and symmetric. No atrophy or tone abnormalities are noted.  Neuro:  No loss of sensation is noted.  Gait: Antalgic, ambulates  with cane for assistance     ASSESSMENT: 58 y.o. year old male with neck and lumbar pain, consistent with      1. Chronic pain syndrome        2. Spondylosis of cervical region without myelopathy or radiculopathy  Procedure Order to Pain Management      3. S/P cervical spinal fusion        4. DDD (degenerative disc disease), cervical        5. Lumbar spondylosis        6. DDD (degenerative disc disease), lumbar        7. Sacroiliac joint dysfunction                PLAN:     - Previous imaging reviewed today.    - He is s/p bilateral SI joint injections with benefit.     - Schedule for left then right C4,5,6, RFA, one side at a time, two weeks apart.     - Consider TAP block in the future for abdominal pain.     - I have stressed the importance of physical activity and a home exercise plan to help with pain and improve health.    - Continue Gabapentin.     - Continue Robaxin 500 mg PRN muscle pain.     - Pain contract signed 2/12/2021.     - Continue Norco 7.5/325 mg BID PRN. Refill provided.      - The patient is here today for a refill of current pain medications and they believe these provide effective pain control and improvements in quality of life.  The patient notes no serious side effects, and feels the benefits outweigh the risks.  The patient was reminded of the pain contract that they signed previously as well as the risks and benefits of the medication including possible death.  The updated [place] [employer] prescription monitoring program was reviewed, and the patient has been found to be compliant with current treatment plan. Medication management provided by Dr. Callahan.     - UDS from 1/31/2023 reviewed, negative for medications, he does take sparingly.     - Continue home exercise routine.     - RTC 3 weeks after above procedure.     The above plan and management options were discussed at length with patient. Patient is in agreement with the above and verbalized understanding.    Angelique MUNOZ  WILLIAM Barahona  08/11/2023

## 2023-08-14 ENCOUNTER — PATIENT MESSAGE (OUTPATIENT)
Dept: PAIN MEDICINE | Facility: OTHER | Age: 58
End: 2023-08-14
Payer: MEDICARE

## 2023-09-15 ENCOUNTER — HOSPITAL ENCOUNTER (OUTPATIENT)
Facility: OTHER | Age: 58
Discharge: HOME | End: 2023-09-15
Attending: ANESTHESIOLOGY | Admitting: ANESTHESIOLOGY
Payer: MEDICARE

## 2023-09-15 VITALS
OXYGEN SATURATION: 97 % | WEIGHT: 245 LBS | RESPIRATION RATE: 16 BRPM | DIASTOLIC BLOOD PRESSURE: 83 MMHG | BODY MASS INDEX: 32.47 KG/M2 | HEIGHT: 73 IN | HEART RATE: 54 BPM | SYSTOLIC BLOOD PRESSURE: 127 MMHG | TEMPERATURE: 98 F

## 2023-09-15 DIAGNOSIS — M47.812 ARTHROPATHY OF CERVICAL FACET JOINT: ICD-10-CM

## 2023-09-15 DIAGNOSIS — G89.29 CHRONIC PAIN: ICD-10-CM

## 2023-09-15 DIAGNOSIS — M50.30 DDD (DEGENERATIVE DISC DISEASE), CERVICAL: ICD-10-CM

## 2023-09-15 DIAGNOSIS — M47.812 FACET ARTHRITIS OF CERVICAL REGION: Primary | ICD-10-CM

## 2023-09-15 LAB — POCT GLUCOSE: 97 MG/DL (ref 70–110)

## 2023-09-15 PROCEDURE — 64633 DESTROY CERV/THOR FACET JNT: CPT | Mod: LT,,, | Performed by: ANESTHESIOLOGY

## 2023-09-15 PROCEDURE — 64634 DESTROY C/TH FACET JNT ADDL: CPT | Mod: LT,,, | Performed by: ANESTHESIOLOGY

## 2023-09-15 PROCEDURE — 99152 PR MOD CONSCIOUS SEDATION, SAME PHYS, 5+ YRS, FIRST 15 MIN: ICD-10-PCS | Mod: ,,, | Performed by: ANESTHESIOLOGY

## 2023-09-15 PROCEDURE — 99152 MOD SED SAME PHYS/QHP 5/>YRS: CPT | Performed by: ANESTHESIOLOGY

## 2023-09-15 PROCEDURE — 64633 PR DESTROY CERV/THOR FACET JNT: ICD-10-PCS | Mod: LT,,, | Performed by: ANESTHESIOLOGY

## 2023-09-15 PROCEDURE — 63600175 PHARM REV CODE 636 W HCPCS: Performed by: ANESTHESIOLOGY

## 2023-09-15 PROCEDURE — 64634 DESTROY C/TH FACET JNT ADDL: CPT | Mod: LT | Performed by: ANESTHESIOLOGY

## 2023-09-15 PROCEDURE — 99152 MOD SED SAME PHYS/QHP 5/>YRS: CPT | Mod: ,,, | Performed by: ANESTHESIOLOGY

## 2023-09-15 PROCEDURE — 64633 DESTROY CERV/THOR FACET JNT: CPT | Mod: LT | Performed by: ANESTHESIOLOGY

## 2023-09-15 PROCEDURE — 25000003 PHARM REV CODE 250: Performed by: ANESTHESIOLOGY

## 2023-09-15 PROCEDURE — 25000003 PHARM REV CODE 250: Performed by: STUDENT IN AN ORGANIZED HEALTH CARE EDUCATION/TRAINING PROGRAM

## 2023-09-15 PROCEDURE — 64634 PR DESTROY C/TH FACET JNT ADDL: ICD-10-PCS | Mod: LT,,, | Performed by: ANESTHESIOLOGY

## 2023-09-15 RX ORDER — MIDAZOLAM HYDROCHLORIDE 1 MG/ML
INJECTION INTRAMUSCULAR; INTRAVENOUS
Status: DISCONTINUED | OUTPATIENT
Start: 2023-09-15 | End: 2023-09-15 | Stop reason: HOSPADM

## 2023-09-15 RX ORDER — SODIUM CHLORIDE 9 MG/ML
INJECTION, SOLUTION INTRAVENOUS CONTINUOUS
Status: DISCONTINUED | OUTPATIENT
Start: 2023-09-15 | End: 2023-09-15 | Stop reason: HOSPADM

## 2023-09-15 RX ORDER — FENTANYL CITRATE 50 UG/ML
INJECTION, SOLUTION INTRAMUSCULAR; INTRAVENOUS
Status: DISCONTINUED | OUTPATIENT
Start: 2023-09-15 | End: 2023-09-15 | Stop reason: HOSPADM

## 2023-09-15 RX ORDER — LIDOCAINE HYDROCHLORIDE 20 MG/ML
INJECTION, SOLUTION INFILTRATION; PERINEURAL
Status: DISCONTINUED | OUTPATIENT
Start: 2023-09-15 | End: 2023-09-15 | Stop reason: HOSPADM

## 2023-09-15 RX ORDER — DEXAMETHASONE SODIUM PHOSPHATE 10 MG/ML
INJECTION INTRAMUSCULAR; INTRAVENOUS
Status: DISCONTINUED | OUTPATIENT
Start: 2023-09-15 | End: 2023-09-15 | Stop reason: HOSPADM

## 2023-09-15 RX ORDER — BUPIVACAINE HYDROCHLORIDE 2.5 MG/ML
INJECTION, SOLUTION EPIDURAL; INFILTRATION; INTRACAUDAL
Status: DISCONTINUED | OUTPATIENT
Start: 2023-09-15 | End: 2023-09-15 | Stop reason: HOSPADM

## 2023-09-15 NOTE — DISCHARGE SUMMARY
Discharge Note  Short Stay      SUMMARY     Admit Date: 9/15/2023    Attending Physician: Cedric Woody      Discharge Physician: Cedric Woody      Discharge Date: 9/15/2023 10:14 AM    Procedure(s) (LRB):  RADIOFREQUENCY ABLATION, LEFT C4-C5-C6-C7 MEDIAL BRANCH ONE OF TWO  SOONER DATE (Left)    Final Diagnosis: Spondylosis of cervical region without myelopathy or radiculopathy [M47.812]    Disposition: Home or self care    Patient Instructions:   Current Discharge Medication List        CONTINUE these medications which have NOT CHANGED    Details   acetaminophen (TYLENOL) 500 MG tablet Take 500 mg by mouth every 6 (six) hours as needed for Pain.      albuterol (PROVENTIL/VENTOLIN HFA) 90 mcg/actuation inhaler       aspirin (ECOTRIN) 81 MG EC tablet Take 81 mg by mouth once daily.      cetirizine (ZYRTEC) 10 MG tablet Take 10 mg by mouth nightly.  Refills: 5      FOLIC ACID/MULTIVIT-MIN/LUTEIN (CENTRUM SILVER ORAL) Take by mouth once daily.      gabapentin (NEURONTIN) 300 MG capsule Take 2 capsules (600 mg total) by mouth 2 (two) times daily.  Qty: 120 capsule, Refills: 5    Associated Diagnoses: Chronic pain disorder; Lumbar spondylosis; DDD (degenerative disc disease), lumbar; S/P cervical spinal fusion; Cervical radiculopathy; Myofascial pain      HYDROcodone-acetaminophen (NORCO) 7.5-325 mg per tablet Take 1 tablet by mouth every 12 (twelve) hours as needed for Pain.  Qty: 60 tablet, Refills: 0    Comments: Patient with chronic intractable pain.  Medically necessary for > 7 days  Associated Diagnoses: Chronic pain disorder; Lumbar spondylosis; DDD (degenerative disc disease), lumbar; Facet arthritis of lumbar region; Myalgia; Spondylosis of cervical region without myelopathy or radiculopathy; S/P cervical spinal fusion      levothyroxine (SYNTHROID) 50 MCG tablet Take 50 mcg by mouth once daily.      meclizine (ANTIVERT) 25 mg tablet Take 25 mg by mouth once daily.       meloxicam (MOBIC) 15 MG tablet Take 15  mg by mouth once daily.      metFORMIN (GLUCOPHAGE) 500 MG tablet Take 1 tablet by mouth 2 (two) times a day.      methocarbamoL (ROBAXIN) 500 MG Tab Take 1 tablet (500 mg total) by mouth 2 (two) times daily.  Qty: 60 tablet, Refills: 4    Associated Diagnoses: Myofascial pain      omeprazole (PRILOSEC) 20 MG capsule Take 1 capsule (20 mg total) by mouth once daily.  Qty: 90 capsule, Refills: 3    Associated Diagnoses: Gastroesophageal reflux disease, esophagitis presence not specified      rosuvastatin (CRESTOR) 20 MG tablet Take 20 mg by mouth once daily.      valsartan (DIOVAN) 80 MG tablet Take 40 mg by mouth once daily.   Refills: 1                 Discharge Diagnosis: Spondylosis of cervical region without myelopathy or radiculopathy [M47.812]  Condition on Discharge: Stable with no complications to procedure   Diet on Discharge: Same as before.  Activity: as per instruction sheet.  Discharge to: Home with a responsible adult.  Follow up: 2-4 weeks       Please call my office or pager at [phone] if experienced any weakness or loss of sensation, fever > 101.5, pain uncontrolled with oral medications, persistent nausea/vomiting/or diarrhea, redness or drainage from the incisions, or any other worrisome concerns. If physician on call was not reached or could not communicate with our office for any reason please go to the nearest emergency department

## 2023-09-15 NOTE — OP NOTE
Therapeutic Cervical Medial Branch Radiofrequency Ablation Under Fluoroscopy     The procedure, risks, benefits, and options were discussed with the patient. There are no contraindications to the procedure. The patent expressed understanding and agreed to the procedure. Informed written consent was obtained prior to the start of the procedure and can be found in the patient's chart.        PATIENT NAME: Sal Navarro   MRN: [id number]     DATE OF PROCEDURE: 09/15/2023       PROCEDURE: Therapeutic Left C4, C5, C6, and C7 Cervical Radiofrequency Ablation under Fluoroscopy    PRE-OP DIAGNOSIS: Spondylosis of cervical region without myelopathy or radiculopathy [M47.812] Cervical Spondylosis [M47.812]    POST-OP DIAGNOSIS: Same    PHYSICIAN: Alex Callahan MD    ASSISTANTS: Blake Fechtel, M.D. [facility] Fellow       MEDICATIONS INJECTED:  Preservative-free Decadron 10mg with 9cc of Bupivicaine 0.25%    LOCAL ANESTHETIC INJECTED:   Xylocaine 2%    SEDATION: Versed 2mg and Fentanyl 100mcg                                                                                                                                                                                     Conscious sedation ordered by M.D. Patient re-evaluation prior to administration of conscious sedation. No changes noted in patient's status from initial evaluation. The patient's vital signs were monitored by RN and patient remained hemodynamically stable throughout the procedure.    Event Time In   Sedation Start 0956   Sedation End 1010       ESTIMATED BLOOD LOSS:  None    COMPLICATIONS:  None.     INTERVAL HISTORY: Patient has clinical and imaging findings suggestive of facet mediated pain. Patients has completed 2 previous diagnostic medial branch blocks at specified levels with at least 80% relief for the expected duration of the local anesthetic utilized.    TECHNIQUE: Time-out was performed to identify the patient and procedure to be performed. With  the patient laying in a prone position, the surgical area was prepped and draped in the usual sterile fashion using ChloraPrep and fenestrated drape. The levels were determined under fluoroscopic guidance. Skin anesthesia was achieved by injecting Lidocaine 2% over the injection sites. A 18 gauge 10mm curved active tip needle was introduced to the anatomic local of the medial branch at each of the above levels using AP, lateral and/or contralateral oblique fluoroscopic imaging. Then the sensory and motor testing was performed to confirm that the needle tips were in the correct location. After negative aspiration for blood or CSF was confirmed, 1 mL of the lidocaine 2% listed above was injected slowly at each site. This was followed by thermal lesioning at 80 degrees celsius for 90 seconds. That was followed by slowly injecting 1 mL of the medication mixture listed above at each site. The needles were removed and bleeding was nil. A sterile dressing was applied. No specimens collected. The patient tolerated the procedure well and did not have any procedure related motor deficit at the conclusion of the procedure.    The patient was monitored after the procedure in the recovery area. They were given post-procedure and discharge instructions to follow at home. The patient was discharged in a stable condition.     Cedric Woody MD    I reviewed and edited the fellow's note. I conducted my own interview and physical examination. I agree with the findings. I was present and supervising all critical portions of the procedure.      Alex Callahan MD

## 2023-09-15 NOTE — H&P
HPI  Patient presenting for Procedure(s) (LRB):  RADIOFREQUENCY ABLATION, LEFT C4-C5-C6-C7 MEDIAL BRANCH ONE OF TWO  SOONER DATE (Left)     Patient on Anti-coagulation No    No health changes since previous encounter    Past Medical History:   Diagnosis Date    Arthritis     Cataract     Cervical back pain with evidence of disc disease     Hiatal hernia     Hypertension     Thyroid disease      Past Surgical History:   Procedure Laterality Date    ACROMIOCLAVICULAR JOINT CYST EXCISION Right     BACK SURGERY      cataracts Bilateral     CERVICAL FUSION      COLONOSCOPY N/A 4/27/2018    Procedure: COLONOSCOPY;  Surgeon: Giovani Medeiros MD;  [facility]  Service: Endoscopy;  Laterality: N/A;    EPIDURAL STEROID INJECTION N/A 10/11/2019    Procedure: INJECTION, STEROID, EPIDURAL;  Surgeon: Kasandra Montoya MD;  Location: [facility] PAIN MGT;  Service: Pain Management;  Laterality: N/A;  Lumbar NGUYEN L4/5    NGUYEN      EYE SURGERY      INJECTION OF JOINT Bilateral 7/14/2022    Procedure: INJECTION, JOINT, SI BILATERAL  needs consent;  Surgeon: Alex Callahan MD;  Location: [facility] PAIN MGT;  Service: Pain Management;  Laterality: Bilateral;    INJECTION, SACROILIAC JOINT Bilateral 11/4/2022    Procedure: INJECTION,SACROILIAC JOINT BILATERAL CONTRAST;  Surgeon: Alex Callahan MD;  Location: [facility] PAIN MGT;  Service: Pain Management;  Laterality: Bilateral;    INJECTION, SACROILIAC JOINT Bilateral 7/27/2023    Procedure: INJECTION,SACROILIAC JOINT, BILATERAL SOONER DATE;  Surgeon: Alex Callahan MD;  Location: [facility] PAIN MGT;  Service: Pain Management;  Laterality: Bilateral;    RADIOFREQUENCY ABLATION  10/2016    cervical spine/Jan    RADIOFREQUENCY ABLATION Left 5/3/2019    Procedure: RADIOFREQUENCY ABLATION, L2-L5 MEDIALBRANCH;  Surgeon: Kasandra Montoya MD;  Location: [facility] PAIN MGT;  Service: Pain Management;  Laterality: Left;    RADIOFREQUENCY ABLATION Left 6/13/2019    Procedure: RADIOFREQUENCY  ABLATION C4-7;  Surgeon: Kasandra Montoya MD;  Location: [facility] PAIN MGT;  Service: Pain Management;  Laterality: Left;    RADIOFREQUENCY ABLATION Left 3/9/2021    Procedure: RADIOFREQUENCY ABLATION C4,C5,C6,C7;  Surgeon: Alex Callahan MD;  Location: [facility] PAIN MGT;  Service: Pain Management;  Laterality: Left;  1 of 2    RADIOFREQUENCY ABLATION Right 3/26/2021    Procedure: RADIOFREQUENCY ABLATION C4,C6,C6,C7;  Surgeon: Alex Callahan MD;  Location: [facility] PAIN MGT;  Service: Pain Management;  Laterality: Right;  2 of 2    RADIOFREQUENCY ABLATION Right 4/23/2021    Procedure: RADIOFREQUENCY ABLATION L3,4,5;  Surgeon: Alex Callahan MD;  Location: [facility] PAIN MGT;  Service: Pain Management;  Laterality: Right;  1 of 2    RADIOFREQUENCY ABLATION Left 5/7/2021    Procedure: RADIOFREQUENCY ABLATION L3,4,5;  Surgeon: Alex Callahan MD;  Location: [facility] PAIN MGT;  Service: Pain Management;  Laterality: Left;  2 of 2    RADIOFREQUENCY ABLATION Left 10/1/2021    Procedure: RADIOFREQUENCY ABLATION LEFT, C4,5,6,7 1 of 2 CONSENT NEEDED;  Surgeon: Alex Callahan MD;  Location: [facility] PAIN MGT;  Service: Pain Management;  Laterality: Left;    RADIOFREQUENCY ABLATION Right 10/15/2021    Procedure: RADIOFREQUENCY ABLATION  RIGHT C4,5,6,7 2 of 2 CONSENT NEEDED;  Surgeon: Alex Callahan MD;  Location: [facility] PAIN MGT;  Service: Pain Management;  Laterality: Right;    RADIOFREQUENCY ABLATION Left 2/25/2022    Procedure: RADIOFREQUENCY ABLATION LEFT L3,4,5 1 of 2, needs consent;  Surgeon: Alex Callahan MD;  Location: [facility] PAIN [facility]GT;  Service: Pain Management;  Laterality: Left;    RADIOFREQUENCY ABLATION Right 3/11/2022    Procedure: RADIOFREQUENCY ABLATION RIGHT L3,4,5 2 of 2, needs consent;  Surgeon: Alex Callahan MD;  Location: [facility] PAIN MGT;  Service: Pain Management;  Laterality: Right;    RADIOFREQUENCY ABLATION Left 12/15/2022    Procedure: RADIOFREQUENCY ABLATION LEFT C4, C5, C6, C7  ONE OF TWO NEEDS CONSENT;  Surgeon:  "Alex Callahan MD;  Location: [facility] PAIN MGT;  Service: Pain Management;  Laterality: Left;    RADIOFREQUENCY ABLATION Right 12/29/2022    Procedure: RADIOFREQUENCY ABLATION RIGHT C4, C5, C6, C7  TWO OF TWO NEEDS CONSENT;  Surgeon: Alex Callahan MD;  Location: [facility] PAIN MGT;  Service: Pain Management;  Laterality: Right;    RADIOFREQUENCY ABLATION Left 2/24/2023    Procedure: RADIOFREQUENCY ABLATION LEFT L3,L4,L5;  Surgeon: Alex Callahan MD;  Location: [facility] PAIN MGT;  Service: Pain Management;  Laterality: Left;    RADIOFREQUENCY ABLATION Right 3/10/2023    Procedure: RADIOFREQUENCY ABLATION RIGHT L3,L4,L5;  Surgeon: Alex Callahan MD;  Location: [facility] PAIN MGT;  Service: Pain Management;  Laterality: Right;    RETINAL DETACHMENT SURGERY      ROTATOR CUFF REPAIR Right     SPINE SURGERY      cerival fusion     TRANSFORAMINAL EPIDURAL INJECTION OF STEROID Right 4/14/2022    Procedure: INJECTION, STEROID, EPIDURAL, TRANSFORAMINAL APPROACH RIGHT L4/5 & L5/S1 Needs Consent;  Surgeon: Alex Callahan MD;  Location: [facility] PAIN MGT;  Service: Pain Management;  Laterality: Right;    TRIGGER POINT INJECTION Left 6/13/2019    Procedure: INJECTION, TRIGGER POINT;  Surgeon: Kasandra Montoya MD;  Location: [facility] PAIN MGT;  Service: Pain Management;  Laterality: Left;     Review of patient's allergies indicates:   Allergen Reactions    Pcn [penicillins] Hives and Rash    Lipitor [atorvastatin] Other (See Comments)     Muscle cramps, weakness      Current Facility-Administered Medications   Medication    0.9%  NaCl infusion       PMHx, PSHx, Allergies, Medications reviewed in epic    ROS negative except pain complaints in HPI    OBJECTIVE:    /72 (BP Location: Right arm, Patient Position: Lying)   Pulse (!) 54   Temp 98.2 °F (36.8 °C)   Resp 18   Ht 6' 1" (1.854 m)   Wt 111.1 kg (245 lb)   SpO2 99%   BMI 32.32 kg/m²     PHYSICAL EXAMINATION:    GENERAL: Well appearing, in no acute distress, alert and oriented " x3.  PSYCH:  Mood and affect appropriate.  SKIN: Skin color, texture, turgor normal, no rashes or lesions which will impact the procedure.  CV: RRR with palpation of the radial artery.  PULM: No evidence of respiratory difficulty, symmetric chest rise. Clear to auscultation.  NEURO: Cranial nerves grossly intact.    Plan:    Proceed with procedure as planned Procedure(s) (LRB):  RADIOFREQUENCY ABLATION, LEFT C4-C5-C6-C7 MEDIAL BRANCH ONE OF TWO  SOONER DATE (Left)    Da Miner MD  09/15/2023

## 2023-09-15 NOTE — DISCHARGE INSTRUCTIONS

## 2023-09-28 ENCOUNTER — PATIENT MESSAGE (OUTPATIENT)
Dept: PAIN MEDICINE | Facility: OTHER | Age: 58
End: 2023-09-28
Payer: MEDICARE

## 2023-09-29 ENCOUNTER — HOSPITAL ENCOUNTER (OUTPATIENT)
Facility: OTHER | Age: 58
Discharge: HOME | End: 2023-09-29
Attending: ANESTHESIOLOGY | Admitting: ANESTHESIOLOGY
Payer: MEDICARE

## 2023-09-29 VITALS
TEMPERATURE: 98 F | SYSTOLIC BLOOD PRESSURE: 127 MMHG | DIASTOLIC BLOOD PRESSURE: 77 MMHG | OXYGEN SATURATION: 96 % | BODY MASS INDEX: 32.47 KG/M2 | WEIGHT: 245 LBS | HEIGHT: 73 IN | HEART RATE: 51 BPM | RESPIRATION RATE: 16 BRPM

## 2023-09-29 DIAGNOSIS — G89.29 CHRONIC PAIN: ICD-10-CM

## 2023-09-29 DIAGNOSIS — M47.812 CERVICAL SPONDYLOSIS: Primary | ICD-10-CM

## 2023-09-29 LAB — POCT GLUCOSE: 93 MG/DL (ref 70–110)

## 2023-09-29 PROCEDURE — 64633 DESTROY CERV/THOR FACET JNT: CPT | Mod: RT,,, | Performed by: ANESTHESIOLOGY

## 2023-09-29 PROCEDURE — 64634 PR DESTROY C/TH FACET JNT ADDL: ICD-10-PCS | Mod: RT,,, | Performed by: ANESTHESIOLOGY

## 2023-09-29 PROCEDURE — 64634 DESTROY C/TH FACET JNT ADDL: CPT | Mod: RT,,, | Performed by: ANESTHESIOLOGY

## 2023-09-29 PROCEDURE — 63600175 PHARM REV CODE 636 W HCPCS: Performed by: ANESTHESIOLOGY

## 2023-09-29 PROCEDURE — 99152 PR MOD CONSCIOUS SEDATION, SAME PHYS, 5+ YRS, FIRST 15 MIN: ICD-10-PCS | Mod: ,,, | Performed by: ANESTHESIOLOGY

## 2023-09-29 PROCEDURE — 99152 MOD SED SAME PHYS/QHP 5/>YRS: CPT | Performed by: ANESTHESIOLOGY

## 2023-09-29 PROCEDURE — 64634 DESTROY C/TH FACET JNT ADDL: CPT | Mod: RT | Performed by: ANESTHESIOLOGY

## 2023-09-29 PROCEDURE — 99152 MOD SED SAME PHYS/QHP 5/>YRS: CPT | Mod: ,,, | Performed by: ANESTHESIOLOGY

## 2023-09-29 PROCEDURE — 25000003 PHARM REV CODE 250: Performed by: ANESTHESIOLOGY

## 2023-09-29 PROCEDURE — 64633 PR DESTROY CERV/THOR FACET JNT: ICD-10-PCS | Mod: RT,,, | Performed by: ANESTHESIOLOGY

## 2023-09-29 PROCEDURE — 64633 DESTROY CERV/THOR FACET JNT: CPT | Mod: RT | Performed by: ANESTHESIOLOGY

## 2023-09-29 RX ORDER — SODIUM CHLORIDE 9 MG/ML
INJECTION, SOLUTION INTRAVENOUS CONTINUOUS
Status: DISCONTINUED | OUTPATIENT
Start: 2023-09-29 | End: 2023-09-29 | Stop reason: HOSPADM

## 2023-09-29 RX ORDER — FENTANYL CITRATE 50 UG/ML
INJECTION, SOLUTION INTRAMUSCULAR; INTRAVENOUS
Status: DISCONTINUED | OUTPATIENT
Start: 2023-09-29 | End: 2023-09-29 | Stop reason: HOSPADM

## 2023-09-29 RX ORDER — BUPIVACAINE HYDROCHLORIDE 2.5 MG/ML
INJECTION, SOLUTION EPIDURAL; INFILTRATION; INTRACAUDAL
Status: DISCONTINUED | OUTPATIENT
Start: 2023-09-29 | End: 2023-09-29 | Stop reason: HOSPADM

## 2023-09-29 RX ORDER — MIDAZOLAM HYDROCHLORIDE 1 MG/ML
INJECTION INTRAMUSCULAR; INTRAVENOUS
Status: DISCONTINUED | OUTPATIENT
Start: 2023-09-29 | End: 2023-09-29 | Stop reason: HOSPADM

## 2023-09-29 RX ORDER — DEXAMETHASONE SODIUM PHOSPHATE 10 MG/ML
INJECTION INTRAMUSCULAR; INTRAVENOUS
Status: DISCONTINUED | OUTPATIENT
Start: 2023-09-29 | End: 2023-09-29 | Stop reason: HOSPADM

## 2023-09-29 RX ORDER — LIDOCAINE HYDROCHLORIDE 20 MG/ML
INJECTION, SOLUTION INFILTRATION; PERINEURAL
Status: DISCONTINUED | OUTPATIENT
Start: 2023-09-29 | End: 2023-09-29 | Stop reason: HOSPADM

## 2023-09-29 NOTE — OP NOTE
Therapeutic Cervical Medial Branch Radiofrequency Ablation Under Fluoroscopy     The procedure, risks, benefits, and options were discussed with the patient. There are no contraindications to the procedure. The patent expressed understanding and agreed to the procedure. Informed written consent was obtained prior to the start of the procedure and can be found in the patient's chart.        PATIENT NAME: Sal Navarro   MRN: [id number]     DATE OF PROCEDURE: 09/29/2023       PROCEDURE: Therapeutic Right C4, C5, C6, and C7 Cervical Radiofrequency Ablation under Fluoroscopy    PRE-OP DIAGNOSIS: Spondylosis of cervical region without myelopathy or radiculopathy [M47.812] Cervical Spondylosis [M47.812]    POST-OP DIAGNOSIS: Same    PHYSICIAN: Alex Callahan MD    ASSISTANTS: None     MEDICATIONS INJECTED:  Preservative-free Decadron 10mg with 9cc of Bupivicaine 0.25%    LOCAL ANESTHETIC INJECTED:   Xylocaine 2%    SEDATION: Versed 2mg and Fentanyl 50mcg                                                                                                                                                                                     Conscious sedation ordered by M.D. Patient re-evaluation prior to administration of conscious sedation. No changes noted in patient's status from initial evaluation. The patient's vital signs were monitored by RN and patient remained hemodynamically stable throughout the procedure.    Event Time In   Sedation Start 0938   Sedation End 0953       ESTIMATED BLOOD LOSS:  None    COMPLICATIONS:  None.     INTERVAL HISTORY: Patient has clinical and imaging findings suggestive of recurrent facet mediated pain. Patient has undergone a previous RFA at specified levels with at least 50% relief for at least 6 months. Successful diagnostic medial branch blocks have been completed within the past 2 years.    TECHNIQUE: Time-out was performed to identify the patient and procedure to be performed. With the  patient laying in a prone position, the surgical area was prepped and draped in the usual sterile fashion using ChloraPrep and fenestrated drape. The levels were determined under fluoroscopic guidance. Skin anesthesia was achieved by injecting Lidocaine 2% over the injection sites. A 20 gauge 10mm curved active tip needle was introduced to the anatomic local of the medial branch at each of the above levels using AP, lateral and/or contralateral oblique fluoroscopic imaging. Then the sensory and motor testing was performed to confirm that the needle tips were in the correct location. After negative aspiration for blood or CSF was confirmed, 1 mL of the lidocaine 2% listed above was injected slowly at each site. This was followed by thermal lesioning at 80 degrees celsius for 90 seconds. That was followed by slowly injecting 1 mL of the medication mixture listed above at each site. The needles were removed and bleeding was nil. A sterile dressing was applied. No specimens collected. The patient tolerated the procedure well and did not have any procedure related motor deficit at the conclusion of the procedure.        The patient was monitored after the procedure in the recovery area. They were given post-procedure and discharge instructions to follow at home. The patient was discharged in a stable condition.         Alex Callahan MD

## 2023-09-29 NOTE — H&P
HPI  Patient presenting for Procedure(s) (LRB):  RADIOFREQUENCY ABLATION,  RIGHT C4-C5-C6-C7 MEDIAL BRANCH TWO OF TWO SOONER DATE (Right)     Patient on Anti-coagulation No    No health changes since previous encounter    Past Medical History:   Diagnosis Date    Arthritis     Cataract     Cervical back pain with evidence of disc disease     Hiatal hernia     Hypertension     Thyroid disease      Past Surgical History:   Procedure Laterality Date    ACROMIOCLAVICULAR JOINT CYST EXCISION Right     BACK SURGERY      cataracts Bilateral     CERVICAL FUSION      COLONOSCOPY N/A 4/27/2018    Procedure: COLONOSCOPY;  Surgeon: Giovani Medeiros MD;  [facility]  Service: Endoscopy;  Laterality: N/A;    EPIDURAL STEROID INJECTION N/A 10/11/2019    Procedure: INJECTION, STEROID, EPIDURAL;  Surgeon: Kasandra Montoya MD;  Location: [facility], [place], operated by [facility];  Service: Pain Management;  Laterality: N/A;  Lumbar NGUYEN L4/5    NGUYEN      EYE SURGERY      INJECTION OF JOINT Bilateral 7/14/2022    Procedure: INJECTION, JOINT, SI BILATERAL  needs consent;  Surgeon: Alex Callahan MD;  Location: [facility], [place], operated by [facility];  Service: Pain Management;  Laterality: Bilateral;    INJECTION, SACROILIAC JOINT Bilateral 11/4/2022    Procedure: INJECTION,SACROILIAC JOINT BILATERAL CONTRAST;  Surgeon: Alex Callahan MD;  Location: [facility], [place], operated by [facility];  Service: Pain Management;  Laterality: Bilateral;    INJECTION, SACROILIAC JOINT Bilateral 7/27/2023    Procedure: INJECTION,SACROILIAC JOINT, BILATERAL SOONER DATE;  Surgeon: Alex Callahan MD;  Location: [facility], [place], operated by [facility];  Service: Pain Management;  Laterality: Bilateral;    RADIOFREQUENCY ABLATION  10/2016    cervical spine/Jan    RADIOFREQUENCY ABLATION Left 5/3/2019    Procedure: RADIOFREQUENCY ABLATION, L2-L5 MEDIALBRANCH;  Surgeon: Kasandra Montoya MD;  Location: [facility], [place], operated by [facility];  Service: Pain Management;  Laterality: Left;    RADIOFREQUENCY ABLATION Left 6/13/2019    Procedure: RADIOFREQUENCY  ABLATION C4-7;  Surgeon: Kasandra Montoya MD;  Location: [facility]IN [facility];  Service: Pain Management;  Laterality: Left;    RADIOFREQUENCY ABLATION Left 3/9/2021    Procedure: RADIOFREQUENCY ABLATION C4,C5,C6,C7;  Surgeon: Alex Callahan MD;  Location: [facility]IN [facility];  Service: Pain Management;  Laterality: Left;  1 of 2    RADIOFREQUENCY ABLATION Right 3/26/2021    Procedure: RADIOFREQUENCY ABLATION C4,C6,C6,C7;  Surgeon: Alex Callahan MD;  Location: [facility];  Service: Pain Management;  Laterality: Right;  2 of 2    RADIOFREQUENCY ABLATION Right 4/23/2021    Procedure: RADIOFREQUENCY ABLATION L3,4,5;  Surgeon: Alex Callahan MD;  Location: [facility];  Service: Pain Management;  Laterality: Right;  1 of 2    RADIOFREQUENCY ABLATION Left 5/7/2021    Procedure: RADIOFREQUENCY ABLATION L3,4,5;  Surgeon: Alex Callahan MD;  Location: [facility];  Service: Pain Management;  Laterality: Left;  2 of 2    RADIOFREQUENCY ABLATION Left 10/1/2021    Procedure: RADIOFREQUENCY ABLATION LEFT, C4,5,6,7 1 of 2 CONSENT NEEDED;  Surgeon: Alex Callahan MD;  Location: [facility];  Service: Pain Management;  Laterality: Left;    RADIOFREQUENCY ABLATION Right 10/15/2021    Procedure: RADIOFREQUENCY ABLATION  RIGHT C4,5,6,7 2 of 2 CONSENT NEEDED;  Surgeon: Alex Callahan MD;  Location: [facility];  Service: Pain Management;  Laterality: Right;    RADIOFREQUENCY ABLATION Left 2/25/2022    Procedure: RADIOFREQUENCY ABLATION LEFT L3,4,5 1 of 2, needs consent;  Surgeon: Alex Callahan MD;  Location: [facility];  Service: Pain Management;  Laterality: Left;    RADIOFREQUENCY ABLATION Right 3/11/2022    Procedure: RADIOFREQUENCY ABLATION RIGHT L3,4,5 2 of 2, needs consent;  Surgeon: Alex Callahan MD;  Location: [facility];  Service: Pain Management;  Laterality: Right;    RADIOFREQUENCY ABLATION Left 12/15/2022    Procedure: RADIOFREQUENCY ABLATION LEFT C4, C5, C6, C7  ONE OF TWO NEEDS CONSENT;  Surgeon:  Alex Callahan MD;  Location: [facility];  Service: Pain Management;  Laterality: Left;    RADIOFREQUENCY ABLATION Right 12/29/2022    Procedure: RADIOFREQUENCY ABLATION RIGHT C4, C5, C6, C7  TWO OF TWO NEEDS CONSENT;  Surgeon: Alex Callahan MD;  Location: [facility];  Service: Pain Management;  Laterality: Right;    RADIOFREQUENCY ABLATION Left 2/24/2023    Procedure: RADIOFREQUENCY ABLATION LEFT L3,L4,L5;  Surgeon: Alex Callahan MD;  Location: [facility];  Service: Pain Management;  Laterality: Left;    RADIOFREQUENCY ABLATION Right 3/10/2023    Procedure: RADIOFREQUENCY ABLATION RIGHT L3,L4,L5;  Surgeon: Alex Callahan MD;  Location: [facility];  Service: Pain Management;  Laterality: Right;    RADIOFREQUENCY ABLATION Left 9/15/2023    Procedure: RADIOFREQUENCY ABLATION, LEFT C4-C5-C6-C7 MEDIAL BRANCH ONE OF TWO  SOONER DATE;  Surgeon: Alex Callahan MD;  Location: [facility];  Service: Pain Management;  Laterality: Left;    RETINAL DETACHMENT SURGERY      ROTATOR CUFF REPAIR Right     SPINE SURGERY      cerival fusion     TRANSFORAMINAL EPIDURAL INJECTION OF STEROID Right 4/14/2022    Procedure: INJECTION, STEROID, EPIDURAL, TRANSFORAMINAL APPROACH RIGHT L4/5 & L5/S1 Needs Consent;  Surgeon: Alex Callahan MD;  Location: [facility];  Service: Pain Management;  Laterality: Right;    TRIGGER POINT INJECTION Left 6/13/2019    Procedure: INJECTION, TRIGGER POINT;  Surgeon: Kasandra Montoya MD;  Location: [facility];  Service: Pain Management;  Laterality: Left;     Review of patient's allergies indicates:   Allergen Reactions    Pcn [penicillins] Hives and Rash    Lipitor [atorvastatin] Other (See Comments)     Muscle cramps, weakness      Current Facility-Administered Medications   Medication    0.9%  NaCl infusion       PMHx, PSHx, Allergies, Medications reviewed in epic    ROS negative except pain complaints in HPI    OBJECTIVE:    There were no vitals taken for this  visit.    PHYSICAL EXAMINATION:    GENERAL: Well appearing, in no acute distress, alert and oriented x3.  PSYCH:  Mood and affect appropriate.  SKIN: Skin color, texture, turgor normal, no rashes or lesions which will impact the procedure.  CV: RRR with palpation of the radial artery.  PULM: No evidence of respiratory difficulty, symmetric chest rise. Clear to auscultation.  NEURO: Cranial nerves grossly intact.    Plan:    Proceed with procedure as planned Procedure(s) (LRB):  RADIOFREQUENCY ABLATION,  RIGHT C4-C5-C6-C7 MEDIAL BRANCH TWO OF TWO SOONER DATE (Right)    Da Miner MD  09/29/2023

## 2023-09-29 NOTE — DISCHARGE INSTRUCTIONS

## 2023-09-29 NOTE — DISCHARGE SUMMARY
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      albuterol (PROVENTIL/VENTOLIN HFA) 90 mcg/actuation inhaler       aspirin (ECOTRIN) 81 MG EC tablet Take 81 mg by mouth once daily.      cetirizine (ZYRTEC) 10 MG tablet Take 10 mg by mouth nightly.  Refills: 5      FOLIC ACID/MULTIVIT-MIN/LUTEIN (CENTRUM SILVER ORAL) Take by mouth once daily.      gabapentin (NEURONTIN) 300 MG capsule Take 2 capsules (600 mg total) by mouth 2 (two) times daily.  Qty: 120 capsule, Refills: 5    Associated Diagnoses: Chronic pain disorder; Lumbar spondylosis; DDD (degenerative disc disease), lumbar; S/P cervical spinal fusion; Cervical radiculopathy; Myofascial pain      HYDROcodone-acetaminophen (NORCO) 7.5-325 mg per tablet Take 1 tablet by mouth every 12 (twelve) hours as needed for Pain.  Qty: 60 tablet, Refills: 0    Comments: Patient with chronic intractable pain.  Medically necessary for > 7 days  Associated Diagnoses: Chronic pain disorder; Lumbar spondylosis; DDD (degenerative disc disease), lumbar; Facet arthritis of lumbar region; Myalgia; Spondylosis of cervical region without myelopathy or radiculopathy; S/P cervical spinal fusion      levothyroxine (SYNTHROID) 50 MCG tablet Take 50 mcg by mouth once daily.      meclizine (ANTIVERT) 25 mg tablet Take 25 mg by mouth once daily.       meloxicam (MOBIC) 15 MG tablet Take  15 mg by mouth once daily.      metFORMIN (GLUCOPHAGE) 500 MG tablet Take 1 tablet by mouth 2 (two) times a day.      methocarbamoL (ROBAXIN) 500 MG Tab Take 1 tablet (500 mg total) by mouth 2 (two) times daily.  Qty: 60 tablet, Refills: 4    Associated Diagnoses: Myofascial pain      omeprazole (PRILOSEC) 20 MG capsule Take 1 capsule (20 mg total) by mouth once daily.  Qty: 90 capsule, Refills: 3    Associated Diagnoses: Gastroesophageal reflux disease, esophagitis presence not specified      rosuvastatin (CRESTOR) 20 MG tablet Take 20 mg by mouth once daily.      valsartan (DIOVAN) 80 MG tablet Take 40 mg by mouth once daily.   Refills: 1                 Discharge Diagnosis: Spondylosis of cervical region without myelopathy or radiculopathy [M47.812]  Condition on Discharge: Stable with no complications to procedure   Diet on Discharge: Same as before.  Activity: as per instruction sheet.  Discharge to: Home with a responsible adult.  Follow up: 2 weeks

## 2023-10-11 ENCOUNTER — OFFICE VISIT (OUTPATIENT)
Dept: PAIN MEDICINE | Facility: CLINIC | Age: 58
End: 2023-10-11
Payer: MEDICARE

## 2023-10-11 VITALS
OXYGEN SATURATION: 100 % | SYSTOLIC BLOOD PRESSURE: 109 MMHG | RESPIRATION RATE: 18 BRPM | TEMPERATURE: 97 F | BODY MASS INDEX: 31.7 KG/M2 | DIASTOLIC BLOOD PRESSURE: 74 MMHG | WEIGHT: 240.31 LBS | HEART RATE: 71 BPM

## 2023-10-11 DIAGNOSIS — Z98.1 S/P CERVICAL SPINAL FUSION: ICD-10-CM

## 2023-10-11 DIAGNOSIS — M51.36 DDD (DEGENERATIVE DISC DISEASE), LUMBAR: ICD-10-CM

## 2023-10-11 DIAGNOSIS — M47.812 SPONDYLOSIS OF CERVICAL REGION WITHOUT MYELOPATHY OR RADICULOPATHY: ICD-10-CM

## 2023-10-11 DIAGNOSIS — G89.4 CHRONIC PAIN SYNDROME: Primary | ICD-10-CM

## 2023-10-11 DIAGNOSIS — M53.3 SACROILIAC JOINT DYSFUNCTION: ICD-10-CM

## 2023-10-11 DIAGNOSIS — M50.30 DDD (DEGENERATIVE DISC DISEASE), CERVICAL: ICD-10-CM

## 2023-10-11 DIAGNOSIS — M47.816 LUMBAR SPONDYLOSIS: ICD-10-CM

## 2023-10-11 PROCEDURE — 99999 PR PBB SHADOW E&M-EST. PATIENT-LVL IV: CPT | Mod: PBBFAC,,, | Performed by: NURSE PRACTITIONER

## 2023-10-11 PROCEDURE — 1159F MED LIST DOCD IN RCRD: CPT | Mod: CPTII,S$GLB,, | Performed by: NURSE PRACTITIONER

## 2023-10-11 PROCEDURE — 4010F PR ACE/ARB THEARPY RXD/TAKEN: ICD-10-PCS | Mod: CPTII,S$GLB,, | Performed by: NURSE PRACTITIONER

## 2023-10-11 PROCEDURE — 4010F ACE/ARB THERAPY RXD/TAKEN: CPT | Mod: CPTII,S$GLB,, | Performed by: NURSE PRACTITIONER

## 2023-10-11 PROCEDURE — 3078F PR MOST RECENT DIASTOLIC BLOOD PRESSURE < 80 MM HG: ICD-10-PCS | Mod: CPTII,S$GLB,, | Performed by: NURSE PRACTITIONER

## 2023-10-11 PROCEDURE — 99999 PR PBB SHADOW E&M-EST. PATIENT-LVL IV: ICD-10-PCS | Mod: PBBFAC,,, | Performed by: NURSE PRACTITIONER

## 2023-10-11 PROCEDURE — 1160F PR REVIEW ALL MEDS BY PRESCRIBER/CLIN PHARMACIST DOCUMENTED: ICD-10-PCS | Mod: CPTII,S$GLB,, | Performed by: NURSE PRACTITIONER

## 2023-10-11 PROCEDURE — 1159F PR MEDICATION LIST DOCUMENTED IN MEDICAL RECORD: ICD-10-PCS | Mod: CPTII,S$GLB,, | Performed by: NURSE PRACTITIONER

## 2023-10-11 PROCEDURE — 3074F SYST BP LT 130 MM HG: CPT | Mod: CPTII,S$GLB,, | Performed by: NURSE PRACTITIONER

## 2023-10-11 PROCEDURE — 3074F PR MOST RECENT SYSTOLIC BLOOD PRESSURE < 130 MM HG: ICD-10-PCS | Mod: CPTII,S$GLB,, | Performed by: NURSE PRACTITIONER

## 2023-10-11 PROCEDURE — 3008F BODY MASS INDEX DOCD: CPT | Mod: CPTII,S$GLB,, | Performed by: NURSE PRACTITIONER

## 2023-10-11 PROCEDURE — 99213 PR OFFICE/OUTPT VISIT, EST, LEVL III, 20-29 MIN: ICD-10-PCS | Mod: S$GLB,,, | Performed by: NURSE PRACTITIONER

## 2023-10-11 PROCEDURE — 3008F PR BODY MASS INDEX (BMI) DOCUMENTED: ICD-10-PCS | Mod: CPTII,S$GLB,, | Performed by: NURSE PRACTITIONER

## 2023-10-11 PROCEDURE — 3078F DIAST BP <80 MM HG: CPT | Mod: CPTII,S$GLB,, | Performed by: NURSE PRACTITIONER

## 2023-10-11 PROCEDURE — 99213 OFFICE O/P EST LOW 20 MIN: CPT | Mod: S$GLB,,, | Performed by: NURSE PRACTITIONER

## 2023-10-11 PROCEDURE — 1160F RVW MEDS BY RX/DR IN RCRD: CPT | Mod: CPTII,S$GLB,, | Performed by: NURSE PRACTITIONER

## 2023-10-11 NOTE — PROGRESS NOTES
Chronic patient Established Note (Follow up visit)      Interval History 10/11/2023:  The patient returns to clinic today for follow up of neck and back pain. He is s/p left C4,5,6,7 RFA on 9/15/2023. He is s/p right C4,5,6,7 RFA on 9/29/2023. He reports 50% relief of his pain. He does report intermittent neck pain. He also reports left sided anterior neck and upper chest pain. This is tight in nature. He reports increased low back pain. He denies any radicular leg pain. This pain is worse with prolonged activity. He does have intermittent left abdominal pain. He feels a knot in the area. This pain is relieved with sitting or laying down. He did have a recent fall in his garden. He continues to take Gabapentin and Robaxin. He also takes Norco as needed for severe pain. He takes this sparingly without adverse effects. He denies any other health changes. His pain today is 4/10.    Interval History 8/11/2023:  The patient returns to clinic today for follow up of back pain. He is s/p bilateral SI joint injections on 7/27/2023. He reports 50-60% relief of his buttock pain. He reports worsened neck pain over the last month. He does report some pain into the left shoulder. He denies any radicular arm pain. His pain is worse with turning his head to the side. He does endorse stiffness. He continues to report intermittent left abdominal pain. This pain is worse with prolonged activity. He also notes 2 lumps in the area, especially at the end of the day. He is taking Gabapentin and Robaxin. He also takes Norco as needed for severe pain sparingly. He denies any other health changes. His pain today is 6/10.    Interval History 7/5/2023:  The patient returns to clinic today for follow up of back pain. He reports increased low back and buttock pain. He denies any radicular leg pain. His pain is worse with prolonged sitting, moving from sitting to standing, and prolonged activity. He continues to report intermittent left abdominal  pain. He also reports increased neck pain. He is taking Gabapentin and Robaxin. He also takes Norco as needed for severe pain. He denies any adverse effects. His pain today is 6/10.    Interval History 3/21/2023:  The patient returns to clinic today for follow up of back pain. He is s/p left L3,4,5 RFA on 2/24/2023 and right L3,4,5 RFA on 3/10/2023. He is unsure of relief at this time. He reports increased left sided low back pain over the last two weeks. 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 He did feel a pop. He thought he pulled a muscle, but pain has been worsening. He reports left sided low back pain that radiates into the left hip and lower abdomen. He also reports intermittent radiating pain into the posterior left leg. He describes this pain as numb and tingling in nature. His pain is worse with activity. He does endorse muscle spasms. He is taking Gabapentin and Robaxin. He has had to take more Norco recently due to increased pain. He denies any weakness. He denies any other health changes. His pain today is 8/10.    Interval History 1/31/2023:  The patient returns to clinic today for follow up of neck and back pain. He is s/p left C4,5,6,7 on 12/15/2022 and right C4,5,6,7 RFA on 12/29/2022. He reports 50% relief of his neck pain. He reports intermittent neck pain, left side greater than right. He denies any radicular arm pain. He does report pain into the shoulder blades. He reports increased low back pain. He denies any radicular leg pain. His pain is worse with standing, walking, and activity. He does endorse stiffness. He is taking Gabapentin and Robaxin. He also takes Norco sparingly with benefit. He denies any other health changes. His pain today is 6/10.    Interval History 11/18/2022:  The patient returns to clinic today for follow up of neck and back pain. He is s/p bilateral SI joint injections on 11/4/2022. He reports 60% relief of his low back and buttock pain. He  continues to report low back pain. He denies any radicular leg pain. He reports increased neck pain, left side greater than right. He denies any radicular arm. He does report some pain into his shoulder blades. His pain is worse with activity. He continues to take Gabapentin and Robaxin. He continues to take Norco as needed sparingly with benefit. He denies any other health changes. His pain today is 7/10.    Interval History 10/27/2022:  The patient returns to clinic today for follow up of neck and back pain. He reports increased low back and bilateral buttock pain. He denies any radicular leg pain. His pain is worse with prolonged sitting and moving from sitting to standing. He does endorse a fall recently while getting out of the vehicle onto his side. He continues to report neck pain. He continues to take Gabapentin and Robaxin. He continues to take Norco sparingly with benefit and without adverse effects. He denies any other health changes. His pain today is 6/10.    Interval History 8/2/2022:  The aptient returns to clinic today for follow up of neck and back pain. He is s/p bilateral SI joint injection on 7/14/2022. He reports 50-60% relief of his low back and buttock pain. He reports intermittent low back and buttock pain. He reports increased neck pain, left side greater than right. He denies any radicular arm pain. He has good days and bad days. He continues to take Gabapentin. He continues to take Norco sparingly with benefit and without adverse effects. He denies any other health changes. His pain today is 5/10.    Interval History 7/1/2022:  The patient returns to clinic today for follow up of neck and back pain. He reports increased low back and buttock pain.His pain is worse with prolonged sitting and moving from sitting to standing. He also reports increased pain with bending over. He does report intermittent radiating pain into his right posterior thigh stopping at mid thigh. He does report a fall,  "which was sitting hard on the ground about a week ago. He denies any acute injury. He continues to take Gabapentin and Norco. He denies any other health changes. His pain today is 6/10.     Interval History 5/9/2022:  The patient returns to clinic today for follow up of back pain. He is s/p right L4/5 and L5/S1 TF NGUYEN on 4/14/2022. He reports 50-60% relief of his low back and leg pain. He has been more active since the procedure. He continues to report low back and hip pain, worse with bending and activity. He denies any radicular leg pain. He continues to repot intermittent neck pain. He continues to take Gabapentin with benefit. He continues to take Norco sparingly as needed. He denies any other health changes. His pain today is 5/10.    Interval History 4/1/2022:  The patient returns to clinic today for follow up of back pain. He is s/p left L3,4,5 RFA on 2/25/2022 and right L3,4,5 RFA on 3/11/2022. He reports relief of his back pain. He reports increased back pain that radiates into the posterior aspect of his right leg to his knee. He denies any left leg pain. His pain is worse with prolonged walking. He reports that he sometimes drags his right leg. He denies any falls. He denies any bowel or bladder incontinence. He continues to take Gabapentin and Norco as needed with benefit. He denies any adverse effects. He denies any other health changes. His pain today is 7/10.    Interval History 2/15/2022:  The patient returns to clinic today for follow up of neck and back pain. He reports increased low back pain over the last two weeks. He describes this pain as sharp and aching in nature. He does report intermittent "catching" pain in his lower back, worse with getting out of bed. He denies any radicular leg pain. He continues to report benefit from previous cervical procedures. He reports intermittent neck pain. He continues to take Gabapentin with benefit. He continues to take Norco as needed with benefit and " without adverse effects. He denies any other health changes. His pain today is 6/10.    Interval History 11/5/2021:  The patient returns to clinic today for follow up of neck and back pain. He is s/p left C4,5,6,7 RFA on 10/1/2021 and right C4,5,6,7 RFA on 10/15/2021. He reports 50% relief of his neck pain. He reports increased burning and itching pain to his skin near injection sites. He denies any radicular arm pain. He does report increased left sided muscle pain. He is concerned that one of the screws in his cervical hardware is moving. He continues to report benefit with previous lumbar procedures. He reports intermittent low back pain without radicular leg pain. He continues to take Gabapentin with benefit. He continues to take Norco as needed with benefit and without adverse effects. He denies any weakness. He denies any other health changes. His pain today is 5/10.    Interval History 9/17/2021:  The patient returns to clinic today for follow up of neck and back pain. He reports increased neck pain. He denies any radicular arm pain today. He does report intermittent radiating pain into his left shoulder blade and mid back. His pain is worse with activity. He reports that sometimes his pain takes his breath away. He continues to report low back pain, that is tolerable at this time. He continues to take Gabapentin and Norco with benefit and without adverse effects. He denies any other health changes. His pain today is 6/10.    Interval History 6/17/2021:  The patient returns to clinic today for follow up of neck and back pain. He is s/p right L3,4,5 RFA on 4/23/2021 and left L3,4,5 RFA on 5/7/2021. He reports 50% relief of his low back pain. He continues to report intermittent low back pain. He denies any radicular leg pain. He continues to report neck pain, especially in between the scapula. His pain is worse with prolonged standing, walking, and activity. He continues to take Gabapentin with benefit. He  continues to take Norco sparingly for severe pain with benefit and without adverse effects. He denies any other health changes. His pain today is 4/10.    Interval History 4/9/2021:  The patient returns to clinic today for follow up of neck and back pain. He is s/p left C4,5,6,7 RFA on 3/9/2021 and right C4,5,6,7 RFA on 3/26/2021. He reports 50% relief of his neck pain. He reports intermittent neck pain. He has good days and bad days. He continues to report low back pain that is constant, sharp, and aching. He reports intermittent radiating pain into the lateral aspect of his left leg to his knee. He continues to take Gabapentin with benefit. He continues to take Norco as needed sparingly for severe pain. He denies any adverse effects. He denies any other health changes. His pain today is 5/10.    Interval History 3/2/2021:  Sal Navarro presents to the clinic for a follow-up appointment for neck pain . Since the last visit, Sal Navarro states the pain has been worsening. Current pain intensity is 6/10.  Was seen by Angelique Barahona NP on 2/12/2021.     Interval History 2/12/2021:  The patient returns to clinic today for follow up of back pain. He has not been seen in over a year. He did not have imaging due to coronavirus outbreak. He would to reschedule this. He was also considering SCS trial prior to the pandemic. He continues to report low back pain that radiates into the lateral aspect of his left leg to his knee. He denies any radicular right leg pain. His pain is worse with bending and walking. He continues to take Gabapentin with benefit. He also takes Norco as needed for severe pain. He denies any adverse effects. He denies any other health changes. His pain today is 6/10.    HPI:  Sal Navarro is a 56 y.o. male who presents today s/p C4-7 ACDF with C5/6 PSIF in 2/2016 with residual chronic midline neck pain that radiates into his shoulder blades bilaterally, R>>L.  This is associated with  "bilateral hand numbness and tingling.  The hand symptoms did improve following the surgery.  The shooting pains in his arms resolved completely.   This pain is described in detail below.  His post-operative course was complicated by dysphagia that is being treated with speech therapy.  The numbness and the tingling were relieved by the surgery. Now experiences "deep pain along the spine"  Patient has not been seen for over a year, had to re-schedule his imaging studies due to pandemic(now completed). Prior to the pandemic, he was considering a SCS.   Only time he gets relief is laying in bed on a very thin pillow, but that doesn't last very long.   Pain aggravated by movement and even breathing/playing with his grandchild/holding the grandchild's hand. Heat and massage (has a vest) are helpful.   Was seen by Dr. Jan srivastava and received multiple EIS and RFAs.   Compliant with his medication.     Pain Disability Index Review:      8/11/2023     3:01 PM 3/21/2023     2:56 PM 1/31/2023    10:43 AM   Last 3 PDI Scores   Pain Disability Index (PDI) 18 40 22       Pain Medications:  Gabapentin  Norco sparingly  Robaxin    Opioid Contract: yes     report:  Reviewed and consistent with medication use as prescribed.    Pain Procedures:   7/27/2023- Bilateral SI joint injections  3/10/2023- Right L3,4,5 RFA  2/24/2023- Left L3,4,5 RFA  12/29/2022- Right C4,5,6,7 RFA  12/15/2022- Left C4,5,6,7 RFA  11/4/2022- Bilateral SI joint injections- 60% relief   4/14/2022- Right L4/5 and L5/S1 TF NGUYEN  3/11/2022- Right L3,4,5 RFA  2/25/2022- Left L3,4,5 RFA  10/15/2021- Right C4,5,6,7 RFA  10/1/2021- Left C4,5,6,7 RFA  5/7/2021- Left L3,4,5 RFA   4/23/2021- Right L3,4,5 RFA  3/26/2021- Right C4,5,6,7 RFA  3/9/2021- Left C4,5,6,7 RFA  10/11/19: L4/5 Interlaminar Epidural Steroid Injection  06/13/19- Left C4-7 RFA  05/03/19:Left L2-5 Lumbar Medial Branch Nerve Thermal Radiofrequency Ablation  12/21/18:Right L2-5 Lumbar Medial Branch " Nerve Thermal Radiofrequency Ablation  11/23/18:Cervical Thermal Radiofreqiency Ablation: Right C4-7  09/14/18:C7/P2Wgeyisps Steroid Injection  06/29/18:LL2/3 Interlaminar Epidural Steroid Injection   03/06/18:L2/3 Interlaminar Epidural Steroid Injection   09/26/17:Bilateral L2-5 Lumbar Medial Branch Nerve Coolief Thermal Radiofrequency Ablation  08/31/17:Bilateral L2-5 Lumbar medial branch blocks   03/28/17:Cervical Conventional Radiofreqiency Ablation: Left C4-7  03/14/17:Cervical Conventional Radiofreqiency Ablation: Right C4-7  10/25/16:Cervical Conventional Radiofreqiency Ablation: Left C4-7  10/11/16: Cervical Conventional Radiofreqiency Ablation: Right C4-7  10/04/16- Cervical Medial Branch Blocks, Bilateral C4-7.     08/31/16:C7/I6Zprtebsb Steroid Injection    Physical Therapy/Home Exercise: does home exercises now, but not in formal PT.    - 2019 was last time in PT     Imaging:   MRI Lumbar Spine 2/25/2021:  COMPARISON:  07/11/2017     FINDINGS:  The vertebral bodies maintain normal height, signal intensity and alignment.  There is redemonstration of few hemangiomas at multiple levels.  The intervertebral disc spaces are preserved although there is some disc desiccation at multiple levels.  The conus terminates at level L1.  Evaluation of the localizer images and structures surrounding the lumbar spine shows no abnormalities.     L1-L2: No significant disc or joint pathology.     L2-L3: Mild diffuse disc bulge with mild bilateral facet arthropathy and ligamentum flavum hypertrophy results in mild central canal stenosis.  Mild bilateral neural foraminal stenosis is also identified.     L3-L4: There is a diffuse disc bulge with no significant facet arthropathy or ligamentum flavum hypertrophy.  There is mild central canal stenosis and mild bilateral neural foraminal stenosis.     L4-L5: There is a diffuse disc bulge with ligamentum flavum hypertrophy.  No significant facet arthropathy.  There is mild central  canal stenosis with only minimal encroachment on the left neural foramen.  Mild right neural foraminal stenosis is present.     L5-S1: There is a diffuse disc bulge with mild to moderate right facet arthropathy.  No left facet arthropathy.  The ligamentum flavum is normal.  The central canal is patent and the neural foramina are largely patent.  Incidental note is made of an annulus fibrosus tear posteriorly measuring approximately 2 mm.     Impression:     Multilevel degenerative disc and joint disease which is mild and results in mild central canal and neural foraminal stenosis as outlined above.    Xray Cervical Spine 2/25/2021:  COMPARISON:  07/07/2017 .     FINDINGS:  The patient has undergone ACDF from C4 through C7. The instrumentation is intact without evidence of complication.  The vertebral bodies maintain normal height and alignment. The remaining intervertebral disc spaces are preserved.  No significant uncovertebral joint hypertrophy.  The prevertebral soft tissues, odontoid views and lung apices are normal. The neural foramen are patent     Impression:     Postsurgical changes to the thoracic spine without additional evidence for degenerative disc or joint disease.     Allergies:   Review of patient's allergies indicates:   Allergen Reactions    Pcn [penicillins] Hives and Rash    Lipitor [atorvastatin] Other (See Comments)     Muscle cramps, weakness       Current Medications:   Current Outpatient Medications   Medication Sig Dispense Refill    acetaminophen (TYLENOL) 500 MG tablet Take 500 mg by mouth every 6 (six) hours as needed for Pain.      albuterol (PROVENTIL/VENTOLIN HFA) 90 mcg/actuation inhaler       aspirin (ECOTRIN) 81 MG EC tablet Take 81 mg by mouth once daily.      cetirizine (ZYRTEC) 10 MG tablet Take 10 mg by mouth nightly.  5    FOLIC ACID/MULTIVIT-MIN/LUTEIN (CENTRUM SILVER ORAL) Take by mouth once daily.      HYDROcodone-acetaminophen (NORCO) 7.5-325 mg per tablet Take 1 tablet by  mouth every 12 (twelve) hours as needed for Pain. 60 tablet 0    levothyroxine (SYNTHROID) 50 MCG tablet Take 50 mcg by mouth once daily.      meclizine (ANTIVERT) 25 mg tablet Take 25 mg by mouth once daily.       meloxicam (MOBIC) 15 MG tablet Take 15 mg by mouth once daily.      metFORMIN (GLUCOPHAGE) 500 MG tablet Take 1 tablet by mouth 2 (two) times a day.      methocarbamoL (ROBAXIN) 500 MG Tab Take 1 tablet (500 mg total) by mouth 2 (two) times daily. 60 tablet 4    omeprazole (PRILOSEC) 20 MG capsule Take 1 capsule (20 mg total) by mouth once daily. 90 capsule 3    rosuvastatin (CRESTOR) 20 MG tablet Take 20 mg by mouth once daily.      valsartan (DIOVAN) 80 MG tablet Take 40 mg by mouth once daily.   1    gabapentin (NEURONTIN) 300 MG capsule Take 2 capsules (600 mg total) by mouth 2 (two) times daily. 120 capsule 5     No current facility-administered medications for this visit.       REVIEW OF SYSTEMS:    GENERAL:  No weight loss, malaise or fevers.  HEENT:  Negative for frequent or significant headaches.  NECK:  Negative for lumps, goiter  and significant neck swelling.   RESPIRATORY:  Negative for cough, wheezing or shortness of breath.  CARDIOVASCULAR:  Negative for chest pain, leg swelling or palpitations. HTN  GI:  Negative for abdominal discomfort, blood in stools or black stools or change in bowel habits.  MUSCULOSKELETAL:  See HPI.  SKIN:  Negative for lesions, rash, and itching.  PSYCH:  Negative for sleep disturbance, mood disorder and recent psychosocial stressors.  HEMATOLOGY/LYMPHOLOGY:  Negative for prolonged bleeding, bruising easily or swollen nodes.  NEURO:   No history of headaches, syncope, paralysis, seizures or tremors.   ENDO: Thyroid disorder.   All other reviewed and negative other than HPI.    Past Medical History:  Past Medical History:   Diagnosis Date    Arthritis     Cataract     Cervical back pain with evidence of disc disease     Hiatal hernia     Hypertension     Thyroid  disease        Past Surgical History:  Past Surgical History:   Procedure Laterality Date    ACROMIOCLAVICULAR JOINT CYST EXCISION Right     BACK SURGERY      cataracts Bilateral     CERVICAL FUSION      COLONOSCOPY N/A 4/27/2018    Procedure: COLONOSCOPY;  Surgeon: Giovani Medeiros MD;  [facility]  Service: Endoscopy;  Laterality: N/A;    EPIDURAL STEROID INJECTION N/A 10/11/2019    Procedure: INJECTION, STEROID, EPIDURAL;  Surgeon: Kasandra Montoya MD;  Location: [facility] PAIN MGT;  Service: Pain Management;  Laterality: N/A;  Lumbar NGUYEN L4/5    NGUYEN      EYE SURGERY      INJECTION OF JOINT Bilateral 7/14/2022    Procedure: INJECTION, JOINT, SI BILATERAL  needs consent;  Surgeon: Alex Callahan MD;  Location: [facility] PAIN MGT;  Service: Pain Management;  Laterality: Bilateral;    INJECTION, SACROILIAC JOINT Bilateral 11/4/2022    Procedure: INJECTION,SACROILIAC JOINT BILATERAL CONTRAST;  Surgeon: Alex Callahan MD;  Location: [facility] PAIN MGT;  Service: Pain Management;  Laterality: Bilateral;    INJECTION, SACROILIAC JOINT Bilateral 7/27/2023    Procedure: INJECTION,SACROILIAC JOINT, BILATERAL SOONER DATE;  Surgeon: Alex Callahan MD;  Location: [facility] PAIN MGT;  Service: Pain Management;  Laterality: Bilateral;    RADIOFREQUENCY ABLATION  10/2016    cervical spine/Montoya    RADIOFREQUENCY ABLATION Left 5/3/2019    Procedure: RADIOFREQUENCY ABLATION, L2-L5 MEDIALBRANCH;  Surgeon: Kasandra Montoya MD;  Location: [facility] PAIN MGT;  Service: Pain Management;  Laterality: Left;    RADIOFREQUENCY ABLATION Left 6/13/2019    Procedure: RADIOFREQUENCY ABLATION C4-7;  Surgeon: Kasandra Montoya MD;  Location: [facility] PAIN MGT;  Service: Pain Management;  Laterality: Left;    RADIOFREQUENCY ABLATION Left 3/9/2021    Procedure: RADIOFREQUENCY ABLATION C4,C5,C6,C7;  Surgeon: Alex Callahan MD;  Location: [facility] PAIN MGT;  Service: Pain Management;  Laterality: Left;  1 of 2    RADIOFREQUENCY ABLATION Right 3/26/2021     Procedure: RADIOFREQUENCY ABLATION C4,C6,C6,C7;  Surgeon: Alex Callahan MD;  Location: [facility] PAIN MGT;  Service: Pain Management;  Laterality: Right;  2 of 2    RADIOFREQUENCY ABLATION Right 4/23/2021    Procedure: RADIOFREQUENCY ABLATION L3,4,5;  Surgeon: Alex Callahan MD;  Location: [facility] PAIN MGT;  Service: Pain Management;  Laterality: Right;  1 of 2    RADIOFREQUENCY ABLATION Left 5/7/2021    Procedure: RADIOFREQUENCY ABLATION L3,4,5;  Surgeon: Alex Callahan MD;  Location: [facility] PAIN MGT;  Service: Pain Management;  Laterality: Left;  2 of 2    RADIOFREQUENCY ABLATION Left 10/1/2021    Procedure: RADIOFREQUENCY ABLATION LEFT, C4,5,6,7 1 of 2 CONSENT NEEDED;  Surgeon: Alex Callahan MD;  Location: [facility] PAIN MGT;  Service: Pain Management;  Laterality: Left;    RADIOFREQUENCY ABLATION Right 10/15/2021    Procedure: RADIOFREQUENCY ABLATION  RIGHT C4,5,6,7 2 of 2 CONSENT NEEDED;  Surgeon: Alex Callahan MD;  Location: [facility] PAIN MGT;  Service: Pain Management;  Laterality: Right;    RADIOFREQUENCY ABLATION Left 2/25/2022    Procedure: RADIOFREQUENCY ABLATION LEFT L3,4,5 1 of 2, needs consent;  Surgeon: Alex Callahan MD;  Location: [facility] PAIN MGT;  Service: Pain Management;  Laterality: Left;    RADIOFREQUENCY ABLATION Right 3/11/2022    Procedure: RADIOFREQUENCY ABLATION RIGHT L3,4,5 2 of 2, needs consent;  Surgeon: Alex Callahan MD;  Location: [facility] PAIN MGT;  Service: Pain Management;  Laterality: Right;    RADIOFREQUENCY ABLATION Left 12/15/2022    Procedure: RADIOFREQUENCY ABLATION LEFT C4, C5, C6, C7  ONE OF TWO NEEDS CONSENT;  Surgeon: Alex Callahan MD;  Location: [facility] PAIN MGT;  Service: Pain Management;  Laterality: Left;    RADIOFREQUENCY ABLATION Right 12/29/2022    Procedure: RADIOFREQUENCY ABLATION RIGHT C4, C5, C6, C7  TWO OF TWO NEEDS CONSENT;  Surgeon: Alex Callahan MD;  Location: [facility] PAIN MGT;  Service: Pain Management;  Laterality: Right;    RADIOFREQUENCY ABLATION Left  2/24/2023    Procedure: RADIOFREQUENCY ABLATION LEFT L3,L4,L5;  Surgeon: Alex Callahan MD;  Location: [facility];  Service: Pain Management;  Laterality: Left;    RADIOFREQUENCY ABLATION Right 3/10/2023    Procedure: RADIOFREQUENCY ABLATION RIGHT L3,L4,L5;  Surgeon: Alex Callahan MD;  Location: [facility];  Service: Pain Management;  Laterality: Right;    RADIOFREQUENCY ABLATION Left 9/15/2023    Procedure: RADIOFREQUENCY ABLATION, LEFT C4-C5-C6-C7 MEDIAL BRANCH ONE OF TWO  SOONER DATE;  Surgeon: Alex Callahan MD;  Location: [facility];  Service: Pain Management;  Laterality: Left;    RADIOFREQUENCY ABLATION Right 9/29/2023    Procedure: RADIOFREQUENCY ABLATION,  RIGHT C4-C5-C6-C7 MEDIAL BRANCH TWO OF TWO SOONER DATE;  Surgeon: Alex Callahan MD;  Location: [facility];  Service: Pain Management;  Laterality: Right;    RETINAL DETACHMENT SURGERY      ROTATOR CUFF REPAIR Right     SPINE SURGERY      cerival fusion     TRANSFORAMINAL EPIDURAL INJECTION OF STEROID Right 4/14/2022    Procedure: INJECTION, STEROID, EPIDURAL, TRANSFORAMINAL APPROACH RIGHT L4/5 & L5/S1 Needs Consent;  Surgeon: Alex Callahan MD;  Location: [facility];  Service: Pain Management;  Laterality: Right;    TRIGGER POINT INJECTION Left 6/13/2019    Procedure: INJECTION, TRIGGER POINT;  Surgeon: Kasandra Montoya MD;  Location: [facility];  Service: Pain Management;  Laterality: Left;       Family History:  Family History   Problem Relation Age of Onset    Heart disease Mother     Cancer Father     Leukemia Brother     Colon cancer Neg Hx     Celiac disease Neg Hx     Crohn's disease Neg Hx     Esophageal cancer Neg Hx     Inflammatory bowel disease Neg Hx     Irritable bowel syndrome Neg Hx     Liver cancer Neg Hx     Rectal cancer Neg Hx     Stomach cancer Neg Hx     Ulcerative colitis Neg Hx        Social History:  Social History     Socioeconomic History    Marital status:    Tobacco Use    Smoking status:  Never    Smokeless tobacco: Never   Substance and Sexual Activity    Alcohol use: No    Drug use: No       OBJECTIVE:    /74   Pulse 71   Temp 97.3 °F (36.3 °C)   Resp 18   Wt 109 kg (240 lb 4.8 oz)   SpO2 100%   BMI 31.70 kg/m²     PHYSICAL EXAMINATION:    General appearance: Well appearing, in no acute distress, alert and oriented x3.  Psych:  Mood and affect appropriate.  Skin: Skin color, texture, turgor normal, no rashes or lesions, in both upper and lower body.  Head/face:  Atraumatic, normocephalic. No palpable lymph nodes   Neck: There is mild pain with palpation over cervical paraspinals and facet joints bilaterally. Limited ROM with pain on flexion, extension, and lateral rotation.   Cor: RRR  Pulm: Symmetric chest rise, no respiratory distress noted.   Abdomen: Soft, pain with palpation over LUQ.  Back: Straight leg raising in the sitting position is negative for radicular leg pain bilaterally. There is pain with palpation over lumbar paraspinals and facet joints bilaterally. Limited ROM with pain on extension.   Extremities: No deformities, edema, or skin discoloration. Good capillary refill.  Musculoskeletal: Bilateral upper and lower extremity strength is normal and symmetric. No atrophy or tone abnormalities are noted.  Neuro:  No loss of sensation is noted.  Gait: Antalgic, ambulates with cane for assistance     ASSESSMENT: 58 y.o. year old male with neck and lumbar pain, consistent with      1. Chronic pain syndrome        2. Lumbar spondylosis        3. DDD (degenerative disc disease), lumbar        4. Sacroiliac joint dysfunction        5. S/P cervical spinal fusion        6. Spondylosis of cervical region without myelopathy or radiculopathy        7. DDD (degenerative disc disease), cervical                  PLAN:     - Previous imaging reviewed today.    - He is s/p cervical RFA with benefit.     - Schedule for left then right L3,4,5 RFA, one side at a time, two weeks apart.     - We  can repeat SI joint injections as needed.    - Consider TAP block in the future for abdominal pain.     - I have stressed the importance of physical activity and a home exercise plan to help with pain and improve health.    - Continue Gabapentin.     - Continue Robaxin 500 mg PRN muscle pain.     - Pain contract signed 2/12/2021.     - Continue Norco 7.5/325 mg BID PRN. He does not need a refill.     - The patient is here today for a refill of current pain medications and they believe these provide effective pain control and improvements in quality of life.  The patient notes no serious side effects, and feels the benefits outweigh the risks.  The patient was reminded of the pain contract that they signed previously as well as the risks and benefits of the medication including possible death.  The updated [place] [employer] prescription monitoring program was reviewed, and the patient has been found to be compliant with current treatment plan. Medication management provided by Dr. Callahan.     - UDS from 1/31/2023 reviewed, negative for medications, he does take sparingly. Repeat next visit.     - Continue home exercise routine.     - RTC 3 weeks after above procedure.     The above plan and management options were discussed at length with patient. Patient is in agreement with the above and verbalized understanding.    Angelique Barahona NP  10/11/2023

## 2024-04-22 ENCOUNTER — PATIENT MESSAGE (OUTPATIENT)
Dept: PAIN MEDICINE | Facility: OTHER | Age: 59
End: 2024-04-22
Payer: MEDICARE

## 2024-04-22 ENCOUNTER — OFFICE VISIT (OUTPATIENT)
Dept: PAIN MEDICINE | Facility: CLINIC | Age: 59
End: 2024-04-22
Payer: MEDICARE

## 2024-04-22 VITALS
OXYGEN SATURATION: 100 % | TEMPERATURE: 98 F | HEIGHT: 73 IN | SYSTOLIC BLOOD PRESSURE: 121 MMHG | WEIGHT: 240.31 LBS | DIASTOLIC BLOOD PRESSURE: 81 MMHG | BODY MASS INDEX: 31.85 KG/M2 | HEART RATE: 68 BPM | RESPIRATION RATE: 18 BRPM

## 2024-04-22 DIAGNOSIS — M47.816 FACET ARTHRITIS OF LUMBAR REGION: ICD-10-CM

## 2024-04-22 DIAGNOSIS — M47.816 LUMBAR SPONDYLOSIS: ICD-10-CM

## 2024-04-22 DIAGNOSIS — M53.3 SACROILIAC JOINT DYSFUNCTION: ICD-10-CM

## 2024-04-22 DIAGNOSIS — M51.36 DDD (DEGENERATIVE DISC DISEASE), LUMBAR: ICD-10-CM

## 2024-04-22 DIAGNOSIS — M53.3 SACROILIAC JOINT DYSFUNCTION: Primary | ICD-10-CM

## 2024-04-22 DIAGNOSIS — M47.812 SPONDYLOSIS OF CERVICAL REGION WITHOUT MYELOPATHY OR RADICULOPATHY: ICD-10-CM

## 2024-04-22 DIAGNOSIS — G89.4 CHRONIC PAIN SYNDROME: Primary | ICD-10-CM

## 2024-04-22 DIAGNOSIS — G89.4 CHRONIC PAIN DISORDER: ICD-10-CM

## 2024-04-22 DIAGNOSIS — Z98.1 S/P CERVICAL SPINAL FUSION: ICD-10-CM

## 2024-04-22 DIAGNOSIS — M79.10 MYALGIA: ICD-10-CM

## 2024-04-22 PROCEDURE — 99214 OFFICE O/P EST MOD 30 MIN: CPT | Mod: PBBFAC | Performed by: NURSE PRACTITIONER

## 2024-04-22 PROCEDURE — 99999 PR PBB SHADOW E&M-EST. PATIENT-LVL IV: CPT | Mod: PBBFAC,,, | Performed by: NURSE PRACTITIONER

## 2024-04-22 PROCEDURE — 99213 OFFICE O/P EST LOW 20 MIN: CPT | Mod: S$GLB,,, | Performed by: NURSE PRACTITIONER

## 2024-04-22 RX ORDER — HYDROCODONE BITARTRATE AND ACETAMINOPHEN 7.5; 325 MG/1; MG/1
1 TABLET ORAL EVERY 12 HOURS PRN
Qty: 60 TABLET | Refills: 0 | Status: SHIPPED | OUTPATIENT
Start: 2024-04-22

## 2024-04-22 NOTE — PROGRESS NOTES
Chronic patient Established Note (Follow up visit)      Interval History 4/22/2024:  The patient returns to clinic today for follow up of neck and back pain. He reports increased low back pain. He reports low back and buttock pain. He denies any radicular leg pain. His pain is worse with prolonged sitting. He also reports increased pain with moving from sitting to standing. He reports intermittent neck pain. He is having increased left elbow pain. His wife notes that he does drop objects from the left hand. He is taking Gabapentin and Robaxin. He is taking Norco as needed for severe pain. He denies any adverse effects. He denies any other health changes. His pain today is 7/10.    Interval History 10/11/2023:  The patient returns to clinic today for follow up of neck and back pain. He is s/p left C4,5,6,7 RFA on 9/15/2023. He is s/p right C4,5,6,7 RFA on 9/29/2023. He reports 50% relief of his pain. He does report intermittent neck pain. He also reports left sided anterior neck and upper chest pain. This is tight in nature. He reports increased low back pain. He denies any radicular leg pain. This pain is worse with prolonged activity. He does have intermittent left abdominal pain. He feels a knot in the area. This pain is relieved with sitting or laying down. He did have a recent fall in his garden. He continues to take Gabapentin and Robaxin. He also takes Norco as needed for severe pain. He takes this sparingly without adverse effects. He denies any other health changes. His pain today is 4/10.    Interval History 8/11/2023:  The patient returns to clinic today for follow up of back pain. He is s/p bilateral SI joint injections on 7/27/2023. He reports 50-60% relief of his buttock pain. He reports worsened neck pain over the last month. He does report some pain into the left shoulder. He denies any radicular arm pain. His pain is worse with turning his head to the side. He does endorse stiffness. He continues to  report intermittent left abdominal pain. This pain is worse with prolonged activity. He also notes 2 lumps in the area, especially at the end of the day. He is taking Gabapentin and Robaxin. He also takes Norco as needed for severe pain sparingly. He denies any other health changes. His pain today is 6/10.    Interval History 7/5/2023:  The patient returns to clinic today for follow up of back pain. He reports increased low back and buttock pain. He denies any radicular leg pain. His pain is worse with prolonged sitting, moving from sitting to standing, and prolonged activity. He continues to report intermittent left abdominal pain. He also reports increased neck pain. He is taking Gabapentin and Robaxin. He also takes Norco as needed for severe pain. He denies any adverse effects. His pain today is 6/10.    Interval History 3/21/2023:  The patient returns to clinic today for follow up of back pain. He is s/p left L3,4,5 RFA on 2/24/2023 and right L3,4,5 RFA on 3/10/2023. He is unsure of relief at this time. He reports increased left sided low back pain over the last two weeks. 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 He did feel a pop. He thought he pulled a muscle, but pain has been worsening. He reports left sided low back pain that radiates into the left hip and lower abdomen. He also reports intermittent radiating pain into the posterior left leg. He describes this pain as numb and tingling in nature. His pain is worse with activity. He does endorse muscle spasms. He is taking Gabapentin and Robaxin. He has had to take more Norco recently due to increased pain. He denies any weakness. He denies any other health changes. His pain today is 8/10.    Interval History 1/31/2023:  The patient returns to clinic today for follow up of neck and back pain. He is s/p left C4,5,6,7 on 12/15/2022 and right C4,5,6,7 RFA on 12/29/2022. He reports 50% relief of his neck pain. He reports intermittent  neck pain, left side greater than right. He denies any radicular arm pain. He does report pain into the shoulder blades. He reports increased low back pain. He denies any radicular leg pain. His pain is worse with standing, walking, and activity. He does endorse stiffness. He is taking Gabapentin and Robaxin. He also takes Norco sparingly with benefit. He denies any other health changes. His pain today is 6/10.    Interval History 11/18/2022:  The patient returns to clinic today for follow up of neck and back pain. He is s/p bilateral SI joint injections on 11/4/2022. He reports 60% relief of his low back and buttock pain. He continues to report low back pain. He denies any radicular leg pain. He reports increased neck pain, left side greater than right. He denies any radicular arm. He does report some pain into his shoulder blades. His pain is worse with activity. He continues to take Gabapentin and Robaxin. He continues to take Norco as needed sparingly with benefit. He denies any other health changes. His pain today is 7/10.    Interval History 10/27/2022:  The patient returns to clinic today for follow up of neck and back pain. He reports increased low back and bilateral buttock pain. He denies any radicular leg pain. His pain is worse with prolonged sitting and moving from sitting to standing. He does endorse a fall recently while getting out of the vehicle onto his side. He continues to report neck pain. He continues to take Gabapentin and Robaxin. He continues to take Norco sparingly with benefit and without adverse effects. He denies any other health changes. His pain today is 6/10.    Interval History 8/2/2022:  The aptient returns to clinic today for follow up of neck and back pain. He is s/p bilateral SI joint injection on 7/14/2022. He reports 50-60% relief of his low back and buttock pain. He reports intermittent low back and buttock pain. He reports increased neck pain, left side greater than right. He  denies any radicular arm pain. He has good days and bad days. He continues to take Gabapentin. He continues to take Norco sparingly with benefit and without adverse effects. He denies any other health changes. His pain today is 5/10.    Interval History 7/1/2022:  The patient returns to clinic today for follow up of neck and back pain. He reports increased low back and buttock pain.His pain is worse with prolonged sitting and moving from sitting to standing. He also reports increased pain with bending over. He does report intermittent radiating pain into his right posterior thigh stopping at mid thigh. He does report a fall, which was sitting hard on the ground about a week ago. He denies any acute injury. He continues to take Gabapentin and Norco. He denies any other health changes. His pain today is 6/10.     Interval History 5/9/2022:  The patient returns to clinic today for follow up of back pain. He is s/p right L4/5 and L5/S1 TF NGUYEN on 4/14/2022. He reports 50-60% relief of his low back and leg pain. He has been more active since the procedure. He continues to report low back and hip pain, worse with bending and activity. He denies any radicular leg pain. He continues to repot intermittent neck pain. He continues to take Gabapentin with benefit. He continues to take Norco sparingly as needed. He denies any other health changes. His pain today is 5/10.    Interval History 4/1/2022:  The patient returns to clinic today for follow up of back pain. He is s/p left L3,4,5 RFA on 2/25/2022 and right L3,4,5 RFA on 3/11/2022. He reports relief of his back pain. He reports increased back pain that radiates into the posterior aspect of his right leg to his knee. He denies any left leg pain. His pain is worse with prolonged walking. He reports that he sometimes drags his right leg. He denies any falls. He denies any bowel or bladder incontinence. He continues to take Gabapentin and Norco as needed with benefit. He denies  "any adverse effects. He denies any other health changes. His pain today is 7/10.    Interval History 2/15/2022:  The patient returns to clinic today for follow up of neck and back pain. He reports increased low back pain over the last two weeks. He describes this pain as sharp and aching in nature. He does report intermittent "catching" pain in his lower back, worse with getting out of bed. He denies any radicular leg pain. He continues to report benefit from previous cervical procedures. He reports intermittent neck pain. He continues to take Gabapentin with benefit. He continues to take Norco as needed with benefit and without adverse effects. He denies any other health changes. His pain today is 6/10.    Interval History 11/5/2021:  The patient returns to clinic today for follow up of neck and back pain. He is s/p left C4,5,6,7 RFA on 10/1/2021 and right C4,5,6,7 RFA on 10/15/2021. He reports 50% relief of his neck pain. He reports increased burning and itching pain to his skin near injection sites. He denies any radicular arm pain. He does report increased left sided muscle pain. He is concerned that one of the screws in his cervical hardware is moving. He continues to report benefit with previous lumbar procedures. He reports intermittent low back pain without radicular leg pain. He continues to take Gabapentin with benefit. He continues to take Norco as needed with benefit and without adverse effects. He denies any weakness. He denies any other health changes. His pain today is 5/10.    Interval History 9/17/2021:  The patient returns to clinic today for follow up of neck and back pain. He reports increased neck pain. He denies any radicular arm pain today. He does report intermittent radiating pain into his left shoulder blade and mid back. His pain is worse with activity. He reports that sometimes his pain takes his breath away. He continues to report low back pain, that is tolerable at this time. He " continues to take Gabapentin and Norco with benefit and without adverse effects. He denies any other health changes. His pain today is 6/10.    Interval History 6/17/2021:  The patient returns to clinic today for follow up of neck and back pain. He is s/p right L3,4,5 RFA on 4/23/2021 and left L3,4,5 RFA on 5/7/2021. He reports 50% relief of his low back pain. He continues to report intermittent low back pain. He denies any radicular leg pain. He continues to report neck pain, especially in between the scapula. His pain is worse with prolonged standing, walking, and activity. He continues to take Gabapentin with benefit. He continues to take Norco sparingly for severe pain with benefit and without adverse effects. He denies any other health changes. His pain today is 4/10.    Interval History 4/9/2021:  The patient returns to clinic today for follow up of neck and back pain. He is s/p left C4,5,6,7 RFA on 3/9/2021 and right C4,5,6,7 RFA on 3/26/2021. He reports 50% relief of his neck pain. He reports intermittent neck pain. He has good days and bad days. He continues to report low back pain that is constant, sharp, and aching. He reports intermittent radiating pain into the lateral aspect of his left leg to his knee. He continues to take Gabapentin with benefit. He continues to take Norco as needed sparingly for severe pain. He denies any adverse effects. He denies any other health changes. His pain today is 5/10.    Interval History 3/2/2021:  Sal Navarro presents to the clinic for a follow-up appointment for neck pain . Since the last visit, Sal Navarro states the pain has been worsening. Current pain intensity is 6/10.  Was seen by Angelique Barahona NP on 2/12/2021.     Interval History 2/12/2021:  The patient returns to clinic today for follow up of back pain. He has not been seen in over a year. He did not have imaging due to coronavirus outbreak. He would to reschedule this. He was also considering  "SCS trial prior to the pandemic. He continues to report low back pain that radiates into the lateral aspect of his left leg to his knee. He denies any radicular right leg pain. His pain is worse with bending and walking. He continues to take Gabapentin with benefit. He also takes Norco as needed for severe pain. He denies any adverse effects. He denies any other health changes. His pain today is 6/10.    HPI:  Sal Navarro is a 56 y.o. male who presents today s/p C4-7 ACDF with C5/6 PSIF in 2/2016 with residual chronic midline neck pain that radiates into his shoulder blades bilaterally, R>>L.  This is associated with bilateral hand numbness and tingling.  The hand symptoms did improve following the surgery.  The shooting pains in his arms resolved completely.   This pain is described in detail below.  His post-operative course was complicated by dysphagia that is being treated with speech therapy.  The numbness and the tingling were relieved by the surgery. Now experiences "deep pain along the spine"  Patient has not been seen for over a year, had to re-schedule his imaging studies due to pandemic(now completed). Prior to the pandemic, he was considering a SCS.   Only time he gets relief is laying in bed on a very thin pillow, but that doesn't last very long.   Pain aggravated by movement and even breathing/playing with his grandchild/holding the grandchild's hand. Heat and massage (has a vest) are helpful.   Was seen by Dr. Jan srivastava and received multiple EIS and RFAs.   Compliant with his medication.     Pain Disability Index Review:      4/22/2024    10:21 AM 8/11/2023     3:01 PM 3/21/2023     2:56 PM   Last 3 PDI Scores   Pain Disability Index (PDI) 21 18 40       Pain Medications:  Gabapentin  Norco sparingly  Robaxin    Opioid Contract: yes     report:  Reviewed and consistent with medication use as prescribed.    Pain Procedures:   7/27/2023- Bilateral SI joint injections  3/10/2023- Right " L3,4,5 RFA  2/24/2023- Left L3,4,5 RFA  12/29/2022- Right C4,5,6,7 RFA  12/15/2022- Left C4,5,6,7 RFA  11/4/2022- Bilateral SI joint injections- 60% relief   4/14/2022- Right L4/5 and L5/S1 TF NGUYEN  3/11/2022- Right L3,4,5 RFA  2/25/2022- Left L3,4,5 RFA  10/15/2021- Right C4,5,6,7 RFA  10/1/2021- Left C4,5,6,7 RFA  5/7/2021- Left L3,4,5 RFA   4/23/2021- Right L3,4,5 RFA  3/26/2021- Right C4,5,6,7 RFA  3/9/2021- Left C4,5,6,7 RFA  10/11/19: L4/5 Interlaminar Epidural Steroid Injection  06/13/19- Left C4-7 RFA  05/03/19:Left L2-5 Lumbar Medial Branch Nerve Thermal Radiofrequency Ablation  12/21/18:Right L2-5 Lumbar Medial Branch Nerve Thermal Radiofrequency Ablation  11/23/18:Cervical Thermal Radiofreqiency Ablation: Right C4-7  09/14/18:C7/P1Ijneqhmb Steroid Injection  06/29/18:LL2/3 Interlaminar Epidural Steroid Injection   03/06/18:L2/3 Interlaminar Epidural Steroid Injection   09/26/17:Bilateral L2-5 Lumbar Medial Branch Nerve Coolief Thermal Radiofrequency Ablation  08/31/17:Bilateral L2-5 Lumbar medial branch blocks   03/28/17:Cervical Conventional Radiofreqiency Ablation: Left C4-7  03/14/17:Cervical Conventional Radiofreqiency Ablation: Right C4-7  10/25/16:Cervical Conventional Radiofreqiency Ablation: Left C4-7  10/11/16: Cervical Conventional Radiofreqiency Ablation: Right C4-7  10/04/16- Cervical Medial Branch Blocks, Bilateral C4-7.     08/31/16:C7/H2Ugbammmi Steroid Injection    Physical Therapy/Home Exercise: does home exercises now, but not in formal PT.    - 2019 was last time in PT     Imaging:   MRI Lumbar Spine 2/25/2021:  COMPARISON:  07/11/2017     FINDINGS:  The vertebral bodies maintain normal height, signal intensity and alignment.  There is redemonstration of few hemangiomas at multiple levels.  The intervertebral disc spaces are preserved although there is some disc desiccation at multiple levels.  The conus terminates at level L1.  Evaluation of the localizer images and structures  surrounding the lumbar spine shows no abnormalities.     L1-L2: No significant disc or joint pathology.     L2-L3: Mild diffuse disc bulge with mild bilateral facet arthropathy and ligamentum flavum hypertrophy results in mild central canal stenosis.  Mild bilateral neural foraminal stenosis is also identified.     L3-L4: There is a diffuse disc bulge with no significant facet arthropathy or ligamentum flavum hypertrophy.  There is mild central canal stenosis and mild bilateral neural foraminal stenosis.     L4-L5: There is a diffuse disc bulge with ligamentum flavum hypertrophy.  No significant facet arthropathy.  There is mild central canal stenosis with only minimal encroachment on the left neural foramen.  Mild right neural foraminal stenosis is present.     L5-S1: There is a diffuse disc bulge with mild to moderate right facet arthropathy.  No left facet arthropathy.  The ligamentum flavum is normal.  The central canal is patent and the neural foramina are largely patent.  Incidental note is made of an annulus fibrosus tear posteriorly measuring approximately 2 mm.     Impression:     Multilevel degenerative disc and joint disease which is mild and results in mild central canal and neural foraminal stenosis as outlined above.    Xray Cervical Spine 2/25/2021:  COMPARISON:  07/07/2017 .     FINDINGS:  The patient has undergone ACDF from C4 through C7. The instrumentation is intact without evidence of complication.  The vertebral bodies maintain normal height and alignment. The remaining intervertebral disc spaces are preserved.  No significant uncovertebral joint hypertrophy.  The prevertebral soft tissues, odontoid views and lung apices are normal. The neural foramen are patent     Impression:     Postsurgical changes to the thoracic spine without additional evidence for degenerative disc or joint disease.     Allergies:   Review of patient's allergies indicates:   Allergen Reactions    Pcn [penicillins] Hives  and Rash    Lipitor [atorvastatin] Other (See Comments)     Muscle cramps, weakness       Current Medications:   Current Outpatient Medications   Medication Sig Dispense Refill    acetaminophen (TYLENOL) 500 MG tablet Take 500 mg by mouth every 6 (six) hours as needed for Pain.      albuterol (PROVENTIL/VENTOLIN HFA) 90 mcg/actuation inhaler       aspirin (ECOTRIN) 81 MG EC tablet Take 81 mg by mouth once daily.      cetirizine (ZYRTEC) 10 MG tablet Take 10 mg by mouth nightly.  5    FOLIC ACID/MULTIVIT-MIN/LUTEIN (CENTRUM SILVER ORAL) Take by mouth once daily.      HYDROcodone-acetaminophen (NORCO) 7.5-325 mg per tablet Take 1 tablet by mouth every 12 (twelve) hours as needed for Pain. 60 tablet 0    levothyroxine (SYNTHROID) 50 MCG tablet Take 50 mcg by mouth once daily.      meclizine (ANTIVERT) 25 mg tablet Take 25 mg by mouth once daily.       meloxicam (MOBIC) 15 MG tablet Take 15 mg by mouth once daily.      metFORMIN (GLUCOPHAGE) 500 MG tablet Take 1 tablet by mouth 2 (two) times a day.      methocarbamoL (ROBAXIN) 500 MG Tab Take 1 tablet (500 mg total) by mouth 2 (two) times daily. 60 tablet 4    omeprazole (PRILOSEC) 20 MG capsule Take 1 capsule (20 mg total) by mouth once daily. 90 capsule 3    rosuvastatin (CRESTOR) 20 MG tablet Take 20 mg by mouth once daily.      valsartan (DIOVAN) 80 MG tablet Take 40 mg by mouth once daily.   1    gabapentin (NEURONTIN) 300 MG capsule Take 2 capsules (600 mg total) by mouth 2 (two) times daily. 120 capsule 5     No current facility-administered medications for this visit.       REVIEW OF SYSTEMS:    GENERAL:  No weight loss, malaise or fevers.  HEENT:  Negative for frequent or significant headaches.  NECK:  Negative for lumps, goiter  and significant neck swelling.   RESPIRATORY:  Negative for cough, wheezing or shortness of breath.  CARDIOVASCULAR:  Negative for chest pain, leg swelling or palpitations. HTN  GI:  Negative for abdominal discomfort, blood in stools  or black stools or change in bowel habits.  MUSCULOSKELETAL:  See HPI.  SKIN:  Negative for lesions, rash, and itching.  PSYCH:  Negative for sleep disturbance, mood disorder and recent psychosocial stressors.  HEMATOLOGY/LYMPHOLOGY:  Negative for prolonged bleeding, bruising easily or swollen nodes.  NEURO:   No history of headaches, syncope, paralysis, seizures or tremors.   ENDO: Thyroid disorder.   All other reviewed and negative other than HPI.    Past Medical History:  Past Medical History:   Diagnosis Date    Arthritis     Cataract     Cervical back pain with evidence of disc disease     Hiatal hernia     Hypertension     Thyroid disease        Past Surgical History:  Past Surgical History:   Procedure Laterality Date    ACROMIOCLAVICULAR JOINT CYST EXCISION Right     BACK SURGERY      cataracts Bilateral     CERVICAL FUSION      COLONOSCOPY N/A 4/27/2018    Procedure: COLONOSCOPY;  Surgeon: Giovani Medeiros MD;  Location: [address]);  Service: Endoscopy;  Laterality: N/A;    EPIDURAL STEROID INJECTION N/A 10/11/2019    Procedure: INJECTION, STEROID, EPIDURAL;  Surgeon: Kasandra Montoya MD;  Location: [facility];  Service: Pain Management;  Laterality: N/A;  Lumbar NGUYEN L4/5    NGUYEN      EYE SURGERY      INJECTION OF JOINT Bilateral 7/14/2022    Procedure: INJECTION, JOINT, SI BILATERAL  needs consent;  Surgeon: Alex Callahan MD;  Location: [facility];  Service: Pain Management;  Laterality: Bilateral;    INJECTION, SACROILIAC JOINT Bilateral 11/4/2022    Procedure: INJECTION,SACROILIAC JOINT BILATERAL CONTRAST;  Surgeon: Alex Callahan MD;  Location: [facility];  Service: Pain Management;  Laterality: Bilateral;    INJECTION, SACROILIAC JOINT Bilateral 7/27/2023    Procedure: INJECTION,SACROILIAC JOINT, BILATERAL SOONER DATE;  Surgeon: Alex Callahan MD;  Location: [facility];  Service: Pain Management;  Laterality: Bilateral;    RADIOFREQUENCY ABLATION  10/2016    cervical  spine/Jan    RADIOFREQUENCY ABLATION Left 5/3/2019    Procedure: RADIOFREQUENCY ABLATION, L2-L5 MEDIALBRANCH;  Surgeon: Kasandra Montoya MD;  Location: [facility];  Service: Pain Management;  Laterality: Left;    RADIOFREQUENCY ABLATION Left 6/13/2019    Procedure: RADIOFREQUENCY ABLATION C4-7;  Surgeon: Kasandra Montoya MD;  Location: [facility];  Service: Pain Management;  Laterality: Left;    RADIOFREQUENCY ABLATION Left 3/9/2021    Procedure: RADIOFREQUENCY ABLATION C4,C5,C6,C7;  Surgeon: Alex Callahan MD;  Location: [facility];  Service: Pain Management;  Laterality: Left;  1 of 2    RADIOFREQUENCY ABLATION Right 3/26/2021    Procedure: RADIOFREQUENCY ABLATION C4,C6,C6,C7;  Surgeon: Alex Callahan MD;  Location: [facility];  Service: Pain Management;  Laterality: Right;  2 of 2    RADIOFREQUENCY ABLATION Right 4/23/2021    Procedure: RADIOFREQUENCY ABLATION L3,4,5;  Surgeon: Alex Callahan MD;  Location: [facility];  Service: Pain Management;  Laterality: Right;  1 of 2    RADIOFREQUENCY ABLATION Left 5/7/2021    Procedure: RADIOFREQUENCY ABLATION L3,4,5;  Surgeon: Alex Callahan MD;  Location: [facility];  Service: Pain Management;  Laterality: Left;  2 of 2    RADIOFREQUENCY ABLATION Left 10/1/2021    Procedure: RADIOFREQUENCY ABLATION LEFT, C4,5,6,7 1 of 2 CONSENT NEEDED;  Surgeon: Alex Callahan MD;  Location: [facility];  Service: Pain Management;  Laterality: Left;    RADIOFREQUENCY ABLATION Right 10/15/2021    Procedure: RADIOFREQUENCY ABLATION  RIGHT C4,5,6,7 2 of 2 CONSENT NEEDED;  Surgeon: Alex Callahan MD;  Location: [facility];  Service: Pain Management;  Laterality: Right;    RADIOFREQUENCY ABLATION Left 2/25/2022    Procedure: RADIOFREQUENCY ABLATION LEFT L3,4,5 1 of 2, needs consent;  Surgeon: Alex Callahan MD;  Location: [facility];  Service: Pain Management;  Laterality: Left;    RADIOFREQUENCY ABLATION Right 3/11/2022    Procedure: RADIOFREQUENCY  ABLATION RIGHT L3,4,5 2 of 2, needs consent;  Surgeon: Alex Callahan MD;  Location: [facility];  Service: Pain Management;  Laterality: Right;    RADIOFREQUENCY ABLATION Left 12/15/2022    Procedure: RADIOFREQUENCY ABLATION LEFT C4, C5, C6, C7  ONE OF TWO NEEDS CONSENT;  Surgeon: Alex Callahan MD;  Location: [facility];  Service: Pain Management;  Laterality: Left;    RADIOFREQUENCY ABLATION Right 12/29/2022    Procedure: RADIOFREQUENCY ABLATION RIGHT C4, C5, C6, C7  TWO OF TWO NEEDS CONSENT;  Surgeon: Alex Callahan MD;  Location: [facility];  Service: Pain Management;  Laterality: Right;    RADIOFREQUENCY ABLATION Left 2/24/2023    Procedure: RADIOFREQUENCY ABLATION LEFT L3,L4,L5;  Surgeon: Alex Callahan MD;  Location: [facility];  Service: Pain Management;  Laterality: Left;    RADIOFREQUENCY ABLATION Right 3/10/2023    Procedure: RADIOFREQUENCY ABLATION RIGHT L3,L4,L5;  Surgeon: Alex Callahan MD;  Location: [facility];  Service: Pain Management;  Laterality: Right;    RADIOFREQUENCY ABLATION Left 9/15/2023    Procedure: RADIOFREQUENCY ABLATION, LEFT C4-C5-C6-C7 MEDIAL BRANCH ONE OF TWO  SOONER DATE;  Surgeon: Alex Callahan MD;  Location: [facility];  Service: Pain Management;  Laterality: Left;    RADIOFREQUENCY ABLATION Right 9/29/2023    Procedure: RADIOFREQUENCY ABLATION,  RIGHT C4-C5-C6-C7 MEDIAL BRANCH TWO OF TWO SOONER DATE;  Surgeon: Alex Callahan MD;  Location: [facility];  Service: Pain Management;  Laterality: Right;    RETINAL DETACHMENT SURGERY      ROTATOR CUFF REPAIR Right     SPINE SURGERY      cerival fusion     TRANSFORAMINAL EPIDURAL INJECTION OF STEROID Right 4/14/2022    Procedure: INJECTION, STEROID, EPIDURAL, TRANSFORAMINAL APPROACH RIGHT L4/5 & L5/S1 Needs Consent;  Surgeon: Alex Callahan MD;  Location: [facility];  Service: Pain Management;  Laterality: Right;    TRIGGER POINT INJECTION Left 6/13/2019    Procedure: INJECTION, TRIGGER POINT;  " Surgeon: Kasandra Montoya MD;  Location: [facility];  Service: Pain Management;  Laterality: Left;       Family History:  Family History   Problem Relation Name Age of Onset    Heart disease Mother      Cancer Father      Leukemia Brother      Colon cancer Neg Hx      Celiac disease Neg Hx      Crohn's disease Neg Hx      Esophageal cancer Neg Hx      Inflammatory bowel disease Neg Hx      Irritable bowel syndrome Neg Hx      Liver cancer Neg Hx      Rectal cancer Neg Hx      Stomach cancer Neg Hx      Ulcerative colitis Neg Hx         Social History:  Social History     Socioeconomic History    Marital status:    Tobacco Use    Smoking status: Never    Smokeless tobacco: Never   Substance and Sexual Activity    Alcohol use: No    Drug use: No       OBJECTIVE:    /81   Pulse 68   Temp 98 °F (36.7 °C)   Resp 18   Ht 6' 1" (1.854 m)   Wt 109 kg (240 lb 4.8 oz)   SpO2 100%   BMI 31.70 kg/m²     PHYSICAL EXAMINATION:    General appearance: Well appearing, in no acute distress, alert and oriented x3.  Psych:  Mood and affect appropriate.  Skin: Skin color, texture, turgor normal, no rashes or lesions, in both upper and lower body.  Head/face:  Atraumatic, normocephalic. No palpable lymph nodes   Neck: There is mild pain with palpation over cervical paraspinals and facet joints bilaterally. Limited ROM with pain on flexion, extension, and lateral rotation.   Cor: RRR  Pulm: Symmetric chest rise, no respiratory distress noted.   Abdomen: Soft, pain with palpation over LUQ.  Back: Straight leg raising in the sitting position is negative for radicular leg pain bilaterally. There is pain with palpation over lumbar paraspinals and facet joints bilaterally. Limited ROM with pain on extension.   Extremities: No deformities, edema, or skin discoloration. Good capillary refill.  Musculoskeletal: There is pain with palpation over bilateral SI joints. Positive FABERs, Gaenslen's and SI compression " bilaterally. Bilateral upper and lower extremity strength is normal and symmetric. No atrophy or tone abnormalities are noted.  Neuro:  No loss of sensation is noted.  Gait: Antalgic, ambulates with cane for assistance     ASSESSMENT: 59 y.o. year old male with neck and lumbar pain, consistent with      1. Chronic pain syndrome        2. Sacroiliac joint dysfunction  Procedure Order to Pain Management      3. Lumbar spondylosis        4. DDD (degenerative disc disease), lumbar                    PLAN:     - Previous imaging reviewed today.    - Schedule for bilateral SI joint injections.     - We can repeat lumbar RFA as needed.     - I have stressed the importance of physical activity and a home exercise plan to help with pain and improve health.    - Continue Gabapentin.     - Continue Robaxin 500 mg PRN muscle pain.     - Pain contract signed 2/12/2021.     - Continue Norco 7.5/325 mg BID PRN. Refill provided.     - The patient is here today for a refill of current pain medications and they believe these provide effective pain control and improvements in quality of life.  The patient notes no serious side effects, and feels the benefits outweigh the risks.  The patient was reminded of the pain contract that they signed previously as well as the risks and benefits of the medication including possible death.  The updated [place] [employer] prescription monitoring program was reviewed, and the patient has been found to be compliant with current treatment plan. Medication management provided by Dr. Callahan.     - UDS from 1/31/2023 reviewed, negative for medications, he does take sparingly. Repeat next visit.     - Continue home exercise routine.     - RTC 3 weeks after above procedure.     The above plan and management options were discussed at length with patient. Patient is in agreement with the above and verbalized understanding.    Angelique Barahona NP  04/22/2024

## 2024-04-22 NOTE — H&P (VIEW-ONLY)
Chronic patient Established Note (Follow up visit)      Interval History 4/22/2024:  The patient returns to clinic today for follow up of neck and back pain. He reports increased low back pain. He reports low back and buttock pain. He denies any radicular leg pain. His pain is worse with prolonged sitting. He also reports increased pain with moving from sitting to standing. He reports intermittent neck pain. He is having increased left elbow pain. His wife notes that he does drop objects from the left hand. He is taking Gabapentin and Robaxin. He is taking Norco as needed for severe pain. He denies any adverse effects. He denies any other health changes. His pain today is 7/10.    Interval History 10/11/2023:  The patient returns to clinic today for follow up of neck and back pain. He is s/p left C4,5,6,7 RFA on 9/15/2023. He is s/p right C4,5,6,7 RFA on 9/29/2023. He reports 50% relief of his pain. He does report intermittent neck pain. He also reports left sided anterior neck and upper chest pain. This is tight in nature. He reports increased low back pain. He denies any radicular leg pain. This pain is worse with prolonged activity. He does have intermittent left abdominal pain. He feels a knot in the area. This pain is relieved with sitting or laying down. He did have a recent fall in his garden. He continues to take Gabapentin and Robaxin. He also takes Norco as needed for severe pain. He takes this sparingly without adverse effects. He denies any other health changes. His pain today is 4/10.    Interval History 8/11/2023:  The patient returns to clinic today for follow up of back pain. He is s/p bilateral SI joint injections on 7/27/2023. He reports 50-60% relief of his buttock pain. He reports worsened neck pain over the last month. He does report some pain into the left shoulder. He denies any radicular arm pain. His pain is worse with turning his head to the side. He does endorse stiffness. He continues to  report intermittent left abdominal pain. This pain is worse with prolonged activity. He also notes 2 lumps in the area, especially at the end of the day. He is taking Gabapentin and Robaxin. He also takes Norco as needed for severe pain sparingly. He denies any other health changes. His pain today is 6/10.    Interval History 7/5/2023:  The patient returns to clinic today for follow up of back pain. He reports increased low back and buttock pain. He denies any radicular leg pain. His pain is worse with prolonged sitting, moving from sitting to standing, and prolonged activity. He continues to report intermittent left abdominal pain. He also reports increased neck pain. He is taking Gabapentin and Robaxin. He also takes Norco as needed for severe pain. He denies any adverse effects. His pain today is 6/10.    Interval History 3/21/2023:  The patient returns to clinic today for follow up of back pain. He is s/p left L3,4,5 RFA on 2/24/2023 and right L3,4,5 RFA on 3/10/2023. He is unsure of relief at this time. He reports increased left sided low back pain over the last two weeks. 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 He did feel a pop. He thought he pulled a muscle, but pain has been worsening. He reports left sided low back pain that radiates into the left hip and lower abdomen. He also reports intermittent radiating pain into the posterior left leg. He describes this pain as numb and tingling in nature. His pain is worse with activity. He does endorse muscle spasms. He is taking Gabapentin and Robaxin. He has had to take more Norco recently due to increased pain. He denies any weakness. He denies any other health changes. His pain today is 8/10.    Interval History 1/31/2023:  The patient returns to clinic today for follow up of neck and back pain. He is s/p left C4,5,6,7 on 12/15/2022 and right C4,5,6,7 RFA on 12/29/2022. He reports 50% relief of his neck pain. He reports intermittent  neck pain, left side greater than right. He denies any radicular arm pain. He does report pain into the shoulder blades. He reports increased low back pain. He denies any radicular leg pain. His pain is worse with standing, walking, and activity. He does endorse stiffness. He is taking Gabapentin and Robaxin. He also takes Norco sparingly with benefit. He denies any other health changes. His pain today is 6/10.    Interval History 11/18/2022:  The patient returns to clinic today for follow up of neck and back pain. He is s/p bilateral SI joint injections on 11/4/2022. He reports 60% relief of his low back and buttock pain. He continues to report low back pain. He denies any radicular leg pain. He reports increased neck pain, left side greater than right. He denies any radicular arm. He does report some pain into his shoulder blades. His pain is worse with activity. He continues to take Gabapentin and Robaxin. He continues to take Norco as needed sparingly with benefit. He denies any other health changes. His pain today is 7/10.    Interval History 10/27/2022:  The patient returns to clinic today for follow up of neck and back pain. He reports increased low back and bilateral buttock pain. He denies any radicular leg pain. His pain is worse with prolonged sitting and moving from sitting to standing. He does endorse a fall recently while getting out of the vehicle onto his side. He continues to report neck pain. He continues to take Gabapentin and Robaxin. He continues to take Norco sparingly with benefit and without adverse effects. He denies any other health changes. His pain today is 6/10.    Interval History 8/2/2022:  The aptient returns to clinic today for follow up of neck and back pain. He is s/p bilateral SI joint injection on 7/14/2022. He reports 50-60% relief of his low back and buttock pain. He reports intermittent low back and buttock pain. He reports increased neck pain, left side greater than right. He  denies any radicular arm pain. He has good days and bad days. He continues to take Gabapentin. He continues to take Norco sparingly with benefit and without adverse effects. He denies any other health changes. His pain today is 5/10.    Interval History 7/1/2022:  The patient returns to clinic today for follow up of neck and back pain. He reports increased low back and buttock pain.His pain is worse with prolonged sitting and moving from sitting to standing. He also reports increased pain with bending over. He does report intermittent radiating pain into his right posterior thigh stopping at mid thigh. He does report a fall, which was sitting hard on the ground about a week ago. He denies any acute injury. He continues to take Gabapentin and Norco. He denies any other health changes. His pain today is 6/10.     Interval History 5/9/2022:  The patient returns to clinic today for follow up of back pain. He is s/p right L4/5 and L5/S1 TF NGUYEN on 4/14/2022. He reports 50-60% relief of his low back and leg pain. He has been more active since the procedure. He continues to report low back and hip pain, worse with bending and activity. He denies any radicular leg pain. He continues to repot intermittent neck pain. He continues to take Gabapentin with benefit. He continues to take Norco sparingly as needed. He denies any other health changes. His pain today is 5/10.    Interval History 4/1/2022:  The patient returns to clinic today for follow up of back pain. He is s/p left L3,4,5 RFA on 2/25/2022 and right L3,4,5 RFA on 3/11/2022. He reports relief of his back pain. He reports increased back pain that radiates into the posterior aspect of his right leg to his knee. He denies any left leg pain. His pain is worse with prolonged walking. He reports that he sometimes drags his right leg. He denies any falls. He denies any bowel or bladder incontinence. He continues to take Gabapentin and Norco as needed with benefit. He denies  "any adverse effects. He denies any other health changes. His pain today is 7/10.    Interval History 2/15/2022:  The patient returns to clinic today for follow up of neck and back pain. He reports increased low back pain over the last two weeks. He describes this pain as sharp and aching in nature. He does report intermittent "catching" pain in his lower back, worse with getting out of bed. He denies any radicular leg pain. He continues to report benefit from previous cervical procedures. He reports intermittent neck pain. He continues to take Gabapentin with benefit. He continues to take Norco as needed with benefit and without adverse effects. He denies any other health changes. His pain today is 6/10.    Interval History 11/5/2021:  The patient returns to clinic today for follow up of neck and back pain. He is s/p left C4,5,6,7 RFA on 10/1/2021 and right C4,5,6,7 RFA on 10/15/2021. He reports 50% relief of his neck pain. He reports increased burning and itching pain to his skin near injection sites. He denies any radicular arm pain. He does report increased left sided muscle pain. He is concerned that one of the screws in his cervical hardware is moving. He continues to report benefit with previous lumbar procedures. He reports intermittent low back pain without radicular leg pain. He continues to take Gabapentin with benefit. He continues to take Norco as needed with benefit and without adverse effects. He denies any weakness. He denies any other health changes. His pain today is 5/10.    Interval History 9/17/2021:  The patient returns to clinic today for follow up of neck and back pain. He reports increased neck pain. He denies any radicular arm pain today. He does report intermittent radiating pain into his left shoulder blade and mid back. His pain is worse with activity. He reports that sometimes his pain takes his breath away. He continues to report low back pain, that is tolerable at this time. He " continues to take Gabapentin and Norco with benefit and without adverse effects. He denies any other health changes. His pain today is 6/10.    Interval History 6/17/2021:  The patient returns to clinic today for follow up of neck and back pain. He is s/p right L3,4,5 RFA on 4/23/2021 and left L3,4,5 RFA on 5/7/2021. He reports 50% relief of his low back pain. He continues to report intermittent low back pain. He denies any radicular leg pain. He continues to report neck pain, especially in between the scapula. His pain is worse with prolonged standing, walking, and activity. He continues to take Gabapentin with benefit. He continues to take Norco sparingly for severe pain with benefit and without adverse effects. He denies any other health changes. His pain today is 4/10.    Interval History 4/9/2021:  The patient returns to clinic today for follow up of neck and back pain. He is s/p left C4,5,6,7 RFA on 3/9/2021 and right C4,5,6,7 RFA on 3/26/2021. He reports 50% relief of his neck pain. He reports intermittent neck pain. He has good days and bad days. He continues to report low back pain that is constant, sharp, and aching. He reports intermittent radiating pain into the lateral aspect of his left leg to his knee. He continues to take Gabapentin with benefit. He continues to take Norco as needed sparingly for severe pain. He denies any adverse effects. He denies any other health changes. His pain today is 5/10.    Interval History 3/2/2021:  Sal Navarro presents to the clinic for a follow-up appointment for neck pain . Since the last visit, Sal Navarro states the pain has been worsening. Current pain intensity is 6/10.  Was seen by Angelique Barahona NP on 2/12/2021.     Interval History 2/12/2021:  The patient returns to clinic today for follow up of back pain. He has not been seen in over a year. He did not have imaging due to coronavirus outbreak. He would to reschedule this. He was also considering  "SCS trial prior to the pandemic. He continues to report low back pain that radiates into the lateral aspect of his left leg to his knee. He denies any radicular right leg pain. His pain is worse with bending and walking. He continues to take Gabapentin with benefit. He also takes Norco as needed for severe pain. He denies any adverse effects. He denies any other health changes. His pain today is 6/10.    HPI:  Sal Navarro is a 56 y.o. male who presents today s/p C4-7 ACDF with C5/6 PSIF in 2/2016 with residual chronic midline neck pain that radiates into his shoulder blades bilaterally, R>>L.  This is associated with bilateral hand numbness and tingling.  The hand symptoms did improve following the surgery.  The shooting pains in his arms resolved completely.   This pain is described in detail below.  His post-operative course was complicated by dysphagia that is being treated with speech therapy.  The numbness and the tingling were relieved by the surgery. Now experiences "deep pain along the spine"  Patient has not been seen for over a year, had to re-schedule his imaging studies due to pandemic(now completed). Prior to the pandemic, he was considering a SCS.   Only time he gets relief is laying in bed on a very thin pillow, but that doesn't last very long.   Pain aggravated by movement and even breathing/playing with his grandchild/holding the grandchild's hand. Heat and massage (has a vest) are helpful.   Was seen by Dr. Jan srivastava and received multiple EIS and RFAs.   Compliant with his medication.     Pain Disability Index Review:      4/22/2024    10:21 AM 8/11/2023     3:01 PM 3/21/2023     2:56 PM   Last 3 PDI Scores   Pain Disability Index (PDI) 21 18 40       Pain Medications:  Gabapentin  Norco sparingly  Robaxin    Opioid Contract: yes     report:  Reviewed and consistent with medication use as prescribed.    Pain Procedures:   7/27/2023- Bilateral SI joint injections  3/10/2023- Right " L3,4,5 RFA  2/24/2023- Left L3,4,5 RFA  12/29/2022- Right C4,5,6,7 RFA  12/15/2022- Left C4,5,6,7 RFA  11/4/2022- Bilateral SI joint injections- 60% relief   4/14/2022- Right L4/5 and L5/S1 TF NGUYEN  3/11/2022- Right L3,4,5 RFA  2/25/2022- Left L3,4,5 RFA  10/15/2021- Right C4,5,6,7 RFA  10/1/2021- Left C4,5,6,7 RFA  5/7/2021- Left L3,4,5 RFA   4/23/2021- Right L3,4,5 RFA  3/26/2021- Right C4,5,6,7 RFA  3/9/2021- Left C4,5,6,7 RFA  10/11/19: L4/5 Interlaminar Epidural Steroid Injection  06/13/19- Left C4-7 RFA  05/03/19:Left L2-5 Lumbar Medial Branch Nerve Thermal Radiofrequency Ablation  12/21/18:Right L2-5 Lumbar Medial Branch Nerve Thermal Radiofrequency Ablation  11/23/18:Cervical Thermal Radiofreqiency Ablation: Right C4-7  09/14/18:C7/[id number] Steroid Injection  06/29/18:LL2/3 Interlaminar Epidural Steroid Injection   03/06/18:L2/3 Interlaminar Epidural Steroid Injection   09/26/17:Bilateral L2-5 Lumbar Medial Branch Nerve Coolief Thermal Radiofrequency Ablation  08/31/17:Bilateral L2-5 Lumbar medial branch blocks   03/28/17:Cervical Conventional Radiofreqiency Ablation: Left C4-7  03/14/17:Cervical Conventional Radiofreqiency Ablation: Right C4-7  10/25/16:Cervical Conventional Radiofreqiency Ablation: Left C4-7  10/11/16: Cervical Conventional Radiofreqiency Ablation: Right C4-7  10/04/16- Cervical Medial Branch Blocks, Bilateral C4-7.     08/31/16:C7/[id number] Steroid Injection    Physical Therapy/Home Exercise: does home exercises now, but not in formal PT.    - 2019 was last time in PT     Imaging:   MRI Lumbar Spine 2/25/2021:  COMPARISON:  07/11/2017     FINDINGS:  The vertebral bodies maintain normal height, signal intensity and alignment.  There is redemonstration of few hemangiomas at multiple levels.  The intervertebral disc spaces are preserved although there is some disc desiccation at multiple levels.  The conus terminates at level L1.  Evaluation of the localizer images and structures  surrounding the lumbar spine shows no abnormalities.     L1-L2: No significant disc or joint pathology.     L2-L3: Mild diffuse disc bulge with mild bilateral facet arthropathy and ligamentum flavum hypertrophy results in mild central canal stenosis.  Mild bilateral neural foraminal stenosis is also identified.     L3-L4: There is a diffuse disc bulge with no significant facet arthropathy or ligamentum flavum hypertrophy.  There is mild central canal stenosis and mild bilateral neural foraminal stenosis.     L4-L5: There is a diffuse disc bulge with ligamentum flavum hypertrophy.  No significant facet arthropathy.  There is mild central canal stenosis with only minimal encroachment on the left neural foramen.  Mild right neural foraminal stenosis is present.     L5-S1: There is a diffuse disc bulge with mild to moderate right facet arthropathy.  No left facet arthropathy.  The ligamentum flavum is normal.  The central canal is patent and the neural foramina are largely patent.  Incidental note is made of an annulus fibrosus tear posteriorly measuring approximately 2 mm.     Impression:     Multilevel degenerative disc and joint disease which is mild and results in mild central canal and neural foraminal stenosis as outlined above.    Xray Cervical Spine 2/25/2021:  COMPARISON:  07/07/2017 .     FINDINGS:  The patient has undergone ACDF from C4 through C7. The instrumentation is intact without evidence of complication.  The vertebral bodies maintain normal height and alignment. The remaining intervertebral disc spaces are preserved.  No significant uncovertebral joint hypertrophy.  The prevertebral soft tissues, odontoid views and lung apices are normal. The neural foramen are patent     Impression:     Postsurgical changes to the thoracic spine without additional evidence for degenerative disc or joint disease.     Allergies:   Review of patient's allergies indicates:   Allergen Reactions    Pcn [penicillins] Hives  and Rash    Lipitor [atorvastatin] Other (See Comments)     Muscle cramps, weakness       Current Medications:   Current Outpatient Medications   Medication Sig Dispense Refill    acetaminophen (TYLENOL) 500 MG tablet Take 500 mg by mouth every 6 (six) hours as needed for Pain.      albuterol (PROVENTIL/VENTOLIN HFA) 90 mcg/actuation inhaler       aspirin (ECOTRIN) 81 MG EC tablet Take 81 mg by mouth once daily.      cetirizine (ZYRTEC) 10 MG tablet Take 10 mg by mouth nightly.  5    FOLIC ACID/MULTIVIT-MIN/LUTEIN (CENTRUM SILVER ORAL) Take by mouth once daily.      HYDROcodone-acetaminophen (NORCO) 7.5-325 mg per tablet Take 1 tablet by mouth every 12 (twelve) hours as needed for Pain. 60 tablet 0    levothyroxine (SYNTHROID) 50 MCG tablet Take 50 mcg by mouth once daily.      meclizine (ANTIVERT) 25 mg tablet Take 25 mg by mouth once daily.       meloxicam (MOBIC) 15 MG tablet Take 15 mg by mouth once daily.      metFORMIN (GLUCOPHAGE) 500 MG tablet Take 1 tablet by mouth 2 (two) times a day.      methocarbamoL (ROBAXIN) 500 MG Tab Take 1 tablet (500 mg total) by mouth 2 (two) times daily. 60 tablet 4    omeprazole (PRILOSEC) 20 MG capsule Take 1 capsule (20 mg total) by mouth once daily. 90 capsule 3    rosuvastatin (CRESTOR) 20 MG tablet Take 20 mg by mouth once daily.      valsartan (DIOVAN) 80 MG tablet Take 40 mg by mouth once daily.   1    gabapentin (NEURONTIN) 300 MG capsule Take 2 capsules (600 mg total) by mouth 2 (two) times daily. 120 capsule 5     No current facility-administered medications for this visit.       REVIEW OF SYSTEMS:    GENERAL:  No weight loss, malaise or fevers.  HEENT:  Negative for frequent or significant headaches.  NECK:  Negative for lumps, goiter  and significant neck swelling.   RESPIRATORY:  Negative for cough, wheezing or shortness of breath.  CARDIOVASCULAR:  Negative for chest pain, leg swelling or palpitations. HTN  GI:  Negative for abdominal discomfort, blood in stools  or black stools or change in bowel habits.  MUSCULOSKELETAL:  See HPI.  SKIN:  Negative for lesions, rash, and itching.  PSYCH:  Negative for sleep disturbance, mood disorder and recent psychosocial stressors.  HEMATOLOGY/LYMPHOLOGY:  Negative for prolonged bleeding, bruising easily or swollen nodes.  NEURO:   No history of headaches, syncope, paralysis, seizures or tremors.   ENDO: Thyroid disorder.   All other reviewed and negative other than HPI.    Past Medical History:  Past Medical History:   Diagnosis Date    Arthritis     Cataract     Cervical back pain with evidence of disc disease     Hiatal hernia     Hypertension     Thyroid disease        Past Surgical History:  Past Surgical History:   Procedure Laterality Date    ACROMIOCLAVICULAR JOINT CYST EXCISION Right     BACK SURGERY      cataracts Bilateral     CERVICAL FUSION      COLONOSCOPY N/A 4/27/2018    Procedure: COLONOSCOPY;  Surgeon: Giovani Mdeeiros MD;  Location: [address]);  Service: Endoscopy;  Laterality: N/A;    EPIDURAL STEROID INJECTION N/A 10/11/2019    Procedure: INJECTION, STEROID, EPIDURAL;  Surgeon: Kasandra Montoya MD;  [facility];  Service: Pain Management;  Laterality: N/A;  Lumbar NGUYEN L4/5    NGUYEN      EYE SURGERY      INJECTION OF JOINT Bilateral 7/14/2022    Procedure: INJECTION, JOINT, SI BILATERAL  needs consent;  Surgeon: Alex Callahan MD;  Location: [facility];  Service: Pain Management;  Laterality: Bilateral;    INJECTION, SACROILIAC JOINT Bilateral 11/4/2022    Procedure: INJECTION,SACROILIAC JOINT BILATERAL CONTRAST;  Surgeon: Alex Callahan MD;  Location: [facility];  Service: Pain Management;  Laterality: Bilateral;    INJECTION, SACROILIAC JOINT Bilateral 7/27/2023    Procedure: INJECTION,SACROILIAC JOINT, BILATERAL SOONER DATE;  Surgeon: Alex Callahan MD;  Location: [facility];  Service: Pain Management;  Laterality: Bilateral;    RADIOFREQUENCY ABLATION  10/2016    cervical  spine/Jan    RADIOFREQUENCY ABLATION Left 5/3/2019    Procedure: RADIOFREQUENCY ABLATION, L2-L5 MEDIALBRANCH;  Surgeon: Kasandra Montoya MD;  Location: [facility];  Service: Pain Management;  Laterality: Left;    RADIOFREQUENCY ABLATION Left 6/13/2019    Procedure: RADIOFREQUENCY ABLATION C4-7;  Surgeon: Kasandra Montoya MD;  Location: [facility];  Service: Pain Management;  Laterality: Left;    RADIOFREQUENCY ABLATION Left 3/9/2021    Procedure: RADIOFREQUENCY ABLATION C4,C5,C6,C7;  Surgeon: Alex Callahan MD;  Location: [facility];  Service: Pain Management;  Laterality: Left;  1 of 2    RADIOFREQUENCY ABLATION Right 3/26/2021    Procedure: RADIOFREQUENCY ABLATION C4,C6,C6,C7;  Surgeon: Alex Callahan MD;  Location: [facility];  Service: Pain Management;  Laterality: Right;  2 of 2    RADIOFREQUENCY ABLATION Right 4/23/2021    Procedure: RADIOFREQUENCY ABLATION L3,4,5;  Surgeon: Alex Callahan MD;  Location: [facility];  Service: Pain Management;  Laterality: Right;  1 of 2    RADIOFREQUENCY ABLATION Left 5/7/2021    Procedure: RADIOFREQUENCY ABLATION L3,4,5;  Surgeon: Alex Callahan MD;  Location: [facility];  Service: Pain Management;  Laterality: Left;  2 of 2    RADIOFREQUENCY ABLATION Left 10/1/2021    Procedure: RADIOFREQUENCY ABLATION LEFT, C4,5,6,7 1 of 2 CONSENT NEEDED;  Surgeon: Alex Callahan MD;  Location: [facility];  Service: Pain Management;  Laterality: Left;    RADIOFREQUENCY ABLATION Right 10/15/2021    Procedure: RADIOFREQUENCY ABLATION  RIGHT C4,5,6,7 2 of 2 CONSENT NEEDED;  Surgeon: Alex Callahan MD;  Location: [facility];  Service: Pain Management;  Laterality: Right;    RADIOFREQUENCY ABLATION Left 2/25/2022    Procedure: RADIOFREQUENCY ABLATION LEFT L3,4,5 1 of 2, needs consent;  Surgeon: Alex Callahan MD;  Location: [facility];  Service: Pain Management;  Laterality: Left;    RADIOFREQUENCY ABLATION Right 3/11/2022    Procedure: RADIOFREQUENCY  ABLATION RIGHT L3,4,5 2 of 2, needs consent;  Surgeon: Alex Callahan MD;  Location: [facility];  Service: Pain Management;  Laterality: Right;    RADIOFREQUENCY ABLATION Left 12/15/2022    Procedure: RADIOFREQUENCY ABLATION LEFT C4, C5, C6, C7  ONE OF TWO NEEDS CONSENT;  Surgeon: Alex Callahan MD;  Location: [facility];  Service: Pain Management;  Laterality: Left;    RADIOFREQUENCY ABLATION Right 12/29/2022    Procedure: RADIOFREQUENCY ABLATION RIGHT C4, C5, C6, C7  TWO OF TWO NEEDS CONSENT;  Surgeon: Alex Callahan MD;  Location: [facility] [facility]GT;  Service: Pain Management;  Laterality: Right;    RADIOFREQUENCY ABLATION Left 2/24/2023    Procedure: RADIOFREQUENCY ABLATION LEFT L3,L4,L5;  Surgeon: Alex Callahan MD;  Location: [facility];  Service: Pain Management;  Laterality: Left;    RADIOFREQUENCY ABLATION Right 3/10/2023    Procedure: RADIOFREQUENCY ABLATION RIGHT L3,L4,L5;  Surgeon: Alex Callahan MD;  Location: [facility];  Service: Pain Management;  Laterality: Right;    RADIOFREQUENCY ABLATION Left 9/15/2023    Procedure: RADIOFREQUENCY ABLATION, LEFT C4-C5-C6-C7 MEDIAL BRANCH ONE OF TWO  SOONER DATE;  Surgeon: Alex Callahan MD;  Location: [facility];  Service: Pain Management;  Laterality: Left;    RADIOFREQUENCY ABLATION Right 9/29/2023    Procedure: RADIOFREQUENCY ABLATION,  RIGHT C4-C5-C6-C7 MEDIAL BRANCH TWO OF TWO SOONER DATE;  Surgeon: Alex Callahan MD;  Location: [facility];  Service: Pain Management;  Laterality: Right;    RETINAL DETACHMENT SURGERY      ROTATOR CUFF REPAIR Right     SPINE SURGERY      cerival fusion     TRANSFORAMINAL EPIDURAL INJECTION OF STEROID Right 4/14/2022    Procedure: INJECTION, STEROID, EPIDURAL, TRANSFORAMINAL APPROACH RIGHT L4/5 & L5/S1 Needs Consent;  Surgeon: Alex Callahan MD;  Location: [facility];  Service: Pain Management;  Laterality: Right;    TRIGGER POINT INJECTION Left 6/13/2019    Procedure: INJECTION, TRIGGER POINT;  " Surgeon: Kasandra Montoya MD;  Location: [facility];  Service: Pain Management;  Laterality: Left;       Family History:  Family History   Problem Relation Name Age of Onset    Heart disease Mother      Cancer Father      Leukemia Brother      Colon cancer Neg Hx      Celiac disease Neg Hx      Crohn's disease Neg Hx      Esophageal cancer Neg Hx      Inflammatory bowel disease Neg Hx      Irritable bowel syndrome Neg Hx      Liver cancer Neg Hx      Rectal cancer Neg Hx      Stomach cancer Neg Hx      Ulcerative colitis Neg Hx         Social History:  Social History     Socioeconomic History    Marital status:    Tobacco Use    Smoking status: Never    Smokeless tobacco: Never   Substance and Sexual Activity    Alcohol use: No    Drug use: No       OBJECTIVE:    /81   Pulse 68   Temp 98 °F (36.7 °C)   Resp 18   Ht 6' 1" (1.854 m)   Wt 109 kg (240 lb 4.8 oz)   SpO2 100%   BMI 31.70 kg/m²     PHYSICAL EXAMINATION:    General appearance: Well appearing, in no acute distress, alert and oriented x3.  Psych:  Mood and affect appropriate.  Skin: Skin color, texture, turgor normal, no rashes or lesions, in both upper and lower body.  Head/face:  Atraumatic, normocephalic. No palpable lymph nodes   Neck: There is mild pain with palpation over cervical paraspinals and facet joints bilaterally. Limited ROM with pain on flexion, extension, and lateral rotation.   Cor: RRR  Pulm: Symmetric chest rise, no respiratory distress noted.   Abdomen: Soft, pain with palpation over LUQ.  Back: Straight leg raising in the sitting position is negative for radicular leg pain bilaterally. There is pain with palpation over lumbar paraspinals and facet joints bilaterally. Limited ROM with pain on extension.   Extremities: No deformities, edema, or skin discoloration. Good capillary refill.  Musculoskeletal: There is pain with palpation over bilateral SI joints. Positive FABERs, Gaenslen's and SI compression " bilaterally. Bilateral upper and lower extremity strength is normal and symmetric. No atrophy or tone abnormalities are noted.  Neuro:  No loss of sensation is noted.  Gait: Antalgic, ambulates with cane for assistance     ASSESSMENT: 59 y.o. year old male with neck and lumbar pain, consistent with      1. Chronic pain syndrome        2. Sacroiliac joint dysfunction  Procedure Order to Pain Management      3. Lumbar spondylosis        4. DDD (degenerative disc disease), lumbar                    PLAN:     - Previous imaging reviewed today.    - Schedule for bilateral SI joint injections.     - We can repeat lumbar RFA as needed.     - I have stressed the importance of physical activity and a home exercise plan to help with pain and improve health.    - Continue Gabapentin.     - Continue Robaxin 500 mg PRN muscle pain.     - Pain contract signed 2/12/2021.     - Continue Norco 7.5/325 mg BID PRN. Refill provided.     - The patient is here today for a refill of current pain medications and they believe these provide effective pain control and improvements in quality of life.  The patient notes no serious side effects, and feels the benefits outweigh the risks.  The patient was reminded of the pain contract that they signed previously as well as the risks and benefits of the medication including possible death.  The updated [place] [employer] prescription monitoring program was reviewed, and the patient has been found to be compliant with current treatment plan. Medication management provided by Dr. Callahan.     - UDS from 1/31/2023 reviewed, negative for medications, he does take sparingly. Repeat next visit.     - Continue home exercise routine.     - RTC 3 weeks after above procedure.     The above plan and management options were discussed at length with patient. Patient is in agreement with the above and verbalized understanding.    Angelique Barahona NP  04/22/2024

## 2024-05-10 ENCOUNTER — HOSPITAL ENCOUNTER (OUTPATIENT)
Facility: OTHER | Age: 59
Discharge: HOME | End: 2024-05-10
Attending: ANESTHESIOLOGY | Admitting: ANESTHESIOLOGY
Payer: MEDICARE

## 2024-05-10 VITALS
BODY MASS INDEX: 31.81 KG/M2 | RESPIRATION RATE: 16 BRPM | DIASTOLIC BLOOD PRESSURE: 81 MMHG | WEIGHT: 240 LBS | SYSTOLIC BLOOD PRESSURE: 136 MMHG | TEMPERATURE: 98 F | HEIGHT: 73 IN | HEART RATE: 54 BPM | OXYGEN SATURATION: 97 %

## 2024-05-10 DIAGNOSIS — G89.29 CHRONIC PAIN: ICD-10-CM

## 2024-05-10 DIAGNOSIS — M46.1 SACROILIITIS: Primary | ICD-10-CM

## 2024-05-10 LAB — POCT GLUCOSE: 99 MG/DL (ref 70–110)

## 2024-05-10 PROCEDURE — 63600175 PHARM REV CODE 636 W HCPCS: Mod: JZ,JG | Performed by: ANESTHESIOLOGY

## 2024-05-10 PROCEDURE — 25500020 PHARM REV CODE 255: Performed by: ANESTHESIOLOGY

## 2024-05-10 PROCEDURE — 25000003 PHARM REV CODE 250: Performed by: ANESTHESIOLOGY

## 2024-05-10 PROCEDURE — 82947 ASSAY GLUCOSE BLOOD QUANT: CPT | Performed by: ANESTHESIOLOGY

## 2024-05-10 PROCEDURE — 27096 INJECT SACROILIAC JOINT: CPT | Mod: 50 | Performed by: ANESTHESIOLOGY

## 2024-05-10 PROCEDURE — 27096 INJECT SACROILIAC JOINT: CPT | Mod: 50,,, | Performed by: ANESTHESIOLOGY

## 2024-05-10 RX ORDER — LIDOCAINE HYDROCHLORIDE 20 MG/ML
INJECTION, SOLUTION INFILTRATION; PERINEURAL
Status: DISCONTINUED | OUTPATIENT
Start: 2024-05-10 | End: 2024-05-10 | Stop reason: HOSPADM

## 2024-05-10 RX ORDER — SODIUM CHLORIDE 9 MG/ML
INJECTION, SOLUTION INTRAVENOUS CONTINUOUS
Status: DISCONTINUED | OUTPATIENT
Start: 2024-05-10 | End: 2024-05-10 | Stop reason: HOSPADM

## 2024-05-10 RX ORDER — BUPIVACAINE HYDROCHLORIDE 2.5 MG/ML
INJECTION, SOLUTION EPIDURAL; INFILTRATION; INTRACAUDAL
Status: DISCONTINUED | OUTPATIENT
Start: 2024-05-10 | End: 2024-05-10 | Stop reason: HOSPADM

## 2024-05-10 RX ORDER — TRIAMCINOLONE ACETONIDE 40 MG/ML
INJECTION, SUSPENSION INTRA-ARTICULAR; INTRAMUSCULAR
Status: DISCONTINUED | OUTPATIENT
Start: 2024-05-10 | End: 2024-05-10 | Stop reason: HOSPADM

## 2024-05-10 NOTE — OP NOTE
Sacroiliac Joint Injection under Fluoroscopic Guidance    The procedure, risks, benefits, and options were discussed with the patient. There are no contraindications to the procedure. The patent expressed understanding and agreed to the procedure. Informed written consent was obtained prior to the start of the procedure and can be found in the patient's chart.    PATIENT NAME: Sal Navarro   MRN: [id number]     DATE OF PROCEDURE: 05/10/2024    PROCEDURE: Bilateral Sacroiliac Joint Injection under Fluoroscopic Guidance    PRE-OP DIAGNOSIS: Sacroiliac joint dysfunction [M53.3]    POST-OP DIAGNOSIS: Same    PHYSICIAN: Alex Callahan MD    ASSISTANTS: Armond Ramachandran     MEDICATIONS INJECTED: Preservative-free Kenalog 40mg with 7cc of Bupivacine 0.25%     LOCAL ANESTHETIC INJECTED: Xylocaine 2%     SEDATION: None    ESTIMATED BLOOD LOSS: None    COMPLICATIONS: None    TECHNIQUE: Time-out was performed to identify the patient and procedure to be performed. With the patient laying in a prone position, the surgical area was prepped and draped in the usual sterile fashion using ChloraPrep and a fenestrated drape. The sacroiliac joint was determined under fluoroscopy guidance. Skin anesthesia was achieved by injecting Lidocaine 2% over the injection site. The sacroiliac joint was  then approached with a 25 gauge, 3.5 inch spinal quinke needle that was introduced under fluoroscopic guidance in the AP and Lateral views. Once the needle tip was in the area of the joint, and there was no blood, contrast dye Omnipaque (300mg/mL) was injected to confirm placement and there was no vascular runoff. Fluoroscopic imaging in the AP and lateral views revealed a clear outline of the joint space. 4 mL of the medication mixture listed above was injected slowly intraarticular and lemuel-articular. Displacement of the radio opaque contrast after injection of the medication confirmed that the medication went into the area of the joint.  The needles were removed and bleeding was nil. The same procedure was repeated on the contralateral side. A sterile dressing was applied. No specimens collected. The patient tolerated the procedure well.       The patient was monitored after the procedure in the recovery area. They were given post-procedure and discharge instructions to follow at home. The patient was discharged in a stable condition.    Armond Ramachandran MD    I reviewed and edited the fellow's note. I conducted my own interview and physical examination. I agree with the findings. I was present and supervising all critical portions of the procedure.    Alex Callahan MD

## 2024-05-10 NOTE — DISCHARGE INSTRUCTIONS

## 2024-05-10 NOTE — DISCHARGE SUMMARY
Discharge Note  Short Stay      SUMMARY     Admit Date: 5/10/2024    Attending Physician: Alex Callahan MD    Discharge Physician: Alex Callahan MD      Discharge Date: 5/10/2024 10:31 AM    Procedure(s) (LRB):  INJECTION,SACROILIAC JOINT BILATERAL (Bilateral)    Final Diagnosis: Sacroiliac joint dysfunction [M53.3]    Disposition: Home or self care    Patient Instructions:   Current Discharge Medication List        CONTINUE these medications which have NOT CHANGED    Details   acetaminophen (TYLENOL) 500 MG tablet Take 500 mg by mouth every 6 (six) hours as needed for Pain.      albuterol (PROVENTIL/VENTOLIN HFA) 90 mcg/actuation inhaler       aspirin (ECOTRIN) 81 MG EC tablet Take 81 mg by mouth once daily.      cetirizine (ZYRTEC) 10 MG tablet Take 10 mg by mouth nightly.  Refills: 5      FOLIC ACID/MULTIVIT-MIN/LUTEIN (CENTRUM SILVER ORAL) Take by mouth once daily.      gabapentin (NEURONTIN) 300 MG capsule Take 2 capsules (600 mg total) by mouth 2 (two) times daily.  Qty: 120 capsule, Refills: 5    Associated Diagnoses: Chronic pain disorder; Lumbar spondylosis; DDD (degenerative disc disease), lumbar; S/P cervical spinal fusion; Cervical radiculopathy; Myofascial pain      HYDROcodone-acetaminophen (NORCO) 7.5-325 mg per tablet Take 1 tablet by mouth every 12 (twelve) hours as needed for Pain.  Qty: 60 tablet, Refills: 0    Comments: Patient with chronic intractable pain.  Medically necessary for > 7 days  Associated Diagnoses: Chronic pain disorder; Lumbar spondylosis; DDD (degenerative disc disease), lumbar; Facet arthritis of lumbar region; Myalgia; Spondylosis of cervical region without myelopathy or radiculopathy; S/P cervical spinal fusion      levothyroxine (SYNTHROID) 50 MCG tablet Take 50 mcg by mouth once daily.      meclizine (ANTIVERT) 25 mg tablet Take 25 mg by mouth once daily.       meloxicam (MOBIC) 15 MG tablet Take 15 mg by mouth once daily.      metFORMIN (GLUCOPHAGE) 500 MG tablet  Take 1 tablet by mouth 2 (two) times a day.      methocarbamoL (ROBAXIN) 500 MG Tab Take 1 tablet (500 mg total) by mouth 2 (two) times daily.  Qty: 60 tablet, Refills: 4    Associated Diagnoses: Myofascial pain      omeprazole (PRILOSEC) 20 MG capsule Take 1 capsule (20 mg total) by mouth once daily.  Qty: 90 capsule, Refills: 3    Associated Diagnoses: Gastroesophageal reflux disease, esophagitis presence not specified      rosuvastatin (CRESTOR) 20 MG tablet Take 20 mg by mouth once daily.      valsartan (DIOVAN) 80 MG tablet Take 40 mg by mouth once daily.   Refills: 1                 Discharge Diagnosis: Sacroiliac joint dysfunction [M53.3]  Condition on Discharge: Stable with no complications to procedure   Diet on Discharge: Same as before.  Activity: as per instruction sheet.  Discharge to: Home with a responsible adult.  Follow up: 2-4 weeks       Please call my office or pager at [phone] if experienced any weakness or loss of sensation, fever > 101.5, pain uncontrolled with oral medications, persistent nausea/vomiting/or diarrhea, redness or drainage from the incisions, or any other worrisome concerns. If physician on call was not reached or could not communicate with our office for any reason please go to the nearest emergency department     Armond Ramachandran MD

## 2024-05-28 ENCOUNTER — OFFICE VISIT (OUTPATIENT)
Dept: PAIN MEDICINE | Facility: CLINIC | Age: 59
End: 2024-05-28
Payer: MEDICARE

## 2024-05-28 VITALS
BODY MASS INDEX: 31.85 KG/M2 | WEIGHT: 240.31 LBS | DIASTOLIC BLOOD PRESSURE: 73 MMHG | HEIGHT: 73 IN | HEART RATE: 75 BPM | RESPIRATION RATE: 12 BRPM | OXYGEN SATURATION: 100 % | SYSTOLIC BLOOD PRESSURE: 110 MMHG

## 2024-05-28 DIAGNOSIS — Z98.1 S/P CERVICAL SPINAL FUSION: ICD-10-CM

## 2024-05-28 DIAGNOSIS — M51.36 DDD (DEGENERATIVE DISC DISEASE), LUMBAR: ICD-10-CM

## 2024-05-28 DIAGNOSIS — M50.30 DDD (DEGENERATIVE DISC DISEASE), CERVICAL: ICD-10-CM

## 2024-05-28 DIAGNOSIS — M47.816 LUMBAR SPONDYLOSIS: ICD-10-CM

## 2024-05-28 DIAGNOSIS — M47.812 SPONDYLOSIS OF CERVICAL REGION WITHOUT MYELOPATHY OR RADICULOPATHY: ICD-10-CM

## 2024-05-28 DIAGNOSIS — M53.3 SACROILIAC JOINT DYSFUNCTION: ICD-10-CM

## 2024-05-28 DIAGNOSIS — G89.4 CHRONIC PAIN SYNDROME: Primary | ICD-10-CM

## 2024-05-28 DIAGNOSIS — Z79.891 ENCOUNTER FOR MONITORING OPIOID MAINTENANCE THERAPY: ICD-10-CM

## 2024-05-28 DIAGNOSIS — Z51.81 ENCOUNTER FOR MONITORING OPIOID MAINTENANCE THERAPY: ICD-10-CM

## 2024-05-28 PROCEDURE — 3074F SYST BP LT 130 MM HG: CPT | Mod: CPTII,S$GLB,, | Performed by: NURSE PRACTITIONER

## 2024-05-28 PROCEDURE — 1159F MED LIST DOCD IN RCRD: CPT | Mod: CPTII,S$GLB,, | Performed by: NURSE PRACTITIONER

## 2024-05-28 PROCEDURE — 3008F BODY MASS INDEX DOCD: CPT | Mod: CPTII,S$GLB,, | Performed by: NURSE PRACTITIONER

## 2024-05-28 PROCEDURE — 80307 DRUG TEST PRSMV CHEM ANLYZR: CPT | Performed by: NURSE PRACTITIONER

## 2024-05-28 PROCEDURE — 99214 OFFICE O/P EST MOD 30 MIN: CPT | Mod: S$GLB,,, | Performed by: NURSE PRACTITIONER

## 2024-05-28 PROCEDURE — 3078F DIAST BP <80 MM HG: CPT | Mod: CPTII,S$GLB,, | Performed by: NURSE PRACTITIONER

## 2024-05-28 PROCEDURE — 4010F ACE/ARB THERAPY RXD/TAKEN: CPT | Mod: CPTII,S$GLB,, | Performed by: NURSE PRACTITIONER

## 2024-05-28 PROCEDURE — 1160F RVW MEDS BY RX/DR IN RCRD: CPT | Mod: CPTII,S$GLB,, | Performed by: NURSE PRACTITIONER

## 2024-05-28 PROCEDURE — 99999 PR PBB SHADOW E&M-EST. PATIENT-LVL IV: CPT | Mod: PBBFAC,,, | Performed by: NURSE PRACTITIONER

## 2024-05-28 PROCEDURE — 80326 AMPHETAMINES 5 OR MORE: CPT | Performed by: NURSE PRACTITIONER

## 2024-05-28 NOTE — PROGRESS NOTES
Chronic patient Established Note (Follow up visit)      Interval History 5/28/2024:  The patient returns to clinic today for follow up of neck and back pain. He is s/p bilateral SI joint injections on 5/10/2024. He reports 50% relief. He reports right sided low back pain, higher than injection sites. He also report left sided low back pain. This pain is worse with prolonged activity. He does endorse stiffness. He denies any radicular leg pain. He reports intermittent neck pain. He is taking Gabapentin and Robaxin. He takes Norco sparingly for severe pain. His last dose was yesterday. He denies any other health changes. His pain today is 6/10.    Interval History 4/22/2024:  The patient returns to clinic today for follow up of neck and back pain. He reports increased low back pain. He reports low back and buttock pain. He denies any radicular leg pain. His pain is worse with prolonged sitting. He also reports increased pain with moving from sitting to standing. He reports intermittent neck pain. He is having increased left elbow pain. His wife notes that he does drop objects from the left hand. He is taking Gabapentin and Robaxin. He is taking Norco as needed for severe pain. He denies any adverse effects. He denies any other health changes. His pain today is 7/10.    Interval History 10/11/2023:  The patient returns to clinic today for follow up of neck and back pain. He is s/p left C4,5,6,7 RFA on 9/15/2023. He is s/p right C4,5,6,7 RFA on 9/29/2023. He reports 50% relief of his pain. He does report intermittent neck pain. He also reports left sided anterior neck and upper chest pain. This is tight in nature. He reports increased low back pain. He denies any radicular leg pain. This pain is worse with prolonged activity. He does have intermittent left abdominal pain. He feels a knot in the area. This pain is relieved with sitting or laying down. He did have a recent fall in his garden. He continues to take  Gabapentin and Robaxin. He also takes Norco as needed for severe pain. He takes this sparingly without adverse effects. He denies any other health changes. His pain today is 4/10.    Interval History 8/11/2023:  The patient returns to clinic today for follow up of back pain. He is s/p bilateral SI joint injections on 7/27/2023. He reports 50-60% relief of his buttock pain. He reports worsened neck pain over the last month. He does report some pain into the left shoulder. He denies any radicular arm pain. His pain is worse with turning his head to the side. He does endorse stiffness. He continues to report intermittent left abdominal pain. This pain is worse with prolonged activity. He also notes 2 lumps in the area, especially at the end of the day. He is taking Gabapentin and Robaxin. He also takes Norco as needed for severe pain sparingly. He denies any other health changes. His pain today is 6/10.    Interval History 7/5/2023:  The patient returns to clinic today for follow up of back pain. He reports increased low back and buttock pain. He denies any radicular leg pain. His pain is worse with prolonged sitting, moving from sitting to standing, and prolonged activity. He continues to report intermittent left abdominal pain. He also reports increased neck pain. He is taking Gabapentin and Robaxin. He also takes Norco as needed for severe pain. He denies any adverse effects. His pain today is 6/10.    Interval History 3/21/2023:  The patient returns to clinic today for follow up of back pain. He is s/p left L3,4,5 RFA on 2/24/2023 and right L3,4,5 RFA on 3/10/2023. He is unsure of relief at this time. He reports increased left sided low back pain over the last two weeks. 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 He did feel a pop. He thought he pulled a muscle, but pain has been worsening. He reports left sided low back pain that radiates into the left hip and lower abdomen. He also  reports intermittent radiating pain into the posterior left leg. He describes this pain as numb and tingling in nature. His pain is worse with activity. He does endorse muscle spasms. He is taking Gabapentin and Robaxin. He has had to take more Norco recently due to increased pain. He denies any weakness. He denies any other health changes. His pain today is 8/10.    Interval History 1/31/2023:  The patient returns to clinic today for follow up of neck and back pain. He is s/p left C4,5,6,7 on 12/15/2022 and right C4,5,6,7 RFA on 12/29/2022. He reports 50% relief of his neck pain. He reports intermittent neck pain, left side greater than right. He denies any radicular arm pain. He does report pain into the shoulder blades. He reports increased low back pain. He denies any radicular leg pain. His pain is worse with standing, walking, and activity. He does endorse stiffness. He is taking Gabapentin and Robaxin. He also takes Norco sparingly with benefit. He denies any other health changes. His pain today is 6/10.    Interval History 11/18/2022:  The patient returns to clinic today for follow up of neck and back pain. He is s/p bilateral SI joint injections on 11/4/2022. He reports 60% relief of his low back and buttock pain. He continues to report low back pain. He denies any radicular leg pain. He reports increased neck pain, left side greater than right. He denies any radicular arm. He does report some pain into his shoulder blades. His pain is worse with activity. He continues to take Gabapentin and Robaxin. He continues to take Norco as needed sparingly with benefit. He denies any other health changes. His pain today is 7/10.    Interval History 10/27/2022:  The patient returns to clinic today for follow up of neck and back pain. He reports increased low back and bilateral buttock pain. He denies any radicular leg pain. His pain is worse with prolonged sitting and moving from sitting to standing. He does endorse a  fall recently while getting out of the vehicle onto his side. He continues to report neck pain. He continues to take Gabapentin and Robaxin. He continues to take Norco sparingly with benefit and without adverse effects. He denies any other health changes. His pain today is 6/10.    Interval History 8/2/2022:  The aptient returns to clinic today for follow up of neck and back pain. He is s/p bilateral SI joint injection on 7/14/2022. He reports 50-60% relief of his low back and buttock pain. He reports intermittent low back and buttock pain. He reports increased neck pain, left side greater than right. He denies any radicular arm pain. He has good days and bad days. He continues to take Gabapentin. He continues to take Norco sparingly with benefit and without adverse effects. He denies any other health changes. His pain today is 5/10.    Interval History 7/1/2022:  The patient returns to clinic today for follow up of neck and back pain. He reports increased low back and buttock pain.His pain is worse with prolonged sitting and moving from sitting to standing. He also reports increased pain with bending over. He does report intermittent radiating pain into his right posterior thigh stopping at mid thigh. He does report a fall, which was sitting hard on the ground about a week ago. He denies any acute injury. He continues to take Gabapentin and Norco. He denies any other health changes. His pain today is 6/10.     Interval History 5/9/2022:  The patient returns to clinic today for follow up of back pain. He is s/p right L4/5 and L5/S1 TF NGUYEN on 4/14/2022. He reports 50-60% relief of his low back and leg pain. He has been more active since the procedure. He continues to report low back and hip pain, worse with bending and activity. He denies any radicular leg pain. He continues to repot intermittent neck pain. He continues to take Gabapentin with benefit. He continues to take Norco sparingly as needed. He denies any  "other health changes. His pain today is 5/10.    Interval History 4/1/2022:  The patient returns to clinic today for follow up of back pain. He is s/p left L3,4,5 RFA on 2/25/2022 and right L3,4,5 RFA on 3/11/2022. He reports relief of his back pain. He reports increased back pain that radiates into the posterior aspect of his right leg to his knee. He denies any left leg pain. His pain is worse with prolonged walking. He reports that he sometimes drags his right leg. He denies any falls. He denies any bowel or bladder incontinence. He continues to take Gabapentin and Norco as needed with benefit. He denies any adverse effects. He denies any other health changes. His pain today is 7/10.    Interval History 2/15/2022:  The patient returns to clinic today for follow up of neck and back pain. He reports increased low back pain over the last two weeks. He describes this pain as sharp and aching in nature. He does report intermittent "catching" pain in his lower back, worse with getting out of bed. He denies any radicular leg pain. He continues to report benefit from previous cervical procedures. He reports intermittent neck pain. He continues to take Gabapentin with benefit. He continues to take Norco as needed with benefit and without adverse effects. He denies any other health changes. His pain today is 6/10.    Interval History 11/5/2021:  The patient returns to clinic today for follow up of neck and back pain. He is s/p left C4,5,6,7 RFA on 10/1/2021 and right C4,5,6,7 RFA on 10/15/2021. He reports 50% relief of his neck pain. He reports increased burning and itching pain to his skin near injection sites. He denies any radicular arm pain. He does report increased left sided muscle pain. He is concerned that one of the screws in his cervical hardware is moving. He continues to report benefit with previous lumbar procedures. He reports intermittent low back pain without radicular leg pain. He continues to take " Gabapentin with benefit. He continues to take Norco as needed with benefit and without adverse effects. He denies any weakness. He denies any other health changes. His pain today is 5/10.    Interval History 9/17/2021:  The patient returns to clinic today for follow up of neck and back pain. He reports increased neck pain. He denies any radicular arm pain today. He does report intermittent radiating pain into his left shoulder blade and mid back. His pain is worse with activity. He reports that sometimes his pain takes his breath away. He continues to report low back pain, that is tolerable at this time. He continues to take Gabapentin and Norco with benefit and without adverse effects. He denies any other health changes. His pain today is 6/10.    Interval History 6/17/2021:  The patient returns to clinic today for follow up of neck and back pain. He is s/p right L3,4,5 RFA on 4/23/2021 and left L3,4,5 RFA on 5/7/2021. He reports 50% relief of his low back pain. He continues to report intermittent low back pain. He denies any radicular leg pain. He continues to report neck pain, especially in between the scapula. His pain is worse with prolonged standing, walking, and activity. He continues to take Gabapentin with benefit. He continues to take Norco sparingly for severe pain with benefit and without adverse effects. He denies any other health changes. His pain today is 4/10.    Interval History 4/9/2021:  The patient returns to clinic today for follow up of neck and back pain. He is s/p left C4,5,6,7 RFA on 3/9/2021 and right C4,5,6,7 RFA on 3/26/2021. He reports 50% relief of his neck pain. He reports intermittent neck pain. He has good days and bad days. He continues to report low back pain that is constant, sharp, and aching. He reports intermittent radiating pain into the lateral aspect of his left leg to his knee. He continues to take Gabapentin with benefit. He continues to take Norco as needed sparingly for  "severe pain. He denies any adverse effects. He denies any other health changes. His pain today is 5/10.    Interval History 3/2/2021:  Sal Navarro presents to the clinic for a follow-up appointment for neck pain . Since the last visit, Sal Navarro states the pain has been worsening. Current pain intensity is 6/10.  Was seen by Angelique Barahona NP on 2/12/2021.     Interval History 2/12/2021:  The patient returns to clinic today for follow up of back pain. He has not been seen in over a year. He did not have imaging due to coronavirus outbreak. He would to reschedule this. He was also considering SCS trial prior to the pandemic. He continues to report low back pain that radiates into the lateral aspect of his left leg to his knee. He denies any radicular right leg pain. His pain is worse with bending and walking. He continues to take Gabapentin with benefit. He also takes Norco as needed for severe pain. He denies any adverse effects. He denies any other health changes. His pain today is 6/10.    HPI:  Sal Navarro is a 56 y.o. male who presents today s/p C4-7 ACDF with C5/6 PSIF in 2/2016 with residual chronic midline neck pain that radiates into his shoulder blades bilaterally, R>>L.  This is associated with bilateral hand numbness and tingling.  The hand symptoms did improve following the surgery.  The shooting pains in his arms resolved completely.   This pain is described in detail below.  His post-operative course was complicated by dysphagia that is being treated with speech therapy.  The numbness and the tingling were relieved by the surgery. Now experiences "deep pain along the spine"  Patient has not been seen for over a year, had to re-schedule his imaging studies due to pandemic(now completed). Prior to the pandemic, he was considering a SCS.   Only time he gets relief is laying in bed on a very thin pillow, but that doesn't last very long.   Pain aggravated by movement and even " breathing/playing with his grandchild/holding the grandchild's hand. Heat and massage (has a vest) are helpful.   Was seen by Dr. Jan srivastava and received multiple EIS and RFAs.   Compliant with his medication.     Pain Disability Index Review:      5/28/2024     2:42 PM 4/22/2024    10:21 AM 8/11/2023     3:01 PM   Last 3 PDI Scores   Pain Disability Index (PDI) 43 21 18       Pain Medications:  Gabapentin  Norco sparingly  Robaxin    Opioid Contract: yes     report:  Reviewed and consistent with medication use as prescribed.    Pain Procedures:   5/10/2024- Bilateral SI joint injections.   7/27/2023- Bilateral SI joint injections  3/10/2023- Right L3,4,5 RFA- 50% relief  2/24/2023- Left L3,4,5 RFA- relief  12/29/2022- Right C4,5,6,7 RFA  12/15/2022- Left C4,5,6,7 RFA  11/4/2022- Bilateral SI joint injections- 60% relief   4/14/2022- Right L4/5 and L5/S1 TF NGUYEN  3/11/2022- Right L3,4,5 RFA  2/25/2022- Left L3,4,5 RFA  10/15/2021- Right C4,5,6,7 RFA  10/1/2021- Left C4,5,6,7 RFA  5/7/2021- Left L3,4,5 RFA   4/23/2021- Right L3,4,5 RFA  3/26/2021- Right C4,5,6,7 RFA  3/9/2021- Left C4,5,6,7 RFA  10/11/19: L4/5 Interlaminar Epidural Steroid Injection  06/13/19- Left C4-7 RFA  05/03/19:Left L2-5 Lumbar Medial Branch Nerve Thermal Radiofrequency Ablation  12/21/18:Right L2-5 Lumbar Medial Branch Nerve Thermal Radiofrequency Ablation  11/23/18:Cervical Thermal Radiofreqiency Ablation: Right C4-7  09/14/18:C7/[id number] Steroid Injection  06/29/18:LL2/3 Interlaminar Epidural Steroid Injection   03/06/18:L2/3 Interlaminar Epidural Steroid Injection   09/26/17:Bilateral L2-5 Lumbar Medial Branch Nerve Coolief Thermal Radiofrequency Ablation  08/31/17:Bilateral L2-5 Lumbar medial branch blocks   03/28/17:Cervical Conventional Radiofreqiency Ablation: Left C4-7  03/14/17:Cervical Conventional Radiofreqiency Ablation: Right C4-7  10/25/16:Cervical Conventional Radiofreqiency Ablation: Left C4-7  10/11/16: Cervical  Conventional Radiofreqiency Ablation: Right C4-7  10/04/16- Cervical Medial Branch Blocks, Bilateral C4-7.     08/31/16:C7/[id number] Steroid Injection    Physical Therapy/Home Exercise: does home exercises now, but not in formal PT.    - 2019 was last time in PT     Imaging:   MRI Lumbar Spine 2/25/2021:  COMPARISON:  07/11/2017     FINDINGS:  The vertebral bodies maintain normal height, signal intensity and alignment.  There is redemonstration of few hemangiomas at multiple levels.  The intervertebral disc spaces are preserved although there is some disc desiccation at multiple levels.  The conus terminates at level L1.  Evaluation of the localizer images and structures surrounding the lumbar spine shows no abnormalities.     L1-L2: No significant disc or joint pathology.     L2-L3: Mild diffuse disc bulge with mild bilateral facet arthropathy and ligamentum flavum hypertrophy results in mild central canal stenosis.  Mild bilateral neural foraminal stenosis is also identified.     L3-L4: There is a diffuse disc bulge with no significant facet arthropathy or ligamentum flavum hypertrophy.  There is mild central canal stenosis and mild bilateral neural foraminal stenosis.     L4-L5: There is a diffuse disc bulge with ligamentum flavum hypertrophy.  No significant facet arthropathy.  There is mild central canal stenosis with only minimal encroachment on the left neural foramen.  Mild right neural foraminal stenosis is present.     L5-S1: There is a diffuse disc bulge with mild to moderate right facet arthropathy.  No left facet arthropathy.  The ligamentum flavum is normal.  The central canal is patent and the neural foramina are largely patent.  Incidental note is made of an annulus fibrosus tear posteriorly measuring approximately 2 mm.     Impression:     Multilevel degenerative disc and joint disease which is mild and results in mild central canal and neural foraminal stenosis as outlined above.    Xray Cervical  Spine 2/25/2021:  COMPARISON:  07/07/2017 .     FINDINGS:  The patient has undergone ACDF from C4 through C7. The instrumentation is intact without evidence of complication.  The vertebral bodies maintain normal height and alignment. The remaining intervertebral disc spaces are preserved.  No significant uncovertebral joint hypertrophy.  The prevertebral soft tissues, odontoid views and lung apices are normal. The neural foramen are patent     Impression:     Postsurgical changes to the thoracic spine without additional evidence for degenerative disc or joint disease.     Allergies:   Review of patient's allergies indicates:   Allergen Reactions    Pcn [penicillins] Hives and Rash    Lipitor [atorvastatin] Other (See Comments)     Muscle cramps, weakness       Current Medications:   Current Outpatient Medications   Medication Sig Dispense Refill    acetaminophen (TYLENOL) 500 MG tablet Take 500 mg by mouth every 6 (six) hours as needed for Pain.      albuterol (PROVENTIL/VENTOLIN HFA) 90 mcg/actuation inhaler       aspirin (ECOTRIN) 81 MG EC tablet Take 81 mg by mouth once daily.      cetirizine (ZYRTEC) 10 MG tablet Take 10 mg by mouth nightly.  5    FOLIC ACID/MULTIVIT-MIN/LUTEIN (CENTRUM SILVER ORAL) Take by mouth once daily.      HYDROcodone-acetaminophen (NORCO) 7.5-325 mg per tablet Take 1 tablet by mouth every 12 (twelve) hours as needed for Pain. 60 tablet 0    levothyroxine (SYNTHROID) 50 MCG tablet Take 50 mcg by mouth once daily.      meclizine (ANTIVERT) 25 mg tablet Take 25 mg by mouth once daily.       meloxicam (MOBIC) 15 MG tablet Take 15 mg by mouth once daily.      metFORMIN (GLUCOPHAGE) 500 MG tablet Take 1 tablet by mouth 2 (two) times a day.      methocarbamoL (ROBAXIN) 500 MG Tab Take 1 tablet (500 mg total) by mouth 2 (two) times daily. 60 tablet 4    omeprazole (PRILOSEC) 20 MG capsule Take 1 capsule (20 mg total) by mouth once daily. 90 capsule 3    rosuvastatin (CRESTOR) 20 MG tablet Take  20 mg by mouth once daily.      valsartan (DIOVAN) 80 MG tablet Take 40 mg by mouth once daily.   1    gabapentin (NEURONTIN) 300 MG capsule Take 2 capsules (600 mg total) by mouth 2 (two) times daily. 120 capsule 5     No current facility-administered medications for this visit.       REVIEW OF SYSTEMS:    GENERAL:  No weight loss, malaise or fevers.  HEENT:  Negative for frequent or significant headaches.  NECK:  Negative for lumps, goiter  and significant neck swelling.   RESPIRATORY:  Negative for cough, wheezing or shortness of breath.  CARDIOVASCULAR:  Negative for chest pain, leg swelling or palpitations. HTN  GI:  Negative for abdominal discomfort, blood in stools or black stools or change in bowel habits.  MUSCULOSKELETAL:  See HPI.  SKIN:  Negative for lesions, rash, and itching.  PSYCH:  Negative for sleep disturbance, mood disorder and recent psychosocial stressors.  HEMATOLOGY/LYMPHOLOGY:  Negative for prolonged bleeding, bruising easily or swollen nodes.  NEURO:   No history of headaches, syncope, paralysis, seizures or tremors.   ENDO: Thyroid disorder.   All other reviewed and negative other than HPI.    Past Medical History:  Past Medical History:   Diagnosis Date    Arthritis     Cataract     Cervical back pain with evidence of disc disease     Hiatal hernia     Hypertension     Thyroid disease        Past Surgical History:  Past Surgical History:   Procedure Laterality Date    ACROMIOCLAVICULAR JOINT CYST EXCISION Right     BACK SURGERY      cataracts Bilateral     CERVICAL FUSION      COLONOSCOPY N/A 4/27/2018    Procedure: COLONOSCOPY;  Surgeon: Giovani Medeiros MD;  Location: [address]);  Service: Endoscopy;  Laterality: N/A;    EPIDURAL STEROID INJECTION N/A 10/11/2019    Procedure: INJECTION, STEROID, EPIDURAL;  Surgeon: Kasandra Montoya MD;  Location: [facility];  Service: Pain Management;  Laterality: N/A;  Lumbar NGUYEN L4/5    NGUYEN      EYE SURGERY      INJECTION OF JOINT  Bilateral 7/14/2022    Procedure: INJECTION, JOINT, SI BILATERAL  needs consent;  Surgeon: Alex Callahan MD;  Location: [facility];  Service: Pain Management;  Laterality: Bilateral;    INJECTION, SACROILIAC JOINT Bilateral 11/4/2022    Procedure: INJECTION,SACROILIAC JOINT BILATERAL CONTRAST;  Surgeon: Alex Callahan MD;  Location: [facility];  Service: Pain Management;  Laterality: Bilateral;    INJECTION, SACROILIAC JOINT Bilateral 7/27/2023    Procedure: INJECTION,SACROILIAC JOINT, BILATERAL SOONER DATE;  Surgeon: Alex Callahan MD;  Location: [facility];  Service: Pain Management;  Laterality: Bilateral;    INJECTION, SACROILIAC JOINT Bilateral 5/10/2024    Procedure: INJECTION,SACROILIAC JOINT BILATERAL;  Surgeon: Alex Callahan MD;  Location: [facility];  Service: Pain Management;  Laterality: Bilateral;  [phone]  2 WK F/U NICK    RADIOFREQUENCY ABLATION  10/2016    cervical spine/Jan    RADIOFREQUENCY ABLATION Left 5/3/2019    Procedure: RADIOFREQUENCY ABLATION, L2-L5 MEDIALBRANCH;  Surgeon: Kasandra Montoya MD;  Location: [facility];  Service: Pain Management;  Laterality: Left;    RADIOFREQUENCY ABLATION Left 6/13/2019    Procedure: RADIOFREQUENCY ABLATION C4-7;  Surgeon: Kasandra Montoya MD;  Location: [facility];  Service: Pain Management;  Laterality: Left;    RADIOFREQUENCY ABLATION Left 3/9/2021    Procedure: RADIOFREQUENCY ABLATION C4,C5,C6,C7;  Surgeon: Alex Callahan MD;  Location: [facility];  Service: Pain Management;  Laterality: Left;  1 of 2    RADIOFREQUENCY ABLATION Right 3/26/2021    Procedure: RADIOFREQUENCY ABLATION C4,C6,C6,C7;  Surgeon: Alex Callahan MD;  Location: [facility];  Service: Pain Management;  Laterality: Right;  2 of 2    RADIOFREQUENCY ABLATION Right 4/23/2021    Procedure: RADIOFREQUENCY ABLATION L3,4,5;  Surgeon: Alex Callahan MD;  Location: [facility];  Service: Pain Management;  Laterality: Right;  1 of 2     RADIOFREQUENCY ABLATION Left 5/7/2021    Procedure: RADIOFREQUENCY ABLATION L3,4,5;  Surgeon: Alex Callahan MD;  Location: [facility];  Service: Pain Management;  Laterality: Left;  2 of 2    RADIOFREQUENCY ABLATION Left 10/1/2021    Procedure: RADIOFREQUENCY ABLATION LEFT, C4,5,6,7 1 of 2 CONSENT NEEDED;  Surgeon: Alex Callahan MD;  Location: [facility];  Service: Pain Management;  Laterality: Left;    RADIOFREQUENCY ABLATION Right 10/15/2021    Procedure: RADIOFREQUENCY ABLATION  RIGHT C4,5,6,7 2 of 2 CONSENT NEEDED;  Surgeon: Alex Callahan MD;  Location: [facility];  Service: Pain Management;  Laterality: Right;    RADIOFREQUENCY ABLATION Left 2/25/2022    Procedure: RADIOFREQUENCY ABLATION LEFT L3,4,5 1 of 2, needs consent;  Surgeon: Alex Callahan MD;  Location: [facility];  Service: Pain Management;  Laterality: Left;    RADIOFREQUENCY ABLATION Right 3/11/2022    Procedure: RADIOFREQUENCY ABLATION RIGHT L3,4,5 2 of 2, needs consent;  Surgeon: Alex Callahan MD;  Location: [facility];  Service: Pain Management;  Laterality: Right;    RADIOFREQUENCY ABLATION Left 12/15/2022    Procedure: RADIOFREQUENCY ABLATION LEFT C4, C5, C6, C7  ONE OF TWO NEEDS CONSENT;  Surgeon: Alex Callahan MD;  Location: [facility];  Service: Pain Management;  Laterality: Left;    RADIOFREQUENCY ABLATION Right 12/29/2022    Procedure: RADIOFREQUENCY ABLATION RIGHT C4, C5, C6, C7  TWO OF TWO NEEDS CONSENT;  Surgeon: Alex Callahan MD;  Location: [facility];  Service: Pain Management;  Laterality: Right;    RADIOFREQUENCY ABLATION Left 2/24/2023    Procedure: RADIOFREQUENCY ABLATION LEFT L3,L4,L5;  Surgeon: Alex Callahan MD;  Location: [facility];  Service: Pain Management;  Laterality: Left;    RADIOFREQUENCY ABLATION Right 3/10/2023    Procedure: RADIOFREQUENCY ABLATION RIGHT L3,L4,L5;  Surgeon: Alex Callahan MD;  Location: [facility] [place];  Service: Pain Management;  Laterality: Right;     "RADIOFREQUENCY ABLATION Left 9/15/2023    Procedure: RADIOFREQUENCY ABLATION, LEFT C4-C5-C6-C7 MEDIAL BRANCH ONE OF TWO  SOONER DATE;  Surgeon: Alex Callahan MD;  Location: [facility];  Service: Pain Management;  Laterality: Left;    RADIOFREQUENCY ABLATION Right 9/29/2023    Procedure: RADIOFREQUENCY ABLATION,  RIGHT C4-C5-C6-C7 MEDIAL BRANCH TWO OF TWO SOONER DATE;  Surgeon: Alex Callahan MD;  Location: [facility];  Service: Pain Management;  Laterality: Right;    RETINAL DETACHMENT SURGERY      ROTATOR CUFF REPAIR Right     SPINE SURGERY      cerival fusion     TRANSFORAMINAL EPIDURAL INJECTION OF STEROID Right 4/14/2022    Procedure: INJECTION, STEROID, EPIDURAL, TRANSFORAMINAL APPROACH RIGHT L4/5 & L5/S1 Needs Consent;  Surgeon: Alex Callahan MD;  Location: [facility];  Service: Pain Management;  Laterality: Right;    TRIGGER POINT INJECTION Left 6/13/2019    Procedure: INJECTION, TRIGGER POINT;  Surgeon: Kasandra Montoya MD;  Location: [facility];  Service: Pain Management;  Laterality: Left;       Family History:  Family History   Problem Relation Name Age of Onset    Heart disease Mother      Cancer Father      Leukemia Brother      Colon cancer Neg Hx      Celiac disease Neg Hx      Crohn's disease Neg Hx      Esophageal cancer Neg Hx      Inflammatory bowel disease Neg Hx      Irritable bowel syndrome Neg Hx      Liver cancer Neg Hx      Rectal cancer Neg Hx      Stomach cancer Neg Hx      Ulcerative colitis Neg Hx         Social History:  Social History     Socioeconomic History    Marital status:    Tobacco Use    Smoking status: Never    Smokeless tobacco: Never   Substance and Sexual Activity    Alcohol use: No    Drug use: No       OBJECTIVE:    /73 (BP Location: Right arm, Patient Position: Sitting, BP Method: Medium (Automatic))   Pulse 75   Resp 12   Ht 6' 1" (1.854 m)   Wt 109 kg (240 lb 4.8 oz)   SpO2 100%   BMI 31.70 kg/m²     PHYSICAL " EXAMINATION:    General appearance: Well appearing, in no acute distress, alert and oriented x3.  Psych:  Mood and affect appropriate.  Skin: Skin color, texture, turgor normal, no rashes or lesions, in both upper and lower body.  Head/face:  Atraumatic, normocephalic. No palpable lymph nodes   Neck: There is mild pain with palpation over cervical paraspinals and facet joints bilaterally.   Cor: RRR  Pulm: Symmetric chest rise, no respiratory distress noted.   Abdomen: Soft, pain with palpation over LUQ.  Back: Straight leg raising in the sitting position is negative for radicular leg pain bilaterally. There is pain with palpation over lumbar paraspinals and facet joints bilaterally, right greater than left. Limited ROM with pain on extension. Positive facet loading bilaterally.   Extremities: No deformities, edema, or skin discoloration. Good capillary refill.  Musculoskeletal: There is mild pain with palpation over bilateral SI joints. Bilateral upper and lower extremity strength is normal and symmetric. No atrophy or tone abnormalities are noted.  Neuro:  No loss of sensation is noted.  Gait: Antalgic, ambulates with cane for assistance     ASSESSMENT: 59 y.o. year old male with neck and lumbar pain, consistent with      1. Chronic pain syndrome        2. Lumbar spondylosis        3. DDD (degenerative disc disease), lumbar        4. Sacroiliac joint dysfunction        5. S/P cervical spinal fusion        6. Spondylosis of cervical region without myelopathy or radiculopathy        7. DDD (degenerative disc disease), cervical        8. Encounter for monitoring opioid maintenance therapy  [facility] Drug Screen              PLAN:     - Previous imaging reviewed today.    - He is s/p bilateral SI joint injections with benefit. We can repeat this as needed.     -Schedule for right then left L3,4,5 RFA, one side at a time, two weeks apart.   The patient did previously have bilateral RFAs from L3 to L5 in the past with  50% relief for 1 year(s). During that time, the patients functional ability improved and was able to be more active without significant limitation by pain. Current pain is axial and non-radiating.     - I have stressed the importance of physical activity and a home exercise plan to help with pain and improve health.    - Continue Gabapentin.     - Continue Robaxin 500 mg PRN muscle pain.     - Pain contract signed 2/12/2021.     - Continue Norco 7.5/325 mg BID PRN. He does not need a refill.      - The patient is here today for a refill of current pain medications and they believe these provide effective pain control and improvements in quality of life.  The patient notes no serious side effects, and feels the benefits outweigh the risks.  The patient was reminded of the pain contract that they signed previously as well as the risks and benefits of the medication including possible death.  The updated [place] [employer] prescription monitoring program was reviewed, and the patient has been found to be compliant with current treatment plan. Medication management provided by Dr. Callahan.     - UDS from 1/31/2023 reviewed, negative for medications, he does take sparingly. Repeat UDS today.     - Continue home exercise routine.     - RTC 3 weeks after above procedure.     The above plan and management options were discussed at length with patient. Patient is in agreement with the above and verbalized understanding.    Angelique Barahona NP  05/28/2024

## 2024-06-01 LAB
1OH-MIDAZOLAM UR QL SCN: NOT DETECTED
6MAM UR QL: NOT DETECTED
7AMINOCLONAZEPAM UR QL: NOT DETECTED
A-OH ALPRAZ UR QL: NOT DETECTED
ALPRAZ UR QL: NOT DETECTED
AMPHET UR QL SCN: NOT DETECTED
ANNOTATION COMMENT IMP: NORMAL
BARBITURATES UR QL: NEGATIVE
BUPRENORPHINE UR QL: NOT DETECTED
BZE UR QL: NEGATIVE
CARBOXYTHC UR QL: NEGATIVE
CARISOPRODOL UR QL: NEGATIVE
CLONAZEPAM UR QL: NOT DETECTED
CODEINE UR QL: NOT DETECTED
CREAT UR-MCNC: 158 MG/DL (ref 20–400)
DIAZEPAM UR QL: NOT DETECTED
ETHYL GLUCURONIDE UR QL: NEGATIVE
FENTANYL UR QL: NOT DETECTED
GABAPENTIN UR QL CFM: PRESENT
HYDROCODONE UR QL: NOT DETECTED
HYDROMORPHONE UR QL: NOT DETECTED
LORAZEPAM UR QL: NOT DETECTED
MDA UR QL: NOT DETECTED
MDEA UR QL: NOT DETECTED
MDMA UR QL: NOT DETECTED
ME-PHENIDATE UR QL: NOT DETECTED
METHADONE UR QL: NEGATIVE
METHAMPHET UR QL: NOT DETECTED
MIDAZOLAM UR QL SCN: NOT DETECTED
MORPHINE UR QL: NOT DETECTED
NALOXONE UR QL CFM: NOT DETECTED
NORBUPRENORPHINE UR QL CFM: NOT DETECTED
NORDIAZEPAM UR QL: NOT DETECTED
NORFENTANYL UR QL: NOT DETECTED
NORHYDROCODONE UR QL CFM: NOT DETECTED
NORMEPERIDINE UR QL CFM: NOT DETECTED
NOROXYCODONE UR QL CFM: NOT DETECTED
NOROXYMORPHONE UR QL SCN: NOT DETECTED
OXAZEPAM UR QL: NOT DETECTED
OXYCODONE UR QL: NOT DETECTED
OXYMORPHONE UR QL: NOT DETECTED
PATHOLOGY STUDY: NORMAL
PCP UR QL: NEGATIVE
PHENTERMINE UR QL: NOT DETECTED
PREGABALIN UR QL CFM: NOT DETECTED
SERVICE CMNT-IMP: NORMAL
TAPENTADOL UR QL SCN: NOT DETECTED
TAPENTADOL UR QL SCN: NOT DETECTED
TEMAZEPAM UR QL: NOT DETECTED
TRAMADOL UR QL: NEGATIVE
ZOLPIDEM PHENYL-4-CARB UR QL SCN: NOT DETECTED
ZOLPIDEM UR QL: NOT DETECTED

## 2024-06-18 ENCOUNTER — PATIENT MESSAGE (OUTPATIENT)
Dept: PAIN MEDICINE | Facility: CLINIC | Age: 59
End: 2024-06-18
Payer: MEDICARE

## 2024-06-18 DIAGNOSIS — M47.816 LUMBAR SPONDYLOSIS: Primary | ICD-10-CM

## 2024-06-19 ENCOUNTER — PATIENT MESSAGE (OUTPATIENT)
Dept: PAIN MEDICINE | Facility: OTHER | Age: 59
End: 2024-06-19
Payer: MEDICARE

## 2024-06-19 DIAGNOSIS — M47.816 LUMBAR SPONDYLOSIS: Primary | ICD-10-CM

## 2024-07-18 ENCOUNTER — HOSPITAL ENCOUNTER (OUTPATIENT)
Facility: OTHER | Age: 59
Discharge: HOME | End: 2024-07-18
Attending: ANESTHESIOLOGY | Admitting: ANESTHESIOLOGY
Payer: MEDICARE

## 2024-07-18 VITALS
HEIGHT: 73 IN | SYSTOLIC BLOOD PRESSURE: 136 MMHG | TEMPERATURE: 98 F | OXYGEN SATURATION: 96 % | HEART RATE: 53 BPM | BODY MASS INDEX: 31.81 KG/M2 | DIASTOLIC BLOOD PRESSURE: 86 MMHG | WEIGHT: 240 LBS | RESPIRATION RATE: 16 BRPM

## 2024-07-18 DIAGNOSIS — M47.816 LUMBAR SPONDYLOSIS: Primary | ICD-10-CM

## 2024-07-18 DIAGNOSIS — G89.29 CHRONIC PAIN: ICD-10-CM

## 2024-07-18 LAB — POCT GLUCOSE: 98 MG/DL (ref 70–110)

## 2024-07-18 PROCEDURE — 25000003 PHARM REV CODE 250: Performed by: STUDENT IN AN ORGANIZED HEALTH CARE EDUCATION/TRAINING PROGRAM

## 2024-07-18 PROCEDURE — 64635 DESTROY LUMB/SAC FACET JNT: CPT | Mod: 50 | Performed by: ANESTHESIOLOGY

## 2024-07-18 PROCEDURE — 64636 DESTROY L/S FACET JNT ADDL: CPT | Mod: 50 | Performed by: ANESTHESIOLOGY

## 2024-07-18 PROCEDURE — 64636 DESTROY L/S FACET JNT ADDL: CPT | Mod: 50,,, | Performed by: ANESTHESIOLOGY

## 2024-07-18 PROCEDURE — 63600175 PHARM REV CODE 636 W HCPCS: Performed by: ANESTHESIOLOGY

## 2024-07-18 PROCEDURE — 64635 DESTROY LUMB/SAC FACET JNT: CPT | Mod: 50,,, | Performed by: ANESTHESIOLOGY

## 2024-07-18 PROCEDURE — 99152 MOD SED SAME PHYS/QHP 5/>YRS: CPT | Performed by: ANESTHESIOLOGY

## 2024-07-18 PROCEDURE — 25000003 PHARM REV CODE 250: Performed by: ANESTHESIOLOGY

## 2024-07-18 RX ORDER — BUPIVACAINE HYDROCHLORIDE 2.5 MG/ML
INJECTION, SOLUTION EPIDURAL; INFILTRATION; INTRACAUDAL
Status: DISCONTINUED | OUTPATIENT
Start: 2024-07-18 | End: 2024-07-18 | Stop reason: HOSPADM

## 2024-07-18 RX ORDER — MIDAZOLAM HYDROCHLORIDE 1 MG/ML
INJECTION INTRAMUSCULAR; INTRAVENOUS
Status: DISCONTINUED | OUTPATIENT
Start: 2024-07-18 | End: 2024-07-18 | Stop reason: HOSPADM

## 2024-07-18 RX ORDER — DEXAMETHASONE SODIUM PHOSPHATE 10 MG/ML
INJECTION INTRAMUSCULAR; INTRAVENOUS
Status: DISCONTINUED | OUTPATIENT
Start: 2024-07-18 | End: 2024-07-18 | Stop reason: HOSPADM

## 2024-07-18 RX ORDER — LIDOCAINE HYDROCHLORIDE 20 MG/ML
INJECTION, SOLUTION INFILTRATION; PERINEURAL
Status: DISCONTINUED | OUTPATIENT
Start: 2024-07-18 | End: 2024-07-18 | Stop reason: HOSPADM

## 2024-07-18 RX ORDER — FENTANYL CITRATE 50 UG/ML
INJECTION, SOLUTION INTRAMUSCULAR; INTRAVENOUS
Status: DISCONTINUED | OUTPATIENT
Start: 2024-07-18 | End: 2024-07-18 | Stop reason: HOSPADM

## 2024-07-18 RX ORDER — SODIUM CHLORIDE 9 MG/ML
INJECTION, SOLUTION INTRAVENOUS CONTINUOUS
Status: DISCONTINUED | OUTPATIENT
Start: 2024-07-18 | End: 2024-07-18 | Stop reason: HOSPADM

## 2024-07-18 NOTE — DISCHARGE SUMMARY
Discharge Note  Short Stay      SUMMARY     Admit Date: 7/18/2024    Attending Physician: Shabana Knight      Discharge Physician: Shabana Knight      Discharge Date: 7/18/2024 4:05 PM    Procedure(s) (LRB):  RADIOFREQUENCY ABLATION BILATERAL L3, 4, 5 (Bilateral)    Final Diagnosis: Lumbar spondylosis [M47.816]    Disposition: Home or self care    Patient Instructions:   Current Discharge Medication List        CONTINUE these medications which have NOT CHANGED    Details   acetaminophen (TYLENOL) 500 MG tablet Take 500 mg by mouth every 6 (six) hours as needed for Pain.      albuterol (PROVENTIL/VENTOLIN HFA) 90 mcg/actuation inhaler       aspirin (ECOTRIN) 81 MG EC tablet Take 81 mg by mouth once daily.      cetirizine (ZYRTEC) 10 MG tablet Take 10 mg by mouth nightly.  Refills: 5      FOLIC ACID/MULTIVIT-MIN/LUTEIN (CENTRUM SILVER ORAL) Take by mouth once daily.      gabapentin (NEURONTIN) 300 MG capsule Take 2 capsules (600 mg total) by mouth 2 (two) times daily.  Qty: 120 capsule, Refills: 5    Associated Diagnoses: Chronic pain disorder; Lumbar spondylosis; DDD (degenerative disc disease), lumbar; S/P cervical spinal fusion; Cervical radiculopathy; Myofascial pain      HYDROcodone-acetaminophen (NORCO) 7.5-325 mg per tablet Take 1 tablet by mouth every 12 (twelve) hours as needed for Pain.  Qty: 60 tablet, Refills: 0    Comments: Patient with chronic intractable pain.  Medically necessary for > 7 days  Associated Diagnoses: Chronic pain disorder; Lumbar spondylosis; DDD (degenerative disc disease), lumbar; Facet arthritis of lumbar region; Myalgia; Spondylosis of cervical region without myelopathy or radiculopathy; S/P cervical spinal fusion      levothyroxine (SYNTHROID) 50 MCG tablet Take 50 mcg by mouth once daily.      meclizine (ANTIVERT) 25 mg tablet Take 25 mg by mouth once daily.       meloxicam (MOBIC) 15 MG tablet Take 15 mg by mouth once daily.      metFORMIN (GLUCOPHAGE) 500 MG tablet Take 1 tablet by  mouth 2 (two) times a day.      methocarbamoL (ROBAXIN) 500 MG Tab Take 1 tablet (500 mg total) by mouth 2 (two) times daily.  Qty: 60 tablet, Refills: 4    Associated Diagnoses: Myofascial pain      omeprazole (PRILOSEC) 20 MG capsule Take 1 capsule (20 mg total) by mouth once daily.  Qty: 90 capsule, Refills: 3    Associated Diagnoses: Gastroesophageal reflux disease, esophagitis presence not specified      rosuvastatin (CRESTOR) 20 MG tablet Take 20 mg by mouth once daily.      valsartan (DIOVAN) 80 MG tablet Take 40 mg by mouth once daily.   Refills: 1                 Discharge Diagnosis: Lumbar spondylosis [M47.816]  Condition on Discharge: Stable with no complications to procedure   Diet on Discharge: Same as before.  Activity: as per instruction sheet.  Discharge to: Home with a responsible adult.  Follow up: 2-4 weeks       Please call my office or pager at [phone] if experienced any weakness or loss of sensation, fever > 101.5, pain uncontrolled with oral medications, persistent nausea/vomiting/or diarrhea, redness or drainage from the incisions, or any other worrisome concerns. If physician on call was not reached or could not communicate with our office for any reason please go to the nearest emergency department

## 2024-07-18 NOTE — DISCHARGE INSTRUCTIONS

## 2024-07-18 NOTE — OP NOTE
Therapeutic Lumbar Medial Branch Radiofrequency Ablation under Fluoroscopy     The procedure, risks, benefits, and options were discussed with the patient. There are no contraindications to the procedure. The patent expressed understanding and agreed to the procedure. Informed written consent was obtained prior to the start of the procedure and can be found in the patient's chart.        PATIENT NAME: Sal Navarro   MRN: [id number]     DATE OF PROCEDURE: 07/18/2024     PROCEDURE:  Bilateral L3, L4, and L5 Lumbar Radiofrequency Ablation under Fluoroscopy    PRE-OP DIAGNOSIS: Lumbar spondylosis [M47.816] Lumbar spondylosis [M47.816]    POST-OP DIAGNOSIS: Same    PHYSICIAN: Alex Callahan MD    ASSISTANTS: Shabana Knight MD  [facility] Pain Fellow  Mabel Lloyd MD - Anesthesia Resident  Graham Rai MS4     MEDICATIONS INJECTED:  Preservative-free Decadron 10mg with 9cc of Bupivicaine 0.25%    LOCAL ANESTHETIC INJECTED:   Xylocaine 2%    SEDATION: Versed 2mg and Fentanyl 50mcg                                                                                                                                                                                     Conscious sedation ordered by M.D. Patient re-evaluation prior to administration of conscious sedation. No changes noted in patient's status from initial evaluation. The patient's vital signs were monitored by RN and patient remained hemodynamically stable throughout the procedure.    Event Time In   Sedation Start 1547   Sedation End 1606       ESTIMATED BLOOD LOSS:  None    COMPLICATIONS:  None     INTERVAL HISTORY: Patient has clinical and imaging findings suggestive of recurrent facet mediated pain. Patient has undergone a previous RFA at specified levels with at least 50% relief for at least 6 months. Successful diagnostic medial branch blocks have been completed within the past 2 years.    TECHNIQUE: Time-out was performed to identify the patient and procedure  to be performed. With the patient laying in a prone position, the surgical area was prepped and draped in the usual sterile fashion using ChloraPrep and fenestrated drape. The levels were determined under fluoroscopic guidance. Skin anesthesia was achieved by injecting Lidocaine 2% over the injection sites. A 18 gauge 10mm curved active tip needle was introduced to the anatomic local of the medial branch at each of the above levels using AP, lateral and/or contralateral oblique fluoroscopic imaging. Then sensory and motor testing was performed to confirm that the needle tips were in the correct location. After negative aspiration for blood or CSF was confirmed, 1 mL of the lidocaine 2% listed above was injected slowly at each site. This was followed by thermal lesioning at 80 degrees celsius for 90 seconds. That was followed by slowly injecting 1 mL of the medication mixture listed above at each site. The needles were removed and bleeding was nil. A sterile dressing was applied. No specimens collected. The patient tolerated the procedure well and did not have any procedure related motor deficit at the conclusion of the procedure.      The patient was monitored after the procedure in the recovery area. They were given post-procedure and discharge instructions to follow at home. The patient was discharged in a stable condition.    Shabana Knight MD    I reviewed and edited the fellow's note. I conducted my own interview and physical examination. I agree with the findings. I was present and supervising all critical portions of the procedure.    Alex Callahan MD

## 2024-07-21 ENCOUNTER — PATIENT MESSAGE (OUTPATIENT)
Dept: ADMINISTRATIVE | Facility: OTHER | Age: 59
End: 2024-07-21
Payer: MEDICARE

## 2024-08-16 ENCOUNTER — TELEPHONE (OUTPATIENT)
Dept: PAIN MEDICINE | Facility: CLINIC | Age: 59
End: 2024-08-16
Payer: MEDICARE

## 2024-08-16 ENCOUNTER — HOSPITAL ENCOUNTER (OUTPATIENT)
Dept: RADIOLOGY | Facility: OTHER | Age: 59
Discharge: HOME | End: 2024-08-16
Attending: NURSE PRACTITIONER
Payer: MEDICARE

## 2024-08-16 ENCOUNTER — OFFICE VISIT (OUTPATIENT)
Dept: PAIN MEDICINE | Facility: CLINIC | Age: 59
End: 2024-08-16
Payer: MEDICARE

## 2024-08-16 VITALS
WEIGHT: 245.13 LBS | SYSTOLIC BLOOD PRESSURE: 107 MMHG | OXYGEN SATURATION: 99 % | BODY MASS INDEX: 32.34 KG/M2 | TEMPERATURE: 97 F | HEART RATE: 69 BPM | DIASTOLIC BLOOD PRESSURE: 72 MMHG

## 2024-08-16 DIAGNOSIS — M47.812 SPONDYLOSIS OF CERVICAL REGION WITHOUT MYELOPATHY OR RADICULOPATHY: Primary | ICD-10-CM

## 2024-08-16 DIAGNOSIS — Z79.891 ENCOUNTER FOR MONITORING OPIOID MAINTENANCE THERAPY: ICD-10-CM

## 2024-08-16 DIAGNOSIS — M47.816 LUMBAR SPONDYLOSIS: ICD-10-CM

## 2024-08-16 DIAGNOSIS — M50.30 DDD (DEGENERATIVE DISC DISEASE), CERVICAL: ICD-10-CM

## 2024-08-16 DIAGNOSIS — Z51.81 ENCOUNTER FOR MONITORING OPIOID MAINTENANCE THERAPY: ICD-10-CM

## 2024-08-16 DIAGNOSIS — M51.36 DDD (DEGENERATIVE DISC DISEASE), LUMBAR: ICD-10-CM

## 2024-08-16 DIAGNOSIS — G89.29 CHRONIC LEFT HIP PAIN: ICD-10-CM

## 2024-08-16 DIAGNOSIS — M25.552 CHRONIC LEFT HIP PAIN: ICD-10-CM

## 2024-08-16 DIAGNOSIS — Z98.1 S/P CERVICAL SPINAL FUSION: ICD-10-CM

## 2024-08-16 PROCEDURE — 73502 X-RAY EXAM HIP UNI 2-3 VIEWS: CPT | Mod: 26,LT,, | Performed by: RADIOLOGY

## 2024-08-16 PROCEDURE — 1160F RVW MEDS BY RX/DR IN RCRD: CPT | Mod: CPTII,S$GLB,, | Performed by: NURSE PRACTITIONER

## 2024-08-16 PROCEDURE — 99999 PR PBB SHADOW E&M-EST. PATIENT-LVL IV: CPT | Mod: PBBFAC,,, | Performed by: NURSE PRACTITIONER

## 2024-08-16 PROCEDURE — 3008F BODY MASS INDEX DOCD: CPT | Mod: CPTII,S$GLB,, | Performed by: NURSE PRACTITIONER

## 2024-08-16 PROCEDURE — 1159F MED LIST DOCD IN RCRD: CPT | Mod: CPTII,S$GLB,, | Performed by: NURSE PRACTITIONER

## 2024-08-16 PROCEDURE — 3078F DIAST BP <80 MM HG: CPT | Mod: CPTII,S$GLB,, | Performed by: NURSE PRACTITIONER

## 2024-08-16 PROCEDURE — 73502 X-RAY EXAM HIP UNI 2-3 VIEWS: CPT | Mod: TC,FY,LT

## 2024-08-16 PROCEDURE — 3074F SYST BP LT 130 MM HG: CPT | Mod: CPTII,S$GLB,, | Performed by: NURSE PRACTITIONER

## 2024-08-16 PROCEDURE — 4010F ACE/ARB THERAPY RXD/TAKEN: CPT | Mod: CPTII,S$GLB,, | Performed by: NURSE PRACTITIONER

## 2024-08-16 PROCEDURE — 99214 OFFICE O/P EST MOD 30 MIN: CPT | Mod: S$GLB,,, | Performed by: NURSE PRACTITIONER

## 2024-08-16 NOTE — H&P (VIEW-ONLY)
Chronic patient Established Note (Follow up visit)      Interval History 8/16/2024:  The patient returns to clinic today for follow up of neck and back pain. He is s/p bilateral L3,4,5 RFA on 7/18/2024. He reports 50-60% relief. His back pain is tolerable at this time. He continues to report left hip pain. He has had a few falls onto the left side. He reports increased neck pain. He denies any radicular arm pain. He does endorse morning stiffness. His pain is worse with prolonged activity. He is taking Gabapentin and Robaxin. He takes Norco sparingly for severe pain. He denies any other health changes. His pain today is 6/10.    Interval History 5/28/2024:  The patient returns to clinic today for follow up of neck and back pain. He is s/p bilateral SI joint injections on 5/10/2024. He reports 50% relief. He reports right sided low back pain, higher than injection sites. He also report left sided low back pain. This pain is worse with prolonged activity. He does endorse stiffness. He denies any radicular leg pain. He reports intermittent neck pain. He is taking Gabapentin and Robaxin. He takes Norco sparingly for severe pain. His last dose was yesterday. He denies any other health changes. His pain today is 6/10.    Interval History 4/22/2024:  The patient returns to clinic today for follow up of neck and back pain. He reports increased low back pain. He reports low back and buttock pain. He denies any radicular leg pain. His pain is worse with prolonged sitting. He also reports increased pain with moving from sitting to standing. He reports intermittent neck pain. He is having increased left elbow pain. His wife notes that he does drop objects from the left hand. He is taking Gabapentin and Robaxin. He is taking Norco as needed for severe pain. He denies any adverse effects. He denies any other health changes. His pain today is 7/10.    Interval History 10/11/2023:  The patient returns to clinic today for follow up  of neck and back pain. He is s/p left C4,5,6,7 RFA on 9/15/2023. He is s/p right C4,5,6,7 RFA on 9/29/2023. He reports 50% relief of his pain. He does report intermittent neck pain. He also reports left sided anterior neck and upper chest pain. This is tight in nature. He reports increased low back pain. He denies any radicular leg pain. This pain is worse with prolonged activity. He does have intermittent left abdominal pain. He feels a knot in the area. This pain is relieved with sitting or laying down. He did have a recent fall in his garden. He continues to take Gabapentin and Robaxin. He also takes Norco as needed for severe pain. He takes this sparingly without adverse effects. He denies any other health changes. His pain today is 4/10.    Interval History 8/11/2023:  The patient returns to clinic today for follow up of back pain. He is s/p bilateral SI joint injections on 7/27/2023. He reports 50-60% relief of his buttock pain. He reports worsened neck pain over the last month. He does report some pain into the left shoulder. He denies any radicular arm pain. His pain is worse with turning his head to the side. He does endorse stiffness. He continues to report intermittent left abdominal pain. This pain is worse with prolonged activity. He also notes 2 lumps in the area, especially at the end of the day. He is taking Gabapentin and Robaxin. He also takes Norco as needed for severe pain sparingly. He denies any other health changes. His pain today is 6/10.    Interval History 7/5/2023:  The patient returns to clinic today for follow up of back pain. He reports increased low back and buttock pain. He denies any radicular leg pain. His pain is worse with prolonged sitting, moving from sitting to standing, and prolonged activity. He continues to report intermittent left abdominal pain. He also reports increased neck pain. He is taking Gabapentin and Robaxin. He also takes Norco as needed for severe pain. He  denies any adverse effects. His pain today is 6/10.    Interval History 3/21/2023:  The patient returns to clinic today for follow up of back pain. He is s/p left L3,4,5 RFA on 2/24/2023 and right L3,4,5 RFA on 3/10/2023. He is unsure of relief at this time. He reports increased left sided low back pain over the last two weeks. 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 He did feel a pop. He thought he pulled a muscle, but pain has been worsening. He reports left sided low back pain that radiates into the left hip and lower abdomen. He also reports intermittent radiating pain into the posterior left leg. He describes this pain as numb and tingling in nature. His pain is worse with activity. He does endorse muscle spasms. He is taking Gabapentin and Robaxin. He has had to take more Norco recently due to increased pain. He denies any weakness. He denies any other health changes. His pain today is 8/10.    Interval History 1/31/2023:  The patient returns to clinic today for follow up of neck and back pain. He is s/p left C4,5,6,7 on 12/15/2022 and right C4,5,6,7 RFA on 12/29/2022. He reports 50% relief of his neck pain. He reports intermittent neck pain, left side greater than right. He denies any radicular arm pain. He does report pain into the shoulder blades. He reports increased low back pain. He denies any radicular leg pain. His pain is worse with standing, walking, and activity. He does endorse stiffness. He is taking Gabapentin and Robaxin. He also takes Norco sparingly with benefit. He denies any other health changes. His pain today is 6/10.    Interval History 11/18/2022:  The patient returns to clinic today for follow up of neck and back pain. He is s/p bilateral SI joint injections on 11/4/2022. He reports 60% relief of his low back and buttock pain. He continues to report low back pain. He denies any radicular leg pain. He reports increased neck pain, left side greater than right.  He denies any radicular arm. He does report some pain into his shoulder blades. His pain is worse with activity. He continues to take Gabapentin and Robaxin. He continues to take Norco as needed sparingly with benefit. He denies any other health changes. His pain today is 7/10.    Interval History 10/27/2022:  The patient returns to clinic today for follow up of neck and back pain. He reports increased low back and bilateral buttock pain. He denies any radicular leg pain. His pain is worse with prolonged sitting and moving from sitting to standing. He does endorse a fall recently while getting out of the vehicle onto his side. He continues to report neck pain. He continues to take Gabapentin and Robaxin. He continues to take Norco sparingly with benefit and without adverse effects. He denies any other health changes. His pain today is 6/10.    Interval History 8/2/2022:  The aptient returns to clinic today for follow up of neck and back pain. He is s/p bilateral SI joint injection on 7/14/2022. He reports 50-60% relief of his low back and buttock pain. He reports intermittent low back and buttock pain. He reports increased neck pain, left side greater than right. He denies any radicular arm pain. He has good days and bad days. He continues to take Gabapentin. He continues to take Norco sparingly with benefit and without adverse effects. He denies any other health changes. His pain today is 5/10.    Interval History 7/1/2022:  The patient returns to clinic today for follow up of neck and back pain. He reports increased low back and buttock pain.His pain is worse with prolonged sitting and moving from sitting to standing. He also reports increased pain with bending over. He does report intermittent radiating pain into his right posterior thigh stopping at mid thigh. He does report a fall, which was sitting hard on the ground about a week ago. He denies any acute injury. He continues to take Gabapentin and Norco. He  "denies any other health changes. His pain today is 6/10.     Interval History 5/9/2022:  The patient returns to clinic today for follow up of back pain. He is s/p right L4/5 and L5/S1 TF NGUYEN on 4/14/2022. He reports 50-60% relief of his low back and leg pain. He has been more active since the procedure. He continues to report low back and hip pain, worse with bending and activity. He denies any radicular leg pain. He continues to repot intermittent neck pain. He continues to take Gabapentin with benefit. He continues to take Norco sparingly as needed. He denies any other health changes. His pain today is 5/10.    Interval History 4/1/2022:  The patient returns to clinic today for follow up of back pain. He is s/p left L3,4,5 RFA on 2/25/2022 and right L3,4,5 RFA on 3/11/2022. He reports relief of his back pain. He reports increased back pain that radiates into the posterior aspect of his right leg to his knee. He denies any left leg pain. His pain is worse with prolonged walking. He reports that he sometimes drags his right leg. He denies any falls. He denies any bowel or bladder incontinence. He continues to take Gabapentin and Norco as needed with benefit. He denies any adverse effects. He denies any other health changes. His pain today is 7/10.    Interval History 2/15/2022:  The patient returns to clinic today for follow up of neck and back pain. He reports increased low back pain over the last two weeks. He describes this pain as sharp and aching in nature. He does report intermittent "catching" pain in his lower back, worse with getting out of bed. He denies any radicular leg pain. He continues to report benefit from previous cervical procedures. He reports intermittent neck pain. He continues to take Gabapentin with benefit. He continues to take Norco as needed with benefit and without adverse effects. He denies any other health changes. His pain today is 6/10.    Interval History 11/5/2021:  The patient " returns to clinic today for follow up of neck and back pain. He is s/p left C4,5,6,7 RFA on 10/1/2021 and right C4,5,6,7 RFA on 10/15/2021. He reports 50% relief of his neck pain. He reports increased burning and itching pain to his skin near injection sites. He denies any radicular arm pain. He does report increased left sided muscle pain. He is concerned that one of the screws in his cervical hardware is moving. He continues to report benefit with previous lumbar procedures. He reports intermittent low back pain without radicular leg pain. He continues to take Gabapentin with benefit. He continues to take Norco as needed with benefit and without adverse effects. He denies any weakness. He denies any other health changes. His pain today is 5/10.    Interval History 9/17/2021:  The patient returns to clinic today for follow up of neck and back pain. He reports increased neck pain. He denies any radicular arm pain today. He does report intermittent radiating pain into his left shoulder blade and mid back. His pain is worse with activity. He reports that sometimes his pain takes his breath away. He continues to report low back pain, that is tolerable at this time. He continues to take Gabapentin and Norco with benefit and without adverse effects. He denies any other health changes. His pain today is 6/10.    Interval History 6/17/2021:  The patient returns to clinic today for follow up of neck and back pain. He is s/p right L3,4,5 RFA on 4/23/2021 and left L3,4,5 RFA on 5/7/2021. He reports 50% relief of his low back pain. He continues to report intermittent low back pain. He denies any radicular leg pain. He continues to report neck pain, especially in between the scapula. His pain is worse with prolonged standing, walking, and activity. He continues to take Gabapentin with benefit. He continues to take Norco sparingly for severe pain with benefit and without adverse effects. He denies any other health changes. His  pain today is 4/10.    Interval History 4/9/2021:  The patient returns to clinic today for follow up of neck and back pain. He is s/p left C4,5,6,7 RFA on 3/9/2021 and right C4,5,6,7 RFA on 3/26/2021. He reports 50% relief of his neck pain. He reports intermittent neck pain. He has good days and bad days. He continues to report low back pain that is constant, sharp, and aching. He reports intermittent radiating pain into the lateral aspect of his left leg to his knee. He continues to take Gabapentin with benefit. He continues to take Norco as needed sparingly for severe pain. He denies any adverse effects. He denies any other health changes. His pain today is 5/10.    Interval History 3/2/2021:  Sal Navarro presents to the clinic for a follow-up appointment for neck pain . Since the last visit, Sal Navarro states the pain has been worsening. Current pain intensity is 6/10.  Was seen by Angelique Barahona NP on 2/12/2021.     Interval History 2/12/2021:  The patient returns to clinic today for follow up of back pain. He has not been seen in over a year. He did not have imaging due to coronavirus outbreak. He would to reschedule this. He was also considering SCS trial prior to the pandemic. He continues to report low back pain that radiates into the lateral aspect of his left leg to his knee. He denies any radicular right leg pain. His pain is worse with bending and walking. He continues to take Gabapentin with benefit. He also takes Norco as needed for severe pain. He denies any adverse effects. He denies any other health changes. His pain today is 6/10.    HPI:  Sal Navarro is a 56 y.o. male who presents today s/p C4-7 ACDF with C5/6 PSIF in 2/2016 with residual chronic midline neck pain that radiates into his shoulder blades bilaterally, R>>L.  This is associated with bilateral hand numbness and tingling.  The hand symptoms did improve following the surgery.  The shooting pains in his arms resolved  "completely.   This pain is described in detail below.  His post-operative course was complicated by dysphagia that is being treated with speech therapy.  The numbness and the tingling were relieved by the surgery. Now experiences "deep pain along the spine"  Patient has not been seen for over a year, had to re-schedule his imaging studies due to pandemic(now completed). Prior to the pandemic, he was considering a SCS.   Only time he gets relief is laying in bed on a very thin pillow, but that doesn't last very long.   Pain aggravated by movement and even breathing/playing with his grandchild/holding the grandchild's hand. Heat and massage (has a vest) are helpful.   Was seen by Dr. Jan srivastava and received multiple EIS and RFAs.   Compliant with his medication.     Pain Disability Index Review:      8/16/2024     1:51 PM 5/28/2024     2:42 PM 4/22/2024    10:21 AM   Last 3 PDI Scores   Pain Disability Index (PDI) 42 [phone] 21       Pain Medications:  Gabapentin  Norco sparingly  Robaxin    Opioid Contract: yes     report:  Reviewed and consistent with medication use as prescribed.    Pain Procedures:   7/18/2024- Bilateral L3,4,5 RFA  5/10/2024- Bilateral SI joint injections.   7/27/2023- Bilateral SI joint injections  3/10/2023- Right L3,4,5 RFA- 50% relief  2/24/2023- Left L3,4,5 RFA- relief  12/29/2022- Right C4,5,6,7 RFA  12/15/2022- Left C4,5,6,7 RFA  11/4/2022- Bilateral SI joint injections- 60% relief   4/14/2022- Right L4/5 and L5/S1 TF NGUYEN  3/11/2022- Right L3,4,5 RFA  2/25/2022- Left L3,4,5 RFA  10/15/2021- Right C4,5,6,7 RFA  10/1/2021- Left C4,5,6,7 RFA  5/7/2021- Left L3,4,5 RFA   4/23/2021- Right L3,4,5 RFA  3/26/2021- Right C4,5,6,7 RFA  3/9/2021- Left C4,5,6,7 RFA  10/11/19: L4/5 Interlaminar Epidural Steroid Injection  06/13/19- Left C4-7 RFA  05/03/19:Left L2-5 Lumbar Medial Branch Nerve Thermal Radiofrequency Ablation  12/21/18:Right L2-5 Lumbar Medial Branch Nerve Thermal Radiofrequency " Ablation  11/23/18:Cervical Thermal Radiofreqiency Ablation: Right C4-7  09/14/18:[id number] Steroid Injection  06/29/18:LL2/3 Interlaminar Epidural Steroid Injection   03/06/18:L2/3 Interlaminar Epidural Steroid Injection   09/26/17:Bilateral L2-5 Lumbar Medial Branch Nerve Coolief Thermal Radiofrequency Ablation  08/31/17:Bilateral L2-5 Lumbar medial branch blocks   03/28/17:Cervical Conventional Radiofreqiency Ablation: Left C4-7  03/14/17:Cervical Conventional Radiofreqiency Ablation: Right C4-7  10/25/16:Cervical Conventional Radiofreqiency Ablation: Left C4-7  10/11/16: Cervical Conventional Radiofreqiency Ablation: Right C4-7  10/04/16- Cervical Medial Branch Blocks, Bilateral C4-7.     08/31/16:C7/[id number] Steroid Injection    Physical Therapy/Home Exercise: does home exercises now, but not in formal PT.    - 2019 was last time in PT     Imaging:   MRI Lumbar Spine 2/25/2021:  COMPARISON:  07/11/2017     FINDINGS:  The vertebral bodies maintain normal height, signal intensity and alignment.  There is redemonstration of few hemangiomas at multiple levels.  The intervertebral disc spaces are preserved although there is some disc desiccation at multiple levels.  The conus terminates at level L1.  Evaluation of the localizer images and structures surrounding the lumbar spine shows no abnormalities.     L1-L2: No significant disc or joint pathology.     L2-L3: Mild diffuse disc bulge with mild bilateral facet arthropathy and ligamentum flavum hypertrophy results in mild central canal stenosis.  Mild bilateral neural foraminal stenosis is also identified.     L3-L4: There is a diffuse disc bulge with no significant facet arthropathy or ligamentum flavum hypertrophy.  There is mild central canal stenosis and mild bilateral neural foraminal stenosis.     L4-L5: There is a diffuse disc bulge with ligamentum flavum hypertrophy.  No significant facet arthropathy.  There is mild central canal stenosis with only  minimal encroachment on the left neural foramen.  Mild right neural foraminal stenosis is present.     L5-S1: There is a diffuse disc bulge with mild to moderate right facet arthropathy.  No left facet arthropathy.  The ligamentum flavum is normal.  The central canal is patent and the neural foramina are largely patent.  Incidental note is made of an annulus fibrosus tear posteriorly measuring approximately 2 mm.     Impression:     Multilevel degenerative disc and joint disease which is mild and results in mild central canal and neural foraminal stenosis as outlined above.    Xray Cervical Spine 2/25/2021:  COMPARISON:  07/07/2017 .     FINDINGS:  The patient has undergone ACDF from C4 through C7. The instrumentation is intact without evidence of complication.  The vertebral bodies maintain normal height and alignment. The remaining intervertebral disc spaces are preserved.  No significant uncovertebral joint hypertrophy.  The prevertebral soft tissues, odontoid views and lung apices are normal. The neural foramen are patent     Impression:     Postsurgical changes to the thoracic spine without additional evidence for degenerative disc or joint disease.     Allergies:   Review of patient's allergies indicates:   Allergen Reactions    Pcn [penicillins] Hives and Rash    Lipitor [atorvastatin] Other (See Comments)     Muscle cramps, weakness       Current Medications:   Current Outpatient Medications   Medication Sig Dispense Refill    acetaminophen (TYLENOL) 500 MG tablet Take 500 mg by mouth every 6 (six) hours as needed for Pain.      albuterol (PROVENTIL/VENTOLIN HFA) 90 mcg/actuation inhaler       aspirin (ECOTRIN) 81 MG EC tablet Take 81 mg by mouth once daily.      cetirizine (ZYRTEC) 10 MG tablet Take 10 mg by mouth nightly.  5    FOLIC ACID/MULTIVIT-MIN/LUTEIN (CENTRUM SILVER ORAL) Take by mouth once daily.      HYDROcodone-acetaminophen (NORCO) 7.5-325 mg per tablet Take 1 tablet by mouth every 12 (twelve)  hours as needed for Pain. 60 tablet 0    levothyroxine (SYNTHROID) 50 MCG tablet Take 50 mcg by mouth once daily.      meclizine (ANTIVERT) 25 mg tablet Take 25 mg by mouth once daily.       meloxicam (MOBIC) 15 MG tablet Take 15 mg by mouth once daily.      metFORMIN (GLUCOPHAGE) 500 MG tablet Take 1 tablet by mouth 2 (two) times a day.      methocarbamoL (ROBAXIN) 500 MG Tab Take 1 tablet (500 mg total) by mouth 2 (two) times daily. 60 tablet 4    omeprazole (PRILOSEC) 20 MG capsule Take 1 capsule (20 mg total) by mouth once daily. 90 capsule 3    rosuvastatin (CRESTOR) 20 MG tablet Take 20 mg by mouth once daily.      valsartan (DIOVAN) 80 MG tablet Take 40 mg by mouth once daily.   1    gabapentin (NEURONTIN) 300 MG capsule Take 2 capsules (600 mg total) by mouth 2 (two) times daily. 120 capsule 5     No current facility-administered medications for this visit.       REVIEW OF SYSTEMS:    GENERAL:  No weight loss, malaise or fevers.  HEENT:  Negative for frequent or significant headaches.  NECK:  Negative for lumps, goiter  and significant neck swelling.   RESPIRATORY:  Negative for cough, wheezing or shortness of breath.  CARDIOVASCULAR:  Negative for chest pain, leg swelling or palpitations. HTN  GI:  Negative for abdominal discomfort, blood in stools or black stools or change in bowel habits.  MUSCULOSKELETAL:  See HPI.  SKIN:  Negative for lesions, rash, and itching.  PSYCH:  Negative for sleep disturbance, mood disorder and recent psychosocial stressors.  HEMATOLOGY/LYMPHOLOGY:  Negative for prolonged bleeding, bruising easily or swollen nodes.  NEURO:   No history of headaches, syncope, paralysis, seizures or tremors.   ENDO: Thyroid disorder.   All other reviewed and negative other than HPI.    Past Medical History:  Past Medical History:   Diagnosis Date    Arthritis     Cataract     Cervical back pain with evidence of disc disease     Hiatal hernia     Hypertension     Thyroid disease        Past  Surgical History:  Past Surgical History:   Procedure Laterality Date    ACROMIOCLAVICULAR JOINT CYST EXCISION Right     BACK SURGERY      cataracts Bilateral     CERVICAL FUSION      COLONOSCOPY N/A 4/27/2018    Procedure: COLONOSCOPY;  Surgeon: Giovani Medeiros MD;  [facility]  Service: Endoscopy;  Laterality: N/A;    EPIDURAL STEROID INJECTION N/A 10/11/2019    Procedure: INJECTION, STEROID, EPIDURAL;  Surgeon: Kasandra Montoya MD;  Location: [facility] PAIN [facility]GT;  Service: Pain Management;  Laterality: N/A;  Lumbar NGUYEN L4/5    NGUYEN      EYE SURGERY      INJECTION OF JOINT Bilateral 7/14/2022    Procedure: INJECTION, JOINT, SI BILATERAL  needs consent;  Surgeon: Alex Callahan MD;  Location: [facility] PAIN [facility]GT;  Service: Pain Management;  Laterality: Bilateral;    INJECTION, SACROILIAC JOINT Bilateral 11/4/2022    Procedure: INJECTION,SACROILIAC JOINT BILATERAL CONTRAST;  Surgeon: Alex Callahan MD;  Location: [facility] PAIN [facility]GT;  Service: Pain Management;  Laterality: Bilateral;    INJECTION, SACROILIAC JOINT Bilateral 7/27/2023    Procedure: INJECTION,SACROILIAC JOINT, BILATERAL SOONER DATE;  Surgeon: Alex Callahan MD;  Location: [facility] PAIN [facility]GT;  Service: Pain Management;  Laterality: Bilateral;    INJECTION, SACROILIAC JOINT Bilateral 5/10/2024    Procedure: INJECTION,SACROILIAC JOINT BILATERAL;  Surgeon: Alex Callahan MD;  Location: [facility] PAIN [facility]GT;  Service: Pain Management;  Laterality: Bilateral;  [phone]  2 WK F/U NICK    RADIOFREQUENCY ABLATION  10/2016    cervical spine/Jan    RADIOFREQUENCY ABLATION Left 5/3/2019    Procedure: RADIOFREQUENCY ABLATION, L2-L5 MEDIALBRANCH;  Surgeon: Kasandra Montoya MD;  Location: [facility]GT;  Service: Pain Management;  Laterality: Left;    RADIOFREQUENCY ABLATION Left 6/13/2019    Procedure: RADIOFREQUENCY ABLATION C4-7;  Surgeon: Kasandra Montoya MD;  Location: [facility] PAIN [facility]GT;  Service: Pain Management;  Laterality: Left;    RADIOFREQUENCY ABLATION  Left 3/9/2021    Procedure: RADIOFREQUENCY ABLATION C4,C5,C6,C7;  Surgeon: Alex Callahan MD;  Location: [facility] PAIN [facility]GT;  Service: Pain Management;  Laterality: Left;  1 of 2    RADIOFREQUENCY ABLATION Right 3/26/2021    Procedure: RADIOFREQUENCY ABLATION C4,C6,C6,C7;  Surgeon: Alex Callahan MD;  Location: [facility] PAIN [facility]GT;  Service: Pain Management;  Laterality: Right;  2 of 2    RADIOFREQUENCY ABLATION Right 4/23/2021    Procedure: RADIOFREQUENCY ABLATION L3,4,5;  Surgeon: Alex Callahan MD;  Location: [facility];  Service: Pain Management;  Laterality: Right;  1 of 2    RADIOFREQUENCY ABLATION Left 5/7/2021    Procedure: RADIOFREQUENCY ABLATION L3,4,5;  Surgeon: Alex Callahan MD;  Location: [facility];  Service: Pain Management;  Laterality: Left;  2 of 2    RADIOFREQUENCY ABLATION Left 10/1/2021    Procedure: RADIOFREQUENCY ABLATION LEFT, C4,5,6,7 1 of 2 CONSENT NEEDED;  Surgeon: Alex Callahan MD;  Location: [facility];  Service: Pain Management;  Laterality: Left;    RADIOFREQUENCY ABLATION Right 10/15/2021    Procedure: RADIOFREQUENCY ABLATION  RIGHT C4,5,6,7 2 of 2 CONSENT NEEDED;  Surgeon: Alex Callahan MD;  Location: [facility];  Service: Pain Management;  Laterality: Right;    RADIOFREQUENCY ABLATION Left 2/25/2022    Procedure: RADIOFREQUENCY ABLATION LEFT L3,4,5 1 of 2, needs consent;  Surgeon: Alex Callahan MD;  Location: [facility];  Service: Pain Management;  Laterality: Left;    RADIOFREQUENCY ABLATION Right 3/11/2022    Procedure: RADIOFREQUENCY ABLATION RIGHT L3,4,5 2 of 2, needs consent;  Surgeon: Alex Callahan MD;  Location: [facility];  Service: Pain Management;  Laterality: Right;    RADIOFREQUENCY ABLATION Left 12/15/2022    Procedure: RADIOFREQUENCY ABLATION LEFT C4, C5, C6, C7  ONE OF TWO NEEDS CONSENT;  Surgeon: Alex Callahan MD;  Location: [facility];  Service: Pain Management;  Laterality: Left;    RADIOFREQUENCY ABLATION Right 12/29/2022     Procedure: RADIOFREQUENCY ABLATION RIGHT C4, C5, C6, C7  TWO OF TWO NEEDS CONSENT;  Surgeon: Alex Callahan MD;  Location: [facility];  Service: Pain Management;  Laterality: Right;    RADIOFREQUENCY ABLATION Left 2/24/2023    Procedure: RADIOFREQUENCY ABLATION LEFT L3,L4,L5;  Surgeon: Alex Callahan MD;  Location: [facility];  Service: Pain Management;  Laterality: Left;    RADIOFREQUENCY ABLATION Right 3/10/2023    Procedure: RADIOFREQUENCY ABLATION RIGHT L3,L4,L5;  Surgeon: Alex Callahan MD;  Location: [facility];  Service: Pain Management;  Laterality: Right;    RADIOFREQUENCY ABLATION Left 9/15/2023    Procedure: RADIOFREQUENCY ABLATION, LEFT C4-C5-C6-C7 MEDIAL BRANCH ONE OF TWO  SOONER DATE;  Surgeon: Alex Callahan MD;  Location: [facility];  Service: Pain Management;  Laterality: Left;    RADIOFREQUENCY ABLATION Right 9/29/2023    Procedure: RADIOFREQUENCY ABLATION,  RIGHT C4-C5-C6-C7 MEDIAL BRANCH TWO OF TWO SOONER DATE;  Surgeon: Alex Callahan MD;  Location: [facility];  Service: Pain Management;  Laterality: Right;    RADIOFREQUENCY ABLATION Bilateral 7/18/2024    Procedure: RADIOFREQUENCY ABLATION BILATERAL L3, 4, 5;  Surgeon: Alex Callahan MD;  Location: [facility];  Service: Pain Management;  Laterality: Bilateral;  [phone]  3 WK F/U EMILY    RETINAL DETACHMENT SURGERY      ROTATOR CUFF REPAIR Right     SPINE SURGERY      cerival fusion     TRANSFORAMINAL EPIDURAL INJECTION OF STEROID Right 4/14/2022    Procedure: INJECTION, STEROID, EPIDURAL, TRANSFORAMINAL APPROACH RIGHT L4/5 & L5/S1 Needs Consent;  Surgeon: Alex Callahan MD;  Location: [facility];  Service: Pain Management;  Laterality: Right;    TRIGGER POINT INJECTION Left 6/13/2019    Procedure: INJECTION, TRIGGER POINT;  Surgeon: Kasandra Montoya MD;  Location: [facility];  Service: Pain Management;  Laterality: Left;       Family History:  Family History   Problem Relation Name Age of Onset     Heart disease Mother      Cancer Father      Leukemia Brother      Colon cancer Neg Hx      Celiac disease Neg Hx      Crohn's disease Neg Hx      Esophageal cancer Neg Hx      Inflammatory bowel disease Neg Hx      Irritable bowel syndrome Neg Hx      Liver cancer Neg Hx      Rectal cancer Neg Hx      Stomach cancer Neg Hx      Ulcerative colitis Neg Hx         Social History:  Social History     Socioeconomic History    Marital status:    Tobacco Use    Smoking status: Never    Smokeless tobacco: Never   Substance and Sexual Activity    Alcohol use: No    Drug use: No       OBJECTIVE:    /72   Pulse 69   Temp 97.4 °F (36.3 °C)   Wt 111.2 kg (245 lb 2.4 oz)   SpO2 99%   BMI 32.34 kg/m²     PHYSICAL EXAMINATION:    General appearance: Well appearing, in no acute distress, alert and oriented x3.  Psych:  Mood and affect appropriate.  Skin: Skin color, texture, turgor normal, no rashes or lesions, in both upper and lower body.  Head/face:  Atraumatic, normocephalic. No palpable lymph nodes   Neck: There is mild pain with palpation over cervical paraspinals and facet joints bilaterally.   Cor: RRR  Pulm: Symmetric chest rise, no respiratory distress noted.   Abdomen: Soft, pain with palpation over LUQ.  Back: Straight leg raising in the sitting position is negative for radicular leg pain bilaterally. There is pain with palpation over lumbar paraspinals and facet joints bilaterally, right greater than left. Limited ROM with pain on extension. Positive facet loading bilaterally.   Extremities: No deformities, edema, or skin discoloration. Good capillary refill.  Musculoskeletal: There is mild pain with palpation over bilateral SI joints. Bilateral upper and lower extremity strength is normal and symmetric. No atrophy or tone abnormalities are noted.  Neuro:  No loss of sensation is noted.  Gait: Antalgic, ambulates with cane for assistance     ASSESSMENT: 59 y.o. year old male with neck and lumbar pain,  consistent with      1. Spondylosis of cervical region without myelopathy or radiculopathy        2. S/P cervical spinal fusion        3. Chronic left hip pain  X-Ray Hip 2 or 3 views Left with Pelvis when performed      4. Lumbar spondylosis        5. DDD (degenerative disc disease), lumbar        6. DDD (degenerative disc disease), cervical        7. Encounter for monitoring opioid maintenance therapy                  PLAN:     - Previous imaging reviewed today.    - Obtain xray of hips.     - He is s/p bilateral L3,4,5 RFA with 50-60% relief.     - Schedule for bilateral C4,5,6 RFA.   The patient did previously have bilateral RFAs from C4 to C6 in the past with 50% relief for 1 year(s). During that time, the patients functional ability improved and was able to be more active without significant limitation by pain. Current pain is axial and non-radiating.     - I have stressed the importance of physical activity and a home exercise plan to help with pain and improve health.    - Continue Gabapentin.     - Continue Robaxin 500 mg PRN muscle pain.     - Pain contract signed 2/12/2021.     - Continue Norco 7.5/325 mg BID PRN. He does not need a refill.      - The patient is here today for a refill of current pain medications and they believe these provide effective pain control and improvements in quality of life.  The patient notes no serious side effects, and feels the benefits outweigh the risks.  The patient was reminded of the pain contract that they signed previously as well as the risks and benefits of the medication including possible death.  The updated [place] [employer] prescription monitoring program was reviewed, and the patient has been found to be compliant with current treatment plan. Medication management provided by Dr. Callahan.     - UDS from 5/28/2024 reviewed, negative for medications, he does take sparingly.     - Continue home exercise routine.     - RTC 3 weeks after above  procedure.     The above plan and management options were discussed at length with patient. Patient is in agreement with the above and verbalized understanding.    Angelique Barahona NP  08/16/2024

## 2024-08-16 NOTE — PROGRESS NOTES
Chronic patient Established Note (Follow up visit)      Interval History 8/16/2024:  The patient returns to clinic today for follow up of neck and back pain. He is s/p bilateral L3,4,5 RFA on 7/18/2024. He reports 50-60% relief. His back pain is tolerable at this time. He continues to report left hip pain. He has had a few falls onto the left side. He reports increased neck pain. He denies any radicular arm pain. He does endorse morning stiffness. His pain is worse with prolonged activity. He is taking Gabapentin and Robaxin. He takes Norco sparingly for severe pain. He denies any other health changes. His pain today is 6/10.    Interval History 5/28/2024:  The patient returns to clinic today for follow up of neck and back pain. He is s/p bilateral SI joint injections on 5/10/2024. He reports 50% relief. He reports right sided low back pain, higher than injection sites. He also report left sided low back pain. This pain is worse with prolonged activity. He does endorse stiffness. He denies any radicular leg pain. He reports intermittent neck pain. He is taking Gabapentin and Robaxin. He takes Norco sparingly for severe pain. His last dose was yesterday. He denies any other health changes. His pain today is 6/10.    Interval History 4/22/2024:  The patient returns to clinic today for follow up of neck and back pain. He reports increased low back pain. He reports low back and buttock pain. He denies any radicular leg pain. His pain is worse with prolonged sitting. He also reports increased pain with moving from sitting to standing. He reports intermittent neck pain. He is having increased left elbow pain. His wife notes that he does drop objects from the left hand. He is taking Gabapentin and Robaxin. He is taking Norco as needed for severe pain. He denies any adverse effects. He denies any other health changes. His pain today is 7/10.    Interval History 10/11/2023:  The patient returns to clinic today for follow up  of neck and back pain. He is s/p left C4,5,6,7 RFA on 9/15/2023. He is s/p right C4,5,6,7 RFA on 9/29/2023. He reports 50% relief of his pain. He does report intermittent neck pain. He also reports left sided anterior neck and upper chest pain. This is tight in nature. He reports increased low back pain. He denies any radicular leg pain. This pain is worse with prolonged activity. He does have intermittent left abdominal pain. He feels a knot in the area. This pain is relieved with sitting or laying down. He did have a recent fall in his garden. He continues to take Gabapentin and Robaxin. He also takes Norco as needed for severe pain. He takes this sparingly without adverse effects. He denies any other health changes. His pain today is 4/10.    Interval History 8/11/2023:  The patient returns to clinic today for follow up of back pain. He is s/p bilateral SI joint injections on 7/27/2023. He reports 50-60% relief of his buttock pain. He reports worsened neck pain over the last month. He does report some pain into the left shoulder. He denies any radicular arm pain. His pain is worse with turning his head to the side. He does endorse stiffness. He continues to report intermittent left abdominal pain. This pain is worse with prolonged activity. He also notes 2 lumps in the area, especially at the end of the day. He is taking Gabapentin and Robaxin. He also takes Norco as needed for severe pain sparingly. He denies any other health changes. His pain today is 6/10.    Interval History 7/5/2023:  The patient returns to clinic today for follow up of back pain. He reports increased low back and buttock pain. He denies any radicular leg pain. His pain is worse with prolonged sitting, moving from sitting to standing, and prolonged activity. He continues to report intermittent left abdominal pain. He also reports increased neck pain. He is taking Gabapentin and Robaxin. He also takes Norco as needed for severe pain. He  denies any adverse effects. His pain today is 6/10.    Interval History 3/21/2023:  The patient returns to clinic today for follow up of back pain. He is s/p left L3,4,5 RFA on 2/24/2023 and right L3,4,5 RFA on 3/10/2023. He is unsure of relief at this time. He reports increased left sided low back pain over the last two weeks. 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 He did feel a pop. He thought he pulled a muscle, but pain has been worsening. He reports left sided low back pain that radiates into the left hip and lower abdomen. He also reports intermittent radiating pain into the posterior left leg. He describes this pain as numb and tingling in nature. His pain is worse with activity. He does endorse muscle spasms. He is taking Gabapentin and Robaxin. He has had to take more Norco recently due to increased pain. He denies any weakness. He denies any other health changes. His pain today is 8/10.    Interval History 1/31/2023:  The patient returns to clinic today for follow up of neck and back pain. He is s/p left C4,5,6,7 on 12/15/2022 and right C4,5,6,7 RFA on 12/29/2022. He reports 50% relief of his neck pain. He reports intermittent neck pain, left side greater than right. He denies any radicular arm pain. He does report pain into the shoulder blades. He reports increased low back pain. He denies any radicular leg pain. His pain is worse with standing, walking, and activity. He does endorse stiffness. He is taking Gabapentin and Robaxin. He also takes Norco sparingly with benefit. He denies any other health changes. His pain today is 6/10.    Interval History 11/18/2022:  The patient returns to clinic today for follow up of neck and back pain. He is s/p bilateral SI joint injections on 11/4/2022. He reports 60% relief of his low back and buttock pain. He continues to report low back pain. He denies any radicular leg pain. He reports increased neck pain, left side greater than right.  He denies any radicular arm. He does report some pain into his shoulder blades. His pain is worse with activity. He continues to take Gabapentin and Robaxin. He continues to take Norco as needed sparingly with benefit. He denies any other health changes. His pain today is 7/10.    Interval History 10/27/2022:  The patient returns to clinic today for follow up of neck and back pain. He reports increased low back and bilateral buttock pain. He denies any radicular leg pain. His pain is worse with prolonged sitting and moving from sitting to standing. He does endorse a fall recently while getting out of the vehicle onto his side. He continues to report neck pain. He continues to take Gabapentin and Robaxin. He continues to take Norco sparingly with benefit and without adverse effects. He denies any other health changes. His pain today is 6/10.    Interval History 8/2/2022:  The aptient returns to clinic today for follow up of neck and back pain. He is s/p bilateral SI joint injection on 7/14/2022. He reports 50-60% relief of his low back and buttock pain. He reports intermittent low back and buttock pain. He reports increased neck pain, left side greater than right. He denies any radicular arm pain. He has good days and bad days. He continues to take Gabapentin. He continues to take Norco sparingly with benefit and without adverse effects. He denies any other health changes. His pain today is 5/10.    Interval History 7/1/2022:  The patient returns to clinic today for follow up of neck and back pain. He reports increased low back and buttock pain.His pain is worse with prolonged sitting and moving from sitting to standing. He also reports increased pain with bending over. He does report intermittent radiating pain into his right posterior thigh stopping at mid thigh. He does report a fall, which was sitting hard on the ground about a week ago. He denies any acute injury. He continues to take Gabapentin and Norco. He  "denies any other health changes. His pain today is 6/10.     Interval History 5/9/2022:  The patient returns to clinic today for follow up of back pain. He is s/p right L4/5 and L5/S1 TF NGUYEN on 4/14/2022. He reports 50-60% relief of his low back and leg pain. He has been more active since the procedure. He continues to report low back and hip pain, worse with bending and activity. He denies any radicular leg pain. He continues to repot intermittent neck pain. He continues to take Gabapentin with benefit. He continues to take Norco sparingly as needed. He denies any other health changes. His pain today is 5/10.    Interval History 4/1/2022:  The patient returns to clinic today for follow up of back pain. He is s/p left L3,4,5 RFA on 2/25/2022 and right L3,4,5 RFA on 3/11/2022. He reports relief of his back pain. He reports increased back pain that radiates into the posterior aspect of his right leg to his knee. He denies any left leg pain. His pain is worse with prolonged walking. He reports that he sometimes drags his right leg. He denies any falls. He denies any bowel or bladder incontinence. He continues to take Gabapentin and Norco as needed with benefit. He denies any adverse effects. He denies any other health changes. His pain today is 7/10.    Interval History 2/15/2022:  The patient returns to clinic today for follow up of neck and back pain. He reports increased low back pain over the last two weeks. He describes this pain as sharp and aching in nature. He does report intermittent "catching" pain in his lower back, worse with getting out of bed. He denies any radicular leg pain. He continues to report benefit from previous cervical procedures. He reports intermittent neck pain. He continues to take Gabapentin with benefit. He continues to take Norco as needed with benefit and without adverse effects. He denies any other health changes. His pain today is 6/10.    Interval History 11/5/2021:  The patient " returns to clinic today for follow up of neck and back pain. He is s/p left C4,5,6,7 RFA on 10/1/2021 and right C4,5,6,7 RFA on 10/15/2021. He reports 50% relief of his neck pain. He reports increased burning and itching pain to his skin near injection sites. He denies any radicular arm pain. He does report increased left sided muscle pain. He is concerned that one of the screws in his cervical hardware is moving. He continues to report benefit with previous lumbar procedures. He reports intermittent low back pain without radicular leg pain. He continues to take Gabapentin with benefit. He continues to take Norco as needed with benefit and without adverse effects. He denies any weakness. He denies any other health changes. His pain today is 5/10.    Interval History 9/17/2021:  The patient returns to clinic today for follow up of neck and back pain. He reports increased neck pain. He denies any radicular arm pain today. He does report intermittent radiating pain into his left shoulder blade and mid back. His pain is worse with activity. He reports that sometimes his pain takes his breath away. He continues to report low back pain, that is tolerable at this time. He continues to take Gabapentin and Norco with benefit and without adverse effects. He denies any other health changes. His pain today is 6/10.    Interval History 6/17/2021:  The patient returns to clinic today for follow up of neck and back pain. He is s/p right L3,4,5 RFA on 4/23/2021 and left L3,4,5 RFA on 5/7/2021. He reports 50% relief of his low back pain. He continues to report intermittent low back pain. He denies any radicular leg pain. He continues to report neck pain, especially in between the scapula. His pain is worse with prolonged standing, walking, and activity. He continues to take Gabapentin with benefit. He continues to take Norco sparingly for severe pain with benefit and without adverse effects. He denies any other health changes. His  pain today is 4/10.    Interval History 4/9/2021:  The patient returns to clinic today for follow up of neck and back pain. He is s/p left C4,5,6,7 RFA on 3/9/2021 and right C4,5,6,7 RFA on 3/26/2021. He reports 50% relief of his neck pain. He reports intermittent neck pain. He has good days and bad days. He continues to report low back pain that is constant, sharp, and aching. He reports intermittent radiating pain into the lateral aspect of his left leg to his knee. He continues to take Gabapentin with benefit. He continues to take Norco as needed sparingly for severe pain. He denies any adverse effects. He denies any other health changes. His pain today is 5/10.    Interval History 3/2/2021:  Sal Navarro presents to the clinic for a follow-up appointment for neck pain . Since the last visit, Sal Navarro states the pain has been worsening. Current pain intensity is 6/10.  Was seen by Angelique Barahona NP on 2/12/2021.     Interval History 2/12/2021:  The patient returns to clinic today for follow up of back pain. He has not been seen in over a year. He did not have imaging due to coronavirus outbreak. He would to reschedule this. He was also considering SCS trial prior to the pandemic. He continues to report low back pain that radiates into the lateral aspect of his left leg to his knee. He denies any radicular right leg pain. His pain is worse with bending and walking. He continues to take Gabapentin with benefit. He also takes Norco as needed for severe pain. He denies any adverse effects. He denies any other health changes. His pain today is 6/10.    HPI:  Sal Navarro is a 56 y.o. male who presents today s/p C4-7 ACDF with C5/6 PSIF in 2/2016 with residual chronic midline neck pain that radiates into his shoulder blades bilaterally, R>>L.  This is associated with bilateral hand numbness and tingling.  The hand symptoms did improve following the surgery.  The shooting pains in his arms resolved  "completely.   This pain is described in detail below.  His post-operative course was complicated by dysphagia that is being treated with speech therapy.  The numbness and the tingling were relieved by the surgery. Now experiences "deep pain along the spine"  Patient has not been seen for over a year, had to re-schedule his imaging studies due to pandemic(now completed). Prior to the pandemic, he was considering a SCS.   Only time he gets relief is laying in bed on a very thin pillow, but that doesn't last very long.   Pain aggravated by movement and even breathing/playing with his grandchild/holding the grandchild's hand. Heat and massage (has a vest) are helpful.   Was seen by Dr. Jan srivastava and received multiple EIS and RFAs.   Compliant with his medication.     Pain Disability Index Review:      8/16/2024     1:51 PM 5/28/2024     2:42 PM 4/22/2024    10:21 AM   Last 3 PDI Scores   Pain Disability Index (PDI) 42 [phone]       Pain Medications:  Gabapentin  Norco sparingly  Robaxin    Opioid Contract: yes     report:  Reviewed and consistent with medication use as prescribed.    Pain Procedures:   7/18/2024- Bilateral L3,4,5 RFA  5/10/2024- Bilateral SI joint injections.   7/27/2023- Bilateral SI joint injections  3/10/2023- Right L3,4,5 RFA- 50% relief  2/24/2023- Left L3,4,5 RFA- relief  12/29/2022- Right C4,5,6,7 RFA  12/15/2022- Left C4,5,6,7 RFA  11/4/2022- Bilateral SI joint injections- 60% relief   4/14/2022- Right L4/5 and L5/S1 TF NGUYEN  3/11/2022- Right L3,4,5 RFA  2/25/2022- Left L3,4,5 RFA  10/15/2021- Right C4,5,6,7 RFA  10/1/2021- Left C4,5,6,7 RFA  5/7/2021- Left L3,4,5 RFA   4/23/2021- Right L3,4,5 RFA  3/26/2021- Right C4,5,6,7 RFA  3/9/2021- Left C4,5,6,7 RFA  10/11/19: L4/5 Interlaminar Epidural Steroid Injection  06/13/19- Left C4-7 RFA  05/03/19:Left L2-5 Lumbar Medial Branch Nerve Thermal Radiofrequency Ablation  12/21/18:Right L2-5 Lumbar Medial Branch Nerve Thermal Radiofrequency " Ablation  11/23/18:Cervical Thermal Radiofreqiency Ablation: Right C4-7  09/14/18:C7/[id number] Steroid Injection  06/29/18:LL2/3 Interlaminar Epidural Steroid Injection   03/06/18:L2/3 Interlaminar Epidural Steroid Injection   09/26/17:Bilateral L2-5 Lumbar Medial Branch Nerve Coolief Thermal Radiofrequency Ablation  08/31/17:Bilateral L2-5 Lumbar medial branch blocks   03/28/17:Cervical Conventional Radiofreqiency Ablation: Left C4-7  03/14/17:Cervical Conventional Radiofreqiency Ablation: Right C4-7  10/25/16:Cervical Conventional Radiofreqiency Ablation: Left C4-7  10/11/16: Cervical Conventional Radiofreqiency Ablation: Right C4-7  10/04/16- Cervical Medial Branch Blocks, Bilateral C4-7.     08/31/16:C7/[id number] Steroid Injection    Physical Therapy/Home Exercise: does home exercises now, but not in formal PT.    - 2019 was last time in PT     Imaging:   MRI Lumbar Spine 2/25/2021:  COMPARISON:  07/11/2017     FINDINGS:  The vertebral bodies maintain normal height, signal intensity and alignment.  There is redemonstration of few hemangiomas at multiple levels.  The intervertebral disc spaces are preserved although there is some disc desiccation at multiple levels.  The conus terminates at level L1.  Evaluation of the localizer images and structures surrounding the lumbar spine shows no abnormalities.     L1-L2: No significant disc or joint pathology.     L2-L3: Mild diffuse disc bulge with mild bilateral facet arthropathy and ligamentum flavum hypertrophy results in mild central canal stenosis.  Mild bilateral neural foraminal stenosis is also identified.     L3-L4: There is a diffuse disc bulge with no significant facet arthropathy or ligamentum flavum hypertrophy.  There is mild central canal stenosis and mild bilateral neural foraminal stenosis.     L4-L5: There is a diffuse disc bulge with ligamentum flavum hypertrophy.  No significant facet arthropathy.  There is mild central canal stenosis with only  minimal encroachment on the left neural foramen.  Mild right neural foraminal stenosis is present.     L5-S1: There is a diffuse disc bulge with mild to moderate right facet arthropathy.  No left facet arthropathy.  The ligamentum flavum is normal.  The central canal is patent and the neural foramina are largely patent.  Incidental note is made of an annulus fibrosus tear posteriorly measuring approximately 2 mm.     Impression:     Multilevel degenerative disc and joint disease which is mild and results in mild central canal and neural foraminal stenosis as outlined above.    Xray Cervical Spine 2/25/2021:  COMPARISON:  07/07/2017 .     FINDINGS:  The patient has undergone ACDF from C4 through C7. The instrumentation is intact without evidence of complication.  The vertebral bodies maintain normal height and alignment. The remaining intervertebral disc spaces are preserved.  No significant uncovertebral joint hypertrophy.  The prevertebral soft tissues, odontoid views and lung apices are normal. The neural foramen are patent     Impression:     Postsurgical changes to the thoracic spine without additional evidence for degenerative disc or joint disease.     Allergies:   Review of patient's allergies indicates:   Allergen Reactions    Pcn [penicillins] Hives and Rash    Lipitor [atorvastatin] Other (See Comments)     Muscle cramps, weakness       Current Medications:   Current Outpatient Medications   Medication Sig Dispense Refill    acetaminophen (TYLENOL) 500 MG tablet Take 500 mg by mouth every 6 (six) hours as needed for Pain.      albuterol (PROVENTIL/VENTOLIN HFA) 90 mcg/actuation inhaler       aspirin (ECOTRIN) 81 MG EC tablet Take 81 mg by mouth once daily.      cetirizine (ZYRTEC) 10 MG tablet Take 10 mg by mouth nightly.  5    FOLIC ACID/MULTIVIT-MIN/LUTEIN (CENTRUM SILVER ORAL) Take by mouth once daily.      HYDROcodone-acetaminophen (NORCO) 7.5-325 mg per tablet Take 1 tablet by mouth every 12 (twelve)  hours as needed for Pain. 60 tablet 0    levothyroxine (SYNTHROID) 50 MCG tablet Take 50 mcg by mouth once daily.      meclizine (ANTIVERT) 25 mg tablet Take 25 mg by mouth once daily.       meloxicam (MOBIC) 15 MG tablet Take 15 mg by mouth once daily.      metFORMIN (GLUCOPHAGE) 500 MG tablet Take 1 tablet by mouth 2 (two) times a day.      methocarbamoL (ROBAXIN) 500 MG Tab Take 1 tablet (500 mg total) by mouth 2 (two) times daily. 60 tablet 4    omeprazole (PRILOSEC) 20 MG capsule Take 1 capsule (20 mg total) by mouth once daily. 90 capsule 3    rosuvastatin (CRESTOR) 20 MG tablet Take 20 mg by mouth once daily.      valsartan (DIOVAN) 80 MG tablet Take 40 mg by mouth once daily.   1    gabapentin (NEURONTIN) 300 MG capsule Take 2 capsules (600 mg total) by mouth 2 (two) times daily. 120 capsule 5     No current facility-administered medications for this visit.       REVIEW OF SYSTEMS:    GENERAL:  No weight loss, malaise or fevers.  HEENT:  Negative for frequent or significant headaches.  NECK:  Negative for lumps, goiter  and significant neck swelling.   RESPIRATORY:  Negative for cough, wheezing or shortness of breath.  CARDIOVASCULAR:  Negative for chest pain, leg swelling or palpitations. HTN  GI:  Negative for abdominal discomfort, blood in stools or black stools or change in bowel habits.  MUSCULOSKELETAL:  See HPI.  SKIN:  Negative for lesions, rash, and itching.  PSYCH:  Negative for sleep disturbance, mood disorder and recent psychosocial stressors.  HEMATOLOGY/LYMPHOLOGY:  Negative for prolonged bleeding, bruising easily or swollen nodes.  NEURO:   No history of headaches, syncope, paralysis, seizures or tremors.   ENDO: Thyroid disorder.   All other reviewed and negative other than HPI.    Past Medical History:  Past Medical History:   Diagnosis Date    Arthritis     Cataract     Cervical back pain with evidence of disc disease     Hiatal hernia     Hypertension     Thyroid disease        Past  Surgical History:  Past Surgical History:   Procedure Laterality Date    ACROMIOCLAVICULAR JOINT CYST EXCISION Right     BACK SURGERY      cataracts Bilateral     CERVICAL FUSION      COLONOSCOPY N/A 4/27/2018    Procedure: COLONOSCOPY;  Surgeon: Giovani Medeiros MD;  [facility]  Service: Endoscopy;  Laterality: N/A;    EPIDURAL STEROID INJECTION N/A 10/11/2019    Procedure: INJECTION, STEROID, EPIDURAL;  Surgeon: Kasandra Montoya MD;  Location: [facility] PAIN [facility]GT;  Service: Pain Management;  Laterality: N/A;  Lumbar NGUYEN L4/5    NGUYEN      EYE SURGERY      INJECTION OF JOINT Bilateral 7/14/2022    Procedure: INJECTION, JOINT, SI BILATERAL  needs consent;  Surgeon: Alex Callahan MD;  Location: [facility] PAIN [facility]GT;  Service: Pain Management;  Laterality: Bilateral;    INJECTION, SACROILIAC JOINT Bilateral 11/4/2022    Procedure: INJECTION,SACROILIAC JOINT BILATERAL CONTRAST;  Surgeon: Alex Callahan MD;  Location: [facility] PAIN [facility]GT;  Service: Pain Management;  Laterality: Bilateral;    INJECTION, SACROILIAC JOINT Bilateral 7/27/2023    Procedure: INJECTION,SACROILIAC JOINT, BILATERAL SOONER DATE;  Surgeon: Alex Callahan MD;  Location: [facility] PAIN [facility]GT;  Service: Pain Management;  Laterality: Bilateral;    INJECTION, SACROILIAC JOINT Bilateral 5/10/2024    Procedure: INJECTION,SACROILIAC JOINT BILATERAL;  Surgeon: Alex Callahan MD;  Location: [facility] PAIN [facility]GT;  Service: Pain Management;  Laterality: Bilateral;  [phone]  2 WK F/U NICK    RADIOFREQUENCY ABLATION  10/2016    cervical spine/Jan    RADIOFREQUENCY ABLATION Left 5/3/2019    Procedure: RADIOFREQUENCY ABLATION, L2-L5 MEDIALBRANCH;  Surgeon: Kasandra Montoya MD;  Location: [facility];  Service: Pain Management;  Laterality: Left;    RADIOFREQUENCY ABLATION Left 6/13/2019    Procedure: RADIOFREQUENCY ABLATION C4-7;  Surgeon: Kasandra Montoya MD;  Location: [facility] PAIN [facility]GT;  Service: Pain Management;  Laterality: Left;    RADIOFREQUENCY ABLATION  Left 3/9/2021    Procedure: RADIOFREQUENCY ABLATION C4,C5,C6,C7;  Surgeon: Alex Callahan MD;  Location: [facility]IN [facility]GT;  Service: Pain Management;  Laterality: Left;  1 of 2    RADIOFREQUENCY ABLATION Right 3/26/2021    Procedure: RADIOFREQUENCY ABLATION C4,C6,C6,C7;  Surgeon: Alex Callahan MD;  Location: [facility] PAIN [facility]GT;  Service: Pain Management;  Laterality: Right;  2 of 2    RADIOFREQUENCY ABLATION Right 4/23/2021    Procedure: RADIOFREQUENCY ABLATION L3,4,5;  Surgeon: Alex Callahan MD;  Location: [facility];  Service: Pain Management;  Laterality: Right;  1 of 2    RADIOFREQUENCY ABLATION Left 5/7/2021    Procedure: RADIOFREQUENCY ABLATION L3,4,5;  Surgeon: Alex Callahan MD;  Location: [facility];  Service: Pain Management;  Laterality: Left;  2 of 2    RADIOFREQUENCY ABLATION Left 10/1/2021    Procedure: RADIOFREQUENCY ABLATION LEFT, C4,5,6,7 1 of 2 CONSENT NEEDED;  Surgeon: Alex Callahan MD;  Location: [facility];  Service: Pain Management;  Laterality: Left;    RADIOFREQUENCY ABLATION Right 10/15/2021    Procedure: RADIOFREQUENCY ABLATION  RIGHT C4,5,6,7 2 of 2 CONSENT NEEDED;  Surgeon: Alex Callahan MD;  Location: [facility];  Service: Pain Management;  Laterality: Right;    RADIOFREQUENCY ABLATION Left 2/25/2022    Procedure: RADIOFREQUENCY ABLATION LEFT L3,4,5 1 of 2, needs consent;  Surgeon: Alex Callahan MD;  Location: [facility];  Service: Pain Management;  Laterality: Left;    RADIOFREQUENCY ABLATION Right 3/11/2022    Procedure: RADIOFREQUENCY ABLATION RIGHT L3,4,5 2 of 2, needs consent;  Surgeon: Alex Callahan MD;  Location: [facility];  Service: Pain Management;  Laterality: Right;    RADIOFREQUENCY ABLATION Left 12/15/2022    Procedure: RADIOFREQUENCY ABLATION LEFT C4, C5, C6, C7  ONE OF TWO NEEDS CONSENT;  Surgeon: Alex Callahan MD;  Location: [facility];  Service: Pain Management;  Laterality: Left;    RADIOFREQUENCY ABLATION Right 12/29/2022     Procedure: RADIOFREQUENCY ABLATION RIGHT C4, C5, C6, C7  TWO OF TWO NEEDS CONSENT;  Surgeon: Alex Callahan MD;  Location: [facility];  Service: Pain Management;  Laterality: Right;    RADIOFREQUENCY ABLATION Left 2/24/2023    Procedure: RADIOFREQUENCY ABLATION LEFT L3,L4,L5;  Surgeon: Alex Callahan MD;  Location: [facility];  Service: Pain Management;  Laterality: Left;    RADIOFREQUENCY ABLATION Right 3/10/2023    Procedure: RADIOFREQUENCY ABLATION RIGHT L3,L4,L5;  Surgeon: Alex Callahan MD;  Location: [facility];  Service: Pain Management;  Laterality: Right;    RADIOFREQUENCY ABLATION Left 9/15/2023    Procedure: RADIOFREQUENCY ABLATION, LEFT C4-C5-C6-C7 MEDIAL BRANCH ONE OF TWO  SOONER DATE;  Surgeon: Alex Callahan MD;  Location: [facility];  Service: Pain Management;  Laterality: Left;    RADIOFREQUENCY ABLATION Right 9/29/2023    Procedure: RADIOFREQUENCY ABLATION,  RIGHT C4-C5-C6-C7 MEDIAL BRANCH TWO OF TWO SOONER DATE;  Surgeon: Alex Callahan MD;  Location: [facility];  Service: Pain Management;  Laterality: Right;    RADIOFREQUENCY ABLATION Bilateral 7/18/2024    Procedure: RADIOFREQUENCY ABLATION BILATERAL L3, 4, 5;  Surgeon: Alex Callahan MD;  Location: [facility];  Service: Pain Management;  Laterality: Bilateral;  [phone]  3 WK F/U EMILY    RETINAL DETACHMENT SURGERY      ROTATOR CUFF REPAIR Right     SPINE SURGERY      cerival fusion     TRANSFORAMINAL EPIDURAL INJECTION OF STEROID Right 4/14/2022    Procedure: INJECTION, STEROID, EPIDURAL, TRANSFORAMINAL APPROACH RIGHT L4/5 & L5/S1 Needs Consent;  Surgeon: Alex Callahan MD;  Location: [facility];  Service: Pain Management;  Laterality: Right;    TRIGGER POINT INJECTION Left 6/13/2019    Procedure: INJECTION, TRIGGER POINT;  Surgeon: Kasandra Montoya MD;  Location: [facility];  Service: Pain Management;  Laterality: Left;       Family History:  Family History   Problem Relation Name Age of Onset     Heart disease Mother      Cancer Father      Leukemia Brother      Colon cancer Neg Hx      Celiac disease Neg Hx      Crohn's disease Neg Hx      Esophageal cancer Neg Hx      Inflammatory bowel disease Neg Hx      Irritable bowel syndrome Neg Hx      Liver cancer Neg Hx      Rectal cancer Neg Hx      Stomach cancer Neg Hx      Ulcerative colitis Neg Hx         Social History:  Social History     Socioeconomic History    Marital status:    Tobacco Use    Smoking status: Never    Smokeless tobacco: Never   Substance and Sexual Activity    Alcohol use: No    Drug use: No       OBJECTIVE:    /72   Pulse 69   Temp 97.4 °F (36.3 °C)   Wt 111.2 kg (245 lb 2.4 oz)   SpO2 99%   BMI 32.34 kg/m²     PHYSICAL EXAMINATION:    General appearance: Well appearing, in no acute distress, alert and oriented x3.  Psych:  Mood and affect appropriate.  Skin: Skin color, texture, turgor normal, no rashes or lesions, in both upper and lower body.  Head/face:  Atraumatic, normocephalic. No palpable lymph nodes   Neck: There is mild pain with palpation over cervical paraspinals and facet joints bilaterally.   Cor: RRR  Pulm: Symmetric chest rise, no respiratory distress noted.   Abdomen: Soft, pain with palpation over LUQ.  Back: Straight leg raising in the sitting position is negative for radicular leg pain bilaterally. There is pain with palpation over lumbar paraspinals and facet joints bilaterally, right greater than left. Limited ROM with pain on extension. Positive facet loading bilaterally.   Extremities: No deformities, edema, or skin discoloration. Good capillary refill.  Musculoskeletal: There is mild pain with palpation over bilateral SI joints. Bilateral upper and lower extremity strength is normal and symmetric. No atrophy or tone abnormalities are noted.  Neuro:  No loss of sensation is noted.  Gait: Antalgic, ambulates with cane for assistance     ASSESSMENT: 59 y.o. year old male with neck and lumbar pain,  consistent with      1. Spondylosis of cervical region without myelopathy or radiculopathy        2. S/P cervical spinal fusion        3. Chronic left hip pain  X-Ray Hip 2 or 3 views Left with Pelvis when performed      4. Lumbar spondylosis        5. DDD (degenerative disc disease), lumbar        6. DDD (degenerative disc disease), cervical        7. Encounter for monitoring opioid maintenance therapy                  PLAN:     - Previous imaging reviewed today.    - Obtain xray of hips.     - He is s/p bilateral L3,4,5 RFA with 50-60% relief.     - Schedule for bilateral C4,5,6 RFA.   The patient did previously have bilateral RFAs from C4 to C6 in the past with 50% relief for 1 year(s). During that time, the patients functional ability improved and was able to be more active without significant limitation by pain. Current pain is axial and non-radiating.     - I have stressed the importance of physical activity and a home exercise plan to help with pain and improve health.    - Continue Gabapentin.     - Continue Robaxin 500 mg PRN muscle pain.     - Pain contract signed 2/12/2021.     - Continue Norco 7.5/325 mg BID PRN. He does not need a refill.      - The patient is here today for a refill of current pain medications and they believe these provide effective pain control and improvements in quality of life.  The patient notes no serious side effects, and feels the benefits outweigh the risks.  The patient was reminded of the pain contract that they signed previously as well as the risks and benefits of the medication including possible death.  The updated [place] [employer] prescription monitoring program was reviewed, and the patient has been found to be compliant with current treatment plan. Medication management provided by Dr. Callahan.     - UDS from 5/28/2024 reviewed, negative for medications, he does take sparingly.     - Continue home exercise routine.     - RTC 3 weeks after above  procedure.     The above plan and management options were discussed at length with patient. Patient is in agreement with the above and verbalized understanding.    Angelique Barahona NP  08/16/2024

## 2024-08-16 NOTE — TELEPHONE ENCOUNTER
----- Message from Shaista Kirby sent at 8/16/2024 12:33 PM CDT -----  Regarding: late pt  Name of caller:wife       What is the requesting detail: states the pt will be 20 mins late. Can he still be seen today? Or does he have to reschedule.Please advise       Can the clinic reply by [facility]:       What number to call back: [phone]

## 2024-08-16 NOTE — TELEPHONE ENCOUNTER
Staff spoke with provider and provider agreed that she will still see the patient in clinic. Staff called patient to inform him that he can still come to his appointment and he will be fit into the schedule.

## 2024-08-19 DIAGNOSIS — M47.812 SPONDYLOSIS OF CERVICAL REGION WITHOUT MYELOPATHY OR RADICULOPATHY: Primary | ICD-10-CM

## 2024-08-20 ENCOUNTER — PATIENT MESSAGE (OUTPATIENT)
Dept: PAIN MEDICINE | Facility: OTHER | Age: 59
End: 2024-08-20
Payer: MEDICARE

## 2024-08-21 NOTE — H&P
HPI   Sal Navarro is a 53 y.o. male.    Presenting for:  Procedure(s) (LRB):  RADIOFREQUENCY ABLATION RIGHT LUMBAR L2-5 RFA VENOM (Right)    No recent changes in pain. No antithrombotic medications. Denies any recent fevers, infections, antibiotics, changes in health.     Anesthesia/Surgery risks, benefits and alternative options discussed and understood by patient/family.    PMHx, PSHx, Allergies, Medications reviewed in epic    ROS negative except pain complaints in HPI    OBJECTIVE:    There were no vitals taken for this visit.    PHYSICAL EXAMINATION:    GENERAL: Well appearing, in no acute distress, alert and oriented x3.  PSYCH:  Mood and affect appropriate.  SKIN: Skin color, texture, turgor normal, no rashes or lesions.  CV: RRR with palpation of the radial artery.  PULM: No evidence of respiratory difficulty, symmetric chest rise. Clear to auscultation.  NEURO: Cranial nerves grossly intact.    Plan:    Proceed with procedure as planned    Darrell Siegel  12/21/2018    I have seen the patient with the resident physician.  We have come up with the above plan.  The patient is in agreement with our plan.    Patient's COPD is controlled currently.  Patient is currently off COPD Pathway. Continue scheduled and prn inhalers. Oxygenating well on room air. Monitor respiratory status closely.     -wheezing on exam today, pt reports using nebulizer 4x/day. Would benefit from OP PCP/Pulm follow up for PFTs for COPD evaluation as none on file. Luis Felipe ordered inpatient.

## 2024-09-06 ENCOUNTER — HOSPITAL ENCOUNTER (OUTPATIENT)
Facility: OTHER | Age: 59
Discharge: HOME | End: 2024-09-06
Attending: ANESTHESIOLOGY | Admitting: ANESTHESIOLOGY
Payer: MEDICARE

## 2024-09-06 VITALS
BODY MASS INDEX: 31.81 KG/M2 | TEMPERATURE: 98 F | RESPIRATION RATE: 16 BRPM | SYSTOLIC BLOOD PRESSURE: 170 MMHG | OXYGEN SATURATION: 95 % | HEART RATE: 60 BPM | WEIGHT: 240 LBS | DIASTOLIC BLOOD PRESSURE: 94 MMHG | HEIGHT: 73 IN

## 2024-09-06 DIAGNOSIS — M47.812 CERVICAL SPONDYLOSIS: Primary | ICD-10-CM

## 2024-09-06 DIAGNOSIS — G89.29 CHRONIC PAIN: ICD-10-CM

## 2024-09-06 LAB — POCT GLUCOSE: 118 MG/DL (ref 70–110)

## 2024-09-06 PROCEDURE — 99153 MOD SED SAME PHYS/QHP EA: CPT | Performed by: ANESTHESIOLOGY

## 2024-09-06 PROCEDURE — 99152 MOD SED SAME PHYS/QHP 5/>YRS: CPT | Performed by: ANESTHESIOLOGY

## 2024-09-06 PROCEDURE — 63600175 PHARM REV CODE 636 W HCPCS: Performed by: ANESTHESIOLOGY

## 2024-09-06 PROCEDURE — 64633 DESTROY CERV/THOR FACET JNT: CPT | Mod: 50 | Performed by: ANESTHESIOLOGY

## 2024-09-06 PROCEDURE — 64634 DESTROY C/TH FACET JNT ADDL: CPT | Mod: 50,,, | Performed by: ANESTHESIOLOGY

## 2024-09-06 PROCEDURE — 99152 MOD SED SAME PHYS/QHP 5/>YRS: CPT | Mod: ,,, | Performed by: ANESTHESIOLOGY

## 2024-09-06 PROCEDURE — 25000003 PHARM REV CODE 250: Performed by: STUDENT IN AN ORGANIZED HEALTH CARE EDUCATION/TRAINING PROGRAM

## 2024-09-06 PROCEDURE — 64634 DESTROY C/TH FACET JNT ADDL: CPT | Mod: 50 | Performed by: ANESTHESIOLOGY

## 2024-09-06 PROCEDURE — 64633 DESTROY CERV/THOR FACET JNT: CPT | Mod: 50,,, | Performed by: ANESTHESIOLOGY

## 2024-09-06 PROCEDURE — 25000003 PHARM REV CODE 250: Performed by: ANESTHESIOLOGY

## 2024-09-06 RX ORDER — MIDAZOLAM HYDROCHLORIDE 1 MG/ML
INJECTION INTRAMUSCULAR; INTRAVENOUS
Status: DISCONTINUED | OUTPATIENT
Start: 2024-09-06 | End: 2024-09-06 | Stop reason: HOSPADM

## 2024-09-06 RX ORDER — FENTANYL CITRATE 50 UG/ML
INJECTION, SOLUTION INTRAMUSCULAR; INTRAVENOUS
Status: DISCONTINUED | OUTPATIENT
Start: 2024-09-06 | End: 2024-09-06 | Stop reason: HOSPADM

## 2024-09-06 RX ORDER — DEXAMETHASONE SODIUM PHOSPHATE 10 MG/ML
INJECTION INTRAMUSCULAR; INTRAVENOUS
Status: DISCONTINUED | OUTPATIENT
Start: 2024-09-06 | End: 2024-09-06 | Stop reason: HOSPADM

## 2024-09-06 RX ORDER — BUPIVACAINE HYDROCHLORIDE 2.5 MG/ML
INJECTION, SOLUTION EPIDURAL; INFILTRATION; INTRACAUDAL
Status: DISCONTINUED | OUTPATIENT
Start: 2024-09-06 | End: 2024-09-06 | Stop reason: HOSPADM

## 2024-09-06 RX ORDER — LIDOCAINE HYDROCHLORIDE 20 MG/ML
INJECTION, SOLUTION INFILTRATION; PERINEURAL
Status: DISCONTINUED | OUTPATIENT
Start: 2024-09-06 | End: 2024-09-06 | Stop reason: HOSPADM

## 2024-09-06 RX ORDER — SODIUM CHLORIDE 9 MG/ML
INJECTION, SOLUTION INTRAVENOUS CONTINUOUS
Status: DISCONTINUED | OUTPATIENT
Start: 2024-09-06 | End: 2024-09-06 | Stop reason: HOSPADM

## 2024-09-06 NOTE — DISCHARGE SUMMARY
Discharge Note  Short Stay      SUMMARY     Admit Date: 9/6/2024    Attending Physician: Alex Callahan MD    Discharge Physician: Alex Callahan MD      Discharge Date: 9/6/2024 9:17 AM    Procedure(s) (LRB):  RADIOFREQUENCY ABLATION BILATERAL C4, 5, 6, 7 (Bilateral)    Final Diagnosis: Spondylosis of cervical region without myelopathy or radiculopathy [M47.812]    Disposition: Home or self care    Patient Instructions:   Current Discharge Medication List        CONTINUE these medications which have NOT CHANGED    Details   acetaminophen (TYLENOL) 500 MG tablet Take 500 mg by mouth every 6 (six) hours as needed for Pain.      albuterol (PROVENTIL/VENTOLIN HFA) 90 mcg/actuation inhaler       aspirin (ECOTRIN) 81 MG EC tablet Take 81 mg by mouth once daily.      cetirizine (ZYRTEC) 10 MG tablet Take 10 mg by mouth nightly.  Refills: 5      FOLIC ACID/MULTIVIT-MIN/LUTEIN (CENTRUM SILVER ORAL) Take by mouth once daily.      gabapentin (NEURONTIN) 300 MG capsule Take 2 capsules (600 mg total) by mouth 2 (two) times daily.  Qty: 120 capsule, Refills: 5    Associated Diagnoses: Chronic pain disorder; Lumbar spondylosis; DDD (degenerative disc disease), lumbar; S/P cervical spinal fusion; Cervical radiculopathy; Myofascial pain      HYDROcodone-acetaminophen (NORCO) 7.5-325 mg per tablet Take 1 tablet by mouth every 12 (twelve) hours as needed for Pain.  Qty: 60 tablet, Refills: 0    Comments: Patient with chronic intractable pain.  Medically necessary for > 7 days  Associated Diagnoses: Chronic pain disorder; Lumbar spondylosis; DDD (degenerative disc disease), lumbar; Facet arthritis of lumbar region; Myalgia; Spondylosis of cervical region without myelopathy or radiculopathy; S/P cervical spinal fusion      levothyroxine (SYNTHROID) 50 MCG tablet Take 50 mcg by mouth once daily.      meclizine (ANTIVERT) 25 mg tablet Take 25 mg by mouth once daily.       meloxicam (MOBIC) 15 MG tablet Take 15 mg by mouth once  daily.      metFORMIN (GLUCOPHAGE) 500 MG tablet Take 1 tablet by mouth 2 (two) times a day.      methocarbamoL (ROBAXIN) 500 MG Tab Take 1 tablet (500 mg total) by mouth 2 (two) times daily.  Qty: 60 tablet, Refills: 4    Associated Diagnoses: Myofascial pain      omeprazole (PRILOSEC) 20 MG capsule Take 1 capsule (20 mg total) by mouth once daily.  Qty: 90 capsule, Refills: 3    Associated Diagnoses: Gastroesophageal reflux disease, esophagitis presence not specified      rosuvastatin (CRESTOR) 20 MG tablet Take 20 mg by mouth once daily.      valsartan (DIOVAN) 80 MG tablet Take 40 mg by mouth once daily.   Refills: 1                 Discharge Diagnosis: Spondylosis of cervical region without myelopathy or radiculopathy [M47.812]  Condition on Discharge: Stable with no complications to procedure   Diet on Discharge: Same as before.  Activity: as per instruction sheet.  Discharge to: Home with a responsible adult.  Follow up: 2-4 weeks       Please call my office or pager at [phone] if experienced any weakness or loss of sensation, fever > 101.5, pain uncontrolled with oral medications, persistent nausea/vomiting/or diarrhea, redness or drainage from the incisions, or any other worrisome concerns. If physician on call was not reached or could not communicate with our office for any reason please go to the nearest emergency department     Agustin Watt MD

## 2024-09-06 NOTE — DISCHARGE INSTRUCTIONS

## 2024-09-06 NOTE — OP NOTE
Therapeutic Cervical Medial Branch Radiofrequency Ablation Under Fluoroscopy     The procedure, risks, benefits, and options were discussed with the patient. There are no contraindications to the procedure. The patent expressed understanding and agreed to the procedure. Informed written consent was obtained prior to the start of the procedure and can be found in the patient's chart.        PATIENT NAME: Sal Navarro   MRN: [id number]     DATE OF PROCEDURE: 09/06/2024       PROCEDURE: Therapeutic Bilateral C4, C5, C6, and C7 Cervical Radiofrequency Ablation under Fluoroscopy    PRE-OP DIAGNOSIS: Spondylosis of cervical region without myelopathy or radiculopathy [M47.812] Cervical Spondylosis [M47.812]    POST-OP DIAGNOSIS: Same    PHYSICIAN: Alex Callahan MD    ASSISTANTS: Bayron Johnson MD      MEDICATIONS INJECTED:  Preservative-free Decadron 10mg with 9cc of Bupivicaine 0.25%    LOCAL ANESTHETIC INJECTED:   Xylocaine 2%    SEDATION: Versed 2mg and Fentanyl 125 mcg                                                                                                                                                                                     Conscious sedation ordered by M.JO. Patient re-evaluation prior to administration of conscious sedation. No changes noted in patient's status from initial evaluation. The patient's vital signs were monitored by RN and patient remained hemodynamically stable throughout the procedure.    Event Time In   Sedation Start 0920   Sedation End 0946       ESTIMATED BLOOD LOSS:  None    COMPLICATIONS:  None.     INTERVAL HISTORY: Patient has clinical and imaging findings suggestive of facet mediated pain. Patients has completed 2 previous diagnostic medial branch blocks at specified levels with at least 80% relief for the expected duration of the local anesthetic utilized.    TECHNIQUE: Time-out was performed to identify the patient and procedure to be performed. With the patient laying  in a prone position, the surgical area was prepped and draped in the usual sterile fashion using ChloraPrep and fenestrated drape. The levels were determined under fluoroscopic guidance. Skin anesthesia was achieved by injecting Lidocaine 2% over the injection sites. A 20 gauge 10mm curved active tip needle was introduced to the anatomic local of the medial branch at each of the above levels using AP, lateral and/or contralateral oblique fluoroscopic imaging. Then the sensory and motor testing was performed to confirm that the needle tips were in the correct location. After negative aspiration for blood or CSF was confirmed, 1 mL of the lidocaine 2% listed above was injected slowly at each site. This was followed by thermal lesioning at 80 degrees celsius for 90 seconds. That was followed by slowly injecting 1 mL of the medication mixture listed above at each site. The needles were removed and bleeding was nil. A sterile dressing was applied. No specimens collected. The patient tolerated the procedure well and did not have any procedure related motor deficit at the conclusion of the procedure.      The patient was monitored after the procedure in the recovery area. They were given post-procedure and discharge instructions to follow at home. The patient was discharged in a stable condition.     Agustin Watt MD    I reviewed and edited the fellow's note. I conducted my own interview and physical examination. I agree with the findings. I was present and supervising all critical portions of the procedure.    Alex Callahan MD

## 2024-10-04 ENCOUNTER — OFFICE VISIT (OUTPATIENT)
Dept: PAIN MEDICINE | Facility: CLINIC | Age: 59
End: 2024-10-04
Payer: MEDICARE

## 2024-10-04 VITALS
BODY MASS INDEX: 33.02 KG/M2 | DIASTOLIC BLOOD PRESSURE: 72 MMHG | RESPIRATION RATE: 18 BRPM | HEART RATE: 57 BPM | HEIGHT: 73 IN | TEMPERATURE: 98 F | WEIGHT: 249.13 LBS | SYSTOLIC BLOOD PRESSURE: 124 MMHG | OXYGEN SATURATION: 100 %

## 2024-10-04 DIAGNOSIS — M79.18 MYOFASCIAL PAIN: ICD-10-CM

## 2024-10-04 DIAGNOSIS — M47.812 SPONDYLOSIS OF CERVICAL REGION WITHOUT MYELOPATHY OR RADICULOPATHY: ICD-10-CM

## 2024-10-04 DIAGNOSIS — G89.29 CHRONIC LEFT HIP PAIN: Primary | ICD-10-CM

## 2024-10-04 DIAGNOSIS — M25.552 CHRONIC LEFT HIP PAIN: Primary | ICD-10-CM

## 2024-10-04 DIAGNOSIS — M47.816 LUMBAR SPONDYLOSIS: ICD-10-CM

## 2024-10-04 DIAGNOSIS — M51.360 DEGENERATION OF INTERVERTEBRAL DISC OF LUMBAR REGION WITH DISCOGENIC BACK PAIN: ICD-10-CM

## 2024-10-04 DIAGNOSIS — M50.30 DDD (DEGENERATIVE DISC DISEASE), CERVICAL: ICD-10-CM

## 2024-10-04 DIAGNOSIS — G89.4 CHRONIC PAIN SYNDROME: ICD-10-CM

## 2024-10-04 PROCEDURE — 99999 PR PBB SHADOW E&M-EST. PATIENT-LVL IV: CPT | Mod: PBBFAC,,, | Performed by: NURSE PRACTITIONER

## 2024-10-04 RX ORDER — TRIAMCINOLONE ACETONIDE 40 MG/ML
40 INJECTION, SUSPENSION INTRA-ARTICULAR; INTRAMUSCULAR
Status: COMPLETED | OUTPATIENT
Start: 2024-10-04 | End: 2024-10-04

## 2024-10-04 RX ORDER — BUPIVACAINE HYDROCHLORIDE 2.5 MG/ML
9 INJECTION, SOLUTION EPIDURAL; INFILTRATION; INTRACAUDAL
Status: COMPLETED | OUTPATIENT
Start: 2024-10-04 | End: 2024-10-04

## 2024-10-04 RX ADMIN — BUPIVACAINE HYDROCHLORIDE 22.5 MG: 2.5 INJECTION, SOLUTION EPIDURAL; INFILTRATION; INTRACAUDAL at 04:10

## 2024-10-04 RX ADMIN — TRIAMCINOLONE ACETONIDE 40 MG: 40 INJECTION, SUSPENSION INTRA-ARTICULAR; INTRAMUSCULAR at 04:10

## 2024-10-04 NOTE — PROGRESS NOTES
Chronic patient Established Note (Follow up visit)      Interval History 10/4/2024:  The patient returns to clinic today for follow up of neck and back pain. He is s/p bilateral C4,5,6 RFA on 9/6/2024. He reports 50% relief of his neck pain. He reports intermittent neck pain, worse with prolonged activity. He reports increased left sided low back pain. He describes this as tense and hard in nature. He continues to report left hip pain. This is worse with standing and walking. He is taking Gabapentin and Robaxin. He is taking Norco as needed with benefit. He takes this sparingly. He denies any adverse effects. He denies any other health changes. His pain today is 7/10.    Interval History 8/16/2024:  The patient returns to clinic today for follow up of neck and back pain. He is s/p bilateral L3,4,5 RFA on 7/18/2024. He reports 50-60% relief. His back pain is tolerable at this time. He continues to report left hip pain. He has had a few falls onto the left side. He reports increased neck pain. He denies any radicular arm pain. He does endorse morning stiffness. His pain is worse with prolonged activity. He is taking Gabapentin and Robaxin. He takes Norco sparingly for severe pain. He denies any other health changes. His pain today is 6/10.    Interval History 5/28/2024:  The patient returns to clinic today for follow up of neck and back pain. He is s/p bilateral SI joint injections on 5/10/2024. He reports 50% relief. He reports right sided low back pain, higher than injection sites. He also report left sided low back pain. This pain is worse with prolonged activity. He does endorse stiffness. He denies any radicular leg pain. He reports intermittent neck pain. He is taking Gabapentin and Robaxin. He takes Norco sparingly for severe pain. His last dose was yesterday. He denies any other health changes. His pain today is 6/10.    Interval History 4/22/2024:  The patient returns to clinic today for follow up of neck and  back pain. He reports increased low back pain. He reports low back and buttock pain. He denies any radicular leg pain. His pain is worse with prolonged sitting. He also reports increased pain with moving from sitting to standing. He reports intermittent neck pain. He is having increased left elbow pain. His wife notes that he does drop objects from the left hand. He is taking Gabapentin and Robaxin. He is taking Norco as needed for severe pain. He denies any adverse effects. He denies any other health changes. His pain today is 7/10.    Interval History 10/11/2023:  The patient returns to clinic today for follow up of neck and back pain. He is s/p left C4,5,6,7 RFA on 9/15/2023. He is s/p right C4,5,6,7 RFA on 9/29/2023. He reports 50% relief of his pain. He does report intermittent neck pain. He also reports left sided anterior neck and upper chest pain. This is tight in nature. He reports increased low back pain. He denies any radicular leg pain. This pain is worse with prolonged activity. He does have intermittent left abdominal pain. He feels a knot in the area. This pain is relieved with sitting or laying down. He did have a recent fall in his garden. He continues to take Gabapentin and Robaxin. He also takes Norco as needed for severe pain. He takes this sparingly without adverse effects. He denies any other health changes. His pain today is 4/10.    Interval History 8/11/2023:  The patient returns to clinic today for follow up of back pain. He is s/p bilateral SI joint injections on 7/27/2023. He reports 50-60% relief of his buttock pain. He reports worsened neck pain over the last month. He does report some pain into the left shoulder. He denies any radicular arm pain. His pain is worse with turning his head to the side. He does endorse stiffness. He continues to report intermittent left abdominal pain. This pain is worse with prolonged activity. He also notes 2 lumps in the area, especially at the end of  the day. He is taking Gabapentin and Robaxin. He also takes Norco as needed for severe pain sparingly. He denies any other health changes. His pain today is 6/10.    Interval History 7/5/2023:  The patient returns to clinic today for follow up of back pain. He reports increased low back and buttock pain. He denies any radicular leg pain. His pain is worse with prolonged sitting, moving from sitting to standing, and prolonged activity. He continues to report intermittent left abdominal pain. He also reports increased neck pain. He is taking Gabapentin and Robaxin. He also takes Norco as needed for severe pain. He denies any adverse effects. His pain today is 6/10.    Interval History 3/21/2023:  The patient returns to clinic today for follow up of back pain. He is s/p left L3,4,5 RFA on 2/24/2023 and right L3,4,5 RFA on 3/10/2023. He is unsure of relief at this time. He reports increased left sided low back pain over the last two weeks. 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 He did feel a pop. He thought he pulled a muscle, but pain has been worsening. He reports left sided low back pain that radiates into the left hip and lower abdomen. He also reports intermittent radiating pain into the posterior left leg. He describes this pain as numb and tingling in nature. His pain is worse with activity. He does endorse muscle spasms. He is taking Gabapentin and Robaxin. He has had to take more Norco recently due to increased pain. He denies any weakness. He denies any other health changes. His pain today is 8/10.    Interval History 1/31/2023:  The patient returns to clinic today for follow up of neck and back pain. He is s/p left C4,5,6,7 on 12/15/2022 and right C4,5,6,7 RFA on 12/29/2022. He reports 50% relief of his neck pain. He reports intermittent neck pain, left side greater than right. He denies any radicular arm pain. He does report pain into the shoulder blades. He reports increased low  back pain. He denies any radicular leg pain. His pain is worse with standing, walking, and activity. He does endorse stiffness. He is taking Gabapentin and Robaxin. He also takes Norco sparingly with benefit. He denies any other health changes. His pain today is 6/10.    Interval History 11/18/2022:  The patient returns to clinic today for follow up of neck and back pain. He is s/p bilateral SI joint injections on 11/4/2022. He reports 60% relief of his low back and buttock pain. He continues to report low back pain. He denies any radicular leg pain. He reports increased neck pain, left side greater than right. He denies any radicular arm. He does report some pain into his shoulder blades. His pain is worse with activity. He continues to take Gabapentin and Robaxin. He continues to take Norco as needed sparingly with benefit. He denies any other health changes. His pain today is 7/10.    Interval History 10/27/2022:  The patient returns to clinic today for follow up of neck and back pain. He reports increased low back and bilateral buttock pain. He denies any radicular leg pain. His pain is worse with prolonged sitting and moving from sitting to standing. He does endorse a fall recently while getting out of the vehicle onto his side. He continues to report neck pain. He continues to take Gabapentin and Robaxin. He continues to take Norco sparingly with benefit and without adverse effects. He denies any other health changes. His pain today is 6/10.    Interval History 8/2/2022:  The aptient returns to clinic today for follow up of neck and back pain. He is s/p bilateral SI joint injection on 7/14/2022. He reports 50-60% relief of his low back and buttock pain. He reports intermittent low back and buttock pain. He reports increased neck pain, left side greater than right. He denies any radicular arm pain. He has good days and bad days. He continues to take Gabapentin. He continues to take Norco sparingly with benefit  and without adverse effects. He denies any other health changes. His pain today is 5/10.    Interval History 7/1/2022:  The patient returns to clinic today for follow up of neck and back pain. He reports increased low back and buttock pain.His pain is worse with prolonged sitting and moving from sitting to standing. He also reports increased pain with bending over. He does report intermittent radiating pain into his right posterior thigh stopping at mid thigh. He does report a fall, which was sitting hard on the ground about a week ago. He denies any acute injury. He continues to take Gabapentin and Norco. He denies any other health changes. His pain today is 6/10.     Interval History 5/9/2022:  The patient returns to clinic today for follow up of back pain. He is s/p right L4/5 and L5/S1 TF NGUYEN on 4/14/2022. He reports 50-60% relief of his low back and leg pain. He has been more active since the procedure. He continues to report low back and hip pain, worse with bending and activity. He denies any radicular leg pain. He continues to repot intermittent neck pain. He continues to take Gabapentin with benefit. He continues to take Norco sparingly as needed. He denies any other health changes. His pain today is 5/10.    Interval History 4/1/2022:  The patient returns to clinic today for follow up of back pain. He is s/p left L3,4,5 RFA on 2/25/2022 and right L3,4,5 RFA on 3/11/2022. He reports relief of his back pain. He reports increased back pain that radiates into the posterior aspect of his right leg to his knee. He denies any left leg pain. His pain is worse with prolonged walking. He reports that he sometimes drags his right leg. He denies any falls. He denies any bowel or bladder incontinence. He continues to take Gabapentin and Norco as needed with benefit. He denies any adverse effects. He denies any other health changes. His pain today is 7/10.    Interval History 2/15/2022:  The patient returns to clinic  "today for follow up of neck and back pain. He reports increased low back pain over the last two weeks. He describes this pain as sharp and aching in nature. He does report intermittent "catching" pain in his lower back, worse with getting out of bed. He denies any radicular leg pain. He continues to report benefit from previous cervical procedures. He reports intermittent neck pain. He continues to take Gabapentin with benefit. He continues to take Norco as needed with benefit and without adverse effects. He denies any other health changes. His pain today is 6/10.    Interval History 11/5/2021:  The patient returns to clinic today for follow up of neck and back pain. He is s/p left C4,5,6,7 RFA on 10/1/2021 and right C4,5,6,7 RFA on 10/15/2021. He reports 50% relief of his neck pain. He reports increased burning and itching pain to his skin near injection sites. He denies any radicular arm pain. He does report increased left sided muscle pain. He is concerned that one of the screws in his cervical hardware is moving. He continues to report benefit with previous lumbar procedures. He reports intermittent low back pain without radicular leg pain. He continues to take Gabapentin with benefit. He continues to take Norco as needed with benefit and without adverse effects. He denies any weakness. He denies any other health changes. His pain today is 5/10.    Interval History 9/17/2021:  The patient returns to clinic today for follow up of neck and back pain. He reports increased neck pain. He denies any radicular arm pain today. He does report intermittent radiating pain into his left shoulder blade and mid back. His pain is worse with activity. He reports that sometimes his pain takes his breath away. He continues to report low back pain, that is tolerable at this time. He continues to take Gabapentin and Norco with benefit and without adverse effects. He denies any other health changes. His pain today is " 6/10.    Interval History 6/17/2021:  The patient returns to clinic today for follow up of neck and back pain. He is s/p right L3,4,5 RFA on 4/23/2021 and left L3,4,5 RFA on 5/7/2021. He reports 50% relief of his low back pain. He continues to report intermittent low back pain. He denies any radicular leg pain. He continues to report neck pain, especially in between the scapula. His pain is worse with prolonged standing, walking, and activity. He continues to take Gabapentin with benefit. He continues to take Norco sparingly for severe pain with benefit and without adverse effects. He denies any other health changes. His pain today is 4/10.    Interval History 4/9/2021:  The patient returns to clinic today for follow up of neck and back pain. He is s/p left C4,5,6,7 RFA on 3/9/2021 and right C4,5,6,7 RFA on 3/26/2021. He reports 50% relief of his neck pain. He reports intermittent neck pain. He has good days and bad days. He continues to report low back pain that is constant, sharp, and aching. He reports intermittent radiating pain into the lateral aspect of his left leg to his knee. He continues to take Gabapentin with benefit. He continues to take Norco as needed sparingly for severe pain. He denies any adverse effects. He denies any other health changes. His pain today is 5/10.    Interval History 3/2/2021:  Sal Navarro presents to the clinic for a follow-up appointment for neck pain . Since the last visit, Sal Navarro states the pain has been worsening. Current pain intensity is 6/10.  Was seen by Angelique Barahona NP on 2/12/2021.     Interval History 2/12/2021:  The patient returns to clinic today for follow up of back pain. He has not been seen in over a year. He did not have imaging due to coronavirus outbreak. He would to reschedule this. He was also considering SCS trial prior to the pandemic. He continues to report low back pain that radiates into the lateral aspect of his left leg to his knee.  "He denies any radicular right leg pain. His pain is worse with bending and walking. He continues to take Gabapentin with benefit. He also takes Norco as needed for severe pain. He denies any adverse effects. He denies any other health changes. His pain today is 6/10.    HPI:  Sal Navarro is a 56 y.o. male who presents today s/p C4-7 ACDF with C5/6 PSIF in 2/2016 with residual chronic midline neck pain that radiates into his shoulder blades bilaterally, R>>L.  This is associated with bilateral hand numbness and tingling.  The hand symptoms did improve following the surgery.  The shooting pains in his arms resolved completely.   This pain is described in detail below.  His post-operative course was complicated by dysphagia that is being treated with speech therapy.  The numbness and the tingling were relieved by the surgery. Now experiences "deep pain along the spine"  Patient has not been seen for over a year, had to re-schedule his imaging studies due to pandemic(now completed). Prior to the pandemic, he was considering a SCS.   Only time he gets relief is laying in bed on a very thin pillow, but that doesn't last very long.   Pain aggravated by movement and even breathing/playing with his grandchild/holding the grandchild's hand. Heat and massage (has a vest) are helpful.   Was seen by Dr. Jan srivastava and received multiple EIS and RFAs.   Compliant with his medication.     Pain Disability Index Review:      10/4/2024     1:21 PM 8/16/2024     1:51 PM 5/28/2024     2:42 PM   Last 3 PDI Scores   Pain Disability Index (PDI) 35 [phone]       Pain Medications:  Gabapentin  Norco sparingly  Robaxin    Opioid Contract: yes     report:  Reviewed and consistent with medication use as prescribed.    Pain Procedures:   9/6/2024- Bilateral C4,5,6, 7 RFA   7/18/2024- Bilateral L3,4,5 RFA  5/10/2024- Bilateral SI joint injections.   7/27/2023- Bilateral SI joint injections  3/10/2023- Right L3,4,5 RFA- 50% " relief  2/24/2023- Left L3,4,5 RFA- relief  12/29/2022- Right C4,5,6,7 RFA  12/15/2022- Left C4,5,6,7 RFA  11/4/2022- Bilateral SI joint injections- 60% relief   4/14/2022- Right L4/5 and L5/S1 TF NGUYEN  3/11/2022- Right L3,4,5 RFA  2/25/2022- Left L3,4,5 RFA  10/15/2021- Right C4,5,6,7 RFA  10/1/2021- Left C4,5,6,7 RFA  5/7/2021- Left L3,4,5 RFA   4/23/2021- Right L3,4,5 RFA  3/26/2021- Right C4,5,6,7 RFA  3/9/2021- Left C4,5,6,7 RFA  10/11/19: L4/5 Interlaminar Epidural Steroid Injection  06/13/19- Left C4-7 RFA  05/03/19:Left L2-5 Lumbar Medial Branch Nerve Thermal Radiofrequency Ablation  12/21/18:Right L2-5 Lumbar Medial Branch Nerve Thermal Radiofrequency Ablation  11/23/18:Cervical Thermal Radiofreqiency Ablation: Right C4-7  09/14/18:C7/K0Srfzpoiu Steroid Injection  06/29/18:LL2/3 Interlaminar Epidural Steroid Injection   03/06/18:L2/3 Interlaminar Epidural Steroid Injection   09/26/17:Bilateral L2-5 Lumbar Medial Branch Nerve Coolief Thermal Radiofrequency Ablation  08/31/17:Bilateral L2-5 Lumbar medial branch blocks   03/28/17:Cervical Conventional Radiofreqiency Ablation: Left C4-7  03/14/17:Cervical Conventional Radiofreqiency Ablation: Right C4-7  10/25/16:Cervical Conventional Radiofreqiency Ablation: Left C4-7  10/11/16: Cervical Conventional Radiofreqiency Ablation: Right C4-7  10/04/16- Cervical Medial Branch Blocks, Bilateral C4-7.     08/31/16:C7/H1Dvffbzsy Steroid Injection    Physical Therapy/Home Exercise: does home exercises now, but not in formal PT.    - 2019 was last time in PT     Imaging:   MRI Lumbar Spine 2/25/2021:  COMPARISON:  07/11/2017     FINDINGS:  The vertebral bodies maintain normal height, signal intensity and alignment.  There is redemonstration of few hemangiomas at multiple levels.  The intervertebral disc spaces are preserved although there is some disc desiccation at multiple levels.  The conus terminates at level L1.  Evaluation of the localizer images and structures  surrounding the lumbar spine shows no abnormalities.     L1-L2: No significant disc or joint pathology.     L2-L3: Mild diffuse disc bulge with mild bilateral facet arthropathy and ligamentum flavum hypertrophy results in mild central canal stenosis.  Mild bilateral neural foraminal stenosis is also identified.     L3-L4: There is a diffuse disc bulge with no significant facet arthropathy or ligamentum flavum hypertrophy.  There is mild central canal stenosis and mild bilateral neural foraminal stenosis.     L4-L5: There is a diffuse disc bulge with ligamentum flavum hypertrophy.  No significant facet arthropathy.  There is mild central canal stenosis with only minimal encroachment on the left neural foramen.  Mild right neural foraminal stenosis is present.     L5-S1: There is a diffuse disc bulge with mild to moderate right facet arthropathy.  No left facet arthropathy.  The ligamentum flavum is normal.  The central canal is patent and the neural foramina are largely patent.  Incidental note is made of an annulus fibrosus tear posteriorly measuring approximately 2 mm.     Impression:     Multilevel degenerative disc and joint disease which is mild and results in mild central canal and neural foraminal stenosis as outlined above.    Xray Cervical Spine 2/25/2021:  COMPARISON:  07/07/2017 .     FINDINGS:  The patient has undergone ACDF from C4 through C7. The instrumentation is intact without evidence of complication.  The vertebral bodies maintain normal height and alignment. The remaining intervertebral disc spaces are preserved.  No significant uncovertebral joint hypertrophy.  The prevertebral soft tissues, odontoid views and lung apices are normal. The neural foramen are patent     Impression:     Postsurgical changes to the thoracic spine without additional evidence for degenerative disc or joint disease.     Allergies:   Review of patient's allergies indicates:   Allergen Reactions    Pcn [penicillins] Hives  and Rash    Lipitor [atorvastatin] Other (See Comments)     Muscle cramps, weakness       Current Medications:   Current Outpatient Medications   Medication Sig Dispense Refill    acetaminophen (TYLENOL) 500 MG tablet Take 500 mg by mouth every 6 (six) hours as needed for Pain.      albuterol (PROVENTIL/VENTOLIN HFA) 90 mcg/actuation inhaler       aspirin (ECOTRIN) 81 MG EC tablet Take 81 mg by mouth once daily.      cetirizine (ZYRTEC) 10 MG tablet Take 10 mg by mouth nightly.  5    FOLIC ACID/MULTIVIT-MIN/LUTEIN (CENTRUM SILVER ORAL) Take by mouth once daily.      HYDROcodone-acetaminophen (NORCO) 7.5-325 mg per tablet Take 1 tablet by mouth every 12 (twelve) hours as needed for Pain. 60 tablet 0    levothyroxine (SYNTHROID) 50 MCG tablet Take 50 mcg by mouth once daily.      meclizine (ANTIVERT) 25 mg tablet Take 25 mg by mouth once daily.       meloxicam (MOBIC) 15 MG tablet Take 15 mg by mouth once daily.      metFORMIN (GLUCOPHAGE) 500 MG tablet Take 1 tablet by mouth 2 (two) times a day.      methocarbamoL (ROBAXIN) 500 MG Tab Take 1 tablet (500 mg total) by mouth 2 (two) times daily. 60 tablet 4    omeprazole (PRILOSEC) 20 MG capsule Take 1 capsule (20 mg total) by mouth once daily. 90 capsule 3    rosuvastatin (CRESTOR) 20 MG tablet Take 20 mg by mouth once daily.      valsartan (DIOVAN) 80 MG tablet Take 40 mg by mouth once daily.   1    gabapentin (NEURONTIN) 300 MG capsule Take 2 capsules (600 mg total) by mouth 2 (two) times daily. 120 capsule 5     No current facility-administered medications for this visit.       REVIEW OF SYSTEMS:    GENERAL:  No weight loss, malaise or fevers.  HEENT:  Negative for frequent or significant headaches.  NECK:  Negative for lumps, goiter  and significant neck swelling.   RESPIRATORY:  Negative for cough, wheezing or shortness of breath.  CARDIOVASCULAR:  Negative for chest pain, leg swelling or palpitations. HTN  GI:  Negative for abdominal discomfort, blood in stools  or black stools or change in bowel habits.  MUSCULOSKELETAL:  See HPI.  SKIN:  Negative for lesions, rash, and itching.  PSYCH:  Negative for sleep disturbance, mood disorder and recent psychosocial stressors.  HEMATOLOGY/LYMPHOLOGY:  Negative for prolonged bleeding, bruising easily or swollen nodes.  NEURO:   No history of headaches, syncope, paralysis, seizures or tremors.   ENDO: Thyroid disorder.   All other reviewed and negative other than HPI.    Past Medical History:  Past Medical History:   Diagnosis Date    Arthritis     Cataract     Cervical back pain with evidence of disc disease     Hiatal hernia     Hypertension     Thyroid disease        Past Surgical History:  Past Surgical History:   Procedure Laterality Date    ACROMIOCLAVICULAR JOINT CYST EXCISION Right     BACK SURGERY      cataracts Bilateral     CERVICAL FUSION      COLONOSCOPY N/A 4/27/2018    Procedure: COLONOSCOPY;  Surgeon: Giovani Medeiros MD;  Location: [address]);  Service: Endoscopy;  Laterality: N/A;    EPIDURAL STEROID INJECTION N/A 10/11/2019    Procedure: INJECTION, STEROID, EPIDURAL;  Surgeon: Kasandra Montoya MD;  Location: [facility];  Service: Pain Management;  Laterality: N/A;  Lumbar NGUYEN L4/5    NGUYEN      EYE SURGERY      INJECTION OF JOINT Bilateral 7/14/2022    Procedure: INJECTION, JOINT, SI BILATERAL  needs consent;  Surgeon: Alex Callahan MD;  Location: [facility] [place] [facility];  Service: Pain Management;  Laterality: Bilateral;    INJECTION, SACROILIAC JOINT Bilateral 11/4/2022    Procedure: INJECTION,SACROILIAC JOINT BILATERAL CONTRAST;  Surgeon: Alex Callahan MD;  Location: [facility] [place] [facility];  Service: Pain Management;  Laterality: Bilateral;    INJECTION, SACROILIAC JOINT Bilateral 7/27/2023    Procedure: INJECTION,SACROILIAC JOINT, BILATERAL SOONER DATE;  Surgeon: Alex Callahan MD;  Location: [facility] [place] [facility];  Service: Pain Management;  Laterality: Bilateral;    INJECTION, SACROILIAC JOINT Bilateral 5/10/2024     Procedure: INJECTION,SACROILIAC JOINT BILATERAL;  Surgeon: Alex Callahan MD;  Location: [facility];  Service: Pain Management;  Laterality: Bilateral;  [phone]  2 WK F/U NICK    RADIOFREQUENCY ABLATION  10/2016    cervical spine/Montoya    RADIOFREQUENCY ABLATION Left 5/3/2019    Procedure: RADIOFREQUENCY ABLATION, L2-L5 MEDIALBRANCH;  Surgeon: Kasandra Montoya MD;  Location: [facility];  Service: Pain Management;  Laterality: Left;    RADIOFREQUENCY ABLATION Left 6/13/2019    Procedure: RADIOFREQUENCY ABLATION C4-7;  Surgeon: Kasandra Montoya MD;  Location: [facility];  Service: Pain Management;  Laterality: Left;    RADIOFREQUENCY ABLATION Left 3/9/2021    Procedure: RADIOFREQUENCY ABLATION C4,C5,C6,C7;  Surgeon: Alex Callahan MD;  Location: [facility];  Service: Pain Management;  Laterality: Left;  1 of 2    RADIOFREQUENCY ABLATION Right 3/26/2021    Procedure: RADIOFREQUENCY ABLATION C4,C6,C6,C7;  Surgeon: Alex Callahan MD;  Location: [facility];  Service: Pain Management;  Laterality: Right;  2 of 2    RADIOFREQUENCY ABLATION Right 4/23/2021    Procedure: RADIOFREQUENCY ABLATION L3,4,5;  Surgeon: Alex Callahan MD;  Location: [facility];  Service: Pain Management;  Laterality: Right;  1 of 2    RADIOFREQUENCY ABLATION Left 5/7/2021    Procedure: RADIOFREQUENCY ABLATION L3,4,5;  Surgeon: Alex Callahan MD;  Location: [facility];  Service: Pain Management;  Laterality: Left;  2 of 2    RADIOFREQUENCY ABLATION Left 10/1/2021    Procedure: RADIOFREQUENCY ABLATION LEFT, C4,5,6,7 1 of 2 CONSENT NEEDED;  Surgeon: Alex Callahan MD;  Location: [facility];  Service: Pain Management;  Laterality: Left;    RADIOFREQUENCY ABLATION Right 10/15/2021    Procedure: RADIOFREQUENCY ABLATION  RIGHT C4,5,6,7 2 of 2 CONSENT NEEDED;  Surgeon: Alex Callahan MD;  Location: [facility];  Service: Pain Management;  Laterality: Right;    RADIOFREQUENCY ABLATION Left 2/25/2022    Procedure:  RADIOFREQUENCY ABLATION LEFT L3,4,5 1 of 2, needs consent;  Surgeon: Alex Callahan MD;  Location: [facility];  Service: Pain Management;  Laterality: Left;    RADIOFREQUENCY ABLATION Right 3/11/2022    Procedure: RADIOFREQUENCY ABLATION RIGHT L3,4,5 2 of 2, needs consent;  Surgeon: Alex Callahan MD;  Location: [facility];  Service: Pain Management;  Laterality: Right;    RADIOFREQUENCY ABLATION Left 12/15/2022    Procedure: RADIOFREQUENCY ABLATION LEFT C4, C5, C6, C7  ONE OF TWO NEEDS CONSENT;  Surgeon: Alex Callahan MD;  Location: [facility];  Service: Pain Management;  Laterality: Left;    RADIOFREQUENCY ABLATION Right 12/29/2022    Procedure: RADIOFREQUENCY ABLATION RIGHT C4, C5, C6, C7  TWO OF TWO NEEDS CONSENT;  Surgeon: Alex Callahan MD;  Location: [facility];  Service: Pain Management;  Laterality: Right;    RADIOFREQUENCY ABLATION Left 2/24/2023    Procedure: RADIOFREQUENCY ABLATION LEFT L3,L4,L5;  Surgeon: Alex Callahan MD;  Location: [facility];  Service: Pain Management;  Laterality: Left;    RADIOFREQUENCY ABLATION Right 3/10/2023    Procedure: RADIOFREQUENCY ABLATION RIGHT L3,L4,L5;  Surgeon: Alex Callahan MD;  Location: [facility];  Service: Pain Management;  Laterality: Right;    RADIOFREQUENCY ABLATION Left 9/15/2023    Procedure: RADIOFREQUENCY ABLATION, LEFT C4-C5-C6-C7 MEDIAL BRANCH ONE OF TWO  SOONER DATE;  Surgeon: Alex Callahan MD;  Location: [facility];  Service: Pain Management;  Laterality: Left;    RADIOFREQUENCY ABLATION Right 9/29/2023    Procedure: RADIOFREQUENCY ABLATION,  RIGHT C4-C5-C6-C7 MEDIAL BRANCH TWO OF TWO SOONER DATE;  Surgeon: Alex Callahan MD;  Location: [facility];  Service: Pain Management;  Laterality: Right;    RADIOFREQUENCY ABLATION Bilateral 7/18/2024    Procedure: RADIOFREQUENCY ABLATION BILATERAL L3, 4, 5;  Surgeon: Alex Callahan MD;  Location: [facility];  Service: Pain Management;  Laterality: Bilateral;   "[phone]  3 WK F/U EMILY    RADIOFREQUENCY ABLATION Bilateral 9/6/2024    Procedure: RADIOFREQUENCY ABLATION BILATERAL C4, 5, 6, 7;  Surgeon: Alex Callahan MD;  Location: [facility];  Service: Pain Management;  Laterality: Bilateral;  [phone]  3 WK F/U NICK    RETINAL DETACHMENT SURGERY      ROTATOR CUFF REPAIR Right     SPINE SURGERY      cerival fusion     TRANSFORAMINAL EPIDURAL INJECTION OF STEROID Right 4/14/2022    Procedure: INJECTION, STEROID, EPIDURAL, TRANSFORAMINAL APPROACH RIGHT L4/5 & L5/S1 Needs Consent;  Surgeon: Alex Callahan MD;  Location: [facility];  Service: Pain Management;  Laterality: Right;    TRIGGER POINT INJECTION Left 6/13/2019    Procedure: INJECTION, TRIGGER POINT;  Surgeon: Kasandra Montoya MD;  Location: [facility];  Service: Pain Management;  Laterality: Left;       Family History:  Family History   Problem Relation Name Age of Onset    Heart disease Mother      Cancer Father      Leukemia Brother      Colon cancer Neg Hx      Celiac disease Neg Hx      Crohn's disease Neg Hx      Esophageal cancer Neg Hx      Inflammatory bowel disease Neg Hx      Irritable bowel syndrome Neg Hx      Liver cancer Neg Hx      Rectal cancer Neg Hx      Stomach cancer Neg Hx      Ulcerative colitis Neg Hx         Social History:  Social History     Socioeconomic History    Marital status:    Tobacco Use    Smoking status: Never    Smokeless tobacco: Never   Substance and Sexual Activity    Alcohol use: No    Drug use: No       OBJECTIVE:    /72   Pulse (!) 57   Temp 98 °F (36.7 °C)   Resp 18   Ht 6' 1" (1.854 m)   Wt 113 kg (249 lb 1.9 oz)   SpO2 100%   BMI 32.87 kg/m²     PHYSICAL EXAMINATION:    General appearance: Well appearing, in no acute distress, alert and oriented x3.  Psych:  Mood and affect appropriate.  Skin: Skin color, texture, turgor normal, no rashes or lesions, in both upper and lower body.  Head/face:  Atraumatic, normocephalic. No palpable " lymph nodes   Neck: There is mild pain with palpation over cervical paraspinals and facet joints bilaterally.   Cor: RRR  Pulm: Symmetric chest rise, no respiratory distress noted.   Abdomen: Soft, pain with palpation over LUQ.  Back: Straight leg raising in the sitting position is negative for radicular leg pain bilaterally. There is pain with palpation over lumbar paraspinals and facet joints bilaterally, right greater than left. Limited ROM with pain on extension. Positive facet loading bilaterally.   Extremities: No deformities, edema, or skin discoloration. Good capillary refill.  Musculoskeletal: Positive log roll on the left. Positive FABERs/FADIRS on the left. There is mild pain with palpation over bilateral SI joints. Bilateral upper and lower extremity strength is normal and symmetric. No atrophy or tone abnormalities are noted.  Neuro:  No loss of sensation is noted.  Gait: Antalgic, ambulates with cane for assistance     ASSESSMENT: 59 y.o. year old male with neck and lumbar pain, consistent with      1. Chronic left hip pain  Procedure Order to Pain Management      2. Myofascial pain        3. Lumbar spondylosis        4. Degeneration of intervertebral disc of lumbar region with discogenic back pain        5. Spondylosis of cervical region without myelopathy or radiculopathy        6. DDD (degenerative disc disease), cervical        7. Chronic pain syndrome                    PLAN:     - Previous imaging reviewed today.    - Trigger point injections as per below.     - Schedule for left hip joint injection.     - We can repeat cervical and lumbar RFA as needed.     - I have stressed the importance of physical activity and a home exercise plan to help with pain and improve health.    - Continue Gabapentin.     - Continue Robaxin 500 mg PRN muscle pain.     - Pain contract signed 2/12/2021.     - Continue Norco 7.5/325 mg BID PRN. He does not need a refill.      - The patient is here today for a refill of  current pain medications and they believe these provide effective pain control and improvements in quality of life.  The patient notes no serious side effects, and feels the benefits outweigh the risks.  The patient was reminded of the pain contract that they signed previously as well as the risks and benefits of the medication including possible death.  The updated [place] [employer] prescription monitoring program was reviewed, and the patient has been found to be compliant with current treatment plan. Medication management provided by Dr. Callahan.     - UDS from 5/28/2024 reviewed, negative for medications, he does take sparingly.     - Continue home exercise routine.     - RTC 2 weeks after above procedure.     The above plan and management options were discussed at length with patient. Patient is in agreement with the above and verbalized understanding.    Angelique Barahona NP  10/04/2024    Trigger Point Injection:   The procedure was discussed with the patient including complications of nerve damage,  bleeding, infection, and failure of pain relief. Consent signed.     Trigger points were identified by palpation and marked. Chlorhexidine prep of sites done. A mixture of Kenalog 40 mg/ml + 9 ml 0.25% bupivacaine (10 mL total).   A 27-gauge needle was advanced to the point of maximal tenderness, and  1.5mL  was injected after negative aspiration. All sites done in the same manner. Patient tolerated the procedure well and without complications. Sites injected included: lumbar paraspinals on the left.     The patient was monitored for 10 minutes without adverse effects.

## 2024-10-04 NOTE — H&P (VIEW-ONLY)
Chronic patient Established Note (Follow up visit)      Interval History 10/4/2024:  The patient returns to clinic today for follow up of neck and back pain. He is s/p bilateral C4,5,6 RFA on 9/6/2024. He reports 50% relief of his neck pain. He reports intermittent neck pain, worse with prolonged activity. He reports increased left sided low back pain. He describes this as tense and hard in nature. He continues to report left hip pain. This is worse with standing and walking. He is taking Gabapentin and Robaxin. He is taking Norco as needed with benefit. He takes this sparingly. He denies any adverse effects. He denies any other health changes. His pain today is 7/10.    Interval History 8/16/2024:  The patient returns to clinic today for follow up of neck and back pain. He is s/p bilateral L3,4,5 RFA on 7/18/2024. He reports 50-60% relief. His back pain is tolerable at this time. He continues to report left hip pain. He has had a few falls onto the left side. He reports increased neck pain. He denies any radicular arm pain. He does endorse morning stiffness. His pain is worse with prolonged activity. He is taking Gabapentin and Robaxin. He takes Norco sparingly for severe pain. He denies any other health changes. His pain today is 6/10.    Interval History 5/28/2024:  The patient returns to clinic today for follow up of neck and back pain. He is s/p bilateral SI joint injections on 5/10/2024. He reports 50% relief. He reports right sided low back pain, higher than injection sites. He also report left sided low back pain. This pain is worse with prolonged activity. He does endorse stiffness. He denies any radicular leg pain. He reports intermittent neck pain. He is taking Gabapentin and Robaxin. He takes Norco sparingly for severe pain. His last dose was yesterday. He denies any other health changes. His pain today is 6/10.    Interval History 4/22/2024:  The patient returns to clinic today for follow up of neck and  back pain. He reports increased low back pain. He reports low back and buttock pain. He denies any radicular leg pain. His pain is worse with prolonged sitting. He also reports increased pain with moving from sitting to standing. He reports intermittent neck pain. He is having increased left elbow pain. His wife notes that he does drop objects from the left hand. He is taking Gabapentin and Robaxin. He is taking Norco as needed for severe pain. He denies any adverse effects. He denies any other health changes. His pain today is 7/10.    Interval History 10/11/2023:  The patient returns to clinic today for follow up of neck and back pain. He is s/p left C4,5,6,7 RFA on 9/15/2023. He is s/p right C4,5,6,7 RFA on 9/29/2023. He reports 50% relief of his pain. He does report intermittent neck pain. He also reports left sided anterior neck and upper chest pain. This is tight in nature. He reports increased low back pain. He denies any radicular leg pain. This pain is worse with prolonged activity. He does have intermittent left abdominal pain. He feels a knot in the area. This pain is relieved with sitting or laying down. He did have a recent fall in his garden. He continues to take Gabapentin and Robaxin. He also takes Norco as needed for severe pain. He takes this sparingly without adverse effects. He denies any other health changes. His pain today is 4/10.    Interval History 8/11/2023:  The patient returns to clinic today for follow up of back pain. He is s/p bilateral SI joint injections on 7/27/2023. He reports 50-60% relief of his buttock pain. He reports worsened neck pain over the last month. He does report some pain into the left shoulder. He denies any radicular arm pain. His pain is worse with turning his head to the side. He does endorse stiffness. He continues to report intermittent left abdominal pain. This pain is worse with prolonged activity. He also notes 2 lumps in the area, especially at the end of  the day. He is taking Gabapentin and Robaxin. He also takes Norco as needed for severe pain sparingly. He denies any other health changes. His pain today is 6/10.    Interval History 7/5/2023:  The patient returns to clinic today for follow up of back pain. He reports increased low back and buttock pain. He denies any radicular leg pain. His pain is worse with prolonged sitting, moving from sitting to standing, and prolonged activity. He continues to report intermittent left abdominal pain. He also reports increased neck pain. He is taking Gabapentin and Robaxin. He also takes Norco as needed for severe pain. He denies any adverse effects. His pain today is 6/10.    Interval History 3/21/2023:  The patient returns to clinic today for follow up of back pain. He is s/p left L3,4,5 RFA on 2/24/2023 and right L3,4,5 RFA on 3/10/2023. He is unsure of relief at this time. He reports increased left sided low back pain over the last two weeks. 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 He did feel a pop. He thought he pulled a muscle, but pain has been worsening. He reports left sided low back pain that radiates into the left hip and lower abdomen. He also reports intermittent radiating pain into the posterior left leg. He describes this pain as numb and tingling in nature. His pain is worse with activity. He does endorse muscle spasms. He is taking Gabapentin and Robaxin. He has had to take more Norco recently due to increased pain. He denies any weakness. He denies any other health changes. His pain today is 8/10.    Interval History 1/31/2023:  The patient returns to clinic today for follow up of neck and back pain. He is s/p left C4,5,6,7 on 12/15/2022 and right C4,5,6,7 RFA on 12/29/2022. He reports 50% relief of his neck pain. He reports intermittent neck pain, left side greater than right. He denies any radicular arm pain. He does report pain into the shoulder blades. He reports increased low  back pain. He denies any radicular leg pain. His pain is worse with standing, walking, and activity. He does endorse stiffness. He is taking Gabapentin and Robaxin. He also takes Norco sparingly with benefit. He denies any other health changes. His pain today is 6/10.    Interval History 11/18/2022:  The patient returns to clinic today for follow up of neck and back pain. He is s/p bilateral SI joint injections on 11/4/2022. He reports 60% relief of his low back and buttock pain. He continues to report low back pain. He denies any radicular leg pain. He reports increased neck pain, left side greater than right. He denies any radicular arm. He does report some pain into his shoulder blades. His pain is worse with activity. He continues to take Gabapentin and Robaxin. He continues to take Norco as needed sparingly with benefit. He denies any other health changes. His pain today is 7/10.    Interval History 10/27/2022:  The patient returns to clinic today for follow up of neck and back pain. He reports increased low back and bilateral buttock pain. He denies any radicular leg pain. His pain is worse with prolonged sitting and moving from sitting to standing. He does endorse a fall recently while getting out of the vehicle onto his side. He continues to report neck pain. He continues to take Gabapentin and Robaxin. He continues to take Norco sparingly with benefit and without adverse effects. He denies any other health changes. His pain today is 6/10.    Interval History 8/2/2022:  The aptient returns to clinic today for follow up of neck and back pain. He is s/p bilateral SI joint injection on 7/14/2022. He reports 50-60% relief of his low back and buttock pain. He reports intermittent low back and buttock pain. He reports increased neck pain, left side greater than right. He denies any radicular arm pain. He has good days and bad days. He continues to take Gabapentin. He continues to take Norco sparingly with benefit  and without adverse effects. He denies any other health changes. His pain today is 5/10.    Interval History 7/1/2022:  The patient returns to clinic today for follow up of neck and back pain. He reports increased low back and buttock pain.His pain is worse with prolonged sitting and moving from sitting to standing. He also reports increased pain with bending over. He does report intermittent radiating pain into his right posterior thigh stopping at mid thigh. He does report a fall, which was sitting hard on the ground about a week ago. He denies any acute injury. He continues to take Gabapentin and Norco. He denies any other health changes. His pain today is 6/10.     Interval History 5/9/2022:  The patient returns to clinic today for follow up of back pain. He is s/p right L4/5 and L5/S1 TF NGUYEN on 4/14/2022. He reports 50-60% relief of his low back and leg pain. He has been more active since the procedure. He continues to report low back and hip pain, worse with bending and activity. He denies any radicular leg pain. He continues to repot intermittent neck pain. He continues to take Gabapentin with benefit. He continues to take Norco sparingly as needed. He denies any other health changes. His pain today is 5/10.    Interval History 4/1/2022:  The patient returns to clinic today for follow up of back pain. He is s/p left L3,4,5 RFA on 2/25/2022 and right L3,4,5 RFA on 3/11/2022. He reports relief of his back pain. He reports increased back pain that radiates into the posterior aspect of his right leg to his knee. He denies any left leg pain. His pain is worse with prolonged walking. He reports that he sometimes drags his right leg. He denies any falls. He denies any bowel or bladder incontinence. He continues to take Gabapentin and Norco as needed with benefit. He denies any adverse effects. He denies any other health changes. His pain today is 7/10.    Interval History 2/15/2022:  The patient returns to clinic  "today for follow up of neck and back pain. He reports increased low back pain over the last two weeks. He describes this pain as sharp and aching in nature. He does report intermittent "catching" pain in his lower back, worse with getting out of bed. He denies any radicular leg pain. He continues to report benefit from previous cervical procedures. He reports intermittent neck pain. He continues to take Gabapentin with benefit. He continues to take Norco as needed with benefit and without adverse effects. He denies any other health changes. His pain today is 6/10.    Interval History 11/5/2021:  The patient returns to clinic today for follow up of neck and back pain. He is s/p left C4,5,6,7 RFA on 10/1/2021 and right C4,5,6,7 RFA on 10/15/2021. He reports 50% relief of his neck pain. He reports increased burning and itching pain to his skin near injection sites. He denies any radicular arm pain. He does report increased left sided muscle pain. He is concerned that one of the screws in his cervical hardware is moving. He continues to report benefit with previous lumbar procedures. He reports intermittent low back pain without radicular leg pain. He continues to take Gabapentin with benefit. He continues to take Norco as needed with benefit and without adverse effects. He denies any weakness. He denies any other health changes. His pain today is 5/10.    Interval History 9/17/2021:  The patient returns to clinic today for follow up of neck and back pain. He reports increased neck pain. He denies any radicular arm pain today. He does report intermittent radiating pain into his left shoulder blade and mid back. His pain is worse with activity. He reports that sometimes his pain takes his breath away. He continues to report low back pain, that is tolerable at this time. He continues to take Gabapentin and Norco with benefit and without adverse effects. He denies any other health changes. His pain today is " 6/10.    Interval History 6/17/2021:  The patient returns to clinic today for follow up of neck and back pain. He is s/p right L3,4,5 RFA on 4/23/2021 and left L3,4,5 RFA on 5/7/2021. He reports 50% relief of his low back pain. He continues to report intermittent low back pain. He denies any radicular leg pain. He continues to report neck pain, especially in between the scapula. His pain is worse with prolonged standing, walking, and activity. He continues to take Gabapentin with benefit. He continues to take Norco sparingly for severe pain with benefit and without adverse effects. He denies any other health changes. His pain today is 4/10.    Interval History 4/9/2021:  The patient returns to clinic today for follow up of neck and back pain. He is s/p left C4,5,6,7 RFA on 3/9/2021 and right C4,5,6,7 RFA on 3/26/2021. He reports 50% relief of his neck pain. He reports intermittent neck pain. He has good days and bad days. He continues to report low back pain that is constant, sharp, and aching. He reports intermittent radiating pain into the lateral aspect of his left leg to his knee. He continues to take Gabapentin with benefit. He continues to take Norco as needed sparingly for severe pain. He denies any adverse effects. He denies any other health changes. His pain today is 5/10.    Interval History 3/2/2021:  Sal Navarro presents to the clinic for a follow-up appointment for neck pain . Since the last visit, Sal Navarro states the pain has been worsening. Current pain intensity is 6/10.  Was seen by Angelique Barahona NP on 2/12/2021.     Interval History 2/12/2021:  The patient returns to clinic today for follow up of back pain. He has not been seen in over a year. He did not have imaging due to coronavirus outbreak. He would to reschedule this. He was also considering SCS trial prior to the pandemic. He continues to report low back pain that radiates into the lateral aspect of his left leg to his knee.  "He denies any radicular right leg pain. His pain is worse with bending and walking. He continues to take Gabapentin with benefit. He also takes Norco as needed for severe pain. He denies any adverse effects. He denies any other health changes. His pain today is 6/10.    HPI:  Sal Navarro is a 56 y.o. male who presents today s/p C4-7 ACDF with C5/6 PSIF in 2/2016 with residual chronic midline neck pain that radiates into his shoulder blades bilaterally, R>>L.  This is associated with bilateral hand numbness and tingling.  The hand symptoms did improve following the surgery.  The shooting pains in his arms resolved completely.   This pain is described in detail below.  His post-operative course was complicated by dysphagia that is being treated with speech therapy.  The numbness and the tingling were relieved by the surgery. Now experiences "deep pain along the spine"  Patient has not been seen for over a year, had to re-schedule his imaging studies due to pandemic(now completed). Prior to the pandemic, he was considering a SCS.   Only time he gets relief is laying in bed on a very thin pillow, but that doesn't last very long.   Pain aggravated by movement and even breathing/playing with his grandchild/holding the grandchild's hand. Heat and massage (has a vest) are helpful.   Was seen by Dr. Jan srivastava and received multiple EIS and RFAs.   Compliant with his medication.     Pain Disability Index Review:      10/4/2024     1:21 PM 8/16/2024     1:51 PM 5/28/2024     2:42 PM   Last 3 PDI Scores   Pain Disability Index (PDI) 35 [phone]       Pain Medications:  Gabapentin  Norco sparingly  Robaxin    Opioid Contract: yes     report:  Reviewed and consistent with medication use as prescribed.    Pain Procedures:   9/6/2024- Bilateral C4,5,6, 7 RFA   7/18/2024- Bilateral L3,4,5 RFA  5/10/2024- Bilateral SI joint injections.   7/27/2023- Bilateral SI joint injections  3/10/2023- Right L3,4,5 RFA- 50% " relief  2/24/2023- Left L3,4,5 RFA- relief  12/29/2022- Right C4,5,6,7 RFA  12/15/2022- Left C4,5,6,7 RFA  11/4/2022- Bilateral SI joint injections- 60% relief   4/14/2022- Right L4/5 and L5/S1 TF NGUYEN  3/11/2022- Right L3,4,5 RFA  2/25/2022- Left L3,4,5 RFA  10/15/2021- Right C4,5,6,7 RFA  10/1/2021- Left C4,5,6,7 RFA  5/7/2021- Left L3,4,5 RFA   4/23/2021- Right L3,4,5 RFA  3/26/2021- Right C4,5,6,7 RFA  3/9/2021- Left C4,5,6,7 RFA  10/11/19: L4/5 Interlaminar Epidural Steroid Injection  06/13/19- Left C4-7 RFA  05/03/19:Left L2-5 Lumbar Medial Branch Nerve Thermal Radiofrequency Ablation  12/21/18:Right L2-5 Lumbar Medial Branch Nerve Thermal Radiofrequency Ablation  11/23/18:Cervical Thermal Radiofreqiency Ablation: Right C4-7  09/14/18:C7/L2Tmixoiyf Steroid Injection  06/29/18:LL2/3 Interlaminar Epidural Steroid Injection   03/06/18:L2/3 Interlaminar Epidural Steroid Injection   09/26/17:Bilateral L2-5 Lumbar Medial Branch Nerve Coolief Thermal Radiofrequency Ablation  08/31/17:Bilateral L2-5 Lumbar medial branch blocks   03/28/17:Cervical Conventional Radiofreqiency Ablation: Left C4-7  03/14/17:Cervical Conventional Radiofreqiency Ablation: Right C4-7  10/25/16:Cervical Conventional Radiofreqiency Ablation: Left C4-7  10/11/16: Cervical Conventional Radiofreqiency Ablation: Right C4-7  10/04/16- Cervical Medial Branch Blocks, Bilateral C4-7.     08/31/16:C7/U6Wcdvikyc Steroid Injection    Physical Therapy/Home Exercise: does home exercises now, but not in formal PT.    - 2019 was last time in PT     Imaging:   MRI Lumbar Spine 2/25/2021:  COMPARISON:  07/11/2017     FINDINGS:  The vertebral bodies maintain normal height, signal intensity and alignment.  There is redemonstration of few hemangiomas at multiple levels.  The intervertebral disc spaces are preserved although there is some disc desiccation at multiple levels.  The conus terminates at level L1.  Evaluation of the localizer images and structures  surrounding the lumbar spine shows no abnormalities.     L1-L2: No significant disc or joint pathology.     L2-L3: Mild diffuse disc bulge with mild bilateral facet arthropathy and ligamentum flavum hypertrophy results in mild central canal stenosis.  Mild bilateral neural foraminal stenosis is also identified.     L3-L4: There is a diffuse disc bulge with no significant facet arthropathy or ligamentum flavum hypertrophy.  There is mild central canal stenosis and mild bilateral neural foraminal stenosis.     L4-L5: There is a diffuse disc bulge with ligamentum flavum hypertrophy.  No significant facet arthropathy.  There is mild central canal stenosis with only minimal encroachment on the left neural foramen.  Mild right neural foraminal stenosis is present.     L5-S1: There is a diffuse disc bulge with mild to moderate right facet arthropathy.  No left facet arthropathy.  The ligamentum flavum is normal.  The central canal is patent and the neural foramina are largely patent.  Incidental note is made of an annulus fibrosus tear posteriorly measuring approximately 2 mm.     Impression:     Multilevel degenerative disc and joint disease which is mild and results in mild central canal and neural foraminal stenosis as outlined above.    Xray Cervical Spine 2/25/2021:  COMPARISON:  07/07/2017 .     FINDINGS:  The patient has undergone ACDF from C4 through C7. The instrumentation is intact without evidence of complication.  The vertebral bodies maintain normal height and alignment. The remaining intervertebral disc spaces are preserved.  No significant uncovertebral joint hypertrophy.  The prevertebral soft tissues, odontoid views and lung apices are normal. The neural foramen are patent     Impression:     Postsurgical changes to the thoracic spine without additional evidence for degenerative disc or joint disease.     Allergies:   Review of patient's allergies indicates:   Allergen Reactions    Pcn [penicillins] Hives  and Rash    Lipitor [atorvastatin] Other (See Comments)     Muscle cramps, weakness       Current Medications:   Current Outpatient Medications   Medication Sig Dispense Refill    acetaminophen (TYLENOL) 500 MG tablet Take 500 mg by mouth every 6 (six) hours as needed for Pain.      albuterol (PROVENTIL/VENTOLIN HFA) 90 mcg/actuation inhaler       aspirin (ECOTRIN) 81 MG EC tablet Take 81 mg by mouth once daily.      cetirizine (ZYRTEC) 10 MG tablet Take 10 mg by mouth nightly.  5    FOLIC ACID/MULTIVIT-MIN/LUTEIN (CENTRUM SILVER ORAL) Take by mouth once daily.      HYDROcodone-acetaminophen (NORCO) 7.5-325 mg per tablet Take 1 tablet by mouth every 12 (twelve) hours as needed for Pain. 60 tablet 0    levothyroxine (SYNTHROID) 50 MCG tablet Take 50 mcg by mouth once daily.      meclizine (ANTIVERT) 25 mg tablet Take 25 mg by mouth once daily.       meloxicam (MOBIC) 15 MG tablet Take 15 mg by mouth once daily.      metFORMIN (GLUCOPHAGE) 500 MG tablet Take 1 tablet by mouth 2 (two) times a day.      methocarbamoL (ROBAXIN) 500 MG Tab Take 1 tablet (500 mg total) by mouth 2 (two) times daily. 60 tablet 4    omeprazole (PRILOSEC) 20 MG capsule Take 1 capsule (20 mg total) by mouth once daily. 90 capsule 3    rosuvastatin (CRESTOR) 20 MG tablet Take 20 mg by mouth once daily.      valsartan (DIOVAN) 80 MG tablet Take 40 mg by mouth once daily.   1    gabapentin (NEURONTIN) 300 MG capsule Take 2 capsules (600 mg total) by mouth 2 (two) times daily. 120 capsule 5     No current facility-administered medications for this visit.       REVIEW OF SYSTEMS:    GENERAL:  No weight loss, malaise or fevers.  HEENT:  Negative for frequent or significant headaches.  NECK:  Negative for lumps, goiter  and significant neck swelling.   RESPIRATORY:  Negative for cough, wheezing or shortness of breath.  CARDIOVASCULAR:  Negative for chest pain, leg swelling or palpitations. HTN  GI:  Negative for abdominal discomfort, blood in stools  or black stools or change in bowel habits.  MUSCULOSKELETAL:  See HPI.  SKIN:  Negative for lesions, rash, and itching.  PSYCH:  Negative for sleep disturbance, mood disorder and recent psychosocial stressors.  HEMATOLOGY/LYMPHOLOGY:  Negative for prolonged bleeding, bruising easily or swollen nodes.  NEURO:   No history of headaches, syncope, paralysis, seizures or tremors.   ENDO: Thyroid disorder.   All other reviewed and negative other than HPI.    Past Medical History:  Past Medical History:   Diagnosis Date    Arthritis     Cataract     Cervical back pain with evidence of disc disease     Hiatal hernia     Hypertension     Thyroid disease        Past Surgical History:  Past Surgical History:   Procedure Laterality Date    ACROMIOCLAVICULAR JOINT CYST EXCISION Right     BACK SURGERY      cataracts Bilateral     CERVICAL FUSION      COLONOSCOPY N/A 4/27/2018    Procedure: COLONOSCOPY;  Surgeon: Giovani Medeiros MD;  Location: [address]);  Service: Endoscopy;  Laterality: N/A;    EPIDURAL STEROID INJECTION N/A 10/11/2019    Procedure: INJECTION, STEROID, EPIDURAL;  Surgeon: Kasandra Montoya MD;  [facility];  Service: Pain Management;  Laterality: N/A;  Lumbar NGUYEN L4/5    NGUYEN      EYE SURGERY      INJECTION OF JOINT Bilateral 7/14/2022    Procedure: INJECTION, JOINT, SI BILATERAL  needs consent;  Surgeon: Alex Callahan MD;  Location: [facility] [place] [facility];  Service: Pain Management;  Laterality: Bilateral;    INJECTION, SACROILIAC JOINT Bilateral 11/4/2022    Procedure: INJECTION,SACROILIAC JOINT BILATERAL CONTRAST;  Surgeon: Alex Callahan MD;  Location: [facility] [place] [facility];  Service: Pain Management;  Laterality: Bilateral;    INJECTION, SACROILIAC JOINT Bilateral 7/27/2023    Procedure: INJECTION,SACROILIAC JOINT, BILATERAL SOONER DATE;  Surgeon: Alex Callahan MD;  Location: [facility] [place] [facility];  Service: Pain Management;  Laterality: Bilateral;    INJECTION, SACROILIAC JOINT Bilateral 5/10/2024     Procedure: INJECTION,SACROILIAC JOINT BILATERAL;  Surgeon: Alex Callahan MD;  Location: [facility];  Service: Pain Management;  Laterality: Bilateral;  [phone]  2 WK F/U NICK    RADIOFREQUENCY ABLATION  10/2016    cervical spine/Montoya    RADIOFREQUENCY ABLATION Left 5/3/2019    Procedure: RADIOFREQUENCY ABLATION, L2-L5 MEDIALBRANCH;  Surgeon: Kasandra Montoya MD;  Location: [facility];  Service: Pain Management;  Laterality: Left;    RADIOFREQUENCY ABLATION Left 6/13/2019    Procedure: RADIOFREQUENCY ABLATION C4-7;  Surgeon: Kasandra Montoya MD;  Location: [facility];  Service: Pain Management;  Laterality: Left;    RADIOFREQUENCY ABLATION Left 3/9/2021    Procedure: RADIOFREQUENCY ABLATION C4,C5,C6,C7;  Surgeon: Alex Callahan MD;  Location: [facility];  Service: Pain Management;  Laterality: Left;  1 of 2    RADIOFREQUENCY ABLATION Right 3/26/2021    Procedure: RADIOFREQUENCY ABLATION C4,C6,C6,C7;  Surgeon: Alex Callahan MD;  Location: [facility];  Service: Pain Management;  Laterality: Right;  2 of 2    RADIOFREQUENCY ABLATION Right 4/23/2021    Procedure: RADIOFREQUENCY ABLATION L3,4,5;  Surgeon: Alex Callahan MD;  Location: [facility];  Service: Pain Management;  Laterality: Right;  1 of 2    RADIOFREQUENCY ABLATION Left 5/7/2021    Procedure: RADIOFREQUENCY ABLATION L3,4,5;  Surgeon: Alex Callahan MD;  Location: [facility];  Service: Pain Management;  Laterality: Left;  2 of 2    RADIOFREQUENCY ABLATION Left 10/1/2021    Procedure: RADIOFREQUENCY ABLATION LEFT, C4,5,6,7 1 of 2 CONSENT NEEDED;  Surgeon: Alex Callahan MD;  Location: [facility];  Service: Pain Management;  Laterality: Left;    RADIOFREQUENCY ABLATION Right 10/15/2021    Procedure: RADIOFREQUENCY ABLATION  RIGHT C4,5,6,7 2 of 2 CONSENT NEEDED;  Surgeon: Alex Callahan MD;  Location: [facility];  Service: Pain Management;  Laterality: Right;    RADIOFREQUENCY ABLATION Left 2/25/2022    Procedure:  RADIOFREQUENCY ABLATION LEFT L3,4,5 1 of 2, needs consent;  Surgeon: Alex Callahan MD;  Location: [facility];  Service: Pain Management;  Laterality: Left;    RADIOFREQUENCY ABLATION Right 3/11/2022    Procedure: RADIOFREQUENCY ABLATION RIGHT L3,4,5 2 of 2, needs consent;  Surgeon: Alex Callahan MD;  Location: [facility];  Service: Pain Management;  Laterality: Right;    RADIOFREQUENCY ABLATION Left 12/15/2022    Procedure: RADIOFREQUENCY ABLATION LEFT C4, C5, C6, C7  ONE OF TWO NEEDS CONSENT;  Surgeon: Alex Callahan MD;  Location: [facility];  Service: Pain Management;  Laterality: Left;    RADIOFREQUENCY ABLATION Right 12/29/2022    Procedure: RADIOFREQUENCY ABLATION RIGHT C4, C5, C6, C7  TWO OF TWO NEEDS CONSENT;  Surgeon: Alex Callahan MD;  Location: [facility];  Service: Pain Management;  Laterality: Right;    RADIOFREQUENCY ABLATION Left 2/24/2023    Procedure: RADIOFREQUENCY ABLATION LEFT L3,L4,L5;  Surgeon: Alex Callahan MD;  Location: [facility];  Service: Pain Management;  Laterality: Left;    RADIOFREQUENCY ABLATION Right 3/10/2023    Procedure: RADIOFREQUENCY ABLATION RIGHT L3,L4,L5;  Surgeon: Alex Callahan MD;  Location: [facility];  Service: Pain Management;  Laterality: Right;    RADIOFREQUENCY ABLATION Left 9/15/2023    Procedure: RADIOFREQUENCY ABLATION, LEFT C4-C5-C6-C7 MEDIAL BRANCH ONE OF TWO  SOONER DATE;  Surgeon: Alex Callahan MD;  Location: [facility];  Service: Pain Management;  Laterality: Left;    RADIOFREQUENCY ABLATION Right 9/29/2023    Procedure: RADIOFREQUENCY ABLATION,  RIGHT C4-C5-C6-C7 MEDIAL BRANCH TWO OF TWO SOONER DATE;  Surgeon: Alex Callahan MD;  Location: [facility];  Service: Pain Management;  Laterality: Right;    RADIOFREQUENCY ABLATION Bilateral 7/18/2024    Procedure: RADIOFREQUENCY ABLATION BILATERAL L3, 4, 5;  Surgeon: Alex Callahan MD;  Location: [facility];  Service: Pain Management;  Laterality: Bilateral;   "[phone]  3 WK F/U EMILY    RADIOFREQUENCY ABLATION Bilateral 9/6/2024    Procedure: RADIOFREQUENCY ABLATION BILATERAL C4, 5, 6, 7;  Surgeon: Alex Callahan MD;  Location: [facility] [place];  Service: Pain Management;  Laterality: Bilateral;  [phone]  3 WK F/U NICK    RETINAL DETACHMENT SURGERY      ROTATOR CUFF REPAIR Right     SPINE SURGERY      cerival fusion     TRANSFORAMINAL EPIDURAL INJECTION OF STEROID Right 4/14/2022    Procedure: INJECTION, STEROID, EPIDURAL, TRANSFORAMINAL APPROACH RIGHT L4/5 & L5/S1 Needs Consent;  Surgeon: Alex Callahan MD;  Location: [facility];  Service: Pain Management;  Laterality: Right;    TRIGGER POINT INJECTION Left 6/13/2019    Procedure: INJECTION, TRIGGER POINT;  Surgeon: Kasandra Montoya MD;  Location: [facility] [place];  Service: Pain Management;  Laterality: Left;       Family History:  Family History   Problem Relation Name Age of Onset    Heart disease Mother      Cancer Father      Leukemia Brother      Colon cancer Neg Hx      Celiac disease Neg Hx      Crohn's disease Neg Hx      Esophageal cancer Neg Hx      Inflammatory bowel disease Neg Hx      Irritable bowel syndrome Neg Hx      Liver cancer Neg Hx      Rectal cancer Neg Hx      Stomach cancer Neg Hx      Ulcerative colitis Neg Hx         Social History:  Social History     Socioeconomic History    Marital status:    Tobacco Use    Smoking status: Never    Smokeless tobacco: Never   Substance and Sexual Activity    Alcohol use: No    Drug use: No       OBJECTIVE:    /72   Pulse (!) 57   Temp 98 °F (36.7 °C)   Resp 18   Ht 6' 1" (1.854 m)   Wt 113 kg (249 lb 1.9 oz)   SpO2 100%   BMI 32.87 kg/m²     PHYSICAL EXAMINATION:    General appearance: Well appearing, in no acute distress, alert and oriented x3.  Psych:  Mood and affect appropriate.  Skin: Skin color, texture, turgor normal, no rashes or lesions, in both upper and lower body.  Head/face:  Atraumatic, normocephalic. No palpable " lymph nodes   Neck: There is mild pain with palpation over cervical paraspinals and facet joints bilaterally.   Cor: RRR  Pulm: Symmetric chest rise, no respiratory distress noted.   Abdomen: Soft, pain with palpation over LUQ.  Back: Straight leg raising in the sitting position is negative for radicular leg pain bilaterally. There is pain with palpation over lumbar paraspinals and facet joints bilaterally, right greater than left. Limited ROM with pain on extension. Positive facet loading bilaterally.   Extremities: No deformities, edema, or skin discoloration. Good capillary refill.  Musculoskeletal: Positive log roll on the left. Positive FABERs/FADIRS on the left. There is mild pain with palpation over bilateral SI joints. Bilateral upper and lower extremity strength is normal and symmetric. No atrophy or tone abnormalities are noted.  Neuro:  No loss of sensation is noted.  Gait: Antalgic, ambulates with cane for assistance     ASSESSMENT: 59 y.o. year old male with neck and lumbar pain, consistent with      1. Chronic left hip pain  Procedure Order to Pain Management      2. Myofascial pain        3. Lumbar spondylosis        4. Degeneration of intervertebral disc of lumbar region with discogenic back pain        5. Spondylosis of cervical region without myelopathy or radiculopathy        6. DDD (degenerative disc disease), cervical        7. Chronic pain syndrome                    PLAN:     - Previous imaging reviewed today.    - Trigger point injections as per below.     - Schedule for left hip joint injection.     - We can repeat cervical and lumbar RFA as needed.     - I have stressed the importance of physical activity and a home exercise plan to help with pain and improve health.    - Continue Gabapentin.     - Continue Robaxin 500 mg PRN muscle pain.     - Pain contract signed 2/12/2021.     - Continue Norco 7.5/325 mg BID PRN. He does not need a refill.      - The patient is here today for a refill of  current pain medications and they believe these provide effective pain control and improvements in quality of life.  The patient notes no serious side effects, and feels the benefits outweigh the risks.  The patient was reminded of the pain contract that they signed previously as well as the risks and benefits of the medication including possible death.  The updated [place] [employer] prescription monitoring program was reviewed, and the patient has been found to be compliant with current treatment plan. Medication management provided by Dr. Callahan.     - UDS from 5/28/2024 reviewed, negative for medications, he does take sparingly.     - Continue home exercise routine.     - RTC 2 weeks after above procedure.     The above plan and management options were discussed at length with patient. Patient is in agreement with the above and verbalized understanding.    Angelique Barahona NP  10/04/2024    Trigger Point Injection:   The procedure was discussed with the patient including complications of nerve damage,  bleeding, infection, and failure of pain relief. Consent signed.     Trigger points were identified by palpation and marked. Chlorhexidine prep of sites done. A mixture of Kenalog 40 mg/ml + 9 ml 0.25% bupivacaine (10 mL total).   A 27-gauge needle was advanced to the point of maximal tenderness, and  1.5mL  was injected after negative aspiration. All sites done in the same manner. Patient tolerated the procedure well and without complications. Sites injected included: lumbar paraspinals on the left.     The patient was monitored for 10 minutes without adverse effects.

## 2024-10-07 DIAGNOSIS — M25.552 CHRONIC LEFT HIP PAIN: Primary | ICD-10-CM

## 2024-10-07 DIAGNOSIS — G89.29 CHRONIC LEFT HIP PAIN: Primary | ICD-10-CM

## 2024-10-14 ENCOUNTER — HOSPITAL ENCOUNTER (OUTPATIENT)
Facility: OTHER | Age: 59
Discharge: HOME | End: 2024-10-14
Attending: ANESTHESIOLOGY | Admitting: ANESTHESIOLOGY
Payer: MEDICARE

## 2024-10-14 VITALS
TEMPERATURE: 98 F | BODY MASS INDEX: 32.47 KG/M2 | RESPIRATION RATE: 16 BRPM | HEIGHT: 73 IN | OXYGEN SATURATION: 97 % | WEIGHT: 245 LBS | DIASTOLIC BLOOD PRESSURE: 86 MMHG | SYSTOLIC BLOOD PRESSURE: 122 MMHG | HEART RATE: 51 BPM

## 2024-10-14 DIAGNOSIS — M16.12 PRIMARY OSTEOARTHRITIS OF LEFT HIP: Primary | ICD-10-CM

## 2024-10-14 DIAGNOSIS — G89.29 CHRONIC PAIN: ICD-10-CM

## 2024-10-14 LAB — POCT GLUCOSE: 103 MG/DL (ref 70–110)

## 2024-10-14 PROCEDURE — 20610 DRAIN/INJ JOINT/BURSA W/O US: CPT | Mod: LT,,, | Performed by: ANESTHESIOLOGY

## 2024-10-14 PROCEDURE — 25000003 PHARM REV CODE 250: Performed by: ANESTHESIOLOGY

## 2024-10-14 PROCEDURE — 77002 NEEDLE LOCALIZATION BY XRAY: CPT | Mod: 26,,, | Performed by: ANESTHESIOLOGY

## 2024-10-14 PROCEDURE — 25500020 PHARM REV CODE 255: Performed by: ANESTHESIOLOGY

## 2024-10-14 PROCEDURE — 20610 DRAIN/INJ JOINT/BURSA W/O US: CPT | Mod: LT | Performed by: ANESTHESIOLOGY

## 2024-10-14 PROCEDURE — 63600175 PHARM REV CODE 636 W HCPCS: Performed by: ANESTHESIOLOGY

## 2024-10-14 RX ORDER — ALPRAZOLAM 0.5 MG/1
1 TABLET, ORALLY DISINTEGRATING ORAL ONCE
Status: COMPLETED | OUTPATIENT
Start: 2024-10-14 | End: 2024-10-14

## 2024-10-14 RX ORDER — BUPIVACAINE HYDROCHLORIDE 2.5 MG/ML
INJECTION, SOLUTION EPIDURAL; INFILTRATION; INTRACAUDAL
Status: DISCONTINUED | OUTPATIENT
Start: 2024-10-14 | End: 2024-10-14 | Stop reason: HOSPADM

## 2024-10-14 RX ORDER — SODIUM CHLORIDE 9 MG/ML
INJECTION, SOLUTION INTRAVENOUS CONTINUOUS
Status: DISCONTINUED | OUTPATIENT
Start: 2024-10-14 | End: 2024-10-14 | Stop reason: HOSPADM

## 2024-10-14 RX ORDER — LIDOCAINE HYDROCHLORIDE 20 MG/ML
INJECTION, SOLUTION INFILTRATION; PERINEURAL
Status: DISCONTINUED | OUTPATIENT
Start: 2024-10-14 | End: 2024-10-14 | Stop reason: HOSPADM

## 2024-10-14 RX ORDER — TRIAMCINOLONE ACETONIDE 40 MG/ML
INJECTION, SUSPENSION INTRA-ARTICULAR; INTRAMUSCULAR
Status: DISCONTINUED | OUTPATIENT
Start: 2024-10-14 | End: 2024-10-14 | Stop reason: HOSPADM

## 2024-10-14 RX ADMIN — ALPRAZOLAM 1 MG: 0.5 TABLET, ORALLY DISINTEGRATING ORAL at 10:10

## 2024-10-14 NOTE — DISCHARGE INSTRUCTIONS

## 2024-10-14 NOTE — DISCHARGE SUMMARY
Discharge Note  Short Stay      SUMMARY     Admit Date: 10/14/2024    Attending Physician: Alex Callahan MD    Discharge Physician: Alex Callahan MD      Discharge Date: 10/14/2024 10:43 AM    Procedure(s) (LRB):  INJECTION, JOINT LEFT HIP (Left)    Final Diagnosis:  Chronic left hip pain [M25.552, G89.29]      Disposition: Home or self care    Patient Instructions:   Current Discharge Medication List        CONTINUE these medications which have NOT CHANGED    Details   acetaminophen (TYLENOL) 500 MG tablet Take 500 mg by mouth every 6 (six) hours as needed for Pain.      albuterol (PROVENTIL/VENTOLIN HFA) 90 mcg/actuation inhaler       aspirin (ECOTRIN) 81 MG EC tablet Take 81 mg by mouth once daily.      cetirizine (ZYRTEC) 10 MG tablet Take 10 mg by mouth nightly.  Refills: 5      FOLIC ACID/MULTIVIT-MIN/LUTEIN (CENTRUM SILVER ORAL) Take by mouth once daily.      gabapentin (NEURONTIN) 300 MG capsule Take 2 capsules (600 mg total) by mouth 2 (two) times daily.  Qty: 120 capsule, Refills: 5    Associated Diagnoses: Chronic pain disorder; Lumbar spondylosis; DDD (degenerative disc disease), lumbar; S/P cervical spinal fusion; Cervical radiculopathy; Myofascial pain      HYDROcodone-acetaminophen (NORCO) 7.5-325 mg per tablet Take 1 tablet by mouth every 12 (twelve) hours as needed for Pain.  Qty: 60 tablet, Refills: 0    Comments: Patient with chronic intractable pain.  Medically necessary for > 7 days  Associated Diagnoses: Chronic pain disorder; Lumbar spondylosis; DDD (degenerative disc disease), lumbar; Facet arthritis of lumbar region; Myalgia; Spondylosis of cervical region without myelopathy or radiculopathy; S/P cervical spinal fusion      levothyroxine (SYNTHROID) 50 MCG tablet Take 50 mcg by mouth once daily.      meclizine (ANTIVERT) 25 mg tablet Take 25 mg by mouth once daily.       meloxicam (MOBIC) 15 MG tablet Take 15 mg by mouth once daily.      metFORMIN (GLUCOPHAGE) 500 MG tablet Take 1  tablet by mouth 2 (two) times a day.      methocarbamoL (ROBAXIN) 500 MG Tab Take 1 tablet (500 mg total) by mouth 2 (two) times daily.  Qty: 60 tablet, Refills: 4    Associated Diagnoses: Myofascial pain      omeprazole (PRILOSEC) 20 MG capsule Take 1 capsule (20 mg total) by mouth once daily.  Qty: 90 capsule, Refills: 3    Associated Diagnoses: Gastroesophageal reflux disease, esophagitis presence not specified      rosuvastatin (CRESTOR) 20 MG tablet Take 20 mg by mouth once daily.      valsartan (DIOVAN) 80 MG tablet Take 40 mg by mouth once daily.   Refills: 1                 Discharge Diagnosis: Same as above  Condition on Discharge: Stable with no complications to procedure   Diet on Discharge: Same as before.  Activity: as per instruction sheet.  Discharge to: Home with a responsible adult.  Follow up: 2-4 weeks       Please call my office or pager at [phone] if experienced any weakness or loss of sensation, fever > 101.5, pain uncontrolled with oral medications, persistent nausea/vomiting/or diarrhea, redness or drainage from the incisions, or any other worrisome concerns. If physician on call was not reached or could not communicate with our office for any reason please go to the nearest emergency department     James Garcia MD

## 2024-10-14 NOTE — OP NOTE
Hip Joint Injection under Fluoroscopic Guidance    The procedure, risks, benefits, and options were discussed with the patient. There are no contraindications to the procedure. The patent expressed understanding and agreed to the procedure. Informed written consent was obtained prior to the start of the procedure and can be found in the patient's chart.    PATIENT NAME: Sal Navarro   MRN: [id number]     DATE OF PROCEDURE: 10/14/2024    PROCEDURE: Left Hip Joint Injection under Fluoroscopic Guidance    PRE-OP DIAGNOSIS: Chronic left hip pain [M25.552, G89.29]    POST-OP DIAGNOSIS: Chronic left hip pain [M25.552, G89.29]    PHYSICIAN: Alex Callahan MD    ASSISTANTS: James Garcia MD     MEDICATIONS INJECTED: Preservative-free Kenalog 40mg with 3cc of Bupivacine 0.25%     LOCAL ANESTHETIC INJECTED: Xylocaine 2%     SEDATION: None    ESTIMATED BLOOD LOSS: None    COMPLICATIONS: None    TECHNIQUE: Time-out was performed to identify the patient and procedure to be performed. With the patient laying in a prone position, the surgical area was prepped and draped in the usual sterile fashion using ChloraPrep and a fenestrated drape. The area overlying the hip joint was determined under fluoroscopy guidance. Skin anesthesia was achieved by injecting Lidocaine 2% over the injection site. The acetabulofemoral joint was then approached with a 22 gauge, 7 inch spinal quinke needle that was introduced under fluoroscopic guidance in the AP view.  When the needle tip was in the appropriate position, and there was no blood aspiration, Contrast dye  Omnipaque (300mg/mL) was injected to confirm placement and there was no vascular runoff. 4 mL of the medication mixture listed above was injected slowly. The needles were removed and bleeding was nil. A sterile dressing was applied. No specimens collected. The patient tolerated the procedure well.    The patient was monitored after the procedure in the recovery area. They were given  post-procedure and discharge instructions to follow at home. The patient was discharged in a stable condition.      James Garcia MD     I reviewed and edited the fellow's note. I conducted my own interview and physical examination. I agree with the findings. I was present and supervising all critical portions of the procedure.    Alex Callahan MD

## 2024-10-14 NOTE — PLAN OF CARE
Pt tolerated procedure well. Pt reports 1/10 pain after procedure. Assisted pt up for first time. Steady on feet. All discharge instructions given.

## 2024-11-08 ENCOUNTER — OFFICE VISIT (OUTPATIENT)
Dept: PAIN MEDICINE | Facility: CLINIC | Age: 59
End: 2024-11-08
Payer: MEDICARE

## 2024-11-08 VITALS
RESPIRATION RATE: 12 BRPM | SYSTOLIC BLOOD PRESSURE: 116 MMHG | OXYGEN SATURATION: 100 % | DIASTOLIC BLOOD PRESSURE: 75 MMHG | WEIGHT: 244.69 LBS | HEART RATE: 76 BPM | HEIGHT: 73 IN | BODY MASS INDEX: 32.43 KG/M2

## 2024-11-08 DIAGNOSIS — D49.2 NEOPLASM OF UNSPECIFIED BEHAVIOR OF BONE, SOFT TISSUE, AND SKIN: ICD-10-CM

## 2024-11-08 DIAGNOSIS — G89.4 CHRONIC PAIN SYNDROME: ICD-10-CM

## 2024-11-08 DIAGNOSIS — M79.18 MYOFASCIAL PAIN: ICD-10-CM

## 2024-11-08 DIAGNOSIS — M46.1 BILATERAL SACROILIITIS: Primary | ICD-10-CM

## 2024-11-08 DIAGNOSIS — M51.360 DEGENERATION OF INTERVERTEBRAL DISC OF LUMBAR REGION WITH DISCOGENIC BACK PAIN: ICD-10-CM

## 2024-11-08 DIAGNOSIS — M47.816 FACET ARTHRITIS OF LUMBAR REGION: ICD-10-CM

## 2024-11-08 DIAGNOSIS — M16.12 PRIMARY OSTEOARTHRITIS OF LEFT HIP: ICD-10-CM

## 2024-11-08 PROCEDURE — 99999 PR PBB SHADOW E&M-EST. PATIENT-LVL IV: CPT | Mod: PBBFAC,,,

## 2024-11-08 PROCEDURE — 99214 OFFICE O/P EST MOD 30 MIN: CPT | Mod: S$GLB,,,

## 2024-11-08 PROCEDURE — 3074F SYST BP LT 130 MM HG: CPT | Mod: CPTII,S$GLB,,

## 2024-11-08 PROCEDURE — 1160F RVW MEDS BY RX/DR IN RCRD: CPT | Mod: CPTII,S$GLB,,

## 2024-11-08 PROCEDURE — 3008F BODY MASS INDEX DOCD: CPT | Mod: CPTII,S$GLB,,

## 2024-11-08 PROCEDURE — 1159F MED LIST DOCD IN RCRD: CPT | Mod: CPTII,S$GLB,,

## 2024-11-08 PROCEDURE — 4010F ACE/ARB THERAPY RXD/TAKEN: CPT | Mod: CPTII,S$GLB,,

## 2024-11-08 PROCEDURE — 3078F DIAST BP <80 MM HG: CPT | Mod: CPTII,S$GLB,,

## 2024-11-08 RX ORDER — METHOCARBAMOL 500 MG/1
500 TABLET, FILM COATED ORAL 4 TIMES DAILY PRN
Qty: 120 TABLET | Refills: 0 | Status: SHIPPED | OUTPATIENT
Start: 2024-11-08

## 2024-11-08 NOTE — PROGRESS NOTES
Interventional Pain Management - Established Visit  Follow-Up       Interval History 11/8/2024:  Sal Navarro returns to clinic for follow-up after left hip joint injection on 10/14/2024. He reports 80% pain relief of hip pain. He has noticed left groin pain and a palpable mass under the skin since the injection. He has also noticed a painful lesion under his skin on his left thigh. He has not gone to his PCP for evaluation. He continues Gabapentin and Robaxin with some benefit. He also takes Norco as needed without perceived side effects. He denies recent health changes. He denies recent falls or trauma. He denies new onset fever/night sweats, urinary incontinence, bowel incontinence, significant weight changes, significant motor weakness or changes, or loss of sensations. His pain today is 7/10.      Interval History 10/4/2024:  The patient returns to clinic today for follow up of neck and back pain. He is s/p bilateral C4,5,6 RFA on 9/6/2024. He reports 50% relief of his neck pain. He reports intermittent neck pain, worse with prolonged activity. He reports increased left sided low back pain. He describes this as tense and hard in nature. He continues to report left hip pain. This is worse with standing and walking. He is taking Gabapentin and Robaxin. He is taking Norco as needed with benefit. He takes this sparingly. He denies any adverse effects. He denies any other health changes. His pain today is 7/10.    Interval History 8/16/2024:  The patient returns to clinic today for follow up of neck and back pain. He is s/p bilateral L3,4,5 RFA on 7/18/2024. He reports 50-60% relief. His back pain is tolerable at this time. He continues to report left hip pain. He has had a few falls onto the left side. He reports increased neck pain. He denies any radicular arm pain. He does endorse morning stiffness. His pain is worse with prolonged activity. He is taking Gabapentin and Robaxin. He takes Norco sparingly for  severe pain. He denies any other health changes. His pain today is 6/10.    Interval History 5/28/2024:  The patient returns to clinic today for follow up of neck and back pain. He is s/p bilateral SI joint injections on 5/10/2024. He reports 50% relief. He reports right sided low back pain, higher than injection sites. He also report left sided low back pain. This pain is worse with prolonged activity. He does endorse stiffness. He denies any radicular leg pain. He reports intermittent neck pain. He is taking Gabapentin and Robaxin. He takes Norco sparingly for severe pain. His last dose was yesterday. He denies any other health changes. His pain today is 6/10.    Interval History 4/22/2024:  The patient returns to clinic today for follow up of neck and back pain. He reports increased low back pain. He reports low back and buttock pain. He denies any radicular leg pain. His pain is worse with prolonged sitting. He also reports increased pain with moving from sitting to standing. He reports intermittent neck pain. He is having increased left elbow pain. His wife notes that he does drop objects from the left hand. He is taking Gabapentin and Robaxin. He is taking Norco as needed for severe pain. He denies any adverse effects. He denies any other health changes. His pain today is 7/10.    Interval History 10/11/2023:  The patient returns to clinic today for follow up of neck and back pain. He is s/p left C4,5,6,7 RFA on 9/15/2023. He is s/p right C4,5,6,7 RFA on 9/29/2023. He reports 50% relief of his pain. He does report intermittent neck pain. He also reports left sided anterior neck and upper chest pain. This is tight in nature. He reports increased low back pain. He denies any radicular leg pain. This pain is worse with prolonged activity. He does have intermittent left abdominal pain. He feels a knot in the area. This pain is relieved with sitting or laying down. He did have a recent fall in his garden. He  continues to take Gabapentin and Robaxin. He also takes Norco as needed for severe pain. He takes this sparingly without adverse effects. He denies any other health changes. His pain today is 4/10.    Interval History 8/11/2023:  The patient returns to clinic today for follow up of back pain. He is s/p bilateral SI joint injections on 7/27/2023. He reports 50-60% relief of his buttock pain. He reports worsened neck pain over the last month. He does report some pain into the left shoulder. He denies any radicular arm pain. His pain is worse with turning his head to the side. He does endorse stiffness. He continues to report intermittent left abdominal pain. This pain is worse with prolonged activity. He also notes 2 lumps in the area, especially at the end of the day. He is taking Gabapentin and Robaxin. He also takes Norco as needed for severe pain sparingly. He denies any other health changes. His pain today is 6/10.    Interval History 7/5/2023:  The patient returns to clinic today for follow up of back pain. He reports increased low back and buttock pain. He denies any radicular leg pain. His pain is worse with prolonged sitting, moving from sitting to standing, and prolonged activity. He continues to report intermittent left abdominal pain. He also reports increased neck pain. He is taking Gabapentin and Robaxin. He also takes Norco as needed for severe pain. He denies any adverse effects. His pain today is 6/10.    Interval History 3/21/2023:  The patient returns to clinic today for follow up of back pain. He is s/p left L3,4,5 RFA on 2/24/2023 and right L3,4,5 RFA on 3/10/2023. He is unsure of relief at this time. He reports increased left sided low back pain over the last two weeks. 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 He did feel a pop. He thought he pulled a muscle, but pain has been worsening. He reports left sided low back pain that radiates into the left hip and lower  abdomen. He also reports intermittent radiating pain into the posterior left leg. He describes this pain as numb and tingling in nature. His pain is worse with activity. He does endorse muscle spasms. He is taking Gabapentin and Robaxin. He has had to take more Norco recently due to increased pain. He denies any weakness. He denies any other health changes. His pain today is 8/10.    Interval History 1/31/2023:  The patient returns to clinic today for follow up of neck and back pain. He is s/p left C4,5,6,7 on 12/15/2022 and right C4,5,6,7 RFA on 12/29/2022. He reports 50% relief of his neck pain. He reports intermittent neck pain, left side greater than right. He denies any radicular arm pain. He does report pain into the shoulder blades. He reports increased low back pain. He denies any radicular leg pain. His pain is worse with standing, walking, and activity. He does endorse stiffness. He is taking Gabapentin and Robaxin. He also takes Norco sparingly with benefit. He denies any other health changes. His pain today is 6/10.    Interval History 11/18/2022:  The patient returns to clinic today for follow up of neck and back pain. He is s/p bilateral SI joint injections on 11/4/2022. He reports 60% relief of his low back and buttock pain. He continues to report low back pain. He denies any radicular leg pain. He reports increased neck pain, left side greater than right. He denies any radicular arm. He does report some pain into his shoulder blades. His pain is worse with activity. He continues to take Gabapentin and Robaxin. He continues to take Norco as needed sparingly with benefit. He denies any other health changes. His pain today is 7/10.    Interval History 10/27/2022:  The patient returns to clinic today for follow up of neck and back pain. He reports increased low back and bilateral buttock pain. He denies any radicular leg pain. His pain is worse with prolonged sitting and moving from sitting to standing.  He does endorse a fall recently while getting out of the vehicle onto his side. He continues to report neck pain. He continues to take Gabapentin and Robaxin. He continues to take Norco sparingly with benefit and without adverse effects. He denies any other health changes. His pain today is 6/10.    Interval History 8/2/2022:  The aptient returns to clinic today for follow up of neck and back pain. He is s/p bilateral SI joint injection on 7/14/2022. He reports 50-60% relief of his low back and buttock pain. He reports intermittent low back and buttock pain. He reports increased neck pain, left side greater than right. He denies any radicular arm pain. He has good days and bad days. He continues to take Gabapentin. He continues to take Norco sparingly with benefit and without adverse effects. He denies any other health changes. His pain today is 5/10.    Interval History 7/1/2022:  The patient returns to clinic today for follow up of neck and back pain. He reports increased low back and buttock pain.His pain is worse with prolonged sitting and moving from sitting to standing. He also reports increased pain with bending over. He does report intermittent radiating pain into his right posterior thigh stopping at mid thigh. He does report a fall, which was sitting hard on the ground about a week ago. He denies any acute injury. He continues to take Gabapentin and Norco. He denies any other health changes. His pain today is 6/10.     Interval History 5/9/2022:  The patient returns to clinic today for follow up of back pain. He is s/p right L4/5 and L5/S1 TF NGUYEN on 4/14/2022. He reports 50-60% relief of his low back and leg pain. He has been more active since the procedure. He continues to report low back and hip pain, worse with bending and activity. He denies any radicular leg pain. He continues to repot intermittent neck pain. He continues to take Gabapentin with benefit. He continues to take Norco sparingly as needed.  "He denies any other health changes. His pain today is 5/10.    Interval History 4/1/2022:  The patient returns to clinic today for follow up of back pain. He is s/p left L3,4,5 RFA on 2/25/2022 and right L3,4,5 RFA on 3/11/2022. He reports relief of his back pain. He reports increased back pain that radiates into the posterior aspect of his right leg to his knee. He denies any left leg pain. His pain is worse with prolonged walking. He reports that he sometimes drags his right leg. He denies any falls. He denies any bowel or bladder incontinence. He continues to take Gabapentin and Norco as needed with benefit. He denies any adverse effects. He denies any other health changes. His pain today is 7/10.    Interval History 2/15/2022:  The patient returns to clinic today for follow up of neck and back pain. He reports increased low back pain over the last two weeks. He describes this pain as sharp and aching in nature. He does report intermittent "catching" pain in his lower back, worse with getting out of bed. He denies any radicular leg pain. He continues to report benefit from previous cervical procedures. He reports intermittent neck pain. He continues to take Gabapentin with benefit. He continues to take Norco as needed with benefit and without adverse effects. He denies any other health changes. His pain today is 6/10.    Interval History 11/5/2021:  The patient returns to clinic today for follow up of neck and back pain. He is s/p left C4,5,6,7 RFA on 10/1/2021 and right C4,5,6,7 RFA on 10/15/2021. He reports 50% relief of his neck pain. He reports increased burning and itching pain to his skin near injection sites. He denies any radicular arm pain. He does report increased left sided muscle pain. He is concerned that one of the screws in his cervical hardware is moving. He continues to report benefit with previous lumbar procedures. He reports intermittent low back pain without radicular leg pain. He continues " to take Gabapentin with benefit. He continues to take Norco as needed with benefit and without adverse effects. He denies any weakness. He denies any other health changes. His pain today is 5/10.    Interval History 9/17/2021:  The patient returns to clinic today for follow up of neck and back pain. He reports increased neck pain. He denies any radicular arm pain today. He does report intermittent radiating pain into his left shoulder blade and mid back. His pain is worse with activity. He reports that sometimes his pain takes his breath away. He continues to report low back pain, that is tolerable at this time. He continues to take Gabapentin and Norco with benefit and without adverse effects. He denies any other health changes. His pain today is 6/10.    Interval History 6/17/2021:  The patient returns to clinic today for follow up of neck and back pain. He is s/p right L3,4,5 RFA on 4/23/2021 and left L3,4,5 RFA on 5/7/2021. He reports 50% relief of his low back pain. He continues to report intermittent low back pain. He denies any radicular leg pain. He continues to report neck pain, especially in between the scapula. His pain is worse with prolonged standing, walking, and activity. He continues to take Gabapentin with benefit. He continues to take Norco sparingly for severe pain with benefit and without adverse effects. He denies any other health changes. His pain today is 4/10.    Interval History 4/9/2021:  The patient returns to clinic today for follow up of neck and back pain. He is s/p left C4,5,6,7 RFA on 3/9/2021 and right C4,5,6,7 RFA on 3/26/2021. He reports 50% relief of his neck pain. He reports intermittent neck pain. He has good days and bad days. He continues to report low back pain that is constant, sharp, and aching. He reports intermittent radiating pain into the lateral aspect of his left leg to his knee. He continues to take Gabapentin with benefit. He continues to take Norco as needed  "sparingly for severe pain. He denies any adverse effects. He denies any other health changes. His pain today is 5/10.    Interval History 3/2/2021:  Sal Navarro presents to the clinic for a follow-up appointment for neck pain . Since the last visit, Sal Navarro states the pain has been worsening. Current pain intensity is 6/10.  Was seen by Angelique Barahona NP on 2/12/2021.     Interval History 2/12/2021:  The patient returns to clinic today for follow up of back pain. He has not been seen in over a year. He did not have imaging due to coronavirus outbreak. He would to reschedule this. He was also considering SCS trial prior to the pandemic. He continues to report low back pain that radiates into the lateral aspect of his left leg to his knee. He denies any radicular right leg pain. His pain is worse with bending and walking. He continues to take Gabapentin with benefit. He also takes Norco as needed for severe pain. He denies any adverse effects. He denies any other health changes. His pain today is 6/10.    HPI:  Sal Navarro is a 56 y.o. male who presents today s/p C4-7 ACDF with C5/6 PSIF in 2/2016 with residual chronic midline neck pain that radiates into his shoulder blades bilaterally, R>>L.  This is associated with bilateral hand numbness and tingling.  The hand symptoms did improve following the surgery.  The shooting pains in his arms resolved completely.   This pain is described in detail below.  His post-operative course was complicated by dysphagia that is being treated with speech therapy.  The numbness and the tingling were relieved by the surgery. Now experiences "deep pain along the spine"  Patient has not been seen for over a year, had to re-schedule his imaging studies due to pandemic(now completed). Prior to the pandemic, he was considering a SCS.   Only time he gets relief is laying in bed on a very thin pillow, but that doesn't last very long.   Pain aggravated by movement and " even breathing/playing with his grandchild/holding the grandchild's hand. Heat and massage (has a vest) are helpful.   Was seen by Dr. Jan srivastava and received multiple EIS and RFAs.   Compliant with his medication.     Pain Disability Index Review:      10/4/2024     1:21 PM 8/16/2024     1:51 PM 5/28/2024     2:42 PM   Last 3 PDI Scores   Pain Disability Index (PDI) 35 [phone]       Pain Medications:  Gabapentin  Norco sparingly  Robaxin    Opioid Contract: yes     report:  Reviewed and consistent with medication use as prescribed.    Pain Procedures:   10/14/2024 - Left Hip joint injection - 80% relief  9/6/2024- Bilateral C4,5,6, 7 RFA   7/18/2024- Bilateral L3,4,5 RFA  5/10/2024- Bilateral SI joint injections.   7/27/2023- Bilateral SI joint injections  3/10/2023- Right L3,4,5 RFA- 50% relief  2/24/2023- Left L3,4,5 RFA- relief  12/29/2022- Right C4,5,6,7 RFA  12/15/2022- Left C4,5,6,7 RFA  11/4/2022- Bilateral SI joint injections- 60% relief   4/14/2022- Right L4/5 and L5/S1 TF NGUYEN  3/11/2022- Right L3,4,5 RFA  2/25/2022- Left L3,4,5 RFA  10/15/2021- Right C4,5,6,7 RFA  10/1/2021- Left C4,5,6,7 RFA  5/7/2021- Left L3,4,5 RFA   4/23/2021- Right L3,4,5 RFA  3/26/2021- Right C4,5,6,7 RFA  3/9/2021- Left C4,5,6,7 RFA  10/11/19: L4/5 Interlaminar Epidural Steroid Injection  06/13/19- Left C4-7 RFA  05/03/19:Left L2-5 Lumbar Medial Branch Nerve Thermal Radiofrequency Ablation  12/21/18:Right L2-5 Lumbar Medial Branch Nerve Thermal Radiofrequency Ablation  11/23/18:Cervical Thermal Radiofreqiency Ablation: Right C4-7  09/14/18:C7/S1Gvhafclo Steroid Injection  06/29/18:LL2/3 Interlaminar Epidural Steroid Injection   03/06/18:L2/3 Interlaminar Epidural Steroid Injection   09/26/17:Bilateral L2-5 Lumbar Medial Branch Nerve Coolief Thermal Radiofrequency Ablation  08/31/17:Bilateral L2-5 Lumbar medial branch blocks   03/28/17:Cervical Conventional Radiofreqiency Ablation: Left C4-7  03/14/17:Cervical Conventional  Radiofreqiency Ablation: Right C4-7  10/25/16:Cervical Conventional Radiofreqiency Ablation: Left C4-7  10/11/16: Cervical Conventional Radiofreqiency Ablation: Right C4-7  10/04/16- Cervical Medial Branch Blocks, Bilateral C4-7.     08/31/16:C7/T8Mbtukfis Steroid Injection    Physical Therapy/Home Exercise: does home exercises now, but not in formal PT.    - 2019 was last time in PT     Imaging:   XR HIP WITH PELVIS WHEN PERFORMED 2 OR 3 VIEWS LEFT     CLINICAL HISTORY:  Pain in left hip     FINDINGS:  Two views left hip:     There is DJD.  No fracture, dislocation, or bone destruction seen.        Electronically signed by:Remington Anderson MD  Date:                                            08/16/2024  Time:                                           14:58      MRI Lumbar Spine 2/25/2021:  COMPARISON:  07/11/2017     FINDINGS:  The vertebral bodies maintain normal height, signal intensity and alignment.  There is redemonstration of few hemangiomas at multiple levels.  The intervertebral disc spaces are preserved although there is some disc desiccation at multiple levels.  The conus terminates at level L1.  Evaluation of the localizer images and structures surrounding the lumbar spine shows no abnormalities.     L1-L2: No significant disc or joint pathology.     L2-L3: Mild diffuse disc bulge with mild bilateral facet arthropathy and ligamentum flavum hypertrophy results in mild central canal stenosis.  Mild bilateral neural foraminal stenosis is also identified.     L3-L4: There is a diffuse disc bulge with no significant facet arthropathy or ligamentum flavum hypertrophy.  There is mild central canal stenosis and mild bilateral neural foraminal stenosis.     L4-L5: There is a diffuse disc bulge with ligamentum flavum hypertrophy.  No significant facet arthropathy.  There is mild central canal stenosis with only minimal encroachment on the left neural foramen.  Mild right neural foraminal stenosis is present.      L5-S1: There is a diffuse disc bulge with mild to moderate right facet arthropathy.  No left facet arthropathy.  The ligamentum flavum is normal.  The central canal is patent and the neural foramina are largely patent.  Incidental note is made of an annulus fibrosus tear posteriorly measuring approximately 2 mm.     Impression:     Multilevel degenerative disc and joint disease which is mild and results in mild central canal and neural foraminal stenosis as outlined above.    Xray Cervical Spine 2/25/2021:  COMPARISON:  07/07/2017 .     FINDINGS:  The patient has undergone ACDF from C4 through C7. The instrumentation is intact without evidence of complication.  The vertebral bodies maintain normal height and alignment. The remaining intervertebral disc spaces are preserved.  No significant uncovertebral joint hypertrophy.  The prevertebral soft tissues, odontoid views and lung apices are normal. The neural foramen are patent     Impression:     Postsurgical changes to the thoracic spine without additional evidence for degenerative disc or joint disease.     Allergies:   Review of patient's allergies indicates:   Allergen Reactions    Pcn [penicillins] Hives and Rash    Lipitor [atorvastatin] Other (See Comments)     Muscle cramps, weakness       Current Medications:   Current Outpatient Medications   Medication Sig Dispense Refill    acetaminophen (TYLENOL) 500 MG tablet Take 500 mg by mouth every 6 (six) hours as needed for Pain.      albuterol (PROVENTIL/VENTOLIN HFA) 90 mcg/actuation inhaler       aspirin (ECOTRIN) 81 MG EC tablet Take 81 mg by mouth once daily.      cetirizine (ZYRTEC) 10 MG tablet Take 10 mg by mouth nightly.  5    FOLIC ACID/MULTIVIT-MIN/LUTEIN (CENTRUM SILVER ORAL) Take by mouth once daily.      gabapentin (NEURONTIN) 300 MG capsule Take 2 capsules (600 mg total) by mouth 2 (two) times daily. 120 capsule 5    HYDROcodone-acetaminophen (NORCO) 7.5-325 mg per tablet Take 1 tablet by mouth  every 12 (twelve) hours as needed for Pain. 60 tablet 0    levothyroxine (SYNTHROID) 50 MCG tablet Take 50 mcg by mouth once daily.      meclizine (ANTIVERT) 25 mg tablet Take 25 mg by mouth once daily.       meloxicam (MOBIC) 15 MG tablet Take 15 mg by mouth once daily.      metFORMIN (GLUCOPHAGE) 500 MG tablet Take 1 tablet by mouth 2 (two) times a day.      methocarbamoL (ROBAXIN) 500 MG Tab Take 1 tablet (500 mg total) by mouth 2 (two) times daily. 60 tablet 4    omeprazole (PRILOSEC) 20 MG capsule Take 1 capsule (20 mg total) by mouth once daily. 90 capsule 3    rosuvastatin (CRESTOR) 20 MG tablet Take 20 mg by mouth once daily.      valsartan (DIOVAN) 80 MG tablet Take 40 mg by mouth once daily.   1     No current facility-administered medications for this visit.       REVIEW OF SYSTEMS:    GENERAL:  No weight loss, malaise or fevers.  HEENT:  Negative for frequent or significant headaches.  NECK:  Negative for lumps, goiter  and significant neck swelling.   RESPIRATORY:  Negative for cough, wheezing or shortness of breath.  CARDIOVASCULAR:  Negative for chest pain, leg swelling or palpitations. HTN  GI:  Negative for abdominal discomfort, blood in stools or black stools or change in bowel habits.  MUSCULOSKELETAL:  See HPI.  SKIN:  Negative for lesions, rash, and itching.  PSYCH:  Negative for sleep disturbance, mood disorder and recent psychosocial stressors.  HEMATOLOGY/LYMPHOLOGY:  Negative for prolonged bleeding, bruising easily or swollen nodes.  NEURO:   No history of headaches, syncope, paralysis, seizures or tremors.   ENDO: Thyroid disorder.   All other reviewed and negative other than HPI.    Past Medical History:  Past Medical History:   Diagnosis Date    Arthritis     Cataract     Cervical back pain with evidence of disc disease     Hiatal hernia     Hypertension     Thyroid disease        Past Surgical History:  Past Surgical History:   Procedure Laterality Date    ACROMIOCLAVICULAR JOINT CYST  EXCISION Right     BACK SURGERY      cataracts Bilateral     CERVICAL FUSION      COLONOSCOPY N/A 4/27/2018    Procedure: COLONOSCOPY;  Surgeon: Giovani Medeiros MD;  Location: [facility] ENDO ([address] [place] [address]);  Service: Endoscopy;  Laterality: N/A;    EPIDURAL STEROID INJECTION N/A 10/11/2019    Procedure: INJECTION, STEROID, EPIDURAL;  Surgeon: Kasandra Montoya MD;  Location: [facility] PAIN [facility]GT;  Service: Pain Management;  Laterality: N/A;  Lumbar NGUYEN L4/5    NGUYEN      EYE SURGERY      INJECTION Left 10/14/2024    Procedure: INJECTION, JOINT LEFT HIP;  Surgeon: Alex Callahan MD;  Location: [facility]IN [facility]GT;  Service: Pain Management;  Laterality: Left;  [phone]  2 WK F/U EMILY    INJECTION OF JOINT Bilateral 7/14/2022    Procedure: INJECTION, JOINT, SI BILATERAL  needs consent;  Surgeon: Alex Callahan MD;  Location: [facility];  Service: Pain Management;  Laterality: Bilateral;    INJECTION, SACROILIAC JOINT Bilateral 11/4/2022    Procedure: INJECTION,SACROILIAC JOINT BILATERAL CONTRAST;  Surgeon: Alex Callahan MD;  Location: [facility];  Service: Pain Management;  Laterality: Bilateral;    INJECTION, SACROILIAC JOINT Bilateral 7/27/2023    Procedure: INJECTION,SACROILIAC JOINT, BILATERAL SOONER DATE;  Surgeon: Alex Callahan MD;  Location: [facility];  Service: Pain Management;  Laterality: Bilateral;    INJECTION, SACROILIAC JOINT Bilateral 5/10/2024    Procedure: INJECTION,SACROILIAC JOINT BILATERAL;  Surgeon: Alex Callahan MD;  Location: [facility];  Service: Pain Management;  Laterality: Bilateral;  [phone]  2 WK F/U NICK    RADIOFREQUENCY ABLATION  10/2016    cervical spine/Montoya    RADIOFREQUENCY ABLATION Left 5/3/2019    Procedure: RADIOFREQUENCY ABLATION, L2-L5 MEDIALBRANCH;  Surgeon: Kasandra Montoya MD;  Location: [facility];  Service: Pain Management;  Laterality: Left;    RADIOFREQUENCY ABLATION Left 6/13/2019    Procedure: RADIOFREQUENCY ABLATION C4-7;  Surgeon: Kasandra Montoya MD;   Location: [facility];  Service: Pain Management;  Laterality: Left;    RADIOFREQUENCY ABLATION Left 3/9/2021    Procedure: RADIOFREQUENCY ABLATION C4,C5,C6,C7;  Surgeon: Alex Callahan MD;  Location: [facility];  Service: Pain Management;  Laterality: Left;  1 of 2    RADIOFREQUENCY ABLATION Right 3/26/2021    Procedure: RADIOFREQUENCY ABLATION C4,C6,C6,C7;  Surgeon: Alex Callahan MD;  Location: [facility];  Service: Pain Management;  Laterality: Right;  2 of 2    RADIOFREQUENCY ABLATION Right 4/23/2021    Procedure: RADIOFREQUENCY ABLATION L3,4,5;  Surgeon: Alex Callahan MD;  Location: [facility];  Service: Pain Management;  Laterality: Right;  1 of 2    RADIOFREQUENCY ABLATION Left 5/7/2021    Procedure: RADIOFREQUENCY ABLATION L3,4,5;  Surgeon: Alex Callahan MD;  Location: [facility];  Service: Pain Management;  Laterality: Left;  2 of 2    RADIOFREQUENCY ABLATION Left 10/1/2021    Procedure: RADIOFREQUENCY ABLATION LEFT, C4,5,6,7 1 of 2 CONSENT NEEDED;  Surgeon: Alex Callahan MD;  Location: [facility];  Service: Pain Management;  Laterality: Left;    RADIOFREQUENCY ABLATION Right 10/15/2021    Procedure: RADIOFREQUENCY ABLATION  RIGHT C4,5,6,7 2 of 2 CONSENT NEEDED;  Surgeon: Alex Callahan MD;  Location: [facility];  Service: Pain Management;  Laterality: Right;    RADIOFREQUENCY ABLATION Left 2/25/2022    Procedure: RADIOFREQUENCY ABLATION LEFT L3,4,5 1 of 2, needs consent;  Surgeon: Alex Callahan MD;  Location: [facility];  Service: Pain Management;  Laterality: Left;    RADIOFREQUENCY ABLATION Right 3/11/2022    Procedure: RADIOFREQUENCY ABLATION RIGHT L3,4,5 2 of 2, needs consent;  Surgeon: Alex Callahan MD;  Location: [facility];  Service: Pain Management;  Laterality: Right;    RADIOFREQUENCY ABLATION Left 12/15/2022    Procedure: RADIOFREQUENCY ABLATION LEFT C4, C5, C6, C7  ONE OF TWO NEEDS CONSENT;  Surgeon: Alex Callahan MD;  Location: [facility];   Service: Pain Management;  Laterality: Left;    RADIOFREQUENCY ABLATION Right 12/29/2022    Procedure: RADIOFREQUENCY ABLATION RIGHT C4, C5, C6, C7  TWO OF TWO NEEDS CONSENT;  Surgeon: Alex Callahan MD;  Location: [facility]IN [facility];  Service: Pain Management;  Laterality: Right;    RADIOFREQUENCY ABLATION Left 2/24/2023    Procedure: RADIOFREQUENCY ABLATION LEFT L3,L4,L5;  Surgeon: Alex Callahan MD;  Location: [facility];  Service: Pain Management;  Laterality: Left;    RADIOFREQUENCY ABLATION Right 3/10/2023    Procedure: RADIOFREQUENCY ABLATION RIGHT L3,L4,L5;  Surgeon: Alex Callahan MD;  Location: [facility];  Service: Pain Management;  Laterality: Right;    RADIOFREQUENCY ABLATION Left 9/15/2023    Procedure: RADIOFREQUENCY ABLATION, LEFT C4-C5-C6-C7 MEDIAL BRANCH ONE OF TWO  SOONER DATE;  Surgeon: Alex Callahan MD;  Location: [facility];  Service: Pain Management;  Laterality: Left;    RADIOFREQUENCY ABLATION Right 9/29/2023    Procedure: RADIOFREQUENCY ABLATION,  RIGHT C4-C5-C6-C7 MEDIAL BRANCH TWO OF TWO SOONER DATE;  Surgeon: Alex Callahan MD;  Location: [facility];  Service: Pain Management;  Laterality: Right;    RADIOFREQUENCY ABLATION Bilateral 7/18/2024    Procedure: RADIOFREQUENCY ABLATION BILATERAL L3, 4, 5;  Surgeon: Alex Callahan MD;  Location: [facility];  Service: Pain Management;  Laterality: Bilateral;  [phone]  3 WK F/U EMILY    RADIOFREQUENCY ABLATION Bilateral 9/6/2024    Procedure: RADIOFREQUENCY ABLATION BILATERAL C4, 5, 6, 7;  Surgeon: Alex Callahan MD;  Location: [facility];  Service: Pain Management;  Laterality: Bilateral;  [phone]  3 WK F/U NICK    RETINAL DETACHMENT SURGERY      ROTATOR CUFF REPAIR Right     SPINE SURGERY      cerival fusion     TRANSFORAMINAL EPIDURAL INJECTION OF STEROID Right 4/14/2022    Procedure: INJECTION, STEROID, EPIDURAL, TRANSFORAMINAL APPROACH RIGHT L4/5 & L5/S1 Needs Consent;  Surgeon: Alex Callahan MD;   "Location: [facility] PAIN [facility];  Service: Pain Management;  Laterality: Right;    TRIGGER POINT INJECTION Left 6/13/2019    Procedure: INJECTION, TRIGGER POINT;  Surgeon: Kasandra Montoya MD;  Location: [facility]IN [facility];  Service: Pain Management;  Laterality: Left;       Family History:  Family History   Problem Relation Name Age of Onset    Heart disease Mother      Cancer Father      Leukemia Brother      Colon cancer Neg Hx      Celiac disease Neg Hx      Crohn's disease Neg Hx      Esophageal cancer Neg Hx      Inflammatory bowel disease Neg Hx      Irritable bowel syndrome Neg Hx      Liver cancer Neg Hx      Rectal cancer Neg Hx      Stomach cancer Neg Hx      Ulcerative colitis Neg Hx         Social History:  Social History     Socioeconomic History    Marital status:    Tobacco Use    Smoking status: Never    Smokeless tobacco: Never   Substance and Sexual Activity    Alcohol use: No    Drug use: No       OBJECTIVE:    /75 (BP Location: Left arm, Patient Position: Sitting)   Pulse 76   Resp 12   Ht 6' 1" (1.854 m)   Wt 111 kg (244 lb 11.4 oz)   SpO2 100%   BMI 32.29 kg/m²     PHYSICAL EXAMINATION:    General appearance: Well appearing, in no acute distress, alert and oriented x3.  Psych:  Mood and affect appropriate.  Skin: Skin color, texture, turgor normal, no rashes in both upper and lower body. Subcutaneous lesion to left anterior mid thigh. No redness, edema, drainage.   Head/face:  Atraumatic, normocephalic. No palpable lymph nodes   Neck: There is mild pain with palpation over cervical paraspinals and facet joints bilaterally.   Cor: Rate regular  Pulm: Symmetric chest rise, no respiratory distress noted.   Abdomen: Non-distended.  : subcutaneous, soft palpable mass to left groin.   Back: Straight leg raising in the sitting position is negative for radicular leg pain bilaterally. There is pain with palpation over lumbar paraspinals and facet joints bilaterally. Limited ROM with pain on " extension. Positive facet loading bilaterally. Pain to palpation over bilateral SI joints.   Extremities: No deformities, edema, or skin discoloration. Good capillary refill.  Musculoskeletal: Left hip provacative maneuvers negative. Pain to palpation over left IT band.  Bilateral upper and lower extremity strength is normal and symmetric. No atrophy or tone abnormalities are noted.  Neuro:  No loss of sensation is noted.  Gait: Antalgic, ambulates with cane for assistance       ASSESSMENT: 59 y.o. year old male with neck and lumbar pain, consistent with      1. Bilateral sacroiliitis        2. Myofascial pain  methocarbamoL (ROBAXIN) 500 MG Tab      3. Neoplasm of unspecified behavior of bone, soft tissue, and skin        4. Chronic pain syndrome        5. Primary osteoarthritis of left hip        6. Degeneration of intervertebral disc of lumbar region with discogenic back pain        7. Facet arthritis of lumbar region              PLAN:     - Previous imaging was reviewed and discussed with the patient today.     - He is s/p left hip joint injection with 80% pain relief    - r/o dermatofibroma of left mid thigh - follow-up with PCP/dermatology    - r/o inguinal hernia to left groin- follow-up with PCP    - I have stressed the importance of physical activity and a home exercise plan to help with pain and improve health.    - Recommend new referral to Physical Therapy. Patient defers.     - Continue Gabapentin.     - Continue Robaxin 500 mg PRN muscle pain. Refilled today.    - Pain contract signed 2/12/2021.     - Continue Norco 7.5/325 mg BID PRN. He does not need a refill.      - The patient is here today for a refill of current pain medications and they believe these provide effective pain control and improvements in quality of life.  The patient notes no serious side effects, and feels the benefits outweigh the risks.  The patient was reminded of the pain contract that they signed previously as well as the  risks and benefits of the medication including possible death.  The updated [place] [employer] prescription monitoring program was reviewed, and the patient has been found to be compliant with current treatment plan. Medication management provided by Dr. Callahan.     - UDS from 5/28/2024 reviewed, negative for medications, he does take sparingly.     - Continue home exercise routine.     - RTC 3 months or sooner    The above plan and management options were discussed at length with patient. Patient is in agreement with the above and verbalized understanding.    Clarice Reno NP  11/08/2024

## 2025-05-09 ENCOUNTER — OFFICE VISIT (OUTPATIENT)
Dept: PAIN MEDICINE | Facility: CLINIC | Age: 60
End: 2025-05-09
Payer: MEDICARE

## 2025-05-09 VITALS
SYSTOLIC BLOOD PRESSURE: 155 MMHG | HEART RATE: 68 BPM | WEIGHT: 250.88 LBS | RESPIRATION RATE: 15 BRPM | DIASTOLIC BLOOD PRESSURE: 75 MMHG | BODY MASS INDEX: 33.1 KG/M2 | OXYGEN SATURATION: 100 % | TEMPERATURE: 98 F

## 2025-05-09 DIAGNOSIS — M79.10 MYALGIA: ICD-10-CM

## 2025-05-09 DIAGNOSIS — M47.812 SPONDYLOSIS OF CERVICAL REGION WITHOUT MYELOPATHY OR RADICULOPATHY: ICD-10-CM

## 2025-05-09 DIAGNOSIS — M51.360 DEGENERATION OF INTERVERTEBRAL DISC OF LUMBAR REGION WITH DISCOGENIC BACK PAIN: ICD-10-CM

## 2025-05-09 DIAGNOSIS — M47.816 FACET ARTHRITIS OF LUMBAR REGION: ICD-10-CM

## 2025-05-09 DIAGNOSIS — Z98.1 S/P CERVICAL SPINAL FUSION: ICD-10-CM

## 2025-05-09 DIAGNOSIS — M47.816 LUMBAR SPONDYLOSIS: ICD-10-CM

## 2025-05-09 DIAGNOSIS — M51.369 DDD (DEGENERATIVE DISC DISEASE), LUMBAR: ICD-10-CM

## 2025-05-09 DIAGNOSIS — G89.4 CHRONIC PAIN DISORDER: ICD-10-CM

## 2025-05-09 DIAGNOSIS — G89.4 CHRONIC PAIN SYNDROME: Primary | ICD-10-CM

## 2025-05-09 PROCEDURE — 99999 PR PBB SHADOW E&M-EST. PATIENT-LVL IV: CPT | Mod: PBBFAC,,, | Performed by: NURSE PRACTITIONER

## 2025-05-09 RX ORDER — HYDROCODONE BITARTRATE AND ACETAMINOPHEN 7.5; 325 MG/1; MG/1
1 TABLET ORAL EVERY 12 HOURS PRN
Qty: 60 TABLET | Refills: 0 | Status: SHIPPED | OUTPATIENT
Start: 2025-05-09

## 2025-05-09 NOTE — PROGRESS NOTES
Interventional Pain Management - Established Visit  Follow-Up       Interval History 5/9/2025:  The patient returns to clinic today for follow up of back and hip pain. He reports increased low back pain over the last month. He reports low back pain, worse in the morning. This is also worse with prolonged standing. He denies any radicular leg pain. He does have left hip and thigh pain. He did see PCP regarding lump. He was given a round of steroids in consideration of lymph node. He did not notice any difference with this. He continues to take Gabapentin and Robaxin. He also takes Norco as needed for severe pain. He denies any adverse effects. He denies any other health changes. His pain today is 5/10.    Interval History 11/8/2024:  Sal Navarro returns to clinic for follow-up after left hip joint injection on 10/14/2024. He reports 80% pain relief of hip pain. He has noticed left groin pain and a palpable mass under the skin since the injection. He has also noticed a painful lesion under his skin on his left thigh. He has not gone to his PCP for evaluation. He continues Gabapentin and Robaxin with some benefit. He also takes Norco as needed without perceived side effects. He denies recent health changes. He denies recent falls or trauma. He denies new onset fever/night sweats, urinary incontinence, bowel incontinence, significant weight changes, significant motor weakness or changes, or loss of sensations. His pain today is 7/10.      Interval History 10/4/2024:  The patient returns to clinic today for follow up of neck and back pain. He is s/p bilateral C4,5,6 RFA on 9/6/2024. He reports 50% relief of his neck pain. He reports intermittent neck pain, worse with prolonged activity. He reports increased left sided low back pain. He describes this as tense and hard in nature. He continues to report left hip pain. This is worse with standing and walking. He is taking Gabapentin and Robaxin. He is taking Norco as  needed with benefit. He takes this sparingly. He denies any adverse effects. He denies any other health changes. His pain today is 7/10.    Interval History 8/16/2024:  The patient returns to clinic today for follow up of neck and back pain. He is s/p bilateral L3,4,5 RFA on 7/18/2024. He reports 50-60% relief. His back pain is tolerable at this time. He continues to report left hip pain. He has had a few falls onto the left side. He reports increased neck pain. He denies any radicular arm pain. He does endorse morning stiffness. His pain is worse with prolonged activity. He is taking Gabapentin and Robaxin. He takes Norco sparingly for severe pain. He denies any other health changes. His pain today is 6/10.    Interval History 5/28/2024:  The patient returns to clinic today for follow up of neck and back pain. He is s/p bilateral SI joint injections on 5/10/2024. He reports 50% relief. He reports right sided low back pain, higher than injection sites. He also report left sided low back pain. This pain is worse with prolonged activity. He does endorse stiffness. He denies any radicular leg pain. He reports intermittent neck pain. He is taking Gabapentin and Robaxin. He takes Norco sparingly for severe pain. His last dose was yesterday. He denies any other health changes. His pain today is 6/10.    Interval History 4/22/2024:  The patient returns to clinic today for follow up of neck and back pain. He reports increased low back pain. He reports low back and buttock pain. He denies any radicular leg pain. His pain is worse with prolonged sitting. He also reports increased pain with moving from sitting to standing. He reports intermittent neck pain. He is having increased left elbow pain. His wife notes that he does drop objects from the left hand. He is taking Gabapentin and Robaxin. He is taking Norco as needed for severe pain. He denies any adverse effects. He denies any other health changes. His pain today is  7/10.    Interval History 10/11/2023:  The patient returns to clinic today for follow up of neck and back pain. He is s/p left C4,5,6,7 RFA on 9/15/2023. He is s/p right C4,5,6,7 RFA on 9/29/2023. He reports 50% relief of his pain. He does report intermittent neck pain. He also reports left sided anterior neck and upper chest pain. This is tight in nature. He reports increased low back pain. He denies any radicular leg pain. This pain is worse with prolonged activity. He does have intermittent left abdominal pain. He feels a knot in the area. This pain is relieved with sitting or laying down. He did have a recent fall in his garden. He continues to take Gabapentin and Robaxin. He also takes Norco as needed for severe pain. He takes this sparingly without adverse effects. He denies any other health changes. His pain today is 4/10.    Interval History 8/11/2023:  The patient returns to clinic today for follow up of back pain. He is s/p bilateral SI joint injections on 7/27/2023. He reports 50-60% relief of his buttock pain. He reports worsened neck pain over the last month. He does report some pain into the left shoulder. He denies any radicular arm pain. His pain is worse with turning his head to the side. He does endorse stiffness. He continues to report intermittent left abdominal pain. This pain is worse with prolonged activity. He also notes 2 lumps in the area, especially at the end of the day. He is taking Gabapentin and Robaxin. He also takes Norco as needed for severe pain sparingly. He denies any other health changes. His pain today is 6/10.    Interval History 7/5/2023:  The patient returns to clinic today for follow up of back pain. He reports increased low back and buttock pain. He denies any radicular leg pain. His pain is worse with prolonged sitting, moving from sitting to standing, and prolonged activity. He continues to report intermittent left abdominal pain. He also reports increased neck pain. He  is taking Gabapentin and Robaxin. He also takes Norco as needed for severe pain. He denies any adverse effects. His pain today is 6/10.    Interval History 3/21/2023:  The patient returns to clinic today for follow up of back pain. He is s/p left L3,4,5 RFA on 2/24/2023 and right L3,4,5 RFA on 3/10/2023. He is unsure of relief at this time. He reports increased left sided low back pain over the last two weeks. 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 He did feel a pop. He thought he pulled a muscle, but pain has been worsening. He reports left sided low back pain that radiates into the left hip and lower abdomen. He also reports intermittent radiating pain into the posterior left leg. He describes this pain as numb and tingling in nature. His pain is worse with activity. He does endorse muscle spasms. He is taking Gabapentin and Robaxin. He has had to take more Norco recently due to increased pain. He denies any weakness. He denies any other health changes. His pain today is 8/10.    Interval History 1/31/2023:  The patient returns to clinic today for follow up of neck and back pain. He is s/p left C4,5,6,7 on 12/15/2022 and right C4,5,6,7 RFA on 12/29/2022. He reports 50% relief of his neck pain. He reports intermittent neck pain, left side greater than right. He denies any radicular arm pain. He does report pain into the shoulder blades. He reports increased low back pain. He denies any radicular leg pain. His pain is worse with standing, walking, and activity. He does endorse stiffness. He is taking Gabapentin and Robaxin. He also takes Norco sparingly with benefit. He denies any other health changes. His pain today is 6/10.    Interval History 11/18/2022:  The patient returns to clinic today for follow up of neck and back pain. He is s/p bilateral SI joint injections on 11/4/2022. He reports 60% relief of his low back and buttock pain. He continues to report low back pain. He denies  any radicular leg pain. He reports increased neck pain, left side greater than right. He denies any radicular arm. He does report some pain into his shoulder blades. His pain is worse with activity. He continues to take Gabapentin and Robaxin. He continues to take Norco as needed sparingly with benefit. He denies any other health changes. His pain today is 7/10.    Interval History 10/27/2022:  The patient returns to clinic today for follow up of neck and back pain. He reports increased low back and bilateral buttock pain. He denies any radicular leg pain. His pain is worse with prolonged sitting and moving from sitting to standing. He does endorse a fall recently while getting out of the vehicle onto his side. He continues to report neck pain. He continues to take Gabapentin and Robaxin. He continues to take Norco sparingly with benefit and without adverse effects. He denies any other health changes. His pain today is 6/10.    Interval History 8/2/2022:  The aptient returns to clinic today for follow up of neck and back pain. He is s/p bilateral SI joint injection on 7/14/2022. He reports 50-60% relief of his low back and buttock pain. He reports intermittent low back and buttock pain. He reports increased neck pain, left side greater than right. He denies any radicular arm pain. He has good days and bad days. He continues to take Gabapentin. He continues to take Norco sparingly with benefit and without adverse effects. He denies any other health changes. His pain today is 5/10.    Interval History 7/1/2022:  The patient returns to clinic today for follow up of neck and back pain. He reports increased low back and buttock pain.His pain is worse with prolonged sitting and moving from sitting to standing. He also reports increased pain with bending over. He does report intermittent radiating pain into his right posterior thigh stopping at mid thigh. He does report a fall, which was sitting hard on the ground about a  "week ago. He denies any acute injury. He continues to take Gabapentin and Norco. He denies any other health changes. His pain today is 6/10.     Interval History 5/9/2022:  The patient returns to clinic today for follow up of back pain. He is s/p right L4/5 and L5/S1 TF NGUYEN on 4/14/2022. He reports 50-60% relief of his low back and leg pain. He has been more active since the procedure. He continues to report low back and hip pain, worse with bending and activity. He denies any radicular leg pain. He continues to repot intermittent neck pain. He continues to take Gabapentin with benefit. He continues to take Norco sparingly as needed. He denies any other health changes. His pain today is 5/10.    Interval History 4/1/2022:  The patient returns to clinic today for follow up of back pain. He is s/p left L3,4,5 RFA on 2/25/2022 and right L3,4,5 RFA on 3/11/2022. He reports relief of his back pain. He reports increased back pain that radiates into the posterior aspect of his right leg to his knee. He denies any left leg pain. His pain is worse with prolonged walking. He reports that he sometimes drags his right leg. He denies any falls. He denies any bowel or bladder incontinence. He continues to take Gabapentin and Norco as needed with benefit. He denies any adverse effects. He denies any other health changes. His pain today is 7/10.    Interval History 2/15/2022:  The patient returns to clinic today for follow up of neck and back pain. He reports increased low back pain over the last two weeks. He describes this pain as sharp and aching in nature. He does report intermittent "catching" pain in his lower back, worse with getting out of bed. He denies any radicular leg pain. He continues to report benefit from previous cervical procedures. He reports intermittent neck pain. He continues to take Gabapentin with benefit. He continues to take Norco as needed with benefit and without adverse effects. He denies any other " health changes. His pain today is 6/10.    Interval History 11/5/2021:  The patient returns to clinic today for follow up of neck and back pain. He is s/p left C4,5,6,7 RFA on 10/1/2021 and right C4,5,6,7 RFA on 10/15/2021. He reports 50% relief of his neck pain. He reports increased burning and itching pain to his skin near injection sites. He denies any radicular arm pain. He does report increased left sided muscle pain. He is concerned that one of the screws in his cervical hardware is moving. He continues to report benefit with previous lumbar procedures. He reports intermittent low back pain without radicular leg pain. He continues to take Gabapentin with benefit. He continues to take Norco as needed with benefit and without adverse effects. He denies any weakness. He denies any other health changes. His pain today is 5/10.    Interval History 9/17/2021:  The patient returns to clinic today for follow up of neck and back pain. He reports increased neck pain. He denies any radicular arm pain today. He does report intermittent radiating pain into his left shoulder blade and mid back. His pain is worse with activity. He reports that sometimes his pain takes his breath away. He continues to report low back pain, that is tolerable at this time. He continues to take Gabapentin and Norco with benefit and without adverse effects. He denies any other health changes. His pain today is 6/10.    Interval History 6/17/2021:  The patient returns to clinic today for follow up of neck and back pain. He is s/p right L3,4,5 RFA on 4/23/2021 and left L3,4,5 RFA on 5/7/2021. He reports 50% relief of his low back pain. He continues to report intermittent low back pain. He denies any radicular leg pain. He continues to report neck pain, especially in between the scapula. His pain is worse with prolonged standing, walking, and activity. He continues to take Gabapentin with benefit. He continues to take Norco sparingly for severe pain  with benefit and without adverse effects. He denies any other health changes. His pain today is 4/10.    Interval History 4/9/2021:  The patient returns to clinic today for follow up of neck and back pain. He is s/p left C4,5,6,7 RFA on 3/9/2021 and right C4,5,6,7 RFA on 3/26/2021. He reports 50% relief of his neck pain. He reports intermittent neck pain. He has good days and bad days. He continues to report low back pain that is constant, sharp, and aching. He reports intermittent radiating pain into the lateral aspect of his left leg to his knee. He continues to take Gabapentin with benefit. He continues to take Norco as needed sparingly for severe pain. He denies any adverse effects. He denies any other health changes. His pain today is 5/10.    Interval History 3/2/2021:  Sal Navarro presents to the clinic for a follow-up appointment for neck pain . Since the last visit, Sal Navarro states the pain has been worsening. Current pain intensity is 6/10.  Was seen by Angelique Barahona NP on 2/12/2021.     Interval History 2/12/2021:  The patient returns to clinic today for follow up of back pain. He has not been seen in over a year. He did not have imaging due to coronavirus outbreak. He would to reschedule this. He was also considering SCS trial prior to the pandemic. He continues to report low back pain that radiates into the lateral aspect of his left leg to his knee. He denies any radicular right leg pain. His pain is worse with bending and walking. He continues to take Gabapentin with benefit. He also takes Norco as needed for severe pain. He denies any adverse effects. He denies any other health changes. His pain today is 6/10.    HPI:  Sal Navarro is a 56 y.o. male who presents today s/p C4-7 ACDF with C5/6 PSIF in 2/2016 with residual chronic midline neck pain that radiates into his shoulder blades bilaterally, R>>L.  This is associated with bilateral hand numbness and tingling.  The hand  "symptoms did improve following the surgery.  The shooting pains in his arms resolved completely.   This pain is described in detail below.  His post-operative course was complicated by dysphagia that is being treated with speech therapy.  The numbness and the tingling were relieved by the surgery. Now experiences "deep pain along the spine"  Patient has not been seen for over a year, had to re-schedule his imaging studies due to pandemic(now completed). Prior to the pandemic, he was considering a SCS.   Only time he gets relief is laying in bed on a very thin pillow, but that doesn't last very long.   Pain aggravated by movement and even breathing/playing with his grandchild/holding the grandchild's hand. Heat and massage (has a vest) are helpful.   Was seen by Dr. Jan srivastava and received multiple EIS and RFAs.   Compliant with his medication.     Pain Disability Index Review:      5/9/2025     2:06 PM 11/8/2024    11:41 AM 10/4/2024     1:21 PM   Last 3 PDI Scores   Pain Disability Index (PDI) [phone]       Pain Medications:  Gabapentin  Norco sparingly  Robaxin    Opioid Contract: yes     report:  Reviewed and consistent with medication use as prescribed.    Pain Procedures:   10/14/2024 - Left Hip joint injection - 80% relief  9/6/2024- Bilateral C4,5,6, 7 RFA   7/18/2024- Bilateral L3,4,5 RFA  5/10/2024- Bilateral SI joint injections.   7/27/2023- Bilateral SI joint injections  3/10/2023- Right L3,4,5 RFA- 50% relief  2/24/2023- Left L3,4,5 RFA- relief  12/29/2022- Right C4,5,6,7 RFA  12/15/2022- Left C4,5,6,7 RFA  11/4/2022- Bilateral SI joint injections- 60% relief   4/14/2022- Right L4/5 and L5/S1 TF NGUYEN  3/11/2022- Right L3,4,5 RFA  2/25/2022- Left L3,4,5 RFA  10/15/2021- Right C4,5,6,7 RFA  10/1/2021- Left C4,5,6,7 RFA  5/7/2021- Left L3,4,5 RFA   4/23/2021- Right L3,4,5 RFA  3/26/2021- Right C4,5,6,7 RFA  3/9/2021- Left C4,5,6,7 RFA  10/11/19: L4/5 Interlaminar Epidural Steroid Injection  06/13/19- " Left C4-7 RFA  05/03/19:Left L2-5 Lumbar Medial Branch Nerve Thermal Radiofrequency Ablation  12/21/18:Right L2-5 Lumbar Medial Branch Nerve Thermal Radiofrequency Ablation  11/23/18:Cervical Thermal Radiofreqiency Ablation: Right C4-7  09/14/18:C7/C5Drqstfco Steroid Injection  06/29/18:LL2/3 Interlaminar Epidural Steroid Injection   03/06/18:L2/3 Interlaminar Epidural Steroid Injection   09/26/17:Bilateral L2-5 Lumbar Medial Branch Nerve Coolief Thermal Radiofrequency Ablation  08/31/17:Bilateral L2-5 Lumbar medial branch blocks   03/28/17:Cervical Conventional Radiofreqiency Ablation: Left C4-7  03/14/17:Cervical Conventional Radiofreqiency Ablation: Right C4-7  10/25/16:Cervical Conventional Radiofreqiency Ablation: Left C4-7  10/11/16: Cervical Conventional Radiofreqiency Ablation: Right C4-7  10/04/16- Cervical Medial Branch Blocks, Bilateral C4-7.     08/31/16:C7/C5Itdcoujt Steroid Injection    Physical Therapy/Home Exercise: does home exercises now, but not in formal PT.    - 2019 was last time in PT     Imaging:   MRI Lumbar Spine 3/30/2023:  COMPARISON:  02/25/2021     FINDINGS:  The marrow demonstrates homogeneous signal.  Vertebral body heights are maintained.  Disc spaces are maintained.  Conus terminates appropriately at L1.     Multilevel degenerative change as diesel below:     L1-2: No focal disc abnormality.  No significant canal or neural foraminal narrowing.     L2-3: Facet and ligamentum flavum hypertrophic changes along with posterior epidural fat contribute to mild canal narrowing.  Mild neural foraminal narrowing bilaterally.     L3-4: Small posterior circumferential disc bulge.  Facet and ligamentum flavum hypertrophic changes along with posterior epidural fat contribute to mild canal narrowing.  Moderate bilateral neural foraminal narrowing.     L4-5: Posterior circumferential disc bulge.  Annular tear along the left aspect of the disc.  Mild canal narrowing.  Moderate left neural foraminal  narrowing.  Mild right neural foraminal narrowing.     L5-S1: Posterior circumferential disc bulge with central annular tear.  Facet and ligamentum flavum hypertrophic changes.  Mild right neural foraminal narrowing.     Impression:     Multilevel degenerative change predominantly on the basis of facet arthropathy.  Resultant mild-to-moderate neural foraminal narrowing.  Multilevel mild canal narrowing.  Findings appear similar compared to prior.    XR HIP WITH PELVIS WHEN PERFORMED 2 OR 3 VIEWS LEFT     CLINICAL HISTORY:  Pain in left hip     FINDINGS:  Two views left hip:     There is DJD.  No fracture, dislocation, or bone destruction seen.        Electronically signed by:Remington Anderson MD  Date:                                            08/16/2024  Time:                                           14:58      MRI Lumbar Spine 2/25/2021:  COMPARISON:  07/11/2017     FINDINGS:  The vertebral bodies maintain normal height, signal intensity and alignment.  There is redemonstration of few hemangiomas at multiple levels.  The intervertebral disc spaces are preserved although there is some disc desiccation at multiple levels.  The conus terminates at level L1.  Evaluation of the localizer images and structures surrounding the lumbar spine shows no abnormalities.     L1-L2: No significant disc or joint pathology.     L2-L3: Mild diffuse disc bulge with mild bilateral facet arthropathy and ligamentum flavum hypertrophy results in mild central canal stenosis.  Mild bilateral neural foraminal stenosis is also identified.     L3-L4: There is a diffuse disc bulge with no significant facet arthropathy or ligamentum flavum hypertrophy.  There is mild central canal stenosis and mild bilateral neural foraminal stenosis.     L4-L5: There is a diffuse disc bulge with ligamentum flavum hypertrophy.  No significant facet arthropathy.  There is mild central canal stenosis with only minimal encroachment on the left neural foramen.  Mild  right neural foraminal stenosis is present.     L5-S1: There is a diffuse disc bulge with mild to moderate right facet arthropathy.  No left facet arthropathy.  The ligamentum flavum is normal.  The central canal is patent and the neural foramina are largely patent.  Incidental note is made of an annulus fibrosus tear posteriorly measuring approximately 2 mm.     Impression:     Multilevel degenerative disc and joint disease which is mild and results in mild central canal and neural foraminal stenosis as outlined above.    Xray Cervical Spine 2/25/2021:  COMPARISON:  07/07/2017 .     FINDINGS:  The patient has undergone ACDF from C4 through C7. The instrumentation is intact without evidence of complication.  The vertebral bodies maintain normal height and alignment. The remaining intervertebral disc spaces are preserved.  No significant uncovertebral joint hypertrophy.  The prevertebral soft tissues, odontoid views and lung apices are normal. The neural foramen are patent     Impression:     Postsurgical changes to the thoracic spine without additional evidence for degenerative disc or joint disease.     Allergies:   Review of patient's allergies indicates:   Allergen Reactions    Pcn [penicillins] Hives and Rash    Lipitor [atorvastatin] Other (See Comments)     Muscle cramps, weakness       Current Medications:   Current Outpatient Medications   Medication Sig Dispense Refill    acetaminophen (TYLENOL) 500 MG tablet Take 500 mg by mouth every 6 (six) hours as needed for Pain.      albuterol (PROVENTIL/VENTOLIN HFA) 90 mcg/actuation inhaler       aspirin (ECOTRIN) 81 MG EC tablet Take 81 mg by mouth once daily.      cetirizine (ZYRTEC) 10 MG tablet Take 10 mg by mouth nightly.  5    FOLIC ACID/MULTIVIT-MIN/LUTEIN (CENTRUM SILVER ORAL) Take by mouth once daily.      HYDROcodone-acetaminophen (NORCO) 7.5-325 mg per tablet Take 1 tablet by mouth every 12 (twelve) hours as needed for Pain. 60 tablet 0    levothyroxine  (SYNTHROID) 50 MCG tablet Take 50 mcg by mouth once daily.      meclizine (ANTIVERT) 25 mg tablet Take 25 mg by mouth once daily.       meloxicam (MOBIC) 15 MG tablet Take 15 mg by mouth once daily.      metFORMIN (GLUCOPHAGE) 500 MG tablet Take 1 tablet by mouth 2 (two) times a day.      methocarbamoL (ROBAXIN) 500 MG Tab Take 1 tablet (500 mg total) by mouth 4 (four) times daily as needed (muscle spasm). 120 tablet 0    omeprazole (PRILOSEC) 20 MG capsule Take 1 capsule (20 mg total) by mouth once daily. 90 capsule 3    rosuvastatin (CRESTOR) 20 MG tablet Take 20 mg by mouth once daily.      valsartan (DIOVAN) 80 MG tablet Take 40 mg by mouth once daily.   1    gabapentin (NEURONTIN) 300 MG capsule Take 2 capsules (600 mg total) by mouth 2 (two) times daily. 120 capsule 5     No current facility-administered medications for this visit.       REVIEW OF SYSTEMS:    GENERAL:  No weight loss, malaise or fevers.  HEENT:  Negative for frequent or significant headaches.  NECK:  Negative for lumps, goiter  and significant neck swelling.   RESPIRATORY:  Negative for cough, wheezing or shortness of breath.  CARDIOVASCULAR:  Negative for chest pain, leg swelling or palpitations. HTN  GI:  Negative for abdominal discomfort, blood in stools or black stools or change in bowel habits.  MUSCULOSKELETAL:  See HPI.  SKIN:  Negative for lesions, rash, and itching.  PSYCH:  Negative for sleep disturbance, mood disorder and recent psychosocial stressors.  HEMATOLOGY/LYMPHOLOGY:  Negative for prolonged bleeding, bruising easily or swollen nodes.  NEURO:   No history of headaches, syncope, paralysis, seizures or tremors.   ENDO: Thyroid disorder.   All other reviewed and negative other than HPI.    Past Medical History:  Past Medical History:   Diagnosis Date    Arthritis     Cataract     Cervical back pain with evidence of disc disease     Hiatal hernia     Hypertension     Thyroid disease        Past Surgical History:  Past Surgical  History:   Procedure Laterality Date    ACROMIOCLAVICULAR JOINT CYST EXCISION Right     BACK SURGERY      cataracts Bilateral     CERVICAL FUSION      COLONOSCOPY N/A 4/27/2018    Procedure: COLONOSCOPY;  Surgeon: Giovani Medeiros MD;  Location: [facility] ENDO ([address] [place] [address]);  Service: Endoscopy;  Laterality: N/A;    EPIDURAL STEROID INJECTION N/A 10/11/2019    Procedure: INJECTION, STEROID, EPIDURAL;  Surgeon: Kasandra Montoya MD;  Location: [facility] PAIN MGT;  Service: Pain Management;  Laterality: N/A;  Lumbar NGUYEN L4/5    NGUYEN      EYE SURGERY      INJECTION Left 10/14/2024    Procedure: INJECTION, JOINT LEFT HIP;  Surgeon: Alex Callahan MD;  Location: [facility] PAIN MGT;  Service: Pain Management;  Laterality: Left;  [phone]  2 WK F/U EMILY    INJECTION OF JOINT Bilateral 7/14/2022    Procedure: INJECTION, JOINT, SI BILATERAL  needs consent;  Surgeon: Alex Callahan MD;  Location: [facility] PAIN MGT;  Service: Pain Management;  Laterality: Bilateral;    INJECTION, SACROILIAC JOINT Bilateral 11/4/2022    Procedure: INJECTION,SACROILIAC JOINT BILATERAL CONTRAST;  Surgeon: Alex Callahan MD;  Location: [facility] PAIN MGT;  Service: Pain Management;  Laterality: Bilateral;    INJECTION, SACROILIAC JOINT Bilateral 7/27/2023    Procedure: INJECTION,SACROILIAC JOINT, BILATERAL SOONER DATE;  Surgeon: Alex Callahan MD;  Location: [facility] PAIN MGT;  Service: Pain Management;  Laterality: Bilateral;    INJECTION, SACROILIAC JOINT Bilateral 5/10/2024    Procedure: INJECTION,SACROILIAC JOINT BILATERAL;  Surgeon: Alex Callahan MD;  Location: [facility] PAIN MGT;  Service: Pain Management;  Laterality: Bilateral;  [phone]  2 WK F/U NICK    RADIOFREQUENCY ABLATION  10/2016    cervical spine/Jan    RADIOFREQUENCY ABLATION Left 5/3/2019    Procedure: RADIOFREQUENCY ABLATION, L2-L5 MEDIALBRANCH;  Surgeon: Kasandra Montoya MD;  Location: [facility] PAIN MGT;  Service: Pain Management;  Laterality: Left;    RADIOFREQUENCY ABLATION Left 6/13/2019     Procedure: RADIOFREQUENCY ABLATION C4-7;  Surgeon: Kasandra Montoya MD;  Location: [facility] PAIN MGT;  Service: Pain Management;  Laterality: Left;    RADIOFREQUENCY ABLATION Left 3/9/2021    Procedure: RADIOFREQUENCY ABLATION C4,C5,C6,C7;  Surgeon: Alex Callahan MD;  Location: [facility] PAIN MGT;  Service: Pain Management;  Laterality: Left;  1 of 2    RADIOFREQUENCY ABLATION Right 3/26/2021    Procedure: RADIOFREQUENCY ABLATION C4,C6,C6,C7;  Surgeon: Alex Callahan MD;  Location: [facility] PAIN MGT;  Service: Pain Management;  Laterality: Right;  2 of 2    RADIOFREQUENCY ABLATION Right 4/23/2021    Procedure: RADIOFREQUENCY ABLATION L3,4,5;  Surgeon: Alex Callahan MD;  Location: [facility] PAIN MGT;  Service: Pain Management;  Laterality: Right;  1 of 2    RADIOFREQUENCY ABLATION Left 5/7/2021    Procedure: RADIOFREQUENCY ABLATION L3,4,5;  Surgeon: Alex Callahan MD;  Location: [facility] PAIN [facility]GT;  Service: Pain Management;  Laterality: Left;  2 of 2    RADIOFREQUENCY ABLATION Left 10/1/2021    Procedure: RADIOFREQUENCY ABLATION LEFT, C4,5,6,7 1 of 2 CONSENT NEEDED;  Surgeon: Alex Callahan MD;  Location: [facility] PAIN [facility]GT;  Service: Pain Management;  Laterality: Left;    RADIOFREQUENCY ABLATION Right 10/15/2021    Procedure: RADIOFREQUENCY ABLATION  RIGHT C4,5,6,7 2 of 2 CONSENT NEEDED;  Surgeon: Alex Callahan MD;  Location: [facility] PAIN [facility]GT;  Service: Pain Management;  Laterality: Right;    RADIOFREQUENCY ABLATION Left 2/25/2022    Procedure: RADIOFREQUENCY ABLATION LEFT L3,4,5 1 of 2, needs consent;  Surgeon: Alex Callahan MD;  Location: [facility] PAIN MGT;  Service: Pain Management;  Laterality: Left;    RADIOFREQUENCY ABLATION Right 3/11/2022    Procedure: RADIOFREQUENCY ABLATION RIGHT L3,4,5 2 of 2, needs consent;  Surgeon: Alex Callahan MD;  Location: [facility] PAIN [facility]GT;  Service: Pain Management;  Laterality: Right;    RADIOFREQUENCY ABLATION Left 12/15/2022    Procedure: RADIOFREQUENCY ABLATION LEFT C4, C5, C6, C7  ONE OF TWO  NEEDS CONSENT;  Surgeon: Alex Callahan MD;  Location: [facility] PAIN [facility]GT;  Service: Pain Management;  Laterality: Left;    RADIOFREQUENCY ABLATION Right 12/29/2022    Procedure: RADIOFREQUENCY ABLATION RIGHT C4, C5, C6, C7  TWO OF TWO NEEDS CONSENT;  Surgeon: Alex Callahan MD;  Location: [facility] PAIN [facility]GT;  Service: Pain Management;  Laterality: Right;    RADIOFREQUENCY ABLATION Left 2/24/2023    Procedure: RADIOFREQUENCY ABLATION LEFT L3,L4,L5;  Surgeon: Alex Callahan MD;  Location: [facility];  Service: Pain Management;  Laterality: Left;    RADIOFREQUENCY ABLATION Right 3/10/2023    Procedure: RADIOFREQUENCY ABLATION RIGHT L3,L4,L5;  Surgeon: Alex Callahan MD;  Location: [facility]IN [facility];  Service: Pain Management;  Laterality: Right;    RADIOFREQUENCY ABLATION Left 9/15/2023    Procedure: RADIOFREQUENCY ABLATION, LEFT C4-C5-C6-C7 MEDIAL BRANCH ONE OF TWO  SOONER DATE;  Surgeon: Alex Callahan MD;  Location: [facility] [place] PAIN MGT;  Service: Pain Management;  Laterality: Left;    RADIOFREQUENCY ABLATION Right 9/29/2023    Procedure: RADIOFREQUENCY ABLATION,  RIGHT C4-C5-C6-C7 MEDIAL BRANCH TWO OF TWO SOONER DATE;  Surgeon: Alex Callahan MD;  Location: [facility] [place] PAIN MGT;  Service: Pain Management;  Laterality: Right;    RADIOFREQUENCY ABLATION Bilateral 7/18/2024    Procedure: RADIOFREQUENCY ABLATION BILATERAL L3, 4, 5;  Surgeon: Alex Callahan MD;  Location: [facility] [place] PAIN MGT;  Service: Pain Management;  Laterality: Bilateral;  [phone]  3 WK F/U EMILY    RADIOFREQUENCY ABLATION Bilateral 9/6/2024    Procedure: RADIOFREQUENCY ABLATION BILATERAL C4, 5, 6, 7;  Surgeon: Alex Callahan MD;  Location: [facility] [place] PAIN MGT;  Service: Pain Management;  Laterality: Bilateral;  [phone]  3 WK F/U NICK    RETINAL DETACHMENT SURGERY      ROTATOR CUFF REPAIR Right     SPINE SURGERY      cerival fusion     TRANSFORAMINAL EPIDURAL INJECTION OF STEROID Right 4/14/2022    Procedure: INJECTION, STEROID, EPIDURAL, TRANSFORAMINAL  APPROACH RIGHT L4/5 & L5/S1 Needs Consent;  Surgeon: Alex Callahan MD;  Location: [facility] PAIN MGT;  Service: Pain Management;  Laterality: Right;    TRIGGER POINT INJECTION Left 6/13/2019    Procedure: INJECTION, TRIGGER POINT;  Surgeon: Kasandra Montoya MD;  Location: [facility] PAIN MGT;  Service: Pain Management;  Laterality: Left;       Family History:  Family History   Problem Relation Name Age of Onset    Heart disease Mother      Cancer Father      Leukemia Brother      Colon cancer Neg Hx      Celiac disease Neg Hx      Crohn's disease Neg Hx      Esophageal cancer Neg Hx      Inflammatory bowel disease Neg Hx      Irritable bowel syndrome Neg Hx      Liver cancer Neg Hx      Rectal cancer Neg Hx      Stomach cancer Neg Hx      Ulcerative colitis Neg Hx         Social History:  Social History     Socioeconomic History    Marital status:    Tobacco Use    Smoking status: Never    Smokeless tobacco: Never   Substance and Sexual Activity    Alcohol use: No    Drug use: No       OBJECTIVE:    BP (!) 155/75 (BP Location: Left arm, Patient Position: Sitting)   Pulse 68   Temp 97.9 °F (36.6 °C) (Oral)   Resp 15   Wt 113.8 kg (250 lb 14.1 oz)   SpO2 100%   BMI 33.10 kg/m²     PHYSICAL EXAMINATION:    General appearance: Well appearing, in no acute distress, alert and oriented x3.  Psych:  Mood and affect appropriate.  Skin: Skin color, texture, turgor normal, no rashes in both upper and lower body. Subcutaneous lesion to left anterior mid thigh. No redness, edema, drainage.   Head/face:  Atraumatic, normocephalic. No palpable lymph nodes   Cor: Rate regular  Pulm: Symmetric chest rise, no respiratory distress noted.   Abdomen: Non-distended.  : subcutaneous, soft palpable mass to left groin.   Back: Straight leg raising in the sitting position is negative for radicular leg pain bilaterally. There is pain with palpation over lumbar paraspinals and facet joints bilaterally. Limited ROM with pain on  extension. Positive facet loading bilaterally. Mild pain to palpation over bilateral SI joints.   Extremities: No deformities, edema, or skin discoloration. Good capillary refill.  Musculoskeletal: Left hip provacative maneuvers negative.  Bilateral upper and lower extremity strength is normal and symmetric. No atrophy or tone abnormalities are noted.  Neuro:  No loss of sensation is noted.  Gait: Antalgic, ambulates with cane for assistance       ASSESSMENT: 60 y.o. year old male with neck and lumbar pain, consistent with      1. Chronic pain syndrome        2. Lumbar spondylosis  Procedure Order to Pain Management      3. Degeneration of intervertebral disc of lumbar region with discogenic back pain                PLAN:     - Previous imaging was reviewed and discussed with the patient today.     - Schedule for bilateral L3,4,5 RFA  The patient did previously have bilateral RFAs from L3 to L5 in the past with 60% relief for 10 month(s). During that time, the patients functional ability improved and was able to be more active without significant limitation by pain. Current pain is axial and non-radiating. The patient also previously completed over 12 weeks of PT exercises in the past 6 months.    - We can repeat cervical procedure as needed.     - I have stressed the importance of physical activity and a home exercise plan to help with pain and improve health.    - Continue Gabapentin.     - Continue Robaxin 500 mg PRN muscle pain.     - Pain contract signed 2/12/2021.     - Continue Norco 7.5/325 mg BID PRN. Refill provided.       - The patient is here today for a refill of current pain medications and they believe these provide effective pain control and improvements in quality of life.  The patient notes no serious side effects, and feels the benefits outweigh the risks.  The patient was reminded of the pain contract that they signed previously as well as the risks and benefits of the medication including  possible death.  The updated [place] [employer] prescription monitoring program was reviewed, and the patient has been found to be compliant with current treatment plan. Medication management provided by Dr. Callahan.     - UDS from 5/28/2024 reviewed, negative for medications, he does take sparingly.     - Continue home exercise routine.     - RTC 3 weeks after above procedure.     The above plan and management options were discussed at length with patient. Patient is in agreement with the above and verbalized understanding.    Angelique Barahona NP  05/09/2025

## 2025-05-12 ENCOUNTER — PATIENT MESSAGE (OUTPATIENT)
Dept: PAIN MEDICINE | Facility: CLINIC | Age: 60
End: 2025-05-12
Payer: MEDICARE

## 2025-05-12 DIAGNOSIS — M47.816 LUMBAR SPONDYLOSIS: Primary | ICD-10-CM

## 2025-05-28 ENCOUNTER — PATIENT MESSAGE (OUTPATIENT)
Dept: PAIN MEDICINE | Facility: OTHER | Age: 60
End: 2025-05-28
Payer: MEDICARE

## 2025-05-29 ENCOUNTER — HOSPITAL ENCOUNTER (OUTPATIENT)
Facility: OTHER | Age: 60
Discharge: HOME | End: 2025-05-29
Attending: ANESTHESIOLOGY | Admitting: ANESTHESIOLOGY
Payer: MEDICARE

## 2025-05-29 VITALS
HEART RATE: 59 BPM | TEMPERATURE: 98 F | HEIGHT: 73 IN | BODY MASS INDEX: 33.13 KG/M2 | SYSTOLIC BLOOD PRESSURE: 139 MMHG | RESPIRATION RATE: 16 BRPM | OXYGEN SATURATION: 96 % | WEIGHT: 250 LBS | DIASTOLIC BLOOD PRESSURE: 84 MMHG

## 2025-05-29 DIAGNOSIS — G89.29 CHRONIC PAIN: ICD-10-CM

## 2025-05-29 DIAGNOSIS — M47.816 LUMBAR SPONDYLOSIS: Primary | ICD-10-CM

## 2025-05-29 LAB — POCT GLUCOSE: 101 MG/DL (ref 70–110)

## 2025-05-29 PROCEDURE — 99153 MOD SED SAME PHYS/QHP EA: CPT | Performed by: ANESTHESIOLOGY

## 2025-05-29 PROCEDURE — 63600175 PHARM REV CODE 636 W HCPCS: Performed by: ANESTHESIOLOGY

## 2025-05-29 PROCEDURE — 64635 DESTROY LUMB/SAC FACET JNT: CPT | Mod: 50 | Performed by: ANESTHESIOLOGY

## 2025-05-29 PROCEDURE — 64636 DESTROY L/S FACET JNT ADDL: CPT | Mod: 50 | Performed by: ANESTHESIOLOGY

## 2025-05-29 PROCEDURE — 99152 MOD SED SAME PHYS/QHP 5/>YRS: CPT | Performed by: ANESTHESIOLOGY

## 2025-05-29 PROCEDURE — 64635 DESTROY LUMB/SAC FACET JNT: CPT | Mod: 50,,, | Performed by: ANESTHESIOLOGY

## 2025-05-29 PROCEDURE — 64636 DESTROY L/S FACET JNT ADDL: CPT | Mod: 50,,, | Performed by: ANESTHESIOLOGY

## 2025-05-29 RX ORDER — FENTANYL CITRATE 50 UG/ML
INJECTION, SOLUTION INTRAMUSCULAR; INTRAVENOUS
Status: DISCONTINUED | OUTPATIENT
Start: 2025-05-29 | End: 2025-05-29 | Stop reason: HOSPADM

## 2025-05-29 RX ORDER — SODIUM CHLORIDE 9 MG/ML
INJECTION, SOLUTION INTRAVENOUS CONTINUOUS
Status: DISCONTINUED | OUTPATIENT
Start: 2025-05-29 | End: 2025-05-30 | Stop reason: HOSPADM

## 2025-05-29 RX ORDER — MIDAZOLAM HYDROCHLORIDE 1 MG/ML
INJECTION INTRAMUSCULAR; INTRAVENOUS
Status: DISCONTINUED | OUTPATIENT
Start: 2025-05-29 | End: 2025-05-29 | Stop reason: HOSPADM

## 2025-05-29 RX ORDER — DEXAMETHASONE SODIUM PHOSPHATE 10 MG/ML
INJECTION, SOLUTION INTRA-ARTICULAR; INTRALESIONAL; INTRAMUSCULAR; INTRAVENOUS; SOFT TISSUE
Status: DISCONTINUED | OUTPATIENT
Start: 2025-05-29 | End: 2025-05-29 | Stop reason: HOSPADM

## 2025-05-29 RX ORDER — BUPIVACAINE HYDROCHLORIDE 2.5 MG/ML
INJECTION, SOLUTION EPIDURAL; INFILTRATION; INTRACAUDAL; PERINEURAL
Status: DISCONTINUED | OUTPATIENT
Start: 2025-05-29 | End: 2025-05-29 | Stop reason: HOSPADM

## 2025-05-29 RX ORDER — LIDOCAINE HYDROCHLORIDE 20 MG/ML
INJECTION, SOLUTION INFILTRATION; PERINEURAL
Status: DISCONTINUED | OUTPATIENT
Start: 2025-05-29 | End: 2025-05-29 | Stop reason: HOSPADM

## 2025-06-26 ENCOUNTER — OFFICE VISIT (OUTPATIENT)
Dept: PAIN MEDICINE | Facility: CLINIC | Age: 60
End: 2025-06-26
Payer: MEDICARE

## 2025-06-26 VITALS
HEART RATE: 68 BPM | SYSTOLIC BLOOD PRESSURE: 126 MMHG | BODY MASS INDEX: 34.03 KG/M2 | RESPIRATION RATE: 18 BRPM | TEMPERATURE: 98 F | OXYGEN SATURATION: 100 % | DIASTOLIC BLOOD PRESSURE: 78 MMHG | WEIGHT: 257.94 LBS

## 2025-06-26 DIAGNOSIS — M51.360 DEGENERATION OF INTERVERTEBRAL DISC OF LUMBAR REGION WITH DISCOGENIC BACK PAIN: ICD-10-CM

## 2025-06-26 DIAGNOSIS — G89.29 CHRONIC LEFT HIP PAIN: ICD-10-CM

## 2025-06-26 DIAGNOSIS — M53.3 SACROILIAC JOINT PAIN: Primary | ICD-10-CM

## 2025-06-26 DIAGNOSIS — M25.552 CHRONIC LEFT HIP PAIN: ICD-10-CM

## 2025-06-26 DIAGNOSIS — M47.816 LUMBAR SPONDYLOSIS: ICD-10-CM

## 2025-06-26 DIAGNOSIS — M54.9 DORSALGIA, UNSPECIFIED: ICD-10-CM

## 2025-06-26 DIAGNOSIS — G89.4 CHRONIC PAIN DISORDER: Primary | ICD-10-CM

## 2025-06-26 DIAGNOSIS — M53.3 SACROILIAC JOINT PAIN: ICD-10-CM

## 2025-06-26 PROCEDURE — 99999 PR PBB SHADOW E&M-EST. PATIENT-LVL IV: CPT | Mod: PBBFAC,,, | Performed by: NURSE PRACTITIONER

## 2025-06-26 NOTE — PROGRESS NOTES
Interventional Pain Management - Established Visit  Follow-Up     Interval History 6/26/2025:   The patient returns to clinic today for follow up of back and hip pain. He is s/p bilateral L3,L4,L5 RFA on 5/29/25 with 40%-50% relief of lower back pain. HIs pain is worse today after the long car ride. This is to the left buttock, lower than injection sites.  He does have some aching pain into the hip and thigh. His pain is worse with prolonged sitting and walking. He continues to take Gabapentin and Robaxin. He is taking Norco as needed for severe pain, usually 2-3 times a week. Today his pain is a 5/10.     Interval History 5/9/2025:  The patient returns to clinic today for follow up of back and hip pain. He reports increased low back pain over the last month. He reports low back pain, worse in the morning. This is also worse with prolonged standing. He denies any radicular leg pain. He does have left hip and thigh pain. He did see PCP regarding lump. He was given a round of steroids in consideration of lymph node. He did not notice any difference with this. He continues to take Gabapentin and Robaxin. He also takes Norco as needed for severe pain. He denies any adverse effects. He denies any other health changes. His pain today is 5/10.    Interval History 11/8/2024:  Sal Navarro returns to clinic for follow-up after left hip joint injection on 10/14/2024. He reports 80% pain relief of hip pain. He has noticed left groin pain and a palpable mass under the skin since the injection. He has also noticed a painful lesion under his skin on his left thigh. He has not gone to his PCP for evaluation. He continues Gabapentin and Robaxin with some benefit. He also takes Norco as needed without perceived side effects. He denies recent health changes. He denies recent falls or trauma. He denies new onset fever/night sweats, urinary incontinence, bowel incontinence, significant weight changes, significant motor weakness  or changes, or loss of sensations. His pain today is 7/10.      Interval History 10/4/2024:  The patient returns to clinic today for follow up of neck and back pain. He is s/p bilateral C4,5,6 RFA on 9/6/2024. He reports 50% relief of his neck pain. He reports intermittent neck pain, worse with prolonged activity. He reports increased left sided low back pain. He describes this as tense and hard in nature. He continues to report left hip pain. This is worse with standing and walking. He is taking Gabapentin and Robaxin. He is taking Norco as needed with benefit. He takes this sparingly. He denies any adverse effects. He denies any other health changes. His pain today is 7/10.    Interval History 8/16/2024:  The patient returns to clinic today for follow up of neck and back pain. He is s/p bilateral L3,4,5 RFA on 7/18/2024. He reports 50-60% relief. His back pain is tolerable at this time. He continues to report left hip pain. He has had a few falls onto the left side. He reports increased neck pain. He denies any radicular arm pain. He does endorse morning stiffness. His pain is worse with prolonged activity. He is taking Gabapentin and Robaxin. He takes Norco sparingly for severe pain. He denies any other health changes. His pain today is 6/10.    Interval History 5/28/2024:  The patient returns to clinic today for follow up of neck and back pain. He is s/p bilateral SI joint injections on 5/10/2024. He reports 50% relief. He reports right sided low back pain, higher than injection sites. He also report left sided low back pain. This pain is worse with prolonged activity. He does endorse stiffness. He denies any radicular leg pain. He reports intermittent neck pain. He is taking Gabapentin and Robaxin. He takes Norco sparingly for severe pain. His last dose was yesterday. He denies any other health changes. His pain today is 6/10.    Interval History 4/22/2024:  The patient returns to clinic today for follow up of  neck and back pain. He reports increased low back pain. He reports low back and buttock pain. He denies any radicular leg pain. His pain is worse with prolonged sitting. He also reports increased pain with moving from sitting to standing. He reports intermittent neck pain. He is having increased left elbow pain. His wife notes that he does drop objects from the left hand. He is taking Gabapentin and Robaxin. He is taking Norco as needed for severe pain. He denies any adverse effects. He denies any other health changes. His pain today is 7/10.    Interval History 10/11/2023:  The patient returns to clinic today for follow up of neck and back pain. He is s/p left C4,5,6,7 RFA on 9/15/2023. He is s/p right C4,5,6,7 RFA on 9/29/2023. He reports 50% relief of his pain. He does report intermittent neck pain. He also reports left sided anterior neck and upper chest pain. This is tight in nature. He reports increased low back pain. He denies any radicular leg pain. This pain is worse with prolonged activity. He does have intermittent left abdominal pain. He feels a knot in the area. This pain is relieved with sitting or laying down. He did have a recent fall in his garden. He continues to take Gabapentin and Robaxin. He also takes Norco as needed for severe pain. He takes this sparingly without adverse effects. He denies any other health changes. His pain today is 4/10.    Interval History 8/11/2023:  The patient returns to clinic today for follow up of back pain. He is s/p bilateral SI joint injections on 7/27/2023. He reports 50-60% relief of his buttock pain. He reports worsened neck pain over the last month. He does report some pain into the left shoulder. He denies any radicular arm pain. His pain is worse with turning his head to the side. He does endorse stiffness. He continues to report intermittent left abdominal pain. This pain is worse with prolonged activity. He also notes 2 lumps in the area, especially at the  end of the day. He is taking Gabapentin and Robaxin. He also takes Norco as needed for severe pain sparingly. He denies any other health changes. His pain today is 6/10.    Interval History 7/5/2023:  The patient returns to clinic today for follow up of back pain. He reports increased low back and buttock pain. He denies any radicular leg pain. His pain is worse with prolonged sitting, moving from sitting to standing, and prolonged activity. He continues to report intermittent left abdominal pain. He also reports increased neck pain. He is taking Gabapentin and Robaxin. He also takes Norco as needed for severe pain. He denies any adverse effects. His pain today is 6/10.    Interval History 3/21/2023:  The patient returns to clinic today for follow up of back pain. He is s/p left L3,4,5 RFA on 2/24/2023 and right L3,4,5 RFA on 3/10/2023. He is unsure of relief at this time. He reports increased left sided low back pain over the last two weeks. 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 He did feel a pop. He thought he pulled a muscle, but pain has been worsening. He reports left sided low back pain that radiates into the left hip and lower abdomen. He also reports intermittent radiating pain into the posterior left leg. He describes this pain as numb and tingling in nature. His pain is worse with activity. He does endorse muscle spasms. He is taking Gabapentin and Robaxin. He has had to take more Norco recently due to increased pain. He denies any weakness. He denies any other health changes. His pain today is 8/10.    Interval History 1/31/2023:  The patient returns to clinic today for follow up of neck and back pain. He is s/p left C4,5,6,7 on 12/15/2022 and right C4,5,6,7 RFA on 12/29/2022. He reports 50% relief of his neck pain. He reports intermittent neck pain, left side greater than right. He denies any radicular arm pain. He does report pain into the shoulder blades. He reports  increased low back pain. He denies any radicular leg pain. His pain is worse with standing, walking, and activity. He does endorse stiffness. He is taking Gabapentin and Robaxin. He also takes Norco sparingly with benefit. He denies any other health changes. His pain today is 6/10.    Interval History 11/18/2022:  The patient returns to clinic today for follow up of neck and back pain. He is s/p bilateral SI joint injections on 11/4/2022. He reports 60% relief of his low back and buttock pain. He continues to report low back pain. He denies any radicular leg pain. He reports increased neck pain, left side greater than right. He denies any radicular arm. He does report some pain into his shoulder blades. His pain is worse with activity. He continues to take Gabapentin and Robaxin. He continues to take Norco as needed sparingly with benefit. He denies any other health changes. His pain today is 7/10.    Interval History 10/27/2022:  The patient returns to clinic today for follow up of neck and back pain. He reports increased low back and bilateral buttock pain. He denies any radicular leg pain. His pain is worse with prolonged sitting and moving from sitting to standing. He does endorse a fall recently while getting out of the vehicle onto his side. He continues to report neck pain. He continues to take Gabapentin and Robaxin. He continues to take Norco sparingly with benefit and without adverse effects. He denies any other health changes. His pain today is 6/10.    Interval History 8/2/2022:  The aptient returns to clinic today for follow up of neck and back pain. He is s/p bilateral SI joint injection on 7/14/2022. He reports 50-60% relief of his low back and buttock pain. He reports intermittent low back and buttock pain. He reports increased neck pain, left side greater than right. He denies any radicular arm pain. He has good days and bad days. He continues to take Gabapentin. He continues to take Norco sparingly  with benefit and without adverse effects. He denies any other health changes. His pain today is 5/10.    Interval History 7/1/2022:  The patient returns to clinic today for follow up of neck and back pain. He reports increased low back and buttock pain.His pain is worse with prolonged sitting and moving from sitting to standing. He also reports increased pain with bending over. He does report intermittent radiating pain into his right posterior thigh stopping at mid thigh. He does report a fall, which was sitting hard on the ground about a week ago. He denies any acute injury. He continues to take Gabapentin and Norco. He denies any other health changes. His pain today is 6/10.     Interval History 5/9/2022:  The patient returns to clinic today for follow up of back pain. He is s/p right L4/5 and L5/S1 TF NGUYEN on 4/14/2022. He reports 50-60% relief of his low back and leg pain. He has been more active since the procedure. He continues to report low back and hip pain, worse with bending and activity. He denies any radicular leg pain. He continues to repot intermittent neck pain. He continues to take Gabapentin with benefit. He continues to take Norco sparingly as needed. He denies any other health changes. His pain today is 5/10.    Interval History 4/1/2022:  The patient returns to clinic today for follow up of back pain. He is s/p left L3,4,5 RFA on 2/25/2022 and right L3,4,5 RFA on 3/11/2022. He reports relief of his back pain. He reports increased back pain that radiates into the posterior aspect of his right leg to his knee. He denies any left leg pain. His pain is worse with prolonged walking. He reports that he sometimes drags his right leg. He denies any falls. He denies any bowel or bladder incontinence. He continues to take Gabapentin and Norco as needed with benefit. He denies any adverse effects. He denies any other health changes. His pain today is 7/10.    Interval History 2/15/2022:  The patient returns  "to clinic today for follow up of neck and back pain. He reports increased low back pain over the last two weeks. He describes this pain as sharp and aching in nature. He does report intermittent "catching" pain in his lower back, worse with getting out of bed. He denies any radicular leg pain. He continues to report benefit from previous cervical procedures. He reports intermittent neck pain. He continues to take Gabapentin with benefit. He continues to take Norco as needed with benefit and without adverse effects. He denies any other health changes. His pain today is 6/10.    Interval History 11/5/2021:  The patient returns to clinic today for follow up of neck and back pain. He is s/p left C4,5,6,7 RFA on 10/1/2021 and right C4,5,6,7 RFA on 10/15/2021. He reports 50% relief of his neck pain. He reports increased burning and itching pain to his skin near injection sites. He denies any radicular arm pain. He does report increased left sided muscle pain. He is concerned that one of the screws in his cervical hardware is moving. He continues to report benefit with previous lumbar procedures. He reports intermittent low back pain without radicular leg pain. He continues to take Gabapentin with benefit. He continues to take Norco as needed with benefit and without adverse effects. He denies any weakness. He denies any other health changes. His pain today is 5/10.    Interval History 9/17/2021:  The patient returns to clinic today for follow up of neck and back pain. He reports increased neck pain. He denies any radicular arm pain today. He does report intermittent radiating pain into his left shoulder blade and mid back. His pain is worse with activity. He reports that sometimes his pain takes his breath away. He continues to report low back pain, that is tolerable at this time. He continues to take Gabapentin and Norco with benefit and without adverse effects. He denies any other health changes. His pain today is " 6/10.    Interval History 6/17/2021:  The patient returns to clinic today for follow up of neck and back pain. He is s/p right L3,4,5 RFA on 4/23/2021 and left L3,4,5 RFA on 5/7/2021. He reports 50% relief of his low back pain. He continues to report intermittent low back pain. He denies any radicular leg pain. He continues to report neck pain, especially in between the scapula. His pain is worse with prolonged standing, walking, and activity. He continues to take Gabapentin with benefit. He continues to take Norco sparingly for severe pain with benefit and without adverse effects. He denies any other health changes. His pain today is 4/10.    Interval History 4/9/2021:  The patient returns to clinic today for follow up of neck and back pain. He is s/p left C4,5,6,7 RFA on 3/9/2021 and right C4,5,6,7 RFA on 3/26/2021. He reports 50% relief of his neck pain. He reports intermittent neck pain. He has good days and bad days. He continues to report low back pain that is constant, sharp, and aching. He reports intermittent radiating pain into the lateral aspect of his left leg to his knee. He continues to take Gabapentin with benefit. He continues to take Norco as needed sparingly for severe pain. He denies any adverse effects. He denies any other health changes. His pain today is 5/10.    Interval History 3/2/2021:  Sal Navarro presents to the clinic for a follow-up appointment for neck pain . Since the last visit, Sal Navarro states the pain has been worsening. Current pain intensity is 6/10.  Was seen by Angelique Barahona NP on 2/12/2021.     Interval History 2/12/2021:  The patient returns to clinic today for follow up of back pain. He has not been seen in over a year. He did not have imaging due to coronavirus outbreak. He would to reschedule this. He was also considering SCS trial prior to the pandemic. He continues to report low back pain that radiates into the lateral aspect of his left leg to his knee.  "He denies any radicular right leg pain. His pain is worse with bending and walking. He continues to take Gabapentin with benefit. He also takes Norco as needed for severe pain. He denies any adverse effects. He denies any other health changes. His pain today is 6/10.    HPI:  Sal Navarro is a 56 y.o. male who presents today s/p C4-7 ACDF with C5/6 PSIF in 2/2016 with residual chronic midline neck pain that radiates into his shoulder blades bilaterally, R>>L.  This is associated with bilateral hand numbness and tingling.  The hand symptoms did improve following the surgery.  The shooting pains in his arms resolved completely.   This pain is described in detail below.  His post-operative course was complicated by dysphagia that is being treated with speech therapy.  The numbness and the tingling were relieved by the surgery. Now experiences "deep pain along the spine"  Patient has not been seen for over a year, had to re-schedule his imaging studies due to pandemic(now completed). Prior to the pandemic, he was considering a SCS.   Only time he gets relief is laying in bed on a very thin pillow, but that doesn't last very long.   Pain aggravated by movement and even breathing/playing with his grandchild/holding the grandchild's hand. Heat and massage (has a vest) are helpful.   Was seen by Dr. Jan srivastava and received multiple EIS and RFAs.   Compliant with his medication.     Pain Disability Index Review:      6/26/2025     1:31 PM 5/9/2025     2:06 PM 11/8/2024    11:41 AM   Last 3 PDI Scores   Pain Disability Index (PDI) [phone]       Pain Medications:  Gabapentin  Norco sparingly  Robaxin    Opioid Contract: yes     report:  Reviewed and consistent with medication use as prescribed.    Pain Procedures:   5/29/2025: Bilateral RFA L3, L4, L5 - 40-50% relief   10/14/2024 - Left Hip joint injection - 80% relief  9/6/2024- Bilateral C4,5,6, 7 RFA   7/18/2024- Bilateral L3,4,5 RFA  5/10/2024- Bilateral SI " joint injections.   7/27/2023- Bilateral SI joint injections  3/10/2023- Right L3,4,5 RFA- 50% relief  2/24/2023- Left L3,4,5 RFA- relief  12/29/2022- Right C4,5,6,7 RFA  12/15/2022- Left C4,5,6,7 RFA  11/4/2022- Bilateral SI joint injections- 60% relief   4/14/2022- Right L4/5 and L5/S1 TF NGUYEN  3/11/2022- Right L3,4,5 RFA  2/25/2022- Left L3,4,5 RFA  10/15/2021- Right C4,5,6,7 RFA  10/1/2021- Left C4,5,6,7 RFA  5/7/2021- Left L3,4,5 RFA   4/23/2021- Right L3,4,5 RFA  3/26/2021- Right C4,5,6,7 RFA  3/9/2021- Left C4,5,6,7 RFA  10/11/19: L4/5 Interlaminar Epidural Steroid Injection  06/13/19- Left C4-7 RFA  05/03/19:Left L2-5 Lumbar Medial Branch Nerve Thermal Radiofrequency Ablation  12/21/18:Right L2-5 Lumbar Medial Branch Nerve Thermal Radiofrequency Ablation  11/23/18:Cervical Thermal Radiofreqiency Ablation: Right C4-7  09/14/18:C7/U8Jyuurudt Steroid Injection  06/29/18:LL2/3 Interlaminar Epidural Steroid Injection   03/06/18:L2/3 Interlaminar Epidural Steroid Injection   09/26/17:Bilateral L2-5 Lumbar Medial Branch Nerve Coolief Thermal Radiofrequency Ablation  08/31/17:Bilateral L2-5 Lumbar medial branch blocks   03/28/17:Cervical Conventional Radiofreqiency Ablation: Left C4-7  03/14/17:Cervical Conventional Radiofreqiency Ablation: Right C4-7  10/25/16:Cervical Conventional Radiofreqiency Ablation: Left C4-7  10/11/16: Cervical Conventional Radiofreqiency Ablation: Right C4-7  10/04/16- Cervical Medial Branch Blocks, Bilateral C4-7.    08/31/16:C7/N9Ejpigmit Steroid Injection    Physical Therapy/Home Exercise: does home exercises now, but not in formal PT.    - 2019 was last time in PT     Imaging:   MRI Lumbar Spine 3/30/2023:  COMPARISON:  02/25/2021     FINDINGS:  The marrow demonstrates homogeneous signal.  Vertebral body heights are maintained.  Disc spaces are maintained.  Conus terminates appropriately at L1.     Multilevel degenerative change as diesel below:     L1-2: No focal disc abnormality.  No  significant canal or neural foraminal narrowing.     L2-3: Facet and ligamentum flavum hypertrophic changes along with posterior epidural fat contribute to mild canal narrowing.  Mild neural foraminal narrowing bilaterally.     L3-4: Small posterior circumferential disc bulge.  Facet and ligamentum flavum hypertrophic changes along with posterior epidural fat contribute to mild canal narrowing.  Moderate bilateral neural foraminal narrowing.     L4-5: Posterior circumferential disc bulge.  Annular tear along the left aspect of the disc.  Mild canal narrowing.  Moderate left neural foraminal narrowing.  Mild right neural foraminal narrowing.     L5-S1: Posterior circumferential disc bulge with central annular tear.  Facet and ligamentum flavum hypertrophic changes.  Mild right neural foraminal narrowing.     Impression:     Multilevel degenerative change predominantly on the basis of facet arthropathy.  Resultant mild-to-moderate neural foraminal narrowing.  Multilevel mild canal narrowing.  Findings appear similar compared to prior.    XR HIP WITH PELVIS WHEN PERFORMED 2 OR 3 VIEWS LEFT     CLINICAL HISTORY:  Pain in left hip     FINDINGS:  Two views left hip:     There is DJD.  No fracture, dislocation, or bone destruction seen.        Electronically signed by:Remington Anderson MD  Date:                                            08/16/2024  Time:                                           14:58      MRI Lumbar Spine 2/25/2021:  COMPARISON:  07/11/2017     FINDINGS:  The vertebral bodies maintain normal height, signal intensity and alignment.  There is redemonstration of few hemangiomas at multiple levels.  The intervertebral disc spaces are preserved although there is some disc desiccation at multiple levels.  The conus terminates at level L1.  Evaluation of the localizer images and structures surrounding the lumbar spine shows no abnormalities.     L1-L2: No significant disc or joint pathology.     L2-L3: Mild  diffuse disc bulge with mild bilateral facet arthropathy and ligamentum flavum hypertrophy results in mild central canal stenosis.  Mild bilateral neural foraminal stenosis is also identified.     L3-L4: There is a diffuse disc bulge with no significant facet arthropathy or ligamentum flavum hypertrophy.  There is mild central canal stenosis and mild bilateral neural foraminal stenosis.     L4-L5: There is a diffuse disc bulge with ligamentum flavum hypertrophy.  No significant facet arthropathy.  There is mild central canal stenosis with only minimal encroachment on the left neural foramen.  Mild right neural foraminal stenosis is present.     L5-S1: There is a diffuse disc bulge with mild to moderate right facet arthropathy.  No left facet arthropathy.  The ligamentum flavum is normal.  The central canal is patent and the neural foramina are largely patent.  Incidental note is made of an annulus fibrosus tear posteriorly measuring approximately 2 mm.     Impression:     Multilevel degenerative disc and joint disease which is mild and results in mild central canal and neural foraminal stenosis as outlined above.    Xray Cervical Spine 2/25/2021:  COMPARISON:  07/07/2017 .     FINDINGS:  The patient has undergone ACDF from C4 through C7. The instrumentation is intact without evidence of complication.  The vertebral bodies maintain normal height and alignment. The remaining intervertebral disc spaces are preserved.  No significant uncovertebral joint hypertrophy.  The prevertebral soft tissues, odontoid views and lung apices are normal. The neural foramen are patent     Impression:     Postsurgical changes to the thoracic spine without additional evidence for degenerative disc or joint disease.     Allergies:   Review of patient's allergies indicates:   Allergen Reactions    Pcn [penicillins] Hives and Rash    Lipitor [atorvastatin] Other (See Comments)     Muscle cramps, weakness       Current Medications:   Current  Outpatient Medications   Medication Sig Dispense Refill    acetaminophen (TYLENOL) 500 MG tablet Take 500 mg by mouth every 6 (six) hours as needed for Pain.      albuterol (PROVENTIL/VENTOLIN HFA) 90 mcg/actuation inhaler       aspirin (ECOTRIN) 81 MG EC tablet Take 81 mg by mouth once daily.      cetirizine (ZYRTEC) 10 MG tablet Take 10 mg by mouth nightly.  5    FOLIC ACID/MULTIVIT-MIN/LUTEIN (CENTRUM SILVER ORAL) Take by mouth once daily.      HYDROcodone-acetaminophen (NORCO) 7.5-325 mg per tablet Take 1 tablet by mouth every 12 (twelve) hours as needed for Pain. 60 tablet 0    levothyroxine (SYNTHROID) 50 MCG tablet Take 50 mcg by mouth once daily.      meclizine (ANTIVERT) 25 mg tablet Take 25 mg by mouth once daily.       meloxicam (MOBIC) 15 MG tablet Take 15 mg by mouth once daily.      metFORMIN (GLUCOPHAGE) 500 MG tablet Take 1 tablet by mouth 2 (two) times a day.      methocarbamoL (ROBAXIN) 500 MG Tab Take 1 tablet (500 mg total) by mouth 4 (four) times daily as needed (muscle spasm). 120 tablet 0    omeprazole (PRILOSEC) 20 MG capsule Take 1 capsule (20 mg total) by mouth once daily. 90 capsule 3    rosuvastatin (CRESTOR) 20 MG tablet Take 20 mg by mouth once daily.      valsartan (DIOVAN) 80 MG tablet Take 40 mg by mouth once daily.   1    gabapentin (NEURONTIN) 300 MG capsule Take 2 capsules (600 mg total) by mouth 2 (two) times daily. 120 capsule 5     No current facility-administered medications for this visit.       REVIEW OF SYSTEMS:    GENERAL:  No weight loss, malaise or fevers.  HEENT:  Negative for frequent or significant headaches.  NECK:  Negative for lumps, goiter  and significant neck swelling.   RESPIRATORY:  Negative for cough, wheezing or shortness of breath.  CARDIOVASCULAR:  Negative for chest pain, leg swelling or palpitations. HTN  GI:  Negative for abdominal discomfort, blood in stools or black stools or change in bowel habits.  MUSCULOSKELETAL:  See HPI.  SKIN:  Negative for  lesions, rash, and itching.  PSYCH:  Negative for sleep disturbance, mood disorder and recent psychosocial stressors.  HEMATOLOGY/LYMPHOLOGY:  Negative for prolonged bleeding, bruising easily or swollen nodes.  NEURO:   No history of headaches, syncope, paralysis, seizures or tremors.   ENDO: Thyroid disorder.   All other reviewed and negative other than HPI.    Past Medical History:  Past Medical History:   Diagnosis Date    Arthritis     Cataract     Cervical back pain with evidence of disc disease     Hiatal hernia     Hypertension     Thyroid disease        Past Surgical History:  Past Surgical History:   Procedure Laterality Date    ACROMIOCLAVICULAR JOINT CYST EXCISION Right     BACK SURGERY      cataracts Bilateral     CERVICAL FUSION      COLONOSCOPY N/A 4/27/2018    Procedure: COLONOSCOPY;  Surgeon: Giovani Medeiros MD;  Location: [facility] ENDO ([address] [place] [address]);  Service: Endoscopy;  Laterality: N/A;    EPIDURAL STEROID INJECTION N/A 10/11/2019    Procedure: INJECTION, STEROID, EPIDURAL;  Surgeon: Kasandra Montoya MD;  [facility];  Service: Pain Management;  Laterality: N/A;  Lumbar NGUYEN L4/5    NGUYEN      EYE SURGERY      INJECTION Left 10/14/2024    Procedure: INJECTION, JOINT LEFT HIP;  Surgeon: Alex Callahan MD;  [facility];  Service: Pain Management;  Laterality: Left;  [phone]  2 WK F/U EMILY    INJECTION OF JOINT Bilateral 7/14/2022    Procedure: INJECTION, JOINT, SI BILATERAL  needs consent;  Surgeon: Alex Callahan MD;  [place] [facility] [place] [place] [facility]GT;  Service: Pain Management;  Laterality: Bilateral;    INJECTION, SACROILIAC JOINT Bilateral 11/4/2022    Procedure: INJECTION,SACROILIAC JOINT BILATERAL CONTRAST;  Surgeon: Alex Callahan MD;  Location: [facility] [place];  Service: Pain Management;  Laterality: Bilateral;    INJECTION, SACROILIAC JOINT Bilateral 7/27/2023    Procedure: INJECTION,SACROILIAC JOINT, BILATERAL SOONER DATE;  Surgeon: Alex Callahan MD;  Location: [facility]  MGT;  Service: Pain Management;  Laterality: Bilateral;    INJECTION, SACROILIAC JOINT Bilateral 5/10/2024    Procedure: INJECTION,SACROILIAC JOINT BILATERAL;  Surgeon: Alex Callahan MD;  Location: [facility] PAIN MGT;  Service: Pain Management;  Laterality: Bilateral;  [phone]  2 WK F/U NICK    RADIOFREQUENCY ABLATION  10/2016    cervical spine/Montoya    RADIOFREQUENCY ABLATION Left 5/3/2019    Procedure: RADIOFREQUENCY ABLATION, L2-L5 MEDIALBRANCH;  Surgeon: Kasandra Montoya MD;  Location: [facility] PAIN MGT;  Service: Pain Management;  Laterality: Left;    RADIOFREQUENCY ABLATION Left 6/13/2019    Procedure: RADIOFREQUENCY ABLATION C4-7;  Surgeon: Kasandra Montoya MD;  Location: [facility] PAIN MGT;  Service: Pain Management;  Laterality: Left;    RADIOFREQUENCY ABLATION Left 3/9/2021    Procedure: RADIOFREQUENCY ABLATION C4,C5,C6,C7;  Surgeon: Alex Callahan MD;  Location: [facility] PAIN MGT;  Service: Pain Management;  Laterality: Left;  1 of 2    RADIOFREQUENCY ABLATION Right 3/26/2021    Procedure: RADIOFREQUENCY ABLATION C4,C6,C6,C7;  Surgeon: Alex Callahan MD;  Location: [facility] PAIN MGT;  Service: Pain Management;  Laterality: Right;  2 of 2    RADIOFREQUENCY ABLATION Right 4/23/2021    Procedure: RADIOFREQUENCY ABLATION L3,4,5;  Surgeon: Alex Callahan MD;  Location: [facility] PAIN MGT;  Service: Pain Management;  Laterality: Right;  1 of 2    RADIOFREQUENCY ABLATION Left 5/7/2021    Procedure: RADIOFREQUENCY ABLATION L3,4,5;  Surgeon: Alex Callahan MD;  Location: [facility] PAIN MGT;  Service: Pain Management;  Laterality: Left;  2 of 2    RADIOFREQUENCY ABLATION Left 10/1/2021    Procedure: RADIOFREQUENCY ABLATION LEFT, C4,5,6,7 1 of 2 CONSENT NEEDED;  Surgeon: Alex Callahan MD;  Location: [facility] PAIN MGT;  Service: Pain Management;  Laterality: Left;    RADIOFREQUENCY ABLATION Right 10/15/2021    Procedure: RADIOFREQUENCY ABLATION  RIGHT C4,5,6,7 2 of 2 CONSENT NEEDED;  Surgeon: Alex Callahan MD;  Location: [facility] PAIN  MGT;  Service: Pain Management;  Laterality: Right;    RADIOFREQUENCY ABLATION Left 2/25/2022    Procedure: RADIOFREQUENCY ABLATION LEFT L3,4,5 1 of 2, needs consent;  Surgeon: Alex Callahan MD;  Location: [facility] PAIN MGT;  Service: Pain Management;  Laterality: Left;    RADIOFREQUENCY ABLATION Right 3/11/2022    Procedure: RADIOFREQUENCY ABLATION RIGHT L3,4,5 2 of 2, needs consent;  Surgeon: Alex Callahan MD;  Location: [facility] PAIN MGT;  Service: Pain Management;  Laterality: Right;    RADIOFREQUENCY ABLATION Left 12/15/2022    Procedure: RADIOFREQUENCY ABLATION LEFT C4, C5, C6, C7  ONE OF TWO NEEDS CONSENT;  Surgeon: Alex Callahan MD;  Location: [facility] PAIN MGT;  Service: Pain Management;  Laterality: Left;    RADIOFREQUENCY ABLATION Right 12/29/2022    Procedure: RADIOFREQUENCY ABLATION RIGHT C4, C5, C6, C7  TWO OF TWO NEEDS CONSENT;  Surgeon: Alex Callahan MD;  Location: [facility] PAIN MGT;  Service: Pain Management;  Laterality: Right;    RADIOFREQUENCY ABLATION Left 2/24/2023    Procedure: RADIOFREQUENCY ABLATION LEFT L3,L4,L5;  Surgeon: Alex Callahan MD;  Location: [facility] PAIN MGT;  Service: Pain Management;  Laterality: Left;    RADIOFREQUENCY ABLATION Right 3/10/2023    Procedure: RADIOFREQUENCY ABLATION RIGHT L3,L4,L5;  Surgeon: Alex Callahan MD;  Location: [facility] PAIN MGT;  Service: Pain Management;  Laterality: Right;    RADIOFREQUENCY ABLATION Left 9/15/2023    Procedure: RADIOFREQUENCY ABLATION, LEFT C4-C5-C6-C7 MEDIAL BRANCH ONE OF TWO  SOONER DATE;  Surgeon: Alex Callahan MD;  Location: [facility] PAIN MGT;  Service: Pain Management;  Laterality: Left;    RADIOFREQUENCY ABLATION Right 9/29/2023    Procedure: RADIOFREQUENCY ABLATION,  RIGHT C4-C5-C6-C7 MEDIAL BRANCH TWO OF TWO SOONER DATE;  Surgeon: Alex Callahan MD;  Location: [facility] PAIN MGT;  Service: Pain Management;  Laterality: Right;    RADIOFREQUENCY ABLATION Bilateral 7/18/2024    Procedure: RADIOFREQUENCY ABLATION BILATERAL L3, 4, 5;   Surgeon: Alex Callahan MD;  Location: [facility] PAIN MGT;  Service: Pain Management;  Laterality: Bilateral;  [phone]  3 WK F/U EMILY    RADIOFREQUENCY ABLATION Bilateral 9/6/2024    Procedure: RADIOFREQUENCY ABLATION BILATERAL C4, 5, 6, 7;  Surgeon: Alex Callahan MD;  Location: [facility] PAIN MGT;  Service: Pain Management;  Laterality: Bilateral;  [phone]  3 WK F/U NICK    RADIOFREQUENCY ABLATION Bilateral 5/29/2025    Procedure: RADIOFREQUENCY ABLATION BILATERAL L3, 4, 5;  Surgeon: Alex Callahan MD;  Location: [facility] PAIN MGT;  Service: Pain Management;  Laterality: Bilateral;  4 WK F/U NICK    RETINAL DETACHMENT SURGERY      ROTATOR CUFF REPAIR Right     SPINE SURGERY      cerival fusion     TRANSFORAMINAL EPIDURAL INJECTION OF STEROID Right 4/14/2022    Procedure: INJECTION, STEROID, EPIDURAL, TRANSFORAMINAL APPROACH RIGHT L4/5 & L5/S1 Needs Consent;  Surgeon: Alex Callahan MD;  Location: [facility] PAIN MGT;  Service: Pain Management;  Laterality: Right;    TRIGGER POINT INJECTION Left 6/13/2019    Procedure: INJECTION, TRIGGER POINT;  Surgeon: Kasandra Montoya MD;  Location: [facility] PAIN MGT;  Service: Pain Management;  Laterality: Left;       Family History:  Family History   Problem Relation Name Age of Onset    Heart disease Mother      Cancer Father      Leukemia Brother      Colon cancer Neg Hx      Celiac disease Neg Hx      Crohn's disease Neg Hx      Esophageal cancer Neg Hx      Inflammatory bowel disease Neg Hx      Irritable bowel syndrome Neg Hx      Liver cancer Neg Hx      Rectal cancer Neg Hx      Stomach cancer Neg Hx      Ulcerative colitis Neg Hx         Social History:  Social History     Socioeconomic History    Marital status:    Tobacco Use    Smoking status: Never    Smokeless tobacco: Never   Substance and Sexual Activity    Alcohol use: No    Drug use: No       OBJECTIVE:    /78 (BP Location: Right arm, Patient Position: Sitting)   Pulse 68   Temp 98 °F (36.7 °C)    Resp 18   Wt 117 kg (257 lb 15 oz)   SpO2 100%   BMI 34.03 kg/m²     PHYSICAL EXAMINATION:    General appearance: Well appearing, in no acute distress, alert and oriented x3.  Psych:  Mood and affect appropriate.  Skin: Skin color, texture, turgor normal, no rashes in both upper and lower body. Subcutaneous lesion to left anterior mid thigh. No redness, edema, drainage.   Head/face:  Atraumatic, normocephalic. No palpable lymph nodes   Cor: Rate regular  Pulm: Symmetric chest rise, no respiratory distress noted.   Abdomen: Non-distended.  : subcutaneous, soft palpable mass to left groin.   Back: Straight leg raising in the sitting position is negative for radicular leg pain bilaterally. There is pain with palpation over lumbar paraspinals and facet joints bilaterally. Limited ROM with pain on extension. Positive facet loading bilaterally. Mild pain to palpation over bilateral SI joints. Positive Marija finger. Positive Gaenslen's and + KHRIS on left, Negative FADIR bilaterally.   Extremities: No deformities, edema, or skin discoloration. Good capillary refill.  Musculoskeletal: Left hip provacative maneuvers negative.  Bilateral upper and lower extremity strength is normal and symmetric. No atrophy or tone abnormalities are noted.  Neuro:  No loss of sensation is noted.  Gait: Antalgic, ambulates with cane for assistance       ASSESSMENT: 60 y.o. year old male with neck and lumbar pain, consistent with      1. Chronic pain disorder        2. Sacroiliac joint pain  Procedure Order to Pain Management      3. Lumbar spondylosis        4. Chronic left hip pain  MRI Hip Without Contrast Left      5. Degeneration of intervertebral disc of lumbar region with discogenic back pain        6. Dorsalgia, unspecified  MRI Lumbar Spine Without Contrast          PLAN:     - Previous imaging was reviewed and discussed with the patient today. Labs reviewed.     - S/p bilateral L3,4,5 RFA with 40-50% relief.     - Schedule for  left SI joint injection.     - Obtain updated lumbar MRI and left hip.     - We can repeat cervical RFA as needed.     - Continue Gabapentin.     - Continue Robaxin 500 mg PRN muscle pain.     - Pain contract signed 2/12/2021.     - Continue Norco 7.5/325 mg BID PRN. Refill provided.       - The patient is here today for a refill of current pain medications and they believe these provide effective pain control and improvements in quality of life.  The patient notes no serious side effects, and feels the benefits outweigh the risks.  The patient was reminded of the pain contract that they signed previously as well as the risks and benefits of the medication including possible death.  The updated [place] [employer] prescription monitoring program was reviewed, and the patient has been found to be compliant with current treatment plan. Medication management provided by Dr. Callahan.     - UDS from 5/28/2024 reviewed, negative for medications, he does take sparingly.     - Continue home exercise routine.     - RTC 2 weeks after above procedure.     The above plan and management options were discussed at length with patient. Patient is in agreement with the above and verbalized understanding.    Angelique Barahona, WILLIAM  06/26/2025

## 2025-06-26 NOTE — H&P (VIEW-ONLY)
Interventional Pain Management - Established Visit  Follow-Up     Interval History 6/26/2025:   The patient returns to clinic today for follow up of back and hip pain. He is s/p bilateral L3,L4,L5 RFA on 5/29/25 with 40%-50% relief of lower back pain. HIs pain is worse today after the long car ride. This is to the left buttock, lower than injection sites.  He does have some aching pain into the hip and thigh. His pain is worse with prolonged sitting and walking. He continues to take Gabapentin and Robaxin. He is taking Norco as needed for severe pain, usually 2-3 times a week. Today his pain is a 5/10.     Interval History 5/9/2025:  The patient returns to clinic today for follow up of back and hip pain. He reports increased low back pain over the last month. He reports low back pain, worse in the morning. This is also worse with prolonged standing. He denies any radicular leg pain. He does have left hip and thigh pain. He did see PCP regarding lump. He was given a round of steroids in consideration of lymph node. He did not notice any difference with this. He continues to take Gabapentin and Robaxin. He also takes Norco as needed for severe pain. He denies any adverse effects. He denies any other health changes. His pain today is 5/10.    Interval History 11/8/2024:  Sal Navarro returns to clinic for follow-up after left hip joint injection on 10/14/2024. He reports 80% pain relief of hip pain. He has noticed left groin pain and a palpable mass under the skin since the injection. He has also noticed a painful lesion under his skin on his left thigh. He has not gone to his PCP for evaluation. He continues Gabapentin and Robaxin with some benefit. He also takes Norco as needed without perceived side effects. He denies recent health changes. He denies recent falls or trauma. He denies new onset fever/night sweats, urinary incontinence, bowel incontinence, significant weight changes, significant motor weakness  or changes, or loss of sensations. His pain today is 7/10.      Interval History 10/4/2024:  The patient returns to clinic today for follow up of neck and back pain. He is s/p bilateral C4,5,6 RFA on 9/6/2024. He reports 50% relief of his neck pain. He reports intermittent neck pain, worse with prolonged activity. He reports increased left sided low back pain. He describes this as tense and hard in nature. He continues to report left hip pain. This is worse with standing and walking. He is taking Gabapentin and Robaxin. He is taking Norco as needed with benefit. He takes this sparingly. He denies any adverse effects. He denies any other health changes. His pain today is 7/10.    Interval History 8/16/2024:  The patient returns to clinic today for follow up of neck and back pain. He is s/p bilateral L3,4,5 RFA on 7/18/2024. He reports 50-60% relief. His back pain is tolerable at this time. He continues to report left hip pain. He has had a few falls onto the left side. He reports increased neck pain. He denies any radicular arm pain. He does endorse morning stiffness. His pain is worse with prolonged activity. He is taking Gabapentin and Robaxin. He takes Norco sparingly for severe pain. He denies any other health changes. His pain today is 6/10.    Interval History 5/28/2024:  The patient returns to clinic today for follow up of neck and back pain. He is s/p bilateral SI joint injections on 5/10/2024. He reports 50% relief. He reports right sided low back pain, higher than injection sites. He also report left sided low back pain. This pain is worse with prolonged activity. He does endorse stiffness. He denies any radicular leg pain. He reports intermittent neck pain. He is taking Gabapentin and Robaxin. He takes Norco sparingly for severe pain. His last dose was yesterday. He denies any other health changes. His pain today is 6/10.    Interval History 4/22/2024:  The patient returns to clinic today for follow up of  neck and back pain. He reports increased low back pain. He reports low back and buttock pain. He denies any radicular leg pain. His pain is worse with prolonged sitting. He also reports increased pain with moving from sitting to standing. He reports intermittent neck pain. He is having increased left elbow pain. His wife notes that he does drop objects from the left hand. He is taking Gabapentin and Robaxin. He is taking Norco as needed for severe pain. He denies any adverse effects. He denies any other health changes. His pain today is 7/10.    Interval History 10/11/2023:  The patient returns to clinic today for follow up of neck and back pain. He is s/p left C4,5,6,7 RFA on 9/15/2023. He is s/p right C4,5,6,7 RFA on 9/29/2023. He reports 50% relief of his pain. He does report intermittent neck pain. He also reports left sided anterior neck and upper chest pain. This is tight in nature. He reports increased low back pain. He denies any radicular leg pain. This pain is worse with prolonged activity. He does have intermittent left abdominal pain. He feels a knot in the area. This pain is relieved with sitting or laying down. He did have a recent fall in his garden. He continues to take Gabapentin and Robaxin. He also takes Norco as needed for severe pain. He takes this sparingly without adverse effects. He denies any other health changes. His pain today is 4/10.    Interval History 8/11/2023:  The patient returns to clinic today for follow up of back pain. He is s/p bilateral SI joint injections on 7/27/2023. He reports 50-60% relief of his buttock pain. He reports worsened neck pain over the last month. He does report some pain into the left shoulder. He denies any radicular arm pain. His pain is worse with turning his head to the side. He does endorse stiffness. He continues to report intermittent left abdominal pain. This pain is worse with prolonged activity. He also notes 2 lumps in the area, especially at the  end of the day. He is taking Gabapentin and Robaxin. He also takes Norco as needed for severe pain sparingly. He denies any other health changes. His pain today is 6/10.    Interval History 7/5/2023:  The patient returns to clinic today for follow up of back pain. He reports increased low back and buttock pain. He denies any radicular leg pain. His pain is worse with prolonged sitting, moving from sitting to standing, and prolonged activity. He continues to report intermittent left abdominal pain. He also reports increased neck pain. He is taking Gabapentin and Robaxin. He also takes Norco as needed for severe pain. He denies any adverse effects. His pain today is 6/10.    Interval History 3/21/2023:  The patient returns to clinic today for follow up of back pain. He is s/p left L3,4,5 RFA on 2/24/2023 and right L3,4,5 RFA on 3/10/2023. He is unsure of relief at this time. He reports increased left sided low back pain over the last two weeks. 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 He did feel a pop. He thought he pulled a muscle, but pain has been worsening. He reports left sided low back pain that radiates into the left hip and lower abdomen. He also reports intermittent radiating pain into the posterior left leg. He describes this pain as numb and tingling in nature. His pain is worse with activity. He does endorse muscle spasms. He is taking Gabapentin and Robaxin. He has had to take more Norco recently due to increased pain. He denies any weakness. He denies any other health changes. His pain today is 8/10.    Interval History 1/31/2023:  The patient returns to clinic today for follow up of neck and back pain. He is s/p left C4,5,6,7 on 12/15/2022 and right C4,5,6,7 RFA on 12/29/2022. He reports 50% relief of his neck pain. He reports intermittent neck pain, left side greater than right. He denies any radicular arm pain. He does report pain into the shoulder blades. He reports  increased low back pain. He denies any radicular leg pain. His pain is worse with standing, walking, and activity. He does endorse stiffness. He is taking Gabapentin and Robaxin. He also takes Norco sparingly with benefit. He denies any other health changes. His pain today is 6/10.    Interval History 11/18/2022:  The patient returns to clinic today for follow up of neck and back pain. He is s/p bilateral SI joint injections on 11/4/2022. He reports 60% relief of his low back and buttock pain. He continues to report low back pain. He denies any radicular leg pain. He reports increased neck pain, left side greater than right. He denies any radicular arm. He does report some pain into his shoulder blades. His pain is worse with activity. He continues to take Gabapentin and Robaxin. He continues to take Norco as needed sparingly with benefit. He denies any other health changes. His pain today is 7/10.    Interval History 10/27/2022:  The patient returns to clinic today for follow up of neck and back pain. He reports increased low back and bilateral buttock pain. He denies any radicular leg pain. His pain is worse with prolonged sitting and moving from sitting to standing. He does endorse a fall recently while getting out of the vehicle onto his side. He continues to report neck pain. He continues to take Gabapentin and Robaxin. He continues to take Norco sparingly with benefit and without adverse effects. He denies any other health changes. His pain today is 6/10.    Interval History 8/2/2022:  The aptient returns to clinic today for follow up of neck and back pain. He is s/p bilateral SI joint injection on 7/14/2022. He reports 50-60% relief of his low back and buttock pain. He reports intermittent low back and buttock pain. He reports increased neck pain, left side greater than right. He denies any radicular arm pain. He has good days and bad days. He continues to take Gabapentin. He continues to take Norco sparingly  with benefit and without adverse effects. He denies any other health changes. His pain today is 5/10.    Interval History 7/1/2022:  The patient returns to clinic today for follow up of neck and back pain. He reports increased low back and buttock pain.His pain is worse with prolonged sitting and moving from sitting to standing. He also reports increased pain with bending over. He does report intermittent radiating pain into his right posterior thigh stopping at mid thigh. He does report a fall, which was sitting hard on the ground about a week ago. He denies any acute injury. He continues to take Gabapentin and Norco. He denies any other health changes. His pain today is 6/10.     Interval History 5/9/2022:  The patient returns to clinic today for follow up of back pain. He is s/p right L4/5 and L5/S1 TF NGUYEN on 4/14/2022. He reports 50-60% relief of his low back and leg pain. He has been more active since the procedure. He continues to report low back and hip pain, worse with bending and activity. He denies any radicular leg pain. He continues to repot intermittent neck pain. He continues to take Gabapentin with benefit. He continues to take Norco sparingly as needed. He denies any other health changes. His pain today is 5/10.    Interval History 4/1/2022:  The patient returns to clinic today for follow up of back pain. He is s/p left L3,4,5 RFA on 2/25/2022 and right L3,4,5 RFA on 3/11/2022. He reports relief of his back pain. He reports increased back pain that radiates into the posterior aspect of his right leg to his knee. He denies any left leg pain. His pain is worse with prolonged walking. He reports that he sometimes drags his right leg. He denies any falls. He denies any bowel or bladder incontinence. He continues to take Gabapentin and Norco as needed with benefit. He denies any adverse effects. He denies any other health changes. His pain today is 7/10.    Interval History 2/15/2022:  The patient returns  "to clinic today for follow up of neck and back pain. He reports increased low back pain over the last two weeks. He describes this pain as sharp and aching in nature. He does report intermittent "catching" pain in his lower back, worse with getting out of bed. He denies any radicular leg pain. He continues to report benefit from previous cervical procedures. He reports intermittent neck pain. He continues to take Gabapentin with benefit. He continues to take Norco as needed with benefit and without adverse effects. He denies any other health changes. His pain today is 6/10.    Interval History 11/5/2021:  The patient returns to clinic today for follow up of neck and back pain. He is s/p left C4,5,6,7 RFA on 10/1/2021 and right C4,5,6,7 RFA on 10/15/2021. He reports 50% relief of his neck pain. He reports increased burning and itching pain to his skin near injection sites. He denies any radicular arm pain. He does report increased left sided muscle pain. He is concerned that one of the screws in his cervical hardware is moving. He continues to report benefit with previous lumbar procedures. He reports intermittent low back pain without radicular leg pain. He continues to take Gabapentin with benefit. He continues to take Norco as needed with benefit and without adverse effects. He denies any weakness. He denies any other health changes. His pain today is 5/10.    Interval History 9/17/2021:  The patient returns to clinic today for follow up of neck and back pain. He reports increased neck pain. He denies any radicular arm pain today. He does report intermittent radiating pain into his left shoulder blade and mid back. His pain is worse with activity. He reports that sometimes his pain takes his breath away. He continues to report low back pain, that is tolerable at this time. He continues to take Gabapentin and Norco with benefit and without adverse effects. He denies any other health changes. His pain today is " 6/10.    Interval History 6/17/2021:  The patient returns to clinic today for follow up of neck and back pain. He is s/p right L3,4,5 RFA on 4/23/2021 and left L3,4,5 RFA on 5/7/2021. He reports 50% relief of his low back pain. He continues to report intermittent low back pain. He denies any radicular leg pain. He continues to report neck pain, especially in between the scapula. His pain is worse with prolonged standing, walking, and activity. He continues to take Gabapentin with benefit. He continues to take Norco sparingly for severe pain with benefit and without adverse effects. He denies any other health changes. His pain today is 4/10.    Interval History 4/9/2021:  The patient returns to clinic today for follow up of neck and back pain. He is s/p left C4,5,6,7 RFA on 3/9/2021 and right C4,5,6,7 RFA on 3/26/2021. He reports 50% relief of his neck pain. He reports intermittent neck pain. He has good days and bad days. He continues to report low back pain that is constant, sharp, and aching. He reports intermittent radiating pain into the lateral aspect of his left leg to his knee. He continues to take Gabapentin with benefit. He continues to take Norco as needed sparingly for severe pain. He denies any adverse effects. He denies any other health changes. His pain today is 5/10.    Interval History 3/2/2021:  Sal Navarro presents to the clinic for a follow-up appointment for neck pain . Since the last visit, Sal Navarro states the pain has been worsening. Current pain intensity is 6/10.  Was seen by Angelique Barahona NP on 2/12/2021.     Interval History 2/12/2021:  The patient returns to clinic today for follow up of back pain. He has not been seen in over a year. He did not have imaging due to coronavirus outbreak. He would to reschedule this. He was also considering SCS trial prior to the pandemic. He continues to report low back pain that radiates into the lateral aspect of his left leg to his knee.  "He denies any radicular right leg pain. His pain is worse with bending and walking. He continues to take Gabapentin with benefit. He also takes Norco as needed for severe pain. He denies any adverse effects. He denies any other health changes. His pain today is 6/10.    HPI:  Sal Navarro is a 56 y.o. male who presents today s/p C4-7 ACDF with C5/6 PSIF in 2/2016 with residual chronic midline neck pain that radiates into his shoulder blades bilaterally, R>>L.  This is associated with bilateral hand numbness and tingling.  The hand symptoms did improve following the surgery.  The shooting pains in his arms resolved completely.   This pain is described in detail below.  His post-operative course was complicated by dysphagia that is being treated with speech therapy.  The numbness and the tingling were relieved by the surgery. Now experiences "deep pain along the spine"  Patient has not been seen for over a year, had to re-schedule his imaging studies due to pandemic(now completed). Prior to the pandemic, he was considering a SCS.   Only time he gets relief is laying in bed on a very thin pillow, but that doesn't last very long.   Pain aggravated by movement and even breathing/playing with his grandchild/holding the grandchild's hand. Heat and massage (has a vest) are helpful.   Was seen by Dr. Jan srivastava and received multiple EIS and RFAs.   Compliant with his medication.     Pain Disability Index Review:      6/26/2025     1:31 PM 5/9/2025     2:06 PM 11/8/2024    11:41 AM   Last 3 PDI Scores   Pain Disability Index (PDI) [phone]       Pain Medications:  Gabapentin  Norco sparingly  Robaxin    Opioid Contract: yes     report:  Reviewed and consistent with medication use as prescribed.    Pain Procedures:   5/29/2025: Bilateral RFA L3, L4, L5 - 40-50% relief   10/14/2024 - Left Hip joint injection - 80% relief  9/6/2024- Bilateral C4,5,6, 7 RFA   7/18/2024- Bilateral L3,4,5 RFA  5/10/2024- Bilateral SI " joint injections.   7/27/2023- Bilateral SI joint injections  3/10/2023- Right L3,4,5 RFA- 50% relief  2/24/2023- Left L3,4,5 RFA- relief  12/29/2022- Right C4,5,6,7 RFA  12/15/2022- Left C4,5,6,7 RFA  11/4/2022- Bilateral SI joint injections- 60% relief   4/14/2022- Right L4/5 and L5/S1 TF NGUYEN  3/11/2022- Right L3,4,5 RFA  2/25/2022- Left L3,4,5 RFA  10/15/2021- Right C4,5,6,7 RFA  10/1/2021- Left C4,5,6,7 RFA  5/7/2021- Left L3,4,5 RFA   4/23/2021- Right L3,4,5 RFA  3/26/2021- Right C4,5,6,7 RFA  3/9/2021- Left C4,5,6,7 RFA  10/11/19: L4/5 Interlaminar Epidural Steroid Injection  06/13/19- Left C4-7 RFA  05/03/19:Left L2-5 Lumbar Medial Branch Nerve Thermal Radiofrequency Ablation  12/21/18:Right L2-5 Lumbar Medial Branch Nerve Thermal Radiofrequency Ablation  11/23/18:Cervical Thermal Radiofreqiency Ablation: Right C4-7  09/14/18:C7/[id number] Steroid Injection  06/29/18:LL2/3 Interlaminar Epidural Steroid Injection   03/06/18:L2/3 Interlaminar Epidural Steroid Injection   09/26/17:Bilateral L2-5 Lumbar Medial Branch Nerve Coolief Thermal Radiofrequency Ablation  08/31/17:Bilateral L2-5 Lumbar medial branch blocks   03/28/17:Cervical Conventional Radiofreqiency Ablation: Left C4-7  03/14/17:Cervical Conventional Radiofreqiency Ablation: Right C4-7  10/25/16:Cervical Conventional Radiofreqiency Ablation: Left C4-7  10/11/16: Cervical Conventional Radiofreqiency Ablation: Right C4-7  10/04/16- Cervical Medial Branch Blocks, Bilateral C4-7.    08/31/16:C7/[id number] Steroid Injection    Physical Therapy/Home Exercise: does home exercises now, but not in formal PT.    - 2019 was last time in PT     Imaging:   MRI Lumbar Spine 3/30/2023:  COMPARISON:  02/25/2021     FINDINGS:  The marrow demonstrates homogeneous signal.  Vertebral body heights are maintained.  Disc spaces are maintained.  Conus terminates appropriately at L1.     Multilevel degenerative change as diesel below:     L1-2: No focal disc abnormality.  No  significant canal or neural foraminal narrowing.     L2-3: Facet and ligamentum flavum hypertrophic changes along with posterior epidural fat contribute to mild canal narrowing.  Mild neural foraminal narrowing bilaterally.     L3-4: Small posterior circumferential disc bulge.  Facet and ligamentum flavum hypertrophic changes along with posterior epidural fat contribute to mild canal narrowing.  Moderate bilateral neural foraminal narrowing.     L4-5: Posterior circumferential disc bulge.  Annular tear along the left aspect of the disc.  Mild canal narrowing.  Moderate left neural foraminal narrowing.  Mild right neural foraminal narrowing.     L5-S1: Posterior circumferential disc bulge with central annular tear.  Facet and ligamentum flavum hypertrophic changes.  Mild right neural foraminal narrowing.     Impression:     Multilevel degenerative change predominantly on the basis of facet arthropathy.  Resultant mild-to-moderate neural foraminal narrowing.  Multilevel mild canal narrowing.  Findings appear similar compared to prior.    XR HIP WITH PELVIS WHEN PERFORMED 2 OR 3 VIEWS LEFT     CLINICAL HISTORY:  Pain in left hip     FINDINGS:  Two views left hip:     There is DJD.  No fracture, dislocation, or bone destruction seen.        Electronically signed by:Remington Anderson MD  Date:                                            08/16/2024  Time:                                           14:58      MRI Lumbar Spine 2/25/2021:  COMPARISON:  07/11/2017     FINDINGS:  The vertebral bodies maintain normal height, signal intensity and alignment.  There is redemonstration of few hemangiomas at multiple levels.  The intervertebral disc spaces are preserved although there is some disc desiccation at multiple levels.  The conus terminates at level L1.  Evaluation of the localizer images and structures surrounding the lumbar spine shows no abnormalities.     L1-L2: No significant disc or joint pathology.     L2-L3: Mild  diffuse disc bulge with mild bilateral facet arthropathy and ligamentum flavum hypertrophy results in mild central canal stenosis.  Mild bilateral neural foraminal stenosis is also identified.     L3-L4: There is a diffuse disc bulge with no significant facet arthropathy or ligamentum flavum hypertrophy.  There is mild central canal stenosis and mild bilateral neural foraminal stenosis.     L4-L5: There is a diffuse disc bulge with ligamentum flavum hypertrophy.  No significant facet arthropathy.  There is mild central canal stenosis with only minimal encroachment on the left neural foramen.  Mild right neural foraminal stenosis is present.     L5-S1: There is a diffuse disc bulge with mild to moderate right facet arthropathy.  No left facet arthropathy.  The ligamentum flavum is normal.  The central canal is patent and the neural foramina are largely patent.  Incidental note is made of an annulus fibrosus tear posteriorly measuring approximately 2 mm.     Impression:     Multilevel degenerative disc and joint disease which is mild and results in mild central canal and neural foraminal stenosis as outlined above.    Xray Cervical Spine 2/25/2021:  COMPARISON:  07/07/2017 .     FINDINGS:  The patient has undergone ACDF from C4 through C7. The instrumentation is intact without evidence of complication.  The vertebral bodies maintain normal height and alignment. The remaining intervertebral disc spaces are preserved.  No significant uncovertebral joint hypertrophy.  The prevertebral soft tissues, odontoid views and lung apices are normal. The neural foramen are patent     Impression:     Postsurgical changes to the thoracic spine without additional evidence for degenerative disc or joint disease.     Allergies:   Review of patient's allergies indicates:   Allergen Reactions    Pcn [penicillins] Hives and Rash    Lipitor [atorvastatin] Other (See Comments)     Muscle cramps, weakness       Current Medications:   Current  Outpatient Medications   Medication Sig Dispense Refill    acetaminophen (TYLENOL) 500 MG tablet Take 500 mg by mouth every 6 (six) hours as needed for Pain.      albuterol (PROVENTIL/VENTOLIN HFA) 90 mcg/actuation inhaler       aspirin (ECOTRIN) 81 MG EC tablet Take 81 mg by mouth once daily.      cetirizine (ZYRTEC) 10 MG tablet Take 10 mg by mouth nightly.  5    FOLIC ACID/MULTIVIT-MIN/LUTEIN (CENTRUM SILVER ORAL) Take by mouth once daily.      HYDROcodone-acetaminophen (NORCO) 7.5-325 mg per tablet Take 1 tablet by mouth every 12 (twelve) hours as needed for Pain. 60 tablet 0    levothyroxine (SYNTHROID) 50 MCG tablet Take 50 mcg by mouth once daily.      meclizine (ANTIVERT) 25 mg tablet Take 25 mg by mouth once daily.       meloxicam (MOBIC) 15 MG tablet Take 15 mg by mouth once daily.      metFORMIN (GLUCOPHAGE) 500 MG tablet Take 1 tablet by mouth 2 (two) times a day.      methocarbamoL (ROBAXIN) 500 MG Tab Take 1 tablet (500 mg total) by mouth 4 (four) times daily as needed (muscle spasm). 120 tablet 0    omeprazole (PRILOSEC) 20 MG capsule Take 1 capsule (20 mg total) by mouth once daily. 90 capsule 3    rosuvastatin (CRESTOR) 20 MG tablet Take 20 mg by mouth once daily.      valsartan (DIOVAN) 80 MG tablet Take 40 mg by mouth once daily.   1    gabapentin (NEURONTIN) 300 MG capsule Take 2 capsules (600 mg total) by mouth 2 (two) times daily. 120 capsule 5     No current facility-administered medications for this visit.       REVIEW OF SYSTEMS:    GENERAL:  No weight loss, malaise or fevers.  HEENT:  Negative for frequent or significant headaches.  NECK:  Negative for lumps, goiter  and significant neck swelling.   RESPIRATORY:  Negative for cough, wheezing or shortness of breath.  CARDIOVASCULAR:  Negative for chest pain, leg swelling or palpitations. HTN  GI:  Negative for abdominal discomfort, blood in stools or black stools or change in bowel habits.  MUSCULOSKELETAL:  See HPI.  SKIN:  Negative for  lesions, rash, and itching.  PSYCH:  Negative for sleep disturbance, mood disorder and recent psychosocial stressors.  HEMATOLOGY/LYMPHOLOGY:  Negative for prolonged bleeding, bruising easily or swollen nodes.  NEURO:   No history of headaches, syncope, paralysis, seizures or tremors.   ENDO: Thyroid disorder.   All other reviewed and negative other than HPI.    Past Medical History:  Past Medical History:   Diagnosis Date    Arthritis     Cataract     Cervical back pain with evidence of disc disease     Hiatal hernia     Hypertension     Thyroid disease        Past Surgical History:  Past Surgical History:   Procedure Laterality Date    ACROMIOCLAVICULAR JOINT CYST EXCISION Right     BACK SURGERY      cataracts Bilateral     CERVICAL FUSION      COLONOSCOPY N/A 4/27/2018    Procedure: COLONOSCOPY;  Surgeon: Giovani Medeiros MD;  Location: [facility] ENDO ([address] [place] [address]);  Service: Endoscopy;  Laterality: N/A;    EPIDURAL STEROID INJECTION N/A 10/11/2019    Procedure: INJECTION, STEROID, EPIDURAL;  Surgeon: Kasandra Montoya MD;  [facility];  Service: Pain Management;  Laterality: N/A;  Lumbar NGUYEN L4/5    NGUYEN      EYE SURGERY      INJECTION Left 10/14/2024    Procedure: INJECTION, JOINT LEFT HIP;  Surgeon: Alex Callahan MD;  [facility];  Service: Pain Management;  Laterality: Left;  [phone]  2 WK F/U EMILY    INJECTION OF JOINT Bilateral 7/14/2022    Procedure: INJECTION, JOINT, SI BILATERAL  needs consent;  Surgeon: Alex Callahan MD;  Location: [facility] PAIN MGT;  Service: Pain Management;  Laterality: Bilateral;    INJECTION, SACROILIAC JOINT Bilateral 11/4/2022    Procedure: INJECTION,SACROILIAC JOINT BILATERAL CONTRAST;  Surgeon: Alex Callahan MD;  Location: [facility] PAIN MGT;  Service: Pain Management;  Laterality: Bilateral;    INJECTION, SACROILIAC JOINT Bilateral 7/27/2023    Procedure: INJECTION,SACROILIAC JOINT, BILATERAL SOONER DATE;  Surgeon: Alex Callahan MD;  Location: [facility]  MGT;  Service: Pain Management;  Laterality: Bilateral;    INJECTION, SACROILIAC JOINT Bilateral 5/10/2024    Procedure: INJECTION,SACROILIAC JOINT BILATERAL;  Surgeon: Alex Callahan MD;  Location: [facility] PAIN MGT;  Service: Pain Management;  Laterality: Bilateral;  [phone]  2 WK F/U NICK    RADIOFREQUENCY ABLATION  10/2016    cervical spine/Montoya    RADIOFREQUENCY ABLATION Left 5/3/2019    Procedure: RADIOFREQUENCY ABLATION, L2-L5 MEDIALBRANCH;  Surgeon: Kasandra Montoya MD;  Location: [facility] PAIN MGT;  Service: Pain Management;  Laterality: Left;    RADIOFREQUENCY ABLATION Left 6/13/2019    Procedure: RADIOFREQUENCY ABLATION C4-7;  Surgeon: Kasandra Montoya MD;  Location: [facility] PAIN MGT;  Service: Pain Management;  Laterality: Left;    RADIOFREQUENCY ABLATION Left 3/9/2021    Procedure: RADIOFREQUENCY ABLATION C4,C5,C6,C7;  Surgeon: Alex Callahan MD;  Location: [facility] PAIN MGT;  Service: Pain Management;  Laterality: Left;  1 of 2    RADIOFREQUENCY ABLATION Right 3/26/2021    Procedure: RADIOFREQUENCY ABLATION C4,C6,C6,C7;  Surgeon: Alex Callahan MD;  Location: [facility] PAIN MGT;  Service: Pain Management;  Laterality: Right;  2 of 2    RADIOFREQUENCY ABLATION Right 4/23/2021    Procedure: RADIOFREQUENCY ABLATION L3,4,5;  Surgeon: Alex Callahan MD;  Location: [facility] PAIN MGT;  Service: Pain Management;  Laterality: Right;  1 of 2    RADIOFREQUENCY ABLATION Left 5/7/2021    Procedure: RADIOFREQUENCY ABLATION L3,4,5;  Surgeon: Alex Callahan MD;  Location: [facility] PAIN MGT;  Service: Pain Management;  Laterality: Left;  2 of 2    RADIOFREQUENCY ABLATION Left 10/1/2021    Procedure: RADIOFREQUENCY ABLATION LEFT, C4,5,6,7 1 of 2 CONSENT NEEDED;  Surgeon: Alex Callahan MD;  Location: [facility] PAIN MGT;  Service: Pain Management;  Laterality: Left;    RADIOFREQUENCY ABLATION Right 10/15/2021    Procedure: RADIOFREQUENCY ABLATION  RIGHT C4,5,6,7 2 of 2 CONSENT NEEDED;  Surgeon: Alex Callahan MD;  Location: [facility] PAIN  MGT;  Service: Pain Management;  Laterality: Right;    RADIOFREQUENCY ABLATION Left 2/25/2022    Procedure: RADIOFREQUENCY ABLATION LEFT L3,4,5 1 of 2, needs consent;  Surgeon: Alex Callahan MD;  Location: [facility] PAIN MGT;  Service: Pain Management;  Laterality: Left;    RADIOFREQUENCY ABLATION Right 3/11/2022    Procedure: RADIOFREQUENCY ABLATION RIGHT L3,4,5 2 of 2, needs consent;  Surgeon: Alex Callahan MD;  Location: [facility] PAIN MGT;  Service: Pain Management;  Laterality: Right;    RADIOFREQUENCY ABLATION Left 12/15/2022    Procedure: RADIOFREQUENCY ABLATION LEFT C4, C5, C6, C7  ONE OF TWO NEEDS CONSENT;  Surgeon: Alex Callahan MD;  Location: [facility] PAIN MGT;  Service: Pain Management;  Laterality: Left;    RADIOFREQUENCY ABLATION Right 12/29/2022    Procedure: RADIOFREQUENCY ABLATION RIGHT C4, C5, C6, C7  TWO OF TWO NEEDS CONSENT;  Surgeon: Alex Callahan MD;  Location: [facility] PAIN MGT;  Service: Pain Management;  Laterality: Right;    RADIOFREQUENCY ABLATION Left 2/24/2023    Procedure: RADIOFREQUENCY ABLATION LEFT L3,L4,L5;  Surgeon: Alex Callahan MD;  Location: [facility] PAIN MGT;  Service: Pain Management;  Laterality: Left;    RADIOFREQUENCY ABLATION Right 3/10/2023    Procedure: RADIOFREQUENCY ABLATION RIGHT L3,L4,L5;  Surgeon: Alex Callahan MD;  Location: [facility] PAIN MGT;  Service: Pain Management;  Laterality: Right;    RADIOFREQUENCY ABLATION Left 9/15/2023    Procedure: RADIOFREQUENCY ABLATION, LEFT C4-C5-C6-C7 MEDIAL BRANCH ONE OF TWO  SOONER DATE;  Surgeon: Alex Callahan MD;  Location: [facility] PAIN MGT;  Service: Pain Management;  Laterality: Left;    RADIOFREQUENCY ABLATION Right 9/29/2023    Procedure: RADIOFREQUENCY ABLATION,  RIGHT C4-C5-C6-C7 MEDIAL BRANCH TWO OF TWO SOONER DATE;  Surgeon: Alex Callahan MD;  Location: [facility] PAIN MGT;  Service: Pain Management;  Laterality: Right;    RADIOFREQUENCY ABLATION Bilateral 7/18/2024    Procedure: RADIOFREQUENCY ABLATION BILATERAL L3, 4, 5;   Surgeon: Alex Callahan MD;  Location: [facility] PAIN MGT;  Service: Pain Management;  Laterality: Bilateral;  [phone]  3 WK F/U EMILY    RADIOFREQUENCY ABLATION Bilateral 9/6/2024    Procedure: RADIOFREQUENCY ABLATION BILATERAL C4, 5, 6, 7;  Surgeon: Alex Callahan MD;  Location: [facility] PAIN MGT;  Service: Pain Management;  Laterality: Bilateral;  [phone]  3 WK F/U NICK    RADIOFREQUENCY ABLATION Bilateral 5/29/2025    Procedure: RADIOFREQUENCY ABLATION BILATERAL L3, 4, 5;  Surgeon: Alex Callahan MD;  Location: [facility] PAIN MGT;  Service: Pain Management;  Laterality: Bilateral;  4 WK F/U NICK    RETINAL DETACHMENT SURGERY      ROTATOR CUFF REPAIR Right     SPINE SURGERY      cerival fusion     TRANSFORAMINAL EPIDURAL INJECTION OF STEROID Right 4/14/2022    Procedure: INJECTION, STEROID, EPIDURAL, TRANSFORAMINAL APPROACH RIGHT L4/5 & L5/S1 Needs Consent;  Surgeon: Alex Callahan MD;  Location: [facility] PAIN MGT;  Service: Pain Management;  Laterality: Right;    TRIGGER POINT INJECTION Left 6/13/2019    Procedure: INJECTION, TRIGGER POINT;  Surgeon: Kasandra Montoya MD;  Location: [facility] PAIN MGT;  Service: Pain Management;  Laterality: Left;       Family History:  Family History   Problem Relation Name Age of Onset    Heart disease Mother      Cancer Father      Leukemia Brother      Colon cancer Neg Hx      Celiac disease Neg Hx      Crohn's disease Neg Hx      Esophageal cancer Neg Hx      Inflammatory bowel disease Neg Hx      Irritable bowel syndrome Neg Hx      Liver cancer Neg Hx      Rectal cancer Neg Hx      Stomach cancer Neg Hx      Ulcerative colitis Neg Hx         Social History:  Social History     Socioeconomic History    Marital status:    Tobacco Use    Smoking status: Never    Smokeless tobacco: Never   Substance and Sexual Activity    Alcohol use: No    Drug use: No       OBJECTIVE:    /78 (BP Location: Right arm, Patient Position: Sitting)   Pulse 68   Temp 98 °F (36.7 °C)    Resp 18   Wt 117 kg (257 lb 15 oz)   SpO2 100%   BMI 34.03 kg/m²     PHYSICAL EXAMINATION:    General appearance: Well appearing, in no acute distress, alert and oriented x3.  Psych:  Mood and affect appropriate.  Skin: Skin color, texture, turgor normal, no rashes in both upper and lower body. Subcutaneous lesion to left anterior mid thigh. No redness, edema, drainage.   Head/face:  Atraumatic, normocephalic. No palpable lymph nodes   Cor: Rate regular  Pulm: Symmetric chest rise, no respiratory distress noted.   Abdomen: Non-distended.  : subcutaneous, soft palpable mass to left groin.   Back: Straight leg raising in the sitting position is negative for radicular leg pain bilaterally. There is pain with palpation over lumbar paraspinals and facet joints bilaterally. Limited ROM with pain on extension. Positive facet loading bilaterally. Mild pain to palpation over bilateral SI joints. Positive Marija finger. Positive Gaenslen's and + KHRIS on left, Negative FADIR bilaterally.   Extremities: No deformities, edema, or skin discoloration. Good capillary refill.  Musculoskeletal: Left hip provacative maneuvers negative.  Bilateral upper and lower extremity strength is normal and symmetric. No atrophy or tone abnormalities are noted.  Neuro:  No loss of sensation is noted.  Gait: Antalgic, ambulates with cane for assistance       ASSESSMENT: 60 y.o. year old male with neck and lumbar pain, consistent with      1. Chronic pain disorder        2. Sacroiliac joint pain  Procedure Order to Pain Management      3. Lumbar spondylosis        4. Chronic left hip pain  MRI Hip Without Contrast Left      5. Degeneration of intervertebral disc of lumbar region with discogenic back pain        6. Dorsalgia, unspecified  MRI Lumbar Spine Without Contrast          PLAN:     - Previous imaging was reviewed and discussed with the patient today. Labs reviewed.     - S/p bilateral L3,4,5 RFA with 40-50% relief.     - Schedule for  left SI joint injection.     - Obtain updated lumbar MRI and left hip.     - We can repeat cervical RFA as needed.     - Continue Gabapentin.     - Continue Robaxin 500 mg PRN muscle pain.     - Pain contract signed 2/12/2021.     - Continue Norco 7.5/325 mg BID PRN. Refill provided.       - The patient is here today for a refill of current pain medications and they believe these provide effective pain control and improvements in quality of life.  The patient notes no serious side effects, and feels the benefits outweigh the risks.  The patient was reminded of the pain contract that they signed previously as well as the risks and benefits of the medication including possible death.  The updated [place] [employer] prescription monitoring program was reviewed, and the patient has been found to be compliant with current treatment plan. Medication management provided by Dr. Callahan.     - UDS from 5/28/2024 reviewed, negative for medications, he does take sparingly.     - Continue home exercise routine.     - RTC 2 weeks after above procedure.     The above plan and management options were discussed at length with patient. Patient is in agreement with the above and verbalized understanding.    Angelique Barahona, WILLIAM  06/26/2025

## 2025-07-18 ENCOUNTER — HOSPITAL ENCOUNTER (OUTPATIENT)
Facility: OTHER | Age: 60
Discharge: HOME | End: 2025-07-18
Attending: ANESTHESIOLOGY | Admitting: ANESTHESIOLOGY
Payer: MEDICARE

## 2025-07-18 ENCOUNTER — HOSPITAL ENCOUNTER (OUTPATIENT)
Dept: RADIOLOGY | Facility: OTHER | Age: 60
Discharge: HOME | End: 2025-07-18
Attending: NURSE PRACTITIONER | Admitting: ANESTHESIOLOGY
Payer: MEDICARE

## 2025-07-18 VITALS
DIASTOLIC BLOOD PRESSURE: 81 MMHG | RESPIRATION RATE: 18 BRPM | HEIGHT: 73 IN | HEART RATE: 52 BPM | SYSTOLIC BLOOD PRESSURE: 136 MMHG | WEIGHT: 250 LBS | OXYGEN SATURATION: 97 % | TEMPERATURE: 97 F | BODY MASS INDEX: 33.13 KG/M2

## 2025-07-18 DIAGNOSIS — G89.29 CHRONIC LEFT HIP PAIN: ICD-10-CM

## 2025-07-18 DIAGNOSIS — M25.552 CHRONIC LEFT HIP PAIN: ICD-10-CM

## 2025-07-18 DIAGNOSIS — M54.9 DORSALGIA, UNSPECIFIED: ICD-10-CM

## 2025-07-18 DIAGNOSIS — M46.1 SACROILIITIS: Primary | ICD-10-CM

## 2025-07-18 PROCEDURE — 72148 MRI LUMBAR SPINE W/O DYE: CPT | Mod: TC

## 2025-07-18 PROCEDURE — 72148 MRI LUMBAR SPINE W/O DYE: CPT | Mod: 26,,, | Performed by: RADIOLOGY

## 2025-07-18 PROCEDURE — 27096 INJECT SACROILIAC JOINT: CPT | Mod: LT,,, | Performed by: ANESTHESIOLOGY

## 2025-07-18 PROCEDURE — 73721 MRI JNT OF LWR EXTRE W/O DYE: CPT | Mod: TC,LT

## 2025-07-18 PROCEDURE — 73721 MRI JNT OF LWR EXTRE W/O DYE: CPT | Mod: 26,LT,, | Performed by: RADIOLOGY

## 2025-07-18 PROCEDURE — 25000003 PHARM REV CODE 250: Performed by: ANESTHESIOLOGY

## 2025-07-18 PROCEDURE — 63600175 PHARM REV CODE 636 W HCPCS: Performed by: ANESTHESIOLOGY

## 2025-07-18 PROCEDURE — 27096 INJECT SACROILIAC JOINT: CPT | Mod: LT | Performed by: ANESTHESIOLOGY

## 2025-07-18 PROCEDURE — 25500020 PHARM REV CODE 255: Performed by: ANESTHESIOLOGY

## 2025-07-18 RX ORDER — BUPIVACAINE HYDROCHLORIDE 2.5 MG/ML
INJECTION, SOLUTION EPIDURAL; INFILTRATION; INTRACAUDAL; PERINEURAL
Status: DISCONTINUED | OUTPATIENT
Start: 2025-07-18 | End: 2025-07-18 | Stop reason: HOSPADM

## 2025-07-18 RX ORDER — LIDOCAINE HYDROCHLORIDE 20 MG/ML
INJECTION, SOLUTION INFILTRATION; PERINEURAL
Status: DISCONTINUED | OUTPATIENT
Start: 2025-07-18 | End: 2025-07-18 | Stop reason: HOSPADM

## 2025-07-18 RX ORDER — TRIAMCINOLONE ACETONIDE 40 MG/ML
INJECTION, SUSPENSION INTRA-ARTICULAR; INTRAMUSCULAR
Status: DISCONTINUED | OUTPATIENT
Start: 2025-07-18 | End: 2025-07-18 | Stop reason: HOSPADM

## 2025-07-18 RX ORDER — ALPRAZOLAM 0.5 MG/1
1 TABLET, ORALLY DISINTEGRATING ORAL ONCE
Status: COMPLETED | OUTPATIENT
Start: 2025-07-18 | End: 2025-07-18

## 2025-07-18 RX ORDER — SODIUM CHLORIDE 9 MG/ML
INJECTION, SOLUTION INTRAVENOUS CONTINUOUS
Status: DISCONTINUED | OUTPATIENT
Start: 2025-07-18 | End: 2025-07-18 | Stop reason: HOSPADM

## 2025-07-18 RX ADMIN — ALPRAZOLAM 1 MG: 0.5 TABLET, ORALLY DISINTEGRATING ORAL at 09:07

## 2025-07-18 NOTE — DISCHARGE SUMMARY
Discharge Note  Short Stay      SUMMARY     Admit Date: 7/18/2025    Attending Physician: Maira Land MD      Discharge Physician: New Payne      Discharge Date: 7/18/2025 11:04 AM    Procedure(s) (LRB):  INJECTION,SACROILIAC JOINT LEFT *ESHRAGHI PT* (Left)    Final Diagnosis: Sacroiliac joint pain [M53.3]    Disposition: Home or self care    Patient Instructions:   Current Discharge Medication List        CONTINUE these medications which have NOT CHANGED    Details   acetaminophen (TYLENOL) 500 MG tablet Take 500 mg by mouth every 6 (six) hours as needed for Pain.      albuterol (PROVENTIL/VENTOLIN HFA) 90 mcg/actuation inhaler       aspirin (ECOTRIN) 81 MG EC tablet Take 81 mg by mouth once daily.      cetirizine (ZYRTEC) 10 MG tablet Take 10 mg by mouth nightly.  Refills: 5      FOLIC ACID/MULTIVIT-MIN/LUTEIN (CENTRUM SILVER ORAL) Take by mouth once daily.      gabapentin (NEURONTIN) 300 MG capsule Take 2 capsules (600 mg total) by mouth 2 (two) times daily.  Qty: 120 capsule, Refills: 5    Associated Diagnoses: Chronic pain disorder; Lumbar spondylosis; DDD (degenerative disc disease), lumbar; S/P cervical spinal fusion; Cervical radiculopathy; Myofascial pain      HYDROcodone-acetaminophen (NORCO) 7.5-325 mg per tablet Take 1 tablet by mouth every 12 (twelve) hours as needed for Pain.  Qty: 60 tablet, Refills: 0    Comments: Patient with chronic intractable pain.  Medically necessary for > 7 days  Associated Diagnoses: Chronic pain disorder; Lumbar spondylosis; DDD (degenerative disc disease), lumbar; Facet arthritis of lumbar region; Myalgia; Spondylosis of cervical region without myelopathy or radiculopathy; S/P cervical spinal fusion      levothyroxine (SYNTHROID) 50 MCG tablet Take 50 mcg by mouth once daily.      meclizine (ANTIVERT) 25 mg tablet Take 25 mg by mouth once daily.       meloxicam (MOBIC) 15 MG tablet Take 15 mg by mouth once daily.      metFORMIN (GLUCOPHAGE) 500 MG tablet Take 1  tablet by mouth 2 (two) times a day.      methocarbamoL (ROBAXIN) 500 MG Tab Take 1 tablet (500 mg total) by mouth 4 (four) times daily as needed (muscle spasm).  Qty: 120 tablet, Refills: 0    Associated Diagnoses: Myofascial pain      omeprazole (PRILOSEC) 20 MG capsule Take 1 capsule (20 mg total) by mouth once daily.  Qty: 90 capsule, Refills: 3    Associated Diagnoses: Gastroesophageal reflux disease, esophagitis presence not specified      rosuvastatin (CRESTOR) 20 MG tablet Take 20 mg by mouth once daily.      valsartan (DIOVAN) 80 MG tablet Take 40 mg by mouth once daily.   Refills: 1                 Discharge Diagnosis: Sacroiliac joint pain [M53.3]  Condition on Discharge: Stable with no complications to procedure   Diet on Discharge: Same as before.  Activity: as per instruction sheet.  Discharge to: Home with a responsible adult.  Follow up: 2-4 weeks       Please call my office or pager at [phone] if experienced any weakness or loss of sensation, fever > 101.5, pain uncontrolled with oral medications, persistent nausea/vomiting/or diarrhea, redness or drainage from the incisions, or any other worrisome concerns. If physician on call was not reached or could not communicate with our office for any reason please go to the nearest emergency department

## 2025-07-18 NOTE — DISCHARGE INSTRUCTIONS

## 2025-07-18 NOTE — OP NOTE
Sacroiliac Joint Injection under Fluoroscopic Guidance    The procedure, risks, benefits, and options were discussed with the patient. There are no contraindications to the procedure. The patent expressed understanding and agreed to the procedure. Informed written consent was obtained prior to the start of the procedure and can be found in the patient's chart.    PATIENT NAME: Sal Navarro   MRN: [id number]     DATE OF PROCEDURE: 07/18/2025    PROCEDURE: Left Sacroiliac Joint Injection under Fluoroscopic Guidance    PRE-OP DIAGNOSIS: Sacroiliac joint pain [M53.3]    POST-OP DIAGNOSIS: Same    PHYSICIAN: Maira Land MD    ASSISTANTS: Jules Cortes MS4, New Payne DO, Niru Pearce MD, and Reggie Lui MD       MEDICATIONS INJECTED: Preservative-free Kenalog 40mg with 3cc of Bupivacine 0.25%     LOCAL ANESTHETIC INJECTED: Xylocaine 2%     SEDATION:  None    ESTIMATED BLOOD LOSS: None    COMPLICATIONS: None    TECHNIQUE: Time-out was performed to identify the patient and procedure to be performed. With the patient laying in a prone position, the surgical area was prepped and draped in the usual sterile fashion using ChloraPrep and a fenestrated drape. The sacroiliac joint was determined under fluoroscopy guidance. Skin anesthesia was achieved by injecting Lidocaine 2% over the injection site. The sacroiliac joint was  then approached with a 22 gauge, 3.5 inch spinal quinke needle that was introduced under fluoroscopic guidance in the AP and Lateral views. Once the needle tip was in the area of the joint, and there was no blood, contrast dye Omnipaque (300mg/mL) was injected to confirm placement and there was no vascular runoff. Fluoroscopic imaging in the AP and lateral views revealed a clear outline of the joint space. 5 mL of the medication mixture listed above was injected slowly intraarticular and lemuel-articular. Displacement of the radio opaque contrast after injection of the medication confirmed that  the medication went into the area of the joint. The needles were removed and bleeding was nil.  A sterile dressing was applied. No specimens collected. The patient tolerated the procedure well.     The patient was monitored after the procedure in the recovery area. They were given post-procedure and discharge instructions to follow at home. The patient was discharged in a stable condition.    New Payne MD     I reviewed and edited the fellow's note. I conducted my own interview and physical examination. I agree with the findings. I was present and supervising all critical portions of the procedure.

## 2025-07-18 NOTE — INTERVAL H&P NOTE
The patient has been examined and the H&P has been reviewed:    I concur with the findings and no changes have occurred since H&P was written.    Anesthesia/Surgery risks, benefits and alternative options discussed and understood by patient/family.        Sacroiliitis    There are no hospital problems to display for this patient.

## 2025-07-21 LAB — POCT GLUCOSE: 154 MG/DL (ref 70–110)

## 2025-08-06 ENCOUNTER — PATIENT MESSAGE (OUTPATIENT)
Dept: PAIN MEDICINE | Facility: CLINIC | Age: 60
End: 2025-08-06

## 2025-08-06 ENCOUNTER — OFFICE VISIT (OUTPATIENT)
Dept: PAIN MEDICINE | Facility: CLINIC | Age: 60
End: 2025-08-06
Payer: MEDICARE

## 2025-08-06 VITALS
HEIGHT: 73 IN | WEIGHT: 211.19 LBS | DIASTOLIC BLOOD PRESSURE: 81 MMHG | OXYGEN SATURATION: 98 % | SYSTOLIC BLOOD PRESSURE: 131 MMHG | BODY MASS INDEX: 27.99 KG/M2 | HEART RATE: 61 BPM | TEMPERATURE: 98 F

## 2025-08-06 DIAGNOSIS — M47.816 LUMBAR SPONDYLOSIS: ICD-10-CM

## 2025-08-06 DIAGNOSIS — M54.16 LUMBAR RADICULOPATHY: Primary | ICD-10-CM

## 2025-08-06 DIAGNOSIS — M54.16 LUMBAR RADICULOPATHY: ICD-10-CM

## 2025-08-06 DIAGNOSIS — M51.360 DEGENERATION OF INTERVERTEBRAL DISC OF LUMBAR REGION WITH DISCOGENIC BACK PAIN: ICD-10-CM

## 2025-08-06 DIAGNOSIS — G89.29 CHRONIC LEFT HIP PAIN: ICD-10-CM

## 2025-08-06 DIAGNOSIS — G89.4 CHRONIC PAIN DISORDER: Primary | ICD-10-CM

## 2025-08-06 DIAGNOSIS — M25.552 CHRONIC LEFT HIP PAIN: ICD-10-CM

## 2025-08-06 PROCEDURE — 3008F BODY MASS INDEX DOCD: CPT | Mod: CPTII,S$GLB,, | Performed by: NURSE PRACTITIONER

## 2025-08-06 PROCEDURE — 1159F MED LIST DOCD IN RCRD: CPT | Mod: CPTII,S$GLB,, | Performed by: NURSE PRACTITIONER

## 2025-08-06 PROCEDURE — 3075F SYST BP GE 130 - 139MM HG: CPT | Mod: CPTII,S$GLB,, | Performed by: NURSE PRACTITIONER

## 2025-08-06 PROCEDURE — 1160F RVW MEDS BY RX/DR IN RCRD: CPT | Mod: CPTII,S$GLB,, | Performed by: NURSE PRACTITIONER

## 2025-08-06 PROCEDURE — 3079F DIAST BP 80-89 MM HG: CPT | Mod: CPTII,S$GLB,, | Performed by: NURSE PRACTITIONER

## 2025-08-06 PROCEDURE — 99999 PR PBB SHADOW E&M-EST. PATIENT-LVL IV: CPT | Mod: PBBFAC,,, | Performed by: NURSE PRACTITIONER

## 2025-08-06 PROCEDURE — 4010F ACE/ARB THERAPY RXD/TAKEN: CPT | Mod: CPTII,S$GLB,, | Performed by: NURSE PRACTITIONER

## 2025-08-06 PROCEDURE — 99214 OFFICE O/P EST MOD 30 MIN: CPT | Mod: S$GLB,,, | Performed by: NURSE PRACTITIONER

## 2025-08-06 NOTE — PROGRESS NOTES
Interventional Pain Management - Established Visit  Follow-Up       Interval History 8/6/2025:  The patient returns to clinic today for follow up of back pain. He is s/p left SI joint injection on 7/18/2025. He reports 60% relief of his hip and buttock pain. He continues to report left sided low back and buttock pain. This radiates into the left groin and medial thigh to the knee. His pain is worse with prolonged sitting. He also notes pain at night. He is having difficulty sleeping. He did have recent updated imaging. He is taking Gabapentin and Robaxin. He also takes Norco as needed for severe pain. He denies any other health changes. His pain today is 7/10.     Interval History 6/26/2025:   The patient returns to clinic today for follow up of back and hip pain. He is s/p bilateral L3,L4,L5 RFA on 5/29/25 with 40%-50% relief of lower back pain. HIs pain is worse today after the long car ride. This is to the left buttock, lower than injection sites.  He does have some aching pain into the hip and thigh. His pain is worse with prolonged sitting and walking. He continues to take Gabapentin and Robaxin. He is taking Norco as needed for severe pain, usually 2-3 times a week. Today his pain is a 5/10.     Interval History 5/9/2025:  The patient returns to clinic today for follow up of back and hip pain. He reports increased low back pain over the last month. He reports low back pain, worse in the morning. This is also worse with prolonged standing. He denies any radicular leg pain. He does have left hip and thigh pain. He did see PCP regarding lump. He was given a round of steroids in consideration of lymph node. He did not notice any difference with this. He continues to take Gabapentin and Robaxin. He also takes Norco as needed for severe pain. He denies any adverse effects. He denies any other health changes. His pain today is 5/10.    Interval History 11/8/2024:  Sal Navarro returns to clinic for follow-up  after left hip joint injection on 10/14/2024. He reports 80% pain relief of hip pain. He has noticed left groin pain and a palpable mass under the skin since the injection. He has also noticed a painful lesion under his skin on his left thigh. He has not gone to his PCP for evaluation. He continues Gabapentin and Robaxin with some benefit. He also takes Norco as needed without perceived side effects. He denies recent health changes. He denies recent falls or trauma. He denies new onset fever/night sweats, urinary incontinence, bowel incontinence, significant weight changes, significant motor weakness or changes, or loss of sensations. His pain today is 7/10.      Interval History 10/4/2024:  The patient returns to clinic today for follow up of neck and back pain. He is s/p bilateral C4,5,6 RFA on 9/6/2024. He reports 50% relief of his neck pain. He reports intermittent neck pain, worse with prolonged activity. He reports increased left sided low back pain. He describes this as tense and hard in nature. He continues to report left hip pain. This is worse with standing and walking. He is taking Gabapentin and Robaxin. He is taking Norco as needed with benefit. He takes this sparingly. He denies any adverse effects. He denies any other health changes. His pain today is 7/10.    Interval History 8/16/2024:  The patient returns to clinic today for follow up of neck and back pain. He is s/p bilateral L3,4,5 RFA on 7/18/2024. He reports 50-60% relief. His back pain is tolerable at this time. He continues to report left hip pain. He has had a few falls onto the left side. He reports increased neck pain. He denies any radicular arm pain. He does endorse morning stiffness. His pain is worse with prolonged activity. He is taking Gabapentin and Robaxin. He takes Norco sparingly for severe pain. He denies any other health changes. His pain today is 6/10.    Interval History 5/28/2024:  The patient returns to clinic today for  follow up of neck and back pain. He is s/p bilateral SI joint injections on 5/10/2024. He reports 50% relief. He reports right sided low back pain, higher than injection sites. He also report left sided low back pain. This pain is worse with prolonged activity. He does endorse stiffness. He denies any radicular leg pain. He reports intermittent neck pain. He is taking Gabapentin and Robaxin. He takes Norco sparingly for severe pain. His last dose was yesterday. He denies any other health changes. His pain today is 6/10.    Interval History 4/22/2024:  The patient returns to clinic today for follow up of neck and back pain. He reports increased low back pain. He reports low back and buttock pain. He denies any radicular leg pain. His pain is worse with prolonged sitting. He also reports increased pain with moving from sitting to standing. He reports intermittent neck pain. He is having increased left elbow pain. His wife notes that he does drop objects from the left hand. He is taking Gabapentin and Robaxin. He is taking Norco as needed for severe pain. He denies any adverse effects. He denies any other health changes. His pain today is 7/10.    Interval History 10/11/2023:  The patient returns to clinic today for follow up of neck and back pain. He is s/p left C4,5,6,7 RFA on 9/15/2023. He is s/p right C4,5,6,7 RFA on 9/29/2023. He reports 50% relief of his pain. He does report intermittent neck pain. He also reports left sided anterior neck and upper chest pain. This is tight in nature. He reports increased low back pain. He denies any radicular leg pain. This pain is worse with prolonged activity. He does have intermittent left abdominal pain. He feels a knot in the area. This pain is relieved with sitting or laying down. He did have a recent fall in his garden. He continues to take Gabapentin and Robaxin. He also takes Norco as needed for severe pain. He takes this sparingly without adverse effects. He denies any  other health changes. His pain today is 4/10.    Interval History 8/11/2023:  The patient returns to clinic today for follow up of back pain. He is s/p bilateral SI joint injections on 7/27/2023. He reports 50-60% relief of his buttock pain. He reports worsened neck pain over the last month. He does report some pain into the left shoulder. He denies any radicular arm pain. His pain is worse with turning his head to the side. He does endorse stiffness. He continues to report intermittent left abdominal pain. This pain is worse with prolonged activity. He also notes 2 lumps in the area, especially at the end of the day. He is taking Gabapentin and Robaxin. He also takes Norco as needed for severe pain sparingly. He denies any other health changes. His pain today is 6/10.    Interval History 7/5/2023:  The patient returns to clinic today for follow up of back pain. He reports increased low back and buttock pain. He denies any radicular leg pain. His pain is worse with prolonged sitting, moving from sitting to standing, and prolonged activity. He continues to report intermittent left abdominal pain. He also reports increased neck pain. He is taking Gabapentin and Robaxin. He also takes Norco as needed for severe pain. He denies any adverse effects. His pain today is 6/10.    Interval History 3/21/2023:  The patient returns to clinic today for follow up of back pain. He is s/p left L3,4,5 RFA on 2/24/2023 and right L3,4,5 RFA on 3/10/2023. He is unsure of relief at this time. He reports increased left sided low back pain over the last two weeks. 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 He did feel a pop. He thought he pulled a muscle, but pain has been worsening. He reports left sided low back pain that radiates into the left hip and lower abdomen. He also reports intermittent radiating pain into the posterior left leg. He describes this pain as numb and tingling in nature. His pain is worse  with activity. He does endorse muscle spasms. He is taking Gabapentin and Robaxin. He has had to take more Norco recently due to increased pain. He denies any weakness. He denies any other health changes. His pain today is 8/10.    Interval History 1/31/2023:  The patient returns to clinic today for follow up of neck and back pain. He is s/p left C4,5,6,7 on 12/15/2022 and right C4,5,6,7 RFA on 12/29/2022. He reports 50% relief of his neck pain. He reports intermittent neck pain, left side greater than right. He denies any radicular arm pain. He does report pain into the shoulder blades. He reports increased low back pain. He denies any radicular leg pain. His pain is worse with standing, walking, and activity. He does endorse stiffness. He is taking Gabapentin and Robaxin. He also takes Norco sparingly with benefit. He denies any other health changes. His pain today is 6/10.    Interval History 11/18/2022:  The patient returns to clinic today for follow up of neck and back pain. He is s/p bilateral SI joint injections on 11/4/2022. He reports 60% relief of his low back and buttock pain. He continues to report low back pain. He denies any radicular leg pain. He reports increased neck pain, left side greater than right. He denies any radicular arm. He does report some pain into his shoulder blades. His pain is worse with activity. He continues to take Gabapentin and Robaxin. He continues to take Norco as needed sparingly with benefit. He denies any other health changes. His pain today is 7/10.    Interval History 10/27/2022:  The patient returns to clinic today for follow up of neck and back pain. He reports increased low back and bilateral buttock pain. He denies any radicular leg pain. His pain is worse with prolonged sitting and moving from sitting to standing. He does endorse a fall recently while getting out of the vehicle onto his side. He continues to report neck pain. He continues to take Gabapentin and  Robaxin. He continues to take Norco sparingly with benefit and without adverse effects. He denies any other health changes. His pain today is 6/10.    Interval History 8/2/2022:  The aptient returns to clinic today for follow up of neck and back pain. He is s/p bilateral SI joint injection on 7/14/2022. He reports 50-60% relief of his low back and buttock pain. He reports intermittent low back and buttock pain. He reports increased neck pain, left side greater than right. He denies any radicular arm pain. He has good days and bad days. He continues to take Gabapentin. He continues to take Norco sparingly with benefit and without adverse effects. He denies any other health changes. His pain today is 5/10.    Interval History 7/1/2022:  The patient returns to clinic today for follow up of neck and back pain. He reports increased low back and buttock pain.His pain is worse with prolonged sitting and moving from sitting to standing. He also reports increased pain with bending over. He does report intermittent radiating pain into his right posterior thigh stopping at mid thigh. He does report a fall, which was sitting hard on the ground about a week ago. He denies any acute injury. He continues to take Gabapentin and Norco. He denies any other health changes. His pain today is 6/10.     Interval History 5/9/2022:  The patient returns to clinic today for follow up of back pain. He is s/p right L4/5 and L5/S1 TF NGUYEN on 4/14/2022. He reports 50-60% relief of his low back and leg pain. He has been more active since the procedure. He continues to report low back and hip pain, worse with bending and activity. He denies any radicular leg pain. He continues to repot intermittent neck pain. He continues to take Gabapentin with benefit. He continues to take Norco sparingly as needed. He denies any other health changes. His pain today is 5/10.    Interval History 4/1/2022:  The patient returns to clinic today for follow up of back  "pain. He is s/p left L3,4,5 RFA on 2/25/2022 and right L3,4,5 RFA on 3/11/2022. He reports relief of his back pain. He reports increased back pain that radiates into the posterior aspect of his right leg to his knee. He denies any left leg pain. His pain is worse with prolonged walking. He reports that he sometimes drags his right leg. He denies any falls. He denies any bowel or bladder incontinence. He continues to take Gabapentin and Norco as needed with benefit. He denies any adverse effects. He denies any other health changes. His pain today is 7/10.    Interval History 2/15/2022:  The patient returns to clinic today for follow up of neck and back pain. He reports increased low back pain over the last two weeks. He describes this pain as sharp and aching in nature. He does report intermittent "catching" pain in his lower back, worse with getting out of bed. He denies any radicular leg pain. He continues to report benefit from previous cervical procedures. He reports intermittent neck pain. He continues to take Gabapentin with benefit. He continues to take Norco as needed with benefit and without adverse effects. He denies any other health changes. His pain today is 6/10.    Interval History 11/5/2021:  The patient returns to clinic today for follow up of neck and back pain. He is s/p left C4,5,6,7 RFA on 10/1/2021 and right C4,5,6,7 RFA on 10/15/2021. He reports 50% relief of his neck pain. He reports increased burning and itching pain to his skin near injection sites. He denies any radicular arm pain. He does report increased left sided muscle pain. He is concerned that one of the screws in his cervical hardware is moving. He continues to report benefit with previous lumbar procedures. He reports intermittent low back pain without radicular leg pain. He continues to take Gabapentin with benefit. He continues to take Norco as needed with benefit and without adverse effects. He denies any weakness. He denies any " other health changes. His pain today is 5/10.    Interval History 9/17/2021:  The patient returns to clinic today for follow up of neck and back pain. He reports increased neck pain. He denies any radicular arm pain today. He does report intermittent radiating pain into his left shoulder blade and mid back. His pain is worse with activity. He reports that sometimes his pain takes his breath away. He continues to report low back pain, that is tolerable at this time. He continues to take Gabapentin and Norco with benefit and without adverse effects. He denies any other health changes. His pain today is 6/10.    Interval History 6/17/2021:  The patient returns to clinic today for follow up of neck and back pain. He is s/p right L3,4,5 RFA on 4/23/2021 and left L3,4,5 RFA on 5/7/2021. He reports 50% relief of his low back pain. He continues to report intermittent low back pain. He denies any radicular leg pain. He continues to report neck pain, especially in between the scapula. His pain is worse with prolonged standing, walking, and activity. He continues to take Gabapentin with benefit. He continues to take Norco sparingly for severe pain with benefit and without adverse effects. He denies any other health changes. His pain today is 4/10.    Interval History 4/9/2021:  The patient returns to clinic today for follow up of neck and back pain. He is s/p left C4,5,6,7 RFA on 3/9/2021 and right C4,5,6,7 RFA on 3/26/2021. He reports 50% relief of his neck pain. He reports intermittent neck pain. He has good days and bad days. He continues to report low back pain that is constant, sharp, and aching. He reports intermittent radiating pain into the lateral aspect of his left leg to his knee. He continues to take Gabapentin with benefit. He continues to take Norco as needed sparingly for severe pain. He denies any adverse effects. He denies any other health changes. His pain today is 5/10.    Interval History 3/2/2021:  Sal  "Sebastian Navarro presents to the clinic for a follow-up appointment for neck pain . Since the last visit, Sal Navarro states the pain has been worsening. Current pain intensity is 6/10.  Was seen by Angelique Barahona NP on 2/12/2021.     Interval History 2/12/2021:  The patient returns to clinic today for follow up of back pain. He has not been seen in over a year. He did not have imaging due to coronavirus outbreak. He would to reschedule this. He was also considering SCS trial prior to the pandemic. He continues to report low back pain that radiates into the lateral aspect of his left leg to his knee. He denies any radicular right leg pain. His pain is worse with bending and walking. He continues to take Gabapentin with benefit. He also takes Norco as needed for severe pain. He denies any adverse effects. He denies any other health changes. His pain today is 6/10.    HPI:  Sal Navarro is a 56 y.o. male who presents today s/p C4-7 ACDF with C5/6 PSIF in 2/2016 with residual chronic midline neck pain that radiates into his shoulder blades bilaterally, R>>L.  This is associated with bilateral hand numbness and tingling.  The hand symptoms did improve following the surgery.  The shooting pains in his arms resolved completely.   This pain is described in detail below.  His post-operative course was complicated by dysphagia that is being treated with speech therapy.  The numbness and the tingling were relieved by the surgery. Now experiences "deep pain along the spine"  Patient has not been seen for over a year, had to re-schedule his imaging studies due to pandemic(now completed). Prior to the pandemic, he was considering a SCS.   Only time he gets relief is laying in bed on a very thin pillow, but that doesn't last very long.   Pain aggravated by movement and even breathing/playing with his grandchild/holding the grandchild's hand. Heat and massage (has a vest) are helpful.   Was seen by Dr. Jan srivastava and " received multiple EIS and RFAs.   Compliant with his medication.     Pain Disability Index Review:      8/6/2025    11:03 AM 6/26/2025     1:31 PM 5/9/2025     2:06 PM   Last 3 PDI Scores   Pain Disability Index (PDI) [phone]       Pain Medications:  Gabapentin  Norco sparingly  Robaxin    Opioid Contract: yes     report:  Reviewed and consistent with medication use as prescribed.    Pain Procedures:   7/18/2025- Left SI joint injection  5/29/2025: Bilateral RFA L3, L4, L5 - 40-50% relief   10/14/2024 - Left Hip joint injection - 80% relief  9/6/2024- Bilateral C4,5,6, 7 RFA   7/18/2024- Bilateral L3,4,5 RFA  5/10/2024- Bilateral SI joint injections.   7/27/2023- Bilateral SI joint injections  3/10/2023- Right L3,4,5 RFA- 50% relief  2/24/2023- Left L3,4,5 RFA- relief  12/29/2022- Right C4,5,6,7 RFA  12/15/2022- Left C4,5,6,7 RFA  11/4/2022- Bilateral SI joint injections- 60% relief   4/14/2022- Right L4/5 and L5/S1 TF NGUYEN  3/11/2022- Right L3,4,5 RFA  2/25/2022- Left L3,4,5 RFA  10/15/2021- Right C4,5,6,7 RFA  10/1/2021- Left C4,5,6,7 RFA  5/7/2021- Left L3,4,5 RFA   4/23/2021- Right L3,4,5 RFA  3/26/2021- Right C4,5,6,7 RFA  3/9/2021- Left C4,5,6,7 RFA  10/11/19: L4/5 Interlaminar Epidural Steroid Injection  06/13/19- Left C4-7 RFA  05/03/19:Left L2-5 Lumbar Medial Branch Nerve Thermal Radiofrequency Ablation  12/21/18:Right L2-5 Lumbar Medial Branch Nerve Thermal Radiofrequency Ablation  11/23/18:Cervical Thermal Radiofreqiency Ablation: Right C4-7  09/14/18:C7/[id number] Steroid Injection  06/29/18:LL2/3 Interlaminar Epidural Steroid Injection   03/06/18:L2/3 Interlaminar Epidural Steroid Injection   09/26/17:Bilateral L2-5 Lumbar Medial Branch Nerve Coolief Thermal Radiofrequency Ablation  08/31/17:Bilateral L2-5 Lumbar medial branch blocks   03/28/17:Cervical Conventional Radiofreqiency Ablation: Left C4-7  03/14/17:Cervical Conventional Radiofreqiency Ablation: Right C4-7  10/25/16:Cervical Conventional  Radiofreqiency Ablation: Left C4-7  10/11/16: Cervical Conventional Radiofreqiency Ablation: Right C4-7  10/04/16- Cervical Medial Branch Blocks, Bilateral C4-7.    08/31/16:C7/[id number] Steroid Injection    Physical Therapy/Home Exercise: does home exercises now, but not in formal PT.    - 2019 was last time in PT     Imaging:   MRI Lumbar Spine 7/18/2025:  COMPARISON:  03/30/2023 and 07/11/2017     FINDINGS:  Lumbar spine alignment is within normal limits. There is a biconcave morphology of L2 through L5 vertebral bodies that is similar to the prior exam and is far back as 07/11/2017.  No bone marrow edema is seen.  Findings may be congenital or less likely related to chronic endplate compression fractures.  There is minimal Modic 2 degenerative endplate signal change at the anterior inferior endplate of L3.  No marrow signal abnormality suspicious for an infiltrative process.     The conus is normal in appearance.  The adjacent soft tissue structures show no significant abnormalities.     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     L1-L2: There is no focal disc herniation.Mild central canal stenosis.     L2-L3: There is no focal disc herniation. Bilateral facet arthropathy and ligamentum flavum thickening.  Moderate central canal stenosis.     L3-L4: No focal disc herniation.  Central annular fissure.  Bilateral facet arthropathy and facet joint effusions.  Mild central canal stenosis.     L4-L5: There is no focal disc herniation. Left-sided foraminal and far lateral annular fissure.  Mild central canal stenosis.     L5-S1: There is no focal disc herniation. Right paracentral annular fissure.  Bilateral facet arthritis.     Impression:     Mild degenerative changes mostly affecting the lumbar facets.  There is mild to moderate central canal stenosis most severe at L2-3 contributed to by a congenitally narrow lumbar spinal canal.     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    MRI Hip Joint 7/18/2025:  COMPARISON:  X-ray 08/16/2024 the     FINDINGS:  Bone and Joints:Marrow signal normal.  There is cam morphology of the femoral head neck junction with small fibrocystic lesion at the same location.     Articular cartilage and labrum:There is some chondral thinning and signal abnormality of the posterosuperior aspect of the left hip joint and to a lesser degree superior aspect.There is a small anterior superior acetabular labral tear.  There is some ossification of the superior acetabular labrum at the 12 o'clock position identified on the large field-of-view images.     Soft-tissues:gluteal and hamstring tendons unremarkable.     Impression:     Mild degenerative changes of the left hip noting underlying cam morphology.  See details above.       MRI Lumbar Spine 3/30/2023:  COMPARISON:  02/25/2021     FINDINGS:  The marrow demonstrates homogeneous signal.  Vertebral body heights are maintained.  Disc spaces are maintained.  Conus terminates appropriately at L1.     Multilevel degenerative change as diesel below:     L1-2: No focal disc abnormality.  No significant canal or neural foraminal narrowing.     L2-3: Facet and ligamentum flavum hypertrophic changes along with posterior epidural fat contribute to mild canal narrowing.  Mild neural foraminal narrowing bilaterally.     L3-4: Small posterior circumferential disc bulge.  Facet and ligamentum flavum hypertrophic changes along with posterior epidural fat contribute to mild canal narrowing.  Moderate bilateral neural foraminal narrowing.     L4-5: Posterior circumferential disc bulge.  Annular tear along the left aspect of the disc.  Mild canal narrowing.  Moderate left neural foraminal narrowing.  Mild right neural foraminal narrowing.     L5-S1: Posterior circumferential disc bulge with central annular tear.  Facet and ligamentum flavum hypertrophic changes.  Mild right neural foraminal narrowing.      Impression:     Multilevel degenerative change predominantly on the basis of facet arthropathy.  Resultant mild-to-moderate neural foraminal narrowing.  Multilevel mild canal narrowing.  Findings appear similar compared to prior.    XR HIP WITH PELVIS WHEN PERFORMED 2 OR 3 VIEWS LEFT     CLINICAL HISTORY:  Pain in left hip     FINDINGS:  Two views left hip:     There is DJD.  No fracture, dislocation, or bone destruction seen.        Electronically signed by:Remington Anderson MD  Date:                                            08/16/2024  Time:                                           14:58      MRI Lumbar Spine 2/25/2021:  COMPARISON:  07/11/2017     FINDINGS:  The vertebral bodies maintain normal height, signal intensity and alignment.  There is redemonstration of few hemangiomas at multiple levels.  The intervertebral disc spaces are preserved although there is some disc desiccation at multiple levels.  The conus terminates at level L1.  Evaluation of the localizer images and structures surrounding the lumbar spine shows no abnormalities.     L1-L2: No significant disc or joint pathology.     L2-L3: Mild diffuse disc bulge with mild bilateral facet arthropathy and ligamentum flavum hypertrophy results in mild central canal stenosis.  Mild bilateral neural foraminal stenosis is also identified.     L3-L4: There is a diffuse disc bulge with no significant facet arthropathy or ligamentum flavum hypertrophy.  There is mild central canal stenosis and mild bilateral neural foraminal stenosis.     L4-L5: There is a diffuse disc bulge with ligamentum flavum hypertrophy.  No significant facet arthropathy.  There is mild central canal stenosis with only minimal encroachment on the left neural foramen.  Mild right neural foraminal stenosis is present.     L5-S1: There is a diffuse disc bulge with mild to moderate right facet arthropathy.  No left facet arthropathy.  The ligamentum flavum is normal.  The central canal is  patent and the neural foramina are largely patent.  Incidental note is made of an annulus fibrosus tear posteriorly measuring approximately 2 mm.     Impression:     Multilevel degenerative disc and joint disease which is mild and results in mild central canal and neural foraminal stenosis as outlined above.    Xray Cervical Spine 2/25/2021:  COMPARISON:  07/07/2017 .     FINDINGS:  The patient has undergone ACDF from C4 through C7. The instrumentation is intact without evidence of complication.  The vertebral bodies maintain normal height and alignment. The remaining intervertebral disc spaces are preserved.  No significant uncovertebral joint hypertrophy.  The prevertebral soft tissues, odontoid views and lung apices are normal. The neural foramen are patent     Impression:     Postsurgical changes to the thoracic spine without additional evidence for degenerative disc or joint disease.     Allergies:   Review of patient's allergies indicates:   Allergen Reactions    Pcn [penicillins] Hives and Rash    Lipitor [atorvastatin] Other (See Comments)     Muscle cramps, weakness       Current Medications:   Current Outpatient Medications   Medication Sig Dispense Refill    acetaminophen (TYLENOL) 500 MG tablet Take 500 mg by mouth every 6 (six) hours as needed for Pain.      albuterol (PROVENTIL/VENTOLIN HFA) 90 mcg/actuation inhaler       aspirin (ECOTRIN) 81 MG EC tablet Take 81 mg by mouth once daily.      cetirizine (ZYRTEC) 10 MG tablet Take 10 mg by mouth nightly.  5    FOLIC ACID/MULTIVIT-MIN/LUTEIN (CENTRUM SILVER ORAL) Take by mouth once daily.      HYDROcodone-acetaminophen (NORCO) 7.5-325 mg per tablet Take 1 tablet by mouth every 12 (twelve) hours as needed for Pain. 60 tablet 0    levothyroxine (SYNTHROID) 50 MCG tablet Take 50 mcg by mouth once daily.      meclizine (ANTIVERT) 25 mg tablet Take 25 mg by mouth once daily.       meloxicam (MOBIC) 15 MG tablet Take 15 mg by mouth once daily.      metFORMIN  (GLUCOPHAGE) 500 MG tablet Take 1 tablet by mouth 2 (two) times a day.      methocarbamoL (ROBAXIN) 500 MG Tab Take 1 tablet (500 mg total) by mouth 4 (four) times daily as needed (muscle spasm). 120 tablet 0    omeprazole (PRILOSEC) 20 MG capsule Take 1 capsule (20 mg total) by mouth once daily. 90 capsule 3    rosuvastatin (CRESTOR) 20 MG tablet Take 20 mg by mouth once daily.      valsartan (DIOVAN) 80 MG tablet Take 40 mg by mouth once daily.   1    gabapentin (NEURONTIN) 300 MG capsule Take 2 capsules (600 mg total) by mouth 2 (two) times daily. 120 capsule 5     No current facility-administered medications for this visit.       REVIEW OF SYSTEMS:    GENERAL:  No weight loss, malaise or fevers.  HEENT:  Negative for frequent or significant headaches.  NECK:  Negative for lumps, goiter  and significant neck swelling.   RESPIRATORY:  Negative for cough, wheezing or shortness of breath.  CARDIOVASCULAR:  Negative for chest pain, leg swelling or palpitations. HTN  GI:  Negative for abdominal discomfort, blood in stools or black stools or change in bowel habits.  MUSCULOSKELETAL:  See HPI.  SKIN:  Negative for lesions, rash, and itching.  PSYCH:  Negative for sleep disturbance, mood disorder and recent psychosocial stressors.  HEMATOLOGY/LYMPHOLOGY:  Negative for prolonged bleeding, bruising easily or swollen nodes.  NEURO:   No history of headaches, syncope, paralysis, seizures or tremors.   ENDO: Thyroid disorder.   All other reviewed and negative other than HPI.    Past Medical History:  Past Medical History:   Diagnosis Date    Arthritis     Cataract     Cervical back pain with evidence of disc disease     Hiatal hernia     Hypertension     Thyroid disease        Past Surgical History:  Past Surgical History:   Procedure Laterality Date    ACROMIOCLAVICULAR JOINT CYST EXCISION Right     BACK SURGERY      cataracts Bilateral     CERVICAL FUSION      COLONOSCOPY N/A 4/27/2018    Procedure: COLONOSCOPY;  Surgeon:  Giovani Medeiros MD;  Location: [facility] ENDO (4TH FLR);  Service: Endoscopy;  Laterality: N/A;    EPIDURAL STEROID INJECTION N/A 10/11/2019    Procedure: INJECTION, STEROID, EPIDURAL;  Surgeon: Kasandra Montoya MD;  Location: [facility] PAIN MGT;  Service: Pain Management;  Laterality: N/A;  Lumbar NGUYEN L4/5    NGUYEN      EYE SURGERY      INJECTION Left 10/14/2024    Procedure: INJECTION, JOINT LEFT HIP;  Surgeon: Alex Callahan MD;  Location: [facility] PAIN MGT;  Service: Pain Management;  Laterality: Left;  [phone]  2 WK F/U EMILY    INJECTION OF JOINT Bilateral 7/14/2022    Procedure: INJECTION, JOINT, SI BILATERAL  needs consent;  Surgeon: Alex Callahan MD;  Location: [facility] PAIN MGT;  Service: Pain Management;  Laterality: Bilateral;    INJECTION, SACROILIAC JOINT Bilateral 11/4/2022    Procedure: INJECTION,SACROILIAC JOINT BILATERAL CONTRAST;  Surgeon: Alex Callahan MD;  Location: [facility] PAIN MGT;  Service: Pain Management;  Laterality: Bilateral;    INJECTION, SACROILIAC JOINT Bilateral 7/27/2023    Procedure: INJECTION,SACROILIAC JOINT, BILATERAL SOONER DATE;  Surgeon: Alex Callahan MD;  Location: [facility] PAIN MGT;  Service: Pain Management;  Laterality: Bilateral;    INJECTION, SACROILIAC JOINT Bilateral 5/10/2024    Procedure: INJECTION,SACROILIAC JOINT BILATERAL;  Surgeon: Alex Callahan MD;  Location: [facility] PAIN MGT;  Service: Pain Management;  Laterality: Bilateral;  [phone]  2 WK F/U NICK    INJECTION, SACROILIAC JOINT Left 7/18/2025    Procedure: INJECTION,SACROILIAC JOINT LEFT *JAYLEENHRAGHI PT*;  Surgeon: Maira Land MD;  Location: [facility] PAIN MGT;  Service: Pain Management;  Laterality: Left;  2 WK F/U NICK    RADIOFREQUENCY ABLATION  10/2016    cervical spine/Montoya    RADIOFREQUENCY ABLATION Left 5/3/2019    Procedure: RADIOFREQUENCY ABLATION, L2-L5 MEDIALBRANCH;  Surgeon: Kasandra Montoya MD;  Location: [facility] PAIN MGT;  Service: Pain Management;  Laterality: Left;    RADIOFREQUENCY ABLATION Left  6/13/2019    Procedure: RADIOFREQUENCY ABLATION C4-7;  Surgeon: Kasandra Montoya MD;  Location: [facility] PAIN [facility]GT;  Service: Pain Management;  Laterality: Left;    RADIOFREQUENCY ABLATION Left 3/9/2021    Procedure: RADIOFREQUENCY ABLATION C4,C5,C6,C7;  Surgeon: Alex Callahan MD;  Location: [facility] PAIN MGT;  Service: Pain Management;  Laterality: Left;  1 of 2    RADIOFREQUENCY ABLATION Right 3/26/2021    Procedure: RADIOFREQUENCY ABLATION C4,C6,C6,C7;  Surgeon: Alex Callahan MD;  Location: [facility] PAIN MGT;  Service: Pain Management;  Laterality: Right;  2 of 2    RADIOFREQUENCY ABLATION Right 4/23/2021    Procedure: RADIOFREQUENCY ABLATION L3,4,5;  Surgeon: Alex Callahan MD;  Location: [facility]IN [facility]GT;  Service: Pain Management;  Laterality: Right;  1 of 2    RADIOFREQUENCY ABLATION Left 5/7/2021    Procedure: RADIOFREQUENCY ABLATION L3,4,5;  Surgeon: Alex Callahan MD;  Location: [facility];  Service: Pain Management;  Laterality: Left;  2 of 2    RADIOFREQUENCY ABLATION Left 10/1/2021    Procedure: RADIOFREQUENCY ABLATION LEFT, C4,5,6,7 1 of 2 CONSENT NEEDED;  Surgeon: Alex Callahan MD;  Location: [facility]IN [facility];  Service: Pain Management;  Laterality: Left;    RADIOFREQUENCY ABLATION Right 10/15/2021    Procedure: RADIOFREQUENCY ABLATION  RIGHT C4,5,6,7 2 of 2 CONSENT NEEDED;  Surgeon: Alex Callahan MD;  Location: [facility]IN [facility]GT;  Service: Pain Management;  Laterality: Right;    RADIOFREQUENCY ABLATION Left 2/25/2022    Procedure: RADIOFREQUENCY ABLATION LEFT L3,4,5 1 of 2, needs consent;  Surgeon: Alex Callahan MD;  Location: [facility] PAIN [facility]GT;  Service: Pain Management;  Laterality: Left;    RADIOFREQUENCY ABLATION Right 3/11/2022    Procedure: RADIOFREQUENCY ABLATION RIGHT L3,4,5 2 of 2, needs consent;  Surgeon: Alex Callahan MD;  Location: [facility] PAIN [facility]GT;  Service: Pain Management;  Laterality: Right;    RADIOFREQUENCY ABLATION Left 12/15/2022    Procedure: RADIOFREQUENCY ABLATION LEFT C4, C5, C6,  C7  ONE OF TWO NEEDS CONSENT;  Surgeon: Alex Callahan MD;  Location: [facility];  Service: Pain Management;  Laterality: Left;    RADIOFREQUENCY ABLATION Right 12/29/2022    Procedure: RADIOFREQUENCY ABLATION RIGHT C4, C5, C6, C7  TWO OF TWO NEEDS CONSENT;  Surgeon: Alex Callahan MD;  Location: [facility];  Service: Pain Management;  Laterality: Right;    RADIOFREQUENCY ABLATION Left 2/24/2023    Procedure: RADIOFREQUENCY ABLATION LEFT L3,L4,L5;  Surgeon: Alex Callahan MD;  Location: [facility];  Service: Pain Management;  Laterality: Left;    RADIOFREQUENCY ABLATION Right 3/10/2023    Procedure: RADIOFREQUENCY ABLATION RIGHT L3,L4,L5;  Surgeon: Alex Callahan MD;  Location: [facility] PAIN MGT;  Service: Pain Management;  Laterality: Right;    RADIOFREQUENCY ABLATION Left 9/15/2023    Procedure: RADIOFREQUENCY ABLATION, LEFT C4-C5-C6-C7 MEDIAL BRANCH ONE OF TWO  SOONER DATE;  Surgeon: Alex Callahan MD;  Location: [facility] PAIN MGT;  Service: Pain Management;  Laterality: Left;    RADIOFREQUENCY ABLATION Right 9/29/2023    Procedure: RADIOFREQUENCY ABLATION,  RIGHT C4-C5-C6-C7 MEDIAL BRANCH TWO OF TWO SOONER DATE;  Surgeon: Alex Callahan MD;  Location: [facility] PAIN MGT;  Service: Pain Management;  Laterality: Right;    RADIOFREQUENCY ABLATION Bilateral 7/18/2024    Procedure: RADIOFREQUENCY ABLATION BILATERAL L3, 4, 5;  Surgeon: Alex Callahan MD;  Location: [facility] PAIN MGT;  Service: Pain Management;  Laterality: Bilateral;  [phone]  3 WK F/U EMILY    RADIOFREQUENCY ABLATION Bilateral 9/6/2024    Procedure: RADIOFREQUENCY ABLATION BILATERAL C4, 5, 6, 7;  Surgeon: Alex Callahan MD;  Location: [facility] PAIN MGT;  Service: Pain Management;  Laterality: Bilateral;  [phone]  3 WK F/U NICK    RADIOFREQUENCY ABLATION Bilateral 5/29/2025    Procedure: RADIOFREQUENCY ABLATION BILATERAL L3, 4, 5;  Surgeon: Alex Callahan MD;  Location: [facility] PAIN MGT;  Service: Pain Management;  Laterality:  "Bilateral;  4 WK F/U NICK    RETINAL DETACHMENT SURGERY      ROTATOR CUFF REPAIR Right     SPINE SURGERY      cerival fusion     TRANSFORAMINAL EPIDURAL INJECTION OF STEROID Right 4/14/2022    Procedure: INJECTION, STEROID, EPIDURAL, TRANSFORAMINAL APPROACH RIGHT L4/5 & L5/S1 Needs Consent;  Surgeon: Alex Callahan MD;  Location: [facility] PAIN MGT;  Service: Pain Management;  Laterality: Right;    TRIGGER POINT INJECTION Left 6/13/2019    Procedure: INJECTION, TRIGGER POINT;  Surgeon: Kasandra Montoya MD;  Location: [facility] PAIN MGT;  Service: Pain Management;  Laterality: Left;       Family History:  Family History   Problem Relation Name Age of Onset    Heart disease Mother      Cancer Father      Leukemia Brother      Colon cancer Neg Hx      Celiac disease Neg Hx      Crohn's disease Neg Hx      Esophageal cancer Neg Hx      Inflammatory bowel disease Neg Hx      Irritable bowel syndrome Neg Hx      Liver cancer Neg Hx      Rectal cancer Neg Hx      Stomach cancer Neg Hx      Ulcerative colitis Neg Hx         Social History:  Social History     Socioeconomic History    Marital status:    Tobacco Use    Smoking status: Never    Smokeless tobacco: Never   Substance and Sexual Activity    Alcohol use: No    Drug use: No       OBJECTIVE:    /81 (BP Location: Right arm, Patient Position: Sitting)   Pulse 61   Temp 97.6 °F (36.4 °C) (Oral)   Ht 6' 1" (1.854 m)   Wt 95.8 kg (211 lb 3.2 oz)   SpO2 98%   BMI 27.86 kg/m²     PHYSICAL EXAMINATION:    General appearance: Well appearing, in no acute distress, alert and oriented x3.  Psych:  Mood and affect appropriate.  Skin: Skin color, texture, turgor normal, no rashes in both upper and lower body. Subcutaneous lesion to left anterior mid thigh. No redness, edema, drainage.   Head/face:  Atraumatic, normocephalic. No palpable lymph nodes   Cor: Rate regular  Pulm: Symmetric chest rise, no respiratory distress noted.   Abdomen: Non-distended.  : subcutaneous, " soft palpable mass to left groin.   Back: Straight leg raising in the sitting position is negative for radicular leg pain bilaterally. There is pain with palpation over lumbar paraspinals and facet joints bilaterally. Limited ROM with pain on extension. Positive facet loading bilaterally. Mild pain to palpation over bilateral SI joints. Positive Marija finger. Positive Gaenslen's and + KHRIS on left, Negative FADIR bilaterally.   Extremities: No deformities, edema, or skin discoloration. Good capillary refill.  Musculoskeletal: Left hip provacative maneuvers negative.  Bilateral upper and lower extremity strength is normal and symmetric. No atrophy or tone abnormalities are noted.  Neuro:  No loss of sensation is noted.  Gait: Antalgic, ambulates with cane for assistance       ASSESSMENT: 60 y.o. year old male with neck and lumbar pain, consistent with      1. Chronic pain disorder        2. Lumbar radiculopathy  Procedure Order to Pain Management      3. Lumbar spondylosis        4. Degeneration of intervertebral disc of lumbar region with discogenic back pain        5. Chronic left hip pain  Procedure Order to Pain Management            PLAN:     - Previous imaging was reviewed and discussed with the patient today. Labs reviewed.     - Schedule for left L2/3 and L3/4 TF NGUYEN.     - We can repeat cervical RFA as needed.     - Continue Gabapentin.     - Continue Robaxin 500 mg PRN muscle pain.     - Pain contract signed 2/12/2021.     - Continue Norco 7.5/325 mg BID PRN. Refill provided.       - The patient is here today for a refill of current pain medications and they believe these provide effective pain control and improvements in quality of life.  The patient notes no serious side effects, and feels the benefits outweigh the risks.  The patient was reminded of the pain contract that they signed previously as well as the risks and benefits of the medication including possible death.  The updated [place] Board   Pharmacy prescription monitoring program was reviewed, and the patient has been found to be compliant with current treatment plan. Medication management provided by Dr. Callahan.     - UDS from 5/28/2024 reviewed, negative for medications, he does take sparingly.     - Continue home exercise routine.     - RTC 2 weeks after above procedure.     The above plan and management options were discussed at length with patient. Patient is in agreement with the above and verbalized understanding.    Angelique Barahona NP  08/06/2025

## 2025-08-21 ENCOUNTER — PATIENT MESSAGE (OUTPATIENT)
Dept: PAIN MEDICINE | Facility: OTHER | Age: 60
End: 2025-08-21
Payer: MEDICARE

## 2025-08-25 ENCOUNTER — HOSPITAL ENCOUNTER (OUTPATIENT)
Facility: OTHER | Age: 60
Discharge: HOME | End: 2025-08-25
Attending: ANESTHESIOLOGY | Admitting: ANESTHESIOLOGY
Payer: MEDICARE

## 2025-08-25 VITALS
WEIGHT: 214 LBS | HEIGHT: 73 IN | SYSTOLIC BLOOD PRESSURE: 134 MMHG | TEMPERATURE: 98 F | HEART RATE: 55 BPM | BODY MASS INDEX: 28.36 KG/M2 | RESPIRATION RATE: 18 BRPM | DIASTOLIC BLOOD PRESSURE: 81 MMHG | OXYGEN SATURATION: 98 %

## 2025-08-25 DIAGNOSIS — M54.16 LUMBAR RADICULITIS: Primary | ICD-10-CM

## 2025-08-25 DIAGNOSIS — G89.29 CHRONIC PAIN: ICD-10-CM

## 2025-08-25 LAB — POCT GLUCOSE: 111 MG/DL (ref 70–110)

## 2025-08-25 PROCEDURE — 64483 NJX AA&/STRD TFRM EPI L/S 1: CPT | Mod: LT,,, | Performed by: ANESTHESIOLOGY

## 2025-08-25 PROCEDURE — 63600175 PHARM REV CODE 636 W HCPCS: Performed by: ANESTHESIOLOGY

## 2025-08-25 PROCEDURE — 64483 NJX AA&/STRD TFRM EPI L/S 1: CPT | Mod: LT | Performed by: ANESTHESIOLOGY

## 2025-08-25 PROCEDURE — 25500020 PHARM REV CODE 255: Performed by: ANESTHESIOLOGY

## 2025-08-25 PROCEDURE — 64484 NJX AA&/STRD TFRM EPI L/S EA: CPT | Mod: LT | Performed by: ANESTHESIOLOGY

## 2025-08-25 PROCEDURE — 25000003 PHARM REV CODE 250: Performed by: ANESTHESIOLOGY

## 2025-08-25 PROCEDURE — 64484 NJX AA&/STRD TFRM EPI L/S EA: CPT | Mod: LT,,, | Performed by: ANESTHESIOLOGY

## 2025-08-25 RX ORDER — ALPRAZOLAM 0.5 MG/1
1 TABLET, ORALLY DISINTEGRATING ORAL ONCE
Status: COMPLETED | OUTPATIENT
Start: 2025-08-25 | End: 2025-08-25

## 2025-08-25 RX ORDER — DEXAMETHASONE SODIUM PHOSPHATE 10 MG/ML
INJECTION, SOLUTION INTRA-ARTICULAR; INTRALESIONAL; INTRAMUSCULAR; INTRAVENOUS; SOFT TISSUE
Status: DISCONTINUED | OUTPATIENT
Start: 2025-08-25 | End: 2025-08-25 | Stop reason: HOSPADM

## 2025-08-25 RX ORDER — LIDOCAINE HYDROCHLORIDE 10 MG/ML
INJECTION, SOLUTION EPIDURAL; INFILTRATION; INTRACAUDAL; PERINEURAL
Status: DISCONTINUED | OUTPATIENT
Start: 2025-08-25 | End: 2025-08-25 | Stop reason: HOSPADM

## 2025-08-25 RX ORDER — LIDOCAINE HYDROCHLORIDE 20 MG/ML
INJECTION, SOLUTION INFILTRATION; PERINEURAL
Status: DISCONTINUED | OUTPATIENT
Start: 2025-08-25 | End: 2025-08-25 | Stop reason: HOSPADM

## 2025-08-25 RX ORDER — SODIUM CHLORIDE 9 MG/ML
INJECTION, SOLUTION INTRAVENOUS CONTINUOUS
Status: DISCONTINUED | OUTPATIENT
Start: 2025-08-25 | End: 2025-08-25 | Stop reason: HOSPADM

## 2025-08-25 RX ADMIN — ALPRAZOLAM 1 MG: 0.5 TABLET, ORALLY DISINTEGRATING ORAL at 08:08

## (undated) DEVICE — PAD CAST SPECIALIST STRL 4

## (undated) DEVICE — DRESSING LEUKOPLAST FLEX 1X3IN

## (undated) DEVICE — BANDAID STRIP PLASTIC 3/4X3

## (undated) DEVICE — CANNULA DRY DOC 7 X 85

## (undated) DEVICE — NDL SCORPIAN SUTURE PASSER

## (undated) DEVICE — PAD ELECTRODE STER 1.5X3

## (undated) DEVICE — SEE MEDLINE ITEM 146308

## (undated) DEVICE — BLADE 4.2MM PREBENT ULTRACUT

## (undated) DEVICE — BANDAGE ADHESIVE

## (undated) DEVICE — SEE MEDLINE ITEM 146420

## (undated) DEVICE — PENCIL ELECTROSURG HOLST W/BLD

## (undated) DEVICE — ELECTRODE REM PLYHSV RETURN 9

## (undated) DEVICE — SPONGE LAP 18X18 PREWASHED

## (undated) DEVICE — BLADE SHAVER 4.5 6/BX

## (undated) DEVICE — GAUZE SPONGE 4X4 12PLY

## (undated) DEVICE — GLOVE BIOGEL SKINSENSE PI 7.0

## (undated) DEVICE — SHAVER ULTRAFFR 4.2MM

## (undated) DEVICE — SEE MEDLINE ITEM 152622

## (undated) DEVICE — TUBE SET INFLOW/OUTFLOW

## (undated) DEVICE — Device

## (undated) DEVICE — SEE MEDLINE ITEM 152530

## (undated) DEVICE — PROBE MULTI PORT RF 90 DEGREE

## (undated) DEVICE — SUT MCRYL PLUS 4-0 PS2 27IN

## (undated) DEVICE — SEE MEDLINE ITEM 152529

## (undated) DEVICE — ADHESIVE MASTISOL VIAL 48/BX

## (undated) DEVICE — NDL 18GA X1 1/2 REG BEVEL

## (undated) DEVICE — APPLICATOR CHLORAPREP ORN 26ML

## (undated) DEVICE — SOL IRR NACL .9% 3000ML

## (undated) DEVICE — UNDERGLOVES BIOGEL PI SIZE 7.5

## (undated) DEVICE — POSITIONER IV ARMBOARD FOAM

## (undated) DEVICE — GOWN SMART IMP BREATHABLE XXLG

## (undated) DEVICE — DRG DOC 8.0 X 85MM

## (undated) DEVICE — PAD SHOULDER CARE POLAR

## (undated) DEVICE — SUT VICRYL PLUS 3-0 SH 18IN

## (undated) DEVICE — SUT VICRYL PLUS 0 CT1 18IN

## (undated) DEVICE — GLOVE SURGEON SYN PF SZ 9

## (undated) DEVICE — SOL 9P NACL IRR PIC IL

## (undated) DEVICE — DRAPE SURG W/TWL 17 5/8X23

## (undated) DEVICE — KIT SHOULDER POSITIONER SPIDER

## (undated) DEVICE — DRESSING XEROFORM 1X8IN

## (undated) DEVICE — REPAIR KIT SMALL JOINT BIO TEN

## (undated) DEVICE — SUPPORT SLING SHOT II MEDIUM

## (undated) DEVICE — CORD FOR BIPOLAR FORCEPS 12

## (undated) DEVICE — SEE MEDLINE ITEM 153151

## (undated) DEVICE — SUT 1 36IN PDS II VIO MONO

## (undated) DEVICE — DRAPE STERI U-SHAPED 47X51IN

## (undated) DEVICE — GLOVE ORTHO PF SZ 8.5

## (undated) DEVICE — CANNULA HEX FLEX 8 X 85MM

## (undated) DEVICE — CLOSURE SKIN STERI STRIP 1/2X4

## (undated) DEVICE — DRAPE STERI INSTRUMENT 1018

## (undated) DEVICE — DRESSING XEROFORM FOIL PK 1X8

## (undated) DEVICE — PAD ABD 8X10 STERILE